# Patient Record
Sex: MALE | Race: BLACK OR AFRICAN AMERICAN | NOT HISPANIC OR LATINO | Employment: OTHER | URBAN - NONMETROPOLITAN AREA
[De-identification: names, ages, dates, MRNs, and addresses within clinical notes are randomized per-mention and may not be internally consistent; named-entity substitution may affect disease eponyms.]

---

## 2017-01-09 DIAGNOSIS — I65.23 BILATERAL CAROTID ARTERY STENOSIS: Primary | ICD-10-CM

## 2017-01-10 ENCOUNTER — OFFICE VISIT (OUTPATIENT)
Dept: CARDIAC SURGERY | Facility: CLINIC | Age: 82
End: 2017-01-10

## 2017-01-10 VITALS
WEIGHT: 157 LBS | HEIGHT: 66 IN | BODY MASS INDEX: 25.23 KG/M2 | TEMPERATURE: 98.3 F | HEART RATE: 89 BPM | OXYGEN SATURATION: 98 % | SYSTOLIC BLOOD PRESSURE: 180 MMHG | DIASTOLIC BLOOD PRESSURE: 81 MMHG

## 2017-01-10 DIAGNOSIS — I65.23 CAROTID STENOSIS, ASYMPTOMATIC, BILATERAL: Primary | ICD-10-CM

## 2017-01-10 PROCEDURE — 99212 OFFICE O/P EST SF 10 MIN: CPT | Performed by: THORACIC SURGERY (CARDIOTHORACIC VASCULAR SURGERY)

## 2017-01-10 RX ORDER — LISINOPRIL 20 MG/1
20 TABLET ORAL 2 TIMES DAILY
Qty: 60 TABLET | Refills: 3 | Status: SHIPPED | OUTPATIENT
Start: 2017-01-10 | End: 2017-05-09 | Stop reason: SDUPTHER

## 2017-01-10 NOTE — LETTER
January 15, 2017     Ada Cloud MD  [facility] [address] [place] [address]    Patient: General Keith Ruvalcaba Jr.   YOB: 1935   Date of Visit: 1/10/2017       Dear Dr. Ai MD:    General Ruvalcaba was in my office today. Below are the relevant portions of my assessment and plan of care.           If you have questions, please do not hesitate to call me. I look forward to following General along with you.         Sincerely,        Jack L. Hamman, MD        CC: No Recipients

## 2017-01-16 ENCOUNTER — OFFICE VISIT (OUTPATIENT)
Dept: CARDIOLOGY | Facility: CLINIC | Age: 82
End: 2017-01-16

## 2017-01-16 ENCOUNTER — TELEPHONE (OUTPATIENT)
Dept: CARDIOLOGY | Facility: CLINIC | Age: 82
End: 2017-01-16

## 2017-01-16 VITALS
HEART RATE: 84 BPM | WEIGHT: 159 LBS | BODY MASS INDEX: 25.55 KG/M2 | SYSTOLIC BLOOD PRESSURE: 170 MMHG | DIASTOLIC BLOOD PRESSURE: 86 MMHG | HEIGHT: 66 IN

## 2017-01-16 DIAGNOSIS — I50.32 CHRONIC DIASTOLIC CONGESTIVE HEART FAILURE (HCC): Chronic | ICD-10-CM

## 2017-01-16 DIAGNOSIS — I25.10 CORONARY ARTERY DISEASE INVOLVING NATIVE CORONARY ARTERY OF NATIVE HEART WITHOUT ANGINA PECTORIS: Primary | ICD-10-CM

## 2017-01-16 DIAGNOSIS — N18.2 CHRONIC KIDNEY DISEASE, STAGE 2 (MILD): Chronic | ICD-10-CM

## 2017-01-16 DIAGNOSIS — E11.9 TYPE 2 DIABETES MELLITUS WITHOUT COMPLICATION, WITHOUT LONG-TERM CURRENT USE OF INSULIN (HCC): ICD-10-CM

## 2017-01-16 PROCEDURE — 99214 OFFICE O/P EST MOD 30 MIN: CPT | Performed by: INTERNAL MEDICINE

## 2017-01-16 RX ORDER — SPIRONOLACTONE AND HYDROCHLOROTHIAZIDE 25; 25 MG/1; MG/1
0.5 TABLET ORAL DAILY
Qty: 30 TABLET | Refills: 12 | Status: SHIPPED | OUTPATIENT
Start: 2017-01-16 | End: 2017-01-28 | Stop reason: HOSPADM

## 2017-01-16 NOTE — MR AVS SNAPSHOT
General Parker Jordon    1/16/2017 10:15 AM   Office Visit    Dept Phone:  [phone]   Encounter #:  [id number]    Provider:  Kj Green MD   Department:  [facility] CARDIOLOGY                Your Full Care Plan              Today's Medication Changes          These changes are accurate as of: 1/16/17 11:12 AM.  If you have any questions, ask your nurse or doctor.               New Medication(s)Ordered:     spironolactone-hydrochlorothiazide 25-25 MG tablet   Commonly known as:  ALDACTAZIDE   Take 0.5 tablets by mouth Daily.   Started by:  Kj Green MD         Stop taking medication(s)listed here:     hydrALAZINE 25 MG tablet   Commonly known as:  APRESOLINE   Stopped by:  Kj Green MD           traMADol 50 MG tablet   Commonly known as:  ULTRAM   Stopped by:  Kj Green MD                Where to Get Your Medications      These medications were sent to [place] [address] [address] - [phone] [facility] [phone]   [address] [address]     Phone:  [phone]     spironolactone-hydrochlorothiazide 25-25 MG tablet                  Your Updated Medication List          This list is accurate as of: 1/16/17 11:12 AM.  Always use your most recent med list.                amLODIPine 10 MG tablet   Commonly known as:  NORVASC       aspirin 81 MG EC tablet       ferrous sulfate 325 (65 FE) MG tablet   Take 1 tablet by mouth Daily With Breakfast.       gabapentin 300 MG capsule   Commonly known as:  NEURONTIN   Take 1 capsule by mouth Every Night. at bedtime       glucose blood test strip   100 each by Other route Daily. TEST BLOOD SUGAR ONCE DAILY (LOT [id number] EXP 5/22/18); Truetest Test Strips       lisinopril 20 MG tablet   Commonly known as:  PRINIVIL,ZESTRIL   Take 1 tablet by mouth 2 (Two) Times a Day.       pravastatin 40 MG tablet   Commonly known as:  PRAVACHOL   Take  1 tablet by mouth daily.       SITagliptin 50 MG tablet   Commonly known as:  JANUVIA   Take 1 tablet by mouth Daily.       spironolactone-hydrochlorothiazide 25-25 MG tablet   Commonly known as:  ALDACTAZIDE   Take 0.5 tablets by mouth Daily.       TRUEPLUS LANCETS 30G misc   1 Device Daily. TEST               You Were Diagnosed With        Codes Comments    Coronary artery disease involving native coronary artery of native heart without angina pectoris    -  Primary ICD-10-CM: I25.10  ICD-9-CM: 414.01     Chronic diastolic congestive heart failure     ICD-10-CM: I50.32  ICD-9-CM: 428.32, 428.0     Chronic kidney disease, stage 2 (mild)     ICD-10-CM: N18.2  ICD-9-CM: 585.2     Type 2 diabetes mellitus without complication, without long-term current use of insulin     ICD-10-CM: E11.9  ICD-9-CM: 250.00       Instructions     None    Patient Instructions History      Upcoming Appointments     Visit Type Date Time Department    OFFICE VISIT 2017 10:15 AM [facility] HEART CARE [facility]AD    FOLLOW UP 2017  9:30 AM [facility] ORTHOPEDICS MAD    OFFICE VISIT 2017  8:30 AM [facility] INTERNAL MED MAD    OFFICE VISIT 2017  2:00 PM [facility] HEART CARE [facility]    CHF F/U 2017  9:00 AM [facility] CARDIOLOGY MAD    LAB 2017  9:00 AM [facility] OP INFUSION    FOLLOW UP - ONCOLOGY 2017  9:30 AM [facility] RAD ONC MAD    OFFICE VISIT 10/30/2017  8:00 AM [facility]OLOGY [facility] Sign[facility]     [facility] [employer] allows you to send messages to your doctor, view your test results, renew your prescriptions, schedule appointments, and more. To sign up, go to [employer] and click on the Sign Up Now link in the New User? box. Enter your [employer] Activation Code exactly as it appears below along with the last four digits of your Social Security Number and your Date of Birth () to complete the sign-up process. If you do not sign up before the expiration date, you must request a new code.    [employer] Activation Code:  [email or web address]  Expires: 1/23/2017  4:34 AM    If you have questions, you can email [email or web address] or call [phone] to talk to our MyChart staff. Remember, MyChart is NOT to be used for urgent needs. For medical emergencies, dial 911.               Other Info from Your Visit           Your Appointments     Jan 17, 2017  9:30 AM CST   Follow Up with Lonny Lorenzo MD   [facility] ORTHOPEDICS (--)    [address]  [address] [facility] [address] [phone]           Arrive 15 minutes prior to appointment.            Jan 23, 2017  8:30 AM CST   Office Visit with Ada Cloud MD   [facility] INTERNAL MEDICINE (--)    [address]  [address] [place] [address] [phone]           Arrive 15 minutes prior to appointment.            Feb 28, 2017  2:00 PM CST   Office Visit with Kj Green MD   [facility] CARDIOLOGY (--)    [address] [place] [address] [address] [place] [address] [phone]           Arrive 15 minutes prior to appointment.            Apr 11, 2017  9:00 AM CDT   CHF F/U with REGINA Carlos   [facility] CARDIOLOGY (--)    [address] [place] [address] [address]  [address] [place] [address] [phone]            Jul 25, 2017  9:00 AM CDT   LAB with NURSE JOSSE MARX   [facility] OP INFUSION ([facility]    [address] [address] [phone]              Allergies     No Known Allergies      Vital Signs     Blood Pressure Pulse Height Weight Body Mass Index Smoking Status    170/86 84 66\" (167.6 cm) 159 lb (72.1 kg) 25.66 kg/m2 Former Smoker      Problems and Diagnoses Noted     Heart failure    Chronic kidney disease    Coronary heart disease    Type 2 diabetes mellitus without complication, without long-term current use of insulin        "

## 2017-01-16 NOTE — PROGRESS NOTES
[facility] Keith Jordon .  1935            81 y.o.      1/10/2017    Chief Complaint   Patient presents with   • Carotid Artery Disease     2 year follow up     In August 2, 2005 this patient developed a DVT and pulmonary embolus despite adequate anticoagulation and thus underwent implantation of a vena caval filter which has remained stable and intact since that time.  He has long since been off anticoagulation.  [place] 10 when seen he was thought to have a significant carotid stenosis bilaterally been under surveillance since that time.  CT scan of his abdomen 5/14/14 demonstrated calcification in his abdominal aorta but NO ABDOMINAL AORTIC ANEURYSM      HPI        ROS       Current Outpatient Prescriptions:   •  amLODIPine (NORVASC) 10 MG tablet, Take 10 mg by mouth daily., Disp: , Rfl:   •  aspirin 81 MG EC tablet, Take 81 mg by mouth daily., Disp: , Rfl:   •  ferrous sulfate 325 (65 FE) MG tablet, Take 1 tablet by mouth Daily With Breakfast., Disp: 60 tablet, Rfl: 2  •  gabapentin (NEURONTIN) 300 MG capsule, Take 1 capsule by mouth Every Night. at bedtime, Disp: 30 capsule, Rfl: 4  •  glucose blood test strip, 100 each by Other route Daily. TEST BLOOD SUGAR ONCE DAILY (LOT [id number] EXP 5/22/18); Truetest Test Strips, Disp: 100 each, Rfl: 1  •  hydrALAZINE (APRESOLINE) 25 MG tablet, Take 1 tablet by mouth 2 (two) times a day., Disp: 60 tablet, Rfl: 2  •  pravastatin (PRAVACHOL) 40 MG tablet, Take 1 tablet by mouth daily., Disp: 30 tablet, Rfl: 5  •  traMADol (ULTRAM) 50 MG tablet, Take 1 tablet by mouth every 8 (eight) hours as needed for moderate pain (4-6)., Disp: 1 tablet, Rfl: 0  •  TRUEPLUS LANCETS 30G misc, 1 Device Daily. TEST, Disp: 100 each, Rfl: 3  •  lisinopril (PRINIVIL,ZESTRIL) 20 MG tablet, Take 1 tablet by mouth 2 (Two) Times a Day., Disp: 60 tablet, Rfl: 3  •  SITagliptin (JANUVIA) 50 MG tablet, Take 1 tablet by mouth Daily., Disp: 30 tablet, Rfl: 3    The following portions of the patient's  "history were reviewed and updated as appropriate: allergies, current medications, past family history, past medical history, past social history, past surgical history and problem list.    Physical Exam  Visit Vitals   • /81 (BP Location: Right arm)   • Pulse 89   • Temp 98.3 °F (36.8 °C) (Oral)   • Ht 66\" (167.6 cm)   • Wt 157 lb (71.2 kg)   • SpO2 98%   • BMI 25.34 kg/m2       HEENT:    CHEST:    HEART:    ABDOMEN:    EXTREMITIES:Pulses easily palpable both ankles DP PT arteries bilaterally    VASCULAR LAB STUDIES:CAROTID DUPLEX SCAN (1-10-17                                                                     RIGHT   0-15%              LEFT  0-15%          VERTEBRAL FLOW    Antegrade                     Antegrade          RADIOLOGY:      Carotid stenosis, asymptomatic, bilateral [I65.23]    IMPRESSION:  1. Minimal carotid stenosis asymptomatic                Assessment/Plan  General was seen today for carotid artery disease.    Diagnoses and all orders for this visit:    Carotid stenosis, asymptomatic, bilateral                Plan   1. Return 2 years carotid duplex scan        "

## 2017-01-16 NOTE — TELEPHONE ENCOUNTER
----- Message from Madelin Meza sent at 1/16/2017  1:25 PM CST -----  Contact: [phone]  Patient called to let you know that his blood pressure has been running high, he said the top number has been higher than normal.  He would like for you to call him at [phone].    Telephone contact: Mr Ruvalcaba desired to update me on medication changes made by Dr Green earlier today during office visit. Medication changes were made today due to uncontrolled hypertension.

## 2017-01-16 NOTE — PROGRESS NOTES
General Keith Ruvalcaba Jr.  81 y.o. male    01/16/2017  1. Coronary artery disease involving native coronary artery of native heart without angina pectoris    2. Chronic diastolic congestive heart failure    3. Chronic kidney disease, stage 2 (mild)    4. Type 2 diabetes mellitus without complication, without long-term current use of insulin        Chief complaint - follow-up diastolic heart failure      History of present Illness- 81-year-old gentleman with history of diastolic heart failure, systolic hypertension, history of atrial flutter and nonobstructive coronary disease has been doing fairly well but is systolic blood pressure staying around 170-190.  And going to add Aldactazide half a tablet in the morning along with his lisinopril and amlodipine.  We'll check a Chem-7 and a BNP in 1 week.  And he will follow with me in 6-8 weeks to see how he does.  He does not have any chest pain or shortness of breath no weight gain or weight loss, no TIA symptoms.        No Known Allergies      Past Medical History   Diagnosis Date   • Acquired hallux rigidus    • Arthropathy of lumbar facet joint    • Carpal tunnel syndrome    • CHF (congestive heart failure)    • Chronic diastolic congestive heart failure    • Chronic kidney disease    • Chronic kidney disease, stage II (mild)      Chronic kidney disease stage 2   • Chronic obstructive lung disease    • Chronic renal failure    • Coronary artery disease    • Cortical senile cataract    • Diabetes mellitus    • Diabetes mellitus type 2, noninsulin dependent      diabetes mellitus type 2, non-insulin treated, Medication treatment plan: oral diabetic medication   • Diabetes mellitus without complication    • Diastolic dysfunction    • Diastolic heart failure    • Essential hypertension      difficult to control      • Hip pain    • History of echocardiogram 08/19/2015     Echocardiogram W/ color flow 26746 (1) - Mild to moderate LVH with mild left atrial enlargement and  normal aortic root.CLVH,preserved LV systolic function with Ef of 55%.Diastolic dysfunction.MV intact.AV thickened.Aortic sclerosis.   • Influenza vaccine administered 10/02/2015     INFLUENZA IMMUN ADMIN OR PREV RECV'D  (1) - Ordered By: DIONNE MANDUJANO ([facility])    • Insomnia    • Insomnia      Other insomnia   • Joint pain    • Low back pain    • Muscle strain    • Nuclear cataract    • Pneumococcal vaccination given 07/22/2016     PNEUMOC VAC/ADMIN/RCVD 4040F (3) - Ordered By: DIONNE MANDUJANO ([facility])   • Pure hypercholesterolemia    • Sedentary lifestyle          Past Surgical History   Procedure Laterality Date   • Injection of medication  09/06/2012     Albuterol (2u) (1) - Ordered By: LIDYA STEINER ([facility])    • Injection of medication  09/06/2012     Atrovent (1) - Ordered By: LIDYA STEINER ([facility])    • Cardiac ablation     • Cardiac catheterization  02/04/1986     Cardiac cath 13892 (1) - normal left heart catherization,non cardiac chest pain   • Carpal tunnel release  10/19/2015     Carpal tunnel surgery (1) - Release of left carpal tunnel   • Endoscopy  08/05/2013     Colon endoscopy 86544 (2) - Hemorrhoids found.   • Injection of medication  02/10/2012     Depo Medrol (Methylprednisone) (3) - Ordered By: HEATHER PAK ([facility])    • Other surgical history  09/24/2011     Drain/Inject Major Joint 20610 (1) - Ordered By: KARLOS HERNANDEZ ([facility])    • Inguinal hernia repair Right 06/07/1968     Inguinal hernia, repair (2)   • Other surgical history       Insert IVC filter 59771 (1)   • Injection of medication  02/09/2014     Kenalog (4) - Ordered By: DIMAS ANDRADE ([facility])    • Lumbar laminectomy  2007     Lumbar laminectomy (1)   • Other surgical history  10/11/2011     SPIROMETRY 69631 (1) - Ordered By: HEATHER PAK ([facility])   • Mandible fracture surgery  10/11/1981     Treat nose/jaw fracture (1) - bilateral open reduction of fracture of mandible         Family History   Problem  Relation Age of Onset   • Diabetes Other    • Coronary artery disease Neg Hx          Social History     Social History   • Marital status:      Spouse name: N/A   • Number of children: N/A   • Years of education: N/A     Occupational History   • Not on file.     Social History Main Topics   • Smoking status: Former Smoker     Years: 25.00   • Smokeless tobacco: Never Used      Comment: Patient quit smoking years ago, smoked less than a pack a day for 25 years   • Alcohol use No   • Drug use: No   • Sexual activity: Defer      Comment: Marital status:      Other Topics Concern   • Not on file     Social History Narrative         Current Outpatient Prescriptions   Medication Sig Dispense Refill   • amLODIPine (NORVASC) 10 MG tablet Take 10 mg by mouth daily.     • aspirin 81 MG EC tablet Take 81 mg by mouth daily.     • ferrous sulfate 325 (65 FE) MG tablet Take 1 tablet by mouth Daily With Breakfast. 60 tablet 2   • gabapentin (NEURONTIN) 300 MG capsule Take 1 capsule by mouth Every Night. at bedtime 30 capsule 4   • glucose blood test strip 100 each by Other route Daily. TEST BLOOD SUGAR ONCE DAILY (LOT [id number] EXP 5/22/18); Truetest Test Strips 100 each 1   • lisinopril (PRINIVIL,ZESTRIL) 20 MG tablet Take 1 tablet by mouth 2 (Two) Times a Day. 60 tablet 3   • pravastatin (PRAVACHOL) 40 MG tablet Take 1 tablet by mouth daily. 30 tablet 5   • SITagliptin (JANUVIA) 50 MG tablet Take 1 tablet by mouth Daily. 30 tablet 3   • TRUEPLUS LANCETS 30G misc 1 Device Daily. TEST 100 each 3   • spironolactone-hydrochlorothiazide (ALDACTAZIDE) 25-25 MG tablet Take 0.5 tablets by mouth Daily. 30 tablet 12     No current facility-administered medications for this visit.          Review of Systems     Constitution: Denies any fatigue, fever or chills    HENT: Denies any headache, hearing impairment, nasal discharge or sore throat    Eyes: Denies any blurring of vision, or photophobia     Cardivascular - As per history  "of present illness     Respiratory system-denies any COPD, shortness of breath, sleep apnea.     Endocrine:   history of hyperlipidemia, diabetes       Musculoskeletal:   history of arthritis, musculoskeletal problems    Gastrointestinal: No nausea, vomiting, or melena    Genitourinary: No dysuria or hematuria    Neurological:   No history of seizure disorder, stroke, memory problems    Psychiatric/Behavioral:        No history of depression, bipolar disorder or schizophrenia     Hematological- no history of easy bruising or any bleeding diathesis            OBJECTIVE    Visit Vitals   • /86   • Pulse 84   • Ht 66\" (167.6 cm)   • Wt 159 lb (72.1 kg)   • BMI 25.66 kg/m2         Physical Exam     Constitutional: is oriented to person, place, and time.     Skin-warm and dry    Well developed and nourished in no acute distress      Head: Normocephalic and atraumatic.     Eyes: Pupils are equal, round, and reactive to light.     Neck: Neck supple. No bruit in the carotids, no elevation of JVD    Cardiovascular: [place] in the fifth intercostal space, regular rate, and  Rhythm,  S1 greater than S2, no S3 or S4, no gallop       Pulmonary/Chest:   Air  Entry is equal on both sides  No wheezing or crackles,      Abdominal: Soft.  No hepatosplenomegaly, bowel sounds are present    Musculoskeletal: No kyphoscoliosis, no significant thickening of the joints    Neurological: is alert and oriented to person, place, and time.    cranial nerve are intact .   No motor or sensory deficit    Extremities-no edema,  pulses are felt equally on both sides, no radial femoral delay      Psychiatric: He has a normal mood and affect.                  His behavior is normal.           Procedures      Lab Results   Component Value Date    WBC 5.7 10/10/2016    HGB 14.4 10/10/2016    HCT 42.3 10/10/2016    MCV 83.3 10/10/2016     10/10/2016     Lab Results   Component Value Date    GLUCOSE 101 (H) 08/25/2016    BUN 12 08/25/2016    " CREATININE 1.1 08/25/2016    CO2 28 08/25/2016    CALCIUM 9.2 08/25/2016    PROTENTOTREF 6.4 05/14/2015    ALBUMIN 4.2 09/21/2016    LABIL2 1.7 05/14/2015    AST 30 09/21/2016    ALT 19 (L) 09/21/2016     No results found for: CHOL  Lab Results   Component Value Date    TRIG 67 08/26/2016    TRIG 157 08/27/2015    TRIG 67 06/11/2015     Lab Results   Component Value Date    HDL 49 (L) 08/26/2016     Lab Results   Component Value Date    LDLCALC 79 08/26/2016    LDLCALC 96 08/27/2015    LDLCALC 100 06/11/2015     No results found for: LDL  No results found for: HDLLDLRATIO  No components found for: CHOLHDL  Lab Results   Component Value Date    HGBA1C 5.9 (H) 09/21/2016     Lab Results   Component Value Date    TSH 1.70 09/21/2016                  A/P    Diastolic heart failure with uncontrolled systolic hypertension added Aldactazide half a tablet daily along with lisinopril and amlodipine.  We'll check a BNP and a BMP in 1 week.  follow with me in 6 weeks.    Systolic hypertension not very well controlled added Aldactazide.    Diabetes is on Januvia and does Accu-Cheks.    Hyperlipidemia on pravastatin doing okay.      Kj Green MD  1/16/2017  11:11 AM

## 2017-01-24 ENCOUNTER — RESULTS ENCOUNTER (OUTPATIENT)
Dept: CARDIOLOGY | Facility: CLINIC | Age: 82
End: 2017-01-24

## 2017-01-24 DIAGNOSIS — N18.2 CHRONIC KIDNEY DISEASE, STAGE 2 (MILD): Chronic | ICD-10-CM

## 2017-01-24 DIAGNOSIS — I25.10 CORONARY ARTERY DISEASE INVOLVING NATIVE CORONARY ARTERY OF NATIVE HEART WITHOUT ANGINA PECTORIS: ICD-10-CM

## 2017-01-26 ENCOUNTER — OFFICE VISIT (OUTPATIENT)
Dept: INTERNAL MEDICINE | Facility: CLINIC | Age: 82
End: 2017-01-26

## 2017-01-26 VITALS
SYSTOLIC BLOOD PRESSURE: 140 MMHG | HEIGHT: 66 IN | DIASTOLIC BLOOD PRESSURE: 80 MMHG | BODY MASS INDEX: 25.78 KG/M2 | WEIGHT: 160.4 LBS

## 2017-01-26 DIAGNOSIS — E78.2 MIXED HYPERLIPIDEMIA: ICD-10-CM

## 2017-01-26 DIAGNOSIS — Z23 IMMUNIZATION DUE: ICD-10-CM

## 2017-01-26 DIAGNOSIS — I10 ESSENTIAL HYPERTENSION: ICD-10-CM

## 2017-01-26 DIAGNOSIS — E11.9 TYPE 2 DIABETES MELLITUS WITHOUT COMPLICATION, WITHOUT LONG-TERM CURRENT USE OF INSULIN (HCC): Primary | ICD-10-CM

## 2017-01-26 PROCEDURE — 90686 IIV4 VACC NO PRSV 0.5 ML IM: CPT | Performed by: INTERNAL MEDICINE

## 2017-01-26 PROCEDURE — 99213 OFFICE O/P EST LOW 20 MIN: CPT | Performed by: INTERNAL MEDICINE

## 2017-01-26 PROCEDURE — G0008 ADMIN INFLUENZA VIRUS VAC: HCPCS | Performed by: INTERNAL MEDICINE

## 2017-01-26 RX ORDER — AMLODIPINE BESYLATE 10 MG/1
10 TABLET ORAL DAILY
Qty: 30 TABLET | Refills: 11 | Status: SHIPPED | OUTPATIENT
Start: 2017-01-26 | End: 2017-11-08

## 2017-01-26 RX ORDER — GUANFACINE 1 MG/1
1 TABLET ORAL NIGHTLY
Qty: 30 TABLET | Refills: 5 | Status: SHIPPED | OUTPATIENT
Start: 2017-01-26 | End: 2017-02-24

## 2017-01-26 NOTE — PROGRESS NOTES
Subjective   General Keith Ruvalcaba Jr. is a 81 y.o. male     Patient presents for recheck on HTN, chronic renal failure, DM and hyperlipidemia.       DM, type II.  Not always compliant with ADA diet.  Glucose is running below 150.  HgbA1c 6.1 /08/26/16/.  On ACE inhibitor for HTN.  On statin for hyperlipidemia.  Lipids., TG 67, HDL 49, LDL 79. /08/26/2016/.  He tolerates Pravastatin well, and denies any side effects to the medication. No myalgia or myositis. No history of LFTs elevation.    Patient's BP was elevated, and he was started on Alactazide by .  Follow up labwork showed hyponatremia with Na 128 and hyperkalemia with Potassium 5.2.  Renal function is stable with creatinine between 1.1 and 1.2.  Patient currently denies any chest pain, dyspnea or edema in lower extremities. No TIA symptoms.   ECHO. 08/2016.  CLINICAL IMPRESSION:  1. Mild concentric left ventricular hypertrophy with early diastolic  dysfunction.  2. Estimated ejection fraction greater than 60%.  3. Mildly sclerotic aortic valve with mild mitral annular  calcification without any hemodynamic significant stenotic process.  4. On color flow Doppler, there is trace mitral and tricuspid  regurgitation without any hemodynamic significance.  5. No intracardiac mass, pericardial effusion, or cardiac thrombus  seen.          Past Medical History   Diagnosis Date   • Acquired hallux rigidus    • Arthropathy of lumbar facet joint    • Carpal tunnel syndrome    • CHF (congestive heart failure)    • Chronic diastolic congestive heart failure    • Chronic kidney disease    • Chronic kidney disease, stage II (mild)      Chronic kidney disease stage 2   • Chronic obstructive lung disease    • Chronic renal failure    • Coronary artery disease    • Cortical senile cataract    • Diabetes mellitus    • Diabetes mellitus type 2, noninsulin dependent      diabetes mellitus type 2, non-insulin treated, Medication treatment plan: oral diabetic medication    • Diabetes mellitus without complication    • Diastolic dysfunction    • Diastolic heart failure    • Essential hypertension      difficult to control      • Hip pain    • History of echocardiogram 08/19/2015     Echocardiogram W/ color flow 20751 (1) - Mild to moderate LVH with mild left atrial enlargement and normal aortic root.CLVH,preserved LV systolic function with Ef of 55%.Diastolic dysfunction.MV intact.AV thickened.Aortic sclerosis.   • Influenza vaccine administered 10/02/2015     INFLUENZA IMMUN ADMIN OR PREV RECV'D  (1) - Ordered By: DIONNE MANDUJANO ([facility])    • Insomnia    • Insomnia      Other insomnia   • Joint pain    • Low back pain    • Muscle strain    • Nuclear cataract    • Pneumococcal vaccination given 07/22/2016     PNEUMOC VAC/ADMIN/RCVD 4040F (3) - Ordered By: DIONNE MANDUJANO ([facility])   • Pure hypercholesterolemia    • Sedentary lifestyle      Past Surgical History   Procedure Laterality Date   • Injection of medication  09/06/2012     Albuterol (2u) (1) - Ordered By: LIDYA STEINER ([facility])    • Injection of medication  09/06/2012     Atrovent (1) - Ordered By: LIDYA STEINER ([facility])    • Cardiac ablation     • Cardiac catheterization  02/04/1986     Cardiac cath 80697 (1) - normal left heart catherization,non cardiac chest pain   • Carpal tunnel release  10/19/2015     Carpal tunnel surgery (1) - Release of left carpal tunnel   • Endoscopy  08/05/2013     Colon endoscopy 46326 (2) - Hemorrhoids found.   • Injection of medication  02/10/2012     Depo Medrol (Methylprednisone) (3) - Ordered By: HEATHER PAK ([facility])    • Other surgical history  09/24/2011     Drain/Inject Major Joint 20610 (1) - Ordered By: KARLOS HERNANDEZ ([facility])    • Inguinal hernia repair Right 06/07/1968     Inguinal hernia, repair (2)   • Other surgical history       Insert IVC filter 34770 (1)   • Injection of medication  02/09/2014     Kenalog (4) - Ordered By: DIMAS ANDRADE ([facility])    •  Lumbar laminectomy  2007     Lumbar laminectomy (1)   • Other surgical history  10/11/2011     SPIROMETRY 42698 (1) - Ordered By: HEATHER PAK ([facility])   • Mandible fracture surgery  10/11/1981     Treat nose/jaw fracture (1) - bilateral open reduction of fracture of mandible       Social History   Substance Use Topics   • Smoking status: Former Smoker     Years: 25.00   • Smokeless tobacco: Never Used      Comment: Patient quit smoking years ago, smoked less than a pack a day for 25 years   • Alcohol use No     Family History   Problem Relation Age of Onset   • Diabetes Other    • Coronary artery disease Neg Hx      No Known Allergies  Current Outpatient Prescriptions on File Prior to Visit   Medication Sig Dispense Refill   • aspirin 81 MG EC tablet Take 81 mg by mouth daily.     • ferrous sulfate 325 (65 FE) MG tablet Take 1 tablet by mouth Daily With Breakfast. 60 tablet 2   • gabapentin (NEURONTIN) 300 MG capsule Take 1 capsule by mouth Every Night. at bedtime 30 capsule 4   • glucose blood test strip 100 each by Other route Daily. TEST BLOOD SUGAR ONCE DAILY (LOT [id number] EXP 5/22/18); Truetest Test Strips 100 each 1   • lisinopril (PRINIVIL,ZESTRIL) 20 MG tablet Take 1 tablet by mouth 2 (Two) Times a Day. 60 tablet 3   • pravastatin (PRAVACHOL) 40 MG tablet Take 1 tablet by mouth daily. 30 tablet 5   • SITagliptin (JANUVIA) 50 MG tablet Take 1 tablet by mouth Daily. 30 tablet 3   • spironolactone-hydrochlorothiazide (ALDACTAZIDE) 25-25 MG tablet Take 0.5 tablets by mouth Daily. 30 tablet 12   • TRUEPLUS LANCETS 30G misc 1 Device Daily. TEST 100 each 3   • [DISCONTINUED] amLODIPine (NORVASC) 10 MG tablet Take 10 mg by mouth daily.       No current facility-administered medications on file prior to visit.      Review of Systems   Constitutional: Negative for fatigue.   HENT: Negative for congestion, mouth sores and nosebleeds.    Eyes: Negative for visual disturbance.   Respiratory: Negative for chest  tightness and shortness of breath.    Cardiovascular: Negative for chest pain, palpitations and leg swelling.   Gastrointestinal: Negative for abdominal pain, constipation and diarrhea.   Endocrine: Negative for cold intolerance, heat intolerance, polyphagia and polyuria.   Musculoskeletal: Negative for gait problem.   Neurological: Negative for dizziness, weakness and light-headedness.   Psychiatric/Behavioral: Negative for sleep disturbance. The patient is not nervous/anxious.        Objective   Physical Exam   Constitutional: He is oriented to person, place, and time. He appears well-developed and well-nourished.   HENT:   Head: Normocephalic and atraumatic.   Eyes: EOM are normal.   Neck: No JVD present. No thyromegaly present.   Cardiovascular: Normal rate and regular rhythm.    No murmur heard.  Pulmonary/Chest: Effort normal and breath sounds normal.   Abdominal: Soft. He exhibits no mass.       Neurological Sensory Findings -  Unaltered sharp/dull right ankle/foot discrimination and unaltered sharp/dull left ankle/foot discrimination.    Vascular Status -  His exam exhibits right foot vasculature normal. His exam exhibits left foot vasculature normal.   Skin Integrity  -  His right foot skin is intact.   General's left foot skin is intact. .  Neurological: He is alert and oriented to person, place, and time. No cranial nerve deficit.   Skin: Skin is warm and dry.           Patient Active Problem List   Diagnosis   • Chronic diastolic congestive heart failure   • Hypertension   • Coronary artery disease   • Chronic kidney disease   • H/O total hip arthroplasty   • Lumbar disc disease   • Osteoarthritis of multiple joints   • Iron deficiency   • Nuclear cataract   • Cortical age-related cataract   • Diabetes mellitus without complication   • Trochanteric bursitis, left hip   • Carotid stenosis, asymptomatic   • Type 2 diabetes mellitus without complication, without long-term current use of insulin                Assessment    Diagnosis Plan   1. Type 2 diabetes mellitus without complication, without long-term current use of insulin  Basic Metabolic Panel    Hemoglobin A1c    Microalbumin / Creatinine Urine Ratio   2. Essential hypertension     3. Mixed hyperlipidemia           Plan      1. Patient will continue Januvia.      ADA diet discussed with the patient.      Counseled on physical activity.  2. Will discontinue Aldactazide.      Add Tenex 1 mg ghs.       Continue Norvasc 10 mg daily and Lisinopril 20 mg bid.       Will recheck electrolytes off Altactazide.  3. Patient will continue Pravastatin for hyperlipidemia and history of CAD.      Counseled on physical activity.      He will return in 1 month for BP check.

## 2017-01-26 NOTE — MR AVS SNAPSHOT
General Parker Jordon HerndonHerman   1/26/2017 1:00 PM   Office Visit    Dept Phone:  [phone]   Encounter #:  [id number]    Provider:  Ada Cloud MD   Department:  [facility] INTERNAL MEDICINE                Your Full Care Plan              Today's Medication Changes          These changes are accurate as of: 1/26/17  1:50 PM.  If you have any questions, ask your nurse or doctor.               New Medication(s)Ordered:     guanFACINE 1 MG tablet   Commonly known as:  TENEX   Take 1 tablet by mouth Every Night.   Started by:  Ada Cloud MD            Where to Get Your Medications      These medications were sent to [facility] - [place], [place] - [address] - [phone]  - [phone]   [address], [facility] [address]     Phone:  [phone]     amLODIPine 10 MG tablet    guanFACINE 1 MG tablet                  Your Updated Medication List          This list is accurate as of: 1/26/17  1:50 PM.  Always use your most recent med list.                amLODIPine 10 MG tablet   Commonly known as:  NORVASC   Take 1 tablet by mouth Daily.       aspirin 81 MG EC tablet       ferrous sulfate 325 (65 FE) MG tablet   Take 1 tablet by mouth Daily With Breakfast.       gabapentin 300 MG capsule   Commonly known as:  NEURONTIN   Take 1 capsule by mouth Every Night. at bedtime       glucose blood test strip   100 each by Other route Daily. TEST BLOOD SUGAR ONCE DAILY (LOT [id number] EXP 5/22/18); Truetest Test Strips       guanFACINE 1 MG tablet   Commonly known as:  TENEX   Take 1 tablet by mouth Every Night.       lisinopril 20 MG tablet   Commonly known as:  PRINIVIL,ZESTRIL   Take 1 tablet by mouth 2 (Two) Times a Day.       pravastatin 40 MG tablet   Commonly known as:  PRAVACHOL   Take 1 tablet by mouth daily.       SITagliptin 50 MG tablet   Commonly known as:  JANUVIA   Take 1 tablet by mouth Daily.       spironolactone-hydrochlorothiazide  25-25 MG tablet   Commonly known as:  ALDACTAZIDE   Take 0.5 tablets by mouth Daily.       TRUEPLUS LANCETS 30G misc   1 Device Daily. TEST               We Performed the Following     Basic Metabolic Panel     Flu Vaccine Quad (FLUZONE)     Hemoglobin A1c     Microalbumin / Creatinine Urine Ratio       You Were Diagnosed With        Codes Comments    Type 2 diabetes mellitus without complication, without long-term current use of insulin    -  Primary ICD-10-CM: E11.9  ICD-9-CM: 250.00     Essential hypertension     ICD-10-CM: I10  ICD-9-CM: 401.9     Mixed hyperlipidemia     ICD-10-CM: E78.2  ICD-9-CM: 272.2     Immunization due     ICD-10-CM: Z23  ICD-9-CM: V05.9       Instructions     None    Patient Instructions History      Upcoming Appointments     Visit Type Date Time Department    OFFICE VISIT 1/26/2017  1:00 PM [facility] INTERNAL MED [facility]    FOLLOW UP 1/31/2017  9:15 AM [facility] ORTHOPEDICS MAD    NURSE/MA VISIT 2/24/2017  8:00 AM [facility] INTERNAL MED [facility]    OFFICE VISIT 2/28/2017  2:00 PM [facility] HEART CARE [facility]    CHF F/U 4/11/2017  9:00 AM [facility] CARDIOLOGY MAD    LAB 7/25/2017  9:00 AM [facility] OP INFUSION    FOLLOW UP - ONCOLOGY 7/25/2017  9:30 AM [facility] RAD ONC MAD    OFFICE VISIT 7/26/2017  8:00 AM [facility] INTERNAL MED [facility]    OFFICE VISIT 10/30/2017  8:00 AM [facility] OPHTHALMOLOGY [facility]      MyChart Signup     Our records indicate that you have declined [facility] sign[facility]. If you would like to sign up for [facility], please email [email or web address] or call [phone] to obtain an activation code.             Other Info from Your Visit           Your Appointments     Jan 31, 2017  9:15 AM CST   Follow Up with Lonny Lorenzo MD   [facility] ORTHOPEDICS (--)    [address] [place] [address] [address]  [address] [place] [address] [phone]           Arrive 15 minutes prior to appointment.            Feb 24, 2017  8:00 AM CST   Nurse Visit with MA INTERNAL MED [facility]  "INTERNAL MEDICINE (--)    [address]  [address] [facility] [address] [phone]            Feb 28, 2017  2:00 PM CST   Office Visit with Kj Green MD   [facility] CARDIOLOGY (--)    [address]  [facility] [address] [phone]           Arrive 15 minutes prior to appointment.            Apr 11, 2017  9:00 AM CDT   CHF F/U with REGINA Carlos   [facility] CARDIOLOGY (--)    [address] [place] [address] Dr  [address] [facility] [address] [phone]            Jul 25, 2017  9:00 AM CDT   LAB with JOSSE MARX OP INFU PROCEDURE CHAIR   [facility] OP INFUSION ([place])    [address]  [facility] [address] [phone]              Allergies     No Known Allergies      Reason for Visit     Blood Pressure Check           Vital Signs     Blood Pressure Height Weight Body Mass Index Smoking Status       140/80 66\" (167.6 cm) 160 lb 6.4 oz (72.8 kg) 25.89 kg/m2 Former Smoker       Problems and Diagnoses Noted     High blood pressure    Type 2 diabetes mellitus without complication, without long-term current use of insulin    Mixed hyperlipidemia        Immunization due          Immunizations Administered     Name Date    Flu Vaccine Split Quad         "

## 2017-01-27 ENCOUNTER — HOSPITAL ENCOUNTER (INPATIENT)
Facility: HOSPITAL | Age: 82
LOS: 1 days | Discharge: HOME | End: 2017-01-28
Attending: EMERGENCY MEDICINE | Admitting: FAMILY MEDICINE

## 2017-01-27 ENCOUNTER — APPOINTMENT (OUTPATIENT)
Dept: GENERAL RADIOLOGY | Facility: HOSPITAL | Age: 82
End: 2017-01-27

## 2017-01-27 DIAGNOSIS — R53.1 WEAKNESS: ICD-10-CM

## 2017-01-27 DIAGNOSIS — E87.1 HYPONATREMIA: Primary | ICD-10-CM

## 2017-01-27 LAB
ALBUMIN SERPL-MCNC: 3.9 G/DL (ref 3.4–4.8)
ALBUMIN/GLOB SERPL: 1.4 G/DL (ref 1.1–1.8)
ALP SERPL-CCNC: 66 U/L (ref 38–126)
ALT SERPL W P-5'-P-CCNC: 26 U/L (ref 21–72)
ANION GAP SERPL CALCULATED.3IONS-SCNC: 6 MMOL/L (ref 5–15)
ANION GAP SERPL CALCULATED.3IONS-SCNC: 7 MMOL/L (ref 5–15)
AST SERPL-CCNC: 38 U/L (ref 17–59)
BASOPHILS # BLD AUTO: 0.06 10*3/MM3 (ref 0–0.2)
BASOPHILS # BLD MANUAL: 0.04 10*3/MM3 (ref 0–0.2)
BASOPHILS NFR BLD AUTO: 1 % (ref 0–2)
BASOPHILS NFR BLD AUTO: 1.5 % (ref 0–2)
BILIRUB SERPL-MCNC: 0.6 MG/DL (ref 0.2–1.3)
BILIRUB UR QL STRIP: NEGATIVE
BUN BLD-MCNC: 17 MG/DL (ref 7–21)
BUN BLD-MCNC: 17 MG/DL (ref 7–21)
BUN/CREAT SERPL: 15.2 (ref 7–25)
BUN/CREAT SERPL: 15.3 (ref 7–25)
CALCIUM SPEC-SCNC: 8.9 MG/DL (ref 8.4–10.2)
CALCIUM SPEC-SCNC: 9 MG/DL (ref 8.4–10.2)
CHLORIDE SERPL-SCNC: 89 MMOL/L (ref 95–110)
CHLORIDE SERPL-SCNC: 94 MMOL/L (ref 95–110)
CK MB SERPL-CCNC: 3.71 NG/ML (ref 0–5)
CK SERPL-CCNC: 332 U/L (ref 55–170)
CLARITY UR: CLEAR
CO2 SERPL-SCNC: 29 MMOL/L (ref 22–31)
CO2 SERPL-SCNC: 29 MMOL/L (ref 22–31)
COLOR UR: YELLOW
CREAT BLD-MCNC: 1.11 MG/DL (ref 0.7–1.3)
CREAT BLD-MCNC: 1.12 MG/DL (ref 0.7–1.3)
DEPRECATED RDW RBC AUTO: 44.3 FL (ref 35.1–43.9)
DEPRECATED RDW RBC AUTO: 44.9 FL (ref 35.1–43.9)
EOSINOPHIL # BLD AUTO: 0.11 10*3/MM3 (ref 0–0.7)
EOSINOPHIL # BLD MANUAL: 0.21 10*3/MM3 (ref 0–0.7)
EOSINOPHIL NFR BLD AUTO: 2.7 % (ref 0–7)
EOSINOPHIL NFR BLD MANUAL: 5 % (ref 0–7)
ERYTHROCYTE [DISTWIDTH] IN BLOOD BY AUTOMATED COUNT: 14.3 % (ref 11.5–14.5)
ERYTHROCYTE [DISTWIDTH] IN BLOOD BY AUTOMATED COUNT: 14.4 % (ref 11.5–14.5)
GFR SERPL CREATININE-BSD FRML MDRD: 76 ML/MIN/1.73 (ref 42–98)
GFR SERPL CREATININE-BSD FRML MDRD: 77 ML/MIN/1.73 (ref 42–98)
GLOBULIN UR ELPH-MCNC: 2.7 GM/DL (ref 2.3–3.5)
GLUCOSE BLD-MCNC: 107 MG/DL (ref 60–100)
GLUCOSE BLD-MCNC: 95 MG/DL (ref 60–100)
GLUCOSE BLDC GLUCOMTR-MCNC: 104 MG/DL (ref 70–130)
GLUCOSE BLDC GLUCOMTR-MCNC: 122 MG/DL (ref 70–130)
GLUCOSE BLDC GLUCOMTR-MCNC: 91 MG/DL (ref 70–130)
GLUCOSE UR STRIP-MCNC: NEGATIVE MG/DL
HCT VFR BLD AUTO: 40.7 % (ref 39–49)
HCT VFR BLD AUTO: 41.7 % (ref 39–49)
HGB BLD-MCNC: 14.3 G/DL (ref 13.7–17.3)
HGB BLD-MCNC: 14.9 G/DL (ref 13.7–17.3)
HGB UR QL STRIP.AUTO: NEGATIVE
HOLD SPECIMEN: NORMAL
HOLD SPECIMEN: NORMAL
IMM GRANULOCYTES # BLD: 0.03 10*3/MM3 (ref 0–0.02)
IMM GRANULOCYTES NFR BLD: 0.7 % (ref 0–0.5)
INR PPP: 1 (ref 0.8–1.2)
KETONES UR QL STRIP: NEGATIVE
LEUKOCYTE ESTERASE UR QL STRIP.AUTO: NEGATIVE
LYMPHOCYTES # BLD AUTO: 1 10*3/MM3 (ref 0.6–4.2)
LYMPHOCYTES # BLD MANUAL: 1.05 10*3/MM3 (ref 0.6–4.2)
LYMPHOCYTES NFR BLD AUTO: 24.4 % (ref 10–50)
LYMPHOCYTES NFR BLD MANUAL: 21 % (ref 0–12)
LYMPHOCYTES NFR BLD MANUAL: 25 % (ref 10–50)
MAGNESIUM SERPL-MCNC: 2 MG/DL (ref 1.6–2.3)
MCH RBC QN AUTO: 29.9 PG (ref 26.5–34)
MCH RBC QN AUTO: 29.9 PG (ref 26.5–34)
MCHC RBC AUTO-ENTMCNC: 35.1 G/DL (ref 31.5–36.3)
MCHC RBC AUTO-ENTMCNC: 35.7 G/DL (ref 31.5–36.3)
MCV RBC AUTO: 83.7 FL (ref 80–98)
MCV RBC AUTO: 85 FL (ref 80–98)
MONOCYTES # BLD AUTO: 0.81 10*3/MM3 (ref 0–0.9)
MONOCYTES # BLD AUTO: 0.88 10*3/MM3 (ref 0–0.9)
MONOCYTES NFR BLD AUTO: 19.8 % (ref 0–12)
NEUTROPHILS # BLD AUTO: 1.98 10*3/MM3 (ref 2–8.6)
NEUTROPHILS # BLD AUTO: 2.08 10*3/MM3 (ref 2–8.6)
NEUTROPHILS NFR BLD AUTO: 50.9 % (ref 37–80)
NEUTROPHILS NFR BLD MANUAL: 47 % (ref 37–80)
NITRITE UR QL STRIP: NEGATIVE
NT-PROBNP SERPL-MCNC: 40.8 PG/ML (ref 0–1800)
OSMOLALITY UR: 363 MOSM/KG
PH UR STRIP.AUTO: 5.5 [PH] (ref 5–9)
PLAT MORPH BLD: NORMAL
PLATELET # BLD AUTO: 312 10*3/MM3 (ref 150–450)
PLATELET # BLD AUTO: 326 10*3/MM3 (ref 150–450)
PMV BLD AUTO: 7.9 FL (ref 8–12)
PMV BLD AUTO: 8 FL (ref 8–12)
POTASSIUM BLD-SCNC: 5.6 MMOL/L (ref 3.5–5.1)
POTASSIUM BLD-SCNC: 5.7 MMOL/L (ref 3.5–5.1)
PROT SERPL-MCNC: 6.6 G/DL (ref 6.3–8.6)
PROT UR QL STRIP: NEGATIVE
PROTHROMBIN TIME: 13.2 SECONDS (ref 11.1–15.3)
RBC # BLD AUTO: 4.79 10*6/MM3 (ref 4.37–5.74)
RBC # BLD AUTO: 4.98 10*6/MM3 (ref 4.37–5.74)
RBC MORPH BLD: NORMAL
SODIUM BLD-SCNC: 125 MMOL/L (ref 137–145)
SODIUM BLD-SCNC: 129 MMOL/L (ref 137–145)
SP GR UR STRIP: 1.01 (ref 1–1.03)
TROPONIN I SERPL-MCNC: <0.012 NG/ML
TROPONIN I SERPL-MCNC: <0.012 NG/ML
UROBILINOGEN UR QL STRIP: NORMAL
VARIANT LYMPHS NFR BLD MANUAL: 1 % (ref 0–5)
WBC MORPH BLD: NORMAL
WBC NRBC COR # BLD: 4.09 10*3/MM3 (ref 3.2–9.8)
WBC NRBC COR # BLD: 4.21 10*3/MM3 (ref 3.2–9.8)
WHOLE BLOOD HOLD SPECIMEN: NORMAL
WHOLE BLOOD HOLD SPECIMEN: NORMAL

## 2017-01-27 PROCEDURE — 85025 COMPLETE CBC W/AUTO DIFF WBC: CPT | Performed by: EMERGENCY MEDICINE

## 2017-01-27 PROCEDURE — 80053 COMPREHEN METABOLIC PANEL: CPT | Performed by: EMERGENCY MEDICINE

## 2017-01-27 PROCEDURE — 81003 URINALYSIS AUTO W/O SCOPE: CPT | Performed by: EMERGENCY MEDICINE

## 2017-01-27 PROCEDURE — 71020 HC CHEST PA AND LATERAL: CPT

## 2017-01-27 PROCEDURE — 99284 EMERGENCY DEPT VISIT MOD MDM: CPT

## 2017-01-27 PROCEDURE — 83735 ASSAY OF MAGNESIUM: CPT | Performed by: NURSE PRACTITIONER

## 2017-01-27 PROCEDURE — 83935 ASSAY OF URINE OSMOLALITY: CPT | Performed by: NURSE PRACTITIONER

## 2017-01-27 PROCEDURE — 85007 BL SMEAR W/DIFF WBC COUNT: CPT | Performed by: EMERGENCY MEDICINE

## 2017-01-27 PROCEDURE — 83880 ASSAY OF NATRIURETIC PEPTIDE: CPT | Performed by: EMERGENCY MEDICINE

## 2017-01-27 PROCEDURE — 84484 ASSAY OF TROPONIN QUANT: CPT | Performed by: EMERGENCY MEDICINE

## 2017-01-27 PROCEDURE — 93010 ELECTROCARDIOGRAM REPORT: CPT | Performed by: INTERNAL MEDICINE

## 2017-01-27 PROCEDURE — 82962 GLUCOSE BLOOD TEST: CPT

## 2017-01-27 PROCEDURE — 85025 COMPLETE CBC W/AUTO DIFF WBC: CPT | Performed by: NURSE PRACTITIONER

## 2017-01-27 PROCEDURE — 85610 PROTHROMBIN TIME: CPT | Performed by: EMERGENCY MEDICINE

## 2017-01-27 PROCEDURE — 93005 ELECTROCARDIOGRAM TRACING: CPT | Performed by: EMERGENCY MEDICINE

## 2017-01-27 PROCEDURE — 82553 CREATINE MB FRACTION: CPT | Performed by: EMERGENCY MEDICINE

## 2017-01-27 PROCEDURE — 82550 ASSAY OF CK (CPK): CPT | Performed by: EMERGENCY MEDICINE

## 2017-01-27 RX ORDER — ONDANSETRON 4 MG/1
4 TABLET, FILM COATED ORAL EVERY 6 HOURS PRN
Status: DISCONTINUED | OUTPATIENT
Start: 2017-01-27 | End: 2017-01-28 | Stop reason: HOSPADM

## 2017-01-27 RX ORDER — GABAPENTIN 300 MG/1
300 CAPSULE ORAL NIGHTLY
Status: DISCONTINUED | OUTPATIENT
Start: 2017-01-27 | End: 2017-01-28 | Stop reason: HOSPADM

## 2017-01-27 RX ORDER — SODIUM CHLORIDE 0.9 % (FLUSH) 0.9 %
1-10 SYRINGE (ML) INJECTION AS NEEDED
Status: DISCONTINUED | OUTPATIENT
Start: 2017-01-27 | End: 2017-01-28 | Stop reason: HOSPADM

## 2017-01-27 RX ORDER — SODIUM CHLORIDE 0.9 % (FLUSH) 0.9 %
10 SYRINGE (ML) INJECTION AS NEEDED
Status: DISCONTINUED | OUTPATIENT
Start: 2017-01-27 | End: 2017-01-28 | Stop reason: HOSPADM

## 2017-01-27 RX ORDER — ONDANSETRON 2 MG/ML
4 INJECTION INTRAMUSCULAR; INTRAVENOUS EVERY 6 HOURS PRN
Status: DISCONTINUED | OUTPATIENT
Start: 2017-01-27 | End: 2017-01-28 | Stop reason: HOSPADM

## 2017-01-27 RX ORDER — DEXTROSE MONOHYDRATE 25 G/50ML
25 INJECTION, SOLUTION INTRAVENOUS
Status: DISCONTINUED | OUTPATIENT
Start: 2017-01-27 | End: 2017-01-28 | Stop reason: HOSPADM

## 2017-01-27 RX ORDER — SODIUM CHLORIDE 9 MG/ML
100 INJECTION, SOLUTION INTRAVENOUS CONTINUOUS
Status: DISCONTINUED | OUTPATIENT
Start: 2017-01-27 | End: 2017-01-28 | Stop reason: HOSPADM

## 2017-01-27 RX ORDER — GUANFACINE 1 MG/1
1 TABLET ORAL NIGHTLY
Status: DISCONTINUED | OUTPATIENT
Start: 2017-01-27 | End: 2017-01-28 | Stop reason: HOSPADM

## 2017-01-27 RX ORDER — ASPIRIN 81 MG/1
81 TABLET ORAL DAILY
Status: DISCONTINUED | OUTPATIENT
Start: 2017-01-27 | End: 2017-01-28 | Stop reason: HOSPADM

## 2017-01-27 RX ORDER — ASPIRIN 325 MG
325 TABLET ORAL ONCE
Status: COMPLETED | OUTPATIENT
Start: 2017-01-27 | End: 2017-01-27

## 2017-01-27 RX ORDER — PRAVASTATIN SODIUM 40 MG
40 TABLET ORAL NIGHTLY
Status: DISCONTINUED | OUTPATIENT
Start: 2017-01-27 | End: 2017-01-28 | Stop reason: HOSPADM

## 2017-01-27 RX ORDER — ONDANSETRON 4 MG/1
4 TABLET, ORALLY DISINTEGRATING ORAL EVERY 6 HOURS PRN
Status: DISCONTINUED | OUTPATIENT
Start: 2017-01-27 | End: 2017-01-28 | Stop reason: HOSPADM

## 2017-01-27 RX ORDER — ACETAMINOPHEN 325 MG/1
650 TABLET ORAL EVERY 4 HOURS PRN
Status: DISCONTINUED | OUTPATIENT
Start: 2017-01-27 | End: 2017-01-28 | Stop reason: HOSPADM

## 2017-01-27 RX ORDER — LISINOPRIL 20 MG/1
20 TABLET ORAL 2 TIMES DAILY
Status: DISCONTINUED | OUTPATIENT
Start: 2017-01-27 | End: 2017-01-28 | Stop reason: HOSPADM

## 2017-01-27 RX ORDER — AMLODIPINE BESYLATE 10 MG/1
10 TABLET ORAL DAILY
Status: DISCONTINUED | OUTPATIENT
Start: 2017-01-27 | End: 2017-01-28 | Stop reason: HOSPADM

## 2017-01-27 RX ORDER — NICOTINE POLACRILEX 4 MG
15 LOZENGE BUCCAL
Status: DISCONTINUED | OUTPATIENT
Start: 2017-01-27 | End: 2017-01-28 | Stop reason: HOSPADM

## 2017-01-27 RX ADMIN — SODIUM CHLORIDE 100 ML/HR: 900 INJECTION, SOLUTION INTRAVENOUS at 18:02

## 2017-01-27 RX ADMIN — GABAPENTIN 300 MG: 300 CAPSULE ORAL at 20:46

## 2017-01-27 RX ADMIN — LISINOPRIL 20 MG: 20 TABLET ORAL at 18:29

## 2017-01-27 RX ADMIN — ASPIRIN 81 MG: 81 TABLET, COATED ORAL at 18:29

## 2017-01-27 RX ADMIN — PRAVASTATIN SODIUM 40 MG: 40 TABLET ORAL at 20:46

## 2017-01-27 RX ADMIN — ASPIRIN 325 MG: 325 TABLET, COATED ORAL at 08:50

## 2017-01-27 RX ADMIN — Medication 10 ML: at 08:50

## 2017-01-27 RX ADMIN — GUANFACINE HYDROCHLORIDE 1 MG: 1 TABLET ORAL at 20:46

## 2017-01-27 RX ADMIN — SODIUM CHLORIDE 1000 ML: 9 INJECTION, SOLUTION INTRAVENOUS at 09:43

## 2017-01-27 RX ADMIN — AMLODIPINE BESYLATE 10 MG: 10 TABLET ORAL at 18:29

## 2017-01-28 VITALS
TEMPERATURE: 97.5 F | DIASTOLIC BLOOD PRESSURE: 68 MMHG | SYSTOLIC BLOOD PRESSURE: 119 MMHG | WEIGHT: 155 LBS | BODY MASS INDEX: 24.33 KG/M2 | HEART RATE: 87 BPM | RESPIRATION RATE: 18 BRPM | HEIGHT: 67 IN | OXYGEN SATURATION: 96 %

## 2017-01-28 LAB
ANION GAP SERPL CALCULATED.3IONS-SCNC: 7 MMOL/L (ref 5–15)
BASOPHILS # BLD AUTO: 0.06 10*3/MM3 (ref 0–0.2)
BASOPHILS NFR BLD AUTO: 1.8 % (ref 0–2)
BUN BLD-MCNC: 17 MG/DL (ref 7–21)
BUN/CREAT SERPL: 14.9 (ref 7–25)
CALCIUM SPEC-SCNC: 8.4 MG/DL (ref 8.4–10.2)
CHLORIDE SERPL-SCNC: 99 MMOL/L (ref 95–110)
CO2 SERPL-SCNC: 25 MMOL/L (ref 22–31)
CREAT BLD-MCNC: 1.14 MG/DL (ref 0.7–1.3)
DEPRECATED RDW RBC AUTO: 44.5 FL (ref 35.1–43.9)
EOSINOPHIL # BLD AUTO: 0.09 10*3/MM3 (ref 0–0.7)
EOSINOPHIL NFR BLD AUTO: 2.7 % (ref 0–7)
ERYTHROCYTE [DISTWIDTH] IN BLOOD BY AUTOMATED COUNT: 14.2 % (ref 11.5–14.5)
GFR SERPL CREATININE-BSD FRML MDRD: 75 ML/MIN/1.73 (ref 42–98)
GLUCOSE BLD-MCNC: 99 MG/DL (ref 60–100)
GLUCOSE BLDC GLUCOMTR-MCNC: 88 MG/DL (ref 70–130)
GLUCOSE BLDC GLUCOMTR-MCNC: 95 MG/DL (ref 70–130)
HCT VFR BLD AUTO: 37 % (ref 39–49)
HGB BLD-MCNC: 12.9 G/DL (ref 13.7–17.3)
IMM GRANULOCYTES # BLD: 0.02 10*3/MM3 (ref 0–0.02)
IMM GRANULOCYTES NFR BLD: 0.6 % (ref 0–0.5)
LYMPHOCYTES # BLD AUTO: 0.98 10*3/MM3 (ref 0.6–4.2)
LYMPHOCYTES NFR BLD AUTO: 29.3 % (ref 10–50)
MAGNESIUM SERPL-MCNC: 2 MG/DL (ref 1.6–2.3)
MCH RBC QN AUTO: 29.7 PG (ref 26.5–34)
MCHC RBC AUTO-ENTMCNC: 34.9 G/DL (ref 31.5–36.3)
MCV RBC AUTO: 85.3 FL (ref 80–98)
MONOCYTES # BLD AUTO: 0.73 10*3/MM3 (ref 0–0.9)
MONOCYTES NFR BLD AUTO: 21.8 % (ref 0–12)
NEUTROPHILS # BLD AUTO: 1.47 10*3/MM3 (ref 2–8.6)
NEUTROPHILS NFR BLD AUTO: 43.8 % (ref 37–80)
PLATELET # BLD AUTO: 296 10*3/MM3 (ref 150–450)
PMV BLD AUTO: 8.4 FL (ref 8–12)
POTASSIUM BLD-SCNC: 4.6 MMOL/L (ref 3.5–5.1)
RBC # BLD AUTO: 4.34 10*6/MM3 (ref 4.37–5.74)
RBC MORPH BLD: NORMAL
SMALL PLATELETS BLD QL SMEAR: ADEQUATE
SODIUM BLD-SCNC: 131 MMOL/L (ref 137–145)
WBC MORPH BLD: NORMAL
WBC NRBC COR # BLD: 3.35 10*3/MM3 (ref 3.2–9.8)

## 2017-01-28 PROCEDURE — 80048 BASIC METABOLIC PNL TOTAL CA: CPT | Performed by: NURSE PRACTITIONER

## 2017-01-28 PROCEDURE — 82962 GLUCOSE BLOOD TEST: CPT

## 2017-01-28 PROCEDURE — 85007 BL SMEAR W/DIFF WBC COUNT: CPT | Performed by: NURSE PRACTITIONER

## 2017-01-28 PROCEDURE — 85025 COMPLETE CBC W/AUTO DIFF WBC: CPT | Performed by: NURSE PRACTITIONER

## 2017-01-28 PROCEDURE — 83735 ASSAY OF MAGNESIUM: CPT | Performed by: NURSE PRACTITIONER

## 2017-01-28 RX ADMIN — AMLODIPINE BESYLATE 10 MG: 10 TABLET ORAL at 08:32

## 2017-01-28 RX ADMIN — ASPIRIN 81 MG: 81 TABLET, COATED ORAL at 08:33

## 2017-01-28 RX ADMIN — LISINOPRIL 20 MG: 20 TABLET ORAL at 08:32

## 2017-01-28 RX ADMIN — SODIUM CHLORIDE 100 ML/HR: 900 INJECTION, SOLUTION INTRAVENOUS at 04:01

## 2017-01-31 ENCOUNTER — OFFICE VISIT (OUTPATIENT)
Dept: ORTHOPEDIC SURGERY | Facility: CLINIC | Age: 82
End: 2017-01-31

## 2017-01-31 VITALS — HEIGHT: 67 IN | BODY MASS INDEX: 25.11 KG/M2 | WEIGHT: 160 LBS

## 2017-01-31 DIAGNOSIS — M70.62 TROCHANTERIC BURSITIS, LEFT HIP: Primary | ICD-10-CM

## 2017-01-31 DIAGNOSIS — Z96.641 H/O TOTAL HIP ARTHROPLASTY, RIGHT: ICD-10-CM

## 2017-01-31 PROCEDURE — 99212 OFFICE O/P EST SF 10 MIN: CPT | Performed by: ORTHOPAEDIC SURGERY

## 2017-01-31 NOTE — MR AVS SNAPSHOT
General CAROL Ruvalcaba   1/31/2017 9:15 AM   Office Visit    Dept Phone:  [phone]   Encounter #:  [id number]    Provider:  Lonny Lorenzo MD   Department:  [facility] ORTHOPEDICS                Your Full Care Plan              Your Updated Medication List          This list is accurate as of: 1/31/17  9:49 AM.  Always use your most recent med list.                amLODIPine 10 MG tablet   Commonly known as:  NORVASC   Take 1 tablet by mouth Daily.       aspirin 81 MG EC tablet       ferrous sulfate 325 (65 FE) MG tablet   Take 1 tablet by mouth Daily With Breakfast.       gabapentin 300 MG capsule   Commonly known as:  NEURONTIN   Take 1 capsule by mouth Every Night. at bedtime       glucose blood test strip   100 each by Other route Daily. TEST BLOOD SUGAR ONCE DAILY (LOT [id number] EXP 5/22/18); Truetest Test Strips       guanFACINE 1 MG tablet   Commonly known as:  TENEX   Take 1 tablet by mouth Every Night.       lisinopril 20 MG tablet   Commonly known as:  PRINIVIL,ZESTRIL   Take 1 tablet by mouth 2 (Two) Times a Day.       pravastatin 40 MG tablet   Commonly known as:  PRAVACHOL   Take 1 tablet by mouth daily.       SITagliptin 50 MG tablet   Commonly known as:  JANUVIA   Take 1 tablet by mouth Daily.       TRUEPLUS LANCETS 30G misc   1 Device Daily. TEST               You Were Diagnosed With        Codes Comments    Trochanteric bursitis, left hip    -  Primary ICD-10-CM: M70.62  ICD-9-CM: 726.5     H/O total hip arthroplasty, right     ICD-10-CM: Z96.641  ICD-9-CM: V43.64       Instructions     None    Patient Instructions History      Upcoming Appointments     Visit Type Date Time Department    FOLLOW UP 1/31/2017  9:15 AM [facility] ORTHOPEDICS [facility]AD    NURSE/MA VISIT 2/24/2017  8:00 AM [facility] INTERNAL MED MAD    OFFICE VISIT 2/28/2017  2:00 PM [facility] HEART CARE MAD    CHF F/U 4/11/2017  9:00 AM [facility] CARDIOLOGY MAD    FOLLOW UP 5/2/2017  9:15 AM [facility] ORTHOPEDICS MAD    LAB 7/25/2017   "9:00 AM [facility] OP INFUSION    FOLLOW UP - ONCOLOGY 7/25/2017  9:30 AM [facility] RAD ONC MAD    OFFICE VISIT 7/26/2017  8:00 AM [facility] INTERNAL MED MAD    OFFICE VISIT 10/30/2017  8:00 AM [facility] OPHTHALMOLOGY [facility]      MyChart Signup     Our records indicate that you have declined [facility] MyChart signup. If you would like to sign up for MyChart, please email [email or web address] or call [phone] to obtain an activation code.             Other Info from Your Visit           Your Appointments     Feb 24, 2017  8:00 AM CST   Nurse Visit with MA INTERNAL MED [facility] INTERNAL MEDICINE (--)    [address]  [address] [place] [address] [place] [address] [phone]            Feb 28, 2017  2:00 PM CST   Office Visit with Kj Green MD   [facility] CARDIOLOGY (--)    [address]  [facility] [address] [phone]           Arrive 15 minutes prior to appointment.            Apr 11, 2017  9:00 AM CDT   CHF F/U with REGINA Carlos   [facility] CARDIOLOGY (--)    [address]  [address] [place] [address] [place] [address] [phone]            May 02, 2017  9:15 AM CDT   Follow Up with Lonny Lorenzo MD   [facility] ORTHOPEDICS (--)    [address]  [address] [place] [address] [facility] [address] [phone]           Arrive 15 minutes prior to appointment.            Jul 25, 2017  9:00 AM CDT   LAB with [facility] OP INFU PROCEDURE CHAIR   [facility] OP INFUSION ([facility]    [address]  [facility] [address] [phone]              Allergies     No Known Allergies      Reason for Visit     Left Hip - Follow-up, Pain     Right Hip - Follow-up, Pain           Vital Signs     Height Weight Body Mass Index Smoking Status          67\" (170.2 cm) 160 lb (72.6 kg) 25.06 kg/m2 Former Smoker        Problems and Diagnoses Noted     H/O total " hip arthroplasty    Bursitis of hip

## 2017-01-31 NOTE — PROGRESS NOTES
Subjective   General CAROL Ruvalcaba is a 81 y.o. male. 1935    Patient is here for a recheck of Left hip trochanteric bursitis.    History of Present Illness Patient presents complaining of pain in the trochanteric area of the left hip. He states the pain is aggravated by prolonged walking. He is also complaining of slight pain in his right hip area. Mr. Ruvalcaba ambulates with the help of a walking cane. He received a kenalog injecting in his left hip during his last visit that he feels has greatly helped. At this time  He is doing well and  Doing  All activities of   living daily      The following portions of the patient's history were reviewed and updated as appropriate:   He  has a past medical history of Acquired hallux rigidus; Anemia; Arthropathy of lumbar facet joint; Carpal tunnel syndrome; CHF (congestive heart failure); Chronic diastolic congestive heart failure; Chronic kidney disease; Chronic kidney disease, stage II (mild); Chronic obstructive lung disease; Chronic renal failure; Coronary artery disease; Cortical senile cataract; Diabetes mellitus; Diabetes mellitus type 2, noninsulin dependent; Diabetes mellitus without complication; Diastolic dysfunction; Diastolic heart failure; Essential hypertension; Hip pain; History of echocardiogram (08/19/2015); Hyperlipidemia; Influenza vaccine administered (10/02/2015); Insomnia; Insomnia; Joint pain; Low back pain; Muscle strain; Nuclear cataract; Pneumococcal vaccination given (07/22/2016); Pure hypercholesterolemia; and Sedentary lifestyle.  He  does not have any pertinent problems on file.  He  has a past surgical history that includes Injection of Medication (09/06/2012); Injection of Medication (09/06/2012); Cardiac Ablation; Cardiac catheterization (02/04/1986); Carpal tunnel release (10/19/2015); Esophagogastroduodenoscopy (08/05/2013); Injection of Medication (02/10/2012); Other surgical history (09/24/2011); Inguinal Hernia Repair (Right, 06/07/1968);  Other surgical history; Injection of Medication (02/09/2014); Lumbar laminectomy (2007); Other surgical history (10/11/2011); and Mandible fracture surgery (10/11/1981).  His family history includes Diabetes in his other. There is no history of Coronary artery disease.  He  reports that he has quit smoking. He quit after 25.00 years of use. He has never used smokeless tobacco. He reports that he does not drink alcohol or use illicit drugs.  Current Outpatient Prescriptions   Medication Sig Dispense Refill   • amLODIPine (NORVASC) 10 MG tablet Take 1 tablet by mouth Daily. 30 tablet 11   • aspirin 81 MG EC tablet Take 81 mg by mouth daily.     • ferrous sulfate 325 (65 FE) MG tablet Take 1 tablet by mouth Daily With Breakfast. 60 tablet 2   • gabapentin (NEURONTIN) 300 MG capsule Take 1 capsule by mouth Every Night. at bedtime 30 capsule 4   • glucose blood test strip 100 each by Other route Daily. TEST BLOOD SUGAR ONCE DAILY (LOT [id number] EXP 5/22/18); Truetest Test Strips 100 each 1   • guanFACINE (TENEX) 1 MG tablet Take 1 tablet by mouth Every Night. 30 tablet 5   • lisinopril (PRINIVIL,ZESTRIL) 20 MG tablet Take 1 tablet by mouth 2 (Two) Times a Day. 60 tablet 3   • pravastatin (PRAVACHOL) 40 MG tablet Take 1 tablet by mouth daily. 30 tablet 5   • SITagliptin (JANUVIA) 50 MG tablet Take 1 tablet by mouth Daily. 30 tablet 3   • TRUEPLUS LANCETS 30G misc 1 Device Daily. TEST 100 each 3     No current facility-administered medications for this visit.      Current Outpatient Prescriptions on File Prior to Visit   Medication Sig   • amLODIPine (NORVASC) 10 MG tablet Take 1 tablet by mouth Daily.   • aspirin 81 MG EC tablet Take 81 mg by mouth daily.   • ferrous sulfate 325 (65 FE) MG tablet Take 1 tablet by mouth Daily With Breakfast.   • gabapentin (NEURONTIN) 300 MG capsule Take 1 capsule by mouth Every Night. at bedtime   • glucose blood test strip 100 each by Other route Daily. TEST BLOOD SUGAR ONCE DAILY (LOT  "[id number] EXP 5/22/18); Truetest Test Strips   • guanFACINE (TENEX) 1 MG tablet Take 1 tablet by mouth Every Night.   • lisinopril (PRINIVIL,ZESTRIL) 20 MG tablet Take 1 tablet by mouth 2 (Two) Times a Day.   • pravastatin (PRAVACHOL) 40 MG tablet Take 1 tablet by mouth daily.   • SITagliptin (JANUVIA) 50 MG tablet Take 1 tablet by mouth Daily.   • TRUEPLUS LANCETS 30G misc 1 Device Daily. TEST     No current facility-administered medications on file prior to visit.      He has No Known Allergies..    Review of Systems  Visit Vitals   • Ht 67\" (170.2 cm)   • Wt 160 lb (72.6 kg)   • BMI 25.06 kg/m2       Social History     Social History   • Marital status:      Spouse name: N/A   • Number of children: N/A   • Years of education: N/A     Occupational History   • Not on file.     Social History Main Topics   • Smoking status: Former Smoker     Years: 25.00   • Smokeless tobacco: Never Used      Comment: Patient quit smoking years ago, smoked less than a pack a day for 25 years   • Alcohol use No   • Drug use: No   • Sexual activity: Defer      Comment: Marital status:      Other Topics Concern   • Not on file     Social History Narrative       HEENT: Normocephalic.  PERRLA.  TM's clear bilaterally.  Oropharynx: Clear.  Neck: Supple, with no adenopathy.  Chest: Equal bilateral expansion.  Clear to auscultation and percussion.  Heart: Regular sinus rhythm, S1 and S2 normal.  No murmurs or extra heart sounds heard.  Abdomen: Soft, nontender, and no organomegaly.  Neurological: cranial nerves II-XII normal Vascular: pulses are present  Dermatological: no rashes  or blemishes, or any abnormality of the skin.      REVIEW OF SYSTEMS:  Negative, other than presenting complaint.  HEENT: No headaches, diplopia, blurred vision, tinnitus, vertigo, epistaxis, hoarseness or sore throat.  Pulmonary: No cough, sputum, hemoptysis, dyspnea, wheezing, or chest pain.  Cardiac: No chest pain, palpitations, orthopnea, paroxysmal " nocturnal dyspnea, shortness of breath, or pedal edema.  Gastrointestinal: No diarrhea, melena, or constipation.  Genitourinary: No dysuria, hematuria, nocturia, frequency, bladder or bowel incontinence.  Hematology: No history of any anemia, fatigue, fever, or chills or night sweats.  Dermatology: No rashes, pruritus, or increased pigmentation changes of the skin.   Objective   Physical Exam full range of motion of  both hips no pain.neuro intact.   Assessment/Plan improved  Trochanteric bursitis  Left hip  See back in 3 months.    General was seen today for follow-up, pain, follow-up and pain.    Diagnoses and all orders for this visit:    Trochanteric bursitis, left hip    H/O total hip arthroplasty, right

## 2017-02-02 ENCOUNTER — APPOINTMENT (OUTPATIENT)
Dept: LAB | Facility: HOSPITAL | Age: 82
End: 2017-02-02

## 2017-02-02 LAB
ALBUMIN UR-MCNC: <0.6 MG/L
ANION GAP SERPL CALCULATED.3IONS-SCNC: 11 MMOL/L (ref 5–15)
BUN BLD-MCNC: 13 MG/DL (ref 7–21)
BUN/CREAT SERPL: 11.8 (ref 7–25)
CALCIUM SPEC-SCNC: 9.3 MG/DL (ref 8.4–10.2)
CHLORIDE SERPL-SCNC: 95 MMOL/L (ref 95–110)
CO2 SERPL-SCNC: 27 MMOL/L (ref 22–31)
CREAT BLD-MCNC: 1.1 MG/DL (ref 0.7–1.3)
CREAT UR-MCNC: 85.3 MG/DL
GFR SERPL CREATININE-BSD FRML MDRD: 78 ML/MIN/1.73 (ref 42–98)
GLUCOSE BLD-MCNC: 91 MG/DL (ref 60–100)
HBA1C MFR BLD: 6.56 % (ref 4–5.6)
MICROALBUMIN/CREAT UR: NORMAL MG/G (ref 0–30)
POTASSIUM BLD-SCNC: 4.2 MMOL/L (ref 3.5–5.1)
SODIUM BLD-SCNC: 133 MMOL/L (ref 137–145)

## 2017-02-02 PROCEDURE — 82043 UR ALBUMIN QUANTITATIVE: CPT | Performed by: INTERNAL MEDICINE

## 2017-02-02 PROCEDURE — 82570 ASSAY OF URINE CREATININE: CPT | Performed by: INTERNAL MEDICINE

## 2017-02-02 PROCEDURE — 83036 HEMOGLOBIN GLYCOSYLATED A1C: CPT | Performed by: INTERNAL MEDICINE

## 2017-02-02 PROCEDURE — 36415 COLL VENOUS BLD VENIPUNCTURE: CPT | Performed by: INTERNAL MEDICINE

## 2017-02-02 PROCEDURE — 80048 BASIC METABOLIC PNL TOTAL CA: CPT | Performed by: INTERNAL MEDICINE

## 2017-02-24 ENCOUNTER — OFFICE VISIT (OUTPATIENT)
Dept: INTERNAL MEDICINE | Facility: CLINIC | Age: 82
End: 2017-02-24

## 2017-02-24 VITALS
WEIGHT: 160 LBS | HEIGHT: 67 IN | DIASTOLIC BLOOD PRESSURE: 60 MMHG | SYSTOLIC BLOOD PRESSURE: 180 MMHG | BODY MASS INDEX: 25.11 KG/M2

## 2017-02-24 DIAGNOSIS — I50.32 CHRONIC DIASTOLIC CONGESTIVE HEART FAILURE (HCC): Chronic | ICD-10-CM

## 2017-02-24 DIAGNOSIS — I10 ESSENTIAL HYPERTENSION: Primary | ICD-10-CM

## 2017-02-24 DIAGNOSIS — E11.9 DIABETES MELLITUS WITHOUT COMPLICATION (HCC): ICD-10-CM

## 2017-02-24 PROCEDURE — 99212 OFFICE O/P EST SF 10 MIN: CPT | Performed by: INTERNAL MEDICINE

## 2017-02-24 RX ORDER — GUANFACINE 2 MG/1
2 TABLET ORAL NIGHTLY
Qty: 30 TABLET | Refills: 5 | Status: SHIPPED | OUTPATIENT
Start: 2017-02-24 | End: 2017-08-24 | Stop reason: SDUPTHER

## 2017-02-24 NOTE — PROGRESS NOTES
Subjective   General Keith Ruvalcaba Jr. is a 81 y.o. male       Patient present for follow up after recent hospitalization for weakness and hyponatremia. He was on Altactazide prescribed by . Medication was discontinued, and patient was given hydration with Normal saline. His Sodium improved to 131. Follow up SMA7 done on 02/02/2017 showed sodium of 133.    Patient BP remains elevated with systolic 170 to 180. Patient denies any cardiovascular complaints.  On Norvasc 10 mg daily, Lisinopril 20 mg bid and Tenex 1 mg daily.  He is compliant with his medications and takes them as prescribed.  Tolerates medications well without any side effects.  His renal function is stable.    Glucose has been well controlled on Januvia.   He is compliant with ADA diet.  Past Medical History   Diagnosis Date   • Acquired hallux rigidus    • Anemia    • Arthropathy of lumbar facet joint    • Carpal tunnel syndrome    • CHF (congestive heart failure)    • Chronic diastolic congestive heart failure    • Chronic kidney disease    • Chronic kidney disease, stage II (mild)      Chronic kidney disease stage 2   • Chronic obstructive lung disease    • Chronic renal failure    • Coronary artery disease    • Cortical senile cataract    • Diabetes mellitus    • Diabetes mellitus type 2, noninsulin dependent      diabetes mellitus type 2, non-insulin treated, Medication treatment plan: oral diabetic medication   • Diabetes mellitus without complication    • Diastolic dysfunction    • Diastolic heart failure    • Essential hypertension      difficult to control      • Hip pain    • History of echocardiogram 08/19/2015     Echocardiogram W/ color flow 68563 (1) - Mild to moderate LVH with mild left atrial enlargement and normal aortic root.CLVH,preserved LV systolic function with Ef of 55%.Diastolic dysfunction.MV intact.AV thickened.Aortic sclerosis.   • Hyperlipidemia    • Influenza vaccine administered 10/02/2015     INFLUENZA IMMUN ADMIN OR  PREV RECV'D  (1) - Ordered By: DIONNE MANDUJANO ([facility])    • Insomnia    • Insomnia      Other insomnia   • Joint pain    • Low back pain    • Muscle strain    • Nuclear cataract    • Pneumococcal vaccination given 07/22/2016     PNEUMOC VAC/ADMIN/RCVD 4040F (3) - Ordered By: DIONNE MANDUJANO ([facility])   • Pure hypercholesterolemia    • Sedentary lifestyle      Past Surgical History   Procedure Laterality Date   • Injection of medication  09/06/2012     Albuterol (2u) (1) - Ordered By: LIDYA STEINER ([facility])    • Injection of medication  09/06/2012     Atrovent (1) - Ordered By: LIDYA STEINER ([facility])    • Cardiac ablation     • Cardiac catheterization  02/04/1986     Cardiac cath 85344 (1) - normal left heart catherization,non cardiac chest pain   • Carpal tunnel release  10/19/2015     Carpal tunnel surgery (1) - Release of left carpal tunnel   • Endoscopy  08/05/2013     Colon endoscopy 49434 (2) - Hemorrhoids found.   • Injection of medication  02/10/2012     Depo Medrol (Methylprednisone) (3) - Ordered By: HEATHER PAK ([facility])    • Other surgical history  09/24/2011     Drain/Inject Major Joint 91911 (1) - Ordered By: KARLOS HERNANDEZ ([facility])    • Inguinal hernia repair Right 06/07/1968     Inguinal hernia, repair (2)   • Other surgical history       Insert IVC filter 59367 (1)   • Injection of medication  02/09/2014     Kenalog (4) - Ordered By: DIMAS ANDRADE ([facility])    • Lumbar laminectomy  2007     Lumbar laminectomy (1)   • Other surgical history  10/11/2011     SPIROMETRY 91227 (1) - Ordered By: HEATHER PAK ([facility])   • Mandible fracture surgery  10/11/1981     Treat nose/jaw fracture (1) - bilateral open reduction of fracture of mandible       Social History   Substance Use Topics   • Smoking status: Former Smoker     Years: 25.00   • Smokeless tobacco: Never Used      Comment: Patient quit smoking years ago, smoked less than a pack a day for 25 years   • Alcohol use No      Family History   Problem Relation Age of Onset   • Diabetes Other    • Coronary artery disease Neg Hx      No Known Allergies  Current Outpatient Prescriptions on File Prior to Visit   Medication Sig Dispense Refill   • amLODIPine (NORVASC) 10 MG tablet Take 1 tablet by mouth Daily. 30 tablet 11   • aspirin 81 MG EC tablet Take 81 mg by mouth daily.     • ferrous sulfate 325 (65 FE) MG tablet Take 1 tablet by mouth Daily With Breakfast. 60 tablet 2   • gabapentin (NEURONTIN) 300 MG capsule Take 1 capsule by mouth Every Night. at bedtime 30 capsule 4   • glucose blood test strip 100 each by Other route Daily. TEST BLOOD SUGAR ONCE DAILY (LOT [id number] EXP 5/22/18); Truetest Test Strips 100 each 1   • lisinopril (PRINIVIL,ZESTRIL) 20 MG tablet Take 1 tablet by mouth 2 (Two) Times a Day. 60 tablet 3   • pravastatin (PRAVACHOL) 40 MG tablet Take 1 tablet by mouth daily. 30 tablet 5   • SITagliptin (JANUVIA) 50 MG tablet Take 1 tablet by mouth Daily. 30 tablet 3   • TRUEPLUS LANCETS 30G misc 1 Device Daily. TEST 100 each 3   • [DISCONTINUED] guanFACINE (TENEX) 1 MG tablet Take 1 tablet by mouth Every Night. 30 tablet 5     No current facility-administered medications on file prior to visit.      Review of Systems   Constitutional: Negative for fatigue.   Eyes: Negative for photophobia and visual disturbance.   Respiratory: Negative for chest tightness and shortness of breath.    Cardiovascular: Negative for chest pain, palpitations and leg swelling.   Endocrine: Negative for polydipsia and polyuria.   Musculoskeletal: Negative for back pain and neck pain.   Neurological: Positive for numbness. Negative for dizziness, light-headedness and headaches.   Psychiatric/Behavioral: Negative for sleep disturbance. The patient is not nervous/anxious.        Objective   Physical Exam   Constitutional: He appears well-developed and well-nourished.   Eyes: EOM are normal. Pupils are equal, round, and reactive to light.   Neck: No  JVD present. No thyromegaly present.   Cardiovascular: Normal rate and regular rhythm.  Exam reveals no gallop.    No murmur heard.  Pulmonary/Chest: Effort normal and breath sounds normal.   Abdominal: Soft. Bowel sounds are normal.   Neurological: He is alert. He has normal reflexes.   Psychiatric: He has a normal mood and affect. His behavior is normal.   Nursing note and vitals reviewed.          Patient Active Problem List   Diagnosis   • Chronic diastolic congestive heart failure   • Hypertension   • Coronary artery disease   • Chronic kidney disease   • H/O total hip arthroplasty   • Lumbar disc disease   • Osteoarthritis of multiple joints   • Iron deficiency   • Nuclear cataract   • Cortical age-related cataract   • Diabetes mellitus without complication   • Trochanteric bursitis, left hip   • Carotid stenosis, asymptomatic   • Type 2 diabetes mellitus without complication, without long-term current use of insulin   • Hyponatremia   • Weakness         Admission on 01/27/2017, Discharged on 01/28/2017   Component Date Value Ref Range Status   • Troponin I 01/27/2017 <0.012  <=0.034 ng/mL Final   • Troponin I 01/27/2017 <0.012  <=0.034 ng/mL Final   • Glucose 01/27/2017 107* 60 - 100 mg/dL Final   • BUN 01/27/2017 17  7 - 21 mg/dL Final   • Creatinine 01/27/2017 1.11  0.70 - 1.30 mg/dL Final   • Sodium 01/27/2017 125* 137 - 145 mmol/L Final   • Potassium 01/27/2017 5.6* 3.5 - 5.1 mmol/L Final   • Chloride 01/27/2017 89* 95 - 110 mmol/L Final   • CO2 01/27/2017 29.0  22.0 - 31.0 mmol/L Final   • Calcium 01/27/2017 9.0  8.4 - 10.2 mg/dL Final   • Total Protein 01/27/2017 6.6  6.3 - 8.6 g/dL Final   • Albumin 01/27/2017 3.90  3.40 - 4.80 g/dL Final   • ALT (SGPT) 01/27/2017 26  21 - 72 U/L Final   • AST (SGOT) 01/27/2017 38  17 - 59 U/L Final   • Alkaline Phosphatase 01/27/2017 66  38 - 126 U/L Final   • Total Bilirubin 01/27/2017 0.6  0.2 - 1.3 mg/dL Final   • eGFR   Amer 01/27/2017 00  42 - 74  mL/min/1.73 Final   • Globulin 01/27/2017 2.7  2.3 - 3.5 gm/dL Final   • A/G Ratio 01/27/2017 1.4  1.1 - 1.8 g/dL Final   • BUN/Creatinine Ratio 01/27/2017 15.3  7.0 - 25.0 Final   • Anion Gap 01/27/2017 7.0  5.0 - 15.0 mmol/L Final   • proBNP 01/27/2017 40.8  0.0 - 1800.0 pg/mL Final   • Extra Tube 01/27/2017 hold for add-on   Final   • Extra Tube 01/27/2017 Hold for add-ons.   Final   • Extra Tube 01/27/2017 hold for add-on   Final   • Extra Tube 01/27/2017 Hold for add-ons.   Final   • WBC 01/27/2017 4.21  3.20 - 9.80 10*3/mm3 Final   • RBC 01/27/2017 4.98  4.37 - 5.74 10*6/mm3 Final   • Hemoglobin 01/27/2017 14.9  13.7 - 17.3 g/dL Final   • Hematocrit 01/27/2017 41.7  39.0 - 49.0 % Final   • MCV 01/27/2017 83.7  80.0 - 98.0 fL Final   • MCH 01/27/2017 29.9  26.5 - 34.0 pg Final   • MCHC 01/27/2017 35.7  31.5 - 36.3 g/dL Final   • RDW 01/27/2017 14.4  11.5 - 14.5 % Final   • RDW-SD 01/27/2017 44.3* 35.1 - 43.9 fl Final   • MPV 01/27/2017 8.0  8.0 - 12.0 fL Final   • Platelets 01/27/2017 326  150 - 450 10*3/mm3 Final   • Glucose 01/27/2017 122  70 - 130 mg/dL Final   • Protime 01/27/2017 13.2  11.1 - 15.3 Seconds Final   • INR 01/27/2017 1.00  0.80 - 1.20 Final   • Creatine Kinase 01/27/2017 332* 55 - 170 U/L Final   • CKMB 01/27/2017 3.71  0.00 - 5.00 ng/mL Final   • Neutrophil % 01/27/2017 47.0  37.0 - 80.0 % Final   • Lymphocyte % 01/27/2017 25.0  10.0 - 50.0 % Final   • Monocyte % 01/27/2017 21.0* 0.0 - 12.0 % Final   • Eosinophil % 01/27/2017 5.0  0.0 - 7.0 % Final   • Basophil % 01/27/2017 1.0  0.0 - 2.0 % Final   • Atypical Lymphocyte % 01/27/2017 1.0  0.0 - 5.0 % Final   • Neutrophils Absolute 01/27/2017 1.98* 2.00 - 8.60 10*3/mm3 Final   • Lymphocytes Absolute 01/27/2017 1.05  0.60 - 4.20 10*3/mm3 Final   • Monocytes Absolute 01/27/2017 0.88  0.00 - 0.90 10*3/mm3 Final   • Eosinophils Absolute 01/27/2017 0.21  0.00 - 0.70 10*3/mm3 Final   • Basophils Absolute 01/27/2017 0.04  0.00 - 0.20 10*3/mm3 Final    • RBC Morphology 01/27/2017 Normal  Normal Final   • WBC Morphology 01/27/2017 Normal  Normal Final   • Platelet Morphology 01/27/2017 Normal  Normal Final   • Color, UA 01/27/2017 Yellow  Yellow, Straw, Dark Yellow, Sachi Final   • Appearance, UA 01/27/2017 Clear  Clear Final   • pH, UA 01/27/2017 5.5  5.0 - 9.0 Final   • Specific Gravity, UA 01/27/2017 1.011  1.003 - 1.030 Final   • Glucose, UA 01/27/2017 Negative  Negative Final   • Ketones, UA 01/27/2017 Negative  Negative Final   • Bilirubin, UA 01/27/2017 Negative  Negative Final   • Blood, UA 01/27/2017 Negative  Negative Final   • Protein, UA 01/27/2017 Negative  Negative Final   • Leuk Esterase, UA 01/27/2017 Negative  Negative Final   • Nitrite, UA 01/27/2017 Negative  Negative Final   • Urobilinogen, UA 01/27/2017 0.2 E.U./dL  0.2 - 1.0 E.U./dL Final   • Glucose 01/27/2017 95  60 - 100 mg/dL Final   • BUN 01/27/2017 17  7 - 21 mg/dL Final   • Creatinine 01/27/2017 1.12  0.70 - 1.30 mg/dL Final   • Sodium 01/27/2017 129* 137 - 145 mmol/L Final   • Potassium 01/27/2017 5.7* 3.5 - 5.1 mmol/L Final   • Chloride 01/27/2017 94* 95 - 110 mmol/L Final   • CO2 01/27/2017 29.0  22.0 - 31.0 mmol/L Final   • Calcium 01/27/2017 8.9  8.4 - 10.2 mg/dL Final   • eGFR   Amer 01/27/2017 76  42 - 98 mL/min/1.73 Final   • BUN/Creatinine Ratio 01/27/2017 15.2  7.0 - 25.0 Final   • Anion Gap 01/27/2017 6.0  5.0 - 15.0 mmol/L Final   • Magnesium 01/27/2017 2.0  1.6 - 2.3 mg/dL Final   • Osmolality, Urine 01/27/2017 363  mOsm/kg Final   • WBC 01/27/2017 4.09  3.20 - 9.80 10*3/mm3 Final   • RBC 01/27/2017 4.79  4.37 - 5.74 10*6/mm3 Final   • Hemoglobin 01/27/2017 14.3  13.7 - 17.3 g/dL Final   • Hematocrit 01/27/2017 40.7  39.0 - 49.0 % Final   • MCV 01/27/2017 85.0  80.0 - 98.0 fL Final   • MCH 01/27/2017 29.9  26.5 - 34.0 pg Final   • MCHC 01/27/2017 35.1  31.5 - 36.3 g/dL Final   • RDW 01/27/2017 14.3  11.5 - 14.5 % Final   • RDW-SD 01/27/2017 44.9* 35.1 - 43.9 fl  Final   • MPV 01/27/2017 7.9* 8.0 - 12.0 fL Final   • Platelets 01/27/2017 312  150 - 450 10*3/mm3 Final   • Neutrophil % 01/27/2017 50.9  37.0 - 80.0 % Final   • Lymphocyte % 01/27/2017 24.4  10.0 - 50.0 % Final   • Monocyte % 01/27/2017 19.8* 0.0 - 12.0 % Final   • Eosinophil % 01/27/2017 2.7  0.0 - 7.0 % Final   • Basophil % 01/27/2017 1.5  0.0 - 2.0 % Final   • Immature Grans % 01/27/2017 0.7* 0.0 - 0.5 % Final   • Neutrophils, Absolute 01/27/2017 2.08  2.00 - 8.60 10*3/mm3 Final   • Lymphocytes, Absolute 01/27/2017 1.00  0.60 - 4.20 10*3/mm3 Final   • Monocytes, Absolute 01/27/2017 0.81  0.00 - 0.90 10*3/mm3 Final   • Eosinophils, Absolute 01/27/2017 0.11  0.00 - 0.70 10*3/mm3 Final   • Basophils, Absolute 01/27/2017 0.06  0.00 - 0.20 10*3/mm3 Final   • Immature Grans, Absolute 01/27/2017 0.03* 0.00 - 0.02 10*3/mm3 Final   • Glucose 01/27/2017 91  70 - 130 mg/dL Final   • Glucose 01/27/2017 104  70 - 130 mg/dL Final   • Glucose 01/28/2017 99  60 - 100 mg/dL Final   • BUN 01/28/2017 17  7 - 21 mg/dL Final   • Creatinine 01/28/2017 1.14  0.70 - 1.30 mg/dL Final   • Sodium 01/28/2017 131* 137 - 145 mmol/L Final   • Potassium 01/28/2017 4.6  3.5 - 5.1 mmol/L Final   • Chloride 01/28/2017 99  95 - 110 mmol/L Final   • CO2 01/28/2017 25.0  22.0 - 31.0 mmol/L Final   • Calcium 01/28/2017 8.4  8.4 - 10.2 mg/dL Final   • eGFR   Amer 01/28/2017 75  42 - 98 mL/min/1.73 Final   • BUN/Creatinine Ratio 01/28/2017 14.9  7.0 - 25.0 Final   • Anion Gap 01/28/2017 7.0  5.0 - 15.0 mmol/L Final   • Magnesium 01/28/2017 2.0  1.6 - 2.3 mg/dL Final   • WBC 01/28/2017 3.35  3.20 - 9.80 10*3/mm3 Final   • RBC 01/28/2017 4.34* 4.37 - 5.74 10*6/mm3 Final   • Hemoglobin 01/28/2017 12.9* 13.7 - 17.3 g/dL Final   • Hematocrit 01/28/2017 37.0* 39.0 - 49.0 % Final   • MCV 01/28/2017 85.3  80.0 - 98.0 fL Final   • MCH 01/28/2017 29.7  26.5 - 34.0 pg Final   • MCHC 01/28/2017 34.9  31.5 - 36.3 g/dL Final   • RDW 01/28/2017 14.2  11.5 -  14.5 % Final   • RDW-SD 01/28/2017 44.5* 35.1 - 43.9 fl Final   • MPV 01/28/2017 8.4  8.0 - 12.0 fL Final   • Platelets 01/28/2017 296  150 - 450 10*3/mm3 Final   • Neutrophil % 01/28/2017 43.8  37.0 - 80.0 % Final   • Lymphocyte % 01/28/2017 29.3  10.0 - 50.0 % Final   • Monocyte % 01/28/2017 21.8* 0.0 - 12.0 % Final   • Eosinophil % 01/28/2017 2.7  0.0 - 7.0 % Final   • Basophil % 01/28/2017 1.8  0.0 - 2.0 % Final   • Immature Grans % 01/28/2017 0.6* 0.0 - 0.5 % Final   • Neutrophils, Absolute 01/28/2017 1.47* 2.00 - 8.60 10*3/mm3 Final   • Lymphocytes, Absolute 01/28/2017 0.98  0.60 - 4.20 10*3/mm3 Final   • Monocytes, Absolute 01/28/2017 0.73  0.00 - 0.90 10*3/mm3 Final   • Eosinophils, Absolute 01/28/2017 0.09  0.00 - 0.70 10*3/mm3 Final   • Basophils, Absolute 01/28/2017 0.06  0.00 - 0.20 10*3/mm3 Final   • Immature Grans, Absolute 01/28/2017 0.02  0.00 - 0.02 10*3/mm3 Final   • Glucose 01/28/2017 95  70 - 130 mg/dL Final   • RBC Morphology 01/28/2017 Normal  Normal Final   • WBC Morphology 01/28/2017 Normal  Normal Final   • Platelet Estimate 01/28/2017 Adequate  Normal Final   • Glucose 01/28/2017 88  70 - 130 mg/dL Final   Office Visit on 01/26/2017   Component Date Value Ref Range Status   • Glucose 02/02/2017 91  60 - 100 mg/dL Final   • BUN 02/02/2017 13  7 - 21 mg/dL Final   • Creatinine 02/02/2017 1.10  0.70 - 1.30 mg/dL Final   • Sodium 02/02/2017 133* 137 - 145 mmol/L Final   • Potassium 02/02/2017 4.2  3.5 - 5.1 mmol/L Final   • Chloride 02/02/2017 95  95 - 110 mmol/L Final   • CO2 02/02/2017 27.0  22.0 - 31.0 mmol/L Final   • Calcium 02/02/2017 9.3  8.4 - 10.2 mg/dL Final   • eGFR  African Amer 02/02/2017 78  42 - 98 mL/min/1.73 Final   • BUN/Creatinine Ratio 02/02/2017 11.8  7.0 - 25.0 Final   • Anion Gap 02/02/2017 11.0  5.0 - 15.0 mmol/L Final   • Hemoglobin A1C 02/02/2017 6.56* 4 - 5.6 % Final   • Microalbumin/Creatinine Ratio 02/02/2017   0.0 - 30.0 mg/g Final   • Creatinine, Urine 02/02/2017  85.3  mg/dL Final   • Microalbumin, Urine 02/02/2017 <0.6  mg/L Final           Assessment    Diagnosis Plan   1. Essential hypertension     2. Chronic diastolic congestive heart failure     3. Diabetes mellitus without complication         Plan     Patient will continue Norvasc 10 mg daily and Lisinopril 20 mg bid.  Will increase Tenex to 2 mg daily.  DASH diet.  1.5 gr Sodium restriction.        Follow up in 1 months.

## 2017-02-28 ENCOUNTER — OFFICE VISIT (OUTPATIENT)
Dept: CARDIOLOGY | Facility: CLINIC | Age: 82
End: 2017-02-28

## 2017-02-28 VITALS
WEIGHT: 158 LBS | HEART RATE: 84 BPM | DIASTOLIC BLOOD PRESSURE: 70 MMHG | HEIGHT: 67 IN | SYSTOLIC BLOOD PRESSURE: 150 MMHG | BODY MASS INDEX: 24.8 KG/M2

## 2017-02-28 DIAGNOSIS — I10 ESSENTIAL HYPERTENSION: Primary | ICD-10-CM

## 2017-02-28 DIAGNOSIS — E11.9 TYPE 2 DIABETES MELLITUS WITHOUT COMPLICATION, WITHOUT LONG-TERM CURRENT USE OF INSULIN (HCC): ICD-10-CM

## 2017-02-28 DIAGNOSIS — N18.2 CHRONIC KIDNEY DISEASE, STAGE 2 (MILD): Chronic | ICD-10-CM

## 2017-02-28 PROBLEM — E87.1 HYPONATREMIA: Status: RESOLVED | Noted: 2017-01-27 | Resolved: 2017-02-28

## 2017-02-28 PROBLEM — R53.1 WEAKNESS: Status: RESOLVED | Noted: 2017-01-27 | Resolved: 2017-02-28

## 2017-02-28 PROCEDURE — 99213 OFFICE O/P EST LOW 20 MIN: CPT | Performed by: INTERNAL MEDICINE

## 2017-02-28 NOTE — PROGRESS NOTES
General Keith Ruvalcaba Jr.  81 y.o. male    01/16/2017  1. Essential hypertension    2. Type 2 diabetes mellitus without complication, without long-term current use of insulin    3. Chronic kidney disease, stage 2 (mild)        Chief complaint - follow-up from hospital after he developed hyponatremia and hyperkalemia with Aldactone      History of present Illness- 81-year-old gentleman with history of diastolic heart failure, systolic hypertension, history of atrial flutter and nonobstructive coronary disease has been doing fairly well but is systolic blood pressure staying around 170-190.  Was added with Aldactazide and he developed hyponatremia and hyperkalemia and did well with the stopping the medicine and giving him normal saline.  He is was started on tenex 2 mg at nighttime and his blood pressure is okay.  He thinks his blood pressure comes up when he comes to the office usually at home his blood pressure running around 120 to 140s systolic.  He denies any TIA symptoms or headache or weakness since his Aldactone was stopped.    Allergies   Allergen Reactions   • Aldactone [Spironolactone] Other (See Comments)     Hyponatremia and hyperkalemia         Past Medical History   Diagnosis Date   • Acquired hallux rigidus    • Anemia    • Arthropathy of lumbar facet joint    • Carpal tunnel syndrome    • CHF (congestive heart failure)    • Chronic diastolic congestive heart failure    • Chronic kidney disease    • Chronic kidney disease, stage II (mild)      Chronic kidney disease stage 2   • Chronic obstructive lung disease    • Chronic renal failure    • Coronary artery disease    • Cortical senile cataract    • Diabetes mellitus    • Diabetes mellitus type 2, noninsulin dependent      diabetes mellitus type 2, non-insulin treated, Medication treatment plan: oral diabetic medication   • Diabetes mellitus without complication    • Diastolic dysfunction    • Diastolic heart failure    • Essential hypertension       difficult to control      • Hip pain    • History of echocardiogram 08/19/2015     Echocardiogram W/ color flow 94718 (1) - Mild to moderate LVH with mild left atrial enlargement and normal aortic root.CLVH,preserved LV systolic function with Ef of 55%.Diastolic dysfunction.MV intact.AV thickened.Aortic sclerosis.   • Hyperlipidemia    • Influenza vaccine administered 10/02/2015     INFLUENZA IMMUN ADMIN OR PREV RECV'D  (1) - Ordered By: DIONNE MANDUJANO ([facility])    • Insomnia    • Insomnia      Other insomnia   • Joint pain    • Low back pain    • Muscle strain    • Nuclear cataract    • Pneumococcal vaccination given 07/22/2016     PNEUMOC VAC/ADMIN/RCVD 4040F (3) - Ordered By: DIONNE AMNDUJANO ([facility])   • Pure hypercholesterolemia    • Sedentary lifestyle          Past Surgical History   Procedure Laterality Date   • Injection of medication  09/06/2012     Albuterol (2u) (1) - Ordered By: LIDYA STEINER ([facility])    • Injection of medication  09/06/2012     Atrovent (1) - Ordered By: LIDYA STEINER ([facility])    • Cardiac ablation     • Cardiac catheterization  02/04/1986     Cardiac cath 11583 (1) - normal left heart catherization,non cardiac chest pain   • Carpal tunnel release  10/19/2015     Carpal tunnel surgery (1) - Release of left carpal tunnel   • Endoscopy  08/05/2013     Colon endoscopy 03606 (2) - Hemorrhoids found.   • Injection of medication  02/10/2012     Depo Medrol (Methylprednisone) (3) - Ordered By: HEATHER PAK ([facility])    • Other surgical history  09/24/2011     Drain/Inject Major Joint 20610 (1) - Ordered By: KARLOS HERNANDEZ ([facility])    • Inguinal hernia repair Right 06/07/1968     Inguinal hernia, repair (2)   • Other surgical history       Insert IVC filter 11769 (1)   • Injection of medication  02/09/2014     Kenalog (4) - Ordered By: DIMAS ANDRADE ([facility])    • Lumbar laminectomy  2007     Lumbar laminectomy (1)   • Other surgical history  10/11/2011     SPIROMETRY 97614  (1) - Ordered By: HEATHER PAK ([facility])   • Mandible fracture surgery  10/11/1981     Treat nose/jaw fracture (1) - bilateral open reduction of fracture of mandible         Family History   Problem Relation Age of Onset   • Diabetes Other    • Coronary artery disease Neg Hx          Social History     Social History   • Marital status:      Spouse name: N/A   • Number of children: N/A   • Years of education: N/A     Occupational History   • Not on file.     Social History Main Topics   • Smoking status: Former Smoker     Years: 25.00   • Smokeless tobacco: Never Used      Comment: Patient quit smoking years ago, smoked less than a pack a day for 25 years   • Alcohol use No   • Drug use: No   • Sexual activity: Defer      Comment: Marital status:      Other Topics Concern   • Not on file     Social History Narrative         Current Outpatient Prescriptions   Medication Sig Dispense Refill   • amLODIPine (NORVASC) 10 MG tablet Take 1 tablet by mouth Daily. 30 tablet 11   • aspirin 81 MG EC tablet Take 81 mg by mouth daily.     • ferrous sulfate 325 (65 FE) MG tablet Take 1 tablet by mouth Daily With Breakfast. 60 tablet 2   • gabapentin (NEURONTIN) 300 MG capsule Take 1 capsule by mouth Every Night. at bedtime 30 capsule 4   • glucose blood test strip 100 each by Other route Daily. TEST BLOOD SUGAR ONCE DAILY (LOT [id number] EXP 5/22/18); Truetest Test Strips 100 each 1   • guanFACINE (TENEX) 2 MG tablet Take 1 tablet by mouth Every Night. 30 tablet 5   • lisinopril (PRINIVIL,ZESTRIL) 20 MG tablet Take 1 tablet by mouth 2 (Two) Times a Day. 60 tablet 3   • pravastatin (PRAVACHOL) 40 MG tablet Take 1 tablet by mouth daily. 30 tablet 5   • SITagliptin (JANUVIA) 50 MG tablet Take 1 tablet by mouth Daily. 30 tablet 3   • TRUEPLUS LANCETS 30G misc 1 Device Daily. TEST 100 each 3     No current facility-administered medications for this visit.          Review of Systems     Constitution: Denies any fatigue,  "fever or chills    HENT: Denies any headache, hearing impairment, nasal discharge or sore throat    Eyes: Denies any blurring of vision, or photophobia     Cardivascular - As per history of present illness     Respiratory system-denies any COPD, shortness of breath, sleep apnea.     Endocrine:   history of hyperlipidemia, diabetes       Musculoskeletal:   history of arthritis, musculoskeletal problems    Gastrointestinal: No nausea, vomiting, or melena    Genitourinary: No dysuria or hematuria    Neurological:   No history of seizure disorder, stroke, memory problems    Psychiatric/Behavioral:        No history of depression, bipolar disorder or schizophrenia     Hematological- no history of easy bruising or any bleeding diathesis            OBJECTIVE    Visit Vitals   • /70   • Pulse 84   • Ht 67\" (170.2 cm)   • Wt 158 lb (71.7 kg)   • BMI 24.75 kg/m2         Physical Exam     Constitutional: is oriented to person, place, and time.     Skin-warm and dry    Well developed and nourished in no acute distress      Head: Normocephalic and atraumatic.     Eyes: Pupils are equal, round, and reactive to light.     Neck: Neck supple. No bruit in the carotids, no elevation of JVD    Cardiovascular: [place] in the fifth intercostal space, regular rate, and  Rhythm,  S1 greater than S2, no S3 or S4, no gallop       Pulmonary/Chest:   Air  Entry is equal on both sides  No wheezing or crackles,      Abdominal: Soft.  No hepatosplenomegaly, bowel sounds are present    Musculoskeletal: No kyphoscoliosis, no significant thickening of the joints    Neurological: is alert and oriented to person, place, and time.    cranial nerve are intact .   No motor or sensory deficit    Extremities-no edema,  pulses are felt equally on both sides, no radial femoral delay      Psychiatric: He has a normal mood and affect.                  His behavior is normal.           Procedures      Lab Results   Component Value Date    WBC 3.35 01/28/2017 "    HGB 12.9 (L) 01/28/2017    HCT 37.0 (L) 01/28/2017    MCV 85.3 01/28/2017     01/28/2017     Lab Results   Component Value Date    GLUCOSE 91 02/02/2017    BUN 13 02/02/2017    CREATININE 1.10 02/02/2017    EGFRIFAFRI 78 02/02/2017    BCR 11.8 02/02/2017    CO2 27.0 02/02/2017    CALCIUM 9.3 02/02/2017    PROTENTOTREF 6.4 05/14/2015    ALBUMIN 3.90 01/27/2017    LABIL2 1.4 01/27/2017    AST 38 01/27/2017    ALT 26 01/27/2017     No results found for: CHOL  Lab Results   Component Value Date    TRIG 67 08/26/2016    TRIG 157 08/27/2015    TRIG 67 06/11/2015     Lab Results   Component Value Date    HDL 49 (L) 08/26/2016     Lab Results   Component Value Date    LDLCALC 79 08/26/2016    LDLCALC 96 08/27/2015    LDLCALC 100 06/11/2015     No results found for: LDL  No results found for: HDLLDLRATIO  No components found for: CHOLHDL  Lab Results   Component Value Date    HGBA1C 6.56 (H) 02/02/2017     Lab Results   Component Value Date    TSH 1.70 09/21/2016                  A/P    Diastolic heart failure with systolic hypertension developed problems with Aldactazide, was stopped and started on Tenex 2 mg every afternoon and is doing well.  We'll continue lisinopril and amlodipine.    Diabetes is on Januvia and does Accu-Cheks.    Hyperlipidemia on pravastatin doing okay.  So we'll continue his current medicines as her blood pressure is running around 150/70 and is tolerating it okay.  Follow-up in 6 months      Kj Green MD  2/28/2017  2:20 PM

## 2017-03-24 ENCOUNTER — CLINICAL SUPPORT (OUTPATIENT)
Dept: INTERNAL MEDICINE | Facility: CLINIC | Age: 82
End: 2017-03-24

## 2017-03-24 PROCEDURE — 99211 OFF/OP EST MAY X REQ PHY/QHP: CPT | Performed by: INTERNAL MEDICINE

## 2017-03-24 NOTE — PROGRESS NOTES
PT CAME IN TODAY FOR BP CHECK UPON ARRIVAL PT BP NOTED AT   160/90  PT WAS LEFT TO REST AND DO A RECHECK ON BP UPON RECHECK OF PT BP IT  /80 I LET HIM KNOW TO CONTINUE THE SAME MEDS AND KEEP HIS APPT IN July

## 2017-03-30 ENCOUNTER — ANESTHESIA EVENT (OUTPATIENT)
Dept: PERIOP | Facility: HOSPITAL | Age: 82
End: 2017-03-30

## 2017-03-31 ENCOUNTER — ANESTHESIA (OUTPATIENT)
Dept: PERIOP | Facility: HOSPITAL | Age: 82
End: 2017-03-31

## 2017-03-31 ENCOUNTER — HOSPITAL ENCOUNTER (OUTPATIENT)
Facility: HOSPITAL | Age: 82
Setting detail: HOSPITAL OUTPATIENT SURGERY
Discharge: HOME | End: 2017-03-31
Attending: OPHTHALMOLOGY | Admitting: OPHTHALMOLOGY

## 2017-03-31 VITALS
TEMPERATURE: 97.3 F | RESPIRATION RATE: 20 BRPM | BODY MASS INDEX: 25.71 KG/M2 | DIASTOLIC BLOOD PRESSURE: 67 MMHG | SYSTOLIC BLOOD PRESSURE: 122 MMHG | HEART RATE: 60 BPM | OXYGEN SATURATION: 100 % | WEIGHT: 163.8 LBS | HEIGHT: 67 IN

## 2017-03-31 LAB — GLUCOSE BLDC GLUCOMTR-MCNC: 112 MG/DL (ref 70–130)

## 2017-03-31 PROCEDURE — 82962 GLUCOSE BLOOD TEST: CPT

## 2017-03-31 PROCEDURE — 25010000002 FENTANYL CITRATE (PF) 100 MCG/2ML SOLUTION: Performed by: NURSE ANESTHETIST, CERTIFIED REGISTERED

## 2017-03-31 PROCEDURE — 25010000002 VANCOMYCIN PER 500 MG: Performed by: OPHTHALMOLOGY

## 2017-03-31 PROCEDURE — 25010000002 MIDAZOLAM PER 1 MG: Performed by: NURSE ANESTHETIST, CERTIFIED REGISTERED

## 2017-03-31 PROCEDURE — C1780 LENS, INTRAOCULAR (NEW TECH): HCPCS | Performed by: OPHTHALMOLOGY

## 2017-03-31 DEVICE — LENS ACRYSOF IQ 6X13MM SN60WF 16.5: Type: IMPLANTABLE DEVICE | Site: EYE | Status: FUNCTIONAL

## 2017-03-31 RX ORDER — TETRACAINE HYDROCHLORIDE 5 MG/ML
SOLUTION OPHTHALMIC AS NEEDED
Status: DISCONTINUED | OUTPATIENT
Start: 2017-03-31 | End: 2017-03-31 | Stop reason: HOSPADM

## 2017-03-31 RX ORDER — SODIUM CHLORIDE 9 MG/ML
1000 INJECTION, SOLUTION INTRAVENOUS CONTINUOUS PRN
Status: DISCONTINUED | OUTPATIENT
Start: 2017-03-31 | End: 2017-03-31 | Stop reason: HOSPADM

## 2017-03-31 RX ORDER — BALANCED SALT SOLUTION ENRICHED WITH BICARBONATE, DEXTROSE, AND GLUTATHIONE
KIT INTRAOCULAR AS NEEDED
Status: DISCONTINUED | OUTPATIENT
Start: 2017-03-31 | End: 2017-03-31 | Stop reason: HOSPADM

## 2017-03-31 RX ORDER — BRIMONIDINE TARTRATE 0.15 %
DROPS OPHTHALMIC (EYE) AS NEEDED
Status: DISCONTINUED | OUTPATIENT
Start: 2017-03-31 | End: 2017-03-31 | Stop reason: HOSPADM

## 2017-03-31 RX ORDER — PREDNISOLONE ACETATE 10 MG/ML
SUSPENSION/ DROPS OPHTHALMIC AS NEEDED
Status: DISCONTINUED | OUTPATIENT
Start: 2017-03-31 | End: 2017-03-31 | Stop reason: HOSPADM

## 2017-03-31 RX ORDER — FENTANYL CITRATE 50 UG/ML
INJECTION, SOLUTION INTRAMUSCULAR; INTRAVENOUS AS NEEDED
Status: DISCONTINUED | OUTPATIENT
Start: 2017-03-31 | End: 2017-03-31 | Stop reason: SURG

## 2017-03-31 RX ORDER — MIDAZOLAM HYDROCHLORIDE 1 MG/ML
INJECTION INTRAMUSCULAR; INTRAVENOUS AS NEEDED
Status: DISCONTINUED | OUTPATIENT
Start: 2017-03-31 | End: 2017-03-31 | Stop reason: SURG

## 2017-03-31 RX ORDER — CYCLOPENTOLATE HYDROCHLORIDE 10 MG/ML
1 SOLUTION/ DROPS OPHTHALMIC
Status: COMPLETED | OUTPATIENT
Start: 2017-03-31 | End: 2017-03-31

## 2017-03-31 RX ORDER — TETRACAINE HYDROCHLORIDE 5 MG/ML
1 SOLUTION OPHTHALMIC
Status: COMPLETED | OUTPATIENT
Start: 2017-03-31 | End: 2017-03-31

## 2017-03-31 RX ORDER — PHENYLEPHRINE HCL 2.5 %
1 DROPS OPHTHALMIC (EYE)
Status: COMPLETED | OUTPATIENT
Start: 2017-03-31 | End: 2017-03-31

## 2017-03-31 RX ORDER — FLURBIPROFEN SODIUM 0.3 MG/ML
1 SOLUTION/ DROPS OPHTHALMIC
Status: COMPLETED | OUTPATIENT
Start: 2017-03-31 | End: 2017-03-31

## 2017-03-31 RX ADMIN — PHENYLEPHRINE HYDROCHLORIDE 1 DROP: 25 SOLUTION/ DROPS OPHTHALMIC at 10:27

## 2017-03-31 RX ADMIN — MIDAZOLAM 1 MG: 1 INJECTION INTRAMUSCULAR; INTRAVENOUS at 11:05

## 2017-03-31 RX ADMIN — SODIUM CHLORIDE 1000 ML: 9 INJECTION, SOLUTION INTRAVENOUS at 09:54

## 2017-03-31 RX ADMIN — FENTANYL CITRATE 25 MCG: 50 INJECTION, SOLUTION INTRAMUSCULAR; INTRAVENOUS at 11:08

## 2017-03-31 RX ADMIN — PHENYLEPHRINE HYDROCHLORIDE 1 DROP: 25 SOLUTION/ DROPS OPHTHALMIC at 09:59

## 2017-03-31 RX ADMIN — CYCLOPENTOLATE HYDROCHLORIDE 1 DROP: 10 SOLUTION/ DROPS OPHTHALMIC at 10:27

## 2017-03-31 RX ADMIN — CYCLOPENTOLATE HYDROCHLORIDE 1 DROP: 10 SOLUTION/ DROPS OPHTHALMIC at 09:58

## 2017-03-31 RX ADMIN — FLURBIPROFEN SODIUM 1 DROP: 0.3 SOLUTION/ DROPS OPHTHALMIC at 09:57

## 2017-03-31 RX ADMIN — FLURBIPROFEN SODIUM 1 DROP: 0.3 SOLUTION/ DROPS OPHTHALMIC at 10:11

## 2017-03-31 RX ADMIN — CYCLOPENTOLATE HYDROCHLORIDE 1 DROP: 10 SOLUTION/ DROPS OPHTHALMIC at 10:11

## 2017-03-31 RX ADMIN — FLURBIPROFEN SODIUM 1 DROP: 0.3 SOLUTION/ DROPS OPHTHALMIC at 10:27

## 2017-03-31 RX ADMIN — TETRACAINE HYDROCHLORIDE 1 DROP: 5 SOLUTION OPHTHALMIC at 10:27

## 2017-03-31 RX ADMIN — TETRACAINE HYDROCHLORIDE 1 DROP: 5 SOLUTION OPHTHALMIC at 09:57

## 2017-03-31 RX ADMIN — PHENYLEPHRINE HYDROCHLORIDE 1 DROP: 25 SOLUTION/ DROPS OPHTHALMIC at 10:11

## 2017-03-31 NOTE — ANESTHESIA POSTPROCEDURE EVALUATION
Patient: General Keith Ruvalcaba Jr.    Procedure Summary     Date Anesthesia Start Anesthesia Stop Room / Location    03/31/17 1105 1119 [facility] 06 / [facility]       Procedure Diagnosis Surgeon Provider    REMOVE CATARACT AND IMPLANT INRAOCULAR LENS LEFT EYE (Left Eye) Age-related nuclear cataract of eye, left  (CATARACT LEFT EYE) MD Leydi Jay CRNA          Anesthesia Type: MAC  Last vitals  BP      Temp      Pulse     Resp      SpO2        Post Anesthesia Care and Evaluation    Patient location during evaluation: bedside  Patient participation: complete - patient participated  Level of consciousness: awake and alert  Pain score: 0  Pain management: adequate  Airway patency: patent  Anesthetic complications: No anesthetic complications  PONV Status: none  Cardiovascular status: acceptable and stable  Respiratory status: acceptable, face mask and spontaneous ventilation  Hydration status: acceptable

## 2017-03-31 NOTE — ANESTHESIA PREPROCEDURE EVALUATION
Anesthesia Evaluation     NPO Status: > 8 hours   Airway   Mallampati: II  TM distance: >3 FB  Neck ROM: full  possible difficult intubation  Dental    (+) edentulous    Pulmonary     breath sounds clear to auscultation  (+) COPD moderate, decreased breath sounds,   Cardiovascular - normal exam    ECG reviewed  Rhythm: regular  Rate: normal    (+) hypertension well controlled, CAD, CHF, PVD (Carotid artery stenosis),     ROS comment: EKG:Sinus bradycardia with 1st degree AV block.    Neuro/Psych  (+) numbness (Carpal tunnel),    GI/Hepatic/Renal/Endo    (+)  chronic renal disease (Creatinine 1.2) CRI, diabetes mellitus type 2 well controlled,     Musculoskeletal     Abdominal    Substance History      OB/GYN          Other   (+) arthritis                                 Anesthesia Plan    ASA 3     MAC     intravenous induction   Anesthetic plan and risks discussed with patient.

## 2017-03-31 NOTE — ADDENDUM NOTE
Addendum  created 03/31/17 1130 by Leydi Bean CRNA    Anesthesia Attestations filed, Anesthesia Staff edited

## 2017-04-11 ENCOUNTER — APPOINTMENT (OUTPATIENT)
Dept: LAB | Facility: HOSPITAL | Age: 82
End: 2017-04-11

## 2017-04-11 ENCOUNTER — OFFICE VISIT (OUTPATIENT)
Dept: CARDIOLOGY | Facility: CLINIC | Age: 82
End: 2017-04-11

## 2017-04-11 VITALS
HEART RATE: 86 BPM | WEIGHT: 163.38 LBS | HEIGHT: 67 IN | OXYGEN SATURATION: 99 % | BODY MASS INDEX: 25.64 KG/M2 | SYSTOLIC BLOOD PRESSURE: 140 MMHG | DIASTOLIC BLOOD PRESSURE: 72 MMHG

## 2017-04-11 DIAGNOSIS — I10 ESSENTIAL HYPERTENSION: ICD-10-CM

## 2017-04-11 DIAGNOSIS — I50.32 CHRONIC DIASTOLIC CONGESTIVE HEART FAILURE (HCC): Primary | Chronic | ICD-10-CM

## 2017-04-11 DIAGNOSIS — I25.10 CORONARY ARTERY DISEASE INVOLVING NATIVE CORONARY ARTERY OF NATIVE HEART WITHOUT ANGINA PECTORIS: ICD-10-CM

## 2017-04-11 LAB
ANION GAP SERPL CALCULATED.3IONS-SCNC: 11 MMOL/L (ref 5–15)
BUN BLD-MCNC: 12 MG/DL (ref 7–21)
BUN/CREAT SERPL: 11 (ref 7–25)
CALCIUM SPEC-SCNC: 9.3 MG/DL (ref 8.4–10.2)
CHLORIDE SERPL-SCNC: 102 MMOL/L (ref 95–110)
CO2 SERPL-SCNC: 26 MMOL/L (ref 22–31)
CREAT BLD-MCNC: 1.09 MG/DL (ref 0.7–1.3)
GFR SERPL CREATININE-BSD FRML MDRD: 79 ML/MIN/1.73 (ref 42–98)
GLUCOSE BLD-MCNC: 105 MG/DL (ref 60–100)
POTASSIUM BLD-SCNC: 4.3 MMOL/L (ref 3.5–5.1)
SODIUM BLD-SCNC: 139 MMOL/L (ref 137–145)

## 2017-04-11 PROCEDURE — 99214 OFFICE O/P EST MOD 30 MIN: CPT | Performed by: NURSE PRACTITIONER

## 2017-04-11 PROCEDURE — 80048 BASIC METABOLIC PNL TOTAL CA: CPT | Performed by: NURSE PRACTITIONER

## 2017-04-11 PROCEDURE — 94620 PR PULMONARY STRESS TESTING,SIMPLE: CPT | Performed by: NURSE PRACTITIONER

## 2017-04-11 RX ORDER — GABAPENTIN 300 MG/1
CAPSULE ORAL
Qty: 30 CAPSULE | Refills: 0 | Status: SHIPPED | OUTPATIENT
Start: 2017-04-11 | End: 2017-05-08 | Stop reason: SDUPTHER

## 2017-04-11 RX ORDER — BROMFENAC SODIUM 0.7 MG/ML
1 SOLUTION/ DROPS OPHTHALMIC 4 TIMES DAILY
Refills: 3 | COMMUNITY
Start: 2017-04-05 | End: 2017-08-07

## 2017-04-11 RX ORDER — LOTEPREDNOL ETABONATE 5 MG/G
1 GEL OPHTHALMIC 4 TIMES DAILY
Refills: 3 | COMMUNITY
Start: 2017-04-05 | End: 2017-08-07

## 2017-04-11 NOTE — PROGRESS NOTES
Subjective:     Congestive Heart Failure (chief complaint)    PCP: Dr Lawson  Nephrology: Dr Reza    [facility]:  1. CAD, non-obstructive 2005  2. HFpEF - diastolic dysfunction  3. Hypertension  4. CKD, Stage 2 with H/O KIRK 08/2015  5. H/O Atrial Flutter S/P ablation per Dr Graves 2006  6. Prostate Cancer [place] Score 6/Stage 2 S/P RT followed per Dr Gonzalez  7. DM, Type II  8. Anterior right total hip arthroplasty 04/25/2016    Congestive Heart Failure   Presents for follow-up visit. Pertinent negatives include no abdominal pain, chest pain, chest pressure, claudication, edema, orthopnea, palpitations or unexpected weight change. The symptoms have been stable. Compliance with total regimen is 51-75%. Compliance problems include adherence to exercise.  Compliance with diet is %. Compliance with exercise is 0-25%. Compliance with medications is %.     Review of Systems   Constitution: Negative for chills, fever and unexpected weight change.   Cardiovascular: Negative for chest pain, claudication and palpitations.   Hematologic/Lymphatic: Does not bruise/bleed easily.   Gastrointestinal: Negative for abdominal pain.   Neurological: Negative for dizziness and light-headedness.     Past Medical History:   Diagnosis Date   • Acquired hallux rigidus    • Anemia    • Arthropathy of lumbar facet joint    • Carpal tunnel syndrome    • CHF (congestive heart failure)    • Chronic diastolic congestive heart failure    • Chronic kidney disease    • Chronic kidney disease, stage II (mild)     Chronic kidney disease stage 2   • Chronic obstructive lung disease    • Chronic renal failure    • Coronary artery disease    • Cortical senile cataract    • Diabetes mellitus    • Diabetes mellitus type 2, noninsulin dependent     diabetes mellitus type 2, non-insulin treated, Medication treatment plan: oral diabetic medication   • Diabetes mellitus without complication    • Diastolic dysfunction    • Diastolic heart failure    •  Essential hypertension     difficult to control      • Hip pain    • History of echocardiogram 08/19/2015    Echocardiogram W/ color flow 40371 (1) - Mild to moderate LVH with mild left atrial enlargement and normal aortic root.CLVH,preserved LV systolic function with Ef of 55%.Diastolic dysfunction.MV intact.AV thickened.Aortic sclerosis.   • Hyperlipidemia    • Influenza vaccine administered 10/02/2015    INFLUENZA IMMUN ADMIN OR PREV RECV'D  (1) - Ordered By: DIONNE MANDUJANO ([facility])    • Insomnia    • Insomnia     Other insomnia   • Joint pain    • Low back pain    • Muscle strain    • Nuclear cataract    • Pneumococcal vaccination given 07/22/2016    PNEUMOC VAC/ADMIN/RCVD 4040F (3) - Ordered By: DIONNE MANDUJANO ([facility])   • Pure hypercholesterolemia    • Sedentary lifestyle      Past Surgical History:   Procedure Laterality Date   • CARDIAC ABLATION     • CARDIAC CATHETERIZATION  02/04/1986    Cardiac cath 18567 (1) - normal left heart catherization,non cardiac chest pain   • CARPAL TUNNEL RELEASE  10/19/2015    Carpal tunnel surgery (1) - Release of left carpal tunnel   • CATARACT EXTRACTION W/ INTRAOCULAR LENS IMPLANT Left 3/31/2017    Procedure: REMOVE CATARACT AND IMPLANT INRAOCULAR LENS LEFT EYE;  Surgeon: Hector Navarrete MD;  Location: [facility];  Service:    • ENDOSCOPY  08/05/2013    Colon endoscopy 89129 (2) - Hemorrhoids found.   • INGUINAL HERNIA REPAIR Right 06/07/1968    Inguinal hernia, repair (2)   • INJECTION OF MEDICATION  09/06/2012    Albuterol (2u) (1) - Ordered By: LIDYA STEINER ([facility])    • INJECTION OF MEDICATION  09/06/2012    Atrovent (1) - Ordered By: ILDYA STEINER ([facility])    • INJECTION OF MEDICATION  02/10/2012    Depo Medrol (Methylprednisone) (3) - Ordered By: HEATHER PAK ([facility])    • INJECTION OF MEDICATION  02/09/2014    Kenalog (4) - Ordered By: DIMAS ANDRADE ([facility])    • LUMBAR LAMINECTOMY  2007    Lumbar laminectomy (1)   • MANDIBLE FRACTURE  SURGERY  10/11/1981    Treat nose/jaw fracture (1) - bilateral open reduction of fracture of mandible   • OTHER SURGICAL HISTORY  09/24/2011    Drain/Inject Major Joint 20610 (1) - Ordered By: KARLOS HERNANDEZ ([facility])    • OTHER SURGICAL HISTORY      Insert IVC filter 54748 (1)   • OTHER SURGICAL HISTORY  10/11/2011    SPIROMETRY 01027 (1) - Ordered By: HEATHER PAK ([facility])   • TOTAL HIP ARTHROPLASTY Right      Social History     Social History   • Marital status:      Spouse name: N/A   • Number of children: N/A   • Years of education: N/A     Social History Main Topics   • Smoking status: Former Smoker     Quit date: 1999   • Smokeless tobacco: Never Used   • Alcohol use No   • Drug use: No   • Sexual activity: Defer      Comment: Marital status:      Other Topics Concern   • None     Social History Narrative     Current Outpatient Prescriptions   Medication Sig Dispense Refill   • amLODIPine (NORVASC) 10 MG tablet Take 1 tablet by mouth Daily. 30 tablet 11   • aspirin 81 MG EC tablet Take 81 mg by mouth daily.     • ferrous sulfate 325 (65 FE) MG tablet Take 1 tablet by mouth Daily With Breakfast. 60 tablet 2   • glucose blood test strip 100 each by Other route Daily. TEST BLOOD SUGAR ONCE DAILY (LOT [id number] EXP 5/22/18); Truetest Test Strips 100 each 1   • guanFACINE (TENEX) 2 MG tablet Take 1 tablet by mouth Every Night. 30 tablet 5   • lisinopril (PRINIVIL,ZESTRIL) 20 MG tablet Take 1 tablet by mouth 2 (Two) Times a Day. 60 tablet 3   • LOTEMAX 0.5 % gel ophthalmic gel   3   • pravastatin (PRAVACHOL) 40 MG tablet Take 1 tablet by mouth daily. 30 tablet 5   • PROLENSA 0.07 % solution   3   • SITagliptin (JANUVIA) 50 MG tablet Take 1 tablet by mouth Daily. 30 tablet 3   • TRUEPLUS LANCETS 30G misc 1 Device Daily. TEST 100 each 3   • gabapentin (NEURONTIN) 300 MG capsule TAKE ONE CAPSULE BY MOUTH EVERY NIGHT AT BEDTIME 30 capsule 0     No current facility-administered medications for this visit.   "    Objective:     Vitals:    04/11/17 0903 04/11/17 0907   BP: 162/80 140/72   BP Location: Left arm Right arm   Patient Position: Sitting Sitting   Cuff Size: Adult Adult   Pulse: 86    SpO2: 99%    Weight: 163 lb 6 oz (74.1 kg)    Height: 67\" (170.2 cm)       Physical Exam   Constitutional: He is oriented to person, place, and time. He appears well-nourished. No distress.   Neck: No JVD present.   Cardiovascular: Normal rate, normal heart sounds and intact distal pulses.    No murmur heard.  Pulmonary/Chest: Effort normal and breath sounds normal. No respiratory distress.   Abdominal: He exhibits no distension.   Musculoskeletal: He exhibits no edema.   Neurological: He is alert and oriented to person, place, and time.   Skin: Skin is warm and dry. He is not diaphoretic.   Psychiatric: He has a normal mood and affect. His behavior is normal. Judgment and thought content normal.   Vitals reviewed.    Cardiographics  Echocardiogram: 08/26/2016  FINDINGS:  1. Both atria are normal in size.  2. The aortic valve is mildly sclerotic without any hemodynamically  significant aortic stenosis.  3. Mild concentric left ventricular hypertrophy with early diastolic  dysfunction. No evidence of any regional wall motion abnormality.  The estimated ejection fraction greater than 60%.  4. There is mild mitral annular calcification.  5. Normal right ventricular size and function.  6. On color flow Doppler, there is trace mitral and tricuspid  regurgitation without any hemodynamic significance.  7. No intracardiac mass, pericardial effusion, or cardiac thrombus  seen.  CLINICAL IMPRESSION:  1. Mild concentric left ventricular hypertrophy with early diastolic  dysfunction.  2. Estimated ejection fraction greater than 60%.  3. Mildly sclerotic aortic valve with mild mitral annular  calcification without any hemodynamic significant stenotic process.  4. On color flow Doppler, there is trace mitral and tricuspid  regurgitation without " any hemodynamic significance.  5. No intracardiac mass, pericardial effusion, or cardiac thrombus  seen.    Labs:   Lab Results   Component Value Date    GLUCOSE 105 (H) 04/11/2017    CALCIUM 9.3 04/11/2017     04/11/2017    K 4.3 04/11/2017    CO2 26.0 04/11/2017     04/11/2017    BUN 12 04/11/2017    CREATININE 1.09 04/11/2017    EGFRIFAFRI 79 04/11/2017    BCR 11.0 04/11/2017    ANIONGAP 11.0 04/11/2017     Lab Results   Component Value Date    WBC 3.35 01/28/2017    HGB 12.9 (L) 01/28/2017    HCT 37.0 (L) 01/28/2017    MCV 85.3 01/28/2017     01/28/2017     No results found for: CHOL  Lab Results   Component Value Date    TRIG 67 08/26/2016    TRIG 157 08/27/2015    TRIG 67 06/11/2015     Lab Results   Component Value Date    HDL 49 (L) 08/26/2016     Lab Results   Component Value Date    LDLCALC 79 08/26/2016    LDLCALC 96 08/27/2015    LDLCALC 100 06/11/2015     Lab Results   Component Value Date    TSH 1.70 09/21/2016     The following portions of the patient's history were reviewed and updated as appropriate: allergies, current medications, past family history, past medical history, past social history, past surgical history and problem list.     Assessment:      Diagnosis Plan   1. Chronic diastolic congestive heart failure  Basic Metabolic Panel   2. Essential hypertension  Basic Metabolic Panel   3. Coronary artery disease involving native coronary artery of native heart without angina pectoris       Mr Ruvalcaba presents with history of HFpEF for which there is no clinical evidence of hypervolemia. He is hemodynamically stable.      Plan:   1.   Recommended daily weight monitoring.  Recommended restricted dietary sodium intake.  Discussed patient action plan for heart failure.  Recommended avoiding NSAIDs use.  Discussed warning signs requiring additional medical attention for heart failure.  2. Call results of BMP.

## 2017-04-11 NOTE — PROGRESS NOTES
General Keith Ruvalcaba Jr.  Procedure: 6 Minute Walk Test   04/11/2017  Indication:HFpEF    Pretest: BP:162/80               HR:86               Sa02:99               Dyspnea:0               Fatigue:0    Post Test: BP:180/80               HR:98               Sa02:96               Dyspnea:3               Fatigue:3    First 6MWT:no    Supplemental oxygen during test:no    Stopped before 6 minutes:yes, 3:12    Pauses:no    Results in distance walked:207.4 meters    Did individual experience any pain or discomfort:shortness of breath    I was present for the above test and agree with the data.     NYHA Functional Class NYHA Class III

## 2017-04-13 ENCOUNTER — ANESTHESIA EVENT (OUTPATIENT)
Dept: PERIOP | Facility: HOSPITAL | Age: 82
End: 2017-04-13

## 2017-04-14 ENCOUNTER — HOSPITAL ENCOUNTER (OUTPATIENT)
Facility: HOSPITAL | Age: 82
Setting detail: HOSPITAL OUTPATIENT SURGERY
Discharge: HOME | End: 2017-04-14
Attending: OPHTHALMOLOGY | Admitting: OPHTHALMOLOGY

## 2017-04-14 ENCOUNTER — ANESTHESIA (OUTPATIENT)
Dept: PERIOP | Facility: HOSPITAL | Age: 82
End: 2017-04-14

## 2017-04-14 VITALS
OXYGEN SATURATION: 97 % | WEIGHT: 160.94 LBS | BODY MASS INDEX: 25.26 KG/M2 | SYSTOLIC BLOOD PRESSURE: 114 MMHG | RESPIRATION RATE: 18 BRPM | DIASTOLIC BLOOD PRESSURE: 67 MMHG | TEMPERATURE: 97.1 F | HEIGHT: 67 IN | HEART RATE: 70 BPM

## 2017-04-14 LAB — GLUCOSE BLDC GLUCOMTR-MCNC: 100 MG/DL (ref 70–130)

## 2017-04-14 PROCEDURE — 25010000002 MIDAZOLAM PER 1 MG: Performed by: NURSE ANESTHETIST, CERTIFIED REGISTERED

## 2017-04-14 PROCEDURE — 82962 GLUCOSE BLOOD TEST: CPT

## 2017-04-14 PROCEDURE — 25010000002 VANCOMYCIN PER 500 MG: Performed by: OPHTHALMOLOGY

## 2017-04-14 PROCEDURE — C1780 LENS, INTRAOCULAR (NEW TECH): HCPCS | Performed by: OPHTHALMOLOGY

## 2017-04-14 PROCEDURE — 25010000002 FENTANYL CITRATE (PF) 100 MCG/2ML SOLUTION: Performed by: NURSE ANESTHETIST, CERTIFIED REGISTERED

## 2017-04-14 DEVICE — LENS ACRYSOF IQ 6X13MM SN60WF 16.5: Type: IMPLANTABLE DEVICE | Site: EYE | Status: FUNCTIONAL

## 2017-04-14 RX ORDER — PHENYLEPHRINE HYDROCHLORIDE 100 MG/ML
1 SOLUTION/ DROPS OPHTHALMIC AS NEEDED
Status: DISCONTINUED | OUTPATIENT
Start: 2017-04-14 | End: 2017-04-14 | Stop reason: HOSPADM

## 2017-04-14 RX ORDER — FENTANYL CITRATE 50 UG/ML
INJECTION, SOLUTION INTRAMUSCULAR; INTRAVENOUS AS NEEDED
Status: DISCONTINUED | OUTPATIENT
Start: 2017-04-14 | End: 2017-04-14 | Stop reason: SURG

## 2017-04-14 RX ORDER — SODIUM CHLORIDE, POTASSIUM CHLORIDE, CALCIUM CHLORIDE, MAGNESIUM CHLORIDE, SODIUM ACETATE, AND SODIUM CITRATE 6.4; .75; .48; .3; 3.9; 1.7 MG/ML; MG/ML; MG/ML; MG/ML; MG/ML; MG/ML
SOLUTION IRRIGATION AS NEEDED
Status: DISCONTINUED | OUTPATIENT
Start: 2017-04-14 | End: 2017-04-14 | Stop reason: HOSPADM

## 2017-04-14 RX ORDER — TETRACAINE HYDROCHLORIDE 5 MG/ML
SOLUTION OPHTHALMIC AS NEEDED
Status: DISCONTINUED | OUTPATIENT
Start: 2017-04-14 | End: 2017-04-14 | Stop reason: HOSPADM

## 2017-04-14 RX ORDER — CYCLOPENTOLATE HYDROCHLORIDE 10 MG/ML
1 SOLUTION/ DROPS OPHTHALMIC
Status: COMPLETED | OUTPATIENT
Start: 2017-04-14 | End: 2017-04-14

## 2017-04-14 RX ORDER — FLURBIPROFEN SODIUM 0.3 MG/ML
1 SOLUTION/ DROPS OPHTHALMIC
Status: COMPLETED | OUTPATIENT
Start: 2017-04-14 | End: 2017-04-14

## 2017-04-14 RX ORDER — ONDANSETRON 2 MG/ML
4 INJECTION INTRAMUSCULAR; INTRAVENOUS ONCE AS NEEDED
Status: DISCONTINUED | OUTPATIENT
Start: 2017-04-14 | End: 2017-04-14 | Stop reason: HOSPADM

## 2017-04-14 RX ORDER — MOXIFLOXACIN 5 MG/ML
SOLUTION/ DROPS OPHTHALMIC AS NEEDED
Status: DISCONTINUED | OUTPATIENT
Start: 2017-04-14 | End: 2017-04-14 | Stop reason: HOSPADM

## 2017-04-14 RX ORDER — MIDAZOLAM HYDROCHLORIDE 1 MG/ML
INJECTION INTRAMUSCULAR; INTRAVENOUS AS NEEDED
Status: DISCONTINUED | OUTPATIENT
Start: 2017-04-14 | End: 2017-04-14 | Stop reason: SURG

## 2017-04-14 RX ORDER — TETRACAINE HYDROCHLORIDE 5 MG/ML
1 SOLUTION OPHTHALMIC AS NEEDED
Status: COMPLETED | OUTPATIENT
Start: 2017-04-14 | End: 2017-04-14

## 2017-04-14 RX ORDER — PREDNISOLONE ACETATE 10 MG/ML
SUSPENSION/ DROPS OPHTHALMIC AS NEEDED
Status: DISCONTINUED | OUTPATIENT
Start: 2017-04-14 | End: 2017-04-14 | Stop reason: HOSPADM

## 2017-04-14 RX ORDER — DEXAMETHASONE SODIUM PHOSPHATE 4 MG/ML
8 INJECTION, SOLUTION INTRA-ARTICULAR; INTRALESIONAL; INTRAMUSCULAR; INTRAVENOUS; SOFT TISSUE ONCE AS NEEDED
Status: DISCONTINUED | OUTPATIENT
Start: 2017-04-14 | End: 2017-04-14 | Stop reason: HOSPADM

## 2017-04-14 RX ORDER — SODIUM CHLORIDE 9 MG/ML
1000 INJECTION, SOLUTION INTRAVENOUS CONTINUOUS PRN
Status: DISCONTINUED | OUTPATIENT
Start: 2017-04-14 | End: 2017-04-14 | Stop reason: HOSPADM

## 2017-04-14 RX ORDER — PHENYLEPHRINE HCL 2.5 %
1 DROPS OPHTHALMIC (EYE)
Status: COMPLETED | OUTPATIENT
Start: 2017-04-14 | End: 2017-04-14

## 2017-04-14 RX ORDER — BRIMONIDINE TARTRATE 0.15 %
DROPS OPHTHALMIC (EYE) AS NEEDED
Status: DISCONTINUED | OUTPATIENT
Start: 2017-04-14 | End: 2017-04-14 | Stop reason: HOSPADM

## 2017-04-14 RX ADMIN — PHENYLEPHRINE HYDROCHLORIDE 1 DROP: 25 SOLUTION/ DROPS OPHTHALMIC at 09:33

## 2017-04-14 RX ADMIN — SODIUM CHLORIDE 1000 ML: 9 INJECTION, SOLUTION INTRAVENOUS at 09:10

## 2017-04-14 RX ADMIN — MIDAZOLAM 0.5 MG: 1 INJECTION INTRAMUSCULAR; INTRAVENOUS at 10:53

## 2017-04-14 RX ADMIN — CYCLOPENTOLATE HYDROCHLORIDE 1 DROP: 10 SOLUTION/ DROPS OPHTHALMIC at 09:23

## 2017-04-14 RX ADMIN — FLURBIPROFEN SODIUM 1 DROP: 0.3 SOLUTION/ DROPS OPHTHALMIC at 09:33

## 2017-04-14 RX ADMIN — PHENYLEPHRINE HYDROCHLORIDE 1 DROP: 25 SOLUTION/ DROPS OPHTHALMIC at 09:13

## 2017-04-14 RX ADMIN — FLURBIPROFEN SODIUM 1 DROP: 0.3 SOLUTION/ DROPS OPHTHALMIC at 09:13

## 2017-04-14 RX ADMIN — MIDAZOLAM 1 MG: 1 INJECTION INTRAMUSCULAR; INTRAVENOUS at 10:46

## 2017-04-14 RX ADMIN — FENTANYL CITRATE 25 MCG: 50 INJECTION, SOLUTION INTRAMUSCULAR; INTRAVENOUS at 10:51

## 2017-04-14 RX ADMIN — TETRACAINE HYDROCHLORIDE 1 DROP: 5 SOLUTION OPHTHALMIC at 09:33

## 2017-04-14 RX ADMIN — CYCLOPENTOLATE HYDROCHLORIDE 1 DROP: 10 SOLUTION/ DROPS OPHTHALMIC at 09:33

## 2017-04-14 RX ADMIN — PHENYLEPHRINE HYDROCHLORIDE 1 DROP: 25 SOLUTION/ DROPS OPHTHALMIC at 09:24

## 2017-04-14 RX ADMIN — TETRACAINE HYDROCHLORIDE 1 DROP: 5 SOLUTION OPHTHALMIC at 09:12

## 2017-04-14 RX ADMIN — CYCLOPENTOLATE HYDROCHLORIDE 1 DROP: 10 SOLUTION/ DROPS OPHTHALMIC at 09:12

## 2017-04-14 RX ADMIN — FLURBIPROFEN SODIUM 1 DROP: 0.3 SOLUTION/ DROPS OPHTHALMIC at 09:23

## 2017-04-14 NOTE — ANESTHESIA POSTPROCEDURE EVALUATION
Patient: General Keith Ruvalcaba Jr.    Procedure Summary     Date Anesthesia Start Anesthesia Stop Room / Location    04/14/17 1048  [facility] / [facility]       Procedure Diagnosis Surgeon Provider    REMOVE CATARACT AND IMPLANT INTRAOCULAR LENS RIGHT EYE (Right Eye) Age-related nuclear cataract of eye, right  (CATARACT RIGHT EYE) MD Cholo Jay MD          Anesthesia Type: MAC  Last vitals  BP      Temp      Pulse     Resp      SpO2        Post Anesthesia Care and Evaluation    Patient location during evaluation: bedside  Patient participation: complete - patient participated  Level of consciousness: responsive to verbal stimuli  Pain management: adequate  Airway patency: patent  Anesthetic complications: No anesthetic complications    Cardiovascular status: acceptable  Respiratory status: acceptable  Hydration status: acceptable

## 2017-04-14 NOTE — ANESTHESIA PREPROCEDURE EVALUATION
Anesthesia Evaluation     Patient summary reviewed and Nursing notes reviewed   NPO Status: > 8 hours   Airway   Mallampati: II  TM distance: >3 FB  Neck ROM: full  possible difficult intubation  Dental    (+) edentulous    Pulmonary    (+) COPD moderate, decreased breath sounds,   Cardiovascular - normal exam    ECG reviewed  Rhythm: regular  Rate: normal    (+) hypertension poorly controlled, CAD, CHF, PVD (Carotid artery stenosis),     ROS comment: EKG:Sinus cali with 1st degree AV block.    Neuro/Psych  (+) numbness,    GI/Hepatic/Renal/Endo    (+)  chronic renal disease CRI, diabetes mellitus type 2 well controlled,     Musculoskeletal     Abdominal    Substance History - negative use     OB/GYN negative ob/gyn ROS         Other   (+) arthritis                                 Anesthesia Plan    ASA 3     MAC     intravenous induction   Anesthetic plan and risks discussed with patient.

## 2017-04-21 RX ORDER — FERROUS SULFATE 325(65) MG
TABLET ORAL
Qty: 60 TABLET | Refills: 0 | Status: SHIPPED | OUTPATIENT
Start: 2017-04-21 | End: 2017-06-24 | Stop reason: SDUPTHER

## 2017-05-02 ENCOUNTER — OFFICE VISIT (OUTPATIENT)
Dept: ORTHOPEDIC SURGERY | Facility: CLINIC | Age: 82
End: 2017-05-02

## 2017-05-02 VITALS — HEIGHT: 67 IN | WEIGHT: 160 LBS | BODY MASS INDEX: 25.11 KG/M2

## 2017-05-02 DIAGNOSIS — M70.62 TROCHANTERIC BURSITIS, LEFT HIP: ICD-10-CM

## 2017-05-02 DIAGNOSIS — Z96.641 H/O TOTAL HIP ARTHROPLASTY, RIGHT: Primary | ICD-10-CM

## 2017-05-02 PROCEDURE — 99213 OFFICE O/P EST LOW 20 MIN: CPT | Performed by: ORTHOPAEDIC SURGERY

## 2017-05-02 RX ORDER — BESIFLOXACIN 6 MG/ML
SUSPENSION OPHTHALMIC
Refills: 3 | COMMUNITY
Start: 2017-04-28 | End: 2017-08-07

## 2017-05-08 RX ORDER — GABAPENTIN 300 MG/1
CAPSULE ORAL
Qty: 30 CAPSULE | Refills: 0 | Status: SHIPPED | OUTPATIENT
Start: 2017-05-08 | End: 2017-07-19 | Stop reason: DRUGHIGH

## 2017-05-09 ENCOUNTER — OFFICE VISIT (OUTPATIENT)
Dept: INTERNAL MEDICINE | Facility: CLINIC | Age: 82
End: 2017-05-09

## 2017-05-09 ENCOUNTER — APPOINTMENT (OUTPATIENT)
Dept: LAB | Facility: HOSPITAL | Age: 82
End: 2017-05-09

## 2017-05-09 VITALS
BODY MASS INDEX: 25.58 KG/M2 | DIASTOLIC BLOOD PRESSURE: 84 MMHG | SYSTOLIC BLOOD PRESSURE: 152 MMHG | HEIGHT: 67 IN | WEIGHT: 163 LBS

## 2017-05-09 DIAGNOSIS — E11.69 TYPE 2 DIABETES MELLITUS WITH OTHER SPECIFIED COMPLICATION (HCC): Primary | ICD-10-CM

## 2017-05-09 DIAGNOSIS — I10 ESSENTIAL HYPERTENSION: ICD-10-CM

## 2017-05-09 DIAGNOSIS — E78.2 MIXED HYPERLIPIDEMIA: ICD-10-CM

## 2017-05-09 LAB
GLUCOSE BLDC GLUCOMTR-MCNC: 111 MG/DL (ref 70–130)
HBA1C MFR BLD: 6.51 % (ref 4–5.6)

## 2017-05-09 PROCEDURE — 83036 HEMOGLOBIN GLYCOSYLATED A1C: CPT | Performed by: INTERNAL MEDICINE

## 2017-05-09 PROCEDURE — 99213 OFFICE O/P EST LOW 20 MIN: CPT | Performed by: INTERNAL MEDICINE

## 2017-05-09 PROCEDURE — 82962 GLUCOSE BLOOD TEST: CPT | Performed by: INTERNAL MEDICINE

## 2017-05-09 PROCEDURE — 36415 COLL VENOUS BLD VENIPUNCTURE: CPT | Performed by: INTERNAL MEDICINE

## 2017-05-09 RX ORDER — LISINOPRIL 20 MG/1
20 TABLET ORAL 2 TIMES DAILY
Qty: 60 TABLET | Refills: 5 | Status: SHIPPED | OUTPATIENT
Start: 2017-05-09 | End: 2017-11-08

## 2017-05-10 ENCOUNTER — TELEPHONE (OUTPATIENT)
Dept: INTERNAL MEDICINE | Facility: CLINIC | Age: 82
End: 2017-05-10

## 2017-05-10 RX ORDER — CALCIUM CITRATE/VITAMIN D3 200MG-6.25
TABLET ORAL
Qty: 100 EACH | Refills: 1 | Status: SHIPPED | OUTPATIENT
Start: 2017-05-10 | End: 2018-01-04 | Stop reason: SDUPTHER

## 2017-05-26 ENCOUNTER — HOSPITAL ENCOUNTER (EMERGENCY)
Facility: HOSPITAL | Age: 82
Discharge: HOME | End: 2017-05-26
Attending: FAMILY MEDICINE | Admitting: FAMILY MEDICINE

## 2017-05-26 ENCOUNTER — APPOINTMENT (OUTPATIENT)
Dept: GENERAL RADIOLOGY | Facility: HOSPITAL | Age: 82
End: 2017-05-26

## 2017-05-26 VITALS
WEIGHT: 162 LBS | DIASTOLIC BLOOD PRESSURE: 62 MMHG | HEART RATE: 68 BPM | HEIGHT: 67 IN | BODY MASS INDEX: 25.43 KG/M2 | RESPIRATION RATE: 14 BRPM | OXYGEN SATURATION: 98 % | TEMPERATURE: 97.7 F | SYSTOLIC BLOOD PRESSURE: 131 MMHG

## 2017-05-26 DIAGNOSIS — J40 BRONCHITIS: Primary | ICD-10-CM

## 2017-05-26 LAB
ALBUMIN SERPL-MCNC: 3.8 G/DL (ref 3.4–4.8)
ALBUMIN/GLOB SERPL: 1.4 G/DL (ref 1.1–1.8)
ALP SERPL-CCNC: 64 U/L (ref 38–126)
ALT SERPL W P-5'-P-CCNC: 31 U/L (ref 21–72)
ANION GAP SERPL CALCULATED.3IONS-SCNC: 11 MMOL/L (ref 5–15)
AST SERPL-CCNC: 25 U/L (ref 17–59)
BASOPHILS # BLD AUTO: 0.04 10*3/MM3 (ref 0–0.2)
BASOPHILS NFR BLD AUTO: 1.2 % (ref 0–2)
BILIRUB SERPL-MCNC: 0.3 MG/DL (ref 0.2–1.3)
BUN BLD-MCNC: 12 MG/DL (ref 7–21)
BUN/CREAT SERPL: 11 (ref 7–25)
CALCIUM SPEC-SCNC: 8.9 MG/DL (ref 8.4–10.2)
CHLORIDE SERPL-SCNC: 103 MMOL/L (ref 95–110)
CO2 SERPL-SCNC: 25 MMOL/L (ref 22–31)
CREAT BLD-MCNC: 1.09 MG/DL (ref 0.7–1.3)
DEPRECATED RDW RBC AUTO: 46.5 FL (ref 35.1–43.9)
EOSINOPHIL # BLD AUTO: 0.17 10*3/MM3 (ref 0–0.7)
EOSINOPHIL NFR BLD AUTO: 5.2 % (ref 0–7)
ERYTHROCYTE [DISTWIDTH] IN BLOOD BY AUTOMATED COUNT: 14.3 % (ref 11.5–14.5)
GFR SERPL CREATININE-BSD FRML MDRD: 79 ML/MIN/1.73 (ref 42–98)
GLOBULIN UR ELPH-MCNC: 2.8 GM/DL (ref 2.3–3.5)
GLUCOSE BLD-MCNC: 104 MG/DL (ref 60–100)
HCT VFR BLD AUTO: 39.3 % (ref 39–49)
HGB BLD-MCNC: 13.5 G/DL (ref 13.7–17.3)
HOLD SPECIMEN: NORMAL
HOLD SPECIMEN: NORMAL
IMM GRANULOCYTES # BLD: 0.02 10*3/MM3 (ref 0–0.02)
IMM GRANULOCYTES NFR BLD: 0.6 % (ref 0–0.5)
LYMPHOCYTES # BLD AUTO: 0.9 10*3/MM3 (ref 0.6–4.2)
LYMPHOCYTES NFR BLD AUTO: 27.4 % (ref 10–50)
MCH RBC QN AUTO: 30.2 PG (ref 26.5–34)
MCHC RBC AUTO-ENTMCNC: 34.4 G/DL (ref 31.5–36.3)
MCV RBC AUTO: 87.9 FL (ref 80–98)
MONOCYTES # BLD AUTO: 0.78 10*3/MM3 (ref 0–0.9)
MONOCYTES NFR BLD AUTO: 23.7 % (ref 0–12)
NEUTROPHILS # BLD AUTO: 1.38 10*3/MM3 (ref 2–8.6)
NEUTROPHILS NFR BLD AUTO: 41.9 % (ref 37–80)
NRBC BLD MANUAL-RTO: 0 /100 WBC (ref 0–0)
NT-PROBNP SERPL-MCNC: 88.8 PG/ML (ref 0–1800)
PLATELET # BLD AUTO: 297 10*3/MM3 (ref 150–450)
PMV BLD AUTO: 8.3 FL (ref 8–12)
POTASSIUM BLD-SCNC: 4.1 MMOL/L (ref 3.5–5.1)
PROT SERPL-MCNC: 6.6 G/DL (ref 6.3–8.6)
RBC # BLD AUTO: 4.47 10*6/MM3 (ref 4.37–5.74)
SODIUM BLD-SCNC: 139 MMOL/L (ref 137–145)
TROPONIN I SERPL-MCNC: <0.012 NG/ML
WBC NRBC COR # BLD: 3.29 10*3/MM3 (ref 3.2–9.8)
WHOLE BLOOD HOLD SPECIMEN: NORMAL
WHOLE BLOOD HOLD SPECIMEN: NORMAL

## 2017-05-26 PROCEDURE — 25010000002 CEFTRIAXONE: Performed by: FAMILY MEDICINE

## 2017-05-26 PROCEDURE — 84484 ASSAY OF TROPONIN QUANT: CPT | Performed by: FAMILY MEDICINE

## 2017-05-26 PROCEDURE — 93005 ELECTROCARDIOGRAM TRACING: CPT

## 2017-05-26 PROCEDURE — 83880 ASSAY OF NATRIURETIC PEPTIDE: CPT | Performed by: FAMILY MEDICINE

## 2017-05-26 PROCEDURE — 71020 HC CHEST PA AND LATERAL: CPT

## 2017-05-26 PROCEDURE — 85025 COMPLETE CBC W/AUTO DIFF WBC: CPT | Performed by: FAMILY MEDICINE

## 2017-05-26 PROCEDURE — 93010 ELECTROCARDIOGRAM REPORT: CPT | Performed by: INTERNAL MEDICINE

## 2017-05-26 PROCEDURE — 80053 COMPREHEN METABOLIC PANEL: CPT | Performed by: FAMILY MEDICINE

## 2017-05-26 PROCEDURE — 99283 EMERGENCY DEPT VISIT LOW MDM: CPT

## 2017-05-26 PROCEDURE — 96365 THER/PROPH/DIAG IV INF INIT: CPT

## 2017-05-26 PROCEDURE — 96375 TX/PRO/DX INJ NEW DRUG ADDON: CPT

## 2017-05-26 PROCEDURE — 25010000002 METHYLPREDNISOLONE PER 125 MG: Performed by: FAMILY MEDICINE

## 2017-05-26 RX ORDER — ALBUTEROL SULFATE 90 UG/1
2 AEROSOL, METERED RESPIRATORY (INHALATION) EVERY 4 HOURS PRN
Qty: 1 INHALER | Refills: 0 | Status: SHIPPED | OUTPATIENT
Start: 2017-05-26 | End: 2017-11-14 | Stop reason: SDUPTHER

## 2017-05-26 RX ORDER — AZITHROMYCIN 250 MG/1
250 TABLET, FILM COATED ORAL DAILY
Qty: 4 TABLET | Refills: 0 | Status: SHIPPED | OUTPATIENT
Start: 2017-05-26 | End: 2017-08-07

## 2017-05-26 RX ORDER — AZITHROMYCIN 250 MG/1
500 TABLET, FILM COATED ORAL ONCE
Status: COMPLETED | OUTPATIENT
Start: 2017-05-26 | End: 2017-05-26

## 2017-05-26 RX ORDER — IPRATROPIUM BROMIDE AND ALBUTEROL SULFATE 2.5; .5 MG/3ML; MG/3ML
3 SOLUTION RESPIRATORY (INHALATION) ONCE
Status: COMPLETED | OUTPATIENT
Start: 2017-05-26 | End: 2017-05-26

## 2017-05-26 RX ORDER — METHYLPREDNISOLONE SODIUM SUCCINATE 125 MG/2ML
125 INJECTION, POWDER, LYOPHILIZED, FOR SOLUTION INTRAMUSCULAR; INTRAVENOUS ONCE
Status: COMPLETED | OUTPATIENT
Start: 2017-05-26 | End: 2017-05-26

## 2017-05-26 RX ORDER — PREDNISONE 20 MG/1
20 TABLET ORAL 2 TIMES DAILY
Qty: 10 TABLET | Refills: 0 | Status: SHIPPED | OUTPATIENT
Start: 2017-05-26 | End: 2017-08-07

## 2017-05-26 RX ORDER — SODIUM CHLORIDE 0.9 % (FLUSH) 0.9 %
10 SYRINGE (ML) INJECTION AS NEEDED
Status: DISCONTINUED | OUTPATIENT
Start: 2017-05-26 | End: 2017-05-26 | Stop reason: HOSPADM

## 2017-05-26 RX ADMIN — METHYLPREDNISOLONE SODIUM SUCCINATE 125 MG: 125 INJECTION, POWDER, FOR SOLUTION INTRAMUSCULAR; INTRAVENOUS at 10:20

## 2017-05-26 RX ADMIN — CEFTRIAXONE 1 G: 1 INJECTION, POWDER, FOR SOLUTION INTRAMUSCULAR; INTRAVENOUS at 10:19

## 2017-05-26 RX ADMIN — IPRATROPIUM BROMIDE AND ALBUTEROL SULFATE 3 ML: 2.5; .5 SOLUTION RESPIRATORY (INHALATION) at 10:19

## 2017-05-26 RX ADMIN — AZITHROMYCIN 500 MG: 250 TABLET, FILM COATED ORAL at 10:19

## 2017-05-31 ENCOUNTER — OFFICE VISIT (OUTPATIENT)
Dept: INTERNAL MEDICINE | Facility: CLINIC | Age: 82
End: 2017-05-31

## 2017-05-31 VITALS
SYSTOLIC BLOOD PRESSURE: 130 MMHG | HEIGHT: 67 IN | DIASTOLIC BLOOD PRESSURE: 80 MMHG | WEIGHT: 160.8 LBS | BODY MASS INDEX: 25.24 KG/M2

## 2017-05-31 DIAGNOSIS — J20.9 ACUTE WHEEZY BRONCHITIS: Primary | ICD-10-CM

## 2017-05-31 PROCEDURE — 99213 OFFICE O/P EST LOW 20 MIN: CPT | Performed by: INTERNAL MEDICINE

## 2017-05-31 PROCEDURE — 96372 THER/PROPH/DIAG INJ SC/IM: CPT | Performed by: INTERNAL MEDICINE

## 2017-05-31 RX ORDER — TRIAMCINOLONE ACETONIDE 40 MG/ML
40 INJECTION, SUSPENSION INTRA-ARTICULAR; INTRAMUSCULAR ONCE
Status: COMPLETED | OUTPATIENT
Start: 2017-05-31 | End: 2017-05-31

## 2017-05-31 RX ORDER — DEXTROMETHORPHAN HYDROBROMIDE AND PROMETHAZINE HYDROCHLORIDE 15; 6.25 MG/5ML; MG/5ML
5 SYRUP ORAL 4 TIMES DAILY PRN
Qty: 240 ML | Refills: 0 | Status: SHIPPED | OUTPATIENT
Start: 2017-05-31 | End: 2017-08-07

## 2017-05-31 RX ADMIN — TRIAMCINOLONE ACETONIDE 40 MG: 40 INJECTION, SUSPENSION INTRA-ARTICULAR; INTRAMUSCULAR at 15:19

## 2017-06-01 ENCOUNTER — LAB (OUTPATIENT)
Dept: LAB | Facility: HOSPITAL | Age: 82
End: 2017-06-01

## 2017-06-01 ENCOUNTER — TRANSCRIBE ORDERS (OUTPATIENT)
Dept: LAB | Facility: HOSPITAL | Age: 82
End: 2017-06-01

## 2017-06-01 DIAGNOSIS — I10 ESSENTIAL HYPERTENSION, MALIGNANT: ICD-10-CM

## 2017-06-01 DIAGNOSIS — N18.2 CHRONIC KIDNEY DISEASE, STAGE II (MILD): ICD-10-CM

## 2017-06-01 DIAGNOSIS — E78.5 DYSLIPIDEMIA: ICD-10-CM

## 2017-06-01 DIAGNOSIS — N18.2 CHRONIC KIDNEY DISEASE, STAGE II (MILD): Primary | ICD-10-CM

## 2017-06-01 LAB
25(OH)D3 SERPL-MCNC: 22.8 NG/ML (ref 30–100)
ALBUMIN SERPL-MCNC: 3.9 G/DL (ref 3.4–4.8)
ALBUMIN/GLOB SERPL: 1.5 G/DL (ref 1.1–1.8)
ALP SERPL-CCNC: 62 U/L (ref 38–126)
ALT SERPL W P-5'-P-CCNC: 31 U/L (ref 21–72)
ANION GAP SERPL CALCULATED.3IONS-SCNC: 11 MMOL/L (ref 5–15)
AST SERPL-CCNC: 34 U/L (ref 17–59)
BILIRUB SERPL-MCNC: 0.5 MG/DL (ref 0.2–1.3)
BUN BLD-MCNC: 16 MG/DL (ref 7–21)
BUN/CREAT SERPL: 14.2 (ref 7–25)
CALCIUM SPEC-SCNC: 9 MG/DL (ref 8.4–10.2)
CHLORIDE SERPL-SCNC: 99 MMOL/L (ref 95–110)
CO2 SERPL-SCNC: 27 MMOL/L (ref 22–31)
CREAT BLD-MCNC: 1.13 MG/DL (ref 0.7–1.3)
GFR SERPL CREATININE-BSD FRML MDRD: 76 ML/MIN/1.73 (ref 42–98)
GLOBULIN UR ELPH-MCNC: 2.6 GM/DL (ref 2.3–3.5)
GLUCOSE BLD-MCNC: 78 MG/DL (ref 60–100)
POTASSIUM BLD-SCNC: 4 MMOL/L (ref 3.5–5.1)
PROT SERPL-MCNC: 6.5 G/DL (ref 6.3–8.6)
SODIUM BLD-SCNC: 137 MMOL/L (ref 137–145)

## 2017-06-01 PROCEDURE — 80053 COMPREHEN METABOLIC PANEL: CPT | Performed by: INTERNAL MEDICINE

## 2017-06-01 PROCEDURE — 82306 VITAMIN D 25 HYDROXY: CPT | Performed by: INTERNAL MEDICINE

## 2017-06-01 PROCEDURE — 36415 COLL VENOUS BLD VENIPUNCTURE: CPT

## 2017-06-19 ENCOUNTER — TRANSCRIBE ORDERS (OUTPATIENT)
Dept: LAB | Facility: HOSPITAL | Age: 82
End: 2017-06-19

## 2017-06-19 DIAGNOSIS — I10 ESSENTIAL HYPERTENSION, MALIGNANT: ICD-10-CM

## 2017-06-19 DIAGNOSIS — E78.5 HYPERLIPIDEMIA, UNSPECIFIED HYPERLIPIDEMIA TYPE: ICD-10-CM

## 2017-06-19 DIAGNOSIS — N18.2 CHRONIC KIDNEY DISEASE, STAGE II (MILD): Primary | ICD-10-CM

## 2017-06-19 DIAGNOSIS — E55.9 VITAMIN D DEFICIENCY: ICD-10-CM

## 2017-06-26 RX ORDER — FERROUS SULFATE 325(65) MG
TABLET ORAL
Qty: 60 TABLET | Refills: 3 | Status: SHIPPED | OUTPATIENT
Start: 2017-06-26 | End: 2018-04-02 | Stop reason: SDUPTHER

## 2017-07-11 RX ORDER — PRAVASTATIN SODIUM 40 MG
TABLET ORAL
Qty: 30 TABLET | Refills: 3 | Status: SHIPPED | OUTPATIENT
Start: 2017-07-11 | End: 2017-11-25 | Stop reason: SDUPTHER

## 2017-07-19 ENCOUNTER — OFFICE VISIT (OUTPATIENT)
Dept: CARDIOLOGY | Facility: CLINIC | Age: 82
End: 2017-07-19

## 2017-07-19 VITALS
BODY MASS INDEX: 25.11 KG/M2 | DIASTOLIC BLOOD PRESSURE: 74 MMHG | HEIGHT: 67 IN | HEART RATE: 78 BPM | OXYGEN SATURATION: 97 % | WEIGHT: 160 LBS | SYSTOLIC BLOOD PRESSURE: 158 MMHG

## 2017-07-19 DIAGNOSIS — I50.32 CHRONIC DIASTOLIC CONGESTIVE HEART FAILURE (HCC): Primary | Chronic | ICD-10-CM

## 2017-07-19 DIAGNOSIS — I25.10 CORONARY ARTERY DISEASE INVOLVING NATIVE CORONARY ARTERY OF NATIVE HEART WITHOUT ANGINA PECTORIS: ICD-10-CM

## 2017-07-19 DIAGNOSIS — Z85.46 PERSONAL HISTORY OF MALIGNANT NEOPLASM OF PROSTATE: Primary | ICD-10-CM

## 2017-07-19 DIAGNOSIS — I10 ESSENTIAL HYPERTENSION: ICD-10-CM

## 2017-07-19 DIAGNOSIS — N18.2 CHRONIC KIDNEY DISEASE, STAGE 2 (MILD): Chronic | ICD-10-CM

## 2017-07-19 PROCEDURE — 99214 OFFICE O/P EST MOD 30 MIN: CPT | Performed by: NURSE PRACTITIONER

## 2017-07-19 RX ORDER — GABAPENTIN 300 MG/1
300 CAPSULE ORAL 2 TIMES DAILY
COMMUNITY
End: 2017-11-08

## 2017-07-21 NOTE — PROGRESS NOTES
"Subjective:     Congestive Heart Failure (chief complaint )    Congestive Heart Failure   Presents for follow-up visit. Associated symptoms include shortness of breath. Pertinent negatives include no abdominal pain, chest pain, chest pressure, claudication, edema, near-syncope, orthopnea, palpitations, paroxysmal nocturnal dyspnea or unexpected weight change. The symptoms have been stable. Compliance with total regimen is 51-75%. Compliance problems include adherence to exercise.  Compliance with diet is %. Compliance with exercise is 0-25%. Compliance with medications is %.     1. CAD, non-obstructive 2005  2. HFpEF - diastolic dysfunction  3. Hypertension  4. CKD, Stage 2 with H/O KIRK 08/2015  5. H/O Atrial Flutter S/P ablation per Dr Graves 2006  6. Prostate Cancer Salvador Score 6/Stage 2 S/P RT followed per Dr Gonzalez  7. DM, Type II  8. Anterior right total hip arthroplasty 04/25/2016    Mr. Ruvalcaba indicates that he has occasional \"off-balance\" feeling with position changes.  He believes this began following the adjustment of his gabapentin from evening dose to twice daily.  He reports this adjustment was made by Dr. Johnson, neurology.  The patient reorts that the reason for the adjustment was due to complaints of some numbness tingling in the lower extremities and with this adjustment dose symptoms have not improved or resolved.    Review of Systems   Constitution: Negative for chills, fever and unexpected weight change.   Cardiovascular: Negative for chest pain, claudication, near-syncope and palpitations.   Respiratory: Positive for shortness of breath.    Hematologic/Lymphatic: Does not bruise/bleed easily.   Gastrointestinal: Negative for abdominal pain.   Neurological: Negative for dizziness and light-headedness.     Past Medical History:   Diagnosis Date   • Acquired hallux rigidus    • Anemia    • Arthropathy of lumbar facet joint    • Carpal tunnel syndrome    • CHF (congestive heart failure)  "   • Chronic diastolic congestive heart failure    • Chronic kidney disease    • Chronic kidney disease, stage II (mild)     Chronic kidney disease stage 2   • Chronic obstructive lung disease    • Chronic renal failure    • Coronary artery disease    • Cortical senile cataract    • Diabetes mellitus    • Diabetes mellitus type 2, noninsulin dependent     diabetes mellitus type 2, non-insulin treated, Medication treatment plan: oral diabetic medication   • Diabetes mellitus without complication    • Diastolic dysfunction    • Diastolic heart failure    • Essential hypertension     difficult to control      • Hip pain    • History of echocardiogram 08/19/2015    Echocardiogram W/ color flow 64121 (1) - Mild to moderate LVH with mild left atrial enlargement and normal aortic root.CLVH,preserved LV systolic function with Ef of 55%.Diastolic dysfunction.MV intact.AV thickened.Aortic sclerosis.   • Hyperlipidemia    • Influenza vaccine administered 10/02/2015    INFLUENZA IMMUN ADMIN OR PREV RECV'D  (1) - Ordered By: DIONNE MANDUJANO ([facility])    • Insomnia    • Insomnia     Other insomnia   • Joint pain    • Low back pain    • Muscle strain    • Nuclear cataract    • Pneumococcal vaccination given 07/22/2016    PNEUMOC VAC/ADMIN/RCVD 4040F (3) - Ordered By: DIONNE MANDUJANO ([facility])   • Pure hypercholesterolemia    • Sedentary lifestyle      Past Surgical History:   Procedure Laterality Date   • CARDIAC ABLATION     • CARDIAC CATHETERIZATION  02/04/1986    Cardiac cath 02254 (1) - normal left heart catherization,non cardiac chest pain   • CARPAL TUNNEL RELEASE  10/19/2015    Carpal tunnel surgery (1) - Release of left carpal tunnel   • CATARACT EXTRACTION W/ INTRAOCULAR LENS IMPLANT Left 3/31/2017    Procedure: REMOVE CATARACT AND IMPLANT INRAOCULAR LENS LEFT EYE;  Surgeon: Hector Navarrete MD;  Location: [facility];  Service:    • CATARACT EXTRACTION W/ INTRAOCULAR LENS IMPLANT Right 4/14/2017    Procedure:  REMOVE CATARACT AND IMPLANT INTRAOCULAR LENS RIGHT EYE;  Surgeon: Hector Navarrete MD;  Location: [facility];  Service:    • ENDOSCOPY  08/05/2013    Colon endoscopy 93274 (2) - Hemorrhoids found.   • INGUINAL HERNIA REPAIR Right 06/07/1968    Inguinal hernia, repair (2)   • INJECTION OF MEDICATION  09/06/2012    Albuterol (2u) (1) - Ordered By: LIDYA STEINER ([facility])    • INJECTION OF MEDICATION  09/06/2012    Atrovent (1) - Ordered By: LIDYA STEINER ([facility])    • INJECTION OF MEDICATION  02/10/2012    Depo Medrol (Methylprednisone) (3) - Ordered By: HEATHER PAK ([facility])    • INJECTION OF MEDICATION  02/09/2014    Kenalog (4) - Ordered By: DIMAS ANDRADE ([facility])    • LUMBAR LAMINECTOMY  2007    Lumbar laminectomy (1)   • MANDIBLE FRACTURE SURGERY  10/11/1981    Treat nose/jaw fracture (1) - bilateral open reduction of fracture of mandible   • OTHER SURGICAL HISTORY  09/24/2011    Drain/Inject Major Joint 20610 (1) - Ordered By: KARLOS HERNANDEZ ([facility])    • OTHER SURGICAL HISTORY      Insert IVC filter 81124 (1)   • OTHER SURGICAL HISTORY  10/11/2011    SPIROMETRY 28590 (1) - Ordered By: HEATHER PAK ([facility])   • TOTAL HIP ARTHROPLASTY Right      Social History     Social History   • Marital status:      Spouse name: N/A   • Number of children: N/A   • Years of education: N/A     Social History Main Topics   • Smoking status: Former Smoker     Quit date: 1999   • Smokeless tobacco: Never Used   • Alcohol use No   • Drug use: No   • Sexual activity: Defer      Comment: Marital status:      Other Topics Concern   • None     Social History Narrative     Current Outpatient Prescriptions   Medication Sig Dispense Refill   • albuterol (PROVENTIL HFA;VENTOLIN HFA) 108 (90 BASE) MCG/ACT inhaler Inhale 2 puffs Every 4 (Four) Hours As Needed for Wheezing. 1 inhaler 0   • amLODIPine (NORVASC) 10 MG tablet Take 1 tablet by mouth Daily. 30 tablet 11   • aspirin 81 MG EC tablet Take 81 mg by  "mouth daily.     • azithromycin (ZITHROMAX) 250 MG tablet Take 1 tablet by mouth Daily. 4 tablet 0   • BESIVANCE 0.6 % suspension ophthalmic suspension   3   • ferrous sulfate 325 (65 FE) MG tablet TAKE ONE TABLET BY MOUTH DAILY WITH BREAKFAST 60 tablet 3   • gabapentin (NEURONTIN) 300 MG capsule Take 300 mg by mouth 2 (Two) Times a Day.     • guanFACINE (TENEX) 2 MG tablet Take 1 tablet by mouth Every Night. 30 tablet 5   • lisinopril (PRINIVIL,ZESTRIL) 20 MG tablet Take 1 tablet by mouth 2 (Two) Times a Day. 60 tablet 5   • LOTEMAX 0.5 % gel ophthalmic gel 1 drop 4 (Four) Times a Day. Rt eye  3   • pravastatin (PRAVACHOL) 40 MG tablet TAKE ONE TABLET BY MOUTH DAILY 30 tablet 3   • predniSONE (DELTASONE) 20 MG tablet Take 1 tablet by mouth 2 (Two) Times a Day. 10 tablet 0   • PROLENSA 0.07 % solution 1 drop 4 (Four) Times a Day. Rt eye  3   • promethazine-dextromethorphan (PROMETHAZINE-DM) 6.25-15 MG/5ML syrup Take 5 mL by mouth 4 (Four) Times a Day As Needed for Cough. 240 mL 0   • SITagliptin (JANUVIA) 50 MG tablet Take 1 tablet by mouth Daily. 30 tablet 5   • TRUE METRIX BLOOD GLUCOSE TEST test strip TEST BLOOD SUGAR ONCE DAILY (LOT [id number] EXP 03/31/2018) 100 each 1   • TRUEPLUS LANCETS 30G misc 1 Device Daily. TEST 100 each 3     No current facility-administered medications for this visit.      Objective:     Vitals:    07/19/17 1008 07/19/17 1052 07/19/17 1053 07/19/17 1054   BP: 140/78 150/80 180/96 158/74   BP Location: Left arm Right arm Right arm Right arm   Patient Position: Sitting Lying Standing Sitting   Cuff Size: Adult Adult Adult Adult   Pulse: 83 81 84 78   SpO2: 98% 99% 98% 97%   Weight: 160 lb (72.6 kg)      Height: 67\" (170.2 cm)           Physical Exam   Constitutional: He is oriented to person, place, and time. He appears well-nourished. No distress.   Neck: No JVD present.   Cardiovascular: Normal rate, normal heart sounds and intact distal pulses.    No murmur heard.  Pulmonary/Chest: Effort " normal and breath sounds normal. No respiratory distress.   Abdominal: He exhibits no distension.   Musculoskeletal: He exhibits no edema.   Neurological: He is alert and oriented to person, place, and time.   Skin: Skin is warm and dry. He is not diaphoretic.   Psychiatric: He has a normal mood and affect. His behavior is normal. Judgment and thought content normal.   Vitals reviewed.    Cardiographics  Echocardiogram: 08/26/2016  FINDINGS:  1. Both atria are normal in size.  2. The aortic valve is mildly sclerotic without any hemodynamically  significant aortic stenosis.  3. Mild concentric left ventricular hypertrophy with early diastolic  dysfunction. No evidence of any regional wall motion abnormality.  The estimated ejection fraction greater than 60%.  4. There is mild mitral annular calcification.  5. Normal right ventricular size and function.  6. On color flow Doppler, there is trace mitral and tricuspid  regurgitation without any hemodynamic significance.  7. No intracardiac mass, pericardial effusion, or cardiac thrombus  seen.  CLINICAL IMPRESSION:  1. Mild concentric left ventricular hypertrophy with early diastolic  dysfunction.  2. Estimated ejection fraction greater than 60%.  3. Mildly sclerotic aortic valve with mild mitral annular  calcification without any hemodynamic significant stenotic process.  4. On color flow Doppler, there is trace mitral and tricuspid  regurgitation without any hemodynamic significance.  5. No intracardiac mass, pericardial effusion, or cardiac thrombus  seen.     Lab Results   Component Value Date    GLUCOSE 78 06/01/2017    CALCIUM 9.0 06/01/2017     06/01/2017    K 4.0 06/01/2017    CO2 27.0 06/01/2017    CL 99 06/01/2017    BUN 16 06/01/2017    CREATININE 1.13 06/01/2017    EGFRIFAFRI 76 06/01/2017    BCR 14.2 06/01/2017    ANIONGAP 11.0 06/01/2017     Lab Results   Component Value Date    WBC 3.29 05/26/2017    HGB 13.5 (L) 05/26/2017    HCT 39.3 05/26/2017     MCV 87.9 05/26/2017     05/26/2017     No results found for: CHOL  Lab Results   Component Value Date    TRIG 67 08/26/2016    TRIG 157 08/27/2015    TRIG 67 06/11/2015     Lab Results   Component Value Date    HDL 49 (L) 08/26/2016     Lab Results   Component Value Date    LDLCALC 79 08/26/2016    LDLCALC 96 08/27/2015    LDLCALC 100 06/11/2015     Lab Results   Component Value Date    TSH 1.70 09/21/2016     The following portions of the patient's history were reviewed and updated as appropriate: allergies, current medications, past family history, past medical history, past social history, past surgical history and problem list.     Assessment:      Diagnosis Plan   1. Chronic diastolic congestive heart failure  Recommended daily weight monitoring.  Recommended restricted dietary sodium intake not to exceed 2000 mg daily; FR 2000 cc daily  Discussed patient action plan for heart failure.  Recommended avoiding NSAIDs use.  Discussed warning signs requiring additional medical attention for heart failure.    Recommended for slow position changes which have been demonstrated as well as use of cane at all times as mobile assistance    Recommended to discuss with primary care provider, Dr. Lawson regarding gabapentin dosage     2. Essential hypertension  As above    3. Chronic kidney disease, stage 2 (mild)  Dr Reza   4. Coronary artery disease involving native coronary artery of native heart without angina pectoris  Aspirin plus statin therapy   Dr. Kj Green:  attached cardiologist

## 2017-07-25 ENCOUNTER — LAB (OUTPATIENT)
Dept: ONCOLOGY | Facility: HOSPITAL | Age: 82
End: 2017-07-25

## 2017-07-25 DIAGNOSIS — Z85.46 PERSONAL HISTORY OF MALIGNANT NEOPLASM OF PROSTATE: ICD-10-CM

## 2017-07-25 LAB — PSA SERPL-MCNC: 0.44 NG/ML (ref 0–4)

## 2017-07-25 PROCEDURE — 84153 ASSAY OF PSA TOTAL: CPT

## 2017-07-25 PROCEDURE — 36415 COLL VENOUS BLD VENIPUNCTURE: CPT | Performed by: RADIOLOGY

## 2017-08-07 ENCOUNTER — OFFICE VISIT (OUTPATIENT)
Dept: INTERNAL MEDICINE | Facility: CLINIC | Age: 82
End: 2017-08-07

## 2017-08-07 VITALS
HEIGHT: 67 IN | SYSTOLIC BLOOD PRESSURE: 140 MMHG | DIASTOLIC BLOOD PRESSURE: 90 MMHG | BODY MASS INDEX: 25.08 KG/M2 | WEIGHT: 159.8 LBS

## 2017-08-07 DIAGNOSIS — I10 ESSENTIAL HYPERTENSION: ICD-10-CM

## 2017-08-07 DIAGNOSIS — E78.2 MIXED HYPERLIPIDEMIA: ICD-10-CM

## 2017-08-07 DIAGNOSIS — N18.2 CHRONIC KIDNEY DISEASE, STAGE 2 (MILD): Chronic | ICD-10-CM

## 2017-08-07 DIAGNOSIS — E11.40 TYPE 2 DIABETES MELLITUS WITH DIABETIC NEUROPATHY, WITHOUT LONG-TERM CURRENT USE OF INSULIN (HCC): Primary | ICD-10-CM

## 2017-08-07 PROCEDURE — 99213 OFFICE O/P EST LOW 20 MIN: CPT | Performed by: INTERNAL MEDICINE

## 2017-08-07 NOTE — PROGRESS NOTES
Subjective   General Keith Ruvalcaba Jr. is a 81 y.o. male         Patient is in for recheck on HTN, DM, chronic renal failure and hyperlipidemia.    BP is controlled when he checks it at home but usually elevated when he comes to the office.  Patient is on Lisinopril, Amlodipine and Tenex.  He denies any chest pain, dyspnea or edema in lower extremities.  Renal function is stable with creatinine between 1.1 and 1.3.      DM, type II.  ADA diet compliant.  Quality controlled.  FBS is running below 120.  HgbA1c 6.51 /05/09/2017/.  On ACE inhibitor for HTN.  On statin for hyperlipidemia.  Lipids. , TG 67, HDL 49, LDL 79.  Tolerates Pravachol well and denies myalgia, myositis or fatigue.  No history of elevated LFTs.  Microvascular complications of DM. No retinopathy. Peripheral neuropathy. Gabapentin is helping with pain in his legs.  Macrovascular complications. CAD. No PAD.    Past Medical History:   Diagnosis Date   • Acquired hallux rigidus    • Anemia    • Arthropathy of lumbar facet joint    • Carpal tunnel syndrome    • CHF (congestive heart failure)    • Chronic diastolic congestive heart failure    • Chronic kidney disease    • Chronic kidney disease, stage II (mild)     Chronic kidney disease stage 2   • Chronic obstructive lung disease    • Chronic renal failure    • Coronary artery disease    • Cortical senile cataract    • Diabetes mellitus    • Diabetes mellitus type 2, noninsulin dependent     diabetes mellitus type 2, non-insulin treated, Medication treatment plan: oral diabetic medication   • Diabetes mellitus without complication    • Diastolic dysfunction    • Diastolic heart failure    • Essential hypertension     difficult to control      • Hip pain    • History of echocardiogram 08/19/2015    Echocardiogram W/ color flow 84221 (1) - Mild to moderate LVH with mild left atrial enlargement and normal aortic root.CLVH,preserved LV systolic function with Ef of 55%.Diastolic dysfunction.MV intact.AV  thickened.Aortic sclerosis.   • Hyperlipidemia    • Influenza vaccine administered 10/02/2015    INFLUENZA IMMUN ADMIN OR PREV RECV'D  (1) - Ordered By: DIONNE MANDUJANO ([facility])    • Insomnia    • Insomnia     Other insomnia   • Joint pain    • Low back pain    • Muscle strain    • Nuclear cataract    • Pneumococcal vaccination given 07/22/2016    PNEUMOC VAC/ADMIN/RCVD 4040F (3) - Ordered By: DIONNE MANDUJANO ([facility])   • Pure hypercholesterolemia    • Sedentary lifestyle      Past Surgical History:   Procedure Laterality Date   • CARDIAC ABLATION     • CARDIAC CATHETERIZATION  02/04/1986    Cardiac cath 80907 (1) - normal left heart catherization,non cardiac chest pain   • CARPAL TUNNEL RELEASE  10/19/2015    Carpal tunnel surgery (1) - Release of left carpal tunnel   • CATARACT EXTRACTION W/ INTRAOCULAR LENS IMPLANT Left 3/31/2017    Procedure: REMOVE CATARACT AND IMPLANT INRAOCULAR LENS LEFT EYE;  Surgeon: Hector Navarrete MD;  Location: [facility] OR;  Service:    • CATARACT EXTRACTION W/ INTRAOCULAR LENS IMPLANT Right 4/14/2017    Procedure: REMOVE CATARACT AND IMPLANT INTRAOCULAR LENS RIGHT EYE;  Surgeon: Hector Navarrete MD;  Location: [facility] OR;  Service:    • ENDOSCOPY  08/05/2013    Colon endoscopy 41845 (2) - Hemorrhoids found.   • INGUINAL HERNIA REPAIR Right 06/07/1968    Inguinal hernia, repair (2)   • INJECTION OF MEDICATION  09/06/2012    Albuterol (2u) (1) - Ordered By: LIDYA STEINER ([facility])    • INJECTION OF MEDICATION  09/06/2012    Atrovent (1) - Ordered By: LIDYA STEINER ([facility])    • INJECTION OF MEDICATION  02/10/2012    Depo Medrol (Methylprednisone) (3) - Ordered By: HEATHER PAK ([facility])    • INJECTION OF MEDICATION  02/09/2014    Kenalog (4) - Ordered By: DIMAS ANDRADE ([facility])    • LUMBAR LAMINECTOMY  2007    Lumbar laminectomy (1)   • MANDIBLE FRACTURE SURGERY  10/11/1981    Treat nose/jaw fracture (1) - bilateral open reduction of fracture of mandible   •  OTHER SURGICAL HISTORY  09/24/2011    Drain/Inject Major Joint 56675 (1) - Ordered By: KARLOS HERNANDEZ ([facility])    • OTHER SURGICAL HISTORY      Insert IVC filter 30273 (1)   • OTHER SURGICAL HISTORY  10/11/2011    SPIROMETRY 38172 (1) - Ordered By: HEATHER PAK ([facility])   • TOTAL HIP ARTHROPLASTY Right        Social History   Substance Use Topics   • Smoking status: Former Smoker     Quit date: 1999   • Smokeless tobacco: Never Used   • Alcohol use No     Family History   Problem Relation Age of Onset   • Diabetes Other    • Coronary artery disease Neg Hx      Allergies   Allergen Reactions   • Aldactone [Spironolactone] Other (See Comments)     Hyponatremia and hyperkalemia     Current Outpatient Prescriptions on File Prior to Visit   Medication Sig Dispense Refill   • albuterol (PROVENTIL HFA;VENTOLIN HFA) 108 (90 BASE) MCG/ACT inhaler Inhale 2 puffs Every 4 (Four) Hours As Needed for Wheezing. 1 inhaler 0   • amLODIPine (NORVASC) 10 MG tablet Take 1 tablet by mouth Daily. 30 tablet 11   • aspirin 81 MG EC tablet Take 81 mg by mouth daily.     • ferrous sulfate 325 (65 FE) MG tablet TAKE ONE TABLET BY MOUTH DAILY WITH BREAKFAST 60 tablet 3   • gabapentin (NEURONTIN) 300 MG capsule Take 300 mg by mouth 2 (Two) Times a Day.     • guanFACINE (TENEX) 2 MG tablet Take 1 tablet by mouth Every Night. 30 tablet 5   • lisinopril (PRINIVIL,ZESTRIL) 20 MG tablet Take 1 tablet by mouth 2 (Two) Times a Day. 60 tablet 5   • pravastatin (PRAVACHOL) 40 MG tablet TAKE ONE TABLET BY MOUTH DAILY 30 tablet 3   • SITagliptin (JANUVIA) 50 MG tablet Take 1 tablet by mouth Daily. 30 tablet 5   • TRUE METRIX BLOOD GLUCOSE TEST test strip TEST BLOOD SUGAR ONCE DAILY (LOT [id number] EXP 03/31/2018) 100 each 1   • TRUEPLUS LANCETS 30G misc 1 Device Daily. TEST 100 each 3   • [DISCONTINUED] azithromycin (ZITHROMAX) 250 MG tablet Take 1 tablet by mouth Daily. 4 tablet 0   • [DISCONTINUED] BESIVANCE 0.6 % suspension ophthalmic suspension   3    • [DISCONTINUED] LOTEMAX 0.5 % gel ophthalmic gel 1 drop 4 (Four) Times a Day. Rt eye  3   • [DISCONTINUED] predniSONE (DELTASONE) 20 MG tablet Take 1 tablet by mouth 2 (Two) Times a Day. 10 tablet 0   • [DISCONTINUED] PROLENSA 0.07 % solution 1 drop 4 (Four) Times a Day. Rt eye  3   • [DISCONTINUED] promethazine-dextromethorphan (PROMETHAZINE-DM) 6.25-15 MG/5ML syrup Take 5 mL by mouth 4 (Four) Times a Day As Needed for Cough. 240 mL 0     No current facility-administered medications on file prior to visit.      Review of Systems   Constitutional: Negative for activity change and fatigue.   Eyes: Negative for photophobia and visual disturbance.   Respiratory: Negative for chest tightness and shortness of breath.    Cardiovascular: Negative for chest pain, palpitations and leg swelling.   Gastrointestinal: Negative for abdominal pain, constipation and diarrhea.   Endocrine: Negative for cold intolerance, polydipsia and polyuria.   Genitourinary: Negative for flank pain and frequency.   Musculoskeletal: Positive for back pain. Negative for joint swelling and myalgias.   Skin: Negative for wound.   Neurological: Positive for numbness. Negative for dizziness and syncope.   Psychiatric/Behavioral: Negative for sleep disturbance. The patient is not nervous/anxious.        Objective   Physical Exam   Constitutional: He is oriented to person, place, and time. He appears well-developed and well-nourished.   HENT:   Head: Normocephalic and atraumatic.   Eyes: Conjunctivae are normal. Pupils are equal, round, and reactive to light.   Neck: Neck supple. No JVD present. No thyromegaly present.   Cardiovascular: Normal rate and regular rhythm.    Pulmonary/Chest: Effort normal and breath sounds normal.   Abdominal: Soft. Bowel sounds are normal. He exhibits no mass.   Musculoskeletal: Normal range of motion.   Neurological: He is alert and oriented to person, place, and time. He has normal reflexes.   Skin: Skin is warm and  dry.   Psychiatric: He has a normal mood and affect. His behavior is normal.           Patient Active Problem List   Diagnosis   • Chronic diastolic congestive heart failure   • Hypertension   • Coronary artery disease   • Chronic kidney disease   • H/O total hip arthroplasty   • Lumbar disc disease   • Osteoarthritis of multiple joints   • Iron deficiency   • Nuclear cataract   • Cortical age-related cataract   • Diabetes mellitus without complication   • Carotid stenosis, asymptomatic   • Type 2 diabetes mellitus without complication, without long-term current use of insulin           .  Lab on 07/25/2017   Component Date Value Ref Range Status   • PSA 07/25/2017 0.437  0.000 - 4.000 ng/mL Final   Lab on 06/01/2017   Component Date Value Ref Range Status   • Glucose 06/01/2017 78  60 - 100 mg/dL Final   • BUN 06/01/2017 16  7 - 21 mg/dL Final   • Creatinine 06/01/2017 1.13  0.70 - 1.30 mg/dL Final   • Sodium 06/01/2017 137  137 - 145 mmol/L Final   • Potassium 06/01/2017 4.0  3.5 - 5.1 mmol/L Final   • Chloride 06/01/2017 99  95 - 110 mmol/L Final   • CO2 06/01/2017 27.0  22.0 - 31.0 mmol/L Final   • Calcium 06/01/2017 9.0  8.4 - 10.2 mg/dL Final   • Total Protein 06/01/2017 6.5  6.3 - 8.6 g/dL Final   • Albumin 06/01/2017 3.90  3.40 - 4.80 g/dL Final   • ALT (SGPT) 06/01/2017 31  21 - 72 U/L Final   • AST (SGOT) 06/01/2017 34  17 - 59 U/L Final   • Alkaline Phosphatase 06/01/2017 62  38 - 126 U/L Final   • Total Bilirubin 06/01/2017 0.5  0.2 - 1.3 mg/dL Final   • eGFR   Amer 06/01/2017 76  42 - 98 mL/min/1.73 Final   • Globulin 06/01/2017 2.6  2.3 - 3.5 gm/dL Final   • A/G Ratio 06/01/2017 1.5  1.1 - 1.8 g/dL Final   • BUN/Creatinine Ratio 06/01/2017 14.2  7.0 - 25.0 Final   • Anion Gap 06/01/2017 11.0  5.0 - 15.0 mmol/L Final   • 25 Hydroxy, Vitamin D 06/01/2017 22.8* 30.0 - 100.0 ng/ml Final   Admission on 05/26/2017, Discharged on 05/26/2017   Component Date Value Ref Range Status   • Glucose 05/26/2017  104* 60 - 100 mg/dL Final   • BUN 05/26/2017 12  7 - 21 mg/dL Final   • Creatinine 05/26/2017 1.09  0.70 - 1.30 mg/dL Final   • Sodium 05/26/2017 139  137 - 145 mmol/L Final   • Potassium 05/26/2017 4.1  3.5 - 5.1 mmol/L Final   • Chloride 05/26/2017 103  95 - 110 mmol/L Final   • CO2 05/26/2017 25.0  22.0 - 31.0 mmol/L Final   • Calcium 05/26/2017 8.9  8.4 - 10.2 mg/dL Final   • Total Protein 05/26/2017 6.6  6.3 - 8.6 g/dL Final   • Albumin 05/26/2017 3.80  3.40 - 4.80 g/dL Final   • ALT (SGPT) 05/26/2017 31  21 - 72 U/L Final   • AST (SGOT) 05/26/2017 25  17 - 59 U/L Final   • Alkaline Phosphatase 05/26/2017 64  38 - 126 U/L Final   • Total Bilirubin 05/26/2017 0.3  0.2 - 1.3 mg/dL Final   • eGFR   Amer 05/26/2017 79  42 - 98 mL/min/1.73 Final   • Globulin 05/26/2017 2.8  2.3 - 3.5 gm/dL Final   • A/G Ratio 05/26/2017 1.4  1.1 - 1.8 g/dL Final   • BUN/Creatinine Ratio 05/26/2017 11.0  7.0 - 25.0 Final   • Anion Gap 05/26/2017 11.0  5.0 - 15.0 mmol/L Final   • proBNP 05/26/2017 88.8  0.0 - 1800.0 pg/mL Final   • Troponin I 05/26/2017 <0.012  <=0.034 ng/mL Final   • Extra Tube 05/26/2017 hold for add-on   Final   • Extra Tube 05/26/2017 Hold for add-ons.   Final   • Extra Tube 05/26/2017 hold for add-on   Final   • Extra Tube 05/26/2017 Hold for add-ons.   Final   • WBC 05/26/2017 3.29  3.20 - 9.80 10*3/mm3 Final   • RBC 05/26/2017 4.47  4.37 - 5.74 10*6/mm3 Final   • Hemoglobin 05/26/2017 13.5* 13.7 - 17.3 g/dL Final   • Hematocrit 05/26/2017 39.3  39.0 - 49.0 % Final   • MCV 05/26/2017 87.9  80.0 - 98.0 fL Final   • MCH 05/26/2017 30.2  26.5 - 34.0 pg Final   • MCHC 05/26/2017 34.4  31.5 - 36.3 g/dL Final   • RDW 05/26/2017 14.3  11.5 - 14.5 % Final   • RDW-SD 05/26/2017 46.5* 35.1 - 43.9 fl Final   • MPV 05/26/2017 8.3  8.0 - 12.0 fL Final   • Platelets 05/26/2017 297  150 - 450 10*3/mm3 Final   • Neutrophil % 05/26/2017 41.9  37.0 - 80.0 % Final   • Lymphocyte % 05/26/2017 27.4  10.0 - 50.0 % Final   •  Monocyte % 05/26/2017 23.7* 0.0 - 12.0 % Final   • Eosinophil % 05/26/2017 5.2  0.0 - 7.0 % Final   • Basophil % 05/26/2017 1.2  0.0 - 2.0 % Final   • Immature Grans % 05/26/2017 0.6* 0.0 - 0.5 % Final   • Neutrophils, Absolute 05/26/2017 1.38* 2.00 - 8.60 10*3/mm3 Final   • Lymphocytes, Absolute 05/26/2017 0.90  0.60 - 4.20 10*3/mm3 Final   • Monocytes, Absolute 05/26/2017 0.78  0.00 - 0.90 10*3/mm3 Final   • Eosinophils, Absolute 05/26/2017 0.17  0.00 - 0.70 10*3/mm3 Final   • Basophils, Absolute 05/26/2017 0.04  0.00 - 0.20 10*3/mm3 Final   • Immature Grans, Absolute 05/26/2017 0.02  0.00 - 0.02 10*3/mm3 Final   • nRBC 05/26/2017 0.0  0.0 - 0.0 /100 WBC Final   Office Visit on 05/09/2017   Component Date Value Ref Range Status   • Glucose 05/09/2017 111  70 - 130 mg/dL Final   • Hemoglobin A1C 05/09/2017 6.51* 4 - 5.6 % Final   Admission on 04/14/2017, Discharged on 04/14/2017   Component Date Value Ref Range Status   • Glucose 04/14/2017 100  70 - 130 mg/dL Final   Office Visit on 04/11/2017   Component Date Value Ref Range Status   • Glucose 04/11/2017 105* 60 - 100 mg/dL Final   • BUN 04/11/2017 12  7 - 21 mg/dL Final   • Creatinine 04/11/2017 1.09  0.70 - 1.30 mg/dL Final   • Sodium 04/11/2017 139  137 - 145 mmol/L Final   • Potassium 04/11/2017 4.3  3.5 - 5.1 mmol/L Final   • Chloride 04/11/2017 102  95 - 110 mmol/L Final   • CO2 04/11/2017 26.0  22.0 - 31.0 mmol/L Final   • Calcium 04/11/2017 9.3  8.4 - 10.2 mg/dL Final   • eGFR   Amer 04/11/2017 79  42 - 98 mL/min/1.73 Final   • BUN/Creatinine Ratio 04/11/2017 11.0  7.0 - 25.0 Final   • Anion Gap 04/11/2017 11.0  5.0 - 15.0 mmol/L Final   Admission on 03/31/2017, Discharged on 03/31/2017   Component Date Value Ref Range Status   • Glucose 03/31/2017 112  70 - 130 mg/dL Final           Assessment    Diagnosis Plan   1. Type 2 diabetes mellitus with diabetic neuropathy, without long-term current use of insulin     2. Essential hypertension     3.  Chronic kidney disease, stage 2 (mild)     4. Mixed hyperlipidemia           Plan        1. Patient will continue Januvia.      ADA diet.      Goals of diabetic control reviewed with the patient.      Will recheck HgbA1c.  2. Patient will continue Lisinopril, Norvasc and Tenex.      DASH.      1.5 gr Sodium restriction.      No evidence of CHF on exam.  3. Patient is advised to avoid NSAIDs and other nephrotoxins.       Renal function is monitored periodically.  4. Pravastatin is continued.      Counseled on nutrition and physical activity.        Follow up in 6 months.

## 2017-08-08 ENCOUNTER — TELEPHONE (OUTPATIENT)
Dept: INTERNAL MEDICINE | Facility: CLINIC | Age: 82
End: 2017-08-08

## 2017-08-08 NOTE — TELEPHONE ENCOUNTER
Spoke with pt let him know DR BRAVO office was unable at this time to move appt up but if they get a cancelation they will call [facility] with a sooner appt .... Other wise he is to keep his appt in September

## 2017-08-14 ENCOUNTER — TELEPHONE (OUTPATIENT)
Dept: INTERNAL MEDICINE | Facility: CLINIC | Age: 82
End: 2017-08-14

## 2017-08-14 NOTE — TELEPHONE ENCOUNTER
Spoke with pt let him know I have tried other neurologist and their appt for new pt is out further than what  DR BRAVO   At this point I let him know just to keep that appt until we can try to get him in sooner

## 2017-08-23 ENCOUNTER — APPOINTMENT (OUTPATIENT)
Dept: LAB | Facility: HOSPITAL | Age: 82
End: 2017-08-23

## 2017-08-23 ENCOUNTER — TRANSCRIBE ORDERS (OUTPATIENT)
Dept: LAB | Facility: HOSPITAL | Age: 82
End: 2017-08-23

## 2017-08-23 DIAGNOSIS — G60.8 OTHER HEREDITARY AND IDIOPATHIC NEUROPATHIES: Primary | ICD-10-CM

## 2017-08-23 DIAGNOSIS — E55.9 VITAMIN D DEFICIENCY, UNSPECIFIED: ICD-10-CM

## 2017-08-23 DIAGNOSIS — D51.9 ANEMIA DUE TO VITAMIN B12 DEFICIENCY, UNSPECIFIED B12 DEFICIENCY TYPE: ICD-10-CM

## 2017-08-23 DIAGNOSIS — E03.9 UNSPECIFIED HYPOTHYROIDISM: ICD-10-CM

## 2017-08-23 DIAGNOSIS — E11.9 DIABETES MELLITUS WITH NO COMPLICATION (HCC): ICD-10-CM

## 2017-08-23 DIAGNOSIS — Z13.1 ENCOUNTER FOR SCREENING FOR DIABETES MELLITUS: ICD-10-CM

## 2017-08-23 DIAGNOSIS — E61.1 IRON DEFICIENCY: ICD-10-CM

## 2017-08-23 LAB
25(OH)D3 SERPL-MCNC: 40.4 NG/ML (ref 30–100)
ALBUMIN SERPL-MCNC: 4.3 G/DL (ref 3.4–4.8)
ALP SERPL-CCNC: 54 U/L (ref 38–126)
ALT SERPL W P-5'-P-CCNC: 27 U/L (ref 21–72)
AST SERPL-CCNC: 30 U/L (ref 17–59)
BILIRUB CONJ SERPL-MCNC: 0 MG/DL (ref 0–0.3)
BILIRUB INDIRECT SERPL-MCNC: 0.3 MG/DL (ref 0–1.1)
BILIRUB SERPL-MCNC: 0.7 MG/DL (ref 0.2–1.3)
FOLATE SERPL-MCNC: 9.78 NG/ML (ref 2.76–21)
HBA1C MFR BLD: 6.6 % (ref 4–5.6)
IRON 24H UR-MRATE: 37 MCG/DL (ref 49–181)
MAGNESIUM SERPL-MCNC: 2 MG/DL (ref 1.6–2.3)
PROT SERPL-MCNC: 6.9 G/DL (ref 6.3–8.6)
TSH SERPL DL<=0.05 MIU/L-ACNC: 0.99 MIU/ML (ref 0.46–4.68)
VIT B12 BLD-MCNC: 242 PG/ML (ref 239–931)

## 2017-08-23 PROCEDURE — 80076 HEPATIC FUNCTION PANEL: CPT | Performed by: PSYCHIATRY & NEUROLOGY

## 2017-08-23 PROCEDURE — 82746 ASSAY OF FOLIC ACID SERUM: CPT | Performed by: PSYCHIATRY & NEUROLOGY

## 2017-08-23 PROCEDURE — 82525 ASSAY OF COPPER: CPT | Performed by: PSYCHIATRY & NEUROLOGY

## 2017-08-23 PROCEDURE — 84425 ASSAY OF VITAMIN B-1: CPT | Performed by: PSYCHIATRY & NEUROLOGY

## 2017-08-23 PROCEDURE — 36415 COLL VENOUS BLD VENIPUNCTURE: CPT | Performed by: PSYCHIATRY & NEUROLOGY

## 2017-08-23 PROCEDURE — 83735 ASSAY OF MAGNESIUM: CPT | Performed by: PSYCHIATRY & NEUROLOGY

## 2017-08-23 PROCEDURE — 83540 ASSAY OF IRON: CPT | Performed by: PSYCHIATRY & NEUROLOGY

## 2017-08-23 PROCEDURE — 82607 VITAMIN B-12: CPT | Performed by: PSYCHIATRY & NEUROLOGY

## 2017-08-23 PROCEDURE — 84443 ASSAY THYROID STIM HORMONE: CPT | Performed by: PSYCHIATRY & NEUROLOGY

## 2017-08-23 PROCEDURE — 83036 HEMOGLOBIN GLYCOSYLATED A1C: CPT | Performed by: PSYCHIATRY & NEUROLOGY

## 2017-08-23 PROCEDURE — 82306 VITAMIN D 25 HYDROXY: CPT | Performed by: PSYCHIATRY & NEUROLOGY

## 2017-08-24 RX ORDER — GUANFACINE 2 MG/1
TABLET ORAL
Qty: 30 TABLET | Refills: 5 | Status: SHIPPED | OUTPATIENT
Start: 2017-08-24 | End: 2017-11-08

## 2017-08-25 LAB
COPPER SERPL-MCNC: 114 UG/DL (ref 72–166)
VIT B1 BLD-SCNC: 88.6 NMOL/L (ref 66.5–200)

## 2017-08-30 ENCOUNTER — TRANSCRIBE ORDERS (OUTPATIENT)
Dept: PHYSICAL THERAPY | Facility: HOSPITAL | Age: 82
End: 2017-08-30

## 2017-08-30 DIAGNOSIS — G60.8 HEREDITARY SENSORY NEUROPATHY: ICD-10-CM

## 2017-08-30 DIAGNOSIS — M54.5 LOW BACK PAIN, UNSPECIFIED BACK PAIN LATERALITY, UNSPECIFIED CHRONICITY, WITH SCIATICA PRESENCE UNSPECIFIED: Primary | ICD-10-CM

## 2017-09-07 ENCOUNTER — HOSPITAL ENCOUNTER (OUTPATIENT)
Dept: PHYSICAL THERAPY | Facility: HOSPITAL | Age: 82
Setting detail: THERAPIES SERIES
Discharge: HOME | End: 2017-09-07

## 2017-09-07 DIAGNOSIS — M54.5 LOW BACK PAIN, UNSPECIFIED BACK PAIN LATERALITY, UNSPECIFIED CHRONICITY, WITH SCIATICA PRESENCE UNSPECIFIED: Primary | ICD-10-CM

## 2017-09-07 PROCEDURE — G8979 MOBILITY GOAL STATUS: HCPCS | Performed by: PHYSICAL THERAPIST

## 2017-09-07 PROCEDURE — 97162 PT EVAL MOD COMPLEX 30 MIN: CPT | Performed by: PHYSICAL THERAPIST

## 2017-09-07 PROCEDURE — 97110 THERAPEUTIC EXERCISES: CPT | Performed by: PHYSICAL THERAPIST

## 2017-09-07 PROCEDURE — G8978 MOBILITY CURRENT STATUS: HCPCS | Performed by: PHYSICAL THERAPIST

## 2017-09-07 NOTE — THERAPY EVALUATION
Outpatient Physical Therapy Ortho Initial Evaluation  [facility] [place]     Patient Name: General Keith Ruvalcaba Jr.  : 1935  MRN: [id number]  Today's Date: 2017      Visit Date: 2017     Subjective Improvement: N/A      Attendance:   Approved: Medicare guidelines          MD follow up: MRI 17           date:  17         Patient Active Problem List   Diagnosis   • Chronic diastolic congestive heart failure   • Hypertension   • Coronary artery disease   • Chronic kidney disease   • H/O total hip arthroplasty   • Lumbar disc disease   • Osteoarthritis of multiple joints   • Iron deficiency   • Nuclear cataract   • Cortical age-related cataract   • Diabetes mellitus without complication   • Carotid stenosis, asymptomatic   • Type 2 diabetes mellitus without complication, without long-term current use of insulin        Past Medical History:   Diagnosis Date   • Acquired hallux rigidus    • Anemia    • Arthropathy of lumbar facet joint    • Carpal tunnel syndrome    • CHF (congestive heart failure)    • Chronic diastolic congestive heart failure    • Chronic kidney disease    • Chronic kidney disease, stage II (mild)     Chronic kidney disease stage 2   • Chronic obstructive lung disease    • Chronic renal failure    • Coronary artery disease    • Cortical senile cataract    • Diabetes mellitus    • Diabetes mellitus type 2, noninsulin dependent     diabetes mellitus type 2, non-insulin treated, Medication treatment plan: oral diabetic medication   • Diabetes mellitus without complication    • Diastolic dysfunction    • Diastolic heart failure    • Essential hypertension     difficult to control      • Hip pain    • History of echocardiogram 2015    Echocardiogram W/ color flow 70065 (1) - Mild to moderate LVH with mild left atrial enlargement and normal aortic root.CLVH,preserved LV systolic function with Ef of 55%.Diastolic dysfunction.MV intact.AV thickened.Aortic sclerosis.   •  Hyperlipidemia    • Influenza vaccine administered 10/02/2015    INFLUENZA IMMUN ADMIN OR PREV RECV'D  (1) - Ordered By: DIONNE MANDUJANO ([facility])    • Insomnia    • Insomnia     Other insomnia   • Joint pain    • Low back pain    • Muscle strain    • Nuclear cataract    • Pneumococcal vaccination given 07/22/2016    PNEUMOC VAC/ADMIN/RCVD 4040F (3) - Ordered By: DIONNE MANDUJANO ([facility])   • Pure hypercholesterolemia    • Sedentary lifestyle         Past Surgical History:   Procedure Laterality Date   • CARDIAC ABLATION     • CARDIAC CATHETERIZATION  02/04/1986    Cardiac cath 14903 (1) - normal left heart catherization,non cardiac chest pain   • CARPAL TUNNEL RELEASE  10/19/2015    Carpal tunnel surgery (1) - Release of left carpal tunnel   • CATARACT EXTRACTION W/ INTRAOCULAR LENS IMPLANT Left 3/31/2017    Procedure: REMOVE CATARACT AND IMPLANT INRAOCULAR LENS LEFT EYE;  Surgeon: Hector Navarrete MD;  Location: [facility];  Service:    • CATARACT EXTRACTION W/ INTRAOCULAR LENS IMPLANT Right 4/14/2017    Procedure: REMOVE CATARACT AND IMPLANT INTRAOCULAR LENS RIGHT EYE;  Surgeon: Hector Navarrete MD;  Location: [facility] OR;  Service:    • ENDOSCOPY  08/05/2013    Colon endoscopy 58103 (2) - Hemorrhoids found.   • INGUINAL HERNIA REPAIR Right 06/07/1968    Inguinal hernia, repair (2)   • INJECTION OF MEDICATION  09/06/2012    Albuterol (2u) (1) - Ordered By: LIDYA STEINER ([facility])    • INJECTION OF MEDICATION  09/06/2012    Atrovent (1) - Ordered By: LIDYA STEINER ([facility])    • INJECTION OF MEDICATION  02/10/2012    Depo Medrol (Methylprednisone) (3) - Ordered By: HEATHER PAK ([facility])    • INJECTION OF MEDICATION  02/09/2014    Kenalog (4) - Ordered By: DIMAS ANDRADE ([facility])    • LUMBAR LAMINECTOMY  2007    Lumbar laminectomy (1)   • MANDIBLE FRACTURE SURGERY  10/11/1981    Treat nose/jaw fracture (1) - bilateral open reduction of fracture of mandible   • OTHER SURGICAL HISTORY   09/24/2011    Drain/Inject Major Joint 20610 (1) - Ordered By: KARLOS HERNANDEZ ([facility])    • OTHER SURGICAL HISTORY      Insert IVC filter 27891 (1)   • OTHER SURGICAL HISTORY  10/11/2011    SPIROMETRY 38431 (1) - Ordered By: HEATHER PAK ([facility])   • TOTAL HIP ARTHROPLASTY Right      Allergies   Allergen Reactions   • Aldactone [Spironolactone] Other (See Comments)     Hyponatremia and hyperkalemia       Current Outpatient Prescriptions:   •  albuterol (PROVENTIL HFA;VENTOLIN HFA) 108 (90 BASE) MCG/ACT inhaler, Inhale 2 puffs Every 4 (Four) Hours As Needed for Wheezing., Disp: 1 inhaler, Rfl: 0  •  amLODIPine (NORVASC) 10 MG tablet, Take 1 tablet by mouth Daily., Disp: 30 tablet, Rfl: 11  •  aspirin 81 MG EC tablet, Take 81 mg by mouth daily., Disp: , Rfl:   •  ferrous sulfate 325 (65 FE) MG tablet, TAKE ONE TABLET BY MOUTH DAILY WITH BREAKFAST, Disp: 60 tablet, Rfl: 3  •  gabapentin (NEURONTIN) 300 MG capsule, Take 300 mg by mouth 2 (Two) Times a Day., Disp: , Rfl:   •  guanFACINE (TENEX) 2 MG tablet, TAKE ONE TABLET BY MOUTH EVERY NIGHT, Disp: 30 tablet, Rfl: 5  •  lisinopril (PRINIVIL,ZESTRIL) 20 MG tablet, Take 1 tablet by mouth 2 (Two) Times a Day., Disp: 60 tablet, Rfl: 5  •  pravastatin (PRAVACHOL) 40 MG tablet, TAKE ONE TABLET BY MOUTH DAILY, Disp: 30 tablet, Rfl: 3  •  SITagliptin (JANUVIA) 50 MG tablet, Take 1 tablet by mouth Daily., Disp: 30 tablet, Rfl: 5  •  TRUE METRIX BLOOD GLUCOSE TEST test strip, TEST BLOOD SUGAR ONCE DAILY (LOT [id number] EXP 03/31/2018), Disp: 100 each, Rfl: 1  •  TRUEPLUS LANCETS 30G misc, 1 Device Daily. TEST, Disp: 100 each, Rfl: 3      Visit Dx:     ICD-10-CM ICD-9-CM   1. Low back pain, unspecified back pain laterality, unspecified chronicity, with sciatica presence unspecified M54.5 724.2             Patient History       09/07/17 0800          History    Chief Complaint Pain;Numbness;Difficulty Walking  -KG      Type of Pain Back pain  -KG      Date Current Problem(s) Began  "--   Middle of July 2017  -KG      Brief Description of Current Complaint Pt presents with c/o chronic low back pain & difficulty walking. States he's having a lot of trouble with his balance. Pt states his feet are always numb & when he tries to walk or stand with his walker, his knees tend to give out. States he's already fallen x2 because of it. States he uses his RW all the time when he is walking. Reports he's getting an MRI on 9/11/17, as he reports the MD states his balance/gait issues are all originating from his back. Pt reports he has had 2 back surgeries, most recent one in 2007. Medical report shows that pt had a lumbar laminectomy in 2007.  -KG      Onset Date- PT 9/7/17  -KG      Patient/Caregiver Goals Improve mobility;Improve strength;Return to prior level of function  -KG      Occupation/sports/leisure activities Pt is retired. Pt enjoys walking but hasn't been able to since his issues   -KG      Pain     Pain Location Back  -KG      Pain at Present 4  -KG      Pain at Best 2  -KG      Pain at Worst 8  -KG      Pain Frequency Intermittent  -KG      Pain Description Aching;Sharp  -KG      What Performance Factors Make the Current Problem(s) WORSE? Pt states sitting doesn't aggrevate his back pain much but sometimes when he stands or walks it does.  -KG      What Performance Factors Make the Current Problem(s) BETTER? Resting  -KG      Pain Comments Pt states sometimes pain will shoot down both his legs causing them to \"jump up\"  -KG      Tolerance Time- Standing \"just a few seconds\"  -KG      Tolerance Time- Sitting Can sit longer than he can stand or walk  -KG      Tolerance Time- Walking Pt reports he can walk between rooms in his house but it doesn't take long for his back to start hurting or his legs to become weak  -KG      Tolerance Time- Lying No issues lying on back  -KG      Is your sleep disturbed? No  -KG        User Key  (r) = Recorded By, (t) = Taken By, (c) = Cosigned By    Initials " Name Provider Type    KG Maryana Braden, PT Physical Therapist                PT Ortho       09/07/17 0800    Precautions and Contraindications    Precautions/Limitations no known precautions/limitations  -KG    Subjective Pain    Able to rate subjective pain? yes  -KG    Pre-Treatment Pain Level 4  -KG    Posture/Observations    Posture/Observations Comments Pt shows no signs of acute distress. Presents to clinic in w/c. Decreased lumbar lordosis noted. Forward flexed at hips in standing position. Rounded shoulders posture. Ambulates with RW with CGA/min assist for safety. Sit<> stand transfers with RW & CGA/min assist. Verbal cues required for proper walker managment and hand placement.  -KG    Quarter Clearing    Quarter Clearing Lower Quarter Clearing  -KG    Neural Tension Signs- Lower Quarter Clearing    Slump Bilateral:;Negative  -KG    SLR Bilateral:;Negative   Hamstring tightness noted bilaterally ~40 deg  -KG    Sensory Screen for Light Touch- Lower Quarter Clearing    L1 (inguinal area) Bilateral:;Intact  -KG    L2 (anterior mid thigh) Bilateral:;Intact  -KG    L3 (distal anterior thigh) Bilateral:;Diminished  -KG    L4 (medial lower leg/foot) Bilateral:;Diminished  -KG    L5 (lateral lower leg/great toe) Bilateral:;Diminished  -KG    S1 (bottom of foot) Bilateral:;Diminished  -KG    SI/Hip Screen- Lower Quarter Clearing    ASIS compression Bilateral:;Negative  -KG    ASIS distraction Bilateral:;Negative  -KG    Special Tests/Palpation    Special Tests/Palpation Lumbar/SI  -KG    Lumbosacral Palpation    Piriformis Bilateral:;Tender  -KG    Erector Spinae (Paraspinals) Bilateral:;Tender  -KG    Lumbosacral Palpation? Yes  -KG    MMT (Manual Muscle Testing)    General MMT Assessment lower extremity strength deficits identified  -KG    Left Hip    Hip Flexion Gross Movement (4+/5) good plus  -KG    Hip ABduction Gross Movement (4/5) good  -KG    Right Hip    Hip Flexion Gross Movement (4/5) good  -KG     "Hip ABduction Gross Movement (4/5) good  -KG    Lower Extremity    Lower Ext Manual Muscle Testing left hip strength deficit;right hip strength deficit;left knee strength deficit;right knee strength deficit;left ankle strength deficit;right ankle strength deficit  -KG    Left Knee    Knee Extension Gross Movement (4+/5) good plus  -KG    Knee Flexion Gross Movement (4/5) good  -KG    Right Knee    Knee Extension Gross Movement (4+/5) good plus  -KG    Knee Flexion Gross Movement (4-/5) good minus  -KG    Left Ankle/Foot    Ankle PF Gross Movement (4/5) good  -KG    Ankle Dorsiflexion Gross Movement (3+/5) fair plus  -KG    Right Ankle/Foot    Ankle PF Gross Movement (4/5) good  -KG    Ankle Dorsiflexion Gross Movement (3+/5) fair plus  -KG    Flexibility    Flexibility Tested? Lower Extremity  -KG    Lower Extremity Flexibility    Hamstrings Bilateral:;Moderately limited  -KG    Hip Flexors Bilateral:;Mildly limited  -KG    Balance Skills Training    Training Strategies (Balance Skills) FA/EO 20\", FT/EO 15\", Semi-tandem R leading 4\", semi-tandem R leading 12\"   Very close supervision/CGA by PT  -KG    Gait Assessment/Treatment    Gait, [place] Level contact guard assist  -KG    Gait, Assistive Device rolling walker  -KG    Gait, Gait Pattern Analysis other (see comments)  -KG    Gait, Gait Deviations bilateral:;antalgic;thuy decreased;decreased heel strike;forward flexed posture;knee buckling;narrow base;step length decreased;other (see comments)   Poor sensation/proprioception in feet  -KG    Gait, Safety Issues balance decreased during turns;sequencing ability decreased;step length decreased  -KG    Gait, Impairments sensation decreased;strength decreased;impaired balance  -KG    Gait, Comment Pt very unsteady on his feet when ambulating throughout evaluation. Pt unable to clear toes/feet at times. Pt appears to be unaware of where his feet are placed, possibly due to diminished sensation. During intial " "contact, pt's feet would be over pronated & pt would strike on lat side of foot.  -KG      User Key  (r) = Recorded By, (t) = Taken By, (c) = Cosigned By    Initials Name Provider Type    RONNI Braden PT Physical Therapist                            Therapy Education       09/07/17 0800          Therapy Education    Education Details Walker management when sitting from standing; sit<>stand transfer education, POC education. HEP: LTR, seated CR/TR, seated marching, LAQ  -KG      Given HEP;Symptoms/condition management;Pain management;Posture/body mechanics;Mobility training;Fall prevention and home safety  -KG      Program New  -KG      How Provided Verbal;Demonstration;Written  -KG      Provided to Patient;Other (comment)   Daughter  -KG      Level of Understanding Teach back education performed;Verbalized;Demonstrated  -KG        User Key  (r) = Recorded By, (t) = Taken By, (c) = Cosigned By    Initials Name Provider Type    RONNI Braden PT Physical Therapist                PT OP Goals       09/07/17 0800       PT Short Term Goals    STG Date to Achieve 09/21/17  -KG     STG 1 Indep in HEP  -KG     STG 1 Progress New  -KG     STG 2 Complete TUG test in 60\" with RW  -KG     STG 2 Progress New  -KG     STG 3 Perform FA/EO static standing balance for 30\" or greater  -KG     STG 3 Progress New  -KG     STG 4 Perform sit<>stand transfer with RW safely, no verbal cues for proper hand placement/technique  -KG     STG 4 Progress New  -KG     Long Term Goals    LTG Date to Achieve 10/05/17  -KG     LTG 1 Subjectively report 60% improvement or greater  -KG     LTG 1 Progress New  -KG     LTG 2 Complete TUG test in 45\" with RW  -KG     LTG 2 Progress New  -KG     LTG 3 Demonstrate bilateral hip flex/abd MMT to 4+/5 or greater  -KG     LTG 3 Progress New  -KG     LTG 4 Demonstrate bilateral knee flex/ext MMT to 4+/5 or greater  -KG     LTG 4 Progress New  -KG     LTG 5 Perform 10 min of standing " "therex/activties during treatment session without increased pain  -KG     LTG 5 Progress New  -KG     LTG 6 Perform FT/EO static balance for 30\" or greater  -KG     LTG 6 Progress New  -KG     LTG 7 Ambulate 100 feet with RW safely, modified independent  -KG     LTG 7 Progress New  -KG     Time Calculation    PT Goal Re-Cert Due Date 09/28/17   Visit 1/1, MRI 9/11/17  -KG       User Key  (r) = Recorded By, (t) = Taken By, (c) = Cosigned By    Initials Name Provider Type    KG Maryana Braden, PT Physical Therapist                PT Assessment/Plan       09/07/17 0800       PT Assessment    Functional Limitations Decreased safety during functional activities;Impaired gait;Impaired locomotion;Limitation in home management;Limitations in community activities;Performance in leisure activities;Limitations in functional capacity and performance  -KG     Impairments Balance;Gait;Impaired flexibility;Muscle strength;Pain;Posture;Range of motion;Sensation  -KG     Assessment Comments Pt presents with chronic low back & recent balance deficits over the past 2 months. Neurotension signs negative at this time with decreased core/BLE flexibility noted. Diminished sensation noted more distal to the knee bilaterally vs proximal. Demonstrates decrease in overall BLE strength. Weakness/sensation issues possibly lumbar related. Required CGA/min assist this date for ambulation & sit<>stand transfers. Pt will benefit from skilled PT services to address these limitations for improvements in overall functional mobility & strength.  -KG     Rehab Potential Good  -KG     Patient/caregiver participated in establishment of treatment plan and goals Yes  -KG     Patient would benefit from skilled therapy intervention Yes  -KG     PT Plan    PT Frequency 2x/week  -KG     Predicted Duration of Therapy Intervention (days/wks) 4 weeks  -KG     Planned CPT's? PT EVAL MOD COMPLELITY: 48835;PT RE-EVAL: 76534;PT THER PROC EA 15 MIN: 54122;PT THER ACT " "EA 15 MIN: 89077;PT MANUAL THERAPY EA 15 MIN: 30465;PT NEUROMUSC RE-EDUCATION EA 15 MIN: 36441;PT GAIT TRAINING EA 15 MIN: 10436;PT SELF CARE/HOME MGMT/TRAIN EA 15: 55429;PT THER SUPP EA 15 MIN  -KG     Physical Therapy Interventions (Optional Details) balance training;gait training;home exercise program;lumbar stabilization;manual therapy techniques;modalities;neuromuscular re-education;patient/family education;ROM (Range of Motion);strengthening;stretching  -KG     PT Plan Comments HEP, LE stretching, core stability, BLE strengthening, gait training, balance activities, transfer education, MHP prn for pain. No electromodalities due to prior hx of CA.  -KG       User Key  (r) = Recorded By, (t) = Taken By, (c) = Cosigned By    Initials Name Provider Type    RONNI Braden, PT Physical Therapist                Modalities       09/07/17 0800          Subjective Pain    Post-Treatment Pain Level 4  -KG        User Key  (r) = Recorded By, (t) = Taken By, (c) = Cosigned By    Initials Name Provider Type    RONNI Braden, PT Physical Therapist              Exercises       09/07/17 0800          Subjective Comments    Subjective Comments Refer to therapy pt history for details.  -KG      Subjective Pain    Able to rate subjective pain? yes  -KG      Pre-Treatment Pain Level 4  -KG      Post-Treatment Pain Level 4  -KG      Aquatics    Aquatics performed? No  -KG      Exercise 1    Exercise Name 1 LTR  -KG      Sets 1 1  -KG      Reps 1 10  -KG      Time (Seconds) 1 5\" hold  -KG      Exercise 2    Exercise Name 2 Seated Marching  -KG      Sets 2 2  -KG      Reps 2 10  -KG      Exercise 3    Exercise Name 3 Seated CR/TR  -KG      Sets 3 2  -KG      Reps 3 10  -KG      Exercise 4    Exercise Name 4 LAQ Bilaterally  -KG      Sets 4 1  -KG      Reps 4 10  -KG        User Key  (r) = Recorded By, (t) = Taken By, (c) = Cosigned By    Initials Name Provider Type    RONNI Braden PT Physical Therapist         " "                     Outcome Measures       17 0900          Modified Oswestry    Modified Oswestry Score/Comments 56%  -KG      Timed Up and Go (TUG)    TUG Test 1 72 seconds  -KG      Timed Up and Go Comments Use of RW, CGA  -KG        User Key  (r) = Recorded By, (t) = Taken By, (c) = Cosigned By    Initials Name Provider Type    KG Maryana Braden, PT Physical Therapist            Time Calculation:   Start Time: 806  Stop Time: 900  Time Calculation (min): 54 min  Total Timed Code Minutes- PT: 16 minute(s)     Therapy Charges for Today     Code Description Service Date Service Provider Modifiers Qty    96435815183 HC PT MOBILITY CURRENT 2017 Maryana Braden, PT GP, CL 1    48590698213 HC PT MOBILITY PROJECTED 2017 Maryana Braden, PT GP, CJ 1    35131722041 HC PT THER PROC EA 15 MIN 2017 Maryana Braden, PT GP 1    05393826649 HC PT EVAL MOD COMPLEXITY 3 2017 Maryana Braden, PT GP 1          PT G-Codes  PT Professional Judgement Used?: Yes  Outcome Measure Options: Charissa Irving, Timed Up and Go (TUG)  Score: TU min 12\"  Functional Limitation: Mobility: Walking and moving around  Mobility: Walking and Moving Around Current Status (): At least 60 percent but less than 80 percent impaired, limited or restricted  Mobility: Walking and Moving Around Goal Status (): At least 20 percent but less than 40 percent impaired, limited or restricted         Maryana Braden, PT  2017        "

## 2017-09-11 ENCOUNTER — APPOINTMENT (OUTPATIENT)
Dept: LAB | Facility: HOSPITAL | Age: 82
End: 2017-09-11
Attending: INTERNAL MEDICINE

## 2017-09-11 ENCOUNTER — HOSPITAL ENCOUNTER (OUTPATIENT)
Dept: MRI IMAGING | Facility: HOSPITAL | Age: 82
Discharge: HOME | End: 2017-09-11
Admitting: PSYCHIATRY & NEUROLOGY

## 2017-09-11 ENCOUNTER — TELEPHONE (OUTPATIENT)
Dept: INTERNAL MEDICINE | Facility: CLINIC | Age: 82
End: 2017-09-11

## 2017-09-11 DIAGNOSIS — M54.50 LOW BACK PAIN: ICD-10-CM

## 2017-09-11 LAB
ALBUMIN SERPL-MCNC: 4.4 G/DL (ref 3.4–4.8)
ALBUMIN/GLOB SERPL: 1.6 G/DL (ref 1.1–1.8)
ALP SERPL-CCNC: 65 U/L (ref 38–126)
ALT SERPL W P-5'-P-CCNC: 29 U/L (ref 21–72)
ANION GAP SERPL CALCULATED.3IONS-SCNC: 11 MMOL/L (ref 5–15)
ARTICHOKE IGE QN: 82 MG/DL (ref 1–129)
AST SERPL-CCNC: 23 U/L (ref 17–59)
BILIRUB SERPL-MCNC: 0.6 MG/DL (ref 0.2–1.3)
BUN BLD-MCNC: 13 MG/DL (ref 7–21)
BUN/CREAT SERPL: 11.1 (ref 7–25)
CALCIUM SPEC-SCNC: 9.8 MG/DL (ref 8.4–10.2)
CHLORIDE SERPL-SCNC: 100 MMOL/L (ref 95–110)
CHOLEST SERPL-MCNC: 169 MG/DL (ref 0–199)
CO2 SERPL-SCNC: 28 MMOL/L (ref 22–31)
CREAT BLD-MCNC: 1.17 MG/DL (ref 0.7–1.3)
GFR SERPL CREATININE-BSD FRML MDRD: 73 ML/MIN/1.73 (ref 42–98)
GLOBULIN UR ELPH-MCNC: 2.7 GM/DL (ref 2.3–3.5)
GLUCOSE BLD-MCNC: 142 MG/DL (ref 60–100)
HBA1C MFR BLD: 6.6 % (ref 4–5.6)
HDLC SERPL-MCNC: 66 MG/DL (ref 60–200)
LDLC/HDLC SERPL: 1.38 {RATIO} (ref 0–3.55)
POTASSIUM BLD-SCNC: 3.9 MMOL/L (ref 3.5–5.1)
PROT SERPL-MCNC: 7.1 G/DL (ref 6.3–8.6)
SODIUM BLD-SCNC: 139 MMOL/L (ref 137–145)
TRIGL SERPL-MCNC: 61 MG/DL (ref 20–199)

## 2017-09-11 PROCEDURE — 72158 MRI LUMBAR SPINE W/O & W/DYE: CPT

## 2017-09-11 PROCEDURE — 80053 COMPREHEN METABOLIC PANEL: CPT | Performed by: INTERNAL MEDICINE

## 2017-09-11 PROCEDURE — 36415 COLL VENOUS BLD VENIPUNCTURE: CPT | Performed by: INTERNAL MEDICINE

## 2017-09-11 PROCEDURE — 25010000002 GADOTERIDOL PER 1 ML: Performed by: PSYCHIATRY & NEUROLOGY

## 2017-09-11 PROCEDURE — A9576 INJ PROHANCE MULTIPACK: HCPCS | Performed by: PSYCHIATRY & NEUROLOGY

## 2017-09-11 PROCEDURE — 83036 HEMOGLOBIN GLYCOSYLATED A1C: CPT | Performed by: INTERNAL MEDICINE

## 2017-09-11 PROCEDURE — 80061 LIPID PANEL: CPT | Performed by: INTERNAL MEDICINE

## 2017-09-11 RX ADMIN — GADOTERIDOL 17 ML: 279.3 INJECTION, SOLUTION INTRAVENOUS at 09:47

## 2017-09-12 ENCOUNTER — HOSPITAL ENCOUNTER (OUTPATIENT)
Dept: PHYSICAL THERAPY | Facility: HOSPITAL | Age: 82
Setting detail: THERAPIES SERIES
Discharge: HOME | End: 2017-09-12

## 2017-09-12 DIAGNOSIS — M54.5 LOW BACK PAIN, UNSPECIFIED BACK PAIN LATERALITY, UNSPECIFIED CHRONICITY, WITH SCIATICA PRESENCE UNSPECIFIED: Primary | ICD-10-CM

## 2017-09-12 PROCEDURE — 97110 THERAPEUTIC EXERCISES: CPT

## 2017-09-12 PROCEDURE — 97140 MANUAL THERAPY 1/> REGIONS: CPT

## 2017-09-12 NOTE — THERAPY TREATMENT NOTE
Outpatient Physical Therapy Ortho Treatment Note  [facility]     Patient Name: General Keith Ruvalcaba Jr.  : 1935  MRN: [id number]  Today's Date: 2017      Visit Date: 2017  Attendance:   Subjective improvement:   Recert: 17  MD Appointment: 17 ~8:00-Dr. Johnson    Visit Dx:    ICD-10-CM ICD-9-CM   1. Low back pain, unspecified back pain laterality, unspecified chronicity, with sciatica presence unspecified M54.5 724.2       Patient Active Problem List   Diagnosis   • Chronic diastolic congestive heart failure   • Hypertension   • Coronary artery disease   • Chronic kidney disease   • H/O total hip arthroplasty   • Lumbar disc disease   • Osteoarthritis of multiple joints   • Iron deficiency   • Nuclear cataract   • Cortical age-related cataract   • Diabetes mellitus without complication   • Carotid stenosis, asymptomatic   • Type 2 diabetes mellitus without complication, without long-term current use of insulin        Past Medical History:   Diagnosis Date   • Acquired hallux rigidus    • Anemia    • Arthropathy of lumbar facet joint    • Carpal tunnel syndrome    • CHF (congestive heart failure)    • Chronic diastolic congestive heart failure    • Chronic kidney disease    • Chronic kidney disease, stage II (mild)     Chronic kidney disease stage 2   • Chronic obstructive lung disease    • Chronic renal failure    • Coronary artery disease    • Cortical senile cataract    • Diabetes mellitus    • Diabetes mellitus type 2, noninsulin dependent     diabetes mellitus type 2, non-insulin treated, Medication treatment plan: oral diabetic medication   • Diabetes mellitus without complication    • Diastolic dysfunction    • Diastolic heart failure    • Essential hypertension     difficult to control      • Hip pain    • History of echocardiogram 2015    Echocardiogram W/ color flow 95084 (1) - Mild to moderate LVH with mild left atrial enlargement and normal aortic  root.CLVH,preserved LV systolic function with Ef of 55%.Diastolic dysfunction.MV intact.AV thickened.Aortic sclerosis.   • Hyperlipidemia    • Influenza vaccine administered 10/02/2015    INFLUENZA IMMUN ADMIN OR PREV RECV'D  (1) - Ordered By: DIONNE MANDUJANO ([facility])    • Insomnia    • Insomnia     Other insomnia   • Joint pain    • Low back pain    • Muscle strain    • Nuclear cataract    • Pneumococcal vaccination given 07/22/2016    PNEUMOC VAC/ADMIN/RCVD 4040F (3) - Ordered By: DIONNE MANDUJANO ([facility])   • Pure hypercholesterolemia    • Sedentary lifestyle         Past Surgical History:   Procedure Laterality Date   • CARDIAC ABLATION     • CARDIAC CATHETERIZATION  02/04/1986    Cardiac cath 48338 (1) - normal left heart catherization,non cardiac chest pain   • CARPAL TUNNEL RELEASE  10/19/2015    Carpal tunnel surgery (1) - Release of left carpal tunnel   • CATARACT EXTRACTION W/ INTRAOCULAR LENS IMPLANT Left 3/31/2017    Procedure: REMOVE CATARACT AND IMPLANT INRAOCULAR LENS LEFT EYE;  Surgeon: Hector Navarrete MD;  Location: [facility] OR;  Service:    • CATARACT EXTRACTION W/ INTRAOCULAR LENS IMPLANT Right 4/14/2017    Procedure: REMOVE CATARACT AND IMPLANT INTRAOCULAR LENS RIGHT EYE;  Surgeon: Hector Navarrete MD;  Location: [facility] OR;  Service:    • ENDOSCOPY  08/05/2013    Colon endoscopy 11390 (2) - Hemorrhoids found.   • INGUINAL HERNIA REPAIR Right 06/07/1968    Inguinal hernia, repair (2)   • INJECTION OF MEDICATION  09/06/2012    Albuterol (2u) (1) - Ordered By: LIDYA STEINER ([facility])    • INJECTION OF MEDICATION  09/06/2012    Atrovent (1) - Ordered By: LIDYA STEINER ([facility])    • INJECTION OF MEDICATION  02/10/2012    Depo Medrol (Methylprednisone) (3) - Ordered By: HEATHER PAK ([facility])    • INJECTION OF MEDICATION  02/09/2014    Kenalog (4) - Ordered By: DIMAS ANDRADE ([facility])    • LUMBAR LAMINECTOMY  2007    Lumbar laminectomy (1)   • MANDIBLE FRACTURE SURGERY   10/11/1981    Treat nose/jaw fracture (1) - bilateral open reduction of fracture of mandible   • OTHER SURGICAL HISTORY  09/24/2011    Drain/Inject Major Joint 20610 (1) - Ordered By: KARLOS HERNANDEZ ([facility])    • OTHER SURGICAL HISTORY      Insert IVC filter 75767 (1)   • OTHER SURGICAL HISTORY  10/11/2011    SPIROMETRY 29199 (1) - Ordered By: HEAHTER PAK ([facility])   • TOTAL HIP ARTHROPLASTY Right              PT Ortho       09/12/17 1000    Precautions and Contraindications    Precautions/Limitations fall precautions  -EM    Contraindications No ESTIM due to hx of cancer  -EM    Subjective Pain    Post-Treatment Pain Level 0  -EM    Posture/Observations    Posture/Observations Comments Presents in w/c, t/f via stand pivot with min Ax1 .  -EM    Transfers    Transfers, Bed-Chair [place] minimum assist (75% patient effort)  -EM    Transfers, Chair-Bed [place] minimum assist (75% patient effort)  -EM    Transfers, Bed-Chair-Bed, Assist Device --   HHA  -EM    Transfers, Sit-Stand [place] minimum assist (75% patient effort)  -EM    Transfers, Stand-Sit [place] minimum assist (75% patient effort)  -EM    Transfer, Comment sup-sit-sup min Ax1; rolling minAx1 with poor control of B LE.   -EM      User Key  (r) = Recorded By, (t) = Taken By, (c) = Cosigned By    Initials Name Provider Type    EM Jaron Driscoll PTA Physical Therapy Assistant                            PT Assessment/Plan       09/12/17 1000       PT Assessment    Functional Limitations Decreased safety during functional activities;Impaired gait;Impaired locomotion;Limitation in home management;Limitations in community activities;Performance in leisure activities;Limitations in functional capacity and performance  -EM     Impairments Balance;Gait;Impaired flexibility;Muscle strength;Pain;Posture;Range of motion;Sensation  -EM     Assessment Comments Pt samy tx well, responed well with PT with no increase in pain. Pt demo poor balance  "and t/f ability due to poor B LE control at this time  -EM     Rehab Potential Good  -EM     Patient/caregiver participated in establishment of treatment plan and goals Yes  -EM     Patient would benefit from skilled therapy intervention Yes  -EM     PT Plan    PT Frequency 2x/week  -EM     Predicted Duration of Therapy Intervention (days/wks) 4 weeks  -EM     PT Plan Comments Cont gentle therex with core strengthening as samy/indicated.  Add PPT next tx.   -EM       User Key  (r) = Recorded By, (t) = Taken By, (c) = Cosigned By    Initials Name Provider Type    ITALIA Driscoll PTA Physical Therapy Assistant                Modalities       09/12/17 1000          Moist Heat    MH Applied Yes  -EM      Location L spine  -EM      Rx Minutes 10 mins  -EM      MH S/P Rx Yes  -EM        User Key  (r) = Recorded By, (t) = Taken By, (c) = Cosigned By    Initials Name Provider Type    ITALIA Driscoll PTA Physical Therapy Assistant                Exercises       09/12/17 1000          Subjective Comments    Subjective Comments Pt states he feel pretty good today. States he had only fallen 1 time. But states he catches his foot alot on floor. Pt reports numbness in B thighs and B feet.  -EM      Subjective Pain    Able to rate subjective pain? yes  -EM      Pre-Treatment Pain Level 0  -EM      Post-Treatment Pain Level 0  -EM      Exercise 1    Additional Comments Unable to perform PRO II due to poor control of B LE.   -EM      Exercise 2    Exercise Name 2 Seated Marching  -EM      Sets 2 2  -EM      Reps 2 10  -EM      Exercise 3    Exercise Name 3 HLTR  -EM      Sets 3 1  -EM      Reps 3 20  -EM      Exercise 4    Exercise Name 4 SL Clamshells  -EM      Sets 4 1  -EM      Reps 4 20  -EM      Exercise 5    Exercise Name 5 Add Bolster squeeze  -EM      Sets 5 1  -EM      Reps 5 20  -EM      Time (Seconds) 5 5\" Hold  -EM      Exercise 6    Exercise Name 6 Man Lumbar Gapping B sides  -EM      Time (Minutes) 6 10'  -EM      " "Exercise 7    Exercise Name 7 MHP LSpine  -EM      Time (Minutes) 7 10'  -EM        User Key  (r) = Recorded By, (t) = Taken By, (c) = Cosigned By    Initials Name Provider Type    ITALIA Driscoll PTA Physical Therapy Assistant                               PT OP Goals       09/12/17 1000       PT Short Term Goals    STG Date to Achieve 09/21/17  -EM     STG 1 Indep in HEP  -EM     STG 1 Progress Not Met  -EM     STG 2 Complete TUG test in 60\" with RW  -EM     STG 2 Progress Not Met  -EM     STG 3 Perform FA/EO static standing balance for 30\" or greater  -EM     STG 3 Progress Not Met  -EM     STG 4 Perform sit<>stand transfer with RW safely, no verbal cues for proper hand placement/technique  -EM     STG 4 Progress Not Met  -EM     Long Term Goals    LTG Date to Achieve 10/05/17  -EM     LTG 1 Subjectively report 60% improvement or greater  -EM     LTG 1 Progress Not Met  -EM     LTG 2 Complete TUG test in 45\" with RW  -EM     LTG 2 Progress Not Met  -EM     LTG 3 Demonstrate bilateral hip flex/abd MMT to 4+/5 or greater  -EM     LTG 3 Progress Not Met  -EM     LTG 4 Demonstrate bilateral knee flex/ext MMT to 4+/5 or greater  -EM     LTG 4 Progress Not Met  -EM     LTG 5 Perform 10 min of standing therex/activties during treatment session without increased pain  -EM     LTG 5 Progress Not Met  -EM     LTG 6 Perform FT/EO static balance for 30\" or greater  -EM     LTG 6 Progress Not Met  -EM     LTG 7 Ambulate 100 feet with RW safely, modified independent  -EM     LTG 7 Progress Not Met  -EM     Time Calculation    PT Goal Re-Cert Due Date 09/28/17  -EM       User Key  (r) = Recorded By, (t) = Taken By, (c) = Cosigned By    Initials Name Provider Type    ITALIA Driscoll PTA Physical Therapy Assistant                    Time Calculation:   Start Time: 1004  Stop Time: 1100  Time Calculation (min): 56 min  Total Timed Code Minutes- PT: 46 minute(s)    Therapy Charges for Today     Code Description Service Date " Service Provider Modifiers Qty    67315079181  PT THER PROC EA 15 MIN 9/12/2017 Jaron Driscoll PTA GP 3    90090411597 HC PT THER SUPP EA 15 MIN 9/12/2017 Jaron Driscoll PTA GP 1                    Jaron Driscoll PTA  9/12/2017

## 2017-09-12 NOTE — THERAPY TREATMENT NOTE
Outpatient Physical Therapy Ortho Treatment Note  [facility]     Patient Name: General Keith Ruvalcaba Jr.  : 1935  MRN: [id number]  Today's Date: 2017      Visit Date: 2017   Attendance:   Subjective improvement:  Recert:17  MD Appointment: 17 ~8:00-Dr. Johnson      Visit Dx:    ICD-10-CM ICD-9-CM   1. Low back pain, unspecified back pain laterality, unspecified chronicity, with sciatica presence unspecified M54.5 724.2       Patient Active Problem List   Diagnosis   • Chronic diastolic congestive heart failure   • Hypertension   • Coronary artery disease   • Chronic kidney disease   • H/O total hip arthroplasty   • Lumbar disc disease   • Osteoarthritis of multiple joints   • Iron deficiency   • Nuclear cataract   • Cortical age-related cataract   • Diabetes mellitus without complication   • Carotid stenosis, asymptomatic   • Type 2 diabetes mellitus without complication, without long-term current use of insulin        Past Medical History:   Diagnosis Date   • Acquired hallux rigidus    • Anemia    • Arthropathy of lumbar facet joint    • Carpal tunnel syndrome    • CHF (congestive heart failure)    • Chronic diastolic congestive heart failure    • Chronic kidney disease    • Chronic kidney disease, stage II (mild)     Chronic kidney disease stage 2   • Chronic obstructive lung disease    • Chronic renal failure    • Coronary artery disease    • Cortical senile cataract    • Diabetes mellitus    • Diabetes mellitus type 2, noninsulin dependent     diabetes mellitus type 2, non-insulin treated, Medication treatment plan: oral diabetic medication   • Diabetes mellitus without complication    • Diastolic dysfunction    • Diastolic heart failure    • Essential hypertension     difficult to control      • Hip pain    • History of echocardiogram 2015    Echocardiogram W/ color flow 33408 (1) - Mild to moderate LVH with mild left atrial enlargement and normal aortic  root.CLVH,preserved LV systolic function with Ef of 55%.Diastolic dysfunction.MV intact.AV thickened.Aortic sclerosis.   • Hyperlipidemia    • Influenza vaccine administered 10/02/2015    INFLUENZA IMMUN ADMIN OR PREV RECV'D  (1) - Ordered By: DIONNE MANDUJANO ([facility])    • Insomnia    • Insomnia     Other insomnia   • Joint pain    • Low back pain    • Muscle strain    • Nuclear cataract    • Pneumococcal vaccination given 07/22/2016    PNEUMOC VAC/ADMIN/RCVD 4040F (3) - Ordered By: DIONNE MANDUJANO ([facility])   • Pure hypercholesterolemia    • Sedentary lifestyle         Past Surgical History:   Procedure Laterality Date   • CARDIAC ABLATION     • CARDIAC CATHETERIZATION  02/04/1986    Cardiac cath 60046 (1) - normal left heart catherization,non cardiac chest pain   • CARPAL TUNNEL RELEASE  10/19/2015    Carpal tunnel surgery (1) - Release of left carpal tunnel   • CATARACT EXTRACTION W/ INTRAOCULAR LENS IMPLANT Left 3/31/2017    Procedure: REMOVE CATARACT AND IMPLANT INRAOCULAR LENS LEFT EYE;  Surgeon: Hector Navarrete MD;  Location: [facility] OR;  Service:    • CATARACT EXTRACTION W/ INTRAOCULAR LENS IMPLANT Right 4/14/2017    Procedure: REMOVE CATARACT AND IMPLANT INTRAOCULAR LENS RIGHT EYE;  Surgeon: Hector Navarrete MD;  Location: [facility] OR;  Service:    • ENDOSCOPY  08/05/2013    Colon endoscopy 11267 (2) - Hemorrhoids found.   • INGUINAL HERNIA REPAIR Right 06/07/1968    Inguinal hernia, repair (2)   • INJECTION OF MEDICATION  09/06/2012    Albuterol (2u) (1) - Ordered By: LIDYA STEINER ([facility])    • INJECTION OF MEDICATION  09/06/2012    Atrovent (1) - Ordered By: LIDYA STEINER ([facility])    • INJECTION OF MEDICATION  02/10/2012    Depo Medrol (Methylprednisone) (3) - Ordered By: HEATHER PAK ([facility])    • INJECTION OF MEDICATION  02/09/2014    Kenalog (4) - Ordered By: DIMAS ANDRADE ([facility])    • LUMBAR LAMINECTOMY  2007    Lumbar laminectomy (1)   • MANDIBLE FRACTURE SURGERY   10/11/1981    Treat nose/jaw fracture (1) - bilateral open reduction of fracture of mandible   • OTHER SURGICAL HISTORY  09/24/2011    Drain/Inject Major Joint 20610 (1) - Ordered By: KARLOS HERNANDEZ ([facility])    • OTHER SURGICAL HISTORY      Insert IVC filter 59945 (1)   • OTHER SURGICAL HISTORY  10/11/2011    SPIROMETRY 62994 (1) - Ordered By: HEATHER PAK ([facility])   • TOTAL HIP ARTHROPLASTY Right              PT Ortho       09/12/17 1000    Precautions and Contraindications    Precautions/Limitations fall precautions  -EM    Contraindications No ESTIM due to hx of cancer  -EM    Subjective Pain    Post-Treatment Pain Level 0  -EM    Posture/Observations    Posture/Observations Comments Presents in w/c, t/f via stand pivot with min Ax1 .  -EM    Transfers    Transfers, Bed-Chair [place] minimum assist (75% patient effort)  -EM    Transfers, Chair-Bed [place] minimum assist (75% patient effort)  -EM    Transfers, Bed-Chair-Bed, Assist Device --   HHA  -EM    Transfers, Sit-Stand [place] minimum assist (75% patient effort)  -EM    Transfers, Stand-Sit [place] minimum assist (75% patient effort)  -EM    Transfer, Comment sup-sit-sup min Ax1; rolling minAx1 with poor control of B LE.   -EM      User Key  (r) = Recorded By, (t) = Taken By, (c) = Cosigned By    Initials Name Provider Type    EM Jaron Driscoll PTA Physical Therapy Assistant                            PT Assessment/Plan       09/12/17 1000       PT Assessment    Functional Limitations Decreased safety during functional activities;Impaired gait;Impaired locomotion;Limitation in home management;Limitations in community activities;Performance in leisure activities;Limitations in functional capacity and performance  -EM     Impairments Balance;Gait;Impaired flexibility;Muscle strength;Pain;Posture;Range of motion;Sensation  -EM     Assessment Comments Pt samy tx well, responed well with PT with no increase in pain. Pt demo poor balance  "and t/f ability due to poor B LE control at this time  -EM     Rehab Potential Good  -EM     Patient/caregiver participated in establishment of treatment plan and goals Yes  -EM     Patient would benefit from skilled therapy intervention Yes  -EM     PT Plan    PT Frequency 2x/week  -EM     Predicted Duration of Therapy Intervention (days/wks) 4 weeks  -EM     PT Plan Comments Cont gentle therex with core strengthening as samy/indicated.  Add PPT next tx.   -EM       User Key  (r) = Recorded By, (t) = Taken By, (c) = Cosigned By    Initials Name Provider Type    ITALIA Driscoll PTA Physical Therapy Assistant                Modalities       09/12/17 1000          Moist Heat    MH Applied Yes  -EM      Location L spine  -EM      Rx Minutes 10 mins  -EM      MH S/P Rx Yes  -EM        User Key  (r) = Recorded By, (t) = Taken By, (c) = Cosigned By    Initials Name Provider Type    ITALIA Driscoll PTA Physical Therapy Assistant                Exercises       09/12/17 1000          Subjective Comments    Subjective Comments Pt states he feel pretty good today. States he had only fallen 1 time. But states he catches his foot alot on floor. Pt reports numbness in B thighs and B feet.  -EM      Subjective Pain    Able to rate subjective pain? yes  -EM      Pre-Treatment Pain Level 0  -EM      Post-Treatment Pain Level 0  -EM      Exercise 1    Additional Comments Unable to perform PRO II due to poor control of B LE.   -EM      Exercise 2    Exercise Name 2 Seated Marching  -EM      Sets 2 2  -EM      Reps 2 10  -EM      Exercise 3    Exercise Name 3 HLTR  -EM      Sets 3 1  -EM      Reps 3 20  -EM      Exercise 4    Exercise Name 4 SL Clamshells  -EM      Sets 4 1  -EM      Reps 4 20  -EM      Exercise 5    Exercise Name 5 Add Bolster squeeze  -EM      Sets 5 1  -EM      Reps 5 20  -EM      Time (Seconds) 5 5\" Hold  -EM      Exercise 6    Exercise Name 6 Man Lumbar Gapping B sides  -EM      Time (Minutes) 6 10'  -EM      " "Exercise 7    Exercise Name 7 MHP LSpine  -EM      Time (Minutes) 7 10'  -EM        User Key  (r) = Recorded By, (t) = Taken By, (c) = Cosigned By    Initials Name Provider Type    ITALIA Driscoll PTA Physical Therapy Assistant                               PT OP Goals       09/12/17 1000       PT Short Term Goals    STG Date to Achieve 09/21/17  -EM     STG 1 Indep in HEP  -EM     STG 1 Progress Not Met  -EM     STG 2 Complete TUG test in 60\" with RW  -EM     STG 2 Progress Not Met  -EM     STG 3 Perform FA/EO static standing balance for 30\" or greater  -EM     STG 3 Progress Not Met  -EM     STG 4 Perform sit<>stand transfer with RW safely, no verbal cues for proper hand placement/technique  -EM     STG 4 Progress Not Met  -EM     Long Term Goals    LTG Date to Achieve 10/05/17  -EM     LTG 1 Subjectively report 60% improvement or greater  -EM     LTG 1 Progress Not Met  -EM     LTG 2 Complete TUG test in 45\" with RW  -EM     LTG 2 Progress Not Met  -EM     LTG 3 Demonstrate bilateral hip flex/abd MMT to 4+/5 or greater  -EM     LTG 3 Progress Not Met  -EM     LTG 4 Demonstrate bilateral knee flex/ext MMT to 4+/5 or greater  -EM     LTG 4 Progress Not Met  -EM     LTG 5 Perform 10 min of standing therex/activties during treatment session without increased pain  -EM     LTG 5 Progress Not Met  -EM     LTG 6 Perform FT/EO static balance for 30\" or greater  -EM     LTG 6 Progress Not Met  -EM     LTG 7 Ambulate 100 feet with RW safely, modified independent  -EM     LTG 7 Progress Not Met  -EM     Time Calculation    PT Goal Re-Cert Due Date 09/28/17  -EM       User Key  (r) = Recorded By, (t) = Taken By, (c) = Cosigned By    Initials Name Provider Type    ITALIA Driscoll PTA Physical Therapy Assistant                    Time Calculation:   Start Time: 1004  Stop Time: 1100  Time Calculation (min): 56 min  Total Timed Code Minutes- PT: 46 minute(s)    Therapy Charges for Today     Code Description Service Date " Service Provider Modifiers Qty    35651389280 HC PT THER SUPP EA 15 MIN 9/12/2017 Jaron Driscoll, PTA GP 1    81556375146 HC PT THER PROC EA 15 MIN 9/12/2017 Jaron Driscoll PTA GP 2    77792656929 HC PT MANUAL THERAPY EA 15 MIN 9/12/2017 Jaron Driscoll PTA GP 1                    Jaron Driscoll, ENE  9/12/2017

## 2017-09-15 ENCOUNTER — HOSPITAL ENCOUNTER (OUTPATIENT)
Dept: PHYSICAL THERAPY | Facility: HOSPITAL | Age: 82
Setting detail: THERAPIES SERIES
Discharge: HOME | End: 2017-09-15

## 2017-09-15 DIAGNOSIS — M54.5 LOW BACK PAIN, UNSPECIFIED BACK PAIN LATERALITY, UNSPECIFIED CHRONICITY, WITH SCIATICA PRESENCE UNSPECIFIED: Primary | ICD-10-CM

## 2017-09-15 PROCEDURE — 97110 THERAPEUTIC EXERCISES: CPT

## 2017-09-15 NOTE — THERAPY TREATMENT NOTE
Outpatient Physical Therapy Ortho Treatment Note  [facility]     Patient Name: General Keith Ruvalcaba Jr.  : 1935  MRN: [id number]  Today's Date: 9/15/2017      Visit Date: 09/15/2017   Attendance: 3/3  Subjective improvement: 10-20%  Recert: 17  MD Appointment: 17 at 9: 45      Visit Dx:    ICD-10-CM ICD-9-CM   1. Low back pain, unspecified back pain laterality, unspecified chronicity, with sciatica presence unspecified M54.5 724.2       Patient Active Problem List   Diagnosis   • Chronic diastolic congestive heart failure   • Hypertension   • Coronary artery disease   • Chronic kidney disease   • H/O total hip arthroplasty   • Lumbar disc disease   • Osteoarthritis of multiple joints   • Iron deficiency   • Nuclear cataract   • Cortical age-related cataract   • Diabetes mellitus without complication   • Carotid stenosis, asymptomatic   • Type 2 diabetes mellitus without complication, without long-term current use of insulin        Past Medical History:   Diagnosis Date   • Acquired hallux rigidus    • Anemia    • Arthropathy of lumbar facet joint    • Carpal tunnel syndrome    • CHF (congestive heart failure)    • Chronic diastolic congestive heart failure    • Chronic kidney disease    • Chronic kidney disease, stage II (mild)     Chronic kidney disease stage 2   • Chronic obstructive lung disease    • Chronic renal failure    • Coronary artery disease    • Cortical senile cataract    • Diabetes mellitus    • Diabetes mellitus type 2, noninsulin dependent     diabetes mellitus type 2, non-insulin treated, Medication treatment plan: oral diabetic medication   • Diabetes mellitus without complication    • Diastolic dysfunction    • Diastolic heart failure    • Essential hypertension     difficult to control      • Hip pain    • History of echocardiogram 2015    Echocardiogram W/ color flow 74164 (1) - Mild to moderate LVH with mild left atrial enlargement and normal aortic  root.CLVH,preserved LV systolic function with Ef of 55%.Diastolic dysfunction.MV intact.AV thickened.Aortic sclerosis.   • Hyperlipidemia    • Influenza vaccine administered 10/02/2015    INFLUENZA IMMUN ADMIN OR PREV RECV'D  (1) - Ordered By: DIONNE MANDUJANO ([facility])    • Insomnia    • Insomnia     Other insomnia   • Joint pain    • Low back pain    • Muscle strain    • Nuclear cataract    • Pneumococcal vaccination given 07/22/2016    PNEUMOC VAC/ADMIN/RCVD 4040F (3) - Ordered By: DIONNE MANDUJANO ([facility])   • Pure hypercholesterolemia    • Sedentary lifestyle         Past Surgical History:   Procedure Laterality Date   • CARDIAC ABLATION     • CARDIAC CATHETERIZATION  02/04/1986    Cardiac cath 52235 (1) - normal left heart catherization,non cardiac chest pain   • CARPAL TUNNEL RELEASE  10/19/2015    Carpal tunnel surgery (1) - Release of left carpal tunnel   • CATARACT EXTRACTION W/ INTRAOCULAR LENS IMPLANT Left 3/31/2017    Procedure: REMOVE CATARACT AND IMPLANT INRAOCULAR LENS LEFT EYE;  Surgeon: Hector Navarrete MD;  Location: [facility] OR;  Service:    • CATARACT EXTRACTION W/ INTRAOCULAR LENS IMPLANT Right 4/14/2017    Procedure: REMOVE CATARACT AND IMPLANT INTRAOCULAR LENS RIGHT EYE;  Surgeon: Hector Navarrete MD;  Location: [facility] OR;  Service:    • ENDOSCOPY  08/05/2013    Colon endoscopy 21953 (2) - Hemorrhoids found.   • INGUINAL HERNIA REPAIR Right 06/07/1968    Inguinal hernia, repair (2)   • INJECTION OF MEDICATION  09/06/2012    Albuterol (2u) (1) - Ordered By: LIDYA STEINER ([facility])    • INJECTION OF MEDICATION  09/06/2012    Atrovent (1) - Ordered By: LIDYA STEINER ([facility])    • INJECTION OF MEDICATION  02/10/2012    Depo Medrol (Methylprednisone) (3) - Ordered By: HEATHER PAK ([facility])    • INJECTION OF MEDICATION  02/09/2014    Kenalog (4) - Ordered By: DIMAS ANDRADE ([facility])    • LUMBAR LAMINECTOMY  2007    Lumbar laminectomy (1)   • MANDIBLE FRACTURE SURGERY   10/11/1981    Treat nose/jaw fracture (1) - bilateral open reduction of fracture of mandible   • OTHER SURGICAL HISTORY  09/24/2011    Drain/Inject Major Joint 20610 (1) - Ordered By: KARLOS HERNANDEZ ([facility])    • OTHER SURGICAL HISTORY      Insert IVC filter 61307 (1)   • OTHER SURGICAL HISTORY  10/11/2011    SPIROMETRY 17600 (1) - Ordered By: HEATHER PAK ([facility])   • TOTAL HIP ARTHROPLASTY Right              PT Ortho       09/15/17 1300    Precautions and Contraindications    Precautions/Limitations fall precautions  -EM    Contraindications No ESTIM due to hx of cancer  -EM    Subjective Pain    Post-Treatment Pain Level 0  -EM    Posture/Observations    Posture/Observations Comments Presents in W/C , t/f via stand pivot. Numbness noted with B feet and mid thighs.  -EM    Left Hip    Hip Flexion Gross Movement (3+/5) fair plus  -EM    Hip ABduction Gross Movement (2+/5) poor plus  -EM    Right Hip    Hip Flexion Gross Movement (3+/5) fair plus  -EM    Hip ABduction Gross Movement (2+/5) poor plus  -EM    Left Knee    Knee Extension Gross Movement (5/5) normal  -EM    Knee Flexion Gross Movement (5/5) normal  -EM    Right Knee    Knee Extension Gross Movement (4+/5) good plus  -EM    Knee Flexion Gross Movement (4+/5) good plus  -EM    Left Ankle/Foot    Ankle Dorsiflexion Gross Movement (4+/5) good plus  -EM    Right Ankle/Foot    Ankle Dorsiflexion Gross Movement (4/5) good  -EM      User Key  (r) = Recorded By, (t) = Taken By, (c) = Cosigned By    Initials Name Provider Type    EM Jaron Driscoll PTA Physical Therapy Assistant                            PT Assessment/Plan       09/15/17 1400       PT Assessment    Functional Limitations Decreased safety during functional activities;Impaired gait;Impaired locomotion;Limitation in home management;Limitations in community activities;Performance in leisure activities;Limitations in functional capacity and performance  -EM     Impairments Balance;Gait;Impaired  "flexibility;Muscle strength;Pain;Posture;Range of motion;Sensation  -EM     Assessment Comments Pt samy tx well and cont to demonstrate poor B LE control and poor balance with t/fs. Pt is high fall risk due to poor control of B LE causing safety concerns. Slight subjective improvements noted with numbness. Slightly improved bed and t/f mobility noted.  -EM     Rehab Potential Fair  -EM     Patient/caregiver participated in establishment of treatment plan and goals Yes  -EM     Patient would benefit from skilled therapy intervention Yes  -EM     PT Plan    PT Frequency 2x/week  -EM     Predicted Duration of Therapy Intervention (days/wks) 4 weeks  -EM     PT Plan Comments Cont per MD recommendations. Per pt- surgery is possible.  -EM       User Key  (r) = Recorded By, (t) = Taken By, (c) = Cosigned By    Initials Name Provider Type    EM Jaron Driscoll PTA Physical Therapy Assistant                    Exercises       09/15/17 1300          Subjective Comments    Subjective Comments Pt states he has to have surgery, no scheduled date yet. Pt states he sees Dr. Johnson Monday. Pt later reports \"I think the numbness is a little better in my feet today\"  -EM      Subjective Pain    Able to rate subjective pain? yes  -EM      Pre-Treatment Pain Level 0  -EM      Post-Treatment Pain Level 0  -EM      Exercise 1    Exercise Name 1 Seated Marches  -EM      Sets 1 1  -EM      Reps 1 30  -EM      Exercise 2    Exercise Name 2 HLTR  -EM      Sets 2 1  -EM      Reps 2 30  -EM      Exercise 3    Exercise Name 3 SL Clamshells  -EM      Sets 3 1  -EM      Reps 3 20  -EM      Additional Comments AAROM  -EM      Exercise 4    Exercise Name 4 SL Hip Abd  -EM      Sets 4 1  -EM      Reps 4 20  -EM      Additional Comments AAROM  -EM      Exercise 5    Exercise Name 5 Add Bolster squeeze  -EM      Sets 5 1  -EM      Reps 5 30  -EM      Time (Seconds) 5 5\" Hold  -EM      Exercise 6    Exercise Name 6 SKTC  -EM      Sets 6 3  -EM      Time " "(Seconds) 6 30\"  -EM      Exercise 7    Additional Comments [facility]  -EM        User Key  (r) = Recorded By, (t) = Taken By, (c) = Cosigned By    Initials Name Provider Type    EM Jaron Driscoll PTA Physical Therapy Assistant                               PT OP Goals       09/15/17 1300       PT Short Term Goals    STG Date to Achieve 09/21/17  -EM     STG 1 Indep in HEP  -EM     STG 1 Progress Met  -EM     STG 2 Complete TUG test in 60\" with RW  -EM     STG 2 Progress Not Met  -EM     STG 3 Perform FA/EO static standing balance for 30\" or greater  -EM     STG 3 Progress Not Met  -EM     STG 4 Perform sit<>stand transfer with RW safely, no verbal cues for proper hand placement/technique  -EM     STG 4 Progress Not Met  -EM     Long Term Goals    LTG Date to Achieve 10/05/17  -EM     LTG 1 Subjectively report 60% improvement or greater  -EM     LTG 1 Progress Not Met  -EM     LTG 2 Complete TUG test in 45\" with RW  -EM     LTG 2 Progress Not Met  -EM     LTG 3 Demonstrate bilateral hip flex/abd MMT to 4+/5 or greater  -EM     LTG 3 Progress Not Met  -EM     LTG 4 Demonstrate bilateral knee flex/ext MMT to 4+/5 or greater  -EM     LTG 4 Progress Not Met  -EM     LTG 5 Perform 10 min of standing therex/activties during treatment session without increased pain  -EM     LTG 5 Progress Not Met  -EM     LTG 6 Perform FT/EO static balance for 30\" or greater  -EM     LTG 6 Progress Not Met  -EM     LTG 7 Ambulate 100 feet with RW safely, modified independent  -EM     LTG 7 Progress Not Met  -EM     Time Calculation    PT Goal Re-Cert Due Date 09/28/17  -EM       User Key  (r) = Recorded By, (t) = Taken By, (c) = Cosigned By    Initials Name Provider Type    ITALIA Driscoll PTA Physical Therapy Assistant                    Time Calculation:   Start Time: 1302  Stop Time: 1400  Time Calculation (min): 58 min  Total Timed Code Minutes- PT: 58 minute(s)    Therapy Charges for Today     Code Description Service Date Service " Provider Modifiers Qty    [id number]  PT THER PROC EA 15 MIN 9/15/2017 Jaron Driscoll, PTA GP 4                    Jaron Driscoll PTA  9/15/2017

## 2017-09-19 ENCOUNTER — APPOINTMENT (OUTPATIENT)
Dept: PHYSICAL THERAPY | Facility: HOSPITAL | Age: 82
End: 2017-09-19

## 2017-09-20 ENCOUNTER — TELEPHONE (OUTPATIENT)
Dept: INTERNAL MEDICINE | Facility: CLINIC | Age: 82
End: 2017-09-20

## 2017-09-21 ENCOUNTER — APPOINTMENT (OUTPATIENT)
Dept: PHYSICAL THERAPY | Facility: HOSPITAL | Age: 82
End: 2017-09-21

## 2017-09-21 ENCOUNTER — DOCUMENTATION (OUTPATIENT)
Dept: PHYSICAL THERAPY | Facility: HOSPITAL | Age: 82
End: 2017-09-21

## 2017-09-21 DIAGNOSIS — M54.5 LOW BACK PAIN, UNSPECIFIED BACK PAIN LATERALITY, UNSPECIFIED CHRONICITY, WITH SCIATICA PRESENCE UNSPECIFIED: Primary | ICD-10-CM

## 2017-09-21 NOTE — THERAPY DISCHARGE NOTE
"     Outpatient Physical Therapy Discharge Summary         Patient Name: General Keith Ruvalcaba Jr.  : 1935  MRN: [id number]    Today's Date: 2017    Visit Dx:    ICD-10-CM ICD-9-CM   1. Low back pain, unspecified back pain laterality, unspecified chronicity, with sciatica presence unspecified M54.5 724.2             PT OP Goals       17 1500       PT Short Term Goals    STG Date to Achieve 17  -KG     STG 1 Indep in HEP  -KG     STG 1 Progress Met  -KG     STG 2 Complete TUG test in 60\" with RW  -KG     STG 2 Progress Not Met  -KG     STG 3 Perform FA/EO static standing balance for 30\" or greater  -KG     STG 3 Progress Not Met  -KG     STG 4 Perform sit<>stand transfer with RW safely, no verbal cues for proper hand placement/technique  -KG     STG 4 Progress Not Met  -KG     Long Term Goals    LTG Date to Achieve 10/05/17  -KG     LTG 1 Subjectively report 60% improvement or greater  -KG     LTG 1 Progress Not Met  -KG     LTG 2 Complete TUG test in 45\" with RW  -KG     LTG 2 Progress Not Met  -KG     LTG 3 Demonstrate bilateral hip flex/abd MMT to 4+/5 or greater  -KG     LTG 3 Progress Not Met  -KG     LTG 4 Demonstrate bilateral knee flex/ext MMT to 4+/5 or greater  -KG     LTG 4 Progress Not Met  -KG     LTG 5 Perform 10 min of standing therex/activties during treatment session without increased pain  -KG     LTG 5 Progress Not Met  -KG     LTG 6 Perform FT/EO static balance for 30\" or greater  -KG     LTG 6 Progress Not Met  -KG     LTG 7 Ambulate 100 feet with RW safely, modified independent  -KG     LTG 7 Progress Not Met  -KG       User Key  (r) = Recorded By, (t) = Taken By, (c) = Cosigned By    Initials Name Provider Type    KG Maryana Braden, PT Physical Therapist          OP PT Discharge Summary  Date of Discharge: 17  Reason for Discharge: Per MD order, other (comment) (Per MD, pt will be having back surgery soon but has not been scheduled at this time.)  Outcomes Achieved: " Unable to make functional progress toward goals at this time, Refer to plan of care for updates on goals achieved  Discharge Destination: Home with home program      Time Calculation:                    Maryana Braden, PT  9/21/2017

## 2017-09-29 ENCOUNTER — TELEPHONE (OUTPATIENT)
Dept: INTERNAL MEDICINE | Facility: CLINIC | Age: 82
End: 2017-09-29

## 2017-09-29 RX ORDER — FUROSEMIDE 20 MG/1
TABLET ORAL
Qty: 30 TABLET | Refills: 0 | Status: SHIPPED | OUTPATIENT
Start: 2017-09-29 | End: 2017-11-08

## 2017-09-29 NOTE — TELEPHONE ENCOUNTER
PATIENT WANTED TO KNOW IF HE CAN GET SOMETHING CALLED INTO [facility] FOR SWELLING IN HIS FEET

## 2017-11-08 ENCOUNTER — APPOINTMENT (OUTPATIENT)
Dept: CT IMAGING | Facility: HOSPITAL | Age: 82
End: 2017-11-08

## 2017-11-08 ENCOUNTER — APPOINTMENT (OUTPATIENT)
Dept: CARDIOLOGY | Facility: HOSPITAL | Age: 82
End: 2017-11-08
Attending: FAMILY MEDICINE

## 2017-11-08 ENCOUNTER — HOSPITAL ENCOUNTER (OUTPATIENT)
Facility: HOSPITAL | Age: 82
Setting detail: OBSERVATION
Discharge: HOME | End: 2017-11-10
Attending: EMERGENCY MEDICINE | Admitting: FAMILY MEDICINE

## 2017-11-08 ENCOUNTER — APPOINTMENT (OUTPATIENT)
Dept: GENERAL RADIOLOGY | Facility: HOSPITAL | Age: 82
End: 2017-11-08

## 2017-11-08 DIAGNOSIS — N39.0 ACUTE URINARY TRACT INFECTION: ICD-10-CM

## 2017-11-08 DIAGNOSIS — I50.9 CONGESTIVE HEART FAILURE, UNSPECIFIED CONGESTIVE HEART FAILURE CHRONICITY, UNSPECIFIED CONGESTIVE HEART FAILURE TYPE: ICD-10-CM

## 2017-11-08 DIAGNOSIS — I50.32 CHRONIC DIASTOLIC CONGESTIVE HEART FAILURE (HCC): Chronic | ICD-10-CM

## 2017-11-08 DIAGNOSIS — I25.10 CORONARY ARTERY DISEASE INVOLVING NATIVE CORONARY ARTERY OF NATIVE HEART WITHOUT ANGINA PECTORIS: ICD-10-CM

## 2017-11-08 DIAGNOSIS — R60.0 BILATERAL LOWER EXTREMITY EDEMA: ICD-10-CM

## 2017-11-08 DIAGNOSIS — N18.2 STAGE 2 CHRONIC KIDNEY DISEASE: Chronic | ICD-10-CM

## 2017-11-08 DIAGNOSIS — E11.9 TYPE 2 DIABETES MELLITUS WITHOUT COMPLICATION, WITHOUT LONG-TERM CURRENT USE OF INSULIN (HCC): ICD-10-CM

## 2017-11-08 DIAGNOSIS — I10 ESSENTIAL HYPERTENSION: ICD-10-CM

## 2017-11-08 DIAGNOSIS — R06.00 DYSPNEA, UNSPECIFIED TYPE: ICD-10-CM

## 2017-11-08 DIAGNOSIS — R77.8 ELEVATED TROPONIN: Primary | ICD-10-CM

## 2017-11-08 LAB
ALBUMIN SERPL-MCNC: 4 G/DL (ref 3.4–4.8)
ALBUMIN/GLOB SERPL: 1.4 G/DL (ref 1.1–1.8)
ALP SERPL-CCNC: 101 U/L (ref 38–126)
ALT SERPL W P-5'-P-CCNC: 46 U/L (ref 21–72)
ANION GAP SERPL CALCULATED.3IONS-SCNC: 15 MMOL/L (ref 5–15)
AST SERPL-CCNC: 39 U/L (ref 17–59)
BACTERIA UR QL AUTO: ABNORMAL /HPF
BASOPHILS # BLD AUTO: 0.03 10*3/MM3 (ref 0–0.2)
BASOPHILS NFR BLD AUTO: 0.2 % (ref 0–2)
BH CV ECHO MEAS - EF(MOD-SP4): 60 %
BILIRUB SERPL-MCNC: 1.2 MG/DL (ref 0.2–1.3)
BILIRUB UR QL STRIP: ABNORMAL
BUN BLD-MCNC: 24 MG/DL (ref 7–21)
BUN/CREAT SERPL: 21.2 (ref 7–25)
CALCIUM SPEC-SCNC: 9.6 MG/DL (ref 8.4–10.2)
CHLORIDE SERPL-SCNC: 104 MMOL/L (ref 95–110)
CK MB SERPL-CCNC: 1.47 NG/ML (ref 0–5)
CK MB SERPL-CCNC: 1.78 NG/ML (ref 0–5)
CK SERPL-CCNC: 110 U/L (ref 55–170)
CK SERPL-CCNC: 97 U/L (ref 55–170)
CLARITY UR: ABNORMAL
CO2 SERPL-SCNC: 23 MMOL/L (ref 22–31)
COLOR UR: ABNORMAL
CREAT BLD-MCNC: 1.13 MG/DL (ref 0.7–1.3)
DEPRECATED RDW RBC AUTO: 43.9 FL (ref 35.1–43.9)
EOSINOPHIL # BLD AUTO: 0.03 10*3/MM3 (ref 0–0.7)
EOSINOPHIL NFR BLD AUTO: 0.2 % (ref 0–7)
ERYTHROCYTE [DISTWIDTH] IN BLOOD BY AUTOMATED COUNT: 13.8 % (ref 11.5–14.5)
GFR SERPL CREATININE-BSD FRML MDRD: 75 ML/MIN/1.73 (ref 42–98)
GLOBULIN UR ELPH-MCNC: 2.9 GM/DL (ref 2.3–3.5)
GLUCOSE BLD-MCNC: 169 MG/DL (ref 60–100)
GLUCOSE BLDC GLUCOMTR-MCNC: 120 MG/DL (ref 70–130)
GLUCOSE BLDC GLUCOMTR-MCNC: 144 MG/DL (ref 70–130)
GLUCOSE UR STRIP-MCNC: NEGATIVE MG/DL
HCT VFR BLD AUTO: 33.9 % (ref 39–49)
HGB BLD-MCNC: 11.4 G/DL (ref 13.7–17.3)
HGB UR QL STRIP.AUTO: ABNORMAL
HOLD SPECIMEN: NORMAL
HOLD SPECIMEN: NORMAL
HYALINE CASTS UR QL AUTO: ABNORMAL /LPF
IMM GRANULOCYTES # BLD: 0.48 10*3/MM3 (ref 0–0.02)
IMM GRANULOCYTES NFR BLD: 3.8 % (ref 0–0.5)
KETONES UR QL STRIP: ABNORMAL
LEUKOCYTE ESTERASE UR QL STRIP.AUTO: ABNORMAL
LV EF 2D ECHO EST: 55 %
LYMPHOCYTES # BLD AUTO: 0.91 10*3/MM3 (ref 0.6–4.2)
LYMPHOCYTES NFR BLD AUTO: 7.3 % (ref 10–50)
MAXIMAL PREDICTED HEART RATE: 139 BPM
MCH RBC QN AUTO: 29.5 PG (ref 26.5–34)
MCHC RBC AUTO-ENTMCNC: 33.6 G/DL (ref 31.5–36.3)
MCV RBC AUTO: 87.6 FL (ref 80–98)
MONOCYTES # BLD AUTO: 1.67 10*3/MM3 (ref 0–0.9)
MONOCYTES NFR BLD AUTO: 13.4 % (ref 0–12)
NEUTROPHILS # BLD AUTO: 9.35 10*3/MM3 (ref 2–8.6)
NEUTROPHILS NFR BLD AUTO: 75.1 % (ref 37–80)
NITRITE UR QL STRIP: POSITIVE
NT-PROBNP SERPL-MCNC: 363 PG/ML (ref 0–1800)
PH UR STRIP.AUTO: <=5 [PH] (ref 5–9)
PLATELET # BLD AUTO: 341 10*3/MM3 (ref 150–450)
PMV BLD AUTO: 9.1 FL (ref 8–12)
POTASSIUM BLD-SCNC: 3.8 MMOL/L (ref 3.5–5.1)
PROT SERPL-MCNC: 6.9 G/DL (ref 6.3–8.6)
PROT UR QL STRIP: ABNORMAL
RBC # BLD AUTO: 3.87 10*6/MM3 (ref 4.37–5.74)
RBC # UR: ABNORMAL /HPF
REF LAB TEST METHOD: ABNORMAL
SODIUM BLD-SCNC: 142 MMOL/L (ref 137–145)
SP GR UR STRIP: 1.03 (ref 1–1.03)
SQUAMOUS #/AREA URNS HPF: ABNORMAL /HPF
STRESS TARGET HR: 118 BPM
TROPONIN I SERPL-MCNC: 0.05 NG/ML
TROPONIN I SERPL-MCNC: 0.06 NG/ML
TROPONIN I SERPL-MCNC: 0.06 NG/ML
UROBILINOGEN UR QL STRIP: ABNORMAL
WBC NRBC COR # BLD: 12.47 10*3/MM3 (ref 3.2–9.8)
WBC UR QL AUTO: ABNORMAL /HPF
WHOLE BLOOD HOLD SPECIMEN: NORMAL
WHOLE BLOOD HOLD SPECIMEN: NORMAL

## 2017-11-08 PROCEDURE — G0378 HOSPITAL OBSERVATION PER HR: HCPCS

## 2017-11-08 PROCEDURE — 87040 BLOOD CULTURE FOR BACTERIA: CPT | Performed by: FAMILY MEDICINE

## 2017-11-08 PROCEDURE — 82553 CREATINE MB FRACTION: CPT | Performed by: FAMILY MEDICINE

## 2017-11-08 PROCEDURE — 25010000002 LEVOFLOXACIN PER 250 MG: Performed by: PHYSICIAN ASSISTANT

## 2017-11-08 PROCEDURE — 71275 CT ANGIOGRAPHY CHEST: CPT

## 2017-11-08 PROCEDURE — 25010000002 FUROSEMIDE PER 20 MG: Performed by: PHYSICIAN ASSISTANT

## 2017-11-08 PROCEDURE — 96365 THER/PROPH/DIAG IV INF INIT: CPT

## 2017-11-08 PROCEDURE — 99284 EMERGENCY DEPT VISIT MOD MDM: CPT

## 2017-11-08 PROCEDURE — 63710000001 DIPHENHYDRAMINE PER 50 MG: Performed by: INTERNAL MEDICINE

## 2017-11-08 PROCEDURE — 87077 CULTURE AEROBIC IDENTIFY: CPT | Performed by: EMERGENCY MEDICINE

## 2017-11-08 PROCEDURE — 87086 URINE CULTURE/COLONY COUNT: CPT | Performed by: EMERGENCY MEDICINE

## 2017-11-08 PROCEDURE — 25010000002 CEFTRIAXONE PER 250 MG: Performed by: FAMILY MEDICINE

## 2017-11-08 PROCEDURE — 87186 SC STD MICRODIL/AGAR DIL: CPT | Performed by: EMERGENCY MEDICINE

## 2017-11-08 PROCEDURE — 93005 ELECTROCARDIOGRAM TRACING: CPT | Performed by: EMERGENCY MEDICINE

## 2017-11-08 PROCEDURE — 71020 HC CHEST PA AND LATERAL: CPT

## 2017-11-08 PROCEDURE — 85025 COMPLETE CBC W/AUTO DIFF WBC: CPT | Performed by: EMERGENCY MEDICINE

## 2017-11-08 PROCEDURE — 84484 ASSAY OF TROPONIN QUANT: CPT | Performed by: PHYSICIAN ASSISTANT

## 2017-11-08 PROCEDURE — 93306 TTE W/DOPPLER COMPLETE: CPT

## 2017-11-08 PROCEDURE — 96376 TX/PRO/DX INJ SAME DRUG ADON: CPT

## 2017-11-08 PROCEDURE — 93306 TTE W/DOPPLER COMPLETE: CPT | Performed by: INTERNAL MEDICINE

## 2017-11-08 PROCEDURE — 96375 TX/PRO/DX INJ NEW DRUG ADDON: CPT

## 2017-11-08 PROCEDURE — 0 IOPAMIDOL PER 1 ML: Performed by: EMERGENCY MEDICINE

## 2017-11-08 PROCEDURE — 81001 URINALYSIS AUTO W/SCOPE: CPT | Performed by: EMERGENCY MEDICINE

## 2017-11-08 PROCEDURE — 83880 ASSAY OF NATRIURETIC PEPTIDE: CPT | Performed by: PHYSICIAN ASSISTANT

## 2017-11-08 PROCEDURE — 93010 ELECTROCARDIOGRAM REPORT: CPT | Performed by: INTERNAL MEDICINE

## 2017-11-08 PROCEDURE — 84484 ASSAY OF TROPONIN QUANT: CPT | Performed by: FAMILY MEDICINE

## 2017-11-08 PROCEDURE — 80053 COMPREHEN METABOLIC PANEL: CPT | Performed by: EMERGENCY MEDICINE

## 2017-11-08 PROCEDURE — 25010000002 AZITHROMYCIN: Performed by: FAMILY MEDICINE

## 2017-11-08 PROCEDURE — 82962 GLUCOSE BLOOD TEST: CPT

## 2017-11-08 PROCEDURE — 94799 UNLISTED PULMONARY SVC/PX: CPT

## 2017-11-08 PROCEDURE — 82550 ASSAY OF CK (CPK): CPT | Performed by: FAMILY MEDICINE

## 2017-11-08 PROCEDURE — 94640 AIRWAY INHALATION TREATMENT: CPT

## 2017-11-08 RX ORDER — FUROSEMIDE 10 MG/ML
40 INJECTION INTRAMUSCULAR; INTRAVENOUS ONCE
Status: COMPLETED | OUTPATIENT
Start: 2017-11-08 | End: 2017-11-08

## 2017-11-08 RX ORDER — DOCUSATE SODIUM 100 MG/1
100 CAPSULE, LIQUID FILLED ORAL
COMMUNITY
Start: 2017-11-07 | End: 2018-04-12

## 2017-11-08 RX ORDER — NICOTINE POLACRILEX 4 MG
15 LOZENGE BUCCAL
Status: DISCONTINUED | OUTPATIENT
Start: 2017-11-08 | End: 2017-11-10 | Stop reason: HOSPADM

## 2017-11-08 RX ORDER — DIPHENHYDRAMINE HCL 25 MG
25 CAPSULE ORAL ONCE
Status: COMPLETED | OUTPATIENT
Start: 2017-11-08 | End: 2017-11-08

## 2017-11-08 RX ORDER — ALBUTEROL SULFATE 2.5 MG/3ML
2.5 SOLUTION RESPIRATORY (INHALATION) EVERY 4 HOURS PRN
Status: DISCONTINUED | OUTPATIENT
Start: 2017-11-08 | End: 2017-11-10 | Stop reason: HOSPADM

## 2017-11-08 RX ORDER — LEVOFLOXACIN 5 MG/ML
750 INJECTION, SOLUTION INTRAVENOUS ONCE
Status: COMPLETED | OUTPATIENT
Start: 2017-11-08 | End: 2017-11-08

## 2017-11-08 RX ORDER — HYDROCODONE BITARTRATE AND ACETAMINOPHEN 5; 325 MG/1; MG/1
1 TABLET ORAL
COMMUNITY
Start: 2017-11-07 | End: 2018-04-10

## 2017-11-08 RX ORDER — HYDROCODONE BITARTRATE AND ACETAMINOPHEN 5; 325 MG/1; MG/1
1 TABLET ORAL EVERY 4 HOURS PRN
Status: DISCONTINUED | OUTPATIENT
Start: 2017-11-08 | End: 2017-11-10 | Stop reason: HOSPADM

## 2017-11-08 RX ORDER — DOCUSATE SODIUM 100 MG/1
100 CAPSULE, LIQUID FILLED ORAL 2 TIMES DAILY PRN
Status: DISCONTINUED | OUTPATIENT
Start: 2017-11-08 | End: 2017-11-10 | Stop reason: HOSPADM

## 2017-11-08 RX ORDER — SODIUM CHLORIDE 0.9 % (FLUSH) 0.9 %
1-10 SYRINGE (ML) INJECTION AS NEEDED
Status: DISCONTINUED | OUTPATIENT
Start: 2017-11-08 | End: 2017-11-10 | Stop reason: HOSPADM

## 2017-11-08 RX ORDER — FUROSEMIDE 10 MG/ML
40 INJECTION INTRAMUSCULAR; INTRAVENOUS EVERY 12 HOURS
Status: DISCONTINUED | OUTPATIENT
Start: 2017-11-08 | End: 2017-11-09

## 2017-11-08 RX ORDER — IPRATROPIUM BROMIDE AND ALBUTEROL SULFATE 2.5; .5 MG/3ML; MG/3ML
3 SOLUTION RESPIRATORY (INHALATION)
Status: DISCONTINUED | OUTPATIENT
Start: 2017-11-08 | End: 2017-11-10 | Stop reason: HOSPADM

## 2017-11-08 RX ORDER — SODIUM CHLORIDE 0.9 % (FLUSH) 0.9 %
10 SYRINGE (ML) INJECTION AS NEEDED
Status: DISCONTINUED | OUTPATIENT
Start: 2017-11-08 | End: 2017-11-10 | Stop reason: HOSPADM

## 2017-11-08 RX ORDER — DEXTROSE MONOHYDRATE 25 G/50ML
25 INJECTION, SOLUTION INTRAVENOUS
Status: DISCONTINUED | OUTPATIENT
Start: 2017-11-08 | End: 2017-11-10 | Stop reason: HOSPADM

## 2017-11-08 RX ORDER — GUANFACINE 1 MG/1
1 TABLET ORAL NIGHTLY
COMMUNITY
Start: 2017-11-07 | End: 2017-12-08

## 2017-11-08 RX ORDER — ONDANSETRON 2 MG/ML
4 INJECTION INTRAMUSCULAR; INTRAVENOUS EVERY 6 HOURS PRN
Status: DISCONTINUED | OUTPATIENT
Start: 2017-11-08 | End: 2017-11-10 | Stop reason: HOSPADM

## 2017-11-08 RX ORDER — ATORVASTATIN CALCIUM 10 MG/1
10 TABLET, FILM COATED ORAL DAILY
Status: DISCONTINUED | OUTPATIENT
Start: 2017-11-08 | End: 2017-11-10 | Stop reason: HOSPADM

## 2017-11-08 RX ORDER — ASPIRIN 81 MG/1
81 TABLET ORAL DAILY
Status: DISCONTINUED | OUTPATIENT
Start: 2017-11-08 | End: 2017-11-10 | Stop reason: HOSPADM

## 2017-11-08 RX ORDER — ACETAMINOPHEN 325 MG/1
650 TABLET ORAL EVERY 4 HOURS PRN
Status: DISCONTINUED | OUTPATIENT
Start: 2017-11-08 | End: 2017-11-10 | Stop reason: HOSPADM

## 2017-11-08 RX ADMIN — IOPAMIDOL 74 ML: 755 INJECTION, SOLUTION INTRAVENOUS at 12:50

## 2017-11-08 RX ADMIN — CEFTRIAXONE SODIUM 1 G: 1 INJECTION, POWDER, FOR SOLUTION INTRAMUSCULAR; INTRAVENOUS at 17:35

## 2017-11-08 RX ADMIN — DIPHENHYDRAMINE HYDROCHLORIDE 25 MG: 25 CAPSULE ORAL at 20:50

## 2017-11-08 RX ADMIN — ATORVASTATIN CALCIUM 10 MG: 10 TABLET, FILM COATED ORAL at 17:35

## 2017-11-08 RX ADMIN — FUROSEMIDE 40 MG: 10 INJECTION, SOLUTION INTRAMUSCULAR; INTRAVENOUS at 10:49

## 2017-11-08 RX ADMIN — ASPIRIN 81 MG: 81 TABLET, COATED ORAL at 17:35

## 2017-11-08 RX ADMIN — HYDROCODONE BITARTRATE AND ACETAMINOPHEN 1 TABLET: 5; 325 TABLET ORAL at 21:55

## 2017-11-08 RX ADMIN — LEVOFLOXACIN 750 MG: 5 INJECTION, SOLUTION INTRAVENOUS at 12:32

## 2017-11-08 RX ADMIN — AZITHROMYCIN 500 MG: 500 INJECTION, POWDER, LYOPHILIZED, FOR SOLUTION INTRAVENOUS at 18:55

## 2017-11-08 RX ADMIN — Medication 10 ML: at 09:33

## 2017-11-08 RX ADMIN — FUROSEMIDE 40 MG: 10 INJECTION, SOLUTION INTRAMUSCULAR; INTRAVENOUS at 17:35

## 2017-11-08 RX ADMIN — IPRATROPIUM BROMIDE AND ALBUTEROL SULFATE 3 ML: .5; 3 SOLUTION RESPIRATORY (INHALATION) at 20:09

## 2017-11-08 NOTE — H&P
[facility] Medicine Admission      Date of Admission: 11/8/2017      Primary Care Physician: Ada Cloud MD      Chief Complaint:  Dyspnea    HPI:  This 82-year-old -American male reported to the emergency department with complaints of shortness of air.  Patient was recently admitted at [facility] for spinal surgery.  Patient reports that he was admitted for proximally 10-12 days.  Since being in the hospital he feels like he is more short of air, endorses dyspnea upon exertion.  No chest pain, nausea, vomiting.  No dizziness or syncope.  He does have a new onset cough, feels generally weak.  Denies any focal weakness, slurring of speech, drooping of face.  Denies any numbness, tingling.  Denies any loss of bowel or bladder function.  Denies gross hematuria, dysuria, hematochezia, melena, hematemesis.  While he states that it has been a couple of days since his last bowel movement, he denies long-term constipation.  Patient reports that he has no restrictions status post surgical procedure.  He reports that he had a discectomy done, though it is possible that at some point the patient has had a laminectomy according to radiographical findings.  Patient states that [place] changed some of his meds, that he has perhaps not gotten some his blood pressure medications or some of his heart medications.  Patient states that he was on heavy fluid resuscitation while there and he feels he has become fluid overloaded.  Patient furthermore endorses that he did receive DVT prophylaxis, that he does recall getting shots in the abdomen.  CT a of the pulmonary arteries was performed in the emergency department, no pulmonary embolism.  Patient denies fever, chills.  Denies abdominal pain.  Endorses lower extremity edema. Denies lower extremity weakness.  Should also be noted that despite demonstration of urinary tract infection on labs and hematuria, patient  denies being catheterized.      Concurrent Medical History:  has a past medical history of Acquired hallux rigidus; Anemia; Arthropathy of lumbar facet joint; Carpal tunnel syndrome; CHF (congestive heart failure); Chronic diastolic congestive heart failure; Chronic kidney disease; Chronic kidney disease, stage II (mild); Chronic obstructive lung disease; Chronic renal failure; Coronary artery disease; Cortical senile cataract; Diabetes mellitus; Diabetes mellitus type 2, noninsulin dependent; Diabetes mellitus without complication; Diastolic dysfunction; Diastolic heart failure; Essential hypertension; Hip pain; History of echocardiogram (08/19/2015); Hyperlipidemia; Influenza vaccine administered (10/02/2015); Insomnia; Insomnia; Joint pain; Low back pain; Muscle strain; Nuclear cataract; Pneumococcal vaccination given (07/22/2016); Pure hypercholesterolemia; and Sedentary lifestyle.    Past Surgical History:  has a past surgical history that includes Injection of Medication (09/06/2012); Injection of Medication (09/06/2012); Cardiac Ablation; Cardiac catheterization (02/04/1986); Carpal tunnel release (10/19/2015); Esophagogastroduodenoscopy (08/05/2013); Injection of Medication (02/10/2012); Other surgical history (09/24/2011); Inguinal Hernia Repair (Right, 06/07/1968); Other surgical history; Injection of Medication (02/09/2014); Lumbar laminectomy (2007); Other surgical history (10/11/2011); Mandible fracture surgery (10/11/1981); Total hip arthroplasty (Right); Cataract extraction w/ intraocular lens implant (Left, 3/31/2017); and Cataract extraction w/ intraocular lens implant (Right, 4/14/2017).    Family History: family history includes Diabetes in his other. There is no history of Coronary artery disease.     Social History:  reports that he quit smoking about 18 years ago. He has never used smokeless tobacco. He reports that he does not drink alcohol or use illicit drugs.    Allergies:   Allergies    Allergen Reactions   • Aldactone [Spironolactone] Other (See Comments)     Hyponatremia and hyperkalemia       Medications:   Prescriptions Prior to Admission   Medication Sig Dispense Refill Last Dose   • docusate sodium (COLACE) 100 MG capsule Take 100 mg by mouth.      • guanFACINE (TENEX) 1 MG tablet Take 1 mg by mouth.      • HYDROcodone-acetaminophen (NORCO) 5-325 MG per tablet Take 1 tablet by mouth.      • albuterol (PROVENTIL HFA;VENTOLIN HFA) 108 (90 BASE) MCG/ACT inhaler Inhale 2 puffs Every 4 (Four) Hours As Needed for Wheezing. 1 inhaler 0 Taking   • aspirin 81 MG EC tablet Take 81 mg by mouth daily.   Taking   • ferrous sulfate 325 (65 FE) MG tablet TAKE ONE TABLET BY MOUTH DAILY WITH BREAKFAST 60 tablet 3 Taking   • pravastatin (PRAVACHOL) 40 MG tablet TAKE ONE TABLET BY MOUTH DAILY 30 tablet 3 Taking   • SITagliptin (JANUVIA) 50 MG tablet Take 1 tablet by mouth Daily. 30 tablet 5 Taking   • TRUE METRIX BLOOD GLUCOSE TEST test strip TEST BLOOD SUGAR ONCE DAILY (LOT [id number] EXP 03/31/2018) 100 each 1 Taking   • TRUEPLUS LANCETS 30G misc 1 Device Daily. TEST 100 each 3 Taking       Review of Systems:  Review of Systems   Constitutional: Negative for chills and fever.   Respiratory: Positive for cough and shortness of breath. Negative for apnea, choking, chest tightness, wheezing and stridor.    Cardiovascular: Positive for leg swelling. Negative for chest pain and palpitations.   Gastrointestinal: Negative for abdominal distention, abdominal pain, blood in stool, constipation, diarrhea, nausea and vomiting.   Genitourinary: Negative for decreased urine volume, difficulty urinating, dysuria, flank pain, frequency, hematuria and urgency.   Musculoskeletal: Negative for back pain, gait problem, neck pain and neck stiffness.   Skin: Negative for color change, pallor and rash.   Neurological: Positive for weakness. Negative for dizziness, tremors, seizures, syncope, facial asymmetry, speech difficulty,  light-headedness, numbness and headaches.        Generalized weakness, NO focal weakness   Psychiatric/Behavioral: Negative for behavioral problems, confusion and hallucinations. The patient is not nervous/anxious.      Otherwise complete ROS is negative except as mentioned above.    Physical Exam:   Temp:  [97.5 °F (36.4 °C)] 97.5 °F (36.4 °C)  Heart Rate:  [84-96] 96  Resp:  [17-28] 17  BP: (133-183)/(78-88) 133/80  Physical Exam   Constitutional: He is oriented to person, place, and time. He appears well-developed and well-nourished. No distress.   HENT:   Head: Normocephalic and atraumatic.   Eyes: Conjunctivae and EOM are normal. Pupils are equal, round, and reactive to light.   Cardiovascular: Normal rate and regular rhythm.    Pulmonary/Chest: Effort normal. No respiratory distress. He has no wheezes.   Abdominal: Soft. Bowel sounds are normal. He exhibits no distension. There is no tenderness.   Musculoskeletal: He exhibits edema.   Neurological: He is alert and oriented to person, place, and time. He has normal reflexes. No cranial nerve deficit.   Skin: Skin is warm and dry. He is not diaphoretic.   Psychiatric: He has a normal mood and affect. His behavior is normal.     Results Reviewed:  I have personally reviewed current lab, radiology, and data and agree with results.  Lab Results (last 24 hours)     Procedure Component Value Units Date/Time    CBC & Differential [[id number]] Collected:  11/08/17 0932    Specimen:  Blood Updated:  11/08/17 0943    Narrative:       The following orders were created for panel order CBC & Differential.  Procedure                               Abnormality         Status                     ---------                               -----------         ------                     CBC Auto Differential[[id number]]        Abnormal            Final result                 Please view results for these tests on the individual orders.    CBC Auto Differential [[id number]]  (Abnormal)  Collected:  11/08/17 0932    Specimen:  Blood Updated:  11/08/17 0943     WBC 12.47 (H) 10*3/mm3      RBC 3.87 (L) 10*6/mm3      Hemoglobin 11.4 (L) g/dL      Hematocrit 33.9 (L) %      MCV 87.6 fL      MCH 29.5 pg      MCHC 33.6 g/dL      RDW 13.8 %      RDW-SD 43.9 fl      MPV 9.1 fL      Platelets 341 10*3/mm3      Neutrophil % 75.1 %      Lymphocyte % 7.3 (L) %      Monocyte % 13.4 (H) %      Eosinophil % 0.2 %      Basophil % 0.2 %      Immature Grans % 3.8 (H) %      Neutrophils, Absolute 9.35 (H) 10*3/mm3      Lymphocytes, Absolute 0.91 10*3/mm3      Monocytes, Absolute 1.67 (H) 10*3/mm3      Eosinophils, Absolute 0.03 10*3/mm3      Basophils, Absolute 0.03 10*3/mm3      Immature Grans, Absolute 0.48 (H) 10*3/mm3     Comprehensive Metabolic Panel [[id number]]  (Abnormal) Collected:  11/08/17 0932    Specimen:  Blood Updated:  11/08/17 1001     Glucose 169 (H) mg/dL      BUN 24 (H) mg/dL      Creatinine 1.13 mg/dL      Sodium 142 mmol/L      Potassium 3.8 mmol/L      Chloride 104 mmol/L      CO2 23.0 mmol/L      Calcium 9.6 mg/dL      Total Protein 6.9 g/dL      Albumin 4.00 g/dL      ALT (SGPT) 46 U/L      AST (SGOT) 39 U/L      Alkaline Phosphatase 101 U/L      Total Bilirubin 1.2 mg/dL      eGFR  African Amer 75 mL/min/1.73      Globulin 2.9 gm/dL      A/G Ratio 1.4 g/dL      BUN/Creatinine Ratio 21.2     Anion Gap 15.0 mmol/L     Narrative:       The MDRD GFR formula is only valid for adults with stable renal function between ages 18 and 70.    [place] Draw [[id number]] Collected:  11/08/17 0932    Specimen:  Blood Updated:  11/08/17 1046    Narrative:       The following orders were created for panel order [place] Draw.  Procedure                               Abnormality         Status                     ---------                               -----------         ------                     Light Blue Top[[id number]]                                   Final result               Green Top (Gel)[[id number]]                                   Final result               Lavender Top[[id number]]                                     Final result               Gold Top - SST[[id number]]                                   Final result                 Please view results for these tests on the individual orders.    Light Blue Top [[id number]] Collected:  11/08/17 0932    Specimen:  Blood Updated:  11/08/17 1046     Extra Tube hold for add-on      Auto resulted       Green Top (Gel) [[id number]] Collected:  11/08/17 0932    Specimen:  Blood Updated:  11/08/17 1046     Extra Tube Hold for add-ons.      Auto resulted.       Lavender Top [[id number]] Collected:  11/08/17 0932    Specimen:  Blood Updated:  11/08/17 1046     Extra Tube hold for add-on      Auto resulted       Gold Top - SST [[id number]] Collected:  11/08/17 0932    Specimen:  Blood Updated:  11/08/17 1046     Extra Tube Hold for add-ons.      Auto resulted.       Troponin [[id number]]  (Abnormal) Collected:  11/08/17 0932    Specimen:  Blood Updated:  11/08/17 1049     Troponin I 0.050 (H) ng/mL     BNP [[id number]]  (Normal) Collected:  11/08/17 0932    Specimen:  Blood Updated:  11/08/17 1049     proBNP 363.0 pg/mL     Urine Culture - Urine, Urine, Clean Catch [[id number]] Collected:  11/08/17 1059    Specimen:  Urine from Urine, Clean Catch Updated:  11/08/17 1126    Urinalysis With / Culture If Indicated - Urine, Clean Catch [[id number]]  (Abnormal) Collected:  11/08/17 1059    Specimen:  Urine from Urine, Clean Catch Updated:  11/08/17 1127     Color, UA Dark Yellow     Appearance, UA Cloudy (A)     pH, UA <=5.0     Specific Gravity, UA 1.027     Glucose, UA Negative     Ketones, UA 15 mg/dL (1+) (A)     Bilirubin, UA Small (1+) (A)     Blood, UA Large (3+) (A)     Protein, UA 30 mg/dL (1+) (A)     Leuk Esterase, UA Moderate (2+) (A)     Nitrite, UA Positive (A)     Urobilinogen, UA 4.0 E.U./dL (A)    Urinalysis, Microscopic Only - Urine, Clean Catch [[id number]]  (Abnormal)  Collected:  11/08/17 1059    Specimen:  Urine from Urine, Clean Catch Updated:  11/08/17 1148     RBC, UA 3-5 (A) /HPF      WBC, UA Too Numerous to Count (A) /HPF      Bacteria, UA 4+ (A) /HPF      Squamous Epithelial Cells, UA None Seen /HPF      Hyaline Casts, UA 13-20 /LPF      Methodology Manual Light Microscopy        Imaging Results (last 24 hours)     Procedure Component Value Units Date/Time    XR Chest 2 View [[id number]] Collected:  11/08/17 1000     Updated:  11/08/17 1022    Narrative:           PROCEDURE: Chest PA and lateral    REASON FOR EXAM: shortness of breath    FINDINGS: Comparison study dated May 26, 2017. . Cardiac and  pulmonary vasculature are normal . Lungs are clear. Pleural  spaces are normal . No acute osseous abnormality.      Impression:       Negative chest    Electronically signed by:  Eladio Garces MD  11/8/2017 10:21 AM CST  Workstation: [id number]    CT Angiogram Chest With Contrast [[id number]] Collected:  11/08/17 1247     Updated:  11/08/17 1320    Narrative:         PROCEDURE: CT -CTA Thorax/PE with contrast     REASON FOR EXAM: chest pressure, dyspnea, elevated troponin,  R74.8 Abnormal levels of other serum enzymes R60.0 Localized  edema E11.9 Type 2 diabetes mellitus without complications I50.9  Heart failure, unspecified I10 Essential (primary) hypertension  N18.2 Chronic kidney disease, stage 2 (mild) I25.10  Atherosclerotic heart disease of native coronary artery without  angina pectoris R06.00 Dyspnea, unspecified N39.0     FINDINGS: No comparison. Axial computer tomography sequential  imaging was performed from the thoracic inlet through the  diaphragms after administration of IV contrast .3-D chest  sagittal and coronal reformates was performed. This exam was  performed according to our departmental dose optimization  program, which includes automated exposure control, adjustment of  the mA and/or KV according to patient size and/or use of  iterative reconstruction  technique.     The pulmonary arteries are normal. There is no filling defect  identified to suggest pulmonary embolus. Mediastinal structures  of the chest are normal. No lymphadenopathy or pericardial  effusion. Mild centrilobular as well as paraseptal emphysematous  changes. Lungs otherwise clear. Pleural spaces are normal.  Visualized upper abdominal structures reveals posterior segment  right lobe of liver peripheral 1.56 cm simple hepatic cyst.  Otherwise visualized upper abdominal structures are unremarkable.  No acute osseous abnormality. No acute osseous abnormality. No  acute osseous abnormality. T11, T12, and L1 bilateral  decompressive laminectomies.      Impression:       1. No evidence of pulmonary embolus..  2. Mild centrilobular and paraseptal emphysematous changes..  3. Posterior segment right lobe of liver peripheral 1.56 cm  simple hepatic cyst..  4.T11, T12, and L1 bilateral decompressive laminectomies..    Electronically signed by:  Eladio Garces MD  11/8/2017 1:18 PM CST  Workstation: [id number]            Assessment:    Hospital Problem List     Elevated troponin      Likely acute on chronic congestive heart failure   Likely congestive heart failure exacerbation   Urinary tract infection, acute cystitis with hematuria   Dyspnea   COPD         Plan:  At this time empiric anabiotic coverage in case the patient has early pneumonia from hospital setting, we'll also cover her urinary tract infection.  CTA of pulmonary arteries negative for pulmonary embolism.  Lasix for diuresis, echocardiogram.  At this time troponin is mildly elevated, but the patient denies chest pain.  If patient develops chest pain or if troponin continues to rise, will consult cardiology.  At this time troponin may be elevated secondary to either respiratory process or acute exacerbation of congestive heart failure.  Once echocardiogram has returned, we'll consider congestive heart failure now to get her consult.  Evaluate patient's  medications, records from [facility], continue treat as hospital course dictates.        This document has been electronically signed by JOSEPH Cueva on November 8, 2017 2:34 PM

## 2017-11-08 NOTE — ED PROVIDER NOTES
Subjective   HPI Comments: Mr Ruvalcaba presents to emergency department for shortness of breath, cough, chest pressure. Discharged from [place] yesterday for lumbar surgery for herniated disc.  States he was having shortness of breath a few days before that.  Denies fever, chills, nausea, vomiting.  States he used to use an inhaler.  States last time he saw his PCP, Dr Cloud was about 6mo ago.  Hx of of CHF, COPD, Type 2 Diabetes, CKD.  Also notes leg swelling.  Denies any pain.  He was taken of blood pressure medications and Lasix for surgical procedure.                         Patient is a 82 y.o. male presenting with shortness of breath.   History provided by:  Patient   used: No    Shortness of Breath   Severity:  Mild  Onset quality:  Gradual  Duration:  4 days  Timing:  Intermittent  Progression:  Unchanged  Chronicity:  Recurrent  Context: activity    Context: not animal exposure, not known allergens, not smoke exposure, not strong odors and not URI    Relieved by:  Nothing  Worsened by:  Nothing  Ineffective treatments:  None tried  Associated symptoms: no abdominal pain, no chest pain, no claudication, no cough, no diaphoresis, no ear pain, no fever, no headaches, no hemoptysis, no neck pain, no PND, no rash, no sore throat, no sputum production, no syncope, no swollen glands, no vomiting and no wheezing    Associated symptoms comment:  Leg edema  Risk factors: recent surgery    Risk factors: no recent alcohol use, no family hx of DVT, no hx of cancer, no hx of PE/DVT, no obesity, no prolonged immobilization and no tobacco use        Review of Systems   Constitutional: Negative for chills, diaphoresis and fever.   HENT: Negative for ear pain and sore throat.    Respiratory: Positive for shortness of breath. Negative for cough, hemoptysis, sputum production and wheezing.    Cardiovascular: Negative for chest pain, claudication, syncope and PND.   Gastrointestinal: Negative for abdominal  distention, abdominal pain, constipation, diarrhea, nausea and vomiting.   Genitourinary: Negative for flank pain.   Musculoskeletal: Negative for neck pain.   Skin: Negative for rash.   Allergic/Immunologic: Negative for immunocompromised state.   Neurological: Negative for dizziness, weakness and headaches.   Hematological: Does not bruise/bleed easily.   Psychiatric/Behavioral: Negative for self-injury and suicidal ideas.       Past Medical History:   Diagnosis Date   • Acquired hallux rigidus    • Anemia    • Arthropathy of lumbar facet joint    • Carpal tunnel syndrome    • CHF (congestive heart failure)    • Chronic diastolic congestive heart failure    • Chronic kidney disease    • Chronic kidney disease, stage II (mild)     Chronic kidney disease stage 2   • Chronic obstructive lung disease    • Chronic renal failure    • Coronary artery disease    • Cortical senile cataract    • Diabetes mellitus    • Diabetes mellitus type 2, noninsulin dependent     diabetes mellitus type 2, non-insulin treated, Medication treatment plan: oral diabetic medication   • Diabetes mellitus without complication    • Diastolic dysfunction    • Diastolic heart failure    • Essential hypertension     difficult to control      • Hip pain    • History of echocardiogram 08/19/2015    Echocardiogram W/ color flow 48758 (1) - Mild to moderate LVH with mild left atrial enlargement and normal aortic root.CLVH,preserved LV systolic function with Ef of 55%.Diastolic dysfunction.MV intact.AV thickened.Aortic sclerosis.   • Hyperlipidemia    • Influenza vaccine administered 10/02/2015    INFLUENZA IMMUN ADMIN OR PREV RECV'D  (1) - Ordered By: DIONNE MANDUJANO ([facility])    • Insomnia    • Insomnia     Other insomnia   • Joint pain    • Low back pain    • Muscle strain    • Nuclear cataract    • Pneumococcal vaccination given 07/22/2016    PNEUMOC VAC/ADMIN/RCVD 4040F (3) - Ordered By: DIONNE MANDUJANO ([facility])   • Pure  hypercholesterolemia    • Sedentary lifestyle        Allergies   Allergen Reactions   • Aldactone [Spironolactone] Other (See Comments)     Hyponatremia and hyperkalemia       Past Surgical History:   Procedure Laterality Date   • CARDIAC ABLATION     • CARDIAC CATHETERIZATION  02/04/1986    Cardiac cath 99487 (1) - normal left heart catherization,non cardiac chest pain   • CARPAL TUNNEL RELEASE  10/19/2015    Carpal tunnel surgery (1) - Release of left carpal tunnel   • CATARACT EXTRACTION W/ INTRAOCULAR LENS IMPLANT Left 3/31/2017    Procedure: REMOVE CATARACT AND IMPLANT INRAOCULAR LENS LEFT EYE;  Surgeon: Hector Navarrete MD;  Location: [facility] OR;  Service:    • CATARACT EXTRACTION W/ INTRAOCULAR LENS IMPLANT Right 4/14/2017    Procedure: REMOVE CATARACT AND IMPLANT INTRAOCULAR LENS RIGHT EYE;  Surgeon: Hector Navarrete MD;  Location: [facility] OR;  Service:    • ENDOSCOPY  08/05/2013    Colon endoscopy 90847 (2) - Hemorrhoids found.   • INGUINAL HERNIA REPAIR Right 06/07/1968    Inguinal hernia, repair (2)   • INJECTION OF MEDICATION  09/06/2012    Albuterol (2u) (1) - Ordered By: LIDYA STEINER ([facility])    • INJECTION OF MEDICATION  09/06/2012    Atrovent (1) - Ordered By: LIDYA STEINER ([facility])    • INJECTION OF MEDICATION  02/10/2012    Depo Medrol (Methylprednisone) (3) - Ordered By: HEATHER PAK ([facility])    • INJECTION OF MEDICATION  02/09/2014    Kenalog (4) - Ordered By: DIMAS ANDRADE ([facility])    • LUMBAR LAMINECTOMY  2007    Lumbar laminectomy (1)   • MANDIBLE FRACTURE SURGERY  10/11/1981    Treat nose/jaw fracture (1) - bilateral open reduction of fracture of mandible   • OTHER SURGICAL HISTORY  09/24/2011    Drain/Inject Major Joint 20610 (1) - Ordered By: KARLOS HERNANDEZ ([facility])    • OTHER SURGICAL HISTORY      Insert IVC filter 84887 (1)   • OTHER SURGICAL HISTORY  10/11/2011    SPIROMETRY 01986 (1) - Ordered By: HEATHER PAK ([facility])   • TOTAL HIP ARTHROPLASTY Right   "      Family History   Problem Relation Age of Onset   • Diabetes Other    • Coronary artery disease Neg Hx        Social History     Social History   • Marital status:      Spouse name: N/A   • Number of children: N/A   • Years of education: N/A     Social History Main Topics   • Smoking status: Former Smoker     Quit date: 1999   • Smokeless tobacco: Never Used   • Alcohol use No   • Drug use: No   • Sexual activity: Defer      Comment: Marital status:      Other Topics Concern   • None     Social History Narrative           Objective      /78  Pulse 92  Temp 97.5 °F (36.4 °C) (Oral)   Resp 19  Ht 67\" (170.2 cm)  Wt 160 lb (72.6 kg)  SpO2 98%  BMI 25.06 kg/m2    Physical Exam   Constitutional: He is oriented to person, place, and time. He appears well-developed and well-nourished. No distress.   Eyes: EOM are normal. Pupils are equal, round, and reactive to light.   Cardiovascular: Normal rate, regular rhythm and normal heart sounds.    Pulmonary/Chest: Effort normal and breath sounds normal. He has no wheezes.   Abdominal: Soft. Bowel sounds are normal. He exhibits no distension. There is no tenderness.   Musculoskeletal:        Right upper leg: He exhibits edema. He exhibits no tenderness and no deformity.        Left upper leg: He exhibits edema. He exhibits no tenderness and no deformity.        Right lower leg: He exhibits edema. He exhibits no tenderness and no deformity.        Left lower leg: He exhibits edema. He exhibits no tenderness and no deformity.        Legs:  2-3+ pitting edema from mid thigh to foot bilateral, intact distal pulses and sensation   Neurological: He is alert and oriented to person, place, and time.   Skin: Skin is warm and dry.   Psychiatric: He has a normal mood and affect. His behavior is normal. Thought content normal.   Nursing note and vitals reviewed.      Procedures         ED Course  ED Course   Value Comment By Time    HEART Score 5 points. Obed " LEONIE Carroll PA-C 11/08 1156   Leuk Esterase, UA: (!) Moderate (2+) (Reviewed) Obed Carroll PA-C 11/08 1200    Spoke to Dr Ferris who agreed to admit patient. Obed Carroll PA-C 11/08 1205                  MDM    Final diagnoses:   Elevated troponin   Bilateral lower extremity edema   Type 2 diabetes mellitus without complication, without long-term current use of insulin   Congestive heart failure, unspecified congestive heart failure chronicity, unspecified congestive heart failure type   Essential hypertension   Stage 2 chronic kidney disease   Coronary artery disease involving native coronary artery of native heart without angina pectoris   Dyspnea, unspecified type   Acute urinary tract infection            Obed Carroll PA-C  11/08/17 1216

## 2017-11-08 NOTE — ED NOTES
Assisted pt with urinal. Brought pt fresh coffee & a warm blanket per pt request     Josh Rincon RN  11/08/17 3119

## 2017-11-08 NOTE — PLAN OF CARE
Problem: Patient Care Overview (Adult)  Goal: Plan of Care Review  Outcome: Ongoing (interventions implemented as appropriate)    11/08/17 4957   Coping/Psychosocial Response Interventions   Plan Of Care Reviewed With patient;spouse   Patient Care Overview   Progress progress toward functional goals as expected   Outcome Evaluation   Outcome Summary/Follow up Plan new admission this shift.        Goal: Adult Individualization and Mutuality  Outcome: Ongoing (interventions implemented as appropriate)  Goal: Discharge Needs Assessment  Outcome: Ongoing (interventions implemented as appropriate)    Problem: Acute Coronary Syndrome (ACS) (Adult)  Goal: Signs and Symptoms of Listed Potential Problems Will be Absent or Manageable (Acute Coronary Syndrome)  Outcome: Ongoing (interventions implemented as appropriate)    Problem: Fall Risk (Adult)  Goal: Identify Related Risk Factors and Signs and Symptoms  Outcome: Outcome(s) achieved Date Met:  11/08/17  Goal: Absence of Falls  Outcome: Ongoing (interventions implemented as appropriate)

## 2017-11-09 ENCOUNTER — APPOINTMENT (OUTPATIENT)
Dept: GENERAL RADIOLOGY | Facility: HOSPITAL | Age: 82
End: 2017-11-09

## 2017-11-09 LAB
ANION GAP SERPL CALCULATED.3IONS-SCNC: 11 MMOL/L (ref 5–15)
BUN BLD-MCNC: 25 MG/DL (ref 7–21)
BUN/CREAT SERPL: 18.2 (ref 7–25)
CALCIUM SPEC-SCNC: 8.9 MG/DL (ref 8.4–10.2)
CHLORIDE SERPL-SCNC: 104 MMOL/L (ref 95–110)
CO2 SERPL-SCNC: 27 MMOL/L (ref 22–31)
CREAT BLD-MCNC: 1.37 MG/DL (ref 0.7–1.3)
DEPRECATED RDW RBC AUTO: 43.5 FL (ref 35.1–43.9)
ERYTHROCYTE [DISTWIDTH] IN BLOOD BY AUTOMATED COUNT: 13.6 % (ref 11.5–14.5)
GFR SERPL CREATININE-BSD FRML MDRD: 60 ML/MIN/1.73 (ref 42–98)
GIANT PLATELETS: ABNORMAL
GLUCOSE BLD-MCNC: 117 MG/DL (ref 60–100)
GLUCOSE BLDC GLUCOMTR-MCNC: 119 MG/DL (ref 70–130)
GLUCOSE BLDC GLUCOMTR-MCNC: 121 MG/DL (ref 70–130)
GLUCOSE BLDC GLUCOMTR-MCNC: 169 MG/DL (ref 70–130)
GLUCOSE BLDC GLUCOMTR-MCNC: 177 MG/DL (ref 70–130)
HCT VFR BLD AUTO: 30.2 % (ref 39–49)
HGB BLD-MCNC: 10.2 G/DL (ref 13.7–17.3)
HOLD SPECIMEN: NORMAL
LYMPHOCYTES # BLD MANUAL: 0.7 10*3/MM3 (ref 0.6–4.2)
LYMPHOCYTES NFR BLD MANUAL: 6 % (ref 10–50)
LYMPHOCYTES NFR BLD MANUAL: 9 % (ref 0–12)
MCH RBC QN AUTO: 29.3 PG (ref 26.5–34)
MCHC RBC AUTO-ENTMCNC: 33.8 G/DL (ref 31.5–36.3)
MCV RBC AUTO: 86.8 FL (ref 80–98)
METAMYELOCYTES NFR BLD MANUAL: 1 % (ref 0–0)
MONOCYTES # BLD AUTO: 1.05 10*3/MM3 (ref 0–0.9)
MYELOCYTES NFR BLD MANUAL: 1 % (ref 0–0)
NEUTROPHILS # BLD AUTO: 9.58 10*3/MM3 (ref 2–8.6)
NEUTROPHILS NFR BLD MANUAL: 82 % (ref 37–80)
NEUTS VAC BLD QL SMEAR: ABNORMAL
PLATELET # BLD AUTO: 303 10*3/MM3 (ref 150–450)
PMV BLD AUTO: 8.8 FL (ref 8–12)
POLYCHROMASIA BLD QL SMEAR: ABNORMAL
POTASSIUM BLD-SCNC: 3.7 MMOL/L (ref 3.5–5.1)
RBC # BLD AUTO: 3.48 10*6/MM3 (ref 4.37–5.74)
SODIUM BLD-SCNC: 142 MMOL/L (ref 137–145)
TOXIC GRANULATION: ABNORMAL
TROPONIN I SERPL-MCNC: 0.04 NG/ML
TROPONIN I SERPL-MCNC: 0.06 NG/ML
VARIANT LYMPHS NFR BLD MANUAL: 1 % (ref 0–5)
WBC NRBC COR # BLD: 11.68 10*3/MM3 (ref 3.2–9.8)

## 2017-11-09 PROCEDURE — 82962 GLUCOSE BLOOD TEST: CPT

## 2017-11-09 PROCEDURE — 84484 ASSAY OF TROPONIN QUANT: CPT | Performed by: FAMILY MEDICINE

## 2017-11-09 PROCEDURE — 85025 COMPLETE CBC W/AUTO DIFF WBC: CPT | Performed by: FAMILY MEDICINE

## 2017-11-09 PROCEDURE — 93005 ELECTROCARDIOGRAM TRACING: CPT | Performed by: FAMILY MEDICINE

## 2017-11-09 PROCEDURE — 94799 UNLISTED PULMONARY SVC/PX: CPT

## 2017-11-09 PROCEDURE — 63710000001 INSULIN ASPART PER 5 UNITS: Performed by: FAMILY MEDICINE

## 2017-11-09 PROCEDURE — G0378 HOSPITAL OBSERVATION PER HR: HCPCS

## 2017-11-09 PROCEDURE — 25010000002 CEFTRIAXONE PER 250 MG: Performed by: FAMILY MEDICINE

## 2017-11-09 PROCEDURE — 93010 ELECTROCARDIOGRAM REPORT: CPT | Performed by: INTERNAL MEDICINE

## 2017-11-09 PROCEDURE — 85007 BL SMEAR W/DIFF WBC COUNT: CPT | Performed by: FAMILY MEDICINE

## 2017-11-09 PROCEDURE — 80048 BASIC METABOLIC PNL TOTAL CA: CPT | Performed by: FAMILY MEDICINE

## 2017-11-09 PROCEDURE — 25010000002 FUROSEMIDE PER 20 MG: Performed by: FAMILY MEDICINE

## 2017-11-09 PROCEDURE — 94760 N-INVAS EAR/PLS OXIMETRY 1: CPT

## 2017-11-09 PROCEDURE — 25010000002 FUROSEMIDE PER 20 MG: Performed by: PHYSICIAN ASSISTANT

## 2017-11-09 PROCEDURE — 25010000002 AZITHROMYCIN PER 500 MG: Performed by: FAMILY MEDICINE

## 2017-11-09 PROCEDURE — 96376 TX/PRO/DX INJ SAME DRUG ADON: CPT

## 2017-11-09 PROCEDURE — 71020 HC CHEST PA AND LATERAL: CPT

## 2017-11-09 RX ORDER — FUROSEMIDE 10 MG/ML
20 INJECTION INTRAMUSCULAR; INTRAVENOUS EVERY 12 HOURS
Status: DISCONTINUED | OUTPATIENT
Start: 2017-11-09 | End: 2017-11-10 | Stop reason: HOSPADM

## 2017-11-09 RX ORDER — ZOLPIDEM TARTRATE 5 MG/1
5 TABLET ORAL NIGHTLY PRN
Status: DISCONTINUED | OUTPATIENT
Start: 2017-11-09 | End: 2017-11-10 | Stop reason: HOSPADM

## 2017-11-09 RX ADMIN — IPRATROPIUM BROMIDE AND ALBUTEROL SULFATE 3 ML: .5; 3 SOLUTION RESPIRATORY (INHALATION) at 20:49

## 2017-11-09 RX ADMIN — FUROSEMIDE 20 MG: 10 INJECTION, SOLUTION INTRAVENOUS at 17:49

## 2017-11-09 RX ADMIN — AZITHROMYCIN 500 MG: 500 INJECTION, POWDER, LYOPHILIZED, FOR SOLUTION INTRAVENOUS at 17:49

## 2017-11-09 RX ADMIN — FUROSEMIDE 40 MG: 10 INJECTION, SOLUTION INTRAMUSCULAR; INTRAVENOUS at 04:29

## 2017-11-09 RX ADMIN — IPRATROPIUM BROMIDE AND ALBUTEROL SULFATE 3 ML: .5; 3 SOLUTION RESPIRATORY (INHALATION) at 07:20

## 2017-11-09 RX ADMIN — IPRATROPIUM BROMIDE AND ALBUTEROL SULFATE 3 ML: .5; 3 SOLUTION RESPIRATORY (INHALATION) at 11:21

## 2017-11-09 RX ADMIN — ASPIRIN 81 MG: 81 TABLET, COATED ORAL at 09:02

## 2017-11-09 RX ADMIN — HYDROCORTISONE: 1 CREAM TOPICAL at 17:49

## 2017-11-09 RX ADMIN — ATORVASTATIN CALCIUM 10 MG: 10 TABLET, FILM COATED ORAL at 09:02

## 2017-11-09 RX ADMIN — INSULIN ASPART 3 UNITS: 100 INJECTION, SOLUTION INTRAVENOUS; SUBCUTANEOUS at 17:50

## 2017-11-09 RX ADMIN — HYDROCODONE BITARTRATE AND ACETAMINOPHEN 1 TABLET: 5; 325 TABLET ORAL at 12:40

## 2017-11-09 RX ADMIN — CEFTRIAXONE SODIUM 1 G: 1 INJECTION, POWDER, FOR SOLUTION INTRAMUSCULAR; INTRAVENOUS at 17:49

## 2017-11-09 RX ADMIN — INSULIN ASPART 3 UNITS: 100 INJECTION, SOLUTION INTRAVENOUS; SUBCUTANEOUS at 11:32

## 2017-11-09 RX ADMIN — IPRATROPIUM BROMIDE AND ALBUTEROL SULFATE 3 ML: .5; 3 SOLUTION RESPIRATORY (INHALATION) at 14:31

## 2017-11-09 RX ADMIN — ZOLPIDEM TARTRATE 5 MG: 5 TABLET ORAL at 19:53

## 2017-11-09 NOTE — PLAN OF CARE
Problem: Patient Care Overview (Adult)  Goal: Plan of Care Review  Outcome: Ongoing (interventions implemented as appropriate)    11/09/17 0413   Coping/Psychosocial Response Interventions   Plan Of Care Reviewed With patient   Patient Care Overview   Progress no change       Goal: Adult Individualization and Mutuality  Outcome: Ongoing (interventions implemented as appropriate)  Goal: Discharge Needs Assessment  Outcome: Ongoing (interventions implemented as appropriate)    Problem: Acute Coronary Syndrome (ACS) (Adult)  Goal: Signs and Symptoms of Listed Potential Problems Will be Absent or Manageable (Acute Coronary Syndrome)  Outcome: Ongoing (interventions implemented as appropriate)    Problem: Fall Risk (Adult)  Goal: Absence of Falls  Outcome: Ongoing (interventions implemented as appropriate)    Problem: Pressure Ulcer (Adult)  Goal: Signs and Symptoms of Listed Potential Problems Will be Absent or Manageable (Pressure Ulcer)  Outcome: Ongoing (interventions implemented as appropriate)

## 2017-11-09 NOTE — PLAN OF CARE
Problem: Patient Care Overview (Adult)  Goal: Plan of Care Review  Outcome: Ongoing (interventions implemented as appropriate)  Goal: Adult Individualization and Mutuality  Outcome: Ongoing (interventions implemented as appropriate)  Goal: Discharge Needs Assessment  Outcome: Ongoing (interventions implemented as appropriate)    Problem: Acute Coronary Syndrome (ACS) (Adult)  Goal: Signs and Symptoms of Listed Potential Problems Will be Absent or Manageable (Acute Coronary Syndrome)  Outcome: Ongoing (interventions implemented as appropriate)    Problem: Fall Risk (Adult)  Goal: Absence of Falls  Outcome: Ongoing (interventions implemented as appropriate)    Problem: Pressure Ulcer (Adult)  Goal: Signs and Symptoms of Listed Potential Problems Will be Absent or Manageable (Pressure Ulcer)  Outcome: Ongoing (interventions implemented as appropriate)

## 2017-11-09 NOTE — NURSING NOTE
Pateint has a stage 2 pressure ulcer healing on his superior left buttock. Measures 0.6 x 0.6 x 0  1 cm Wound bed 100% granulation. No drainage noted. Needs good skin care, off loading and protection. POA yes.

## 2017-11-09 NOTE — PROGRESS NOTES
[facility] Medicine Services  INPATIENT PROGRESS NOTE    Length of Stay: 0  Date of Admission: 11/8/2017  Primary Care Physician: Ada Cloud MD    Subjective     Chief Complaint   Patient presents with   • Shortness of Breath       HPI:  Patient was seen and examined this morning. Patient laying down in bed comfortably . Patient states that dyspnea improved and resolved . Denies chest pain .   Patient is tolerating diet , passing gas, Patient denies fever or chills, headache or dizziness, chest pain or palpitations, difficulty in breathing or cough, abdominal pain N/V/D , blood in the urine or blood in the stool , or any urinary symptoms , weakness or numbness or tingling in the extremities or visual changes.    Review of Systems   All pertinent negatives and positives are as above. All other systems have been reviewed and are negative unless otherwise stated.   Objective    Physical exam    Temp:  [97.9 °F (36.6 °C)-98.3 °F (36.8 °C)] 98.1 °F (36.7 °C)  Heart Rate:  [] 104  Resp:  [16-20] 18  BP: (119-152)/(68-84) 145/79    -Constitutional: Patient is AAO x 3. Patient appears well-developed and well-nourished.   -CVS: Normal rate, regular rhythm, normal heart sounds and intact distal pulses.  Exam reveals no gallop and no friction rub.  No murmur heard.  -Pulm: Effort normal and breath sounds normal. No respiratory distress. No wheezes, rales or ronchi.  -Abdominal: Soft. Bowel sounds are normal. No distension, no tenderness. There is no rebound and no guarding. No hernia.   -Musculoskeletal: No Cyanosis clubbing or edema.    Results Review:    Laboratory Data:     Results from last 7 days  Lab Units 11/09/17  0242 11/08/17  0932   SODIUM mmol/L 142 142   POTASSIUM mmol/L 3.7 3.8   CHLORIDE mmol/L 104 104   CO2 mmol/L 27.0 23.0   BUN mg/dL 25* 24*   CREATININE mg/dL 1.37* 1.13   GLUCOSE mg/dL 117* 169*   CALCIUM mg/dL 8.9 9.6   BILIRUBIN mg/dL  --  1.2   ALK PHOS  U/L  --  101   ALT (SGPT) U/L  --  46   AST (SGOT) U/L  --  39   ANION GAP mmol/L 11.0 15.0     Estimated Creatinine Clearance: 33.8 mL/min (by C-G formula based on Cr of 1.37).            Results from last 7 days  Lab Units 11/09/17  0242 11/08/17  0932   WBC 10*3/mm3 11.68* 12.47*   HEMOGLOBIN g/dL 10.2* 11.4*   HEMATOCRIT % 30.2* 33.9*   PLATELETS 10*3/mm3 303 341           Culture Data:   Blood Culture   Date Value Ref Range Status   11/08/2017 No growth at less than 24 hours  Preliminary   11/08/2017 No growth at less than 24 hours  Preliminary     Urine Culture   Date Value Ref Range Status   11/08/2017 >100,000 CFU/mL Gram Negative Bacilli (A)  Preliminary     No results found for: RESPCX  No results found for: WOUNDCX  No results found for: STOOLCX  No components found for: BODYFLD    Micobiology  Blood Culture   Date Value Ref Range Status   11/08/2017 No growth at less than 24 hours  Preliminary                    Urine Culture   Date Value Ref Range Status   11/08/2017 >100,000 CFU/mL Gram Negative Bacilli (A)  Preliminary          Radiology Data:   Imaging Results (all)     Procedure Component Value Units Date/Time    XR Chest 2 View [[id number]] Collected:  11/08/17 1000     Updated:  11/08/17 1022    Narrative:           PROCEDURE: Chest PA and lateral    REASON FOR EXAM: shortness of breath    FINDINGS: Comparison study dated May 26, 2017. . Cardiac and  pulmonary vasculature are normal . Lungs are clear. Pleural  spaces are normal . No acute osseous abnormality.      Impression:       Negative chest    Electronically signed by:  Eladio Garces MD  11/8/2017 10:21 AM [facility]  Workstation: [id number]    CT Angiogram Chest With Contrast [[id number]] Collected:  11/08/17 1247     Updated:  11/08/17 1320    Narrative:         PROCEDURE: CT -CTA Thorax/PE with contrast     REASON FOR EXAM: chest pressure, dyspnea, elevated troponin,  R74.8 Abnormal levels of other serum enzymes R60.0 Localized  edema E11.9 Type 2  diabetes mellitus without complications I50.9  Heart failure, unspecified I10 Essential (primary) hypertension  N18.2 Chronic kidney disease, stage 2 (mild) I25.10  Atherosclerotic heart disease of native coronary artery without  angina pectoris R06.00 Dyspnea, unspecified N39.0     FINDINGS: No comparison. Axial computer tomography sequential  imaging was performed from the thoracic inlet through the  diaphragms after administration of IV contrast .3-D chest  sagittal and coronal reformates was performed. This exam was  performed according to our departmental dose optimization  program, which includes automated exposure control, adjustment of  the mA and/or KV according to patient size and/or use of  iterative reconstruction technique.     The pulmonary arteries are normal. There is no filling defect  identified to suggest pulmonary embolus. Mediastinal structures  of the chest are normal. No lymphadenopathy or pericardial  effusion. Mild centrilobular as well as paraseptal emphysematous  changes. Lungs otherwise clear. Pleural spaces are normal.  Visualized upper abdominal structures reveals posterior segment  right lobe of liver peripheral 1.56 cm simple hepatic cyst.  Otherwise visualized upper abdominal structures are unremarkable.  No acute osseous abnormality. No acute osseous abnormality. No  acute osseous abnormality. T11, T12, and L1 bilateral  decompressive laminectomies.      Impression:       1. No evidence of pulmonary embolus..  2. Mild centrilobular and paraseptal emphysematous changes..  3. Posterior segment right lobe of liver peripheral 1.56 cm  simple hepatic cyst..  4.T11, T12, and L1 bilateral decompressive laminectomies..    Electronically signed by:  Eladio Garces MD  11/8/2017 1:18 PM CST  Workstation: [id number]          I have reviewed the patient current medications.   Assessment/Plan     Hospital Problem List     Elevated troponin          Assessment / Plan    --dyspnea  Improved and  resolving  CTA negative for PE  Will repeat CXR   possibly COPD exacerbation /early CAP    --UTI  continue IVAB    --mildly elevated trop  ECHO EF 55%  Chest pain free       This document has been electronically signed by Jeferson Limon MD on November 9, 2017 10:03 AM

## 2017-11-10 VITALS
HEIGHT: 67 IN | BODY MASS INDEX: 18.74 KG/M2 | WEIGHT: 119.4 LBS | SYSTOLIC BLOOD PRESSURE: 138 MMHG | HEART RATE: 73 BPM | TEMPERATURE: 98.7 F | RESPIRATION RATE: 18 BRPM | DIASTOLIC BLOOD PRESSURE: 92 MMHG | OXYGEN SATURATION: 96 %

## 2017-11-10 LAB
BACTERIA SPEC AEROBE CULT: ABNORMAL
BACTERIA SPEC AEROBE CULT: ABNORMAL
GLUCOSE BLDC GLUCOMTR-MCNC: 128 MG/DL (ref 70–130)
GLUCOSE BLDC GLUCOMTR-MCNC: 134 MG/DL (ref 70–130)
TROPONIN I SERPL-MCNC: 0.05 NG/ML

## 2017-11-10 PROCEDURE — 25010000002 PNEUMOCOCCAL VAC POLYVALENT PER 0.5 ML: Performed by: FAMILY MEDICINE

## 2017-11-10 PROCEDURE — G0378 HOSPITAL OBSERVATION PER HR: HCPCS

## 2017-11-10 PROCEDURE — 96376 TX/PRO/DX INJ SAME DRUG ADON: CPT

## 2017-11-10 PROCEDURE — 84484 ASSAY OF TROPONIN QUANT: CPT | Performed by: FAMILY MEDICINE

## 2017-11-10 PROCEDURE — 94799 UNLISTED PULMONARY SVC/PX: CPT

## 2017-11-10 PROCEDURE — 90732 PPSV23 VACC 2 YRS+ SUBQ/IM: CPT | Performed by: FAMILY MEDICINE

## 2017-11-10 PROCEDURE — 25010000002 INFLUENZA VAC SPLIT QUAD SUSPENSION: Performed by: FAMILY MEDICINE

## 2017-11-10 PROCEDURE — 82962 GLUCOSE BLOOD TEST: CPT

## 2017-11-10 PROCEDURE — 25010000002 FUROSEMIDE PER 20 MG: Performed by: FAMILY MEDICINE

## 2017-11-10 PROCEDURE — G0008 ADMIN INFLUENZA VIRUS VAC: HCPCS | Performed by: FAMILY MEDICINE

## 2017-11-10 PROCEDURE — G0009 ADMIN PNEUMOCOCCAL VACCINE: HCPCS | Performed by: FAMILY MEDICINE

## 2017-11-10 PROCEDURE — 90682 RIV4 VACC RECOMBINANT DNA IM: CPT | Performed by: FAMILY MEDICINE

## 2017-11-10 PROCEDURE — 94760 N-INVAS EAR/PLS OXIMETRY 1: CPT

## 2017-11-10 RX ORDER — LEVOFLOXACIN 500 MG/1
500 TABLET, FILM COATED ORAL DAILY
Qty: 7 TABLET | Refills: 0 | Status: SHIPPED | OUTPATIENT
Start: 2017-11-10 | End: 2017-11-10

## 2017-11-10 RX ORDER — LEVOFLOXACIN 500 MG/1
500 TABLET, FILM COATED ORAL DAILY
Qty: 10 TABLET | Refills: 0 | Status: SHIPPED | OUTPATIENT
Start: 2017-11-10 | End: 2017-11-20

## 2017-11-10 RX ADMIN — IPRATROPIUM BROMIDE AND ALBUTEROL SULFATE 3 ML: .5; 3 SOLUTION RESPIRATORY (INHALATION) at 07:22

## 2017-11-10 RX ADMIN — ASPIRIN 81 MG: 81 TABLET, COATED ORAL at 09:27

## 2017-11-10 RX ADMIN — HYDROCORTISONE: 1 CREAM TOPICAL at 09:28

## 2017-11-10 RX ADMIN — IPRATROPIUM BROMIDE AND ALBUTEROL SULFATE 3 ML: .5; 3 SOLUTION RESPIRATORY (INHALATION) at 10:43

## 2017-11-10 RX ADMIN — INFLUENZA A VIRUS A/MICHIGAN/45/2015 X-275 (H1N1) ANTIGEN (FORMALDEHYDE INACTIVATED), INFLUENZA A VIRUS A/HONG KONG/4801/2014 X-263B (H3N2) ANTIGEN (FORMALDEHYDE INACTIVATED), INFLUENZA B VIRUS B/PHUKET/3073/2013 ANTIGEN (FORMALDEHYDE INACTIVATED), AND INFLUENZA B VIRUS B/BRISBANE/60/2008 ANTIGEN (FORMALDEHYDE INACTIVATED) 0.5 ML: 15; 15; 15; 15 INJECTION, SUSPENSION INTRAMUSCULAR at 11:26

## 2017-11-10 RX ADMIN — PNEUMOCOCCAL VACCINE POLYVALENT 0.5 ML
25; 25; 25; 25; 25; 25; 25; 25; 25; 25; 25; 25; 25; 25; 25; 25; 25; 25; 25; 25; 25; 25; 25 INJECTION, SOLUTION INTRAMUSCULAR; SUBCUTANEOUS at 11:24

## 2017-11-10 RX ADMIN — ATORVASTATIN CALCIUM 10 MG: 10 TABLET, FILM COATED ORAL at 09:27

## 2017-11-10 RX ADMIN — FUROSEMIDE 20 MG: 10 INJECTION, SOLUTION INTRAVENOUS at 05:21

## 2017-11-10 NOTE — PLAN OF CARE
Problem: Patient Care Overview (Adult)  Goal: Adult Individualization and Mutuality  Outcome: Outcome(s) achieved Date Met:  11/10/17

## 2017-11-10 NOTE — DISCHARGE SUMMARY
[facility]  DISCHARGE SUMMARY       Date of Admission: 11/8/2017  Date of Discharge:  11/10/2017  Primary Care Physician: Ada Clodu MD    Presenting Problem/History of Present Illness:  Essential hypertension [I10]  Elevated troponin [R74.8]  Acute urinary tract infection [N39.0]  Bilateral lower extremity edema [R60.0]  Coronary artery disease involving native coronary artery of native heart without angina pectoris [I25.10]  Congestive heart failure, unspecified congestive heart failure chronicity, unspecified congestive heart failure type [I50.9]  Type 2 diabetes mellitus without complication, without long-term current use of insulin [E11.9]  Dyspnea, unspecified type [R06.00]  Stage 2 chronic kidney disease [N18.2]     Final Discharge Diagnoses:  UTI  Hospital Problem List     Elevated troponin          Consults:   Consults     No orders found from 10/10/2017 to 11/9/2017.          Procedures Performed:                 Pertinent Test Results:    Laboratory Data:     Results from last 7 days  Lab Units 11/09/17  0242 11/08/17  0932   SODIUM mmol/L 142 142   POTASSIUM mmol/L 3.7 3.8   CHLORIDE mmol/L 104 104   CO2 mmol/L 27.0 23.0   BUN mg/dL 25* 24*   CREATININE mg/dL 1.37* 1.13   GLUCOSE mg/dL 117* 169*   CALCIUM mg/dL 8.9 9.6   BILIRUBIN mg/dL  --  1.2   ALK PHOS U/L  --  101   ALT (SGPT) U/L  --  46   AST (SGOT) U/L  --  39   ANION GAP mmol/L 11.0 15.0     Estimated Creatinine Clearance: 32.4 mL/min (by C-G formula based on Cr of 1.37).            Results from last 7 days  Lab Units 11/09/17  0242 11/08/17  0932   WBC 10*3/mm3 11.68* 12.47*   HEMOGLOBIN g/dL 10.2* 11.4*   HEMATOCRIT % 30.2* 33.9*   PLATELETS 10*3/mm3 303 341           Culture Data:   Blood Culture   Date Value Ref [facility] Status   11/08/2017 No growth at 24 hours  Preliminary   11/08/2017 No growth at 24 hours  Preliminary     Urine Culture   Date Value Ref Range Status    11/08/2017 >100,000 CFU/mL Escherichia coli (A)  Final     No results found for: RESPCX  No results found for: WOUNDCX  No results found for: STOOLCX  No components found for: BODYFLD    Micobiology  Blood Culture   Date Value Ref Range Status   11/08/2017 No growth at 24 hours  Preliminary                    Urine Culture   Date Value Ref Range Status   11/08/2017 >100,000 CFU/mL Escherichia coli (A)  Final          Radiology Data:   Imaging Results (all)     Procedure Component Value Units Date/Time    XR Chest 2 View [[id number]] Collected:  11/08/17 1000     Updated:  11/08/17 1022    Narrative:           PROCEDURE: Chest PA and lateral    REASON FOR EXAM: shortness of breath    FINDINGS: Comparison study dated May 26, 2017. . Cardiac and  pulmonary vasculature are normal . Lungs are clear. Pleural  spaces are normal . No acute osseous abnormality.      Impression:       Negative chest    Electronically signed by:  Eladio Garces MD  11/8/2017 10:21 AM CST  Workstation: [id number]    CT Angiogram Chest With Contrast [[id number]] Collected:  11/08/17 1247     Updated:  11/08/17 1320    Narrative:         PROCEDURE: CT -CTA Thorax/PE with contrast     REASON FOR EXAM: chest pressure, dyspnea, elevated troponin,  R74.8 Abnormal levels of other serum enzymes R60.0 Localized  edema E11.9 Type 2 diabetes mellitus without complications I50.9  Heart failure, unspecified I10 Essential (primary) hypertension  N18.2 Chronic kidney disease, stage 2 (mild) I25.10  Atherosclerotic heart disease of native coronary artery without  angina pectoris R06.00 Dyspnea, unspecified N39.0     FINDINGS: No comparison. Axial computer tomography sequential  imaging was performed from the thoracic inlet through the  diaphragms after administration of IV contrast .3-D chest  sagittal and coronal reformates was performed. This exam was  performed according to our departmental dose optimization  program, which includes automated exposure control,  adjustment of  the mA and/or KV according to patient size and/or use of  iterative reconstruction technique.     The pulmonary arteries are normal. There is no filling defect  identified to suggest pulmonary embolus. Mediastinal structures  of the chest are normal. No lymphadenopathy or pericardial  effusion. Mild centrilobular as well as paraseptal emphysematous  changes. Lungs otherwise clear. Pleural spaces are normal.  Visualized upper abdominal structures reveals posterior segment  right lobe of liver peripheral 1.56 cm simple hepatic cyst.  Otherwise visualized upper abdominal structures are unremarkable.  No acute osseous abnormality. No acute osseous abnormality. No  acute osseous abnormality. T11, T12, and L1 bilateral  decompressive laminectomies.      Impression:       1. No evidence of pulmonary embolus..  2. Mild centrilobular and paraseptal emphysematous changes..  3. Posterior segment right lobe of liver peripheral 1.56 cm  simple hepatic cyst..  4.T11, T12, and L1 bilateral decompressive laminectomies..    Electronically signed by:  Eladio Garces MD  11/8/2017 1:18 PM CST  Workstation: [id number]    XR Chest PA & Lateral [[id number]] Collected:  11/09/17 1052     Updated:  11/09/17 1746    Narrative:           PROCEDURE: Chest PA and lateral    REASON FOR EXAM: dyspnea, R74.8 Abnormal levels of other serum  enzymes R60.0 Localized edema E11.9 Type 2 diabetes mellitus  without complications I50.9 Heart failure, unspecified I10  Essential (primary) hypertension N18.2 Chronic kidney disease,  stage 2 (mild) I25.10 Atherosclerotic heart disease of native  coronary artery without angina pectoris R06.00 Dyspnea,  unspecified N39.0 Urinary tract infection, site not s    FINDINGS: Comparison study dated November 8, 2017. . Cardiac and  pulmonary vasculature are normal . Stable hyperinflated  hyperlucent lungs suggest underlying emphysematous changes. Lungs  otherwise clear. Pleural spaces are normal . No acute  osseous  abnormality.      Impression:       1.  Stable hyperinflated hyperlucent lungs suggesting underlying  emphysema.  2.  Otherwise unremarkable chest.    Electronically signed by:  Eladio Garces MD  11/9/2017 5:45 PM CST  Workstation: [id number]          Chief Complaint on Day of Discharge: none    HPI:This 82-year-old -American male reported to the emergency department with complaints of shortness of air.  Patient was recently admitted at [facility] for spinal surgery.  Patient reports that he was admitted for proximally 10-12 days.  Since being in the hospital he feels like he is more short of air, endorses dyspnea upon exertion.  No chest pain, nausea, vomiting.  No dizziness or syncope.  He does have a new onset cough, feels generally weak.  Denies any focal weakness, slurring of speech, drooping of face.  Denies any numbness, tingling.  Denies any loss of bowel or bladder function.  Denies gross hematuria, dysuria, hematochezia, melena, hematemesis.  While he states that it has been a couple of days since his last bowel movement, he denies long-term constipation.  Patient reports that he has no restrictions status post surgical procedure.  He reports that he had a discectomy done, though it is possible that at some point the patient has had a laminectomy according to radiographical findings.  Patient states that [facility] changed some of his meds, that he has perhaps not gotten some his blood pressure medications or some of his heart medications.  Patient states that he was on heavy fluid resuscitation while there and he feels he has become fluid overloaded.  Patient furthermore endorses that he did receive DVT prophylaxis, that he does recall getting shots in the abdomen.  CT a of the pulmonary arteries was performed in the emergency department, no pulmonary embolism.  Patient denies fever, chills.  Denies abdominal pain.  Endorses lower extremity edema. Denies lower extremity weakness.   "Should also be noted that despite demonstration of urinary tract infection on labs and hematuria, patient denies being catheterized.    Hospital Course: patient had a UTI , slightly elevated WBC , responded well to IVAB, slightly elevated  trops trended down ( possibly trop leak). EKG with no ST/T wave changes, unremarkable ECHO  And unremarkable CTA .patient denied any chest pain through out his hospital stay and denied any dyspnea upon further question and stated that the reason he came to the hospital  is because he felt slightly\" tired\" . Patient was asymptotic and physical exam unremarkable at time of discharge ,patient wanted to go home and didn't want any further cardiac work up at this point . Patient discharged on levaquin for 10 days . Patient will follow up with PCP in 2-3 days.    Condition on Discharge:  Improved and Stable    Physical Exam on Discharge:  Temp:  [96.6 °F (35.9 °C)-97.9 °F (36.6 °C)] 97.9 °F (36.6 °C)  Heart Rate:  [] 86  Resp:  [18-20] 18  BP: (119-152)/(65-80) 129/76    Constitutional: Patient is AAO x 3. Patient appears well-developed and well-nourished.   Cardiovascular: Normal rate, regular rhythm, normal heart sounds and intact distal pulses.  Exam reveals no gallop and no friction rub.  No murmur heard.  Pulmonary/Chest: Effort normal and breath sounds normal. No respiratory distress. No wheezes, rales or ronchi.  Abdominal: Soft. Bowel sounds are normal. No distension, no tenderness. There is no rebound and no guarding. No hernia.   Musculoskeletal: No Cyanosis clubbing or edema.    Discharge Disposition:  Home or Self Care    Discharge Medications:   General Keith Ruvalcaba Jr.   Home Medication Instructions MARY:[id number]    Printed on:11/10/17 1016   Medication Information                      albuterol (PROVENTIL HFA;VENTOLIN HFA) 108 (90 BASE) MCG/ACT inhaler  Inhale 2 puffs Every 4 (Four) Hours As Needed for Wheezing.             aspirin 81 MG EC tablet  Take 81 mg by " mouth daily.             docusate sodium (COLACE) 100 MG capsule  Take 100 mg by mouth.             ferrous sulfate 325 (65 FE) MG tablet  TAKE ONE TABLET BY MOUTH DAILY WITH BREAKFAST             guanFACINE (TENEX) 1 MG tablet  Take 1 mg by mouth.             HYDROcodone-acetaminophen (NORCO) 5-325 MG per tablet  Take 1 tablet by mouth.             levoFLOXacin (LEVAQUIN) 500 MG tablet  Take 1 tablet by mouth Daily for 10 days.             pravastatin (PRAVACHOL) 40 MG tablet  TAKE ONE TABLET BY MOUTH DAILY             SITagliptin (JANUVIA) 50 MG tablet  Take 1 tablet by mouth Daily.             TRUE METRIX BLOOD GLUCOSE TEST test strip  TEST BLOOD SUGAR ONCE DAILY (LOT [id number] EXP 03/31/2018)             TRUEPLUS LANCETS 30G misc  1 Device Daily. TEST                 Discharge Diet:   Diet Instructions     Diet: Consistent Carbohydrate, Cardiac       Discharge Diet:   Consistent Carbohydrate  Cardiac                    Activity at Discharge:   Activity Instructions     Activity as Tolerated                     All the abnormal findings were discussed with the patient in details and the patient verbalized understanding of needing to follow up with PCP and and other specialities of  outpatient follow up for further evaluation and management.    Discharge Care Plan/Instructions: follow up with PCP in 2-3 days    Follow-up Appointments:   Future Appointments  Date Time Provider Department Center   11/14/2017 8:45 AM Ada Cloud MD [facility] None   11/21/2017 8:30 AM Ada Cloud MD [facility] None   1/14/2019 9:40 AM Jack L Hamman, MD [facility] None       Test Results Pending at Discharge:  Order Current Status    Blood Culture - Blood, Preliminary result    Blood Culture - Blood, Preliminary result           This document has been electronically signed by Jeferson Limon MD on November 10, 2017 10:16 AM

## 2017-11-10 NOTE — DISCHARGE PLACEMENT REQUEST
"General Keith Ruvalcaba Jr. (82 y.o. Male)     Date of Birth Social Security Number Address Home Phone MRN    [id number]  [address] [facility] [address] [phone] [id number]    Orthodoxy Marital Status          [facility]        Admission Date Admission Type Admitting Provider Attending Provider Department, Room/Bed    11/8/17 Emergency Parish Ferris MD Bleibel, Fadi, MD [address], 328/1    Discharge Date Discharge Disposition Discharge Destination         Home or Self Care             Attending Provider: Jeferson Limon MD     Allergies:  Aldactone [Spironolactone]    Isolation:  None   Infection:  None   Code Status:  FULL    Ht:  67\" (170.2 cm)   Wt:  119 lb 6.4 oz (54.2 kg)    Admission Cmt:  None   Principal Problem:  None                Active Insurance as of 11/8/2017     Primary Coverage     Payor Plan Insurance Group Employer/Plan Group    MEDICARE MEDICARE A & B      Payor Plan Address Payor Plan Phone Number Effective From Effective To    [address] [phone] 10/1/1977     [place] [address]       Subscriber Name Subscriber Birth Date Member ID       GENERAL KEITH RUVALCABA JR. 1935 [id number]           Secondary Coverage     Payor Plan Insurance Group Employer/Plan Group    [facility] [id number]     Payor Plan Address Payor Plan Phone Number Effective From Effective To    [address] [address] [phone] 11/1/2015     [place] [address]       Subscriber Name Subscriber Birth Date Member ID       GENERAL KEITH RUVALCABA JR. 1935 [id number]                 Emergency Contacts      (Rel.) Home Phone Work Phone Mobile Phone    Jerri Ruvalcaba (Daughter) [phone] -- --    MenjivarKiah abbott (Daughter) -- -- [phone]            Emergency Contact Information     Name Relation Home Work Mobile    Jerri Ruvalcaba Daughter [phone]      TiaraKristyKiah Daughter   [phone]          Insurance Information                MEDICARE/MEDICARE A & B Phone: [phone]    " Subscriber: General Keith Ruvalcaba Jr. Subscriber#: [id number]    Group#:  Precert#:         UMR/UMR Phone: [phone]    Subscriber: General Kieth Ruvalcaba Jr. Subscriber#: [id number]    Group#: [id number] Precert#:              History & Physical      JOSEPH Cueva at 11/8/2017  2:23 PM     Attestation signed by Parish Ferris MD at 11/8/2017  5:06 PM        I personally evaluated and discussed the patient in conjunction with Adria Staton PA-C and agree with the assessment, treatment plan, and disposition of the patient as recorded.          This document has been electronically signed by Parish Ferris MD on November 8, 2017 5:06 PM                                         [facility] Medicine Admission      Date of Admission: 11/8/2017      Primary Care Physician: Ada Cloud MD      Chief Complaint:  Dyspnea    HPI:  This 82-year-old -American male reported to the emergency department with complaints of shortness of air.  Patient was recently admitted at [facility] for spinal surgery.  Patient reports that he was admitted for proximally 10-12 days.  Since being in the hospital he feels like he is more short of air, endorses dyspnea upon exertion.  No chest pain, nausea, vomiting.  No dizziness or syncope.  He does have a new onset cough, feels generally weak.  Denies any focal weakness, slurring of speech, drooping of face.  Denies any numbness, tingling.  Denies any loss of bowel or bladder function.  Denies gross hematuria, dysuria, hematochezia, melena, hematemesis.  While he states that it has been a couple of days since his last bowel movement, he denies long-term constipation.  Patient reports that he has no restrictions status post surgical procedure.  He reports that he had a discectomy done, though it is possible that at some point the patient has had a laminectomy according to radiographical findings.  Patient states that [facility]  some of his meds, that he has perhaps not gotten some his blood pressure medications or some of his heart medications.  Patient states that he was on heavy fluid resuscitation while there and he feels he has become fluid overloaded.  Patient furthermore endorses that he did receive DVT prophylaxis, that he does recall getting shots in the abdomen.  CT a of the pulmonary arteries was performed in the emergency department, no pulmonary embolism.  Patient denies fever, chills.  Denies abdominal pain.  Endorses lower extremity edema. Denies lower extremity weakness.  Should also be noted that despite demonstration of urinary tract infection on labs and hematuria, patient denies being catheterized.      Concurrent Medical History:  has a past medical history of Acquired hallux rigidus; Anemia; Arthropathy of lumbar facet joint; Carpal tunnel syndrome; CHF (congestive heart failure); Chronic diastolic congestive heart failure; Chronic kidney disease; Chronic kidney disease, stage II (mild); Chronic obstructive lung disease; Chronic renal failure; Coronary artery disease; Cortical senile cataract; Diabetes mellitus; Diabetes mellitus type 2, noninsulin dependent; Diabetes mellitus without complication; Diastolic dysfunction; Diastolic heart failure; Essential hypertension; Hip pain; History of echocardiogram (08/19/2015); Hyperlipidemia; Influenza vaccine administered (10/02/2015); Insomnia; Insomnia; Joint pain; Low back pain; Muscle strain; Nuclear cataract; Pneumococcal vaccination given (07/22/2016); Pure hypercholesterolemia; and Sedentary lifestyle.    Past Surgical History:  has a past surgical history that includes Injection of Medication (09/06/2012); Injection of Medication (09/06/2012); Cardiac Ablation; Cardiac catheterization (02/04/1986); Carpal tunnel release (10/19/2015); Esophagogastroduodenoscopy (08/05/2013); Injection of Medication (02/10/2012); Other surgical history (09/24/2011); Inguinal Hernia  Repair (Right, 06/07/1968); Other surgical history; Injection of Medication (02/09/2014); Lumbar laminectomy (2007); Other surgical history (10/11/2011); Mandible fracture surgery (10/11/1981); Total hip arthroplasty (Right); Cataract extraction w/ intraocular lens implant (Left, 3/31/2017); and Cataract extraction w/ intraocular lens implant (Right, 4/14/2017).    Family History: family history includes Diabetes in his other. There is no history of Coronary artery disease.     Social History:  reports that he quit smoking about 18 years ago. He has never used smokeless tobacco. He reports that he does not drink alcohol or use illicit drugs.    Allergies:   Allergies   Allergen Reactions   • Aldactone [Spironolactone] Other (See Comments)     Hyponatremia and hyperkalemia       Medications:   Prescriptions Prior to Admission   Medication Sig Dispense Refill Last Dose   • docusate sodium (COLACE) 100 MG capsule Take 100 mg by mouth.      • guanFACINE (TENEX) 1 MG tablet Take 1 mg by mouth.      • HYDROcodone-acetaminophen (NORCO) 5-325 MG per tablet Take 1 tablet by mouth.      • albuterol (PROVENTIL HFA;VENTOLIN HFA) 108 (90 BASE) MCG/ACT inhaler Inhale 2 puffs Every 4 (Four) Hours As Needed for Wheezing. 1 inhaler 0 Taking   • aspirin 81 MG EC tablet Take 81 mg by mouth daily.   Taking   • ferrous sulfate 325 (65 FE) MG tablet TAKE ONE TABLET BY MOUTH DAILY WITH BREAKFAST 60 tablet 3 Taking   • pravastatin (PRAVACHOL) 40 MG tablet TAKE ONE TABLET BY MOUTH DAILY 30 tablet 3 Taking   • SITagliptin (JANUVIA) 50 MG tablet Take 1 tablet by mouth Daily. 30 tablet 5 Taking   • TRUE METRIX BLOOD GLUCOSE TEST test strip TEST BLOOD SUGAR ONCE DAILY (LOT [id number] EXP 03/31/2018) 100 each 1 Taking   • TRUEPLUS LANCETS 30G misc 1 Device Daily. TEST 100 each 3 Taking       Review of Systems:  Review of Systems   Constitutional: Negative for chills and fever.   Respiratory: Positive for cough and shortness of breath. Negative for  apnea, choking, chest tightness, wheezing and stridor.    Cardiovascular: Positive for leg swelling. Negative for chest pain and palpitations.   Gastrointestinal: Negative for abdominal distention, abdominal pain, blood in stool, constipation, diarrhea, nausea and vomiting.   Genitourinary: Negative for decreased urine volume, difficulty urinating, dysuria, flank pain, frequency, hematuria and urgency.   Musculoskeletal: Negative for back pain, gait problem, neck pain and neck stiffness.   Skin: Negative for color change, pallor and rash.   Neurological: Positive for weakness. Negative for dizziness, tremors, seizures, syncope, facial asymmetry, speech difficulty, light-headedness, numbness and headaches.        Generalized weakness, NO focal weakness   Psychiatric/Behavioral: Negative for behavioral problems, confusion and hallucinations. The patient is not nervous/anxious.      Otherwise complete ROS is negative except as mentioned above.    Physical Exam:   Temp:  [97.5 °F (36.4 °C)] 97.5 °F (36.4 °C)  Heart Rate:  [84-96] 96  Resp:  [17-28] 17  BP: (133-183)/(78-88) 133/80  Physical Exam   Constitutional: He is oriented to person, place, and time. He appears well-developed and well-nourished. No distress.   HENT:   Head: Normocephalic and atraumatic.   Eyes: Conjunctivae and EOM are normal. Pupils are equal, round, and reactive to light.   Cardiovascular: Normal rate and regular rhythm.    Pulmonary/Chest: Effort normal. No respiratory distress. He has no wheezes.   Abdominal: Soft. Bowel sounds are normal. He exhibits no distension. There is no tenderness.   Musculoskeletal: He exhibits edema.   Neurological: He is alert and oriented to person, place, and time. He has normal reflexes. No cranial nerve deficit.   Skin: Skin is warm and dry. He is not diaphoretic.   Psychiatric: He has a normal mood and affect. His behavior is normal.     Results Reviewed:  I have personally reviewed current lab, radiology, and  data and agree with results.  Lab Results (last 24 hours)     Procedure Component Value Units Date/Time    CBC & Differential [[id number]] Collected:  11/08/17 0932    Specimen:  Blood Updated:  11/08/17 0943    Narrative:       The following orders were created for panel order CBC & Differential.  Procedure                               Abnormality         Status                     ---------                               -----------         ------                     CBC Auto Differential[[id number]]        Abnormal            Final result                 Please view results for these tests on the individual orders.    CBC Auto Differential [[id number]]  (Abnormal) Collected:  11/08/17 0932    Specimen:  Blood Updated:  11/08/17 0943     WBC 12.47 (H) 10*3/mm3      RBC 3.87 (L) 10*6/mm3      Hemoglobin 11.4 (L) g/dL      Hematocrit 33.9 (L) %      MCV 87.6 fL      MCH 29.5 pg      MCHC 33.6 g/dL      RDW 13.8 %      RDW-SD 43.9 fl      MPV 9.1 fL      Platelets 341 10*3/mm3      Neutrophil % 75.1 %      Lymphocyte % 7.3 (L) %      Monocyte % 13.4 (H) %      Eosinophil % 0.2 %      Basophil % 0.2 %      Immature Grans % 3.8 (H) %      Neutrophils, Absolute 9.35 (H) 10*3/mm3      Lymphocytes, Absolute 0.91 10*3/mm3      Monocytes, Absolute 1.67 (H) 10*3/mm3      Eosinophils, Absolute 0.03 10*3/mm3      Basophils, Absolute 0.03 10*3/mm3      Immature Grans, Absolute 0.48 (H) 10*3/mm3     Comprehensive Metabolic Panel [[id number]]  (Abnormal) Collected:  11/08/17 0932    Specimen:  Blood Updated:  11/08/17 1001     Glucose 169 (H) mg/dL      BUN 24 (H) mg/dL      Creatinine 1.13 mg/dL      Sodium 142 mmol/L      Potassium 3.8 mmol/L      Chloride 104 mmol/L      CO2 23.0 mmol/L      Calcium 9.6 mg/dL      Total Protein 6.9 g/dL      Albumin 4.00 g/dL      ALT (SGPT) 46 U/L      AST (SGOT) 39 U/L      Alkaline Phosphatase 101 U/L      Total Bilirubin 1.2 mg/dL      eGFR  African Amer 75 mL/min/1.73      Globulin 2.9 gm/dL       A/G Ratio 1.4 g/dL      BUN/Creatinine Ratio 21.2     Anion Gap 15.0 mmol/L     Narrative:       The MDRD GFR formula is only valid for adults with stable renal function between ages 18 and 70.    [facility] Draw [[id number]] Collected:  11/08/17 0932    Specimen:  Blood Updated:  11/08/17 1046    Narrative:       The following orders were created for panel order [facility] Draw.  Procedure                               Abnormality         Status                     ---------                               -----------         ------                     Light Blue Top[[id number]]                                   Final result               Green Top (Gel)[[id number]]                                  Final result               Lavender Top[[id number]]                                     Final result               Gold Top - SST[[id number]]                                   Final result                 Please view results for these tests on the individual orders.    Light Blue Top [[id number]] Collected:  11/08/17 0932    Specimen:  Blood Updated:  11/08/17 1046     Extra Tube hold for add-on      Auto resulted       Green Top (Gel) [[id number]] Collected:  11/08/17 0932    Specimen:  Blood Updated:  11/08/17 1046     Extra Tube Hold for add-ons.      Auto resulted.       Lavender Top [[id number]] Collected:  11/08/17 0932    Specimen:  Blood Updated:  11/08/17 1046     Extra Tube hold for add-on      Auto resulted       Gold Top - SST [[id number]] Collected:  11/08/17 0932    Specimen:  Blood Updated:  11/08/17 1046     Extra Tube Hold for add-ons.      Auto resulted.       Troponin [[id number]]  (Abnormal) Collected:  11/08/17 0932    Specimen:  Blood Updated:  11/08/17 1049     Troponin I 0.050 (H) ng/mL     BNP [[id number]]  (Normal) Collected:  11/08/17 0932    Specimen:  Blood Updated:  11/08/17 1049     proBNP 363.0 pg/mL     Urine Culture - Urine, Urine, Clean Catch [[id number]] Collected:  11/08/17 1059    Specimen:  Urine  from Urine, Clean Catch Updated:  11/08/17 1126    Urinalysis With / Culture If Indicated - Urine, Clean Catch [[id number]]  (Abnormal) Collected:  11/08/17 1059    Specimen:  Urine from Urine, Clean Catch Updated:  11/08/17 1127     Color, UA Dark Yellow     Appearance, UA Cloudy (A)     pH, UA <=5.0     Specific Gravity, UA 1.027     Glucose, UA Negative     Ketones, UA 15 mg/dL (1+) (A)     Bilirubin, UA Small (1+) (A)     Blood, UA Large (3+) (A)     Protein, UA 30 mg/dL (1+) (A)     Leuk Esterase, UA Moderate (2+) (A)     Nitrite, UA Positive (A)     Urobilinogen, UA 4.0 E.U./dL (A)    Urinalysis, Microscopic Only - Urine, Clean Catch [[id number]]  (Abnormal) Collected:  11/08/17 1059    Specimen:  Urine from Urine, Clean Catch Updated:  11/08/17 1148     RBC, UA 3-5 (A) /HPF      WBC, UA Too Numerous to Count (A) /HPF      Bacteria, UA 4+ (A) /HPF      Squamous Epithelial Cells, UA None Seen /HPF      Hyaline Casts, UA 13-20 /LPF      Methodology Manual Light Microscopy        Imaging Results (last 24 hours)     Procedure Component Value Units Date/Time    XR Chest 2 View [[id number]] Collected:  11/08/17 1000     Updated:  11/08/17 1022    Narrative:           PROCEDURE: Chest PA and lateral    REASON FOR EXAM: shortness of breath    FINDINGS: Comparison study dated May 26, 2017. . Cardiac and  pulmonary vasculature are normal . Lungs are clear. Pleural  spaces are normal . No acute osseous abnormality.      Impression:       Negative chest    Electronically signed by:  Eladio Garces MD  11/8/2017 10:21 AM CST  Workstation: [id number]    CT Angiogram Chest With Contrast [[id number]] Collected:  11/08/17 1247     Updated:  11/08/17 1320    Narrative:         PROCEDURE: CT -CTA Thorax/PE with contrast     REASON FOR EXAM: chest pressure, dyspnea, elevated troponin,  R74.8 Abnormal levels of other serum enzymes R60.0 Localized  edema E11.9 Type 2 diabetes mellitus without complications I50.9  Heart failure, unspecified  I10 Essential (primary) hypertension  N18.2 Chronic kidney disease, stage 2 (mild) I25.10  Atherosclerotic heart disease of native coronary artery without  angina pectoris R06.00 Dyspnea, unspecified N39.0     FINDINGS: No comparison. Axial computer tomography sequential  imaging was performed from the thoracic inlet through the  diaphragms after administration of IV contrast .3-D chest  sagittal and coronal reformates was performed. This exam was  performed according to our departmental dose optimization  program, which includes automated exposure control, adjustment of  the mA and/or KV according to patient size and/or use of  iterative reconstruction technique.     The pulmonary arteries are normal. There is no filling defect  identified to suggest pulmonary embolus. Mediastinal structures  of the chest are normal. No lymphadenopathy or pericardial  effusion. Mild centrilobular as well as paraseptal emphysematous  changes. Lungs otherwise clear. Pleural spaces are normal.  Visualized upper abdominal structures reveals posterior segment  right lobe of liver peripheral 1.56 cm simple hepatic cyst.  Otherwise visualized upper abdominal structures are unremarkable.  No acute osseous abnormality. No acute osseous abnormality. No  acute osseous abnormality. T11, T12, and L1 bilateral  decompressive laminectomies.      Impression:       1. No evidence of pulmonary embolus..  2. Mild centrilobular and paraseptal emphysematous changes..  3. Posterior segment right lobe of liver peripheral 1.56 cm  simple hepatic cyst..  4.T11, T12, and L1 bilateral decompressive laminectomies..    Electronically signed by:  Eladio Garces MD  11/8/2017 1:18 PM CST  Workstation: [id number]            Assessment:    Hospital Problem List     Elevated troponin      Likely acute on chronic congestive heart failure   Likely congestive heart failure exacerbation   Urinary tract infection, acute cystitis with hematuria   Dyspnea   COPD         Plan:   At this time empiric anabiotic coverage in case the patient has early pneumonia from hospital setting, we'll also cover her urinary tract infection.  CTA of pulmonary arteries negative for pulmonary embolism.  Lasix for diuresis, echocardiogram.  At this time troponin is mildly elevated, but the patient denies chest pain.  If patient develops chest pain or if troponin continues to rise, will consult cardiology.  At this time troponin may be elevated secondary to either respiratory process or acute exacerbation of congestive heart failure.  Once echocardiogram has returned, we'll consider congestive heart failure now to get her consult.  Evaluate patient's medications, records from [facility], continue treat as hospital course dictates.        This document has been electronically signed by JOSEPH Cueva on 2017 2:34 PM                 Electronically signed by Parish Ferris MD at 2017  5:06 PM        Discharge Summary     No notes of this type exist for this encounter.          [address] [address]  Phone:  [phone]  Fax:   Date: Nov 10, 2017      Ambulatory Referral to Home Health     Patient:  General Keith Ruvalcaba Jr. MRN:  [id number]   [address] [address] :  1935  SSN:    Phone: [phone] Sex:  M      INSURANCE PAYOR PLAN GROUP # SUBSCRIBER ID   Primary:  Secondary: MEDICARE  UMR [id number]  [id number]    [id number]  [id number]      Referring Provider Information:  MARIUM ABREU Phone: [phone] Fax:       Referral Information:   # Visits:  1 Referral Type: Home Health [42]   Urgency:  Routine Referral Reason: Specialty Services Required   Start Date: Nov 10, 2017 End Date:  To be determined by Insurer   Diagnosis: Essential hypertension (I10 [ICD-10-CM] 401.9 [ICD-9-CM])  Acute urinary tract infection (N39.0 [ICD-10-CM] 599.0 [ICD-9-CM])  Chronic diastolic congestive heart failure  (I50.32 [ICD-10-CM] 428.32,428.0 [ICD-9-CM])      Refer to Dept: [facility] HOME CARE  Refer to Provider:   Refer to Facility:       Face to Face Visit Date: 11/10/2017  Follow-up Provider for Plan of Care? I treated the patient in an acute care facility and will not continue treatment after discharge.  Follow-up Provider: CHANG MANDUJANO [[id number]]  Reason/Clinical Findings: weakness  Describe mobility limitations that make leaving home difficult: weakness  Nursing/Therapeutic Services Requested: Skilled Nursing  Skilled nursing orders: Cardiopulmonary assessments  Skilled nursing orders: Medication education  Frequency: 1 Week 1     This document serves as a request of services and does not constitute Insurance authorization or approval of services.  To determine eligibility, please contact the members Insurance carrier to verify and review coverage.     If you have medical questions regarding this request for services. Please contact [address] at [phone] between the hours of 8:00am - 5:00pm (Mon-Fri).             Authorizing Provider:Jeferson Limon MD  Authorizing Provider's NPI: [id number]  Order Entered By: Jeferson Limon MD 11/10/2017  9:56 AM     Electronically signed by: Jeferson Limon MD 11/10/2017  9:56 AM         Radha Bojorquez RN [facility]  [phone]  Fax [phone]

## 2017-11-10 NOTE — PLAN OF CARE
Problem: Pressure Ulcer (Adult)  Goal: Signs and Symptoms of Listed Potential Problems Will be Absent or Manageable (Pressure Ulcer)  Outcome: Outcome(s) achieved Date Met:  11/10/17

## 2017-11-10 NOTE — PLAN OF CARE
Problem: Patient Care Overview (Adult)  Goal: Plan of Care Review  Outcome: Ongoing (interventions implemented as appropriate)    11/10/17 0334   Coping/Psychosocial Response Interventions   Plan Of Care Reviewed With patient   Patient Care Overview   Progress no change   Outcome Evaluation   Outcome Summary/Follow up Plan No C/O pain/discomfort. Educated on need to change positions frequently to prevent further breakdown. Will continue to monitor.          Problem: Acute Coronary Syndrome (ACS) (Adult)  Goal: Signs and Symptoms of Listed Potential Problems Will be Absent or Manageable (Acute Coronary Syndrome)  Outcome: Ongoing (interventions implemented as appropriate)    Problem: Fall Risk (Adult)  Goal: Absence of Falls  Outcome: Ongoing (interventions implemented as appropriate)    Problem: Pressure Ulcer (Adult)  Goal: Signs and Symptoms of Listed Potential Problems Will be Absent or Manageable (Pressure Ulcer)  Outcome: Ongoing (interventions implemented as appropriate)

## 2017-11-10 NOTE — PLAN OF CARE
Problem: Patient Care Overview (Adult)  Goal: Discharge Needs Assessment  Outcome: Outcome(s) achieved Date Met:  11/10/17

## 2017-11-10 NOTE — PLAN OF CARE
Problem: Acute Coronary Syndrome (ACS) (Adult)  Goal: Signs and Symptoms of Listed Potential Problems Will be Absent or Manageable (Acute Coronary Syndrome)  Outcome: Outcome(s) achieved Date Met:  11/10/17

## 2017-11-10 NOTE — PLAN OF CARE
Problem: Patient Care Overview (Adult)  Goal: Plan of Care Review  Outcome: Outcome(s) achieved Date Met:  11/10/17

## 2017-11-10 NOTE — PLAN OF CARE
Problem: Fall Risk (Adult)  Goal: Absence of Falls  Outcome: Outcome(s) achieved Date Met:  11/10/17

## 2017-11-13 LAB
BACTERIA SPEC AEROBE CULT: NORMAL
BACTERIA SPEC AEROBE CULT: NORMAL

## 2017-11-14 ENCOUNTER — OFFICE VISIT (OUTPATIENT)
Dept: INTERNAL MEDICINE | Facility: CLINIC | Age: 82
End: 2017-11-14

## 2017-11-14 VITALS
DIASTOLIC BLOOD PRESSURE: 80 MMHG | BODY MASS INDEX: 25.54 KG/M2 | HEIGHT: 67 IN | SYSTOLIC BLOOD PRESSURE: 150 MMHG | WEIGHT: 162.7 LBS

## 2017-11-14 DIAGNOSIS — I50.33 ACUTE ON CHRONIC DIASTOLIC HEART FAILURE (HCC): Primary | ICD-10-CM

## 2017-11-14 DIAGNOSIS — I10 ESSENTIAL HYPERTENSION: ICD-10-CM

## 2017-11-14 DIAGNOSIS — N18.30 STAGE 3 CHRONIC KIDNEY DISEASE (HCC): Chronic | ICD-10-CM

## 2017-11-14 DIAGNOSIS — E11.9 TYPE 2 DIABETES MELLITUS WITHOUT COMPLICATION, WITHOUT LONG-TERM CURRENT USE OF INSULIN (HCC): ICD-10-CM

## 2017-11-14 PROCEDURE — 99213 OFFICE O/P EST LOW 20 MIN: CPT | Performed by: INTERNAL MEDICINE

## 2017-11-14 RX ORDER — FUROSEMIDE 20 MG/1
20 TABLET ORAL 2 TIMES DAILY
Qty: 60 TABLET | Refills: 5 | Status: SHIPPED | OUTPATIENT
Start: 2017-11-14 | End: 2018-05-11 | Stop reason: SDUPTHER

## 2017-11-14 RX ORDER — ALBUTEROL SULFATE 90 UG/1
2 AEROSOL, METERED RESPIRATORY (INHALATION) EVERY 4 HOURS PRN
Qty: 1 INHALER | Refills: 0 | Status: SHIPPED | OUTPATIENT
Start: 2017-11-14 | End: 2018-08-14

## 2017-11-14 NOTE — PROGRESS NOTES
Subjective   General Keith Ruvalcaba Jr. is a 82 y.o. male       Patient is in for recheck after recent hospitalization for dyspnea, weakness and UTI.  He was treated with IV Antibiotics for UTI.Urine culture grew E.coli sensitive to multiple antibiotics. Blood cultures are negative.    Patient states that he is still having difficulty breathing and also complains of edema in lower extremities.  He denies any cough or sputum production. Deneis any fever or chills.  CTA chest done during hospitalization did not show any evidence of PE.  ECHO was performed and showed preserved EF and evidence of diastolic dysfunction.  Patient states that several of his medications where discontinued during hospitalization in [place] after laminectomy. He is off Lisinopril and off Lasix.    His glucose has been fairly well controlled on Januvia with readings between 100 and 150.    Past Medical History:   Diagnosis Date   • Acquired hallux rigidus    • Anemia    • Arthropathy of lumbar facet joint    • Carpal tunnel syndrome    • CHF (congestive heart failure)    • Chronic diastolic congestive heart failure    • Chronic kidney disease    • Chronic kidney disease, stage II (mild)     Chronic kidney disease stage 2   • Chronic obstructive lung disease    • Chronic renal failure    • Coronary artery disease    • Cortical senile cataract    • Diabetes mellitus    • Diabetes mellitus type 2, noninsulin dependent     diabetes mellitus type 2, non-insulin treated, Medication treatment plan: oral diabetic medication   • Diabetes mellitus without complication    • Diastolic dysfunction    • Diastolic heart failure    • Essential hypertension     difficult to control      • Hip pain    • History of echocardiogram 08/19/2015    Echocardiogram W/ color flow 59327 (1) - Mild to moderate LVH with mild left atrial enlargement and normal aortic root.CLVH,preserved LV systolic function with Ef of 55%.Diastolic dysfunction.MV intact.AV thickened.Aortic  sclerosis.   • Hyperlipidemia    • Influenza vaccine administered 10/02/2015    INFLUENZA IMMUN ADMIN OR PREV RECV'D  (1) - Ordered By: DIONNE MANDUJANO ([facility])    • Insomnia    • Insomnia     Other insomnia   • Joint pain    • Low back pain    • Muscle strain    • Nuclear cataract    • Pneumococcal vaccination given 07/22/2016    PNEUMOC VAC/ADMIN/RCVD 4040F (3) - Ordered By: DIONNE MANDUJANO ([facility])   • Pure hypercholesterolemia    • Sedentary lifestyle      Past Surgical History:   Procedure Laterality Date   • CARDIAC ABLATION     • CARDIAC CATHETERIZATION  02/04/1986    Cardiac cath 23214 (1) - normal left heart catherization,non cardiac chest pain   • CARPAL TUNNEL RELEASE  10/19/2015    Carpal tunnel surgery (1) - Release of left carpal tunnel   • CATARACT EXTRACTION W/ INTRAOCULAR LENS IMPLANT Left 3/31/2017    Procedure: REMOVE CATARACT AND IMPLANT INRAOCULAR LENS LEFT EYE;  Surgeon: Hector Navarrete MD;  Location: [facility] OR;  Service:    • CATARACT EXTRACTION W/ INTRAOCULAR LENS IMPLANT Right 4/14/2017    Procedure: REMOVE CATARACT AND IMPLANT INTRAOCULAR LENS RIGHT EYE;  Surgeon: Hector Navarrete MD;  Location: [facility] OR;  Service:    • ENDOSCOPY  08/05/2013    Colon endoscopy 05224 (2) - Hemorrhoids found.   • INGUINAL HERNIA REPAIR Right 06/07/1968    Inguinal hernia, repair (2)   • INJECTION OF MEDICATION  09/06/2012    Albuterol (2u) (1) - Ordered By: LIDYA STEINER ([facility])    • INJECTION OF MEDICATION  09/06/2012    Atrovent (1) - Ordered By: LIDYA STEINER ([facility])    • INJECTION OF MEDICATION  02/10/2012    Depo Medrol (Methylprednisone) (3) - Ordered By: HEATHER PAK ([facility])    • INJECTION OF MEDICATION  02/09/2014    Kenalog (4) - Ordered By: DIMAS ANDRADE ([facility])    • LUMBAR LAMINECTOMY  2007    Lumbar laminectomy (1)   • MANDIBLE FRACTURE SURGERY  10/11/1981    Treat nose/jaw fracture (1) - bilateral open reduction of fracture of mandible   • OTHER SURGICAL  HISTORY  09/24/2011    Drain/Inject Major Joint 20610 (1) - Ordered By: KARLOS HERNANDEZ ([facility])    • OTHER SURGICAL HISTORY      Insert IVC filter 14464 (1)   • OTHER SURGICAL HISTORY  10/11/2011    SPIROMETRY 80504 (1) - Ordered By: HEATHER PAK ([facility])   • TOTAL HIP ARTHROPLASTY Right        Social History   Substance Use Topics   • Smoking status: Former Smoker     Quit date: 1999   • Smokeless tobacco: Never Used   • Alcohol use No     Family History   Problem Relation Age of Onset   • Diabetes Other    • Coronary artery disease Neg Hx      Allergies   Allergen Reactions   • Aldactone [Spironolactone] Other (See Comments)     Hyponatremia and hyperkalemia     Current Outpatient Prescriptions on File Prior to Visit   Medication Sig Dispense Refill   • albuterol (PROVENTIL HFA;VENTOLIN HFA) 108 (90 BASE) MCG/ACT inhaler Inhale 2 puffs Every 4 (Four) Hours As Needed for Wheezing. 1 inhaler 0   • aspirin 81 MG EC tablet Take 81 mg by mouth daily.     • docusate sodium (COLACE) 100 MG capsule Take 100 mg by mouth.     • ferrous sulfate 325 (65 FE) MG tablet TAKE ONE TABLET BY MOUTH DAILY WITH BREAKFAST 60 tablet 3   • guanFACINE (TENEX) 1 MG tablet Take 1 mg by mouth.     • HYDROcodone-acetaminophen (NORCO) 5-325 MG per tablet Take 1 tablet by mouth.     • levoFLOXacin (LEVAQUIN) 500 MG tablet Take 1 tablet by mouth Daily for 10 days. 10 tablet 0   • pravastatin (PRAVACHOL) 40 MG tablet TAKE ONE TABLET BY MOUTH DAILY 30 tablet 3   • SITagliptin (JANUVIA) 50 MG tablet Take 1 tablet by mouth Daily. 30 tablet 5   • TRUE METRIX BLOOD GLUCOSE TEST test strip TEST BLOOD SUGAR ONCE DAILY (LOT [id number] EXP 03/31/2018) 100 each 1   • TRUEPLUS LANCETS 30G misc 1 Device Daily. TEST 100 each 3     No current facility-administered medications on file prior to visit.      Current outpatient and discharge medications have been reconciled for the patient.  Ada Cloud MD    Review of Systems   Constitutional: Negative for  chills and fever.   Respiratory: Positive for shortness of breath. Negative for cough and chest tightness.    Cardiovascular: Positive for leg swelling. Negative for chest pain and palpitations.   Gastrointestinal: Negative for abdominal pain, constipation and diarrhea.   Endocrine: Negative for cold intolerance, heat intolerance, polydipsia and polyuria.   Musculoskeletal: Positive for gait problem.   Neurological: Negative for dizziness and light-headedness.       Objective   Physical Exam   Constitutional: He appears well-developed and well-nourished.   HENT:   Head: Normocephalic and atraumatic.   Nose: Nose normal.   Mouth/Throat: Oropharynx is clear and moist.   Eyes: Conjunctivae are normal.   Neck: Neck supple. No JVD present.   Cardiovascular: Normal rate and regular rhythm.    Bilateral lower extremities edema   Pulmonary/Chest: Effort normal and breath sounds normal.   Abdominal: Soft. Bowel sounds are normal. He exhibits no mass. There is no tenderness.   Skin: Skin is warm and dry. No rash noted.   Psychiatric: He has a normal mood and affect. His behavior is normal. Judgment and thought content normal.   Nursing note and vitals reviewed.          Patient Active Problem List   Diagnosis   • Chronic diastolic congestive heart failure   • Hypertension   • Coronary artery disease   • Chronic kidney disease   • H/O total hip arthroplasty   • Lumbar disc disease   • Osteoarthritis of multiple joints   • Iron deficiency   • Nuclear cataract   • Cortical age-related cataract   • Diabetes mellitus without complication   • Carotid stenosis, asymptomatic   • Type 2 diabetes mellitus without complication, without long-term current use of insulin   • Elevated troponin         Admission on 11/08/2017, Discharged on 11/10/2017   Component Date Value Ref Range Status   • Glucose 11/08/2017 169* 60 - 100 mg/dL Final   • BUN 11/08/2017 24* 7 - 21 mg/dL Final   • Creatinine 11/08/2017 1.13  0.70 - 1.30 mg/dL Final   •  Sodium 11/08/2017 142  137 - 145 mmol/L Final   • Potassium 11/08/2017 3.8  3.5 - 5.1 mmol/L Final   • Chloride 11/08/2017 104  95 - 110 mmol/L Final   • CO2 11/08/2017 23.0  22.0 - 31.0 mmol/L Final   • Calcium 11/08/2017 9.6  8.4 - 10.2 mg/dL Final   • Total Protein 11/08/2017 6.9  6.3 - 8.6 g/dL Final   • Albumin 11/08/2017 4.00  3.40 - 4.80 g/dL Final   • ALT (SGPT) 11/08/2017 46  21 - 72 U/L Final   • AST (SGOT) 11/08/2017 39  17 - 59 U/L Final   • Alkaline Phosphatase 11/08/2017 101  38 - 126 U/L Final   • Total Bilirubin 11/08/2017 1.2  0.2 - 1.3 mg/dL Final   • eGFR   Amer 11/08/2017 75  42 - 98 mL/min/1.73 Final   • Globulin 11/08/2017 2.9  2.3 - 3.5 gm/dL Final   • A/G Ratio 11/08/2017 1.4  1.1 - 1.8 g/dL Final   • BUN/Creatinine Ratio 11/08/2017 21.2  7.0 - 25.0 Final   • Anion Gap 11/08/2017 15.0  5.0 - 15.0 mmol/L Final   • WBC 11/08/2017 12.47* 3.20 - 9.80 10*3/mm3 Final   • RBC 11/08/2017 3.87* 4.37 - 5.74 10*6/mm3 Final   • Hemoglobin 11/08/2017 11.4* 13.7 - 17.3 g/dL Final   • Hematocrit 11/08/2017 33.9* 39.0 - 49.0 % Final   • MCV 11/08/2017 87.6  80.0 - 98.0 fL Final   • MCH 11/08/2017 29.5  26.5 - 34.0 pg Final   • MCHC 11/08/2017 33.6  31.5 - 36.3 g/dL Final   • RDW 11/08/2017 13.8  11.5 - 14.5 % Final   • RDW-SD 11/08/2017 43.9  35.1 - 43.9 fl Final   • MPV 11/08/2017 9.1  8.0 - 12.0 fL Final   • Platelets 11/08/2017 341  150 - 450 10*3/mm3 Final   • Neutrophil % 11/08/2017 75.1  37.0 - 80.0 % Final   • Lymphocyte % 11/08/2017 7.3* 10.0 - 50.0 % Final   • Monocyte % 11/08/2017 13.4* 0.0 - 12.0 % Final   • Eosinophil % 11/08/2017 0.2  0.0 - 7.0 % Final   • Basophil % 11/08/2017 0.2  0.0 - 2.0 % Final   • Immature Grans % 11/08/2017 3.8* 0.0 - 0.5 % Final   • Neutrophils, Absolute 11/08/2017 9.35* 2.00 - 8.60 10*3/mm3 Final   • Lymphocytes, Absolute 11/08/2017 0.91  0.60 - 4.20 10*3/mm3 Final   • Monocytes, Absolute 11/08/2017 1.67* 0.00 - 0.90 10*3/mm3 Final   • Eosinophils, Absolute  11/08/2017 0.03  0.00 - 0.70 10*3/mm3 Final   • Basophils, Absolute 11/08/2017 0.03  0.00 - 0.20 10*3/mm3 Final   • Immature Grans, Absolute 11/08/2017 0.48* 0.00 - 0.02 10*3/mm3 Final   • Extra Tube 11/08/2017 hold for add-on   Final   • Extra Tube 11/08/2017 Hold for add-ons.   Final   • Extra Tube 11/08/2017 hold for add-on   Final   • Extra Tube 11/08/2017 Hold for add-ons.   Final   • Color, UA 11/08/2017 Dark Yellow  Yellow, Straw, Dark Yellow, Sachi Final   • Appearance, UA 11/08/2017 Cloudy* Clear Final   • pH, UA 11/08/2017 <=5.0  5.0 - 9.0 Final   • Specific Gravity, UA 11/08/2017 1.027  1.003 - 1.030 Final   • Glucose, UA 11/08/2017 Negative  Negative Final   • Ketones, UA 11/08/2017 15 mg/dL (1+)* Negative Final   • Bilirubin, UA 11/08/2017 Small (1+)* Negative Final   • Blood, UA 11/08/2017 Large (3+)* Negative Final   • Protein, UA 11/08/2017 30 mg/dL (1+)* Negative Final   • Leuk Esterase, UA 11/08/2017 Moderate (2+)* Negative Final   • Nitrite, UA 11/08/2017 Positive* Negative Final   • Urobilinogen, UA 11/08/2017 4.0 E.U./dL* 0.2 - 1.0 E.U./dL Final   • Troponin I 11/08/2017 0.050* <=0.034 ng/mL Final   • proBNP 11/08/2017 363.0  0.0 - 1800.0 pg/mL Final   • RBC, UA 11/08/2017 3-5* None Seen /HPF Final   • WBC, UA 11/08/2017 Too Numerous to Count* None Seen, 0-2, 3-5 /HPF Final   • Bacteria, UA 11/08/2017 4+* None Seen /HPF Final   • Squamous Epithelial Cells, UA 11/08/2017 None Seen  None Seen, 0-2 /HPF Final   • Hyaline Casts, UA 11/08/2017 13-20  None Seen /LPF Final   • Methodology 11/08/2017 Manual Light Microscopy   Final   • Urine Culture 11/08/2017 >100,000 CFU/mL Escherichia coli*  Final   • Troponin I 11/08/2017 0.056* <=0.034 ng/mL Final   • Troponin I 11/08/2017 0.056* <=0.034 ng/mL Final   • Creatine Kinase 11/08/2017 110  55 - 170 U/L Final   • CKMB 11/08/2017 1.78  0.00 - 5.00 ng/mL Final   • Creatine Kinase 11/08/2017 97  55 - 170 U/L Final   • CKMB 11/08/2017 1.47  0.00 - 5.00 ng/mL  Final   • Target HR (85%) 11/08/2017 118  bpm Final   • Max. Pred. HR (100%) 11/08/2017 139  bpm Final   • Echo EF Estimated 11/08/2017 55  % Final   • EF(MOD-sp4) 11/08/2017 60  % Final   • Blood Culture 11/08/2017 No growth at 5 days   Final   • Blood Culture 11/08/2017 No growth at 5 days   Final   • Glucose 11/08/2017 120  70 - 130 mg/dL Final   • Troponin I 11/09/2017 0.057* <=0.034 ng/mL Final   • Glucose 11/08/2017 144* 70 - 130 mg/dL Final   • Glucose 11/09/2017 117* 60 - 100 mg/dL Final   • BUN 11/09/2017 25* 7 - 21 mg/dL Final   • Creatinine 11/09/2017 1.37* 0.70 - 1.30 mg/dL Final   • Sodium 11/09/2017 142  137 - 145 mmol/L Final   • Potassium 11/09/2017 3.7  3.5 - 5.1 mmol/L Final   • Chloride 11/09/2017 104  95 - 110 mmol/L Final   • CO2 11/09/2017 27.0  22.0 - 31.0 mmol/L Final   • Calcium 11/09/2017 8.9  8.4 - 10.2 mg/dL Final   • eGFR   Amer 11/09/2017 60  42 - 98 mL/min/1.73 Final   • BUN/Creatinine Ratio 11/09/2017 18.2  7.0 - 25.0 Final   • Anion Gap 11/09/2017 11.0  5.0 - 15.0 mmol/L Final   • WBC 11/09/2017 11.68* 3.20 - 9.80 10*3/mm3 Final   • RBC 11/09/2017 3.48* 4.37 - 5.74 10*6/mm3 Final   • Hemoglobin 11/09/2017 10.2* 13.7 - 17.3 g/dL Final   • Hematocrit 11/09/2017 30.2* 39.0 - 49.0 % Final   • MCV 11/09/2017 86.8  80.0 - 98.0 fL Final   • MCH 11/09/2017 29.3  26.5 - 34.0 pg Final   • MCHC 11/09/2017 33.8  31.5 - 36.3 g/dL Final   • RDW 11/09/2017 13.6  11.5 - 14.5 % Final   • RDW-SD 11/09/2017 43.5  35.1 - 43.9 fl Final   • MPV 11/09/2017 8.8  8.0 - 12.0 fL Final   • Platelets 11/09/2017 303  150 - 450 10*3/mm3 Final   • Extra Tube 11/09/2017 Hold for add-ons.   Final   • Neutrophil % 11/09/2017 82.0* 37.0 - 80.0 % Final   • Lymphocyte % 11/09/2017 6.0* 10.0 - 50.0 % Final   • Monocyte % 11/09/2017 9.0  0.0 - 12.0 % Final   • Metamyelocyte % 11/09/2017 1.0* 0.0 - 0.0 % Final   • Myelocyte % 11/09/2017 1.0* 0.0 - 0.0 % Final   • Atypical Lymphocyte % 11/09/2017 1.0  0.0 - 5.0 %  Final   • Neutrophils Absolute 11/09/2017 9.58* 2.00 - 8.60 10*3/mm3 Final   • Lymphocytes Absolute 11/09/2017 0.70  0.60 - 4.20 10*3/mm3 Final   • Monocytes Absolute 11/09/2017 1.05* 0.00 - 0.90 10*3/mm3 Final   • Polychromasia 11/09/2017 Slight/1+  None Seen Final   • Toxic Granulation 11/09/2017 Slight/1+  None Seen Final   • Vacuolated Neutrophils 11/09/2017 Slight/1+  None Seen Final   • Giant Platelets 11/09/2017 Slight/1+  None Seen Final   • Glucose 11/09/2017 119  70 - 130 mg/dL Final   • Troponin I 11/09/2017 0.043* <=0.034 ng/mL Final   • Glucose 11/09/2017 169* 70 - 130 mg/dL Final   • Glucose 11/09/2017 177* 70 - 130 mg/dL Final   • Glucose 11/09/2017 121  70 - 130 mg/dL Final   • Troponin I 11/10/2017 0.047* <=0.034 ng/mL Final   • Glucose 11/10/2017 128  70 - 130 mg/dL Final   • Glucose 11/10/2017 134* 70 - 130 mg/dL Final   Transcribe Orders on 08/23/2017   Component Date Value Ref Range Status   • Total Protein 08/23/2017 6.9  6.3 - 8.6 g/dL Final   • Albumin 08/23/2017 4.30  3.40 - 4.80 g/dL Final   • ALT (SGPT) 08/23/2017 27  21 - 72 U/L Final   • AST (SGOT) 08/23/2017 30  17 - 59 U/L Final   • Alkaline Phosphatase 08/23/2017 54  38 - 126 U/L Final   • Total Bilirubin 08/23/2017 0.7  0.2 - 1.3 mg/dL Final   • Bilirubin, Direct 08/23/2017 0.0  0.0 - 0.3 mg/dL Final   • Bilirubin, Indirect 08/23/2017 0.3  0.0 - 1.1 mg/dL Final   • Magnesium 08/23/2017 2.0  1.6 - 2.3 mg/dL Final   • Copper 08/23/2017 114  72 - 166 ug/dL Final   • Vitamin B1, Whole Blood 08/23/2017 88.6  66.5 - 200.0 nmol/L Final   • Vitamin B-12 08/23/2017 242  239 - 931 pg/mL Final   • 25 Hydroxy, Vitamin D 08/23/2017 40.4  30.0 - 100.0 ng/ml Final   • Folate 08/23/2017 9.78  2.76 - 21.00 ng/mL Final   • Hemoglobin A1C 08/23/2017 6.6* 4 - 5.6 % Final   • TSH 08/23/2017 0.990  0.460 - 4.680 mIU/mL Final   • Iron 08/23/2017 37* 49 - 181 mcg/dL Final   Office Visit on 08/07/2017   Component Date Value Ref Range Status   • Total  Cholesterol 09/11/2017 169  0 - 199 mg/dL Final   • Triglycerides 09/11/2017 61  20 - 199 mg/dL Final   • HDL Cholesterol 09/11/2017 66  60 - 200 mg/dL Final   • LDL Cholesterol  09/11/2017 82  1 - 129 mg/dL Final   • LDL/HDL Ratio 09/11/2017 1.38  0.00 - 3.55 Final   • Glucose 09/11/2017 142* 60 - 100 mg/dL Final   • BUN 09/11/2017 13  7 - 21 mg/dL Final   • Creatinine 09/11/2017 1.17  0.70 - 1.30 mg/dL Final   • Sodium 09/11/2017 139  137 - 145 mmol/L Final   • Potassium 09/11/2017 3.9  3.5 - 5.1 mmol/L Final   • Chloride 09/11/2017 100  95 - 110 mmol/L Final   • CO2 09/11/2017 28.0  22.0 - 31.0 mmol/L Final   • Calcium 09/11/2017 9.8  8.4 - 10.2 mg/dL Final   • Total Protein 09/11/2017 7.1  6.3 - 8.6 g/dL Final   • Albumin 09/11/2017 4.40  3.40 - 4.80 g/dL Final   • ALT (SGPT) 09/11/2017 29  21 - 72 U/L Final   • AST (SGOT) 09/11/2017 23  17 - 59 U/L Final   • Alkaline Phosphatase 09/11/2017 65  38 - 126 U/L Final   • Total Bilirubin 09/11/2017 0.6  0.2 - 1.3 mg/dL Final   • eGFR   Amer 09/11/2017 73  42 - 98 mL/min/1.73 Final   • Globulin 09/11/2017 2.7  2.3 - 3.5 gm/dL Final   • A/G Ratio 09/11/2017 1.6  1.1 - 1.8 g/dL Final   • BUN/Creatinine Ratio 09/11/2017 11.1  7.0 - 25.0 Final   • Anion Gap 09/11/2017 11.0  5.0 - 15.0 mmol/L Final   • Hemoglobin A1C 09/11/2017 6.6* 4 - 5.6 % Final   Lab on 07/25/2017   Component Date Value Ref Range Status   • PSA 07/25/2017 0.437  0.000 - 4.000 ng/mL Final   Lab on 06/01/2017   Component Date Value Ref Range Status   • Glucose 06/01/2017 78  60 - 100 mg/dL Final   • BUN 06/01/2017 16  7 - 21 mg/dL Final   • Creatinine 06/01/2017 1.13  0.70 - 1.30 mg/dL Final   • Sodium 06/01/2017 137  137 - 145 mmol/L Final   • Potassium 06/01/2017 4.0  3.5 - 5.1 mmol/L Final   • Chloride 06/01/2017 99  95 - 110 mmol/L Final   • CO2 06/01/2017 27.0  22.0 - 31.0 mmol/L Final   • Calcium 06/01/2017 9.0  8.4 - 10.2 mg/dL Final   • Total Protein 06/01/2017 6.5  6.3 - 8.6 g/dL Final    • Albumin 06/01/2017 3.90  3.40 - 4.80 g/dL Final   • ALT (SGPT) 06/01/2017 31  21 - 72 U/L Final   • AST (SGOT) 06/01/2017 34  17 - 59 U/L Final   • Alkaline Phosphatase 06/01/2017 62  38 - 126 U/L Final   • Total Bilirubin 06/01/2017 0.5  0.2 - 1.3 mg/dL Final   • eGFR   Amer 06/01/2017 76  42 - 98 mL/min/1.73 Final   • Globulin 06/01/2017 2.6  2.3 - 3.5 gm/dL Final   • A/G Ratio 06/01/2017 1.5  1.1 - 1.8 g/dL Final   • BUN/Creatinine Ratio 06/01/2017 14.2  7.0 - 25.0 Final   • Anion Gap 06/01/2017 11.0  5.0 - 15.0 mmol/L Final   • 25 Hydroxy, Vitamin D 06/01/2017 22.8* 30.0 - 100.0 ng/ml Final   Admission on 05/26/2017, Discharged on 05/26/2017   Component Date Value Ref Range Status   • Glucose 05/26/2017 104* 60 - 100 mg/dL Final   • BUN 05/26/2017 12  7 - 21 mg/dL Final   • Creatinine 05/26/2017 1.09  0.70 - 1.30 mg/dL Final   • Sodium 05/26/2017 139  137 - 145 mmol/L Final   • Potassium 05/26/2017 4.1  3.5 - 5.1 mmol/L Final   • Chloride 05/26/2017 103  95 - 110 mmol/L Final   • CO2 05/26/2017 25.0  22.0 - 31.0 mmol/L Final   • Calcium 05/26/2017 8.9  8.4 - 10.2 mg/dL Final   • Total Protein 05/26/2017 6.6  6.3 - 8.6 g/dL Final   • Albumin 05/26/2017 3.80  3.40 - 4.80 g/dL Final   • ALT (SGPT) 05/26/2017 31  21 - 72 U/L Final   • AST (SGOT) 05/26/2017 25  17 - 59 U/L Final   • Alkaline Phosphatase 05/26/2017 64  38 - 126 U/L Final   • Total Bilirubin 05/26/2017 0.3  0.2 - 1.3 mg/dL Final   • eGFR   Amer 05/26/2017 79  42 - 98 mL/min/1.73 Final   • Globulin 05/26/2017 2.8  2.3 - 3.5 gm/dL Final   • A/G Ratio 05/26/2017 1.4  1.1 - 1.8 g/dL Final   • BUN/Creatinine Ratio 05/26/2017 11.0  7.0 - 25.0 Final   • Anion Gap 05/26/2017 11.0  5.0 - 15.0 mmol/L Final   • proBNP 05/26/2017 88.8  0.0 - 1800.0 pg/mL Final   • Troponin I 05/26/2017 <0.012  <=0.034 ng/mL Final   • Extra Tube 05/26/2017 hold for add-on   Final   • Extra Tube 05/26/2017 Hold for add-ons.   Final   • Extra Tube 05/26/2017 hold  for add-on   Final   • Extra Tube 05/26/2017 Hold for add-ons.   Final   • WBC 05/26/2017 3.29  3.20 - 9.80 10*3/mm3 Final   • RBC 05/26/2017 4.47  4.37 - 5.74 10*6/mm3 Final   • Hemoglobin 05/26/2017 13.5* 13.7 - 17.3 g/dL Final   • Hematocrit 05/26/2017 39.3  39.0 - 49.0 % Final   • MCV 05/26/2017 87.9  80.0 - 98.0 fL Final   • MCH 05/26/2017 30.2  26.5 - 34.0 pg Final   • MCHC 05/26/2017 34.4  31.5 - 36.3 g/dL Final   • RDW 05/26/2017 14.3  11.5 - 14.5 % Final   • RDW-SD 05/26/2017 46.5* 35.1 - 43.9 fl Final   • MPV 05/26/2017 8.3  8.0 - 12.0 fL Final   • Platelets 05/26/2017 297  150 - 450 10*3/mm3 Final   • Neutrophil % 05/26/2017 41.9  37.0 - 80.0 % Final   • Lymphocyte % 05/26/2017 27.4  10.0 - 50.0 % Final   • Monocyte % 05/26/2017 23.7* 0.0 - 12.0 % Final   • Eosinophil % 05/26/2017 5.2  0.0 - 7.0 % Final   • Basophil % 05/26/2017 1.2  0.0 - 2.0 % Final   • Immature Grans % 05/26/2017 0.6* 0.0 - 0.5 % Final   • Neutrophils, Absolute 05/26/2017 1.38* 2.00 - 8.60 10*3/mm3 Final   • Lymphocytes, Absolute 05/26/2017 0.90  0.60 - 4.20 10*3/mm3 Final   • Monocytes, Absolute 05/26/2017 0.78  0.00 - 0.90 10*3/mm3 Final   • Eosinophils, Absolute 05/26/2017 0.17  0.00 - 0.70 10*3/mm3 Final   • Basophils, Absolute 05/26/2017 0.04  0.00 - 0.20 10*3/mm3 Final   • Immature Grans, Absolute 05/26/2017 0.02  0.00 - 0.02 10*3/mm3 Final   • nRBC 05/26/2017 0.0  0.0 - 0.0 /100 WBC Final           Assessment   Diagnosis Plan   1. Acute on chronic diastolic heart failure  Ambulatory Referral to Cardiology   2. Essential hypertension     3. Stage 3 chronic kidney disease     4. Type 2 diabetes mellitus without complication, without long-term current use of insulin         Plan      Patient will be placed on Lasix 20 mg bid.  Fluid restriction.  Low sodium diet.  Referral to Heart failure Clinic.  He will continue current diabetic medications.      Follow up in 1 month.          This document has been electronically signed by  Ada Cloud MD on November 14, 2017 9:13 AM

## 2017-11-16 ENCOUNTER — OFFICE VISIT (OUTPATIENT)
Dept: CARDIOLOGY | Facility: CLINIC | Age: 82
End: 2017-11-16

## 2017-11-16 VITALS
HEIGHT: 67 IN | HEART RATE: 53 BPM | BODY MASS INDEX: 25.92 KG/M2 | SYSTOLIC BLOOD PRESSURE: 150 MMHG | DIASTOLIC BLOOD PRESSURE: 74 MMHG | WEIGHT: 165.13 LBS | OXYGEN SATURATION: 98 %

## 2017-11-16 DIAGNOSIS — R60.0 LOWER EXTREMITY EDEMA: ICD-10-CM

## 2017-11-16 DIAGNOSIS — I50.32 CHRONIC DIASTOLIC CONGESTIVE HEART FAILURE (HCC): Primary | Chronic | ICD-10-CM

## 2017-11-16 PROCEDURE — 96372 THER/PROPH/DIAG INJ SC/IM: CPT | Performed by: NURSE PRACTITIONER

## 2017-11-16 PROCEDURE — 99214 OFFICE O/P EST MOD 30 MIN: CPT | Performed by: NURSE PRACTITIONER

## 2017-11-16 RX ORDER — FUROSEMIDE 10 MG/ML
20 INJECTION INTRAMUSCULAR; INTRAVENOUS ONCE
Status: COMPLETED | OUTPATIENT
Start: 2017-11-16 | End: 2017-11-16

## 2017-11-16 RX ORDER — AMLODIPINE BESYLATE 10 MG/1
10 TABLET ORAL DAILY
Qty: 30 TABLET | Refills: 11 | Status: SHIPPED | OUTPATIENT
Start: 2017-11-16 | End: 2017-11-27

## 2017-11-16 RX ORDER — FUROSEMIDE 10 MG/ML
40 INJECTION INTRAMUSCULAR; INTRAVENOUS ONCE
Status: COMPLETED | OUTPATIENT
Start: 2017-11-16 | End: 2017-11-16

## 2017-11-16 RX ORDER — LISINOPRIL 20 MG/1
20 TABLET ORAL DAILY
Qty: 30 TABLET | Refills: 11 | Status: SHIPPED | OUTPATIENT
Start: 2017-11-16 | End: 2018-04-12 | Stop reason: DRUGHIGH

## 2017-11-16 RX ORDER — GABAPENTIN 300 MG/1
300 CAPSULE ORAL 2 TIMES DAILY
COMMUNITY
End: 2017-12-14 | Stop reason: SDUPTHER

## 2017-11-16 RX ADMIN — FUROSEMIDE 20 MG: 10 INJECTION INTRAMUSCULAR; INTRAVENOUS at 14:41

## 2017-11-16 RX ADMIN — FUROSEMIDE 40 MG: 10 INJECTION INTRAMUSCULAR; INTRAVENOUS at 14:41

## 2017-11-16 NOTE — PROGRESS NOTES
Subjective:     Leg Swelling (chief complaint )    Leg Swelling   Pertinent negatives include no abdominal pain, chest pain, chills or fever.   Congestive Heart Failure   Presents for follow-up visit. Associated symptoms include shortness of breath. Pertinent negatives include no abdominal pain, chest pain, chest pressure, claudication, edema, near-syncope, orthopnea, palpitations, paroxysmal nocturnal dyspnea or unexpected weight change. The symptoms have been stable. Compliance with total regimen is 51-75%. Compliance problems include adherence to exercise.  Compliance with diet is %. Compliance with exercise is 0-25%. Compliance with medications is %.     1. CAD, non-obstructive 2005  2. HFpEF - diastolic dysfunction  3. Hypertension  4. CKD, Stage 2 with H/O KIRK 08/2015  5. H/O Atrial Flutter S/P ablation per Dr Graves 2006  6. Prostate Cancer Salvador Score 6/Stage 2 S/P RT followed per Dr Gonzalez  7. DM, Type II  8. Anterior right total hip arthroplasty 04/25/2016    Mr. Ruvalcaba presents sooner than expected from primary care provider's office regarding leg swelling.  He recently underwent lumbar surgery 10/27/2017 in [place]. He was discharge 11/7/2017. Following discharge he had several medications that were recommended be stopped.  On reviewing discharge summary from [place], it was made note that his hospitalization was complicated by hypotension and acute kidney injury.  Her recieved IV fluids per nephrology consult and blood pressure medicine discontinued.   Blood pressure today is elevated higher than normal.  He does check his blood pressure at home & it has been elevated.  Surgeon in [place] also recommended discontinuing his gabapentin.    He is complaining of numbness and tingling in his lower extremities.  I recommended he resume his gabapentin, seeking out Dr. Ocasio for refills as needed.     Review of Systems   Constitution: Negative for chills, fever and unexpected weight change.    Cardiovascular: Negative for chest pain, claudication, near-syncope and palpitations.   Respiratory: Positive for shortness of breath.    Hematologic/Lymphatic: Does not bruise/bleed easily.   Gastrointestinal: Negative for abdominal pain.   Neurological: Negative for dizziness and light-headedness.     Past Medical History:   Diagnosis Date   • Acquired hallux rigidus    • Anemia    • Arthropathy of lumbar facet joint    • Carpal tunnel syndrome    • CHF (congestive heart failure)    • Chronic diastolic congestive heart failure    • Chronic kidney disease    • Chronic kidney disease, stage II (mild)     Chronic kidney disease stage 2   • Chronic obstructive lung disease    • Chronic renal failure    • Coronary artery disease    • Cortical senile cataract    • Diabetes mellitus    • Diabetes mellitus type 2, noninsulin dependent     diabetes mellitus type 2, non-insulin treated, Medication treatment plan: oral diabetic medication   • Diabetes mellitus without complication    • Diastolic dysfunction    • Diastolic heart failure    • Essential hypertension     difficult to control      • Hip pain    • History of echocardiogram 08/19/2015    Echocardiogram W/ color flow 12478 (1) - Mild to moderate LVH with mild left atrial enlargement and normal aortic root.CLVH,preserved LV systolic function with Ef of 55%.Diastolic dysfunction.MV intact.AV thickened.Aortic sclerosis.   • Hyperlipidemia    • Influenza vaccine administered 10/02/2015    INFLUENZA IMMUN ADMIN OR PREV RECV'D  (1) - Ordered By: DIONNE MANDUJANO ([facility])    • Insomnia    • Insomnia     Other insomnia   • Joint pain    • Low back pain    • Muscle strain    • Nuclear cataract    • Pneumococcal vaccination given 07/22/2016    PNEUMOC VAC/ADMIN/RCVD 4040F (3) - Ordered By: DIONNE MANDUJANO ([facility])   • Pure hypercholesterolemia    • Sedentary lifestyle      Past Surgical History:   Procedure Laterality Date   • CARDIAC ABLATION     • CARDIAC  CATHETERIZATION  02/04/1986    Cardiac cath 50924 (1) - normal left heart catherization,non cardiac chest pain   • CARPAL TUNNEL RELEASE  10/19/2015    Carpal tunnel surgery (1) - Release of left carpal tunnel   • CATARACT EXTRACTION W/ INTRAOCULAR LENS IMPLANT Left 3/31/2017    Procedure: REMOVE CATARACT AND IMPLANT INRAOCULAR LENS LEFT EYE;  Surgeon: Hector Navarrete MD;  Location: [facility] OR;  Service:    • CATARACT EXTRACTION W/ INTRAOCULAR LENS IMPLANT Right 4/14/2017    Procedure: REMOVE CATARACT AND IMPLANT INTRAOCULAR LENS RIGHT EYE;  Surgeon: Hector Navarrete MD;  Location: [facility] OR;  Service:    • ENDOSCOPY  08/05/2013    Colon endoscopy 20637 (2) - Hemorrhoids found.   • INGUINAL HERNIA REPAIR Right 06/07/1968    Inguinal hernia, repair (2)   • INJECTION OF MEDICATION  09/06/2012    Albuterol (2u) (1) - Ordered By: LIDYA STEINER ([facility])    • INJECTION OF MEDICATION  09/06/2012    Atrovent (1) - Ordered By: LIDYA STEINER ([facility])    • INJECTION OF MEDICATION  02/10/2012    Depo Medrol (Methylprednisone) (3) - Ordered By: HEATHER PAK ([facility])    • INJECTION OF MEDICATION  02/09/2014    Kenalog (4) - Ordered By: DIMAS ANDRADE ([facility])    • LUMBAR LAMINECTOMY  2007    Lumbar laminectomy (1)   • MANDIBLE FRACTURE SURGERY  10/11/1981    Treat nose/jaw fracture (1) - bilateral open reduction of fracture of mandible   • OTHER SURGICAL HISTORY  09/24/2011    Drain/Inject Major Joint 20610 (1) - Ordered By: KARLOS HERNANDEZ ([facility])    • OTHER SURGICAL HISTORY      Insert IVC filter 24090 (1)   • OTHER SURGICAL HISTORY  10/11/2011    SPIROMETRY 90259 (1) - Ordered By: HEATHER PAK ([facility])   • TOTAL HIP ARTHROPLASTY Right      Social History     Social History   • Marital status:      Spouse name: N/A   • Number of children: N/A   • Years of education: N/A     Social History Main Topics   • Smoking status: Former Smoker     Quit date: 1999   • Smokeless tobacco: Never Used   •  "Alcohol use No   • Drug use: No   • Sexual activity: Defer      Comment: Marital status:      Other Topics Concern   • None     Social History Narrative     Current Outpatient Prescriptions   Medication Sig Dispense Refill   • albuterol (PROVENTIL HFA;VENTOLIN HFA) 108 (90 Base) MCG/ACT inhaler Inhale 2 puffs Every 4 (Four) Hours As Needed for Wheezing. 1 inhaler 0   • aspirin 81 MG EC tablet Take 81 mg by mouth daily.     • docusate sodium (COLACE) 100 MG capsule Take 100 mg by mouth.     • ferrous sulfate 325 (65 FE) MG tablet TAKE ONE TABLET BY MOUTH DAILY WITH BREAKFAST 60 tablet 3   • furosemide (LASIX) 20 MG tablet Take 1 tablet by mouth 2 (Two) Times a Day. 60 tablet 5   • guanFACINE (TENEX) 1 MG tablet Take 1 mg by mouth.     • HYDROcodone-acetaminophen (NORCO) 5-325 MG per tablet Take 1 tablet by mouth.     • levoFLOXacin (LEVAQUIN) 500 MG tablet Take 1 tablet by mouth Daily for 10 days. 10 tablet 0   • pravastatin (PRAVACHOL) 40 MG tablet TAKE ONE TABLET BY MOUTH DAILY 30 tablet 3   • SITagliptin (JANUVIA) 50 MG tablet Take 1 tablet by mouth Daily. 30 tablet 5   • TRUE METRIX BLOOD GLUCOSE TEST test strip TEST BLOOD SUGAR ONCE DAILY (LOT [id number] EXP 03/31/2018) 100 each 1   • TRUEPLUS LANCETS 30G misc 1 Device Daily. TEST 100 each 3     No current facility-administered medications for this visit.      Objective:     Vitals:    11/16/17 1404   BP: 150/74   BP Location: Left arm   Patient Position: Sitting   Cuff Size: Adult   Pulse: 53   SpO2: 98%   Weight: 165 lb 2 oz (74.9 kg)   Height: 67\" (170.2 cm)        Physical Exam   Constitutional: He is oriented to person, place, and time. He appears well-nourished. No distress.   Neck: No JVD present.   Cardiovascular: Normal rate, normal heart sounds and intact distal pulses.    No murmur heard.  Pulmonary/Chest: Effort normal and breath sounds normal. No respiratory distress.   Abdominal: He exhibits no distension.   Musculoskeletal: He exhibits no " edema.   Neurological: He is alert and oriented to person, place, and time.   Skin: Skin is warm and dry. He is not diaphoretic.   Psychiatric: He has a normal mood and affect. His behavior is normal. Judgment and thought content normal.   Vitals reviewed.    Cardiographics  EKG Oct 30 2017 13:52:49  Vent. Rate : 079 BPM     Atrial Rate : 079 BPM     P-R Int : 192 ms          QRS Dur : 072 ms      QT Int : 336 ms       P-R-T Axes : 058 037 -37 degrees     QTc Int : 385 ms    Normal sinus rhythm  T wave abnormality, consider inferolateral ischemia  Abnormal ECG  When compared with ECG of 23-OCT-2017 14:18,  No significant change was found  Confirmed by Jorge Hudson MD ([id number]) on 10/31/2017 7:40:19 AM    Transthoracic Echocardiogram 10/31/3017 in [place]  Indication:  Hypotension  BP:           93/63    Findings       Left Ventricle:  The left ventricular chamber size is normal. Mild concentric left  ventricular hypertrophy is observed. The estimated ejection fraction is  65-70%. Abnormal left ventricular diastolic filling is observed,  consistent with impaired relaxation.      Left Atrium:  The left atrial chamber size is normal.     Right Ventricle:  The right ventricular cavity size is normal. The right ventricular  global systolic function is normal.     Right Atrium:  The right atrial cavity size is normal.     Aortic Valve:  Moderate aortic leaflet calcification is visualized. There is mild to  moderate aortic stenosis. The mean gradient of the aortic valve is 30  mmHg. The aortic valve area by velocity time interval is calculated at  1.84 cm2. There is no evidence of aortic regurgitation.     Mitral Valve:  The mitral valve leaflets appear normal. There is mild mitral  regurgitation.      Tricuspid Valve:  The tricuspid valve leaflets are normal.  There is mild tricuspid  regurgitation. The Right Ventricular Systolic Pressure is calculated at  35 mmHg.     Pulmonic Valve:  There is trace pulmonic  regurgitation present.     Pericardium:  There is no pericardial effusion.     Conclusions  1. Mild concentric left ventricular hypertrophy with thickened  papillary muscle is observed.   Hyperdynamic LV systolic function with an  estimated ejection fraction is 65-70%.  2. Abnormal left ventricular diastolic filling is observed, consistent  with impaired relaxation.   3. Aortic valve is trileaflet, moderately calcified with mild to  moderate aortic stenosis  4. Mild tricuspid regurgitation with RVSP 35 mm Hg    Echocardiogram: 08/26/2016  FINDINGS:  1. Both atria are normal in size.  2. The aortic valve is mildly sclerotic without any hemodynamically  significant aortic stenosis.  3. Mild concentric left ventricular hypertrophy with early diastolic  dysfunction. No evidence of any regional wall motion abnormality.  The estimated ejection fraction greater than 60%.  4. There is mild mitral annular calcification.  5. Normal right ventricular size and function.  6. On color flow Doppler, there is trace mitral and tricuspid  regurgitation without any hemodynamic significance.  7. No intracardiac mass, pericardial effusion, or cardiac thrombus  seen.  CLINICAL IMPRESSION:  1. Mild concentric left ventricular hypertrophy with early diastolic  dysfunction.  2. Estimated ejection fraction greater than 60%.  3. Mildly sclerotic aortic valve with mild mitral annular  calcification without any hemodynamic significant stenotic process.  4. On color flow Doppler, there is trace mitral and tricuspid  regurgitation without any hemodynamic significance.  5. No intracardiac mass, pericardial effusion, or cardiac thrombus  Seen.    Renal Artery U/S 11/1/2017  1. No color Doppler scan evidence of hemodynamically significant renal artery stenosis.  2. Elevated resistivity indices within the kidneys bilaterally which may be indicative of medical renal parenchymal disease.  3. Kidneys are slightly atrophic in size as detailed above.  4.  Urinary bladder poorly distended for assessment with suspected thickening the bladder wall which is nonspecific and may simply be accentuated by lack of distention or muscle hypertrophy..     Lab Results   Component Value Date    GLUCOSE 117 (H) 11/09/2017    CALCIUM 8.9 11/09/2017     11/09/2017    K 3.7 11/09/2017    CO2 27.0 11/09/2017     11/09/2017    BUN 25 (H) 11/09/2017    CREATININE 1.37 (H) (baseline 1.0) 11/09/2017    EGFRIFAFRI 60 11/09/2017    BCR 18.2 11/09/2017    ANIONGAP 11.0 11/09/2017     Lab Results   Component Value Date    WBC 11.68 (H) 11/09/2017    HGB 10.2 (L) 11/09/2017    HCT 30.2 (L) 11/09/2017    MCV 86.8 11/09/2017     11/09/2017     Lab Results   Component Value Date    CHOL 169 09/11/2017     Lab Results   Component Value Date    TRIG 61 09/11/2017    TRIG 67 08/26/2016    TRIG 157 08/27/2015     Lab Results   Component Value Date    HDL 66 09/11/2017    HDL 49 (L) 08/26/2016     Lab Results   Component Value Date    LDLCALC 79 08/26/2016    LDLCALC 96 08/27/2015    LDLCALC 100 06/11/2015     Lab Results   Component Value Date    TSH 0.990 08/23/2017   BNP        Ref Range & Units 11/08/2017     proBNP 0.0 - 1800.0 pg/mL 363.0             The following portions of the patient's history were reviewed and updated as appropriate: allergies, current medications, past family history, past medical history, past social history, past surgical history and problem list.     Assessment:   Apparently, some hypotension prior to hospital DC following back surgery prompted discontinuation of Lisinopril, Lasix, Norvasc, and gabapentin.   Mr. Ruvalcaba presented to PCP office yesterday for follow up. Contact was made with CHF clinic regarding leg edema.   He was seen today with 2-3+ non-pitting edema and mild SOA. Medications were resumed and lasix was injected SQ.      Diagnosis Plan   1. Chronic diastolic congestive heart failure  EF:55%. NYHA Class II, Stage C. Patient is currently  volume overloaded.  and in a well perfused physiologic state. Hemodynamics are acceptable  BETA-BLOCKER:  n/a           ACE/ARB: Lisinopril 20mg daily  ENTRESTO: no  DIURETIC: Lasix 20mg BID  ALDOSTERONT ANTAGONIST: Allergy  IMDUR/HYDRALAZINE: N/A  DIGOXIN: N/A  Fluid restriction: 1.5-1.8 L  Sodium restriction:2 grams    Recommended daily weight monitoring.  Recommended restricted dietary sodium intake not to exceed 2000 mg daily; FR 1.5-1.8 L daily  Discussed patient action plan for heart failure.  Recommended avoiding NSAIDs use.  Discussed warning signs requiring additional medical attention for heart failure.      DUE TO FINDINGS OF HYPERVOLEMIA IN THE SETTING OF HFpEF WE WILL PROCEED WITH LASIX 60 MG SUBCUTANEOUS INFUSION.   TIME: 1444  LASIX 60mg/6ML SUBCUTANEOUS right LOWER QUADRANT OF ABDOMEN INFUSED OVER 10 MINUTES FOLLOWED BY 0.9% NA+ CHLORIDE FLUSH OF 10ML INFUSION PER REGINA COULTER.       2. Essential hypertension  Lisinopril 20mg daily  Norvasc 10mg daily (hold for 2 days before beginning again)  Tenex 1mg nightly       3. Chronic kidney disease, stage 2 (mild)  Dr Libia AKINS tomorrow following Lasix injection        Follow up 10 days. TASHI tomorrow. Mr. Ruvalcaba will call this office to be seen sooner should leg edema not subside over the weekend with interventions.

## 2017-11-16 NOTE — PATIENT INSTRUCTIONS
Restart Lisinopril 20mg daily    Restart Lasix 20mg BID- tomorrow    Restart Norvasc on Saturday if BP over 120 systolic    Restart Gabapentin    Daily weights.     Elevate legs.     Increase activity

## 2017-11-17 ENCOUNTER — TELEPHONE (OUTPATIENT)
Dept: CARDIOLOGY | Facility: CLINIC | Age: 82
End: 2017-11-17

## 2017-11-17 ENCOUNTER — LAB (OUTPATIENT)
Dept: LAB | Facility: HOSPITAL | Age: 82
End: 2017-11-17

## 2017-11-17 DIAGNOSIS — R60.0 LOWER EXTREMITY EDEMA: ICD-10-CM

## 2017-11-17 DIAGNOSIS — I50.32 CHRONIC DIASTOLIC CONGESTIVE HEART FAILURE (HCC): Chronic | ICD-10-CM

## 2017-11-17 LAB
ANION GAP SERPL CALCULATED.3IONS-SCNC: 9 MMOL/L (ref 5–15)
BUN BLD-MCNC: 13 MG/DL (ref 7–21)
BUN/CREAT SERPL: 10.3 (ref 7–25)
CALCIUM SPEC-SCNC: 9.4 MG/DL (ref 8.4–10.2)
CHLORIDE SERPL-SCNC: 100 MMOL/L (ref 95–110)
CO2 SERPL-SCNC: 29 MMOL/L (ref 22–31)
CREAT BLD-MCNC: 1.26 MG/DL (ref 0.7–1.3)
GFR SERPL CREATININE-BSD FRML MDRD: 67 ML/MIN/1.73 (ref 42–98)
GLUCOSE BLD-MCNC: 114 MG/DL (ref 60–100)
POTASSIUM BLD-SCNC: 4.1 MMOL/L (ref 3.5–5.1)
SODIUM BLD-SCNC: 138 MMOL/L (ref 137–145)

## 2017-11-17 PROCEDURE — 80048 BASIC METABOLIC PNL TOTAL CA: CPT | Performed by: NURSE PRACTITIONER

## 2017-11-17 PROCEDURE — 36415 COLL VENOUS BLD VENIPUNCTURE: CPT

## 2017-11-17 NOTE — TELEPHONE ENCOUNTER
Called with BMP results. Weight is down. Swelling is better following lasix inj.    /80 without Norvasc. Continue to hold. Can DC at next visit if BP controlled.   Lisinopril 20mg , lasix 20mg BID

## 2017-11-27 ENCOUNTER — OFFICE VISIT (OUTPATIENT)
Dept: CARDIOLOGY | Facility: CLINIC | Age: 82
End: 2017-11-27

## 2017-11-27 VITALS
WEIGHT: 160.8 LBS | DIASTOLIC BLOOD PRESSURE: 90 MMHG | SYSTOLIC BLOOD PRESSURE: 155 MMHG | HEIGHT: 67 IN | OXYGEN SATURATION: 99 % | HEART RATE: 82 BPM | BODY MASS INDEX: 25.24 KG/M2

## 2017-11-27 DIAGNOSIS — R60.0 LOWER EXTREMITY EDEMA: ICD-10-CM

## 2017-11-27 DIAGNOSIS — I50.32 CHRONIC DIASTOLIC CONGESTIVE HEART FAILURE (HCC): Primary | Chronic | ICD-10-CM

## 2017-11-27 PROCEDURE — 99214 OFFICE O/P EST MOD 30 MIN: CPT | Performed by: NURSE PRACTITIONER

## 2017-11-27 RX ORDER — HYDRALAZINE HYDROCHLORIDE 25 MG/1
25 TABLET, FILM COATED ORAL 2 TIMES DAILY
Qty: 60 TABLET | Refills: 6 | Status: SHIPPED | OUTPATIENT
Start: 2017-11-27 | End: 2018-06-25 | Stop reason: SDUPTHER

## 2017-11-27 RX ORDER — PRAVASTATIN SODIUM 40 MG
TABLET ORAL
Qty: 30 TABLET | Refills: 3 | Status: SHIPPED | OUTPATIENT
Start: 2017-11-27 | End: 2018-04-12 | Stop reason: SDUPTHER

## 2017-11-27 NOTE — PATIENT INSTRUCTIONS
Lasix 2 tablets in morning for 4 days. Continue 1 tablet in afternoon. After 4 days, go back to regular dosing.    Add Hydralazine 25mg twice a day. Can stop Amlodipine 10mg.     Compression stockings- wear during the day. Off at night.

## 2017-11-27 NOTE — PROGRESS NOTES
Subjective:     Congestive Heart Failure    Congestive Heart Failure   Presents for follow-up visit. Associated symptoms include edema and shortness of breath. Pertinent negatives include no abdominal pain, chest pain, chest pressure, claudication, near-syncope, orthopnea, palpitations, paroxysmal nocturnal dyspnea or unexpected weight change. The symptoms have been worsening. Compliance with total regimen is 51-75%. Compliance problems include adherence to exercise.  Compliance with diet is %. Compliance with exercise is 0-25%. Compliance with medications is %.   Leg Swelling   This is a recurrent problem. The current episode started 1 to 4 weeks ago. The problem occurs constantly. The problem has been gradually worsening. Pertinent negatives include no abdominal pain, chest pain, chills or fever.     1. CAD, non-obstructive 2005  2. HFpEF - diastolic dysfunction  3. Hypertension  4. CKD, Stage 2 with H/O KIRK 08/2015  5. H/O Atrial Flutter S/P ablation per Dr Graves 2006  6. Prostate Cancer [place] Score 6/Stage 2 S/P RT followed per Dr Gonzalez  7. DM, Type II  8. Anterior right total hip arthroplasty 04/25/2016    Mr. Ruvalcaba presents sooner than expected from primary care provider's office regarding leg swelling.  He recently underwent lumbar surgery 10/27/2017 in [place]. He was discharge 11/7/2017. Following discharge he had several medications that were recommended be stopped.  On reviewing discharge summary from [place], it was made note that his hospitalization was complicated by hypotension and acute kidney injury.  Her recieved IV fluids per nephrology consult and blood pressure medicine discontinued.   Blood pressure today is elevated higher than normal.  He does check his blood pressure at home & it has been elevated.  Surgeon in [place] also recommended discontinuing his gabapentin.    He is complaining of numbness and tingling in his lower extremities.  I recommended he resume his  gabapentin, seeking out Dr. Ocasio for refills as needed.     Review of Systems   Constitution: Negative for chills, fever and unexpected weight change.   Cardiovascular: Negative for chest pain, claudication, near-syncope and palpitations.   Respiratory: Positive for shortness of breath.    Hematologic/Lymphatic: Does not bruise/bleed easily.   Gastrointestinal: Negative for abdominal pain.   Neurological: Negative for dizziness and light-headedness.     Past Medical History:   Diagnosis Date   • Acquired hallux rigidus    • Anemia    • Arthropathy of lumbar facet joint    • Carpal tunnel syndrome    • CHF (congestive heart failure)    • Chronic diastolic congestive heart failure    • Chronic kidney disease    • Chronic kidney disease, stage II (mild)     Chronic kidney disease stage 2   • Chronic obstructive lung disease    • Chronic renal failure    • Coronary artery disease    • Cortical senile cataract    • Diabetes mellitus    • Diabetes mellitus type 2, noninsulin dependent     diabetes mellitus type 2, non-insulin treated, Medication treatment plan: oral diabetic medication   • Diabetes mellitus without complication    • Diastolic dysfunction    • Diastolic heart failure    • Essential hypertension     difficult to control      • Hip pain    • History of echocardiogram 08/19/2015    Echocardiogram W/ color flow 52862 (1) - Mild to moderate LVH with mild left atrial enlargement and normal aortic root.CLVH,preserved LV systolic function with Ef of 55%.Diastolic dysfunction.MV intact.AV thickened.Aortic sclerosis.   • Hyperlipidemia    • Influenza vaccine administered 10/02/2015    INFLUENZA IMMUN ADMIN OR PREV RECV'D  (1) - Ordered By: DIONNE MANDUJANO ([facility])    • Insomnia    • Insomnia     Other insomnia   • Joint pain    • Low back pain    • Muscle strain    • Nuclear cataract    • Pneumococcal vaccination given 07/22/2016    PNEUMOC VAC/ADMIN/RCVD 4040F (3) - Ordered By: DIONNE MANDUJANO ([facility])   •  Pure hypercholesterolemia    • Sedentary lifestyle      Past Surgical History:   Procedure Laterality Date   • CARDIAC ABLATION     • CARDIAC CATHETERIZATION  02/04/1986    Cardiac cath 29520 (1) - normal left heart catherization,non cardiac chest pain   • CARPAL TUNNEL RELEASE  10/19/2015    Carpal tunnel surgery (1) - Release of left carpal tunnel   • CATARACT EXTRACTION W/ INTRAOCULAR LENS IMPLANT Left 3/31/2017    Procedure: REMOVE CATARACT AND IMPLANT INRAOCULAR LENS LEFT EYE;  Surgeon: Hector Navarrete MD;  Location: [facility];  Service:    • CATARACT EXTRACTION W/ INTRAOCULAR LENS IMPLANT Right 4/14/2017    Procedure: REMOVE CATARACT AND IMPLANT INTRAOCULAR LENS RIGHT EYE;  Surgeon: Hector Navarrete MD;  Location: [facility] OR;  Service:    • ENDOSCOPY  08/05/2013    Colon endoscopy 30364 (2) - Hemorrhoids found.   • INGUINAL HERNIA REPAIR Right 06/07/1968    Inguinal hernia, repair (2)   • INJECTION OF MEDICATION  09/06/2012    Albuterol (2u) (1) - Ordered By: LIDYA STEINER ([facility])    • INJECTION OF MEDICATION  09/06/2012    Atrovent (1) - Ordered By: LIDYA STEINER ([facility])    • INJECTION OF MEDICATION  02/10/2012    Depo Medrol (Methylprednisone) (3) - Ordered By: HEATHER PAK ([facility])    • INJECTION OF MEDICATION  02/09/2014    Kenalog (4) - Ordered By: DIMAS ANDRADE ([facility])    • LUMBAR LAMINECTOMY  2007    Lumbar laminectomy (1)   • MANDIBLE FRACTURE SURGERY  10/11/1981    Treat nose/jaw fracture (1) - bilateral open reduction of fracture of mandible   • OTHER SURGICAL HISTORY  09/24/2011    Drain/Inject Major Joint 20610 (1) - Ordered By: KARLOS HERNANDEZ ([facility])    • OTHER SURGICAL HISTORY      Insert IVC filter 94475 (1)   • OTHER SURGICAL HISTORY  10/11/2011    SPIROMETRY 62562 (1) - Ordered By: HEATHER PAK ([facility])   • TOTAL HIP ARTHROPLASTY Right      Social History     Social History   • Marital status:      Spouse name: N/A   • Number of children: N/A   • Years  "of education: N/A     Social History Main Topics   • Smoking status: Former Smoker     Quit date: 1999   • Smokeless tobacco: Never Used   • Alcohol use No   • Drug use: No   • Sexual activity: Defer      Comment: Marital status:      Other Topics Concern   • None     Social History Narrative     Current Outpatient Prescriptions   Medication Sig Dispense Refill   • albuterol (PROVENTIL HFA;VENTOLIN HFA) 108 (90 Base) MCG/ACT inhaler Inhale 2 puffs Every 4 (Four) Hours As Needed for Wheezing. 1 inhaler 0   • amLODIPine (NORVASC) 10 MG tablet Take 1 tablet by mouth Daily. 30 tablet 11   • aspirin 81 MG EC tablet Take 81 mg by mouth daily.     • docusate sodium (COLACE) 100 MG capsule Take 100 mg by mouth.     • ferrous sulfate 325 (65 FE) MG tablet TAKE ONE TABLET BY MOUTH DAILY WITH BREAKFAST 60 tablet 3   • furosemide (LASIX) 20 MG tablet Take 1 tablet by mouth 2 (Two) Times a Day. 60 tablet 5   • gabapentin (NEURONTIN) 300 MG capsule Take 300 mg by mouth 2 (Two) Times a Day.     • guanFACINE (TENEX) 1 MG tablet Take 1 mg by mouth Every Night.     • HYDROcodone-acetaminophen (NORCO) 5-325 MG per tablet Take 1 tablet by mouth.     • lisinopril (PRINIVIL,ZESTRIL) 20 MG tablet Take 1 tablet by mouth Daily. 30 tablet 11   • pravastatin (PRAVACHOL) 40 MG tablet TAKE ONE TABLET BY MOUTH DAILY 30 tablet 3   • SITagliptin (JANUVIA) 50 MG tablet Take 1 tablet by mouth Daily. 30 tablet 5   • TRUE METRIX BLOOD GLUCOSE TEST test strip TEST BLOOD SUGAR ONCE DAILY (LOT [id number] EXP 03/31/2018) 100 each 1   • TRUEPLUS LANCETS 30G misc 1 Device Daily. TEST 100 each 3     No current facility-administered medications for this visit.      Objective:     Vitals:    11/27/17 0950 11/27/17 1012   BP: (!) 190/88 155/90   BP Location: Left arm Right arm   Patient Position: Sitting    Cuff Size: Adult    Pulse: 82    SpO2: 99%    Weight: 160 lb 12.8 oz (72.9 kg)    Height: 67\" (170.2 cm)         Physical Exam   Constitutional: He is " oriented to person, place, and time. He appears well-nourished. No distress.   Neck: No JVD present.   Cardiovascular: Normal rate, normal heart sounds and intact distal pulses.    No murmur heard.  Pulmonary/Chest: Effort normal and breath sounds normal. No respiratory distress.   Abdominal: He exhibits no distension.   Musculoskeletal: He exhibits no edema.   Neurological: He is alert and oriented to person, place, and time.   Skin: Skin is warm and dry. He is not diaphoretic.   Psychiatric: He has a normal mood and affect. His behavior is normal. Judgment and thought content normal.   Vitals reviewed.    Cardiographics  EKG Oct 30 2017 13:52:49  Vent. Rate : 079 BPM     Atrial Rate : 079 BPM     P-R Int : 192 ms          QRS Dur : 072 ms      QT Int : 336 ms       P-R-T Axes : 058 037 -37 degrees     QTc Int : 385 ms    Normal sinus rhythm  T wave abnormality, consider inferolateral ischemia  Abnormal ECG  When compared with ECG of 23-OCT-2017 14:18,  No significant change was found  Confirmed by Jorge Hudson MD ([id number]) on 10/31/2017 7:40:19 AM    Transthoracic Echocardiogram 10/31/3017 in [place]  Indication:  Hypotension  BP:           93/63    Findings       Left Ventricle:  The left ventricular chamber size is normal. Mild concentric left  ventricular hypertrophy is observed. The estimated ejection fraction is  65-70%. Abnormal left ventricular diastolic filling is observed,  consistent with impaired relaxation.      Left Atrium:  The left atrial chamber size is normal.     Right Ventricle:  The right ventricular cavity size is normal. The right ventricular  global systolic function is normal.     Right Atrium:  The right atrial cavity size is normal.     Aortic Valve:  Moderate aortic leaflet calcification is visualized. There is mild to  moderate aortic stenosis. The mean gradient of the aortic valve is 30  mmHg. The aortic valve area by velocity time interval is calculated at  1.84 cm2. There is no  evidence of aortic regurgitation.     Mitral Valve:  The mitral valve leaflets appear normal. There is mild mitral  regurgitation.      Tricuspid Valve:  The tricuspid valve leaflets are normal.  There is mild tricuspid  regurgitation. The Right Ventricular Systolic Pressure is calculated at  35 mmHg.     Pulmonic Valve:  There is trace pulmonic regurgitation present.     Pericardium:  There is no pericardial effusion.     Conclusions  1. Mild concentric left ventricular hypertrophy with thickened  papillary muscle is observed.   Hyperdynamic LV systolic function with an  estimated ejection fraction is 65-70%.  2. Abnormal left ventricular diastolic filling is observed, consistent  with impaired relaxation.   3. Aortic valve is trileaflet, moderately calcified with mild to  moderate aortic stenosis  4. Mild tricuspid regurgitation with RVSP 35 mm Hg    Echocardiogram: 08/26/2016  FINDINGS:  1. Both atria are normal in size.  2. The aortic valve is mildly sclerotic without any hemodynamically  significant aortic stenosis.  3. Mild concentric left ventricular hypertrophy with early diastolic  dysfunction. No evidence of any regional wall motion abnormality.  The estimated ejection fraction greater than 60%.  4. There is mild mitral annular calcification.  5. Normal right ventricular size and function.  6. On color flow Doppler, there is trace mitral and tricuspid  regurgitation without any hemodynamic significance.  7. No intracardiac mass, pericardial effusion, or cardiac thrombus  seen.  CLINICAL IMPRESSION:  1. Mild concentric left ventricular hypertrophy with early diastolic  dysfunction.  2. Estimated ejection fraction greater than 60%.  3. Mildly sclerotic aortic valve with mild mitral annular  calcification without any hemodynamic significant stenotic process.  4. On color flow Doppler, there is trace mitral and tricuspid  regurgitation without any hemodynamic significance.  5. No intracardiac mass, pericardial  effusion, or cardiac thrombus  Seen.    Renal Artery U/S 11/1/2017  1. No color Doppler scan evidence of hemodynamically significant renal artery stenosis.  2. Elevated resistivity indices within the kidneys bilaterally which may be indicative of medical renal parenchymal disease.  3. Kidneys are slightly atrophic in size as detailed above.  4. Urinary bladder poorly distended for assessment with suspected thickening the bladder wall which is nonspecific and may simply be accentuated by lack of distention or muscle hypertrophy..     Lab Results   Component Value Date    GLUCOSE 117 (H) 11/09/2017    CALCIUM 8.9 11/09/2017     11/09/2017    K 3.7 11/09/2017    CO2 27.0 11/09/2017     11/09/2017    BUN 25 (H) 11/09/2017    CREATININE 1.37 (H) (baseline 1.0) 11/09/2017    EGFRIFAFRI 60 11/09/2017    BCR 18.2 11/09/2017    ANIONGAP 11.0 11/09/2017     Lab Results   Component Value Date    WBC 11.68 (H) 11/09/2017    HGB 10.2 (L) 11/09/2017    HCT 30.2 (L) 11/09/2017    MCV 86.8 11/09/2017     11/09/2017     Lab Results   Component Value Date    CHOL 169 09/11/2017     Lab Results   Component Value Date    TRIG 61 09/11/2017    TRIG 67 08/26/2016    TRIG 157 08/27/2015     Lab Results   Component Value Date    HDL 66 09/11/2017    HDL 49 (L) 08/26/2016     Lab Results   Component Value Date    LDLCALC 79 08/26/2016    LDLCALC 96 08/27/2015    LDLCALC 100 06/11/2015     Lab Results   Component Value Date    TSH 0.990 08/23/2017   BNP        Ref Range & Units 11/08/2017     proBNP 0.0 - 1800.0 pg/mL 363.0             The following portions of the patient's history were reviewed and updated as appropriate: allergies, current medications, past family history, past medical history, past social history, past surgical history and problem list.     Assessment:   Apparently, some hypotension prior to hospital DC following back surgery prompted discontinuation of Lisinopril, Lasix, Norvasc, and gabapentin.     Jordon presented to PCP office yesterday for follow up. Contact was made with CHF clinic regarding leg edema.   He was seen 11/16/ with 2-3+ non-pitting edema and mild SOA. Medications were resumed and lasix was injected SQ.     Today is a F/U for that visit. He still has edematous legs, but is without any SOA. He is using a walker now with good mobility.      Diagnosis Plan   1. Chronic diastolic congestive heart failure  EF:55%. NYHA Class II, Stage C. Patient is mildly volume overloaded and in a well perfused physiologic state. Hypertensive- 158/90 on recheck.   BETA-BLOCKER:  n/a           ACE/ARB: Lisinopril 20mg daily  ENTRESTO: no  DIURETIC: Lasix 20mg BID  ALDOSTERONT ANTAGONIST: Allergy  IMDUR/HYDRALAZINE: N/A  DIGOXIN: N/A  Fluid restriction: 1.5-1.8 L  Sodium restriction:2 grams    Recommended daily weight monitoring.  Recommended restricted dietary sodium intake not to exceed 2000 mg daily; FR 1.5-1.8 L daily  Discussed patient action plan for heart failure.  Recommended avoiding NSAIDs use.  Discussed warning signs requiring additional medical attention for heart failure.       2. Essential hypertension  Lisinopril 20mg daily  DC Norvasc. Add Hydralazine 25mg BID  Tenex 1mg nightly       3. Chronic kidney disease, stage 2 (mild)  Dr Reza       4. BLE edema Compression stockings placed today.   No open areas           Follow up next scheduled visit in 1 month with Maria.   Mr. Ruvalcaba will call this office to be seen sooner should leg edema not subside over the week with interventions.

## 2017-11-30 DIAGNOSIS — R31.29 HEMATURIA, MICROSCOPIC: Primary | ICD-10-CM

## 2017-11-30 RX ORDER — LEVOFLOXACIN 500 MG/1
500 TABLET, FILM COATED ORAL DAILY
Qty: 7 TABLET | Refills: 0 | Status: SHIPPED | OUTPATIENT
Start: 2017-11-30 | End: 2017-12-14

## 2017-12-04 RX ORDER — SITAGLIPTIN 50 MG/1
TABLET, FILM COATED ORAL
Qty: 30 TABLET | Refills: 5 | Status: SHIPPED | OUTPATIENT
Start: 2017-12-04 | End: 2018-07-02 | Stop reason: SDUPTHER

## 2017-12-14 ENCOUNTER — OFFICE VISIT (OUTPATIENT)
Dept: INTERNAL MEDICINE | Facility: CLINIC | Age: 82
End: 2017-12-14

## 2017-12-14 VITALS
BODY MASS INDEX: 23.89 KG/M2 | DIASTOLIC BLOOD PRESSURE: 60 MMHG | WEIGHT: 152.2 LBS | HEIGHT: 67 IN | SYSTOLIC BLOOD PRESSURE: 150 MMHG

## 2017-12-14 DIAGNOSIS — E11.42 DM TYPE 2 WITH DIABETIC PERIPHERAL NEUROPATHY (HCC): ICD-10-CM

## 2017-12-14 DIAGNOSIS — I10 ESSENTIAL HYPERTENSION: Primary | ICD-10-CM

## 2017-12-14 DIAGNOSIS — I50.32 CHRONIC DIASTOLIC CONGESTIVE HEART FAILURE (HCC): Chronic | ICD-10-CM

## 2017-12-14 PROCEDURE — 99213 OFFICE O/P EST LOW 20 MIN: CPT | Performed by: INTERNAL MEDICINE

## 2017-12-14 RX ORDER — GABAPENTIN 300 MG/1
300 CAPSULE ORAL 2 TIMES DAILY
Qty: 60 CAPSULE | Refills: 5 | Status: SHIPPED | OUTPATIENT
Start: 2017-12-14 | End: 2018-06-11 | Stop reason: SDUPTHER

## 2017-12-14 NOTE — PROGRESS NOTES
Subjective   General Keith Ruvalcaba Jr. is a 82 y.o. male       Patient is in for recheck on HTN, and CHF.  He is doing better. His breathing improved and he denies jaqueline chest pain or palpitations. He still has some swelling in his legs but has not been using support hose.  His weight  Is down 12lbs pounds.  His BP is still elevated at times. He is on Lisinopril and Hydralazine. Amlodipine was discontinued.  His renal function remains stable with creatinine between 1.2 and 1.3.    His glucose is well controlled on Januvia with FBS readings between 100 and 150.  He is trying to abide by ADA diet  HgbA1c 6.6 /09/11/2017/.  He is taking Pravachol for cholesterol and tolerates it well.   He has neuropathy and complains of numbness in his legs. Has been out of Gabapentin since surgery.    Past Medical History:   Diagnosis Date   • Acquired hallux rigidus    • Anemia    • Arthropathy of lumbar facet joint    • Carpal tunnel syndrome    • CHF (congestive heart failure)    • Chronic diastolic congestive heart failure    • Chronic kidney disease    • Chronic kidney disease, stage II (mild)     Chronic kidney disease stage 2   • Chronic obstructive lung disease    • Chronic renal failure    • Coronary artery disease    • Cortical senile cataract    • Diabetes mellitus    • Diabetes mellitus type 2, noninsulin dependent     diabetes mellitus type 2, non-insulin treated, Medication treatment plan: oral diabetic medication   • Diabetes mellitus without complication    • Diastolic dysfunction    • Diastolic heart failure    • Essential hypertension     difficult to control      • Hip pain    • History of echocardiogram 08/19/2015    Echocardiogram W/ color flow 76792 (1) - Mild to moderate LVH with mild left atrial enlargement and normal aortic root.CLVH,preserved LV systolic function with Ef of 55%.Diastolic dysfunction.MV intact.AV thickened.Aortic sclerosis.   • Hyperlipidemia    • Influenza vaccine administered 10/02/2015     INFLUENZA IMMUN ADMIN OR PREV RECV'D  (1) - Ordered By: DIONNE MANDUJANO ([facility])    • Insomnia    • Insomnia     Other insomnia   • Joint pain    • Low back pain    • Muscle strain    • Nuclear cataract    • Pneumococcal vaccination given 07/22/2016    PNEUMOC VAC/ADMIN/RCVD 4040F (3) - Ordered By: DIONNE MANDUJANO ([facility])   • Pure hypercholesterolemia    • Sedentary lifestyle      Past Surgical History:   Procedure Laterality Date   • CARDIAC ABLATION     • CARDIAC CATHETERIZATION  02/04/1986    Cardiac cath 87980 (1) - normal left heart catherization,non cardiac chest pain   • CARPAL TUNNEL RELEASE  10/19/2015    Carpal tunnel surgery (1) - Release of left carpal tunnel   • CATARACT EXTRACTION W/ INTRAOCULAR LENS IMPLANT Left 3/31/2017    Procedure: REMOVE CATARACT AND IMPLANT INRAOCULAR LENS LEFT EYE;  Surgeon: Hector Navarrete MD;  Location: [facility];  Service:    • CATARACT EXTRACTION W/ INTRAOCULAR LENS IMPLANT Right 4/14/2017    Procedure: REMOVE CATARACT AND IMPLANT INTRAOCULAR LENS RIGHT EYE;  Surgeon: Hector Navarrete MD;  Location: [facility] OR;  Service:    • ENDOSCOPY  08/05/2013    Colon endoscopy 19866 (2) - Hemorrhoids found.   • INGUINAL HERNIA REPAIR Right 06/07/1968    Inguinal hernia, repair (2)   • INJECTION OF MEDICATION  09/06/2012    Albuterol (2u) (1) - Ordered By: LIDYA STEINER ([facility])    • INJECTION OF MEDICATION  09/06/2012    Atrovent (1) - Ordered By: LIDYA STEINER ([facility])    • INJECTION OF MEDICATION  02/10/2012    Depo Medrol (Methylprednisone) (3) - Ordered By: HEATHER PAK ([facility])    • INJECTION OF MEDICATION  02/09/2014    Kenalog (4) - Ordered By: DIMAS ANDRADE ([facility])    • LUMBAR LAMINECTOMY  2007    Lumbar laminectomy (1)   • MANDIBLE FRACTURE SURGERY  10/11/1981    Treat nose/jaw fracture (1) - bilateral open reduction of fracture of mandible   • OTHER SURGICAL HISTORY  09/24/2011    Drain/Inject Major Joint 20610 (1) - Ordered By: KARLOS HERNANDEZ  ([facility])    • OTHER SURGICAL HISTORY      Insert IVC filter 44539 (1)   • OTHER SURGICAL HISTORY  10/11/2011    SPIROMETRY 75517 (1) - Ordered By: HEATHER PAK ([facility])   • TOTAL HIP ARTHROPLASTY Right        Social History   Substance Use Topics   • Smoking status: Former Smoker     Quit date: 1999   • Smokeless tobacco: Never Used   • Alcohol use No     Family History   Problem Relation Age of Onset   • Diabetes Other    • Coronary artery disease Neg Hx      Allergies   Allergen Reactions   • Aldactone [Spironolactone] Other (See Comments)     Hyponatremia and hyperkalemia     Current Outpatient Prescriptions on File Prior to Visit   Medication Sig Dispense Refill   • albuterol (PROVENTIL HFA;VENTOLIN HFA) 108 (90 Base) MCG/ACT inhaler Inhale 2 puffs Every 4 (Four) Hours As Needed for Wheezing. 1 inhaler 0   • aspirin 81 MG EC tablet Take 81 mg by mouth daily.     • docusate sodium (COLACE) 100 MG capsule Take 100 mg by mouth.     • ferrous sulfate 325 (65 FE) MG tablet TAKE ONE TABLET BY MOUTH DAILY WITH BREAKFAST 60 tablet 3   • furosemide (LASIX) 20 MG tablet Take 1 tablet by mouth 2 (Two) Times a Day. 60 tablet 5   • gabapentin (NEURONTIN) 300 MG capsule Take 300 mg by mouth 2 (Two) Times a Day.     • hydrALAZINE (APRESOLINE) 25 MG tablet Take 1 tablet by mouth 2 (Two) Times a Day. 60 tablet 6   • HYDROcodone-acetaminophen (NORCO) 5-325 MG per tablet Take 1 tablet by mouth.     • JANUVIA 50 MG tablet TAKE ONE TABLET BY MOUTH DAILY 30 tablet 5   • lisinopril (PRINIVIL,ZESTRIL) 20 MG tablet Take 1 tablet by mouth Daily. 30 tablet 11   • pravastatin (PRAVACHOL) 40 MG tablet TAKE ONE TABLET BY MOUTH DAILY 30 tablet 3   • TRUE METRIX BLOOD GLUCOSE TEST test strip TEST BLOOD SUGAR ONCE DAILY (LOT [id number] EXP 03/31/2018) 100 each 1   • TRUEPLUS LANCETS 30G misc 1 Device Daily. TEST 100 each 3   • [DISCONTINUED] levoFLOXacin (LEVAQUIN) 500 MG tablet Take 1 tablet by mouth Daily. 7 tablet 0     No current  facility-administered medications on file prior to visit.      Review of Systems   Constitutional: Negative for chills, fatigue and fever.   HENT: Negative for mouth sores and nosebleeds.    Eyes: Negative for photophobia and visual disturbance.   Respiratory: Negative for chest tightness and shortness of breath.    Cardiovascular: Positive for leg swelling. Negative for chest pain and palpitations.   Gastrointestinal: Negative for abdominal pain, constipation and diarrhea.   Genitourinary: Negative for difficulty urinating, flank pain and frequency.   Musculoskeletal: Negative for back pain and joint swelling.   Neurological: Positive for numbness. Negative for dizziness and light-headedness.   Psychiatric/Behavioral: Negative for sleep disturbance. The patient is not nervous/anxious.        Objective   Physical Exam   Constitutional: He is oriented to person, place, and time. He appears well-developed and well-nourished.   HENT:   Nose: Nose normal.   Mouth/Throat: Oropharynx is clear and moist.   Eyes: Conjunctivae and EOM are normal. Pupils are equal, round, and reactive to light.   Neck: Neck supple. No JVD present. No thyromegaly present.   Cardiovascular: Normal rate and regular rhythm.    No murmur heard.  Plus 1 edema in lower extremities   Pulmonary/Chest: Effort normal and breath sounds normal.   Abdominal: Soft. Bowel sounds are normal. He exhibits no mass.   Neurological: He is alert and oriented to person, place, and time. He has normal reflexes.   Skin: Skin is warm and dry.   Psychiatric: He has a normal mood and affect. His behavior is normal.   Nursing note and vitals reviewed.          Patient Active Problem List   Diagnosis   • Chronic diastolic congestive heart failure   • Hypertension   • Coronary artery disease   • Chronic kidney disease   • H/O total hip arthroplasty   • Lumbar disc disease   • Osteoarthritis of multiple joints   • Iron deficiency   • Nuclear cataract   • Cortical age-related  cataract   • Diabetes mellitus without complication   • Carotid stenosis, asymptomatic   • Type 2 diabetes mellitus without complication, without long-term current use of insulin   • Elevated troponin   • Lower extremity edema   • Hematuria, microscopic               Assessment   Diagnosis Plan   1. Essential hypertension     2. Chronic diastolic congestive heart failure     3. DM type 2 with diabetic peripheral neuropathy     4. Stage 3 chronic kidney disease         Plan      1. Patient will continue Lisinopril and Hydralazine.      Will restart Tenex 1 mg qhs.  2. Lasix is continued in current dosage.      Low sodium diet.       Fluid restriction.      Advised to start wearing support hose for swelling in lower extremities.  3. Januvia 50 mg daily.      ADA diet.     Gabapentin 300 mg bid for symptoms of neuropathy.     Medication side effects discussed with the patient.      Follow up in 3 months.          This document has been electronically signed by Ada Cloud MD on December 14, 2017 10:06 AM

## 2017-12-18 ENCOUNTER — EPISODE CHANGES (OUTPATIENT)
Dept: CASE MANAGEMENT | Facility: OTHER | Age: 82
End: 2017-12-18

## 2017-12-20 ENCOUNTER — TELEPHONE (OUTPATIENT)
Dept: CARDIOLOGY | Facility: CLINIC | Age: 82
End: 2017-12-20

## 2017-12-20 ENCOUNTER — APPOINTMENT (OUTPATIENT)
Dept: LAB | Facility: HOSPITAL | Age: 82
End: 2017-12-20

## 2017-12-20 ENCOUNTER — OFFICE VISIT (OUTPATIENT)
Dept: CARDIOLOGY | Facility: CLINIC | Age: 82
End: 2017-12-20

## 2017-12-20 VITALS
HEIGHT: 67 IN | DIASTOLIC BLOOD PRESSURE: 70 MMHG | WEIGHT: 152.44 LBS | OXYGEN SATURATION: 99 % | HEART RATE: 85 BPM | SYSTOLIC BLOOD PRESSURE: 148 MMHG | BODY MASS INDEX: 23.93 KG/M2

## 2017-12-20 DIAGNOSIS — I50.32 CHRONIC DIASTOLIC CONGESTIVE HEART FAILURE (HCC): Primary | Chronic | ICD-10-CM

## 2017-12-20 DIAGNOSIS — I10 ESSENTIAL HYPERTENSION: ICD-10-CM

## 2017-12-20 LAB
ANION GAP SERPL CALCULATED.3IONS-SCNC: 12 MMOL/L (ref 5–15)
BUN BLD-MCNC: 18 MG/DL (ref 7–21)
BUN/CREAT SERPL: 14.5 (ref 7–25)
CALCIUM SPEC-SCNC: 9.1 MG/DL (ref 8.4–10.2)
CHLORIDE SERPL-SCNC: 97 MMOL/L (ref 95–110)
CO2 SERPL-SCNC: 28 MMOL/L (ref 22–31)
CREAT BLD-MCNC: 1.24 MG/DL (ref 0.7–1.3)
GFR SERPL CREATININE-BSD FRML MDRD: 68 ML/MIN/1.73 (ref 42–98)
GLUCOSE BLD-MCNC: 96 MG/DL (ref 60–100)
POTASSIUM BLD-SCNC: 3.9 MMOL/L (ref 3.5–5.1)
SODIUM BLD-SCNC: 137 MMOL/L (ref 137–145)

## 2017-12-20 PROCEDURE — 99214 OFFICE O/P EST MOD 30 MIN: CPT | Performed by: NURSE PRACTITIONER

## 2017-12-20 PROCEDURE — 36415 COLL VENOUS BLD VENIPUNCTURE: CPT | Performed by: NURSE PRACTITIONER

## 2017-12-20 PROCEDURE — 80048 BASIC METABOLIC PNL TOTAL CA: CPT | Performed by: NURSE PRACTITIONER

## 2017-12-20 NOTE — PROGRESS NOTES
Subjective:     Congestive Heart Failure (chief complaint )    Congestive Heart Failure   Presents for follow-up visit. Associated symptoms include edema and shortness of breath. Pertinent negatives include no abdominal pain, chest pain, chest pressure, claudication, near-syncope, orthopnea, palpitations, paroxysmal nocturnal dyspnea or unexpected weight change. The symptoms have been improving. Compliance with total regimen is 51-75%. Compliance problems include adherence to exercise.  Compliance with diet is %. Compliance with exercise is 0-25%. Compliance with medications is %.   Leg Swelling   This is a recurrent problem. The current episode started 1 to 4 weeks ago. The problem occurs constantly. The problem has been gradually worsening. Pertinent negatives include no abdominal pain, chest pain, chills or fever.     1. CAD, non-obstructive 2005  2. HFpEF - diastolic dysfunction  3. Hypertension  4. CKD, Stage 2 with H/O KIRK 08/2015  5. H/O Atrial Flutter S/P ablation per Dr Graves 2006  6. Prostate Cancer Salvador Score 6/Stage 2 S/P RT followed per Dr Gonzalez  7. DM, Type II  8. Anterior right total hip arthroplasty 04/25/2016    Mr. Ruvalcaba presents sooner than expected from primary care provider's office regarding leg swelling.  He recently underwent lumbar surgery 10/27/2017 in [place]. He was discharge 11/7/2017. Following discharge he had several medications that were recommended be stopped.  On reviewing discharge summary from [place], it was made note that his hospitalization was complicated by hypotension and acute kidney injury.  Her recieved IV fluids per nephrology consult and blood pressure medicine discontinued.   Blood pressure today is elevated higher than normal.  He does check his blood pressure at home & it has been elevated.  Surgeon in [place] also recommended discontinuing his gabapentin.        The patient returns to the office today in follow-up as noted above.  The patient  reports that he received evaluation in nephrology services per Dr. Reza on December 4 at which time recommendation was increasing Lasix 20  mg to 2 tablets in the a.m. 1 tablet in the p.m. for a total of 3 weeks. In one more week he will return to Lasix 20 mg 1 AM 1 PM.    The patient reports continuation of mild improvement in overall condition.  He is utilizing a rolling walker today for mobility assist and is compliant with presence of bilateral compression stockings.    Review of Systems   Constitution: Negative for chills, fever and unexpected weight change.   Cardiovascular: Negative for chest pain, claudication, near-syncope and palpitations.   Respiratory: Positive for shortness of breath.    Hematologic/Lymphatic: Does not bruise/bleed easily.   Gastrointestinal: Negative for abdominal pain.   Neurological: Negative for dizziness and light-headedness.     Past Medical History:   Diagnosis Date   • Acquired hallux rigidus    • Anemia    • Arthropathy of lumbar facet joint    • Carpal tunnel syndrome    • CHF (congestive heart failure)    • Chronic diastolic congestive heart failure    • Chronic kidney disease    • Chronic kidney disease, stage II (mild)     Chronic kidney disease stage 2   • Chronic obstructive lung disease    • Chronic renal failure    • Coronary artery disease    • Cortical senile cataract    • Diabetes mellitus    • Diabetes mellitus type 2, noninsulin dependent     diabetes mellitus type 2, non-insulin treated, Medication treatment plan: oral diabetic medication   • Diabetes mellitus without complication    • Diastolic dysfunction    • Diastolic heart failure    • Essential hypertension     difficult to control      • Hip pain    • History of echocardiogram 08/19/2015    Echocardiogram W/ color flow 84859 (1) - Mild to moderate LVH with mild left atrial enlargement and normal aortic root.CLVH,preserved LV systolic function with Ef of 55%.Diastolic dysfunction.MV intact.AV  thickened.Aortic sclerosis.   • Hyperlipidemia    • Influenza vaccine administered 10/02/2015    INFLUENZA IMMUN ADMIN OR PREV RECV'D  (1) - Ordered By: DIONNE MANDUJANO ([facility])    • Insomnia    • Insomnia     Other insomnia   • Joint pain    • Low back pain    • Muscle strain    • Nuclear cataract    • Pneumococcal vaccination given 07/22/2016    PNEUMOC VAC/ADMIN/RCVD 4040F (3) - Ordered By: DIONNE MANDUJANO ([facility])   • Pure hypercholesterolemia    • Sedentary lifestyle      Past Surgical History:   Procedure Laterality Date   • CARDIAC ABLATION     • CARDIAC CATHETERIZATION  02/04/1986    Cardiac cath 59070 (1) - normal left heart catherization,non cardiac chest pain   • CARPAL TUNNEL RELEASE  10/19/2015    Carpal tunnel surgery (1) - Release of left carpal tunnel   • CATARACT EXTRACTION W/ INTRAOCULAR LENS IMPLANT Left 3/31/2017    Procedure: REMOVE CATARACT AND IMPLANT INRAOCULAR LENS LEFT EYE;  Surgeon: Hector Navarrete MD;  Location: [facility] OR;  Service:    • CATARACT EXTRACTION W/ INTRAOCULAR LENS IMPLANT Right 4/14/2017    Procedure: REMOVE CATARACT AND IMPLANT INTRAOCULAR LENS RIGHT EYE;  Surgeon: Hector Navarrete MD;  Location: [facility] OR;  Service:    • ENDOSCOPY  08/05/2013    Colon endoscopy 10077 (2) - Hemorrhoids found.   • INGUINAL HERNIA REPAIR Right 06/07/1968    Inguinal hernia, repair (2)   • INJECTION OF MEDICATION  09/06/2012    Albuterol (2u) (1) - Ordered By: LIDYA STEINER ([facility])    • INJECTION OF MEDICATION  09/06/2012    Atrovent (1) - Ordered By: LIDYA STEINER ([facility])    • INJECTION OF MEDICATION  02/10/2012    Depo Medrol (Methylprednisone) (3) - Ordered By: HEATHER PAK ([facility])    • INJECTION OF MEDICATION  02/09/2014    Kenalog (4) - Ordered By: DIMAS ANDRADE ([facility])    • LUMBAR LAMINECTOMY  2007    Lumbar laminectomy (1)   • MANDIBLE FRACTURE SURGERY  10/11/1981    Treat nose/jaw fracture (1) - bilateral open reduction of fracture of mandible   •  OTHER SURGICAL HISTORY  09/24/2011    Drain/Inject Major Joint 20610 (1) - Ordered By: KARLOS HERNANDEZ ([facility])    • OTHER SURGICAL HISTORY      Insert IVC filter 54949 (1)   • OTHER SURGICAL HISTORY  10/11/2011    SPIROMETRY 81650 (1) - Ordered By: HEATHER PAK ([facility])   • TOTAL HIP ARTHROPLASTY Right      Social History     Social History   • Marital status:      Spouse name: N/A   • Number of children: N/A   • Years of education: N/A     Social History Main Topics   • Smoking status: Former Smoker     Quit date: 1999   • Smokeless tobacco: Never Used   • Alcohol use No   • Drug use: No   • Sexual activity: Defer      Comment: Marital status:      Other Topics Concern   • None     Social History Narrative     Current Outpatient Prescriptions   Medication Sig Dispense Refill   • albuterol (PROVENTIL HFA;VENTOLIN HFA) 108 (90 Base) MCG/ACT inhaler Inhale 2 puffs Every 4 (Four) Hours As Needed for Wheezing. 1 inhaler 0   • aspirin 81 MG EC tablet Take 81 mg by mouth daily.     • docusate sodium (COLACE) 100 MG capsule Take 100 mg by mouth.     • ferrous sulfate 325 (65 FE) MG tablet TAKE ONE TABLET BY MOUTH DAILY WITH BREAKFAST 60 tablet 3   • furosemide (LASIX) 20 MG tablet Take 1 tablet by mouth 2 (Two) Times a Day. 60 tablet 5   • gabapentin (NEURONTIN) 300 MG capsule Take 1 capsule by mouth 2 (Two) Times a Day. 60 capsule 5   • hydrALAZINE (APRESOLINE) 25 MG tablet Take 1 tablet by mouth 2 (Two) Times a Day. 60 tablet 6   • HYDROcodone-acetaminophen (NORCO) 5-325 MG per tablet Take 1 tablet by mouth.     • JANUVIA 50 MG tablet TAKE ONE TABLET BY MOUTH DAILY 30 tablet 5   • lisinopril (PRINIVIL,ZESTRIL) 20 MG tablet Take 1 tablet by mouth Daily. 30 tablet 11   • pravastatin (PRAVACHOL) 40 MG tablet TAKE ONE TABLET BY MOUTH DAILY 30 tablet 3   • TRUE METRIX BLOOD GLUCOSE TEST test strip TEST BLOOD SUGAR ONCE DAILY (LOT [id number] EXP 03/31/2018) 100 each 1   • TRUEPLUS LANCETS 30G misc 1 Device Daily.  "TEST 100 each 3     No current facility-administered medications for this visit.      Objective:     Vitals:    12/20/17 1407   BP: 148/70   BP Location: Left arm   Patient Position: Sitting   Cuff Size: Adult   Pulse: 85   SpO2: 99%   Weight: 69.1 kg (152 lb 7 oz)   Height: 170.2 cm (67\")        Physical Exam   Constitutional: He is oriented to person, place, and time. He appears well-nourished. No distress.   Neck: No JVD present.   Cardiovascular: Normal rate, normal heart sounds and intact distal pulses.    No murmur heard.  Pulmonary/Chest: Effort normal and breath sounds normal. No respiratory distress.   Abdominal: He exhibits no distension.   Musculoskeletal: He exhibits no edema.   Neurological: He is alert and oriented to person, place, and time.   Skin: Skin is warm and dry. He is not diaphoretic.   Psychiatric: He has a normal mood and affect. His behavior is normal. Judgment and thought content normal.   Vitals reviewed.    Cardiographics  EKG Oct 30 2017 13:52:49  Vent. Rate : 079 BPM     Atrial Rate : 079 BPM     P-R Int : 192 ms          QRS Dur : 072 ms      QT Int : 336 ms       P-R-T Axes : 058 037 -37 degrees     QTc Int : 385 ms    Normal sinus rhythm  T wave abnormality, consider inferolateral ischemia  Abnormal ECG  When compared with ECG of 23-OCT-2017 14:18,  No significant change was found  Confirmed by Jorge Hudson MD ([id number]) on 10/31/2017 7:40:19 AM    Transthoracic Echocardiogram 10/31/3017 in [place]  Indication:  Hypotension  BP:           93/63    Findings       Left Ventricle:  The left ventricular chamber size is normal. Mild concentric left  ventricular hypertrophy is observed. The estimated ejection fraction is  65-70%. Abnormal left ventricular diastolic filling is observed,  consistent with impaired relaxation.      Left Atrium:  The left atrial chamber size is normal.     Right Ventricle:  The right ventricular cavity size is normal. The right ventricular  global systolic " function is normal.     Right Atrium:  The right atrial cavity size is normal.     Aortic Valve:  Moderate aortic leaflet calcification is visualized. There is mild to  moderate aortic stenosis. The mean gradient of the aortic valve is 30  mmHg. The aortic valve area by velocity time interval is calculated at  1.84 cm2. There is no evidence of aortic regurgitation.     Mitral Valve:  The mitral valve leaflets appear normal. There is mild mitral  regurgitation.      Tricuspid Valve:  The tricuspid valve leaflets are normal.  There is mild tricuspid  regurgitation. The Right Ventricular Systolic Pressure is calculated at  35 mmHg.     Pulmonic Valve:  There is trace pulmonic regurgitation present.     Pericardium:  There is no pericardial effusion.     Conclusions  1. Mild concentric left ventricular hypertrophy with thickened  papillary muscle is observed.   Hyperdynamic LV systolic function with an  estimated ejection fraction is 65-70%.  2. Abnormal left ventricular diastolic filling is observed, consistent  with impaired relaxation.   3. Aortic valve is trileaflet, moderately calcified with mild to  moderate aortic stenosis  4. Mild tricuspid regurgitation with RVSP 35 mm Hg    Echocardiogram: 08/26/2016  FINDINGS:  1. Both atria are normal in size.  2. The aortic valve is mildly sclerotic without any hemodynamically  significant aortic stenosis.  3. Mild concentric left ventricular hypertrophy with early diastolic  dysfunction. No evidence of any regional wall motion abnormality.  The estimated ejection fraction greater than 60%.  4. There is mild mitral annular calcification.  5. Normal right ventricular size and function.  6. On color flow Doppler, there is trace mitral and tricuspid  regurgitation without any hemodynamic significance.  7. No intracardiac mass, pericardial effusion, or cardiac thrombus  seen.  CLINICAL IMPRESSION:  1. Mild concentric left ventricular hypertrophy with early  diastolic  dysfunction.  2. Estimated ejection fraction greater than 60%.  3. Mildly sclerotic aortic valve with mild mitral annular  calcification without any hemodynamic significant stenotic process.  4. On color flow Doppler, there is trace mitral and tricuspid  regurgitation without any hemodynamic significance.  5. No intracardiac mass, pericardial effusion, or cardiac thrombus  Seen.    Renal Artery U/S 11/1/2017  1. No color Doppler scan evidence of hemodynamically significant renal artery stenosis.  2. Elevated resistivity indices within the kidneys bilaterally which may be indicative of medical renal parenchymal disease.  3. Kidneys are slightly atrophic in size as detailed above.  4. Urinary bladder poorly distended for assessment with suspected thickening the bladder wall which is nonspecific and may simply be accentuated by lack of distention or muscle hypertrophy..     Lab Results   Component Value Date    GLUCOSE 117 (H) 11/09/2017    CALCIUM 8.9 11/09/2017     11/09/2017    K 3.7 11/09/2017    CO2 27.0 11/09/2017     11/09/2017    BUN 25 (H) 11/09/2017    CREATININE 1.37 (H) (baseline 1.0) 11/09/2017    EGFRIFAFRI 60 11/09/2017    BCR 18.2 11/09/2017    ANIONGAP 11.0 11/09/2017     Lab Results   Component Value Date    WBC 11.68 (H) 11/09/2017    HGB 10.2 (L) 11/09/2017    HCT 30.2 (L) 11/09/2017    MCV 86.8 11/09/2017     11/09/2017     Lab Results   Component Value Date    CHOL 169 09/11/2017     Lab Results   Component Value Date    TRIG 61 09/11/2017    TRIG 67 08/26/2016    TRIG 157 08/27/2015     Lab Results   Component Value Date    HDL 66 09/11/2017    HDL 49 (L) 08/26/2016     Lab Results   Component Value Date    LDLCALC 79 08/26/2016    LDLCALC 96 08/27/2015    LDLCALC 100 06/11/2015     Lab Results   Component Value Date    TSH 0.990 08/23/2017   BNP        Ref Range & Units 11/08/2017     proBNP 0.0 - 1800.0 pg/mL 363.0             The following portions of the  patient's history were reviewed and updated as appropriate: allergies, current medications, past family history, past medical history, past social history, past surgical history and problem list.     Assessment:      Diagnosis Plan   1. Chronic diastolic congestive heart failure without clinical evidence of hypervolemia; he is well perfused Basic Metabolic Panel - will contact with results    AHA Stage C: ; NYHA Class II  BETA-BLOCKER: Not applicable  ACE/ARB: Lisinopril 20 mg daily  ENTRESTO: Not applicable  DIURETIC: To return to Lasix 20 mg twice daily in one additional week, as directed per Dr Reza [place] ANTAGONIST: Not applicable  IMDUR/HYDRALAZINE: Hydralazine 25 mg twice daily  DIGOXIN: Not applicable  Fluid restriction: 2000 cc daily  Sodium restriction: 2000 mg daily   6MWT: Up-to-date    Recommended daily weight monitoring.  Discussed patient action plan for heart failure.  Recommended avoiding NSAIDs use.  Discussed warning signs requiring additional medical attention for heart failure.       2. Essential hypertension      As per #1   3.  CKD Stage II As per Dr Reza

## 2017-12-20 NOTE — TELEPHONE ENCOUNTER
Telephone contact regarding the results of today's basic metabolic profile for which instructions are to continue to follow the directions of Dr. Reza regarding diuretic therapy.

## 2017-12-26 ENCOUNTER — EPISODE CHANGES (OUTPATIENT)
Dept: CASE MANAGEMENT | Facility: OTHER | Age: 82
End: 2017-12-26

## 2017-12-29 ENCOUNTER — PATIENT OUTREACH (OUTPATIENT)
Dept: CASE MANAGEMENT | Facility: OTHER | Age: 82
End: 2017-12-29

## 2017-12-29 NOTE — OUTREACH NOTE
Spoke with daughter at home number in chart. Stated he is doing well, home health physical therapy continues to see, stated nursing has completed visits. Reports he uses a walker for ambulation. Stated he has medications and is taking as prescribed. Discussed the possibility of daily weights, and to report a gain of 3 pounds or greater to PCP. No new questions or concerns voiced at time of call.

## 2018-01-17 ENCOUNTER — OFFICE VISIT (OUTPATIENT)
Dept: CARDIOLOGY | Facility: CLINIC | Age: 83
End: 2018-01-17

## 2018-01-17 VITALS
DIASTOLIC BLOOD PRESSURE: 82 MMHG | OXYGEN SATURATION: 100 % | BODY MASS INDEX: 24.67 KG/M2 | HEIGHT: 67 IN | HEART RATE: 99 BPM | SYSTOLIC BLOOD PRESSURE: 178 MMHG | WEIGHT: 157.19 LBS

## 2018-01-17 DIAGNOSIS — I50.32 CHRONIC DIASTOLIC CONGESTIVE HEART FAILURE (HCC): Primary | Chronic | ICD-10-CM

## 2018-01-17 DIAGNOSIS — I10 ESSENTIAL HYPERTENSION: ICD-10-CM

## 2018-01-17 PROCEDURE — 99213 OFFICE O/P EST LOW 20 MIN: CPT | Performed by: NURSE PRACTITIONER

## 2018-01-17 NOTE — PROGRESS NOTES
Subjective:     Congestive Heart Failure (chief complaint )    Congestive Heart Failure   Presents for follow-up visit. Associated symptoms include shortness of breath. Pertinent negatives include no abdominal pain, chest pain, chest pressure, claudication, edema, near-syncope, orthopnea, palpitations, paroxysmal nocturnal dyspnea or unexpected weight change. The symptoms have been improving. Compliance with total regimen is %. Compliance problems include adherence to exercise.  Compliance with diet is %. Compliance with exercise is 26-50%. Compliance with medications is %.   Leg Swelling   This is a recurrent problem. The current episode started 1 to 4 weeks ago. The problem occurs constantly. The problem has been gradually worsening. Pertinent negatives include no abdominal pain, chest pain, chills or fever.     1. CAD, non-obstructive 2005  2. HFpEF - diastolic dysfunction  3. Hypertension  4. CKD, Stage 2 with H/O KIRK 08/2015  5. H/O Atrial Flutter S/P ablation per Dr Graves 2006  6. Prostate Cancer North Apollo Score 6/Stage 2 S/P RT followed per Dr Gonazlez  7. DM, Type II  8. Anterior right total hip arthroplasty 04/25/2016    Mr. Ruvalcaba recently underwent lumbar surgery 10/27/2017 in [place]. He was discharge 11/7/2017. Following discharge he had several medications that were recommended be stopped.  On reviewing discharge summary from [place], it was made note that his hospitalization was complicated by hypotension and acute kidney injury.  He recieved IV fluids per nephrology consult and blood pressure medicine discontinued.       The patient returns to the office today in follow-up as noted above.  The patient reports that he received evaluation in nephrology services per Dr. Reza on December 4 at which time recommendation was increasing Lasix 20  mg to 2 tablets in the a.m. 1 tablet in the p.m. for a total of 3 weeks.  The patient is now taking Lasix 20 mg twice daily.    The patient reports  continuation of mild improvement in overall condition.  He is utilizing a rolling walker today for mobility assist and is compliant with presence of bilateral compression stockings.    The patient acknowledges a component of anxiety regarding today's office visit secondary to inclement weather with bad road conditions.  He reports taking his blood pressure in the home setting with typical findings systolic 130 to 140/70-80 diastolic.    Review of Systems   Constitution: Negative for chills, fever and unexpected weight change.   Cardiovascular: Negative for chest pain, claudication, near-syncope and palpitations.   Respiratory: Positive for shortness of breath.    Hematologic/Lymphatic: Does not bruise/bleed easily.   Gastrointestinal: Negative for abdominal pain.   Neurological: Negative for dizziness and light-headedness.     Past Medical History:   Diagnosis Date   • Acquired hallux rigidus    • Anemia    • Arthropathy of lumbar facet joint    • Carpal tunnel syndrome    • CHF (congestive heart failure)    • Chronic diastolic congestive heart failure    • Chronic kidney disease    • Chronic kidney disease, stage II (mild)     Chronic kidney disease stage 2   • Chronic obstructive lung disease    • Chronic renal failure    • Coronary artery disease    • Cortical senile cataract    • Diabetes mellitus    • Diabetes mellitus type 2, noninsulin dependent     diabetes mellitus type 2, non-insulin treated, Medication treatment plan: oral diabetic medication   • Diabetes mellitus without complication    • Diastolic dysfunction    • Diastolic heart failure    • Essential hypertension     difficult to control      • Hip pain    • History of echocardiogram 08/19/2015    Echocardiogram W/ color flow 86970 (1) - Mild to moderate LVH with mild left atrial enlargement and normal aortic root.CLVH,preserved LV systolic function with Ef of 55%.Diastolic dysfunction.MV intact.AV thickened.Aortic sclerosis.   • Hyperlipidemia    •  Influenza vaccine administered 10/02/2015    INFLUENZA IMMUN ADMIN OR PREV RECV'D  (1) - Ordered By: DIONNE MANDUJANO ([facility])    • Insomnia    • Insomnia     Other insomnia   • Joint pain    • Low back pain    • Muscle strain    • Nuclear cataract    • Pneumococcal vaccination given 07/22/2016    PNEUMOC VAC/ADMIN/RCVD 4040F (3) - Ordered By: DIONNE MANDUJANO ([facility])   • Pure hypercholesterolemia    • Sedentary lifestyle      Past Surgical History:   Procedure Laterality Date   • CARDIAC ABLATION     • CARDIAC CATHETERIZATION  02/04/1986    Cardiac cath 95479 (1) - normal left heart catherization,non cardiac chest pain   • CARPAL TUNNEL RELEASE  10/19/2015    Carpal tunnel surgery (1) - Release of left carpal tunnel   • CATARACT EXTRACTION W/ INTRAOCULAR LENS IMPLANT Left 3/31/2017    Procedure: REMOVE CATARACT AND IMPLANT INRAOCULAR LENS LEFT EYE;  Surgeon: Hector Navarrete MD;  Location: [facility];  Service:    • CATARACT EXTRACTION W/ INTRAOCULAR LENS IMPLANT Right 4/14/2017    Procedure: REMOVE CATARACT AND IMPLANT INTRAOCULAR LENS RIGHT EYE;  Surgeon: Hector Navarrete MD;  Location: [facility] OR;  Service:    • ENDOSCOPY  08/05/2013    Colon endoscopy 42096 (2) - Hemorrhoids found.   • INGUINAL HERNIA REPAIR Right 06/07/1968    Inguinal hernia, repair (2)   • INJECTION OF MEDICATION  09/06/2012    Albuterol (2u) (1) - Ordered By: LIDYA STEINER ([facility])    • INJECTION OF MEDICATION  09/06/2012    Atrovent (1) - Ordered By: LIDYA STEINER ([facility])    • INJECTION OF MEDICATION  02/10/2012    Depo Medrol (Methylprednisone) (3) - Ordered By: HEATHER PAK ([facility])    • INJECTION OF MEDICATION  02/09/2014    Kenalog (4) - Ordered By: DIMAS ANDRADE ([facility])    • LUMBAR LAMINECTOMY  2007    Lumbar laminectomy (1)   • MANDIBLE FRACTURE SURGERY  10/11/1981    Treat nose/jaw fracture (1) - bilateral open reduction of fracture of mandible   • OTHER SURGICAL HISTORY  09/24/2011    Drain/Inject  Major Joint 20610 (1) - Ordered By: KARLOS HERNANDEZ ([facility])    • OTHER SURGICAL HISTORY      Insert IVC filter 34665 (1)   • OTHER SURGICAL HISTORY  10/11/2011    SPIROMETRY 00280 (1) - Ordered By: HEATHER PAK ([facility])   • TOTAL HIP ARTHROPLASTY Right      Social History     Social History   • Marital status:      Spouse name: N/A   • Number of children: N/A   • Years of education: N/A     Social History Main Topics   • Smoking status: Former Smoker     Quit date: 1999   • Smokeless tobacco: Never Used   • Alcohol use No   • Drug use: No   • Sexual activity: Defer      Comment: Marital status:      Other Topics Concern   • None     Social History Narrative     Current Outpatient Prescriptions   Medication Sig Dispense Refill   • albuterol (PROVENTIL HFA;VENTOLIN HFA) 108 (90 Base) MCG/ACT inhaler Inhale 2 puffs Every 4 (Four) Hours As Needed for Wheezing. 1 inhaler 0   • aspirin 81 MG EC tablet Take 81 mg by mouth daily.     • docusate sodium (COLACE) 100 MG capsule Take 100 mg by mouth.     • ferrous sulfate 325 (65 FE) MG tablet TAKE ONE TABLET BY MOUTH DAILY WITH BREAKFAST 60 tablet 3   • furosemide (LASIX) 20 MG tablet Take 1 tablet by mouth 2 (Two) Times a Day. 60 tablet 5   • gabapentin (NEURONTIN) 300 MG capsule Take 1 capsule by mouth 2 (Two) Times a Day. 60 capsule 5   • glucose blood (TRUE METRIX BLOOD GLUCOSE TEST) test strip Check glucose daily 100 each 1   • hydrALAZINE (APRESOLINE) 25 MG tablet Take 1 tablet by mouth 2 (Two) Times a Day. 60 tablet 6   • HYDROcodone-acetaminophen (NORCO) 5-325 MG per tablet Take 1 tablet by mouth.     • JANUVIA 50 MG tablet TAKE ONE TABLET BY MOUTH DAILY 30 tablet 5   • lisinopril (PRINIVIL,ZESTRIL) 20 MG tablet Take 1 tablet by mouth Daily. 30 tablet 11   • pravastatin (PRAVACHOL) 40 MG tablet TAKE ONE TABLET BY MOUTH DAILY 30 tablet 3   • TRUEPLUS LANCETS 30G misc 1 Device Daily. TEST 100 each 3     No current facility-administered medications for  "this visit.      Objective:     Vitals:    01/17/18 0858 01/17/18 0900   BP: (!) 184/92 178/82   BP Location: Left arm Right arm   Patient Position: Sitting Sitting   Cuff Size: Adult Adult   Pulse: 99    SpO2: 100%    Weight: 71.3 kg (157 lb 3 oz)    Height: 170.2 cm (67\")       Physical Exam   Constitutional: He is oriented to person, place, and time. He appears well-nourished. No distress.   Neck: No JVD present.   Cardiovascular: Normal rate, normal heart sounds and intact distal pulses.    No murmur heard.  Pulmonary/Chest: Effort normal and breath sounds normal. No respiratory distress.   Abdominal: He exhibits no distension.   Musculoskeletal: He exhibits no edema.   Neurological: He is alert and oriented to person, place, and time.   Skin: Skin is warm and dry. He is not diaphoretic.   Psychiatric: He has a normal mood and affect. His behavior is normal. Judgment and thought content normal.   Vitals reviewed.    Cardiographics  EKG Oct 30 2017 13:52:49  Vent. Rate : 079 BPM     Atrial Rate : 079 BPM     P-R Int : 192 ms          QRS Dur : 072 ms      QT Int : 336 ms       P-R-T Axes : 058 037 -37 degrees     QTc Int : 385 ms    Normal sinus rhythm  T wave abnormality, consider inferolateral ischemia  Abnormal ECG  When compared with ECG of 23-OCT-2017 14:18,  No significant change was found  Confirmed by Jorge Hudson MD ([id number]) on 10/31/2017 7:40:19 AM    Transthoracic Echocardiogram 10/31/3017 in [place]  Indication:  Hypotension  BP:           93/63    Findings       Left Ventricle:  The left ventricular chamber size is normal. Mild concentric left  ventricular hypertrophy is observed. The estimated ejection fraction is  65-70%. Abnormal left ventricular diastolic filling is observed,  consistent with impaired relaxation.      Left Atrium:  The left atrial chamber size is normal.     Right Ventricle:  The right ventricular cavity size is normal. The right ventricular  global systolic function is normal. "     Right Atrium:  The right atrial cavity size is normal.     Aortic Valve:  Moderate aortic leaflet calcification is visualized. There is mild to  moderate aortic stenosis. The mean gradient of the aortic valve is 30  mmHg. The aortic valve area by velocity time interval is calculated at  1.84 cm2. There is no evidence of aortic regurgitation.     Mitral Valve:  The mitral valve leaflets appear normal. There is mild mitral  regurgitation.      Tricuspid Valve:  The tricuspid valve leaflets are normal.  There is mild tricuspid  regurgitation. The Right Ventricular Systolic Pressure is calculated at  35 mmHg.     Pulmonic Valve:  There is trace pulmonic regurgitation present.     Pericardium:  There is no pericardial effusion.     Conclusions  1. Mild concentric left ventricular hypertrophy with thickened  papillary muscle is observed.   Hyperdynamic LV systolic function with an  estimated ejection fraction is 65-70%.  2. Abnormal left ventricular diastolic filling is observed, consistent  with impaired relaxation.   3. Aortic valve is trileaflet, moderately calcified with mild to  moderate aortic stenosis  4. Mild tricuspid regurgitation with RVSP 35 mm Hg    Echocardiogram: 08/26/2016  FINDINGS:  1. Both atria are normal in size.  2. The aortic valve is mildly sclerotic without any hemodynamically  significant aortic stenosis.  3. Mild concentric left ventricular hypertrophy with early diastolic  dysfunction. No evidence of any regional wall motion abnormality.  The estimated ejection fraction greater than 60%.  4. There is mild mitral annular calcification.  5. Normal right ventricular size and function.  6. On color flow Doppler, there is trace mitral and tricuspid  regurgitation without any hemodynamic significance.  7. No intracardiac mass, pericardial effusion, or cardiac thrombus  seen.  CLINICAL IMPRESSION:  1. Mild concentric left ventricular hypertrophy with early diastolic  dysfunction.  2. Estimated  ejection fraction greater than 60%.  3. Mildly sclerotic aortic valve with mild mitral annular  calcification without any hemodynamic significant stenotic process.  4. On color flow Doppler, there is trace mitral and tricuspid  regurgitation without any hemodynamic significance.  5. No intracardiac mass, pericardial effusion, or cardiac thrombus  Seen.    Renal Artery U/S 11/1/2017  1. No color Doppler scan evidence of hemodynamically significant renal artery stenosis.  2. Elevated resistivity indices within the kidneys bilaterally which may be indicative of medical renal parenchymal disease.  3. Kidneys are slightly atrophic in size as detailed above.  4. Urinary bladder poorly distended for assessment with suspected thickening the bladder wall which is nonspecific and may simply be accentuated by lack of distention or muscle hypertrophy..     Lab Results   Component Value Date    GLUCOSE 117 (H) 11/09/2017    CALCIUM 8.9 11/09/2017     11/09/2017    K 3.7 11/09/2017    CO2 27.0 11/09/2017     11/09/2017    BUN 25 (H) 11/09/2017    CREATININE 1.37 (H) (baseline 1.0) 11/09/2017    EGFRIFAFRI 60 11/09/2017    BCR 18.2 11/09/2017    ANIONGAP 11.0 11/09/2017     Lab Results   Component Value Date    WBC 11.68 (H) 11/09/2017    HGB 10.2 (L) 11/09/2017    HCT 30.2 (L) 11/09/2017    MCV 86.8 11/09/2017     11/09/2017     Lab Results   Component Value Date    CHOL 169 09/11/2017     Lab Results   Component Value Date    TRIG 61 09/11/2017    TRIG 67 08/26/2016    TRIG 157 08/27/2015     Lab Results   Component Value Date    HDL 66 09/11/2017    HDL 49 (L) 08/26/2016     Lab Results   Component Value Date    LDLCALC 79 08/26/2016    LDLCALC 96 08/27/2015    LDLCALC 100 06/11/2015     Lab Results   Component Value Date    TSH 0.990 08/23/2017   BNP        Ref Range & Units 11/08/2017     proBNP 0.0 - 1800.0 pg/mL 363.0             The following portions of the patient's history were reviewed and updated as  appropriate: allergies, current medications, past family history, past medical history, past social history, past surgical history and problem list.     Assessment:      Diagnosis Plan   1. Chronic diastolic congestive heart failure without clinical evidence of hypervolemia; he is well perfused AHA Stage C: ; NYHA Class II  BETA-BLOCKER: Not applicable  ACE/ARB: Lisinopril 20 mg daily  ENTRESTO: Not applicable  DIURETIC: Lasix 20 mg twice daily  ALDOSTERONT ANTAGONIST: Not applicable  IMDUR/HYDRALAZINE: Hydralazine 25 mg twice daily  DIGOXIN: Not applicable  Fluid restriction: 2000 cc daily  Sodium restriction: 2000 mg daily   6MWT: Up-to-date    Recommended daily weight monitoring.  Discussed patient action plan for heart failure.  Recommended avoiding NSAIDs use.  Discussed warning signs requiring additional medical attention for heart failure.       2. Essential hypertension      As per #1    The patient feels very strongly that the blood pressure findings in the office today are atypical and he is requesting the ability to continue to monitor at home.    I have strongly encouraged Herman Ruvalcaba to contact me if blood pressure is found consistently greater then 150/90 in the home setting.        3.  CKD Stage II As per Dr Reza

## 2018-01-29 ENCOUNTER — OFFICE VISIT (OUTPATIENT)
Dept: RADIATION ONCOLOGY | Facility: HOSPITAL | Age: 83
End: 2018-01-29

## 2018-01-29 VITALS
HEART RATE: 87 BPM | SYSTOLIC BLOOD PRESSURE: 172 MMHG | RESPIRATION RATE: 18 BRPM | BODY MASS INDEX: 25.24 KG/M2 | WEIGHT: 161.16 LBS | TEMPERATURE: 97.7 F | DIASTOLIC BLOOD PRESSURE: 79 MMHG

## 2018-01-29 DIAGNOSIS — Z85.46 H/O PROSTATE CANCER: Primary | ICD-10-CM

## 2018-01-29 PROCEDURE — 99214 OFFICE O/P EST MOD 30 MIN: CPT | Performed by: NURSE PRACTITIONER

## 2018-01-29 NOTE — PROGRESS NOTES
DATE OF VISIT: 1/29/2018    REASON FOR VISIT:  History of prostate cancer    HISTORY OF PRESENT ILLNESS:      82 yr old male with history of prostate cancer, Stage II, diagnosed in 2010 and status post radiation therapy. He received 7500 cGy that completed in September 2010. He is being seen on yearly follow-up today. His last PSA was checked in July 2017 and was 0.4. He denies any new problems, feels well. Denies any urination problems.    PAST MEDICAL HISTORY:    Past Medical History:   Diagnosis Date   • Acquired hallux rigidus    • Anemia    • Arthropathy of lumbar facet joint    • Carpal tunnel syndrome    • CHF (congestive heart failure)    • Chronic diastolic congestive heart failure    • Chronic kidney disease    • Chronic kidney disease, stage II (mild)     Chronic kidney disease stage 2   • Chronic obstructive lung disease    • Chronic renal failure    • Coronary artery disease    • Cortical senile cataract    • Diabetes mellitus    • Diabetes mellitus type 2, noninsulin dependent     diabetes mellitus type 2, non-insulin treated, Medication treatment plan: oral diabetic medication   • Diabetes mellitus without complication    • Diastolic dysfunction    • Diastolic heart failure    • Essential hypertension     difficult to control      • Hip pain    • History of echocardiogram 08/19/2015    Echocardiogram W/ color flow 27896 (1) - Mild to moderate LVH with mild left atrial enlargement and normal aortic root.CLVH,preserved LV systolic function with Ef of 55%.Diastolic dysfunction.MV intact.AV thickened.Aortic sclerosis.   • Hyperlipidemia    • Influenza vaccine administered 10/02/2015    INFLUENZA IMMUN ADMIN OR PREV RECV'D  (1) - Ordered By: DIONNE MANDUJANO ([facility])    • Insomnia    • Insomnia     Other insomnia   • Joint pain    • Low back pain    • Muscle strain    • Nuclear cataract    • Pneumococcal vaccination given 07/22/2016    PNEUMOC VAC/ADMIN/RCVD 4040F (3) - Ordered By: DIONNE MANDUJANO ([facility])   • Prostate cancer    • Pure hypercholesterolemia    • Sedentary lifestyle        SOCIAL HISTORY:    Social History   Substance Use Topics   • Smoking status: Former Smoker     Quit date: 1999   • Smokeless tobacco: Never Used   • Alcohol use No       Surgical History :  Past Surgical History:   Procedure Laterality Date   • CARDIAC ABLATION     • CARDIAC CATHETERIZATION  02/04/1986    Cardiac cath 47117 (1) - normal left heart catherization,non cardiac chest pain   • CARPAL TUNNEL RELEASE  10/19/2015    Carpal tunnel surgery (1) - Release of left carpal tunnel   • CATARACT EXTRACTION W/ INTRAOCULAR LENS IMPLANT Left 3/31/2017    Procedure: REMOVE CATARACT AND IMPLANT INRAOCULAR LENS LEFT EYE;  Surgeon: Hector Navarrete MD;  Location: [facility] OR;  Service:    • CATARACT EXTRACTION W/ INTRAOCULAR LENS IMPLANT Right 4/14/2017    Procedure: REMOVE CATARACT AND IMPLANT INTRAOCULAR LENS RIGHT EYE;  Surgeon: Hector Navarrete MD;  Location: [facility] OR;  Service:    • ENDOSCOPY  08/05/2013    Colon endoscopy 74917 (2) - Hemorrhoids found.   • INGUINAL HERNIA REPAIR Right 06/07/1968    Inguinal hernia, repair (2)   • INJECTION OF MEDICATION  09/06/2012    Albuterol (2u) (1) - Ordered By: LIDYA STEINER ([facility])    • INJECTION OF MEDICATION  09/06/2012    Atrovent (1) - Ordered By: LIDYA STEINER ([facility])    • INJECTION OF MEDICATION  02/10/2012    Depo Medrol (Methylprednisone) (3) - Ordered By: HEATHER PAK ([facility])    • INJECTION OF MEDICATION  02/09/2014    Kenalog (4) - Ordered By: DIMAS ANDRADE ([facility])    • LUMBAR LAMINECTOMY  2007    Lumbar laminectomy (1)   • MANDIBLE FRACTURE SURGERY  10/11/1981    Treat nose/jaw fracture (1) - bilateral open reduction of fracture of mandible   • OTHER SURGICAL HISTORY  09/24/2011    Drain/Inject Major Joint 20610 (1) - Ordered By: KARLOS HERNANDEZ ([facility])    • OTHER SURGICAL HISTORY      Insert IVC filter 16505 (1)   • OTHER SURGICAL HISTORY   10/11/2011    SPIROMETRY 88591 (1) - Ordered By: HEATHER PAK ([facility])   • TOTAL HIP ARTHROPLASTY Right        ALLERGIES:    Allergies   Allergen Reactions   • Aldactone [Spironolactone] Other (See Comments)     Hyponatremia and hyperkalemia       REVIEW OF SYSTEMS:      CONSTITUTIONAL:  No fever, chills, or night sweats.     HEENT:  No epistaxis, mouth sores, or difficulty swallowing.    RESPIRATORY:  No new shortness of breath or cough at present.    CARDIOVASCULAR:  No chest pain or palpitations.    GASTROINTESTINAL:  No abdominal pain, nausea, vomiting, or blood in the stool.    GENITOURINARY:  No dysuria or hematuria.    MUSCULOSKELETAL:  No any new back pain or arthralgias.     NEUROLOGICAL:  No tingling or numbness. No new headache or dizziness.     LYMPHATICS:  Denies any abnormal swollen and anywhere in the body.    SKIN:  Denies any new skin rash.    PHYSICAL EXAMINATION:      VITAL SIGNS:  /79  Pulse 87  Temp 97.7 °F (36.5 °C)  Resp 18  Wt 73.1 kg (161 lb 2.5 oz)  BMI 25.24 kg/m2    GENERAL:  Not in any distress.    HEENT:  Normocephalic, Atraumatic.Mild Conjunctival pallor. No icterus. No Facial Asymmetry noted.    NECK:  No adenopathy. No JVD.    RESPIRATORY:  Fair air entry bilateral. No rhonchi or wheezing.    CARDIOVASCULAR:  S1, S2. Regular rate and rhythm. No murmur or gallop appreciated.    ABDOMEN:  Soft, obese, nontender. Bowel sounds present in all four quadrants.  No organomegaly appreciated.    EXTREMITIES:  No edema.No Calf Tenderness.    NEUROLOGIC:  Alert, awake and oriented ×3.      SKIN : No new skin lesion identified  DIAGNOSTIC DATA:    Glucose   Date Value Ref Range Status   12/20/2017 96 60 - 100 mg/dL Final     Sodium   Date Value Ref Range Status   12/20/2017 137 137 - 145 mmol/L Final     Potassium   Date Value Ref Range Status   12/20/2017 3.9 3.5 - 5.1 mmol/L Final     CO2   Date Value Ref Range Status   12/20/2017 28.0 22.0 - 31.0 mmol/L Final     Chloride   Date  Value Ref Range Status   12/20/2017 97 95 - 110 mmol/L Final     Anion Gap   Date Value Ref Range Status   12/20/2017 12.0 5.0 - 15.0 mmol/L Final     Creatinine   Date Value Ref Range Status   12/20/2017 1.24 0.70 - 1.30 mg/dL Final     BUN   Date Value Ref Range Status   12/20/2017 18 7 - 21 mg/dL Final     BUN/Creatinine Ratio   Date Value Ref Range Status   12/20/2017 14.5 7.0 - 25.0 Final     Calcium   Date Value Ref Range Status   12/20/2017 9.1 8.4 - 10.2 mg/dL Final     Alkaline Phosphatase   Date Value Ref Range Status   11/08/2017 101 38 - 126 U/L Final     Total Protein   Date Value Ref Range Status   11/08/2017 6.9 6.3 - 8.6 g/dL Final     ALT (SGPT)   Date Value Ref Range Status   11/08/2017 46 21 - 72 U/L Final     AST (SGOT)   Date Value Ref Range Status   11/08/2017 39 17 - 59 U/L Final     Total Bilirubin   Date Value Ref Range Status   11/08/2017 1.2 0.2 - 1.3 mg/dL Final     Albumin   Date Value Ref Range Status   11/08/2017 4.00 3.40 - 4.80 g/dL Final     Globulin   Date Value Ref Range Status   11/08/2017 2.9 2.3 - 3.5 gm/dL Final     A/G Ratio   Date Value Ref Range Status   11/08/2017 1.4 1.1 - 1.8 g/dL Final     Lab Results   Component Value Date    WBC 11.68 (H) 11/09/2017    HGB 10.2 (L) 11/09/2017    HCT 30.2 (L) 11/09/2017    MCV 86.8 11/09/2017     11/09/2017     Lab Results   Component Value Date    NEUTROABS 9.58 (H) 11/09/2017    IRON 37 (L) 08/23/2017    FERRITIN 166.0 10/10/2016    DIVRTTQX77 242 08/23/2017    FOLATE 9.78 08/23/2017     No results found for: , LABCA2, AFPTM, HCGQUANT, , CHROMGRNA, [id number], CEA, REFLABREPO]  Component PSA   Latest Ref Rng & Units 0.000 - 4.000 ng/mL   4/16/2014 0.67   4/15/2015 0.95   7/21/2015 0.77   7/21/2016 0.49   7/25/2017 0.437         ASSESSMENT AND PLAN:      1. History of prostate cancer, stage II diagnosed in 2010, status post radiation therapy, completed in 2010; at this time, pt is more than 7 yr out from radiation  therapy, his PSA has been stable and being monitored by primary care, we will discharge pt from our clinic at this time, he was very happy to hear this.    2. Health maintenance, he does not smoke; colonoscopy in 2016       This document has been signed by REGINA De Jesus on January 29, 2018 2:02 PM

## 2018-02-16 ENCOUNTER — TELEPHONE (OUTPATIENT)
Dept: CARDIOLOGY | Facility: CLINIC | Age: 83
End: 2018-02-16

## 2018-02-16 NOTE — TELEPHONE ENCOUNTER
Contacted patient to remind him to bring his bp cuff that he uses at home to his appointment on Monday he expressed his understanding and also confirmed that he would be here on Monday as well

## 2018-02-19 ENCOUNTER — OFFICE VISIT (OUTPATIENT)
Dept: CARDIOLOGY | Facility: CLINIC | Age: 83
End: 2018-02-19

## 2018-02-19 VITALS
OXYGEN SATURATION: 99 % | WEIGHT: 158.25 LBS | DIASTOLIC BLOOD PRESSURE: 72 MMHG | HEART RATE: 92 BPM | BODY MASS INDEX: 25.43 KG/M2 | SYSTOLIC BLOOD PRESSURE: 144 MMHG | HEIGHT: 66 IN

## 2018-02-19 DIAGNOSIS — I65.23 ASYMPTOMATIC BILATERAL CAROTID ARTERY STENOSIS: ICD-10-CM

## 2018-02-19 DIAGNOSIS — R60.0 LEG EDEMA: ICD-10-CM

## 2018-02-19 DIAGNOSIS — I50.32 CHRONIC DIASTOLIC CONGESTIVE HEART FAILURE (HCC): Primary | Chronic | ICD-10-CM

## 2018-02-19 DIAGNOSIS — I25.10 CORONARY ARTERY DISEASE INVOLVING NATIVE CORONARY ARTERY OF NATIVE HEART WITHOUT ANGINA PECTORIS: ICD-10-CM

## 2018-02-19 DIAGNOSIS — E61.1 IRON DEFICIENCY: ICD-10-CM

## 2018-02-19 DIAGNOSIS — N18.2 CHRONIC KIDNEY DISEASE, STAGE II (MILD): ICD-10-CM

## 2018-02-19 DIAGNOSIS — I10 ESSENTIAL HYPERTENSION: ICD-10-CM

## 2018-02-19 DIAGNOSIS — E11.9 DIABETES MELLITUS WITHOUT COMPLICATION (HCC): ICD-10-CM

## 2018-02-19 PROCEDURE — 99214 OFFICE O/P EST MOD 30 MIN: CPT | Performed by: NURSE PRACTITIONER

## 2018-02-19 NOTE — PROGRESS NOTES
Subjective:     Congestive Heart Failure (chief complaint )    Congestive Heart Failure   Presents for follow-up visit. Pertinent negatives include no abdominal pain, chest pain, chest pressure, claudication, edema (bilateral ankle at the end of the day; compliant with compression stockings), fatigue, near-syncope, orthopnea, palpitations, paroxysmal nocturnal dyspnea, shortness of breath or unexpected weight change. The symptoms have been improving. Compliance with total regimen is %. Compliance problems include adherence to exercise.  Compliance with diet is %. Compliance with exercise is 26-50%. Compliance with medications is %.   Leg Swelling   This is a recurrent problem. The current episode started more than 1 month ago. The problem occurs intermittently. The problem has been gradually improving. Pertinent negatives include no abdominal pain, anorexia, chest pain, chills, congestion, coughing, diaphoresis, fatigue, fever, joint swelling, myalgias, nausea, numbness, rash, vertigo, vomiting or weakness. Nothing aggravates the symptoms. The treatment provided mild relief.     1. CAD, non-obstructive 2005  2. HFpEF - diastolic dysfunction  3. Hypertension  4. CKD, Stage 2 with H/O KIRK  5. H/O Atrial Flutter S/P ablation per Dr Graves 2006  6. Prostate Cancer Salvador Score 6/Stage 2 S/P RT   7. DM, Type II  8. Anterior right total hip arthroplasty 04/25/2016    Mr. Ruvalcaba recently underwent lumbar surgery 10/27/2017 in [place]. He was discharge 11/7/2017. Following discharge he had several medications that were recommended be stopped.  On reviewing discharge summary from [place], it was made note that his hospitalization was complicated by hypotension and acute kidney injury.  He recieved IV fluids per nephrology consult and blood pressure medicine discontinued.       The patient returns to the office today in follow-up as noted above.  The patient is now taking Lasix 20 mg twice daily.    The  patient reports continuation of mild improvement in overall condition.  He is utilizing a rolling walker today for mobility assist and is compliant with presence of bilateral compression stockings.    The patient reports that he continues to receive home health care with [facility].  In the home setting, the patient reports blood pressure on average 1:30 to 140/74-82 mmHg.    The patient reports that when he is sitting at home he does keep his legs and an elevated position.    He has follow-up with Dr. Reza scheduled for 04/04/2018 with labs one week prior.    Review of Systems   Constitution: Negative for chills, diaphoresis, fatigue, fever, weakness and unexpected weight change.   HENT: Negative for congestion and nosebleeds.    Cardiovascular: Positive for dyspnea on exertion. Negative for chest pain, claudication, cyanosis, irregular heartbeat, near-syncope, orthopnea, palpitations, paroxysmal nocturnal dyspnea and syncope.   Respiratory: Negative for cough, shortness of breath, sleep disturbances due to breathing and snoring.    Endocrine: Negative for polydipsia, polyphagia and polyuria.   Hematologic/Lymphatic: Negative for bleeding problem. Does not bruise/bleed easily.   Skin: Negative for poor wound healing and rash.   Musculoskeletal: Negative for joint swelling and myalgias.   Gastrointestinal: Negative for bloating, abdominal pain, anorexia, nausea and vomiting.   Neurological: Negative for dizziness, light-headedness, numbness and vertigo.   Psychiatric/Behavioral: Negative for altered mental status. The patient is not nervous/anxious.      Past Medical History:   Diagnosis Date   • Acquired hallux rigidus    • Anemia    • Arthropathy of lumbar facet joint    • Carpal tunnel syndrome    • CHF (congestive heart failure)    • Chronic diastolic congestive heart failure    • Chronic kidney disease    • Chronic kidney disease, stage II (mild)     Chronic kidney disease stage 2   • Chronic obstructive  lung disease    • Chronic renal failure    • Coronary artery disease    • Cortical senile cataract    • Diabetes mellitus    • Diabetes mellitus type 2, noninsulin dependent     diabetes mellitus type 2, non-insulin treated, Medication treatment plan: oral diabetic medication   • Diabetes mellitus without complication    • Diastolic dysfunction    • Diastolic heart failure    • Essential hypertension     difficult to control      • Hip pain    • History of echocardiogram 08/19/2015    Echocardiogram W/ color flow 33680 (1) - Mild to moderate LVH with mild left atrial enlargement and normal aortic root.CLVH,preserved LV systolic function with Ef of 55%.Diastolic dysfunction.MV intact.AV thickened.Aortic sclerosis.   • Hyperlipidemia    • Influenza vaccine administered 10/02/2015    INFLUENZA IMMUN ADMIN OR PREV RECV'D  (1) - Ordered By: DIONNE MANDUJANO ([facility])    • Insomnia    • Insomnia     Other insomnia   • Joint pain    • Low back pain    • Muscle strain    • Nuclear cataract    • Pneumococcal vaccination given 07/22/2016    PNEUMOC VAC/ADMIN/RCVD 4040F (3) - Ordered By: DIONNE MANDUJANO ([facility])   • Prostate cancer    • Pure hypercholesterolemia    • Sedentary lifestyle      Past Surgical History:   Procedure Laterality Date   • CARDIAC ABLATION     • CARDIAC CATHETERIZATION  02/04/1986    Cardiac cath 83486 (1) - normal left heart catherization,non cardiac chest pain   • CARPAL TUNNEL RELEASE  10/19/2015    Carpal tunnel surgery (1) - Release of left carpal tunnel   • CATARACT EXTRACTION W/ INTRAOCULAR LENS IMPLANT Left 3/31/2017    Procedure: REMOVE CATARACT AND IMPLANT INRAOCULAR LENS LEFT EYE;  Surgeon: Hector Navarrete MD;  Location: [facility] OR;  Service:    • CATARACT EXTRACTION W/ INTRAOCULAR LENS IMPLANT Right 4/14/2017    Procedure: REMOVE CATARACT AND IMPLANT INTRAOCULAR LENS RIGHT EYE;  Surgeon: Hector Navarrete MD;  Location: [facility] OR;  Service:    • ENDOSCOPY  08/05/2013    Colon  endoscopy 73605 (2) - Hemorrhoids found.   • INGUINAL HERNIA REPAIR Right 06/07/1968    Inguinal hernia, repair (2)   • INJECTION OF MEDICATION  09/06/2012    Albuterol (2u) (1) - Ordered By: LIDYA STEINER ([facility])    • INJECTION OF MEDICATION  09/06/2012    Atrovent (1) - Ordered By: LIDYA STEINER ([facility])    • INJECTION OF MEDICATION  02/10/2012    Depo Medrol (Methylprednisone) (3) - Ordered By: HEATHER PAK ([facility])    • INJECTION OF MEDICATION  02/09/2014    Kenalog (4) - Ordered By: DIMAS ANDRADE ([facility])    • LUMBAR LAMINECTOMY  2007    Lumbar laminectomy (1)   • MANDIBLE FRACTURE SURGERY  10/11/1981    Treat nose/jaw fracture (1) - bilateral open reduction of fracture of mandible   • OTHER SURGICAL HISTORY  09/24/2011    Drain/Inject Major Joint 20610 (1) - Ordered By: KARLOS HERNANDEZ ([facility])    • OTHER SURGICAL HISTORY      Insert IVC filter 28549 (1)   • OTHER SURGICAL HISTORY  10/11/2011    SPIROMETRY 28383 (1) - Ordered By: HEATHER PAK ([facility])   • TOTAL HIP ARTHROPLASTY Right      Social History     Social History   • Marital status:      Spouse name: N/A   • Number of children: N/A   • Years of education: N/A     Social History Main Topics   • Smoking status: Former Smoker     Quit date: 1999   • Smokeless tobacco: Never Used   • Alcohol use No   • Drug use: No   • Sexual activity: Defer      Comment: Marital status:      Other Topics Concern   • None     Social History Narrative     Current Outpatient Prescriptions   Medication Sig Dispense Refill   • albuterol (PROVENTIL HFA;VENTOLIN HFA) 108 (90 Base) MCG/ACT inhaler Inhale 2 puffs Every 4 (Four) Hours As Needed for Wheezing. 1 inhaler 0   • aspirin 81 MG EC tablet Take 81 mg by mouth daily.     • docusate sodium (COLACE) 100 MG capsule Take 100 mg by mouth.     • ferrous sulfate 325 (65 FE) MG tablet TAKE ONE TABLET BY MOUTH DAILY WITH BREAKFAST 60 tablet 3   • furosemide (LASIX) 20 MG tablet Take 1 tablet by mouth  "2 (Two) Times a Day. 60 tablet 5   • gabapentin (NEURONTIN) 300 MG capsule Take 1 capsule by mouth 2 (Two) Times a Day. 60 capsule 5   • glucose blood (TRUE METRIX BLOOD GLUCOSE TEST) test strip Check glucose daily 100 each 1   • hydrALAZINE (APRESOLINE) 25 MG tablet Take 1 tablet by mouth 2 (Two) Times a Day. 60 tablet 6   • HYDROcodone-acetaminophen (NORCO) 5-325 MG per tablet Take 1 tablet by mouth.     • JANUVIA 50 MG tablet TAKE ONE TABLET BY MOUTH DAILY 30 tablet 5   • lisinopril (PRINIVIL,ZESTRIL) 20 MG tablet Take 1 tablet by mouth Daily. 30 tablet 11   • pravastatin (PRAVACHOL) 40 MG tablet TAKE ONE TABLET BY MOUTH DAILY 30 tablet 3   • TRUEPLUS LANCETS 30G misc 1 Device Daily. TEST 100 each 3     No current facility-administered medications for this visit.      Objective:     Vitals:    02/19/18 1041 02/19/18 1044 02/19/18 1104   BP: 160/98 158/86 144/72   BP Location: Left arm Right arm Left arm   Patient Position: Sitting Sitting Sitting   Cuff Size: Adult Adult Adult   Pulse: 92     SpO2: 99%     Weight: 71.8 kg (158 lb 4 oz)     Height: 167.6 cm (66\")        Physical Exam   Constitutional: He is oriented to person, place, and time. He appears well-nourished. No distress.   HENT:   Head: Normocephalic and atraumatic.   Eyes: Conjunctivae are normal. Right eye exhibits no discharge. Left eye exhibits no discharge.   Neck: No JVD ( 6 cm at 45°) present. No tracheal deviation present.   Cardiovascular: Normal rate, regular rhythm, S1 normal, S2 normal, normal heart sounds and intact distal pulses.  Exam reveals no S3 and no S4.    No murmur heard.  Pulses:       Radial pulses are 2+ on the right side, and 2+ on the left side.   Pulmonary/Chest: Effort normal and breath sounds normal. No respiratory distress. He has no wheezes. He has no rales.   Abdominal: He exhibits no distension.   Musculoskeletal: He exhibits no edema or tenderness.   Neurological: He is alert and oriented to person, place, and time. "   Skin: Skin is warm and dry. He is not diaphoretic.   Psychiatric: He has a normal mood and affect. His behavior is normal. Judgment and thought content normal.   Vitals reviewed.    Data Reviewed:    Electrocardiogram: 11/09/2017    LHC: 03/12/2005 - Dr Kj Green  Aortic pressure is 133/77.   Left ventricular pressure is 133.   Left ventricular end diastolic pressure is 13.   The left main is a very short caliber vessel, almost like a group home ostium.   The LAD is a large caliber vessel, distally has diffuse 20-30 percent narrowing, gives rise to a large diagonal 1.   The circumflex is a large caliber vessel, which gives rise to an OM1, which had diffuse 20-30 percent disease and also a posterior descending artery.   The RCA is a medium to large caliber vessel, which gave rise to a posterior descending artery and posterolateral, distal to the bifurcation has about 60-70 percent narrowing.   Left ventricular angiogram showed normal wall motion with ejection fraction of 65-75 percent.     Transthoracic echocardiogram: 11/08/2017  · Left ventricular wall thickness is consistent with mild concentric hypertrophy.  · Left ventricular systolic function is normal. Estimated EF = 55%.  · Left ventricular diastolic dysfunction (grade I) consistent with impaired relaxation.    Transthoracic Echocardiogram 10/31/3017 in [place]  Indication:  Hypotension  BP:           93/63  Findings       Left Ventricle:  The left ventricular chamber size is normal. Mild concentric left  ventricular hypertrophy is observed. The estimated ejection fraction is  65-70%. Abnormal left ventricular diastolic filling is observed,  consistent with impaired relaxation.    Left Atrium:  The left atrial chamber size is normal.   Right Ventricle:  The right ventricular cavity size is normal. The right ventricular  global systolic function is normal.   Right Atrium:  The right atrial cavity size is normal.   Aortic Valve:  Moderate aortic leaflet  calcification is visualized. There is mild to  moderate aortic stenosis. The mean gradient of the aortic valve is 30  mmHg. The aortic valve area by velocity time interval is calculated at  1.84 cm2. There is no evidence of aortic regurgitation.   Mitral Valve:  The mitral valve leaflets appear normal. There is mild mitral  regurgitation.    Tricuspid Valve:  The tricuspid valve leaflets are normal.  There is mild tricuspid  regurgitation. The Right Ventricular Systolic Pressure is calculated at  35 mmHg.   Pulmonic Valve:  There is trace pulmonic regurgitation present.   Pericardium:  There is no pericardial effusion.   Conclusions  1. Mild concentric left ventricular hypertrophy with thickened  papillary muscle is observed.   Hyperdynamic LV systolic function with an  estimated ejection fraction is 65-70%.  2. Abnormal left ventricular diastolic filling is observed, consistent  with impaired relaxation.   3. Aortic valve is trileaflet, moderately calcified with mild to  moderate aortic stenosis  4. Mild tricuspid regurgitation with RVSP 35 mm Hg    Echocardiogram: 08/26/2016  FINDINGS:  1. Both atria are normal in size.  2. The aortic valve is mildly sclerotic without any hemodynamically  significant aortic stenosis.  3. Mild concentric left ventricular hypertrophy with early diastolic  dysfunction. No evidence of any regional wall motion abnormality.  The estimated ejection fraction greater than 60%.  4. There is mild mitral annular calcification.  5. Normal right ventricular size and function.  6. On color flow Doppler, there is trace mitral and tricuspid  regurgitation without any hemodynamic significance.  7. No intracardiac mass, pericardial effusion, or cardiac thrombus  seen.  CLINICAL IMPRESSION:  1. Mild concentric left ventricular hypertrophy with early diastolic  dysfunction.  2. Estimated ejection fraction greater than 60%.  3. Mildly sclerotic aortic valve with mild mitral annular  calcification  without any hemodynamic significant stenotic process.  4. On color flow Doppler, there is trace mitral and tricuspid  regurgitation without any hemodynamic significance.  5. No intracardiac mass, pericardial effusion, or cardiac thrombus  Seen.    Carotid Ulsd: 01/09/2017  Unable to retrieve results  Renal Artery U/S 11/1/2017  1. No color Doppler scan evidence of hemodynamically significant renal artery stenosis.  2. Elevated resistivity indices within the kidneys bilaterally which may be indicative of medical renal parenchymal disease.  3. Kidneys are slightly atrophic in size as detailed above.  4. Urinary bladder poorly distended for assessment with suspected thickening the bladder wall which is nonspecific and may simply be accentuated by lack of distention or muscle hypertrophy.  CTA Chest: 11/08/2017  IMPRESSION:  1. No evidence of pulmonary embolus..  2. Mild centrilobular and paraseptal emphysematous changes..  3. Posterior segment right lobe of liver peripheral 1.56 cm  simple hepatic cyst..  4.T11, T12, and L1 bilateral decompressive laminectomies.    Office Visit on 12/20/2017   Component Date Value Ref Range Status   • Glucose 12/20/2017 96  60 - 100 mg/dL Final   • BUN 12/20/2017 18  7 - 21 mg/dL Final   • Creatinine 12/20/2017 1.24  0.70 - 1.30 mg/dL Final   • Sodium 12/20/2017 137  137 - 145 mmol/L Final   • Potassium 12/20/2017 3.9  3.5 - 5.1 mmol/L Final   • Chloride 12/20/2017 97  95 - 110 mmol/L Final   • CO2 12/20/2017 28.0  22.0 - 31.0 mmol/L Final   • Calcium 12/20/2017 9.1  8.4 - 10.2 mg/dL Final   • eGFR   Amer 12/20/2017 68  42 - 98 mL/min/1.73 Final   • BUN/Creatinine Ratio 12/20/2017 14.5  7.0 - 25.0 Final   • Anion Gap 12/20/2017 12.0  5.0 - 15.0 mmol/L Final   Lab on 11/17/2017   Component Date Value Ref Range Status   • Glucose 11/17/2017 114* 60 - 100 mg/dL Final   • BUN 11/17/2017 13  7 - 21 mg/dL Final   • Creatinine 11/17/2017 1.26  0.70 - 1.30 mg/dL Final   • Sodium  11/17/2017 138  137 - 145 mmol/L Final   • Potassium 11/17/2017 4.1  3.5 - 5.1 mmol/L Final   • Chloride 11/17/2017 100  95 - 110 mmol/L Final   • CO2 11/17/2017 29.0  22.0 - 31.0 mmol/L Final   • Calcium 11/17/2017 9.4  8.4 - 10.2 mg/dL Final   • eGFR   Amer 11/17/2017 67  42 - 98 mL/min/1.73 Final   • BUN/Creatinine Ratio 11/17/2017 10.3  7.0 - 25.0 Final   • Anion Gap 11/17/2017 9.0  5.0 - 15.0 mmol/L Final   Admission on 11/08/2017, Discharged on 11/10/2017   Component Date Value Ref Range Status   • Glucose 11/08/2017 169* 60 - 100 mg/dL Final   • BUN 11/08/2017 24* 7 - 21 mg/dL Final   • Creatinine 11/08/2017 1.13  0.70 - 1.30 mg/dL Final   • Sodium 11/08/2017 142  137 - 145 mmol/L Final   • Potassium 11/08/2017 3.8  3.5 - 5.1 mmol/L Final   • Chloride 11/08/2017 104  95 - 110 mmol/L Final   • CO2 11/08/2017 23.0  22.0 - 31.0 mmol/L Final   • Calcium 11/08/2017 9.6  8.4 - 10.2 mg/dL Final   • Total Protein 11/08/2017 6.9  6.3 - 8.6 g/dL Final   • Albumin 11/08/2017 4.00  3.40 - 4.80 g/dL Final   • ALT (SGPT) 11/08/2017 46  21 - 72 U/L Final   • AST (SGOT) 11/08/2017 39  17 - 59 U/L Final   • Alkaline Phosphatase 11/08/2017 101  38 - 126 U/L Final   • Total Bilirubin 11/08/2017 1.2  0.2 - 1.3 mg/dL Final   • eGFR   Amer 11/08/2017 75  42 - 98 mL/min/1.73 Final   • Globulin 11/08/2017 2.9  2.3 - 3.5 gm/dL Final   • A/G Ratio 11/08/2017 1.4  1.1 - 1.8 g/dL Final   • BUN/Creatinine Ratio 11/08/2017 21.2  7.0 - 25.0 Final   • Anion Gap 11/08/2017 15.0  5.0 - 15.0 mmol/L Final   • WBC 11/08/2017 12.47* 3.20 - 9.80 10*3/mm3 Final   • RBC 11/08/2017 3.87* 4.37 - 5.74 10*6/mm3 Final   • Hemoglobin 11/08/2017 11.4* 13.7 - 17.3 g/dL Final   • Hematocrit 11/08/2017 33.9* 39.0 - 49.0 % Final   • MCV 11/08/2017 87.6  80.0 - 98.0 fL Final   • MCH 11/08/2017 29.5  26.5 - 34.0 pg Final   • MCHC 11/08/2017 33.6  31.5 - 36.3 g/dL Final   • RDW 11/08/2017 13.8  11.5 - 14.5 % Final   • RDW-SD 11/08/2017 43.9  35.1 -  43.9 fl Final   • MPV 11/08/2017 9.1  8.0 - 12.0 fL Final   • Platelets 11/08/2017 341  150 - 450 10*3/mm3 Final   • Neutrophil % 11/08/2017 75.1  37.0 - 80.0 % Final   • Lymphocyte % 11/08/2017 7.3* 10.0 - 50.0 % Final   • Monocyte % 11/08/2017 13.4* 0.0 - 12.0 % Final   • Eosinophil % 11/08/2017 0.2  0.0 - 7.0 % Final   • Basophil % 11/08/2017 0.2  0.0 - 2.0 % Final   • Immature Grans % 11/08/2017 3.8* 0.0 - 0.5 % Final   • Neutrophils, Absolute 11/08/2017 9.35* 2.00 - 8.60 10*3/mm3 Final   • Lymphocytes, Absolute 11/08/2017 0.91  0.60 - 4.20 10*3/mm3 Final   • Monocytes, Absolute 11/08/2017 1.67* 0.00 - 0.90 10*3/mm3 Final   • Eosinophils, Absolute 11/08/2017 0.03  0.00 - 0.70 10*3/mm3 Final   • Basophils, Absolute 11/08/2017 0.03  0.00 - 0.20 10*3/mm3 Final   • Immature Grans, Absolute 11/08/2017 0.48* 0.00 - 0.02 10*3/mm3 Final   • Extra Tube 11/08/2017 hold for add-on   Final    Auto resulted   • Extra Tube 11/08/2017 Hold for add-ons.   Final    Auto resulted.   • Extra Tube 11/08/2017 hold for add-on   Final    Auto resulted   • Extra Tube 11/08/2017 Hold for add-ons.   Final    Auto resulted.   • Color, UA 11/08/2017 Dark Yellow  Yellow, Straw, Dark Yellow, Sachi Final   • Appearance, UA 11/08/2017 Cloudy* Clear Final   • pH, UA 11/08/2017 <=5.0  5.0 - 9.0 Final   • Specific Gravity, UA 11/08/2017 1.027  1.003 - 1.030 Final   • Glucose, UA 11/08/2017 Negative  Negative Final   • Ketones, UA 11/08/2017 15 mg/dL (1+)* Negative Final   • Bilirubin, UA 11/08/2017 Small (1+)* Negative Final   • Blood, UA 11/08/2017 Large (3+)* Negative Final   • Protein, UA 11/08/2017 30 mg/dL (1+)* Negative Final   • Leuk Esterase, UA 11/08/2017 Moderate (2+)* Negative Final   • Nitrite, UA 11/08/2017 Positive* Negative Final   • Urobilinogen, UA 11/08/2017 4.0 E.U./dL* 0.2 - 1.0 E.U./dL Final   • Troponin I 11/08/2017 0.050* <=0.034 ng/mL Final   • proBNP 11/08/2017 363.0  0.0 - 1800.0 pg/mL Final   • RBC, UA 11/08/2017 3-5*  None Seen /HPF Final   • WBC, UA 11/08/2017 Too Numerous to Count* None Seen, 0-2, 3-5 /HPF Final   • Bacteria, UA 11/08/2017 4+* None Seen /HPF Final   • Squamous Epithelial Cells, UA 11/08/2017 None Seen  None Seen, 0-2 /HPF Final   • Hyaline Casts, UA 11/08/2017 13-20  None Seen /LPF Final   • Methodology 11/08/2017 Manual Light Microscopy   Final   • Urine Culture 11/08/2017 >100,000 CFU/mL Escherichia coli*  Final   • Troponin I 11/08/2017 0.056* <=0.034 ng/mL Final   • Troponin I 11/08/2017 0.056* <=0.034 ng/mL Final   • Creatine Kinase 11/08/2017 110  55 - 170 U/L Final   • CKMB 11/08/2017 1.78  0.00 - 5.00 ng/mL Final   • Creatine Kinase 11/08/2017 97  55 - 170 U/L Final   • CKMB 11/08/2017 1.47  0.00 - 5.00 ng/mL Final   • Target HR (85%) 11/08/2017 118  bpm Final   • Max. Pred. HR (100%) 11/08/2017 139  bpm Final   • Echo EF Estimated 11/08/2017 55  % Final   • EF(MOD-sp4) 11/08/2017 60  % Final   • Blood Culture 11/08/2017 No growth at 5 days   Final   • Blood Culture 11/08/2017 No growth at 5 days   Final   • Glucose 11/08/2017 120  70 - 130 mg/dL Final    RN NotifiedMeter: [id number]: [id number] FRANKI MCKINLEY   • Troponin I 11/09/2017 0.057* <=0.034 ng/mL Final   • Glucose 11/08/2017 144* 70 - 130 mg/dL Final    RN NotifiedMeter: [id number]: [id number] DANIELLENIGEL ASHLI   • Glucose 11/09/2017 117* 60 - 100 mg/dL Final   • BUN 11/09/2017 25* 7 - 21 mg/dL Final   • Creatinine 11/09/2017 1.37* 0.70 - 1.30 mg/dL Final   • Sodium 11/09/2017 142  137 - 145 mmol/L Final   • Potassium 11/09/2017 3.7  3.5 - 5.1 mmol/L Final   • Chloride 11/09/2017 104  95 - 110 mmol/L Final   • CO2 11/09/2017 27.0  22.0 - 31.0 mmol/L Final   • Calcium 11/09/2017 8.9  8.4 - 10.2 mg/dL Final   • eGFR   Amer 11/09/2017 60  42 - 98 mL/min/1.73 Final   • BUN/Creatinine Ratio 11/09/2017 18.2  7.0 - 25.0 Final   • Anion Gap 11/09/2017 11.0  5.0 - 15.0 mmol/L Final   • WBC 11/09/2017 11.68* 3.20 - 9.80 10*3/mm3  Final   • RBC 11/09/2017 3.48* 4.37 - 5.74 10*6/mm3 Final   • Hemoglobin 11/09/2017 10.2* 13.7 - 17.3 g/dL Final   • Hematocrit 11/09/2017 30.2* 39.0 - 49.0 % Final   • MCV 11/09/2017 86.8  80.0 - 98.0 fL Final   • MCH 11/09/2017 29.3  26.5 - 34.0 pg Final   • MCHC 11/09/2017 33.8  31.5 - 36.3 g/dL Final   • RDW 11/09/2017 13.6  11.5 - 14.5 % Final   • RDW-SD 11/09/2017 43.5  35.1 - 43.9 fl Final   • MPV 11/09/2017 8.8  8.0 - 12.0 fL Final   • Platelets 11/09/2017 303  150 - 450 10*3/mm3 Final   • Extra Tube 11/09/2017 Hold for add-ons.   Final    Auto resulted.   • Neutrophil % 11/09/2017 82.0* 37.0 - 80.0 % Final   • Lymphocyte % 11/09/2017 6.0* 10.0 - 50.0 % Final   • Monocyte % 11/09/2017 9.0  0.0 - 12.0 % Final   • Metamyelocyte % 11/09/2017 1.0* 0.0 - 0.0 % Final   • Myelocyte % 11/09/2017 1.0* 0.0 - 0.0 % Final   • Atypical Lymphocyte % 11/09/2017 1.0  0.0 - 5.0 % Final   • Neutrophils Absolute 11/09/2017 9.58* 2.00 - 8.60 10*3/mm3 Final   • Lymphocytes Absolute 11/09/2017 0.70  0.60 - 4.20 10*3/mm3 Final   • Monocytes Absolute 11/09/2017 1.05* 0.00 - 0.90 10*3/mm3 Final   • Polychromasia 11/09/2017 Slight/1+  None Seen Final   • Toxic Granulation 11/09/2017 Slight/1+  None Seen Final   • Vacuolated Neutrophils 11/09/2017 Slight/1+  None Seen Final   • Giant Platelets 11/09/2017 Slight/1+  None Seen Final   • Glucose 11/09/2017 119  70 - 130 mg/dL Final    RN NotifiedMeter: [id number]: [id number] MU CORNELIUS   • Troponin I 11/09/2017 0.043* <=0.034 ng/mL Final   • Glucose 11/09/2017 169* 70 - 130 mg/dL Final    RN NotifiedMeter: [id number]: [id number] FRANKI MCKINLEY   • Glucose 11/09/2017 177* 70 - 130 mg/dL Final    RN NotifiedMeter: [id number]: [id number] FRANKI MCKINLEY   • Glucose 11/09/2017 121  70 - 130 mg/dL Final    RN NotifiedMeter: [id number]: [id number] MU CORNELIUS   • Troponin I 11/10/2017 0.047* <=0.034 ng/mL Final   • Glucose 11/10/2017 128  70 - 130  mg/dL Final    RN NotifiedMeter: [id number]: [id number] MU CORNELIUS   • Glucose 11/10/2017 134* 70 - 130 mg/dL Final    RN NotifiedMeter: [id number]: [id number] NICK RAMÍREZ   Transcribe Orders on 08/23/2017   Component Date Value Ref Range Status   • Total Protein 08/23/2017 6.9  6.3 - 8.6 g/dL Final   • Albumin 08/23/2017 4.30  3.40 - 4.80 g/dL Final   • ALT (SGPT) 08/23/2017 27  21 - 72 U/L Final   • AST (SGOT) 08/23/2017 30  17 - 59 U/L Final   • Alkaline Phosphatase 08/23/2017 54  38 - 126 U/L Final   • Total Bilirubin 08/23/2017 0.7  0.2 - 1.3 mg/dL Final   • Bilirubin, Direct 08/23/2017 0.0  0.0 - 0.3 mg/dL Final   • Bilirubin, Indirect 08/23/2017 0.3  0.0 - 1.1 mg/dL Final   • Magnesium 08/23/2017 2.0  1.6 - 2.3 mg/dL Final   • Copper 08/23/2017 114  72 - 166 ug/dL Final                                    Detection Limit = 5   • Vitamin B1, Whole Blood 08/23/2017 88.6  66.5 - 200.0 nmol/L Final   • Vitamin B-12 08/23/2017 242  239 - 931 pg/mL Final   • 25 Hydroxy, Vitamin D 08/23/2017 40.4  30.0 - 100.0 ng/ml Final   • Folate 08/23/2017 9.78  2.76 - 21.00 ng/mL Final   • Hemoglobin A1C 08/23/2017 6.6* 4 - 5.6 % Final   • TSH 08/23/2017 0.990  0.460 - 4.680 mIU/mL Final   • Iron 08/23/2017 37* 49 - 181 mcg/dL Final       The following portions of the patient's history were reviewed and updated as appropriate: allergies, current medications, past family history, past medical history, past social history, past surgical history and problem list.     Assessment:      Diagnosis Plan   1. Chronic diastolic congestive heart failure without clinical evidence of hypervolemia;  He is well perfused. AHA Stage C: ; NYHA Class III  BETA-BLOCKER: Not applicable from HFpEF, however could be considered from CAD standpoint  ACE/ARB: Lisinopril 20 mg daily  ENTRESTO: Not applicable  DIURETIC: Lasix 20 mg twice daily  ALDOSTERONT ANTAGONIST: History of KIRK/hyperkalemia  IMDUR/HYDRALAZINE: Hydralazine 25  mg twice daily  DIGOXIN: Not applicable  Fluid restriction: 2000 cc daily  Sodium restriction: 2000 mg daily     Recommended daily weight monitoring.  Discussed patient action plan for heart failure.  Recommended avoiding NSAIDs use.  Discussed warning signs requiring additional medical attention for heart failure.       2. Leg edema (bilateral ankle) consider venous stasis as contributing      Duplex Venous Lower Extremity - Bilateral CAR   3. Coronary artery disease involving native coronary artery of native heart without angina pectoris, stable      ASA plus statin therapy   4. Essential hypertension, stable      As per #1; consideration for white coat syndrome   The patient presented with his outpatient blood pressure monitor which appears in correlation with manual findings        5. Diabetes mellitus without complication, controlled    As per PCP        6. Chronic kidney disease, stage II (mild), stable  As per Dr Reza       7. Iron deficiency, stable  As per PCP and nephrology       8. Asymptomatic bilateral carotid artery stenosis, stable in January 2017  (0-15% bilateral ICA)      Dr Hamman in January 2019         Patient's BMI is within normal parameters. No follow-up required; non smoker

## 2018-03-08 ENCOUNTER — OFFICE VISIT (OUTPATIENT)
Dept: CARDIOLOGY | Facility: CLINIC | Age: 83
End: 2018-03-08

## 2018-03-08 VITALS
DIASTOLIC BLOOD PRESSURE: 78 MMHG | HEART RATE: 83 BPM | BODY MASS INDEX: 25.39 KG/M2 | OXYGEN SATURATION: 99 % | WEIGHT: 158 LBS | HEIGHT: 66 IN | SYSTOLIC BLOOD PRESSURE: 142 MMHG

## 2018-03-08 DIAGNOSIS — I82.403 LEG DVT (DEEP VENOUS THROMBOEMBOLISM), ACUTE, BILATERAL (HCC): Primary | ICD-10-CM

## 2018-03-08 DIAGNOSIS — I50.32 CHRONIC DIASTOLIC CONGESTIVE HEART FAILURE (HCC): Chronic | ICD-10-CM

## 2018-03-08 PROCEDURE — 99214 OFFICE O/P EST MOD 30 MIN: CPT | Performed by: NURSE PRACTITIONER

## 2018-03-08 NOTE — PROGRESS NOTES
Subjective:     CC: DVT    Congestive Heart Failure   Presents for follow-up visit. Pertinent negatives include no abdominal pain, chest pain, chest pressure, claudication, edema (bilateral ankle at the end of the day; compliant with compression stockings), fatigue, near-syncope, orthopnea, palpitations, paroxysmal nocturnal dyspnea, shortness of breath or unexpected weight change. The symptoms have been improving. Compliance with total regimen is %. Compliance problems include adherence to exercise.  Compliance with diet is %. Compliance with exercise is 26-50%. Compliance with medications is %.   Leg Swelling   This is a chronic problem. The current episode started more than 1 month ago. The problem occurs intermittently. The problem has been resolved. Pertinent negatives include no abdominal pain, anorexia, chest pain, chills, congestion, coughing, diaphoresis, fatigue, fever, joint swelling, myalgias, nausea, numbness, rash, vertigo, vomiting or weakness. Nothing aggravates the symptoms. He has tried rest and position changes for the symptoms. The treatment provided mild relief.     1. CAD, non-obstructive 2005  2. HFpEF - diastolic dysfunction  3. Hypertension  4. CKD, Stage 2 with H/O KIRK  5. H/O Atrial Flutter S/P ablation per Dr Graves 2006  6. Prostate Cancer [place] Score 6/Stage 2 S/P RT   7. DM, Type II  8. Anterior right total hip arthroplasty 04/25/2016    Mr. Ruvalcaba recently underwent lumbar surgery 10/27/2017 in [place]. He was discharge 11/7/2017. Following discharge he had several medications that were recommended be stopped.  On reviewing discharge summary from [place], it was made note that his hospitalization was complicated by hypotension and acute kidney injury.  He recieved IV fluids per nephrology consult and blood pressure medicine discontinued.       The patient is now taking Lasix 20 mg twice daily.    The patient reports continuation of mild improvement in overall  condition.  He is utilizing a rolling walker today for mobility assist and is compliant with presence of bilateral compression stockings.    The patient reports that when he is sitting at home he does keep his legs and an elevated position.    He has follow-up with Dr. Reza scheduled for 04/04/2018 with labs one week prior.    3/8/18:  Mr. Ruvalcaba was seen today sooner than expected due to acute findings of DVT bilaterally during today's ultrasound. His edema is improved greatly, absent today. He is taking 20mg Lasix BID. He does complain of increased urination at night. His DVT's are compressible with adequate flow. He was given a Xarelto DVT pack and treated acutely with 15mg BID x 21 days followed by 20mg daily.     Review of Systems   Constitution: Negative for chills, diaphoresis, fatigue, fever, weakness and unexpected weight change.   HENT: Negative for congestion and nosebleeds.    Cardiovascular: Positive for dyspnea on exertion. Negative for chest pain, claudication, cyanosis, irregular heartbeat, near-syncope, orthopnea, palpitations, paroxysmal nocturnal dyspnea and syncope.   Respiratory: Negative for cough, shortness of breath, sleep disturbances due to breathing and snoring.    Endocrine: Negative for polydipsia, polyphagia and polyuria.   Hematologic/Lymphatic: Negative for bleeding problem. Does not bruise/bleed easily.   Skin: Negative for poor wound healing and rash.   Musculoskeletal: Negative for joint swelling and myalgias.   Gastrointestinal: Negative for bloating, abdominal pain, anorexia, nausea and vomiting.   Neurological: Negative for dizziness, light-headedness, numbness and vertigo.   Psychiatric/Behavioral: Negative for altered mental status. The patient is not nervous/anxious.      Past Medical History:   Diagnosis Date   • Acquired hallux rigidus    • Anemia    • Arthropathy of lumbar facet joint    • Carpal tunnel syndrome    • CHF (congestive heart failure)    • Chronic diastolic  congestive heart failure    • Chronic kidney disease    • Chronic kidney disease, stage II (mild)     Chronic kidney disease stage 2   • Chronic obstructive lung disease    • Chronic renal failure    • Coronary artery disease    • Cortical senile cataract    • Diabetes mellitus    • Diabetes mellitus type 2, noninsulin dependent     diabetes mellitus type 2, non-insulin treated, Medication treatment plan: oral diabetic medication   • Diabetes mellitus without complication    • Diastolic dysfunction    • Diastolic heart failure    • Essential hypertension     difficult to control      • Hip pain    • History of echocardiogram 08/19/2015    Echocardiogram W/ color flow 65410 (1) - Mild to moderate LVH with mild left atrial enlargement and normal aortic root.CLVH,preserved LV systolic function with Ef of 55%.Diastolic dysfunction.MV intact.AV thickened.Aortic sclerosis.   • Hyperlipidemia    • Influenza vaccine administered 10/02/2015    INFLUENZA IMMUN ADMIN OR PREV RECV'D  (1) - Ordered By: DIONNE MANDUJANO ([facility])    • Insomnia    • Insomnia     Other insomnia   • Joint pain    • Low back pain    • Muscle strain    • Nuclear cataract    • Pneumococcal vaccination given 07/22/2016    PNEUMOC VAC/ADMIN/RCVD 4040F (3) - Ordered By: DIONNE MANDUJANO ([facility])   • Prostate cancer    • Pure hypercholesterolemia    • Sedentary lifestyle      Past Surgical History:   Procedure Laterality Date   • CARDIAC ABLATION     • CARDIAC CATHETERIZATION  02/04/1986    Cardiac cath 32007 (1) - normal left heart catherization,non cardiac chest pain   • CARPAL TUNNEL RELEASE  10/19/2015    Carpal tunnel surgery (1) - Release of left carpal tunnel   • CATARACT EXTRACTION W/ INTRAOCULAR LENS IMPLANT Left 3/31/2017    Procedure: REMOVE CATARACT AND IMPLANT INRAOCULAR LENS LEFT EYE;  Surgeon: Hector Navarrete MD;  Location: [facility];  Service:    • CATARACT EXTRACTION W/ INTRAOCULAR LENS IMPLANT Right 4/14/2017    Procedure:  REMOVE CATARACT AND IMPLANT INTRAOCULAR LENS RIGHT EYE;  Surgeon: Hector Navarrete MD;  Location: [facility];  Service:    • ENDOSCOPY  08/05/2013    Colon endoscopy 82138 (2) - Hemorrhoids found.   • INGUINAL HERNIA REPAIR Right 06/07/1968    Inguinal hernia, repair (2)   • INJECTION OF MEDICATION  09/06/2012    Albuterol (2u) (1) - Ordered By: LIDYA STEINER ([facility])    • INJECTION OF MEDICATION  09/06/2012    Atrovent (1) - Ordered By: LIDYA STEINER ([facility])    • INJECTION OF MEDICATION  02/10/2012    Depo Medrol (Methylprednisone) (3) - Ordered By: HEATHER PAK ([facility])    • INJECTION OF MEDICATION  02/09/2014    Kenalog (4) - Ordered By: DIMAS ANDRADE ([facility])    • LUMBAR LAMINECTOMY  2007    Lumbar laminectomy (1)   • MANDIBLE FRACTURE SURGERY  10/11/1981    Treat nose/jaw fracture (1) - bilateral open reduction of fracture of mandible   • OTHER SURGICAL HISTORY  09/24/2011    Drain/Inject Major Joint 20610 (1) - Ordered By: KARLOS HERNANDEZ ([facility])    • OTHER SURGICAL HISTORY      Insert IVC filter 78402 (1)   • OTHER SURGICAL HISTORY  10/11/2011    SPIROMETRY 45942 (1) - Ordered By: HEATHER PAK ([facility])   • TOTAL HIP ARTHROPLASTY Right      Social History     Social History   • Marital status:      Social History Main Topics   • Smoking status: Former Smoker     Quit date: 1999   • Smokeless tobacco: Never Used   • Alcohol use No   • Drug use: No   • Sexual activity: Defer      Comment: Marital status:      Current Outpatient Prescriptions   Medication Sig Dispense Refill   • albuterol (PROVENTIL HFA;VENTOLIN HFA) 108 (90 Base) MCG/ACT inhaler Inhale 2 puffs Every 4 (Four) Hours As Needed for Wheezing. 1 inhaler 0   • aspirin 81 MG EC tablet Take 81 mg by mouth daily.     • docusate sodium (COLACE) 100 MG capsule Take 100 mg by mouth.     • ferrous sulfate 325 (65 FE) MG tablet TAKE ONE TABLET BY MOUTH DAILY WITH BREAKFAST 60 tablet 3   • furosemide (LASIX) 20 MG tablet  "Take 1 tablet by mouth 2 (Two) Times a Day. 60 tablet 5   • gabapentin (NEURONTIN) 300 MG capsule Take 1 capsule by mouth 2 (Two) Times a Day. 60 capsule 5   • glucose blood (TRUE METRIX BLOOD GLUCOSE TEST) test strip Check glucose daily 100 each 1   • hydrALAZINE (APRESOLINE) 25 MG tablet Take 1 tablet by mouth 2 (Two) Times a Day. 60 tablet 6   • HYDROcodone-acetaminophen (NORCO) 5-325 MG per tablet Take 1 tablet by mouth.     • JANUVIA 50 MG tablet TAKE ONE TABLET BY MOUTH DAILY 30 tablet 5   • lisinopril (PRINIVIL,ZESTRIL) 20 MG tablet Take 1 tablet by mouth Daily. 30 tablet 11   • pravastatin (PRAVACHOL) 40 MG tablet TAKE ONE TABLET BY MOUTH DAILY 30 tablet 3   • TRUEPLUS LANCETS 30G misc 1 Device Daily. TEST 100 each 3   • Rivaroxaban 15 & 20 MG tablet therapy pack Take as directed 1 each 0     No current facility-administered medications for this visit.      Objective:     Vitals:    03/08/18 1116   BP: 142/78   Pulse: 83   SpO2: 99%   Weight: 71.7 kg (158 lb)   Height: 167.6 cm (66\")      Physical Exam   Constitutional: He is oriented to person, place, and time. He appears well-nourished. No distress.   HENT:   Head: Normocephalic and atraumatic.   Eyes: Conjunctivae are normal. Right eye exhibits no discharge. Left eye exhibits no discharge.   Neck: No JVD ( 6 cm at 45°) present. No tracheal deviation present.   Cardiovascular: Normal rate, regular rhythm, S1 normal, S2 normal, normal heart sounds and intact distal pulses.  Exam reveals no S3 and no S4.    No murmur heard.  Pulses:       Radial pulses are 2+ on the right side, and 2+ on the left side.   Pulmonary/Chest: Effort normal and breath sounds normal. No respiratory distress. He has no wheezes. He has no rales.   Abdominal: He exhibits no distension.   Musculoskeletal: He exhibits no edema or tenderness.   Neurological: He is alert and oriented to person, place, and time.   Skin: Skin is warm and dry. He is not diaphoretic.   Psychiatric: He has a " normal mood and affect. His behavior is normal. Judgment and thought content normal.   Vitals reviewed.    Data Reviewed:    Electrocardiogram: 11/09/2017    LHC: 03/12/2005 - Dr Kj Green  Aortic pressure is 133/77.   Left ventricular pressure is 133.   Left ventricular end diastolic pressure is 13.   The left main is a very short caliber vessel, almost like a senior living ostium.   The LAD is a large caliber vessel, distally has diffuse 20-30 percent narrowing, gives rise to a large diagonal 1.   The circumflex is a large caliber vessel, which gives rise to an OM1, which had diffuse 20-30 percent disease and also a posterior descending artery.   The RCA is a medium to large caliber vessel, which gave rise to a posterior descending artery and posterolateral, distal to the bifurcation has about 60-70 percent narrowing.   Left ventricular angiogram showed normal wall motion with ejection fraction of 65-75 percent.     Transthoracic echocardiogram: 11/08/2017  · Left ventricular wall thickness is consistent with mild concentric hypertrophy.  · Left ventricular systolic function is normal. Estimated EF = 55%.  · Left ventricular diastolic dysfunction (grade I) consistent with impaired relaxation.    Transthoracic Echocardiogram 10/31/3017 in [place]  Indication:  Hypotension  BP:           93/63  Findings       Left Ventricle:  The left ventricular chamber size is normal. Mild concentric left  ventricular hypertrophy is observed. The estimated ejection fraction is  65-70%. Abnormal left ventricular diastolic filling is observed,  consistent with impaired relaxation.    Left Atrium:  The left atrial chamber size is normal.   Right Ventricle:  The right ventricular cavity size is normal. The right ventricular  global systolic function is normal.   Right Atrium:  The right atrial cavity size is normal.   Aortic Valve:  Moderate aortic leaflet calcification is visualized. There is mild to  moderate aortic stenosis. The  mean gradient of the aortic valve is 30  mmHg. The aortic valve area by velocity time interval is calculated at  1.84 cm2. There is no evidence of aortic regurgitation.   Mitral Valve:  The mitral valve leaflets appear normal. There is mild mitral  regurgitation.    Tricuspid Valve:  The tricuspid valve leaflets are normal.  There is mild tricuspid  regurgitation. The Right Ventricular Systolic Pressure is calculated at  35 mmHg.   Pulmonic Valve:  There is trace pulmonic regurgitation present.   Pericardium:  There is no pericardial effusion.   Conclusions  1. Mild concentric left ventricular hypertrophy with thickened  papillary muscle is observed.   Hyperdynamic LV systolic function with an  estimated ejection fraction is 65-70%.  2. Abnormal left ventricular diastolic filling is observed, consistent  with impaired relaxation.   3. Aortic valve is trileaflet, moderately calcified with mild to  moderate aortic stenosis  4. Mild tricuspid regurgitation with RVSP 35 mm Hg    Echocardiogram: 08/26/2016  FINDINGS:  1. Both atria are normal in size.  2. The aortic valve is mildly sclerotic without any hemodynamically  significant aortic stenosis.  3. Mild concentric left ventricular hypertrophy with early diastolic  dysfunction. No evidence of any regional wall motion abnormality.  The estimated ejection fraction greater than 60%.  4. There is mild mitral annular calcification.  5. Normal right ventricular size and function.  6. On color flow Doppler, there is trace mitral and tricuspid  regurgitation without any hemodynamic significance.  7. No intracardiac mass, pericardial effusion, or cardiac thrombus  seen.  CLINICAL IMPRESSION:  1. Mild concentric left ventricular hypertrophy with early diastolic  dysfunction.  2. Estimated ejection fraction greater than 60%.  3. Mildly sclerotic aortic valve with mild mitral annular  calcification without any hemodynamic significant stenotic process.  4. On color flow Doppler,  there is trace mitral and tricuspid  regurgitation without any hemodynamic significance.  5. No intracardiac mass, pericardial effusion, or cardiac thrombus  Seen.    Carotid Ulsd: 01/09/2017  Unable to retrieve results  Renal Artery U/S 11/1/2017  1. No color Doppler scan evidence of hemodynamically significant renal artery stenosis.  2. Elevated resistivity indices within the kidneys bilaterally which may be indicative of medical renal parenchymal disease.  3. Kidneys are slightly atrophic in size as detailed above.  4. Urinary bladder poorly distended for assessment with suspected thickening the bladder wall which is nonspecific and may simply be accentuated by lack of distention or muscle hypertrophy.  CTA Chest: 11/08/2017  IMPRESSION:  1. No evidence of pulmonary embolus..  2. Mild centrilobular and paraseptal emphysematous changes..  3. Posterior segment right lobe of liver peripheral 1.56 cm  simple hepatic cyst..  4.T11, T12, and L1 bilateral decompressive laminectomies.    VENOUS DUPLEX 3/8/2018  Interpretation Summary   · Acute right lower extremity deep vein thrombosis noted in the common femoral, profunda femoral, proximal femoral, mid femoral, distal femoral, popliteal and peroneal.  · Acute right lower extremity superficial thrombophlebitis noted in the greater saphenous (above knee).  · Acute left lower extremity deep vein thrombosis noted in the common femoral, profunda femoral, proximal femoral, mid femoral, distal femoral, popliteal and peroneal.  · Partial flow is noted above mentioned veins.           LABS  Lab Results   Component Value Date    WBC 11.68 (H) 11/09/2017    HGB 10.2 (L) 11/09/2017    HCT 30.2 (L) 11/09/2017    MCV 86.8 11/09/2017     11/09/2017     Lab Results   Component Value Date    GLUCOSE 96 12/20/2017    BUN 18 12/20/2017    CREATININE 1.24 12/20/2017    EGFRIFAFRI 68 12/20/2017    BCR 14.5 12/20/2017    K 3.9 12/20/2017    CO2 28.0 12/20/2017    CALCIUM 9.1  12/20/2017    PROTENTOTREF 6.4 05/14/2015    ALBUMIN 4.00 11/08/2017    LABIL2 1.4 11/08/2017    AST 39 11/08/2017    ALT 46 11/08/2017         The following portions of the patient's history were reviewed and updated as appropriate: allergies, current medications, past family history, past medical history, past social history, past surgical history and problem list.     Assessment:      Diagnosis Plan   1. Leg DVT (deep venous thromboembolism), acute, bilateral  I feel the DVT is provoked secondary to recent surgery, prior CA history, and decreased mobility following said surgery. He also has history of leg edema and some venous pooling is possible.     Xarelto starter pack.  15mg BID x 21 days followed by 20mg daily    He was given the risks and benefits of beginning a NOAC.    Anticoagulation teaching/discussion for 30 minutes of direct face-face interaction with the patient during this encounter and over half of that time was spent on counseling and coordination of care.  We discussed in depth the importance of medication adherence, signs/symptoms of bleeding, and management of anticoagulation for procedures. I also educated the patient about assistance programs available for cost reduction of the medication prescribed.     He was given the starter pack and 20mg samples to follow. He wishes to return on April 20th as prior scheduled versus 3 weeks.    Venous duplex will be repeated in 3-6 months to determine need for NOAC to continue.      2. Chronic diastolic congestive heart failure  Stable currently on medications       CALL for any questions or to be seen sooner.

## 2018-03-08 NOTE — PATIENT INSTRUCTIONS
Blood clots in both legs from femoral vein to calf.     Xarelto is a blood thinner. Go to the ER if you fall or hit head.       Will restudy your legs in 6 months to see if gone.       Elevat you legs daily. Wear your stockings.     Get up and move often.

## 2018-03-27 ENCOUNTER — TRANSCRIBE ORDERS (OUTPATIENT)
Dept: LAB | Facility: HOSPITAL | Age: 83
End: 2018-03-27

## 2018-03-27 ENCOUNTER — APPOINTMENT (OUTPATIENT)
Dept: LAB | Facility: HOSPITAL | Age: 83
End: 2018-03-27

## 2018-03-27 DIAGNOSIS — E78.5 HYPERLIPIDEMIA, UNSPECIFIED HYPERLIPIDEMIA TYPE: ICD-10-CM

## 2018-03-27 DIAGNOSIS — N18.2 CHRONIC KIDNEY DISEASE, STAGE II (MILD): Primary | ICD-10-CM

## 2018-03-27 DIAGNOSIS — I10 ESSENTIAL (PRIMARY) HYPERTENSION: ICD-10-CM

## 2018-03-27 DIAGNOSIS — E55.9 VITAMIN D DEFICIENCY: ICD-10-CM

## 2018-03-27 LAB
25(OH)D3 SERPL-MCNC: 56.2 NG/ML (ref 30–100)
ALBUMIN SERPL-MCNC: 4.4 G/DL (ref 3.4–4.8)
ALBUMIN/GLOB SERPL: 1.8 G/DL (ref 1.1–1.8)
ALP SERPL-CCNC: 67 U/L (ref 38–126)
ALT SERPL W P-5'-P-CCNC: 22 U/L (ref 21–72)
ANION GAP SERPL CALCULATED.3IONS-SCNC: 16 MMOL/L (ref 5–15)
AST SERPL-CCNC: 27 U/L (ref 17–59)
BILIRUB SERPL-MCNC: 0.3 MG/DL (ref 0.2–1.3)
BUN BLD-MCNC: 23 MG/DL (ref 7–21)
BUN/CREAT SERPL: 16.7 (ref 7–25)
CALCIUM SPEC-SCNC: 9.3 MG/DL (ref 8.4–10.2)
CHLORIDE SERPL-SCNC: 99 MMOL/L (ref 95–110)
CO2 SERPL-SCNC: 25 MMOL/L (ref 22–31)
CREAT BLD-MCNC: 1.38 MG/DL (ref 0.7–1.3)
GFR SERPL CREATININE-BSD FRML MDRD: 60 ML/MIN/1.73 (ref 42–98)
GLOBULIN UR ELPH-MCNC: 2.5 GM/DL (ref 2.3–3.5)
GLUCOSE BLD-MCNC: 104 MG/DL (ref 60–100)
HCT VFR BLD AUTO: 40.1 % (ref 39–49)
HGB BLD-MCNC: 13.5 G/DL (ref 13.7–17.3)
PHOSPHATE SERPL-MCNC: 4 MG/DL (ref 2.4–4.4)
POTASSIUM BLD-SCNC: 4.3 MMOL/L (ref 3.5–5.1)
PROT SERPL-MCNC: 6.9 G/DL (ref 6.3–8.6)
SODIUM BLD-SCNC: 140 MMOL/L (ref 137–145)

## 2018-03-27 PROCEDURE — 84100 ASSAY OF PHOSPHORUS: CPT | Performed by: INTERNAL MEDICINE

## 2018-03-27 PROCEDURE — 85014 HEMATOCRIT: CPT | Performed by: INTERNAL MEDICINE

## 2018-03-27 PROCEDURE — 82306 VITAMIN D 25 HYDROXY: CPT | Performed by: INTERNAL MEDICINE

## 2018-03-27 PROCEDURE — 36415 COLL VENOUS BLD VENIPUNCTURE: CPT | Performed by: INTERNAL MEDICINE

## 2018-03-27 PROCEDURE — 85018 HEMOGLOBIN: CPT | Performed by: INTERNAL MEDICINE

## 2018-03-27 PROCEDURE — 80053 COMPREHEN METABOLIC PANEL: CPT | Performed by: INTERNAL MEDICINE

## 2018-04-04 RX ORDER — FERROUS SULFATE 325(65) MG
TABLET ORAL
Qty: 60 TABLET | Refills: 0 | Status: SHIPPED | OUTPATIENT
Start: 2018-04-04 | End: 2018-04-12 | Stop reason: SDUPTHER

## 2018-04-10 ENCOUNTER — PATIENT OUTREACH (OUTPATIENT)
Dept: CASE MANAGEMENT | Facility: OTHER | Age: 83
End: 2018-04-10

## 2018-04-10 ENCOUNTER — OFFICE VISIT (OUTPATIENT)
Dept: ORTHOPEDIC SURGERY | Facility: CLINIC | Age: 83
End: 2018-04-10

## 2018-04-10 ENCOUNTER — TELEPHONE (OUTPATIENT)
Dept: CARDIOLOGY | Facility: CLINIC | Age: 83
End: 2018-04-10

## 2018-04-10 VITALS — HEIGHT: 66 IN | WEIGHT: 162 LBS | BODY MASS INDEX: 26.03 KG/M2

## 2018-04-10 DIAGNOSIS — M79.671 RIGHT FOOT PAIN: Primary | ICD-10-CM

## 2018-04-10 PROCEDURE — 99214 OFFICE O/P EST MOD 30 MIN: CPT | Performed by: NURSE PRACTITIONER

## 2018-04-10 PROCEDURE — 96372 THER/PROPH/DIAG INJ SC/IM: CPT | Performed by: NURSE PRACTITIONER

## 2018-04-10 RX ORDER — HYDROCODONE BITARTRATE AND ACETAMINOPHEN 5; 325 MG/1; MG/1
1 TABLET ORAL EVERY 8 HOURS PRN
Qty: 30 TABLET | Refills: 0 | Status: SHIPPED | OUTPATIENT
Start: 2018-04-10 | End: 2018-06-15 | Stop reason: SDUPTHER

## 2018-04-10 RX ORDER — TRIAMCINOLONE ACETONIDE 40 MG/ML
60 INJECTION, SUSPENSION INTRA-ARTICULAR; INTRAMUSCULAR ONCE
Status: COMPLETED | OUTPATIENT
Start: 2018-04-10 | End: 2018-04-10

## 2018-04-10 RX ADMIN — TRIAMCINOLONE ACETONIDE 60 MG: 40 INJECTION, SUSPENSION INTRA-ARTICULAR; INTRAMUSCULAR at 09:07

## 2018-04-10 NOTE — PROGRESS NOTES
General Keith Ruvalcaba Jr. is a 82 y.o. male   Primary provider:  Ada Cloud MD       Chief Complaint   Patient presents with   • Right Foot - Pain       HISTORY OF PRESENT ILLNESS:    82-year-old male patient presents to office for evaluation of right foot pain.  Onset of pain about 4 weeks ago.  Denies any known injury.  Denies pain with weightbearing.  Patient complains primarily of pain at rest and when he is lying in bed.  Pain is also increased mildly with range of motion of the foot and palpation. Pain is described as intermittent and severe.  Pain is described as aching in nature.  No associated swelling or erythema.  Nothing improves the pain.       Pain   This is a new problem. The current episode started 1 to 4 weeks ago. The problem occurs intermittently. The problem has been gradually worsening. Associated symptoms include numbness (Left foot). Associated symptoms comments: aching. Exacerbated by: laying in bed, movement of the right foot/ankle, palpation. He has tried nothing for the symptoms.        CONCURRENT MEDICAL HISTORY:    Past Medical History:   Diagnosis Date   • Acquired hallux rigidus    • Anemia    • Arthropathy of lumbar facet joint    • Carpal tunnel syndrome    • CHF (congestive heart failure)    • Chronic diastolic congestive heart failure    • Chronic kidney disease    • Chronic kidney disease, stage II (mild)     Chronic kidney disease stage 2   • Chronic obstructive lung disease    • Chronic renal failure    • Coronary artery disease    • Cortical senile cataract    • Diabetes mellitus    • Diabetes mellitus type 2, noninsulin dependent     diabetes mellitus type 2, non-insulin treated, Medication treatment plan: oral diabetic medication   • Diabetes mellitus without complication    • Diastolic dysfunction    • Diastolic heart failure    • Essential hypertension     difficult to control      • Hip pain    • History of echocardiogram 08/19/2015    Echocardiogram W/ color flow 83409  (1) - Mild to moderate LVH with mild left atrial enlargement and normal aortic root.CLVH,preserved LV systolic function with Ef of 55%.Diastolic dysfunction.MV intact.AV thickened.Aortic sclerosis.   • Hyperlipidemia    • Influenza vaccine administered 10/02/2015    INFLUENZA IMMUN ADMIN OR PREV RECV'D  (1) - Ordered By: DIONNE MANDUJANO ([facility])    • Insomnia    • Insomnia     Other insomnia   • Joint pain    • Low back pain    • Muscle strain    • Nuclear cataract    • Pneumococcal vaccination given 07/22/2016    PNEUMOC VAC/ADMIN/RCVD 4040F (3) - Ordered By: DIONNE MANDUJANO ([facility])   • Prostate cancer    • Pure hypercholesterolemia    • Sedentary lifestyle        Allergies   Allergen Reactions   • Aldactone [Spironolactone] Other (See Comments)     Hyponatremia and hyperkalemia         Current Outpatient Prescriptions:   •  albuterol (PROVENTIL HFA;VENTOLIN HFA) 108 (90 Base) MCG/ACT inhaler, Inhale 2 puffs Every 4 (Four) Hours As Needed for Wheezing., Disp: 1 inhaler, Rfl: 0  •  aspirin 81 MG EC tablet, Take 81 mg by mouth daily., Disp: , Rfl:   •  docusate sodium (COLACE) 100 MG capsule, Take 100 mg by mouth., Disp: , Rfl:   •  ferrous sulfate 325 (65 FE) MG tablet, TAKE ONE TABLET BY MOUTH DAILY WITH BREAKFAST, Disp: 60 tablet, Rfl: 0  •  furosemide (LASIX) 20 MG tablet, Take 1 tablet by mouth 2 (Two) Times a Day., Disp: 60 tablet, Rfl: 5  •  gabapentin (NEURONTIN) 300 MG capsule, Take 1 capsule by mouth 2 (Two) Times a Day., Disp: 60 capsule, Rfl: 5  •  glucose blood (TRUE METRIX BLOOD GLUCOSE TEST) test strip, Check glucose daily, Disp: 100 each, Rfl: 1  •  hydrALAZINE (APRESOLINE) 25 MG tablet, Take 1 tablet by mouth 2 (Two) Times a Day., Disp: 60 tablet, Rfl: 6  •  JANUVIA 50 MG tablet, TAKE ONE TABLET BY MOUTH DAILY, Disp: 30 tablet, Rfl: 5  •  lisinopril (PRINIVIL,ZESTRIL) 20 MG tablet, Take 1 tablet by mouth Daily., Disp: 30 tablet, Rfl: 11  •  pravastatin (PRAVACHOL) 40 MG tablet, TAKE ONE  TABLET BY MOUTH DAILY, Disp: 30 tablet, Rfl: 3  •  Rivaroxaban 15 & 20 MG tablet therapy pack, Take as directed, Disp: 1 each, Rfl: 0  •  TRUEPLUS LANCETS 30G misc, 1 Device Daily. TEST, Disp: 100 each, Rfl: 3  •  HYDROcodone-acetaminophen (NORCO) 5-325 MG per tablet, Take 1 tablet by mouth Every 8 (Eight) Hours As Needed for Moderate Pain  or Severe Pain ., Disp: 30 tablet, Rfl: 0    Past Surgical History:   Procedure Laterality Date   • CARDIAC ABLATION     • CARDIAC CATHETERIZATION  02/04/1986    Cardiac cath 77338 (1) - normal left heart catherization,non cardiac chest pain   • CARPAL TUNNEL RELEASE  10/19/2015    Carpal tunnel surgery (1) - Release of left carpal tunnel   • CATARACT EXTRACTION W/ INTRAOCULAR LENS IMPLANT Left 3/31/2017    Procedure: REMOVE CATARACT AND IMPLANT INRAOCULAR LENS LEFT EYE;  Surgeon: Hector Navarrete MD;  Location: [facility];  Service:    • CATARACT EXTRACTION W/ INTRAOCULAR LENS IMPLANT Right 4/14/2017    Procedure: REMOVE CATARACT AND IMPLANT INTRAOCULAR LENS RIGHT EYE;  Surgeon: Hector Navarrete MD;  Location: [facility] OR;  Service:    • ENDOSCOPY  08/05/2013    Colon endoscopy 22729 (2) - Hemorrhoids found.   • INGUINAL HERNIA REPAIR Right 06/07/1968    Inguinal hernia, repair (2)   • INJECTION OF MEDICATION  09/06/2012    Albuterol (2u) (1) - Ordered By: LIDYA STEINER ([facility])    • INJECTION OF MEDICATION  09/06/2012    Atrovent (1) - Ordered By: LIDYA STEINER ([facility])    • INJECTION OF MEDICATION  02/10/2012    Depo Medrol (Methylprednisone) (3) - Ordered By: HEATHER PAK ([facility])    • INJECTION OF MEDICATION  02/09/2014    Kenalog (4) - Ordered By: DIMAS ANDRADE ([facility])    • LUMBAR LAMINECTOMY  2007    Lumbar laminectomy (1)   • MANDIBLE FRACTURE SURGERY  10/11/1981    Treat nose/jaw fracture (1) - bilateral open reduction of fracture of mandible   • OTHER SURGICAL HISTORY  09/24/2011    Drain/Inject Major Joint 20610 (1) - Ordered By: KARLOS HERNANDEZ  "([facility])    • OTHER SURGICAL HISTORY      Insert IVC filter [id number] (1)   • OTHER SURGICAL HISTORY  10/11/2011    SPIROMETRY 05475 (1) - Ordered By: HEATHER PAK ([facility])   • TOTAL HIP ARTHROPLASTY Right        Family History   Problem Relation Age of Onset   • Diabetes Other    • Cancer Sister    • Coronary artery disease Neg Hx        Social History     Social History   • Marital status:      Spouse name: N/A   • Number of children: N/A   • Years of education: N/A     Occupational History   • Not on file.     Social History Main Topics   • Smoking status: Former Smoker     Quit date: 1999   • Smokeless tobacco: Never Used   • Alcohol use No   • Drug use: No   • Sexual activity: Defer      Comment: Marital status:      Other Topics Concern   • Not on file     Social History Narrative   • No narrative on file        Review of Systems   Musculoskeletal:        Right foot pain.    Neurological: Positive for numbness (Left foot).        Tingling.    All other systems reviewed and are negative.      PHYSICAL EXAMINATION:       Ht 167.6 cm (66\")   Wt 73.5 kg (162 lb)   BMI 26.15 kg/m²     Physical Exam   Constitutional: He is oriented to person, place, and time. Vital signs are normal. He appears well-developed and well-nourished. He is active and cooperative. He does not appear ill. No distress.   HENT:   Head: Normocephalic.   Pulmonary/Chest: Effort normal. No respiratory distress.   Abdominal: Soft. He exhibits no distension.   Musculoskeletal: He exhibits edema (Mild, bilateral lower legs (generalized)) and tenderness (Mild, dorsal right foot). He exhibits no deformity.   Neurological: He is alert and oriented to person, place, and time. GCS eye subscore is 4. GCS verbal subscore is 5. GCS motor subscore is 6.   Skin: Skin is warm, dry and intact. Capillary refill takes less than 2 seconds.   Psychiatric: He has a normal mood and affect. His speech is normal and behavior is normal. Judgment " and thought content normal. Cognition and memory are normal.   Vitals reviewed.      GAIT:     []  Normal  [x]  Antalgic    Assistive device: []  None  [x]  Walker     []  Crutches  []  Cane     []  Wheelchair  []  Stretcher    Right Ankle Exam   Swelling: mild    Tenderness   Right ankle tenderness location: Mild, dorsal aspect of foot.        Range of Motion   The patient has normal right ankle ROM.    Muscle Strength   Dorsiflexion:  4/5  Plantar flexion:  4/5  Other   Erythema: absent  Sensation: normal  Pulse: present     Comments:  Mild pain with range of motion.       Left Ankle Exam   Swelling: mild    Tenderness   The patient is experiencing no tenderness.     Range of Motion   The patient has normal left ankle ROM.     Muscle Strength   The patient has normal left ankle strength.    Other   Erythema: absent  Sensation: normal  Pulse: present            Xr Foot 3+ View Right    Result Date: 4/10/2018  Narrative: 3 views of right foot reveal no evidence of fracture.  There are degenerative changes present at the metatarsophalangeal joint of the first toe.  There is mild hallux valgus deformity noted of the first toe.  No acute radiologic abnormalities are noted at this time.  No comparison images are available for review.04/10/18 at 4:02 PM by REGINA Muñoz         ASSESSMENT:    Diagnoses and all orders for this visit:    Right foot pain  -     triamcinolone acetonide (KENALOG-40) injection 60 mg; Inject 1.5 mL into the shoulder, thigh, or buttocks 1 (One) Time.  -     MRI Foot Right Without Contrast; Future    Other orders  -     HYDROcodone-acetaminophen (NORCO) 5-325 MG per tablet; Take 1 tablet by mouth Every 8 (Eight) Hours As Needed for Moderate Pain  or Severe Pain .    PLAN    X-rays of right foot reviewed. Patient complains of pain in his right foot that is intermittent but severe in nature. Denies any pain with weight-bearing. Patient complains of a severe, aching pain primarily that occurs  "at rest and when lying in the bed. The right foot pain is diffuse and mildly increases with range of motion of the foot and ankle. Recommend MRI of right foot to evaluate for stress fracture, bone bruising, ligament and/or tendon injury. Discussed with patient that the pain may be radicular in nature. This patient has a history of 3 back surgeries and a right hip replacement. He denies any back pain or right leg pain other than \"drawing\" of the right thigh muscle at times. Recommend IM injection of Kenalog today for pain. Patient is a well-controlled type 2 diabetic. His blood sugar this AM was 101. Discussed with patient that the steroid may increase his blood sugar readings temporarily. Patient is instructed to monitor his blood sugar closely and follow a diabetic diet. Norco is prescribed for pain as needed for moderate to severe pain. Recommend Tylenol as needed for milder pain. Patient is unable to take NSAIDs due to chronic kidney disease. Follow up after MRI.     This patient has tried and failed a course of multiple treatment modalities which include but are not limited to the use of acetaminophen, rest, ice and heat therapy and continues to have pain that is described as severe. Therefore, I will recommend a short course of narcotic pain medication for this patient until their pain has been sufficiently reduced to a level that I deem acceptable to be treated with alternative treatment options.  [facility] reviewed # [id number].     Return for after MRI.        This document has been electronically signed by REGINA Muñoz on April 11, 2018 12:11 PM      REGINA Muñoz    "

## 2018-04-10 NOTE — TELEPHONE ENCOUNTER
Telephone contact at which time the patient informs me that Dr Reza has RX Lisinopril 20/12.5 mg along with continuation of Lasix 20 mg twice daily with plans for labs in four weeks.  The patient is instructed to stop the Lisinopril 20 mg taken prior and stay with the plan as outlined per Dr Reza.           This document has been electronically signed by REGINA Carlos on April 10, 2018 10:20 AM

## 2018-04-12 ENCOUNTER — OFFICE VISIT (OUTPATIENT)
Dept: INTERNAL MEDICINE | Facility: CLINIC | Age: 83
End: 2018-04-12

## 2018-04-12 ENCOUNTER — APPOINTMENT (OUTPATIENT)
Dept: LAB | Facility: HOSPITAL | Age: 83
End: 2018-04-12

## 2018-04-12 ENCOUNTER — TELEPHONE (OUTPATIENT)
Dept: INTERNAL MEDICINE | Facility: CLINIC | Age: 83
End: 2018-04-12

## 2018-04-12 VITALS
SYSTOLIC BLOOD PRESSURE: 140 MMHG | BODY MASS INDEX: 26.05 KG/M2 | WEIGHT: 162.1 LBS | HEIGHT: 66 IN | DIASTOLIC BLOOD PRESSURE: 70 MMHG

## 2018-04-12 DIAGNOSIS — I10 ESSENTIAL HYPERTENSION: ICD-10-CM

## 2018-04-12 DIAGNOSIS — Z00.00 MEDICARE ANNUAL WELLNESS VISIT, SUBSEQUENT: Primary | ICD-10-CM

## 2018-04-12 DIAGNOSIS — E11.8 TYPE 2 DIABETES MELLITUS WITH COMPLICATION, WITHOUT LONG-TERM CURRENT USE OF INSULIN (HCC): ICD-10-CM

## 2018-04-12 LAB — HBA1C MFR BLD: 6.4 % (ref 4–5.6)

## 2018-04-12 PROCEDURE — 83036 HEMOGLOBIN GLYCOSYLATED A1C: CPT | Performed by: INTERNAL MEDICINE

## 2018-04-12 PROCEDURE — G0439 PPPS, SUBSEQ VISIT: HCPCS | Performed by: INTERNAL MEDICINE

## 2018-04-12 PROCEDURE — 99213 OFFICE O/P EST LOW 20 MIN: CPT | Performed by: INTERNAL MEDICINE

## 2018-04-12 PROCEDURE — 36415 COLL VENOUS BLD VENIPUNCTURE: CPT | Performed by: INTERNAL MEDICINE

## 2018-04-12 RX ORDER — LISINOPRIL AND HYDROCHLOROTHIAZIDE 20; 12.5 MG/1; MG/1
1 TABLET ORAL DAILY
COMMUNITY
End: 2018-08-14 | Stop reason: SDUPTHER

## 2018-04-12 RX ORDER — FERROUS SULFATE 325(65) MG
1 TABLET ORAL
Qty: 30 TABLET | Refills: 2 | Status: SHIPPED | OUTPATIENT
Start: 2018-04-12 | End: 2018-08-14 | Stop reason: SDUPTHER

## 2018-04-12 RX ORDER — PRAVASTATIN SODIUM 40 MG
40 TABLET ORAL DAILY
Qty: 30 TABLET | Refills: 11 | Status: SHIPPED | OUTPATIENT
Start: 2018-04-12 | End: 2019-04-24 | Stop reason: SDUPTHER

## 2018-04-18 ENCOUNTER — HOSPITAL ENCOUNTER (OUTPATIENT)
Dept: MRI IMAGING | Facility: HOSPITAL | Age: 83
Discharge: HOME | End: 2018-04-18
Admitting: NURSE PRACTITIONER

## 2018-04-18 DIAGNOSIS — M79.671 RIGHT FOOT PAIN: ICD-10-CM

## 2018-04-18 PROCEDURE — 73718 MRI LOWER EXTREMITY W/O DYE: CPT

## 2018-04-20 ENCOUNTER — OFFICE VISIT (OUTPATIENT)
Dept: ORTHOPEDIC SURGERY | Facility: CLINIC | Age: 83
End: 2018-04-20

## 2018-04-20 ENCOUNTER — OFFICE VISIT (OUTPATIENT)
Dept: CARDIOLOGY | Facility: CLINIC | Age: 83
End: 2018-04-20

## 2018-04-20 VITALS
BODY MASS INDEX: 25.71 KG/M2 | WEIGHT: 163.8 LBS | SYSTOLIC BLOOD PRESSURE: 134 MMHG | HEART RATE: 97 BPM | HEIGHT: 67 IN | DIASTOLIC BLOOD PRESSURE: 60 MMHG | OXYGEN SATURATION: 98 %

## 2018-04-20 VITALS — BODY MASS INDEX: 25.58 KG/M2 | WEIGHT: 163 LBS | HEIGHT: 67 IN

## 2018-04-20 DIAGNOSIS — M79.671 RIGHT FOOT PAIN: Primary | ICD-10-CM

## 2018-04-20 DIAGNOSIS — I10 ESSENTIAL HYPERTENSION: ICD-10-CM

## 2018-04-20 DIAGNOSIS — I82.403 LEG DVT (DEEP VENOUS THROMBOEMBOLISM), ACUTE, BILATERAL (HCC): ICD-10-CM

## 2018-04-20 DIAGNOSIS — M77.50 TENDONITIS OF FOOT: ICD-10-CM

## 2018-04-20 DIAGNOSIS — I25.10 CORONARY ARTERY DISEASE INVOLVING NATIVE CORONARY ARTERY OF NATIVE HEART WITHOUT ANGINA PECTORIS: ICD-10-CM

## 2018-04-20 DIAGNOSIS — I50.32 CHRONIC DIASTOLIC CONGESTIVE HEART FAILURE (HCC): Primary | Chronic | ICD-10-CM

## 2018-04-20 PROCEDURE — 99213 OFFICE O/P EST LOW 20 MIN: CPT | Performed by: NURSE PRACTITIONER

## 2018-04-20 PROCEDURE — 99215 OFFICE O/P EST HI 40 MIN: CPT | Performed by: NURSE PRACTITIONER

## 2018-04-20 NOTE — PROGRESS NOTES
General Keith Ruvalcaba Jr. is a 82 y.o. male returns for     Chief Complaint   Patient presents with   • Right Foot - Follow-up, Pain   • Results     mri right foot.        HISTORY OF PRESENT ILLNESS: Patient presents to office for follow-up of right foot pain and MRI results of right foot.  Onset of pain about 6 weeks ago.  Denies any known injury.  Pain is primarily present at rest and movement of right foot.  Denies any pain with weightbearing.  Patient was given IM injection of Kenalog at last office visit on 4/10/2018.  Patient reports that the pain is significantly improved.     CONCURRENT MEDICAL HISTORY:    Past Medical History:   Diagnosis Date   • Acquired hallux rigidus    • Anemia    • Arthropathy of lumbar facet joint    • Carpal tunnel syndrome    • CHF (congestive heart failure)    • Chronic diastolic congestive heart failure    • Chronic kidney disease    • Chronic kidney disease, stage II (mild)     Chronic kidney disease stage 2   • Chronic obstructive lung disease    • Chronic renal failure    • Coronary artery disease    • Cortical senile cataract    • Diabetes mellitus    • Diabetes mellitus type 2, noninsulin dependent     diabetes mellitus type 2, non-insulin treated, Medication treatment plan: oral diabetic medication   • Diabetes mellitus without complication    • Diastolic dysfunction    • Diastolic heart failure    • Essential hypertension     difficult to control      • Hip pain    • History of echocardiogram 08/19/2015    Echocardiogram W/ color flow 69914 (1) - Mild to moderate LVH with mild left atrial enlargement and normal aortic root.CLVH,preserved LV systolic function with Ef of 55%.Diastolic dysfunction.MV intact.AV thickened.Aortic sclerosis.   • Hyperlipidemia    • Influenza vaccine administered 10/02/2015    INFLUENZA IMMUN ADMIN OR PREV RECV'D  (1) - Ordered By: DIONNE MANDUJANO ([facility])    • Insomnia    • Insomnia     Other insomnia   • Joint pain    • Low back pain    •  Muscle strain    • Nuclear cataract    • Pneumococcal vaccination given 07/22/2016    PNEUMOC VAC/ADMIN/RCVD 4040F (3) - Ordered By: DIONNE MANDUJANO ([facility])   • Prostate cancer    • Pure hypercholesterolemia    • Sedentary lifestyle        Allergies   Allergen Reactions   • Aldactone [Spironolactone] Other (See Comments)     Hyponatremia and hyperkalemia         Current Outpatient Prescriptions:   •  albuterol (PROVENTIL HFA;VENTOLIN HFA) 108 (90 Base) MCG/ACT inhaler, Inhale 2 puffs Every 4 (Four) Hours As Needed for Wheezing., Disp: 1 inhaler, Rfl: 0  •  aspirin 81 MG EC tablet, Take 81 mg by mouth daily., Disp: , Rfl:   •  ferrous sulfate 325 (65 FE) MG tablet, Take 1 tablet by mouth Daily With Breakfast., Disp: 30 tablet, Rfl: 2  •  furosemide (LASIX) 20 MG tablet, Take 1 tablet by mouth 2 (Two) Times a Day., Disp: 60 tablet, Rfl: 5  •  gabapentin (NEURONTIN) 300 MG capsule, Take 1 capsule by mouth 2 (Two) Times a Day., Disp: 60 capsule, Rfl: 5  •  glucose blood (TRUE METRIX BLOOD GLUCOSE TEST) test strip, Check glucose daily, Disp: 100 each, Rfl: 1  •  hydrALAZINE (APRESOLINE) 25 MG tablet, Take 1 tablet by mouth 2 (Two) Times a Day., Disp: 60 tablet, Rfl: 6  •  HYDROcodone-acetaminophen (NORCO) 5-325 MG per tablet, Take 1 tablet by mouth Every 8 (Eight) Hours As Needed for Moderate Pain  or Severe Pain ., Disp: 30 tablet, Rfl: 0  •  JANUVIA 50 MG tablet, TAKE ONE TABLET BY MOUTH DAILY, Disp: 30 tablet, Rfl: 5  •  lisinopril-hydrochlorothiazide (PRINZIDE,ZESTORETIC) 20-12.5 MG per tablet, Take 1 tablet by mouth Daily., Disp: , Rfl:   •  pravastatin (PRAVACHOL) 40 MG tablet, Take 1 tablet by mouth Daily., Disp: 30 tablet, Rfl: 11  •  rivaroxaban (XARELTO) 20 MG tablet, Take 1 tablet by mouth Daily., Disp: 30 tablet, Rfl: 5  •  TRUEPLUS LANCETS 30G misc, 1 Device Daily. TEST, Disp: 100 each, Rfl: 3    Past Surgical History:   Procedure Laterality Date   • CARDIAC ABLATION     • CARDIAC CATHETERIZATION   "02/04/1986    Cardiac cath 44854 (1) - normal left heart catherization,non cardiac chest pain   • CARPAL TUNNEL RELEASE  10/19/2015    Carpal tunnel surgery (1) - Release of left carpal tunnel   • CATARACT EXTRACTION W/ INTRAOCULAR LENS IMPLANT Left 3/31/2017    Procedure: REMOVE CATARACT AND IMPLANT INRAOCULAR LENS LEFT EYE;  Surgeon: Hector Navarrete MD;  Location: [facility] OR;  Service:    • CATARACT EXTRACTION W/ INTRAOCULAR LENS IMPLANT Right 4/14/2017    Procedure: REMOVE CATARACT AND IMPLANT INTRAOCULAR LENS RIGHT EYE;  Surgeon: Hector Navarrete MD;  Location: [facility] OR;  Service:    • ENDOSCOPY  08/05/2013    Colon endoscopy 22345 (2) - Hemorrhoids found.   • INGUINAL HERNIA REPAIR Right 06/07/1968    Inguinal hernia, repair (2)   • INJECTION OF MEDICATION  09/06/2012    Albuterol (2u) (1) - Ordered By: LIDYA STEINER ([facility])    • INJECTION OF MEDICATION  09/06/2012    Atrovent (1) - Ordered By: LIDYA STEINER ([facility])    • INJECTION OF MEDICATION  02/10/2012    Depo Medrol (Methylprednisone) (3) - Ordered By: HEATHER PAK ([facility])    • INJECTION OF MEDICATION  02/09/2014    Kenalog (4) - Ordered By: DIMAS ANDRADE ([facility])    • LUMBAR LAMINECTOMY  2007    Lumbar laminectomy (1)   • MANDIBLE FRACTURE SURGERY  10/11/1981    Treat nose/jaw fracture (1) - bilateral open reduction of fracture of mandible   • OTHER SURGICAL HISTORY  09/24/2011    Drain/Inject Major Joint 20610 (1) - Ordered By: KARLOS HERNANDEZ ([facility])    • OTHER SURGICAL HISTORY      Insert IVC filter [id number] (1)   • OTHER SURGICAL HISTORY  10/11/2011    SPIROMETRY 26500 (1) - Ordered By: HEATHER PAK ([facility])   • TOTAL HIP ARTHROPLASTY Right        ROS  No fevers or chills.  No chest pain or shortness of air.  No GI or  disturbances.    PHYSICAL EXAMINATION:       Ht 170.2 cm (67\")   Wt 73.9 kg (163 lb)   BMI 25.53 kg/m²     Physical Exam   Constitutional: He is oriented to person, place, and time. Vital signs are " normal. He appears well-developed and well-nourished. He is active and cooperative. He does not appear ill. No distress.   HENT:   Head: Normocephalic.   Pulmonary/Chest: Effort normal. No respiratory distress.   Abdominal: Soft. He exhibits no distension.   Musculoskeletal: He exhibits edema (Mild, bilateral lower extremities, chronic) and tenderness (Mild, dorsal right foot). He exhibits no deformity.   Neurological: He is alert and oriented to person, place, and time. GCS eye subscore is 4. GCS verbal subscore is 5. GCS motor subscore is 6.   Skin: Skin is warm, dry and intact. Capillary refill takes less than 2 seconds.   Psychiatric: He has a normal mood and affect. His speech is normal and behavior is normal. Judgment and thought content normal. Cognition and memory are normal.   Vitals reviewed.      GAIT:     []  Normal  [x]  Antalgic    Assistive device: []  None  [x]  Walker     []  Crutches  []  Cane     []  Wheelchair  []  Stretcher    Right Ankle Exam   Swelling: mild (generalized, lower leg/ankle)    Tenderness   Right ankle tenderness location: Mild, dorsal foot.        Range of Motion   The patient has normal right ankle ROM.    Muscle Strength   Dorsiflexion:  4/5  Plantar flexion:  4/5  Other   Erythema: absent  Sensation: normal  Pulse: present     Comments:  No pain with range of motion.       Left Ankle Exam   Swelling: mild (generalized, lower leg/ankle)    Tenderness   The patient is experiencing no tenderness.     Range of Motion   The patient has normal left ankle ROM.     Muscle Strength   The patient has normal left ankle strength.    Other   Erythema: absent  Sensation: normal  Pulse: present            Xr Foot 3+ View Right    Result Date: 4/10/2018  Narrative: 3 views of right foot reveal no evidence of fracture.  There are degenerative changes present at the metatarsophalangeal joint of the first toe.  There is mild hallux valgus deformity noted of the first toe.  No acute radiologic  abnormalities are noted at this time.  No comparison images are available for review.04/10/18 at 4:02 PM by REGINA Muñoz     Mri Foot Right Without Contrast    Result Date: 4/19/2018  Narrative: MRI right foot. HISTORY: Severe right foot pain. Prior exam: Right foot April 10, 2018. TECHNIQUE: Multiplanar multisequence noncontrast images right foot. FINDINGS: There is no bone edema or areas of bony destruction. There are degenerative changes first metatarsophalangeal joint. There is extensive tenosynovitis, fluid surrounding one of the flexor tendons in the plantar aspect of the foot series 7 image 18. More superiorly there is tenosynovitis of the flexor hallucis tendons and significant edema of the muscle or tendinous junction at the level of the posterior tibia. MRI right foot is otherwise unremarkable.     Impression: CONCLUSION: Tenosynovitis in one of the flexor tendons plantar aspect of the foot. Tenosynovitis flexor hallucis tendon more superiorly as well as edema of the muscle tendinous junction of the flexor hallucis at level of the posterior tibia. Otherwise unremarkable right foot. Electronically signed by:  Bruce Zepeda MD  4/19/2018 8:12 AM CDT Workstation: [id number]        ASSESSMENT:    Diagnoses and all orders for this visit:    Right foot pain    Tendonitis of foot    PLAN    MRI of right foot reviewed and results discussed with patient.  Patient reports his pain is significantly improved since receiving IM injection of Kenalog at last office visit 4/10/2018.  Discussed recommendation for stretching exercises and range of motion of his right foot.  Recommend elevation of the lower extremities intermittently throughout the day to minimize swelling.  Recommend ice therapy to the right foot intermittently as needed for pain/swelling. Recommend Tylenol as needed for pain.  Patient also has Norco as needed for moderate to severe pain.  Patient is unable to take NSAIDs due to chronic kidney disease.   Follow-up as needed for any new, worsening or persistent symptoms.  Consider referral to podiatry if pain/symptoms worsen again.     Return in about 6 weeks (around 6/1/2018), or if symptoms worsen or fail to improve.      This document has been electronically signed by REGINA Muñoz on April 24, 2018 11:13 AM      REGINA Muñoz

## 2018-04-20 NOTE — PROGRESS NOTES
Subjective:     Congestive Heart Failure (2 month f/u)    Congestive Heart Failure   Presents for follow-up visit. Pertinent negatives include no abdominal pain, chest pain, chest pressure, claudication, edema (bilateral ankle at the end of the day; compliant with compression stockings), fatigue, near-syncope, orthopnea, palpitations, paroxysmal nocturnal dyspnea, shortness of breath or unexpected weight change. The symptoms have been improving. Compliance with total regimen is %. Compliance problems include adherence to exercise.  Compliance with diet is %. Compliance with exercise is 26-50%. Compliance with medications is %.   Leg Swelling   The problem has been resolved. Pertinent negatives include no abdominal pain, anorexia, chest pain, chills, congestion, coughing, diaphoresis, fatigue, fever, joint swelling, myalgias, nausea, numbness, rash, vertigo, vomiting or weakness. Nothing aggravates the symptoms. The treatment provided mild relief.     1. CAD, non-obstructive 2005  2. HFpEF - diastolic dysfunction  3. Hypertension  4. CKD, Stage 2 with H/O KIRK  5. H/O Atrial Flutter S/P ablation per Dr Graves 2006  6. Prostate Cancer Dennis Score 6/Stage 2 S/P RT   7. DM, Type II  8. Anterior right total hip arthroplasty 04/25/2016    Mr. Ruvalcaba recently underwent lumbar surgery 10/27/2017 in [place]. He was discharge 11/7/2017. Following discharge he had several medications that were recommended be stopped.  On reviewing discharge summary from [place], it was made note that his hospitalization was complicated by hypotension and acute kidney injury.  He recieved IV fluids per nephrology consult and blood pressure medicine discontinued.       The patient received evaluation with REGINA Vidal on 03/08/2018 in follow-up regarding leg edema for which lower extremity duplex have been ordered and was found with positive findings regarding DVT.   The patient was initiated on DOAC, Xarelto on that  date.     Today the patient reports resolution of leg edema.  He received evaluation with Dr. Reza on 04/04/2018 for which there was initiation of lisinopril 20/12.5 mg daily.  He is scheduled back for laboratory diagnostics per Dr. Reza on May 4 with reported plans to possibly discontinue Lasix.  In addition guanfacine 2 mg has been discontinued per Dr Reza.    He has follow-up with Dr. Reza scheduled for 04/04/2018 with labs one week prior.    Review of Systems   Constitution: Negative for chills, diaphoresis, fatigue, fever, weakness and unexpected weight change.   HENT: Negative for congestion and nosebleeds.    Cardiovascular: Positive for dyspnea on exertion. Negative for chest pain, claudication, cyanosis, irregular heartbeat, near-syncope, orthopnea, palpitations, paroxysmal nocturnal dyspnea and syncope.   Respiratory: Negative for cough, shortness of breath, sleep disturbances due to breathing and snoring.    Endocrine: Negative for polydipsia, polyphagia and polyuria.   Hematologic/Lymphatic: Negative for bleeding problem. Does not bruise/bleed easily.   Skin: Negative for poor wound healing and rash.   Musculoskeletal: Negative for falls, joint swelling and myalgias.   Gastrointestinal: Negative for bloating, abdominal pain, anorexia, nausea and vomiting.   Neurological: Negative for dizziness, light-headedness, numbness and vertigo.   Psychiatric/Behavioral: Negative for altered mental status. The patient is not nervous/anxious.      Past Medical History:   Diagnosis Date   • Acquired hallux rigidus    • Anemia    • Arthropathy of lumbar facet joint    • Carpal tunnel syndrome    • CHF (congestive heart failure)    • Chronic diastolic congestive heart failure    • Chronic kidney disease    • Chronic kidney disease, stage II (mild)     Chronic kidney disease stage 2   • Chronic obstructive lung disease    • Chronic renal failure    • Coronary artery disease    • Cortical senile cataract    •  Diabetes mellitus    • Diabetes mellitus type 2, noninsulin dependent     diabetes mellitus type 2, non-insulin treated, Medication treatment plan: oral diabetic medication   • Diabetes mellitus without complication    • Diastolic dysfunction    • Diastolic heart failure    • Essential hypertension     difficult to control      • Hip pain    • History of echocardiogram 08/19/2015    Echocardiogram W/ color flow 38041 (1) - Mild to moderate LVH with mild left atrial enlargement and normal aortic root.CLVH,preserved LV systolic function with Ef of 55%.Diastolic dysfunction.MV intact.AV thickened.Aortic sclerosis.   • Hyperlipidemia    • Influenza vaccine administered 10/02/2015    INFLUENZA IMMUN ADMIN OR PREV RECV'D  (1) - Ordered By: DIONNE MANDUJANO ([facility])    • Insomnia    • Insomnia     Other insomnia   • Joint pain    • Low back pain    • Muscle strain    • Nuclear cataract    • Pneumococcal vaccination given 07/22/2016    PNEUMOC VAC/ADMIN/RCVD 4040F (3) - Ordered By: DIONNE MANDUJANO ([facility])   • Prostate cancer    • Pure hypercholesterolemia    • Sedentary lifestyle      Past Surgical History:   Procedure Laterality Date   • CARDIAC ABLATION     • CARDIAC CATHETERIZATION  02/04/1986    Cardiac cath 26431 (1) - normal left heart catherization,non cardiac chest pain   • CARPAL TUNNEL RELEASE  10/19/2015    Carpal tunnel surgery (1) - Release of left carpal tunnel   • CATARACT EXTRACTION W/ INTRAOCULAR LENS IMPLANT Left 3/31/2017    Procedure: REMOVE CATARACT AND IMPLANT INRAOCULAR LENS LEFT EYE;  Surgeon: Hector Navarrete MD;  Location: [facility] OR;  Service:    • CATARACT EXTRACTION W/ INTRAOCULAR LENS IMPLANT Right 4/14/2017    Procedure: REMOVE CATARACT AND IMPLANT INTRAOCULAR LENS RIGHT EYE;  Surgeon: Hector Navarrete MD;  Location: [facility] OR;  Service:    • ENDOSCOPY  08/05/2013    Colon endoscopy 93662 (2) - Hemorrhoids found.   • INGUINAL HERNIA REPAIR Right 06/07/1968    Inguinal hernia,  repair (2)   • INJECTION OF MEDICATION  09/06/2012    Albuterol (2u) (1) - Ordered By: LIDYA STEINER ([facility])    • INJECTION OF MEDICATION  09/06/2012    Atrovent (1) - Ordered By: LIDYA STEINER ([facility])    • INJECTION OF MEDICATION  02/10/2012    Depo Medrol (Methylprednisone) (3) - Ordered By: HEATHER PAK ([facility])    • INJECTION OF MEDICATION  02/09/2014    Kenalog (4) - Ordered By: DIMAS ANDRADE ([facility])    • LUMBAR LAMINECTOMY  2007    Lumbar laminectomy (1)   • MANDIBLE FRACTURE SURGERY  10/11/1981    Treat nose/jaw fracture (1) - bilateral open reduction of fracture of mandible   • OTHER SURGICAL HISTORY  09/24/2011    Drain/Inject Major Joint 20610 (1) - Ordered By: KARLOS HERNANDEZ ([facility])    • OTHER SURGICAL HISTORY      Insert IVC filter 56249 (1)   • OTHER SURGICAL HISTORY  10/11/2011    SPIROMETRY 62965 (1) - Ordered By: HEATHER PAK ([facility])   • TOTAL HIP ARTHROPLASTY Right      Social History     Social History   • Marital status:      Social History Main Topics   • Smoking status: Former Smoker     Quit date: 1999   • Smokeless tobacco: Never Used   • Alcohol use No   • Drug use: No   • Sexual activity: Defer      Comment: Marital status:      Other Topics Concern   • Not on file     Current Outpatient Prescriptions   Medication Sig Dispense Refill   • albuterol (PROVENTIL HFA;VENTOLIN HFA) 108 (90 Base) MCG/ACT inhaler Inhale 2 puffs Every 4 (Four) Hours As Needed for Wheezing. 1 inhaler 0   • aspirin 81 MG EC tablet Take 81 mg by mouth daily.     • ferrous sulfate 325 (65 FE) MG tablet Take 1 tablet by mouth Daily With Breakfast. 30 tablet 2   • furosemide (LASIX) 20 MG tablet Take 1 tablet by mouth 2 (Two) Times a Day. 60 tablet 5   • gabapentin (NEURONTIN) 300 MG capsule Take 1 capsule by mouth 2 (Two) Times a Day. 60 capsule 5   • glucose blood (TRUE METRIX BLOOD GLUCOSE TEST) test strip Check glucose daily 100 each 1   • hydrALAZINE (APRESOLINE) 25 MG tablet Take  "1 tablet by mouth 2 (Two) Times a Day. 60 tablet 6   • HYDROcodone-acetaminophen (NORCO) 5-325 MG per tablet Take 1 tablet by mouth Every 8 (Eight) Hours As Needed for Moderate Pain  or Severe Pain . 30 tablet 0   • JANUVIA 50 MG tablet TAKE ONE TABLET BY MOUTH DAILY 30 tablet 5   • lisinopril-hydrochlorothiazide (PRINZIDE,ZESTORETIC) 20-12.5 MG per tablet Take 1 tablet by mouth Daily.     • pravastatin (PRAVACHOL) 40 MG tablet Take 1 tablet by mouth Daily. 30 tablet 11   • TRUEPLUS LANCETS 30G misc 1 Device Daily. TEST 100 each 3   • rivaroxaban (XARELTO) 20 MG tablet Take 1 tablet by mouth Daily. 30 tablet 5     No current facility-administered medications for this visit.      Objective:     Vitals:    04/20/18 1002 04/20/18 1021   BP: 168/78 134/60   BP Location: Left arm Left arm   Patient Position: Sitting Sitting   Cuff Size:  Adult   Pulse: 97    SpO2: 98%    Weight: 74.3 kg (163 lb 12.8 oz)    Height: 170.2 cm (67\")       Physical Exam   Constitutional: He is oriented to person, place, and time. He appears well-nourished. No distress.   HENT:   Head: Normocephalic and atraumatic.   Eyes: Conjunctivae are normal. Right eye exhibits no discharge. Left eye exhibits no discharge.   Neck: No JVD ( 6 cm at 45°) present. No tracheal deviation present.   Cardiovascular: Normal rate, regular rhythm, S1 normal, S2 normal, normal heart sounds and intact distal pulses.  Exam reveals no S3 and no S4.    No murmur heard.  Pulses:       Radial pulses are 2+ on the right side, and 2+ on the left side.   Pulmonary/Chest: Effort normal and breath sounds normal. No respiratory distress. He has no wheezes. He has no rales.   Abdominal: He exhibits no distension.   Musculoskeletal: He exhibits no edema or tenderness.   Neurological: He is alert and oriented to person, place, and time.   Skin: Skin is warm and dry. He is not diaphoretic.   Psychiatric: He has a normal mood and affect. His behavior is normal. Judgment and thought " content normal.   Vitals reviewed.    Data Reviewed:    Electrocardiogram: 11/09/2017    LHC: 03/12/2005 - Dr Kj Green  Aortic pressure is 133/77.   Left ventricular pressure is 133.   Left ventricular end diastolic pressure is 13.   The left main is a very short caliber vessel, almost like a long term ostium.   The LAD is a large caliber vessel, distally has diffuse 20-30 percent narrowing, gives rise to a large diagonal 1.   The circumflex is a large caliber vessel, which gives rise to an OM1, which had diffuse 20-30 percent disease and also a posterior descending artery.   The RCA is a medium to large caliber vessel, which gave rise to a posterior descending artery and posterolateral, distal to the bifurcation has about 60-70 percent narrowing.   Left ventricular angiogram showed normal wall motion with ejection fraction of 65-75 percent.     Transthoracic echocardiogram: 11/08/2017  · Left ventricular wall thickness is consistent with mild concentric hypertrophy.  · Left ventricular systolic function is normal. Estimated EF = 55%.  · Left ventricular diastolic dysfunction (grade I) consistent with impaired relaxation.    Transthoracic Echocardiogram 10/31/3017 in [place]  Indication:  Hypotension  BP:           93/63  Findings       Left Ventricle:  The left ventricular chamber size is normal. Mild concentric left  ventricular hypertrophy is observed. The estimated ejection fraction is  65-70%. Abnormal left ventricular diastolic filling is observed,  consistent with impaired relaxation.    Left Atrium:  The left atrial chamber size is normal.   Right Ventricle:  The right ventricular cavity size is normal. The right ventricular  global systolic function is normal.   Right Atrium:  The right atrial cavity size is normal.   Aortic Valve:  Moderate aortic leaflet calcification is visualized. There is mild to  moderate aortic stenosis. The mean gradient of the aortic valve is 30  mmHg. The aortic valve area  by velocity time interval is calculated at  1.84 cm2. There is no evidence of aortic regurgitation.   Mitral Valve:  The mitral valve leaflets appear normal. There is mild mitral  regurgitation.    Tricuspid Valve:  The tricuspid valve leaflets are normal.  There is mild tricuspid  regurgitation. The Right Ventricular Systolic Pressure is calculated at  35 mmHg.   Pulmonic Valve:  There is trace pulmonic regurgitation present.   Pericardium:  There is no pericardial effusion.   Conclusions  1. Mild concentric left ventricular hypertrophy with thickened  papillary muscle is observed.   Hyperdynamic LV systolic function with an  estimated ejection fraction is 65-70%.  2. Abnormal left ventricular diastolic filling is observed, consistent  with impaired relaxation.   3. Aortic valve is trileaflet, moderately calcified with mild to  moderate aortic stenosis  4. Mild tricuspid regurgitation with RVSP 35 mm Hg    Echocardiogram: 08/26/2016  FINDINGS:  1. Both atria are normal in size.  2. The aortic valve is mildly sclerotic without any hemodynamically  significant aortic stenosis.  3. Mild concentric left ventricular hypertrophy with early diastolic  dysfunction. No evidence of any regional wall motion abnormality.  The estimated ejection fraction greater than 60%.  4. There is mild mitral annular calcification.  5. Normal right ventricular size and function.  6. On color flow Doppler, there is trace mitral and tricuspid  regurgitation without any hemodynamic significance.  7. No intracardiac mass, pericardial effusion, or cardiac thrombus  seen.  CLINICAL IMPRESSION:  1. Mild concentric left ventricular hypertrophy with early diastolic  dysfunction.  2. Estimated ejection fraction greater than 60%.  3. Mildly sclerotic aortic valve with mild mitral annular  calcification without any hemodynamic significant stenotic process.  4. On color flow Doppler, there is trace mitral and tricuspid  regurgitation without any  hemodynamic significance.  5. No intracardiac mass, pericardial effusion, or cardiac thrombus  Seen.    Carotid Ulsd: 01/09/2017  Unable to retrieve results  Renal Artery U/S 11/1/2017  1. No color Doppler scan evidence of hemodynamically significant renal artery stenosis.  2. Elevated resistivity indices within the kidneys bilaterally which may be indicative of medical renal parenchymal disease.  3. Kidneys are slightly atrophic in size as detailed above.  4. Urinary bladder poorly distended for assessment with suspected thickening the bladder wall which is nonspecific and may simply be accentuated by lack of distention or muscle hypertrophy.  CTA Chest: 11/08/2017  IMPRESSION:  1. No evidence of pulmonary embolus..  2. Mild centrilobular and paraseptal emphysematous changes..  3. Posterior segment right lobe of liver peripheral 1.56 cm  simple hepatic cyst..  4.T11, T12, and L1 bilateral decompressive laminectomies.    Venous duplex lower extremity: 03/08/2018  · Acute right lower extremity deep vein thrombosis noted in the common femoral, profunda femoral, proximal femoral, mid femoral, distal femoral, popliteal and peroneal.  · Acute right lower extremity superficial thrombophlebitis noted in the greater saphenous (above knee).  · Acute left lower extremity deep vein thrombosis noted in the common femoral, profunda femoral, proximal femoral, mid femoral, distal femoral, popliteal and peroneal.  · Partial flow is noted above mentioned veins.    Office Visit on 04/12/2018   Component Date Value Ref Range Status   • Hemoglobin A1C 04/12/2018 6.4* 4 - 5.6 % Final   Transcribe Orders on 03/27/2018   Component Date Value Ref Range Status   • Hemoglobin 03/27/2018 13.5* 13.7 - 17.3 g/dL Final   • Hematocrit 03/27/2018 40.1  39.0 - 49.0 % Final   • Phosphorus 03/27/2018 4.0  2.4 - 4.4 mg/dL Final   Office Visit on 12/20/2017   Component Date Value Ref Range Status   • Glucose 12/20/2017 96  60 - 100 mg/dL Final   • BUN  12/20/2017 18  7 - 21 mg/dL Final   • Creatinine 12/20/2017 1.24  0.70 - 1.30 mg/dL Final   • Sodium 12/20/2017 137  137 - 145 mmol/L Final   • Potassium 12/20/2017 3.9  3.5 - 5.1 mmol/L Final   • Chloride 12/20/2017 97  95 - 110 mmol/L Final   • CO2 12/20/2017 28.0  22.0 - 31.0 mmol/L Final   • Calcium 12/20/2017 9.1  8.4 - 10.2 mg/dL Final   • eGFR   Amer 12/20/2017 68  42 - 98 mL/min/1.73 Final   • BUN/Creatinine Ratio 12/20/2017 14.5  7.0 - 25.0 Final   • Anion Gap 12/20/2017 12.0  5.0 - 15.0 mmol/L Final   Lab on 11/17/2017   Component Date Value Ref Range Status   • Glucose 11/17/2017 114* 60 - 100 mg/dL Final   • BUN 11/17/2017 13  7 - 21 mg/dL Final   • Creatinine 11/17/2017 1.26  0.70 - 1.30 mg/dL Final   • Sodium 11/17/2017 138  137 - 145 mmol/L Final   • Potassium 11/17/2017 4.1  3.5 - 5.1 mmol/L Final   • Chloride 11/17/2017 100  95 - 110 mmol/L Final   • CO2 11/17/2017 29.0  22.0 - 31.0 mmol/L Final   • Calcium 11/17/2017 9.4  8.4 - 10.2 mg/dL Final   • eGFR   Amer 11/17/2017 67  42 - 98 mL/min/1.73 Final   • BUN/Creatinine Ratio 11/17/2017 10.3  7.0 - 25.0 Final   • Anion Gap 11/17/2017 9.0  5.0 - 15.0 mmol/L Final   Admission on 11/08/2017, Discharged on 11/10/2017   Component Date Value Ref Range Status   • Glucose 11/08/2017 169* 60 - 100 mg/dL Final   • BUN 11/08/2017 24* 7 - 21 mg/dL Final   • Creatinine 11/08/2017 1.13  0.70 - 1.30 mg/dL Final   • Sodium 11/08/2017 142  137 - 145 mmol/L Final   • Potassium 11/08/2017 3.8  3.5 - 5.1 mmol/L Final   • Chloride 11/08/2017 104  95 - 110 mmol/L Final   • CO2 11/08/2017 23.0  22.0 - 31.0 mmol/L Final   • Calcium 11/08/2017 9.6  8.4 - 10.2 mg/dL Final   • Total Protein 11/08/2017 6.9  6.3 - 8.6 g/dL Final   • Albumin 11/08/2017 4.00  3.40 - 4.80 g/dL Final   • ALT (SGPT) 11/08/2017 46  21 - 72 U/L Final   • AST (SGOT) 11/08/2017 39  17 - 59 U/L Final   • Alkaline Phosphatase 11/08/2017 101  38 - 126 U/L Final   • Total Bilirubin 11/08/2017  1.2  0.2 - 1.3 mg/dL Final   • eGFR   Amer 11/08/2017 75  42 - 98 mL/min/1.73 Final   • Globulin 11/08/2017 2.9  2.3 - 3.5 gm/dL Final   • A/G Ratio 11/08/2017 1.4  1.1 - 1.8 g/dL Final   • BUN/Creatinine Ratio 11/08/2017 21.2  7.0 - 25.0 Final   • Anion Gap 11/08/2017 15.0  5.0 - 15.0 mmol/L Final   • WBC 11/08/2017 12.47* 3.20 - 9.80 10*3/mm3 Final   • RBC 11/08/2017 3.87* 4.37 - 5.74 10*6/mm3 Final   • Hemoglobin 11/08/2017 11.4* 13.7 - 17.3 g/dL Final   • Hematocrit 11/08/2017 33.9* 39.0 - 49.0 % Final   • MCV 11/08/2017 87.6  80.0 - 98.0 fL Final   • MCH 11/08/2017 29.5  26.5 - 34.0 pg Final   • MCHC 11/08/2017 33.6  31.5 - 36.3 g/dL Final   • RDW 11/08/2017 13.8  11.5 - 14.5 % Final   • RDW-SD 11/08/2017 43.9  35.1 - 43.9 fl Final   • MPV 11/08/2017 9.1  8.0 - 12.0 fL Final   • Platelets 11/08/2017 341  150 - 450 10*3/mm3 Final   • Neutrophil % 11/08/2017 75.1  37.0 - 80.0 % Final   • Lymphocyte % 11/08/2017 7.3* 10.0 - 50.0 % Final   • Monocyte % 11/08/2017 13.4* 0.0 - 12.0 % Final   • Eosinophil % 11/08/2017 0.2  0.0 - 7.0 % Final   • Basophil % 11/08/2017 0.2  0.0 - 2.0 % Final   • Immature Grans % 11/08/2017 3.8* 0.0 - 0.5 % Final   • Neutrophils, Absolute 11/08/2017 9.35* 2.00 - 8.60 10*3/mm3 Final   • Lymphocytes, Absolute 11/08/2017 0.91  0.60 - 4.20 10*3/mm3 Final   • Monocytes, Absolute 11/08/2017 1.67* 0.00 - 0.90 10*3/mm3 Final   • Eosinophils, Absolute 11/08/2017 0.03  0.00 - 0.70 10*3/mm3 Final   • Basophils, Absolute 11/08/2017 0.03  0.00 - 0.20 10*3/mm3 Final   • Immature Grans, Absolute 11/08/2017 0.48* 0.00 - 0.02 10*3/mm3 Final   • Extra Tube 11/08/2017 hold for add-on   Final   • Extra Tube 11/08/2017 Hold for add-ons.   Final   • Extra Tube 11/08/2017 hold for add-on   Final   • Extra Tube 11/08/2017 Hold for add-ons.   Final   • Color, UA 11/08/2017 Dark Yellow  Yellow, Straw, Dark Yellow, Sachi Final   • Appearance, UA 11/08/2017 Cloudy* Clear Final   • pH, UA 11/08/2017 <=5.0  5.0  - 9.0 Final   • Specific Gravity, UA 11/08/2017 1.027  1.003 - 1.030 Final   • Glucose, UA 11/08/2017 Negative  Negative Final   • Ketones, UA 11/08/2017 15 mg/dL (1+)* Negative Final   • Bilirubin, UA 11/08/2017 Small (1+)* Negative Final   • Blood, UA 11/08/2017 Large (3+)* Negative Final   • Protein, UA 11/08/2017 30 mg/dL (1+)* Negative Final   • Leuk Esterase, UA 11/08/2017 Moderate (2+)* Negative Final   • Nitrite, UA 11/08/2017 Positive* Negative Final   • Urobilinogen, UA 11/08/2017 4.0 E.U./dL* 0.2 - 1.0 E.U./dL Final   • Troponin I 11/08/2017 0.050* <=0.034 ng/mL Final   • proBNP 11/08/2017 363.0  0.0 - 1,800.0 pg/mL Final   • RBC, UA 11/08/2017 3-5* None Seen /HPF Final   • WBC, UA 11/08/2017 Too Numerous to Count* None Seen, 0-2, 3-5 /HPF Final   • Bacteria, UA 11/08/2017 4+* None Seen /HPF Final   • Squamous Epithelial Cells, UA 11/08/2017 None Seen  None Seen, 0-2 /HPF Final   • Hyaline Casts, UA 11/08/2017 13-20  None Seen /LPF Final   • Methodology 11/08/2017 Manual Light Microscopy   Final   • Urine Culture 11/10/2017 >100,000 CFU/mL Escherichia coli*  Final   • Troponin I 11/08/2017 0.056* <=0.034 ng/mL Final   • Troponin I 11/08/2017 0.056* <=0.034 ng/mL Final   • Creatine Kinase 11/08/2017 110  55 - 170 U/L Final   • CKMB 11/08/2017 1.78  0.00 - 5.00 ng/mL Final   • Creatine Kinase 11/08/2017 97  55 - 170 U/L Final   • CKMB 11/08/2017 1.47  0.00 - 5.00 ng/mL Final   • Target HR (85%) 11/08/2017 118  bpm Final   • Max. Pred. HR (100%) 11/08/2017 139  bpm Final   • Echo EF Estimated 11/08/2017 55  % Final   • EF(MOD-sp4) 11/08/2017 60  % Final   • Blood Culture 11/13/2017 No growth at 5 days   Final   • Blood Culture 11/13/2017 No growth at 5 days   Final   • Glucose 11/08/2017 120  70 - 130 mg/dL Final   • Troponin I 11/09/2017 0.057* <=0.034 ng/mL Final   • Glucose 11/08/2017 144* 70 - 130 mg/dL Final   • Glucose 11/09/2017 117* 60 - 100 mg/dL Final   • BUN 11/09/2017 25* 7 - 21 mg/dL Final   •  Creatinine 11/09/2017 1.37* 0.70 - 1.30 mg/dL Final   • Sodium 11/09/2017 142  137 - 145 mmol/L Final   • Potassium 11/09/2017 3.7  3.5 - 5.1 mmol/L Final   • Chloride 11/09/2017 104  95 - 110 mmol/L Final   • CO2 11/09/2017 27.0  22.0 - 31.0 mmol/L Final   • Calcium 11/09/2017 8.9  8.4 - 10.2 mg/dL Final   • eGFR   Amer 11/09/2017 60  42 - 98 mL/min/1.73 Final   • BUN/Creatinine Ratio 11/09/2017 18.2  7.0 - 25.0 Final   • Anion Gap 11/09/2017 11.0  5.0 - 15.0 mmol/L Final   • WBC 11/09/2017 11.68* 3.20 - 9.80 10*3/mm3 Final   • RBC 11/09/2017 3.48* 4.37 - 5.74 10*6/mm3 Final   • Hemoglobin 11/09/2017 10.2* 13.7 - 17.3 g/dL Final   • Hematocrit 11/09/2017 30.2* 39.0 - 49.0 % Final   • MCV 11/09/2017 86.8  80.0 - 98.0 fL Final   • MCH 11/09/2017 29.3  26.5 - 34.0 pg Final   • MCHC 11/09/2017 33.8  31.5 - 36.3 g/dL Final   • RDW 11/09/2017 13.6  11.5 - 14.5 % Final   • RDW-SD 11/09/2017 43.5  35.1 - 43.9 fl Final   • MPV 11/09/2017 8.8  8.0 - 12.0 fL Final   • Platelets 11/09/2017 303  150 - 450 10*3/mm3 Final   • Extra Tube 11/09/2017 Hold for add-ons.   Final   • Neutrophil % 11/09/2017 82.0* 37.0 - 80.0 % Final   • Lymphocyte % 11/09/2017 6.0* 10.0 - 50.0 % Final   • Monocyte % 11/09/2017 9.0  0.0 - 12.0 % Final   • Metamyelocyte % 11/09/2017 1.0* 0.0 - 0.0 % Final   • Myelocyte % 11/09/2017 1.0* 0.0 - 0.0 % Final   • Atypical Lymphocyte % 11/09/2017 1.0  0.0 - 5.0 % Final   • Neutrophils Absolute 11/09/2017 9.58* 2.00 - 8.60 10*3/mm3 Final   • Lymphocytes Absolute 11/09/2017 0.70  0.60 - 4.20 10*3/mm3 Final   • Monocytes Absolute 11/09/2017 1.05* 0.00 - 0.90 10*3/mm3 Final   • Polychromasia 11/09/2017 Slight/1+  None Seen Final   • Toxic Granulation 11/09/2017 Slight/1+  None Seen Final   • Vacuolated Neutrophils 11/09/2017 Slight/1+  None Seen Final   • Giant Platelets 11/09/2017 Slight/1+  None Seen Final   • Glucose 11/09/2017 119  70 - 130 mg/dL Final   • Troponin I 11/09/2017 0.043* <=0.034 ng/mL Final    • Glucose 11/09/2017 169* 70 - 130 mg/dL Final   • Glucose 11/09/2017 177* 70 - 130 mg/dL Final   • Glucose 11/09/2017 121  70 - 130 mg/dL Final   • Troponin I 11/10/2017 0.047* <=0.034 ng/mL Final   • Glucose 11/10/2017 128  70 - 130 mg/dL Final   • Glucose 11/10/2017 134* 70 - 130 mg/dL Final       The following portions of the patient's history were reviewed and updated as appropriate: allergies, current medications, past family history, past medical history, past social history, past surgical history and problem list.     Assessment:      Diagnosis Plan   1. Chronic diastolic congestive heart failure without clinical evidence of hypervolemia;  He is well perfused. AHA Stage C: ; NYHA Class III  BETA-BLOCKER: Not applicable from HFpEF  ACE/ARB: Lisinopril 20/12.5 mg mg daily  ENTRESTO: Not applicable  DIURETIC: Lasix 20 mg twice daily  ALDOSTERONT ANTAGONIST: History of KIRK/hyperkalemia  IMDUR/HYDRALAZINE: Hydralazine 25 mg twice daily  DIGOXIN: Not applicable  Fluid restriction: 2000 cc daily  Sodium restriction: 2000 mg daily     Recommended daily weight monitoring.  Discussed patient action plan for heart failure.  Recommended avoiding NSAIDs use.  Discussed warning signs requiring additional medical attention for heart failure.       2.  Leg DVT Xarelto;  Referral to vascular services re: referral for DVT and follow up       3. Coronary artery disease involving native coronary artery of native heart without angina pectoris, stable      ASA plus statin therapy   4. Essential hypertension, stable      As per #1; consideration for white coat syndrome        5. Diabetes mellitus without complication, controlled    As per PCP        6. Chronic kidney disease, stage II (mild), stable  As per Dr Reza       7. Iron deficiency, stable  As per PCP and nephrology       8. Asymptomatic bilateral carotid artery stenosis, stable in January 2017  (0-15% bilateral ICA)      Dr Hamman in January 2019         Patient's BMI  is within normal parameters. No follow-up required; non smoker

## 2018-04-24 PROBLEM — M77.50 TENDONITIS OF FOOT: Status: ACTIVE | Noted: 2018-04-24

## 2018-05-04 ENCOUNTER — TRANSCRIBE ORDERS (OUTPATIENT)
Dept: LAB | Facility: HOSPITAL | Age: 83
End: 2018-05-04

## 2018-05-04 ENCOUNTER — LAB (OUTPATIENT)
Dept: LAB | Facility: HOSPITAL | Age: 83
End: 2018-05-04

## 2018-05-04 DIAGNOSIS — E78.5 HYPERLIPIDEMIA, UNSPECIFIED HYPERLIPIDEMIA TYPE: ICD-10-CM

## 2018-05-04 DIAGNOSIS — E55.9 VITAMIN D DEFICIENCY: ICD-10-CM

## 2018-05-04 DIAGNOSIS — N18.2 CHRONIC RENAL DISEASE, STAGE II: Primary | ICD-10-CM

## 2018-05-04 DIAGNOSIS — I10 ESSENTIAL HYPERTENSION: ICD-10-CM

## 2018-05-04 DIAGNOSIS — N18.2 CHRONIC RENAL DISEASE, STAGE II: ICD-10-CM

## 2018-05-04 LAB
ANION GAP SERPL CALCULATED.3IONS-SCNC: 13 MMOL/L (ref 5–15)
BUN BLD-MCNC: 33 MG/DL (ref 7–21)
BUN/CREAT SERPL: 22.4 (ref 7–25)
CALCIUM SPEC-SCNC: 9.3 MG/DL (ref 8.4–10.2)
CHLORIDE SERPL-SCNC: 97 MMOL/L (ref 95–110)
CO2 SERPL-SCNC: 27 MMOL/L (ref 22–31)
CREAT BLD-MCNC: 1.47 MG/DL (ref 0.7–1.3)
GFR SERPL CREATININE-BSD FRML MDRD: 56 ML/MIN/1.73 (ref 42–98)
GLUCOSE BLD-MCNC: 109 MG/DL (ref 60–100)
MAGNESIUM SERPL-MCNC: 2.3 MG/DL (ref 1.6–2.3)
POTASSIUM BLD-SCNC: 4.6 MMOL/L (ref 3.5–5.1)
SODIUM BLD-SCNC: 137 MMOL/L (ref 137–145)

## 2018-05-04 PROCEDURE — 83735 ASSAY OF MAGNESIUM: CPT

## 2018-05-04 PROCEDURE — 36415 COLL VENOUS BLD VENIPUNCTURE: CPT

## 2018-05-04 PROCEDURE — 80048 BASIC METABOLIC PNL TOTAL CA: CPT

## 2018-05-11 RX ORDER — FUROSEMIDE 20 MG/1
TABLET ORAL
Qty: 60 TABLET | Refills: 5 | Status: SHIPPED | OUTPATIENT
Start: 2018-05-11 | End: 2018-07-12 | Stop reason: SDUPTHER

## 2018-06-11 DIAGNOSIS — I50.32 CHRONIC DIASTOLIC CONGESTIVE HEART FAILURE (HCC): Chronic | ICD-10-CM

## 2018-06-15 ENCOUNTER — OFFICE VISIT (OUTPATIENT)
Dept: ORTHOPEDIC SURGERY | Facility: CLINIC | Age: 83
End: 2018-06-15

## 2018-06-15 VITALS — WEIGHT: 167.7 LBS | HEIGHT: 67 IN | BODY MASS INDEX: 26.32 KG/M2

## 2018-06-15 DIAGNOSIS — M77.50 TENDONITIS OF FOOT: Primary | ICD-10-CM

## 2018-06-15 DIAGNOSIS — M79.671 RIGHT FOOT PAIN: ICD-10-CM

## 2018-06-15 PROCEDURE — 96372 THER/PROPH/DIAG INJ SC/IM: CPT | Performed by: NURSE PRACTITIONER

## 2018-06-15 PROCEDURE — 99214 OFFICE O/P EST MOD 30 MIN: CPT | Performed by: NURSE PRACTITIONER

## 2018-06-15 RX ORDER — TRIAMCINOLONE ACETONIDE 40 MG/ML
80 INJECTION, SUSPENSION INTRA-ARTICULAR; INTRAMUSCULAR ONCE
Status: COMPLETED | OUTPATIENT
Start: 2018-06-15 | End: 2018-06-15

## 2018-06-15 RX ORDER — HYDROCODONE BITARTRATE AND ACETAMINOPHEN 5; 325 MG/1; MG/1
1 TABLET ORAL EVERY 8 HOURS PRN
Qty: 30 TABLET | Refills: 0 | Status: SHIPPED | OUTPATIENT
Start: 2018-06-15 | End: 2018-07-16 | Stop reason: ALTCHOICE

## 2018-06-15 RX ORDER — TIZANIDINE 4 MG/1
4 TABLET ORAL NIGHTLY PRN
Qty: 30 TABLET | Refills: 1 | Status: SHIPPED | OUTPATIENT
Start: 2018-06-15 | End: 2018-07-16 | Stop reason: ALTCHOICE

## 2018-06-15 RX ADMIN — TRIAMCINOLONE ACETONIDE 80 MG: 40 INJECTION, SUSPENSION INTRA-ARTICULAR; INTRAMUSCULAR at 16:20

## 2018-06-15 NOTE — PROGRESS NOTES
General Keith Ruvalcaba Jr. is a 82 y.o. male returns for     Chief Complaint   Patient presents with   • Right Foot - Follow-up, Pain       HISTORY OF PRESENT ILLNESS: Patient presents to office for follow up of right foot pain. Onset about 3 to 4 months ago. No known injury. MRI done on 4/19/2018 was positive for tendonitis. Patient had improvement with IM injection of Kenalog for about 2 months. Pain has returned with swelling in the foot also. Patient states he is unable to sleep at night due to pain in his right foot. Patient also complains of leg cramps that cause him pain. Pain level is 7/10 today. Patient is ambulatory with his walker.      CONCURRENT MEDICAL HISTORY:    Past Medical History:   Diagnosis Date   • Acquired hallux rigidus    • Anemia    • Arthropathy of lumbar facet joint    • Carpal tunnel syndrome    • CHF (congestive heart failure)    • Chronic diastolic congestive heart failure    • Chronic kidney disease    • Chronic kidney disease, stage II (mild)     Chronic kidney disease stage 2   • Chronic obstructive lung disease    • Chronic renal failure    • Coronary artery disease    • Cortical senile cataract    • Diabetes mellitus    • Diabetes mellitus type 2, noninsulin dependent     diabetes mellitus type 2, non-insulin treated, Medication treatment plan: oral diabetic medication   • Diabetes mellitus without complication    • Diastolic dysfunction    • Diastolic heart failure    • Essential hypertension     difficult to control      • Hip pain    • History of echocardiogram 08/19/2015    Echocardiogram W/ color flow 84201 (1) - Mild to moderate LVH with mild left atrial enlargement and normal aortic root.CLVH,preserved LV systolic function with Ef of 55%.Diastolic dysfunction.MV intact.AV thickened.Aortic sclerosis.   • Hyperlipidemia    • Influenza vaccine administered 10/02/2015    INFLUENZA IMMUN ADMIN OR PREV RECV'D  (1) - Ordered By: DIONNE MANDUJANO ([facility])    • Insomnia    •  Insomnia     Other insomnia   • Joint pain    • Low back pain    • Muscle strain    • Nuclear cataract    • Pneumococcal vaccination given 07/22/2016    PNEUMOC VAC/ADMIN/RCVD 4040F (3) - Ordered By: DIONNE MANDUJANO ([facility])   • Prostate cancer    • Pure hypercholesterolemia    • Sedentary lifestyle        Allergies   Allergen Reactions   • Aldactone [Spironolactone] Other (See Comments)     Hyponatremia and hyperkalemia         Current Outpatient Prescriptions:   •  albuterol (PROVENTIL HFA;VENTOLIN HFA) 108 (90 Base) MCG/ACT inhaler, Inhale 2 puffs Every 4 (Four) Hours As Needed for Wheezing., Disp: 1 inhaler, Rfl: 0  •  aspirin 81 MG EC tablet, Take 81 mg by mouth daily., Disp: , Rfl:   •  ferrous sulfate 325 (65 FE) MG tablet, Take 1 tablet by mouth Daily With Breakfast., Disp: 30 tablet, Rfl: 2  •  furosemide (LASIX) 20 MG tablet, TAKE ONE TABLET BY MOUTH TWO TIMES A DAY, Disp: 60 tablet, Rfl: 5  •  gabapentin (NEURONTIN) 300 MG capsule, TAKE ONE CAPSULE BY MOUTH TWO TIMES A DAY, Disp: 60 capsule, Rfl: 5  •  glucose blood (TRUE METRIX BLOOD GLUCOSE TEST) test strip, Check glucose daily, Disp: 100 each, Rfl: 1  •  hydrALAZINE (APRESOLINE) 25 MG tablet, Take 1 tablet by mouth 2 (Two) Times a Day., Disp: 60 tablet, Rfl: 6  •  HYDROcodone-acetaminophen (NORCO) 5-325 MG per tablet, Take 1 tablet by mouth Every 8 (Eight) Hours As Needed for Moderate Pain  or Severe Pain ., Disp: 30 tablet, Rfl: 0  •  JANUVIA 50 MG tablet, TAKE ONE TABLET BY MOUTH DAILY, Disp: 30 tablet, Rfl: 5  •  lisinopril-hydrochlorothiazide (PRINZIDE,ZESTORETIC) 20-12.5 MG per tablet, Take 1 tablet by mouth Daily., Disp: , Rfl:   •  pravastatin (PRAVACHOL) 40 MG tablet, Take 1 tablet by mouth Daily., Disp: 30 tablet, Rfl: 11  •  rivaroxaban (XARELTO) 20 MG tablet, Take 1 tablet by mouth Daily., Disp: 30 tablet, Rfl: 5  •  tiZANidine (ZANAFLEX) 4 MG tablet, Take 1 tablet by mouth At Night As Needed for Muscle Spasms., Disp: 30 tablet, Rfl: 1  •   TRUEPLUS LANCETS 30G misc, 1 Device Daily. TEST, Disp: 100 each, Rfl: 3    Past Surgical History:   Procedure Laterality Date   • CARDIAC ABLATION     • CARDIAC CATHETERIZATION  02/04/1986    Cardiac cath 28253 (1) - normal left heart catherization,non cardiac chest pain   • CARPAL TUNNEL RELEASE  10/19/2015    Carpal tunnel surgery (1) - Release of left carpal tunnel   • CATARACT EXTRACTION W/ INTRAOCULAR LENS IMPLANT Left 3/31/2017    Procedure: REMOVE CATARACT AND IMPLANT INRAOCULAR LENS LEFT EYE;  Surgeon: Hector Navarrete MD;  Location: [facility] OR;  Service:    • CATARACT EXTRACTION W/ INTRAOCULAR LENS IMPLANT Right 4/14/2017    Procedure: REMOVE CATARACT AND IMPLANT INTRAOCULAR LENS RIGHT EYE;  Surgeon: Hector Navarrete MD;  Location: [facility] OR;  Service:    • ENDOSCOPY  08/05/2013    Colon endoscopy 66756 (2) - Hemorrhoids found.   • INGUINAL HERNIA REPAIR Right 06/07/1968    Inguinal hernia, repair (2)   • INJECTION OF MEDICATION  09/06/2012    Albuterol (2u) (1) - Ordered By: LIDYA STEINER ([facility])    • INJECTION OF MEDICATION  09/06/2012    Atrovent (1) - Ordered By: LIDYA STEINER ([facility])    • INJECTION OF MEDICATION  02/10/2012    Depo Medrol (Methylprednisone) (3) - Ordered By: HEATHER PAK ([facility])    • INJECTION OF MEDICATION  02/09/2014    Kenalog (4) - Ordered By: DIMAS ANDRADE ([facility])    • LUMBAR LAMINECTOMY  2007    Lumbar laminectomy (1)   • MANDIBLE FRACTURE SURGERY  10/11/1981    Treat nose/jaw fracture (1) - bilateral open reduction of fracture of mandible   • OTHER SURGICAL HISTORY  09/24/2011    Drain/Inject Major Joint 20610 (1) - Ordered By: KARLOS HERNANDEZ ([facility])    • OTHER SURGICAL HISTORY      Insert IVC filter 49619 (1)   • OTHER SURGICAL HISTORY  10/11/2011    SPIROMETRY 89390 (1) - Ordered By: HEATHER PAK ([facility])   • TOTAL HIP ARTHROPLASTY Right        ROS  No fevers or chills.  No chest pain or shortness of air.  No GI or  disturbances. Right foot  "pain.     PHYSICAL EXAMINATION:       Ht 170.2 cm (67\")   Wt 76.1 kg (167 lb 11.2 oz)   BMI 26.27 kg/m²     Physical Exam   Constitutional: He is oriented to person, place, and time. Vital signs are normal. He appears well-developed and well-nourished. He is active and cooperative. He does not appear ill. No distress.   HENT:   Head: Normocephalic.   Pulmonary/Chest: Effort normal. No respiratory distress.   Abdominal: Soft. He exhibits no distension.   Musculoskeletal: He exhibits edema (Mild, lower legs, ankles, feet bilaterally) and tenderness (Right foot). He exhibits no deformity.   Neurological: He is alert and oriented to person, place, and time. GCS eye subscore is 4. GCS verbal subscore is 5. GCS motor subscore is 6.   Skin: Skin is warm, dry and intact. Capillary refill takes less than 2 seconds. No erythema.   Psychiatric: He has a normal mood and affect. His speech is normal and behavior is normal. Judgment and thought content normal. Cognition and memory are normal.   Vitals reviewed.      GAIT:     []  Normal  [x]  Antalgic    Assistive device: []  None  [x]  Walker     []  Crutches  []  Cane     []  Wheelchair  []  Stretcher    Right Ankle Exam   Swelling: mild    Tenderness   Right ankle tenderness location: dorsal foot.        Range of Motion   The patient has normal right ankle ROM.    Muscle Strength   Dorsiflexion:  4/5  Plantar flexion:  4/5  Other   Erythema: absent  Sensation: normal  Pulse: present     Comments:  Pain with range of motion of the foot.       Left Ankle Exam   Swelling: mild    Tenderness   The patient is experiencing no tenderness.     Range of Motion   The patient has normal left ankle ROM.     Muscle Strength   The patient has normal left ankle strength.    Other   Erythema: absent  Sensation: normal  Pulse: present              Xr Foot 3+ View Right     Result Date: 4/10/2018  Narrative: 3 views of right foot reveal no evidence of fracture.  There are degenerative changes " present at the metatarsophalangeal joint of the first toe.  There is mild hallux valgus deformity noted of the first toe.  No acute radiologic abnormalities are noted at this time.  No comparison images are available for review.04/10/18 at 4:02 PM by REGINA Muñoz      Mri Foot Right Without Contrast     Result Date: 4/19/2018  Narrative: MRI right foot. HISTORY: Severe right foot pain. Prior exam: Right foot April 10, 2018. TECHNIQUE: Multiplanar multisequence noncontrast images right foot. FINDINGS: There is no bone edema or areas of bony destruction. There are degenerative changes first metatarsophalangeal joint. There is extensive tenosynovitis, fluid surrounding one of the flexor tendons in the plantar aspect of the foot series 7 image 18. More superiorly there is tenosynovitis of the flexor hallucis tendons and significant edema of the muscle or tendinous junction at the level of the posterior tibia. MRI right foot is otherwise unremarkable.      Impression: CONCLUSION: Tenosynovitis in one of the flexor tendons plantar aspect of the foot. Tenosynovitis flexor hallucis tendon more superiorly as well as edema of the muscle tendinous junction of the flexor hallucis at level of the posterior tibia. Otherwise unremarkable right foot. Electronically signed by:  Bruce Zepeda MD  4/19/2018 8:12 AM CDT Workstation: [id number]      ASSESSMENT:    Diagnoses and all orders for this visit:    Tendonitis of foot  -     triamcinolone acetonide (KENALOG-40) injection 80 mg; Inject 2 mL into the shoulder, thigh, or buttocks 1 (One) Time.    Right foot pain    Other orders  -     HYDROcodone-acetaminophen (NORCO) 5-325 MG per tablet; Take 1 tablet by mouth Every 8 (Eight) Hours As Needed for Moderate Pain  or Severe Pain .  -     tiZANidine (ZANAFLEX) 4 MG tablet; Take 1 tablet by mouth At Night As Needed for Muscle Spasms.    PLAN    Patient complains of recurrent pain in his right foot that is severe at times. Patient  states it keeps him up at night. The pain is present at rest and also with motion of the right foot. MRI done on 4/19/2018 was positive for tendonitis. His pain significantly improved previously with IM injection of Kenalog. Recommend IM injection of Kenalog today. Cautioned patient regarding risk for hyperglycemia with steroid injection. He is a well-controlled type 2 diabetic. Patient reports his blood sugar was 97 this morning. Recommend rest and activity modification as tolerated and based on his pain. Recommend to continue modified weight-bearing with use of his walker. Recommend elevation and ice therapy to the right foot to minimize pain. Norco is prescribed for moderate to severe pain as needed. Patient took this previously and tolerated well. Patient is unable to take oral NSAIDs due to chronic kidney disease. Zanaflex is also prescribed as needed for muscle spasms in his feet and legs. Follow up in 4 weeks for recheck. Plan for referral to podiatry if pain/symptoms persist.     This patient has tried and failed a course of multiple treatment modalities which include the use of acetaminophen, rest, ice and heat therapy and continues to have pain that is described as severe. Therefore, I will recommend a short course of narcotic pain medication for this patient until their pain has been sufficiently reduced to a level that I deem acceptable to be treated with alternative treatment options.  [facility] reviewed # [id number].      Return in about 4 weeks (around 7/13/2018) for Recheck.        This document has been electronically signed by REGINA Muñoz on June 18, 2018 8:47 PM    REGINA Muñoz

## 2018-06-18 ENCOUNTER — TELEPHONE (OUTPATIENT)
Dept: INTERNAL MEDICINE | Facility: CLINIC | Age: 83
End: 2018-06-18

## 2018-06-18 RX ORDER — GABAPENTIN 300 MG/1
CAPSULE ORAL
Qty: 60 CAPSULE | Refills: 5 | Status: SHIPPED | OUTPATIENT
Start: 2018-06-18 | End: 2018-08-14 | Stop reason: SDUPTHER

## 2018-06-18 NOTE — TELEPHONE ENCOUNTER
Pt needs a refill on Gabapentin sent to [facility] and have them deliver it.

## 2018-06-25 RX ORDER — HYDRALAZINE HYDROCHLORIDE 25 MG/1
TABLET, FILM COATED ORAL
Qty: 60 TABLET | Refills: 6 | Status: SHIPPED | OUTPATIENT
Start: 2018-06-25 | End: 2018-08-14 | Stop reason: SDUPTHER

## 2018-07-02 RX ORDER — SITAGLIPTIN 50 MG/1
TABLET, FILM COATED ORAL
Qty: 30 TABLET | Refills: 6 | Status: SHIPPED | OUTPATIENT
Start: 2018-07-02 | End: 2019-03-07 | Stop reason: SDUPTHER

## 2018-07-12 ENCOUNTER — TELEPHONE (OUTPATIENT)
Dept: CARDIOLOGY | Facility: CLINIC | Age: 83
End: 2018-07-12

## 2018-07-12 RX ORDER — FUROSEMIDE 20 MG/1
20 TABLET ORAL 2 TIMES DAILY
Qty: 60 TABLET | Refills: 5 | Status: SHIPPED | OUTPATIENT
Start: 2018-07-12 | End: 2018-07-16 | Stop reason: SDUPTHER

## 2018-07-12 NOTE — TELEPHONE ENCOUNTER
Contacted pharmacy per Jaimie to see how often patient picks up his lasix they informed me that he hasnt picked any up since 5/11 and that he had one ready now I asked if it was the one that Jaimie had just ordered and she said yes I then called them back to verify if they deliver medicine to him and they said yes and also verified that it would be out to him today

## 2018-07-12 NOTE — TELEPHONE ENCOUNTER
"Called regarding swelling coming and going in legs. He has not been on Lasix for \"a long time\". He said Dr. Reza took him off of it. Dr. reza's last note 4/4/18 did not make mention of this. Telephone encounter where Mr. Rvualcaba called Maria and informed her of the plan also mention the continuation of Lasix 20mg BID.     Mr. Ruvalcaba was diagnosed with a DVT in both legs 3/8/18. His swelling improved after a few weeks of acute dosing of Xarelto.     Now, he says his swelling is ok in the AM, but returns in the evening. He is more active now, but dealing with foot pain and tendonitis.     Will send new RX for Lasix 20mg DAILY. He has follow up appointment Aug 2nd. Will bring him in sooner, possibly Monday with Maria.           This document has been electronically signed by REGINA Lopez on July 12, 2018 8:52 AM      "

## 2018-07-16 ENCOUNTER — OFFICE VISIT (OUTPATIENT)
Dept: CARDIOLOGY | Facility: CLINIC | Age: 83
End: 2018-07-16

## 2018-07-16 ENCOUNTER — APPOINTMENT (OUTPATIENT)
Dept: LAB | Facility: HOSPITAL | Age: 83
End: 2018-07-16

## 2018-07-16 VITALS
SYSTOLIC BLOOD PRESSURE: 142 MMHG | DIASTOLIC BLOOD PRESSURE: 64 MMHG | HEART RATE: 76 BPM | HEIGHT: 67 IN | OXYGEN SATURATION: 98 % | WEIGHT: 165.4 LBS | BODY MASS INDEX: 25.96 KG/M2

## 2018-07-16 DIAGNOSIS — N18.30 STAGE 3 CHRONIC KIDNEY DISEASE (HCC): Chronic | ICD-10-CM

## 2018-07-16 DIAGNOSIS — I50.32 CHRONIC DIASTOLIC CONGESTIVE HEART FAILURE (HCC): Primary | Chronic | ICD-10-CM

## 2018-07-16 DIAGNOSIS — I25.10 CORONARY ARTERY DISEASE INVOLVING NATIVE CORONARY ARTERY OF NATIVE HEART WITHOUT ANGINA PECTORIS: ICD-10-CM

## 2018-07-16 DIAGNOSIS — I10 ESSENTIAL HYPERTENSION: ICD-10-CM

## 2018-07-16 DIAGNOSIS — I82.403 LEG DVT (DEEP VENOUS THROMBOEMBOLISM), ACUTE, BILATERAL (HCC): ICD-10-CM

## 2018-07-16 LAB
ANION GAP SERPL CALCULATED.3IONS-SCNC: 12 MMOL/L (ref 5–15)
BUN BLD-MCNC: 29 MG/DL (ref 7–21)
BUN/CREAT SERPL: 21.6 (ref 7–25)
CALCIUM SPEC-SCNC: 8.9 MG/DL (ref 8.4–10.2)
CHLORIDE SERPL-SCNC: 95 MMOL/L (ref 95–110)
CO2 SERPL-SCNC: 24 MMOL/L (ref 22–31)
CREAT BLD-MCNC: 1.34 MG/DL (ref 0.7–1.3)
GFR SERPL CREATININE-BSD FRML MDRD: 62 ML/MIN/1.73 (ref 42–98)
GLUCOSE BLD-MCNC: 110 MG/DL (ref 60–100)
POTASSIUM BLD-SCNC: 4.6 MMOL/L (ref 3.5–5.1)
SODIUM BLD-SCNC: 131 MMOL/L (ref 137–145)

## 2018-07-16 PROCEDURE — 93005 ELECTROCARDIOGRAM TRACING: CPT | Performed by: NURSE PRACTITIONER

## 2018-07-16 PROCEDURE — 99215 OFFICE O/P EST HI 40 MIN: CPT | Performed by: NURSE PRACTITIONER

## 2018-07-16 PROCEDURE — 80048 BASIC METABOLIC PNL TOTAL CA: CPT | Performed by: NURSE PRACTITIONER

## 2018-07-16 PROCEDURE — 93010 ELECTROCARDIOGRAM REPORT: CPT | Performed by: INTERNAL MEDICINE

## 2018-07-16 PROCEDURE — 36415 COLL VENOUS BLD VENIPUNCTURE: CPT | Performed by: NURSE PRACTITIONER

## 2018-07-16 RX ORDER — FUROSEMIDE 20 MG/1
TABLET ORAL
Qty: 30 TABLET | Refills: 5 | Status: SHIPPED | OUTPATIENT
Start: 2018-07-16 | End: 2018-08-14

## 2018-07-16 NOTE — PROGRESS NOTES
Subjective:     Congestive Heart Failure (chief complaint) and Leg Swelling    Congestive Heart Failure   Presents for follow-up visit. Pertinent negatives include no abdominal pain, chest pain, chest pressure, claudication, edema (bilateral ankle at the end of the day; compliant with compression stockings), fatigue, near-syncope, orthopnea, palpitations, paroxysmal nocturnal dyspnea, shortness of breath or unexpected weight change. The symptoms have been improving. Compliance with total regimen is %. Compliance problems include adherence to exercise.  Compliance with diet is %. Compliance with exercise is 26-50%. Compliance with medications is %.   Leg Swelling   The problem has been resolved. Pertinent negatives include no abdominal pain, anorexia, chest pain, chills, congestion, coughing, diaphoresis, fatigue, fever, joint swelling, myalgias, nausea, numbness, rash, vertigo, vomiting or weakness. Nothing aggravates the symptoms. The treatment provided mild relief.     1. CAD, non-obstructive 2005  2. HFpEF - diastolic dysfunction  3. Hypertension  4. CKD, Stage 2 with H/O KIRK  5. H/O Atrial Flutter S/P ablation per Dr Graves 2006  6. Prostate Cancer [place] Score 6/Stage 2 S/P RT   7. DM, Type II  8. Anterior right total hip arthroplasty 04/25/2016    Mr. Ruvalcaba recently underwent lumbar surgery 10/27/2017 in [place]. He was discharge 11/7/2017. Following discharge he had several medications that were recommended be stopped.  On reviewing discharge summary from [place], it was made note that his hospitalization was complicated by hypotension and acute kidney injury.  He recieved IV fluids per nephrology consult and blood pressure medicine discontinued.       The patient received evaluation with REGINA Vidal on 03/08/2018 in follow-up regarding leg edema for which lower extremity duplex have been ordered and was found with positive findings regarding DVT.   The patient was initiated on  "DOAC, Xarelto on that date.     The patient contacted our office on Thursday, 07/12/2018 regarding leg swelling; the patient indicated that he had not been on Lasix since early May 2018 under the direction of Dr. Reza.  The patient also is reporting that he was given directions \"by a nurse\" to stop wearing compression stockings.  Lasix 20 mg twice daily was restarted on 07/12/2018.  Today, the patient is reporting resolution of the leg edema.  The patient reports compliance with dietary sodium restrictions.  Also, he reports monitoring of his blood pressure in the home setting with average systolic blood pressure 120-140.    Review of Systems   Constitution: Negative for chills, diaphoresis, fatigue, fever, weakness and unexpected weight change.   HENT: Negative for congestion and nosebleeds.    Cardiovascular: Negative for chest pain, claudication, cyanosis, dyspnea on exertion, irregular heartbeat, near-syncope, orthopnea, palpitations, paroxysmal nocturnal dyspnea and syncope.   Respiratory: Negative for cough, shortness of breath, sleep disturbances due to breathing and snoring.    Endocrine: Negative for polydipsia, polyphagia and polyuria.   Hematologic/Lymphatic: Negative for bleeding problem. Does not bruise/bleed easily.   Skin: Negative for poor wound healing and rash.   Musculoskeletal: Negative for falls, joint swelling and myalgias.   Gastrointestinal: Negative for bloating, abdominal pain, anorexia, nausea and vomiting.   Neurological: Negative for dizziness, light-headedness, numbness and vertigo.   Psychiatric/Behavioral: Negative for altered mental status. The patient is not nervous/anxious.      Past Medical History:   Diagnosis Date   • Acquired hallux rigidus    • Anemia    • Arthropathy of lumbar facet joint    • Carpal tunnel syndrome    • CHF (congestive heart failure) (CMS/HCC)    • Chronic diastolic congestive heart failure (CMS/HCC)    • Chronic kidney disease    • Chronic kidney disease, " stage II (mild)     Chronic kidney disease stage 2   • Chronic obstructive lung disease (CMS/HCC)    • Chronic renal failure    • Coronary artery disease    • Cortical senile cataract    • Diabetes mellitus (CMS/Formerly [facility])    • Diabetes mellitus type 2, noninsulin dependent (CMS/Formerly [facility])     diabetes mellitus type 2, non-insulin treated, Medication treatment plan: oral diabetic medication   • Diabetes mellitus without complication (CMS/Formerly [facility])    • Diastolic dysfunction    • Diastolic heart failure (CMS/HCC)    • Essential hypertension     difficult to control      • Hip pain    • History of echocardiogram 08/19/2015    Echocardiogram W/ color flow 95185 (1) - Mild to moderate LVH with mild left atrial enlargement and normal aortic root.CLVH,preserved LV systolic function with Ef of 55%.Diastolic dysfunction.MV intact.AV thickened.Aortic sclerosis.   • Hyperlipidemia    • Influenza vaccine administered 10/02/2015    INFLUENZA IMMUN ADMIN OR PREV RECV'D  (1) - Ordered By: DIONNE MANDUJANO ([facility])    • Insomnia    • Insomnia     Other insomnia   • Joint pain    • Low back pain    • Muscle strain    • Nuclear cataract    • Pneumococcal vaccination given 07/22/2016    PNEUMOC VAC/ADMIN/RCVD 4040F (3) - Ordered By: DIONNE MANDUJANO ([facility])   • Prostate cancer (CMS/Formerly [facility])    • Pure hypercholesterolemia    • Sedentary lifestyle      Past Surgical History:   Procedure Laterality Date   • CARDIAC ABLATION     • CARDIAC CATHETERIZATION  02/04/1986    Cardiac cath 41065 (1) - normal left heart catherization,non cardiac chest pain   • CARPAL TUNNEL RELEASE  10/19/2015    Carpal tunnel surgery (1) - Release of left carpal tunnel   • CATARACT EXTRACTION W/ INTRAOCULAR LENS IMPLANT Left 3/31/2017    Procedure: REMOVE CATARACT AND IMPLANT INRAOCULAR LENS LEFT EYE;  Surgeon: Hector Navarrete MD;  Location: [facility];  Service:    • CATARACT EXTRACTION W/ INTRAOCULAR LENS IMPLANT Right 4/14/2017    Procedure: REMOVE CATARACT AND  IMPLANT INTRAOCULAR LENS RIGHT EYE;  Surgeon: Hector Navarrete MD;  Location: [facility];  Service:    • ENDOSCOPY  08/05/2013    Colon endoscopy 12059 (2) - Hemorrhoids found.   • INGUINAL HERNIA REPAIR Right 06/07/1968    Inguinal hernia, repair (2)   • INJECTION OF MEDICATION  09/06/2012    Albuterol (2u) (1) - Ordered By: LIDYA STEINER ([facility])    • INJECTION OF MEDICATION  09/06/2012    Atrovent (1) - Ordered By: LIDYA STEINER ([facility])    • INJECTION OF MEDICATION  02/10/2012    Depo Medrol (Methylprednisone) (3) - Ordered By: HEATHER PAK ([facility])    • INJECTION OF MEDICATION  02/09/2014    Kenalog (4) - Ordered By: DIMAS ANDRADE ([facility])    • LUMBAR LAMINECTOMY  2007    Lumbar laminectomy (1)   • MANDIBLE FRACTURE SURGERY  10/11/1981    Treat nose/jaw fracture (1) - bilateral open reduction of fracture of mandible   • OTHER SURGICAL HISTORY  09/24/2011    Drain/Inject Major Joint 20610 (1) - Ordered By: KARLOS HERNANDEZ ([facility])    • OTHER SURGICAL HISTORY      Insert IVC filter 68331 (1)   • OTHER SURGICAL HISTORY  10/11/2011    SPIROMETRY 92942 (1) - Ordered By: HEATHER PAK ([facility])   • TOTAL HIP ARTHROPLASTY Right      Social History     Social History   • Marital status:      Social History Main Topics   • Smoking status: Former Smoker     Quit date: 1999   • Smokeless tobacco: Never Used   • Alcohol use No   • Drug use: No   • Sexual activity: Defer      Comment: Marital status:      Other Topics Concern   • Not on file     Current Outpatient Prescriptions   Medication Sig Dispense Refill   • albuterol (PROVENTIL HFA;VENTOLIN HFA) 108 (90 Base) MCG/ACT inhaler Inhale 2 puffs Every 4 (Four) Hours As Needed for Wheezing. 1 inhaler 0   • aspirin 81 MG EC tablet Take 81 mg by mouth daily.     • ferrous sulfate 325 (65 FE) MG tablet Take 1 tablet by mouth Daily With Breakfast. 30 tablet 2   • furosemide (LASIX) 20 MG tablet If weight gain of 2-3 pounds overnight or 5  "pounds in one week 30 tablet 5   • gabapentin (NEURONTIN) 300 MG capsule TAKE ONE CAPSULE BY MOUTH TWO TIMES A DAY 60 capsule 5   • glucose blood (TRUE METRIX BLOOD GLUCOSE TEST) test strip Check glucose daily 100 each 1   • hydrALAZINE (APRESOLINE) 25 MG tablet TAKE ONE TABLET BY MOUTH TWO TIMES A DAY 60 tablet 6   • JANUVIA 50 MG tablet TAKE ONE TABLET BY MOUTH DAILY 30 tablet 6   • lisinopril-hydrochlorothiazide (PRINZIDE,ZESTORETIC) 20-12.5 MG per tablet Take 1 tablet by mouth Daily.     • pravastatin (PRAVACHOL) 40 MG tablet Take 1 tablet by mouth Daily. 30 tablet 11   • rivaroxaban (XARELTO) 20 MG tablet Take 1 tablet by mouth Daily. 30 tablet 5   • TRUEPLUS LANCETS 30G misc 1 Device Daily. TEST 100 each 3     No current facility-administered medications for this visit.      Objective:     Vitals:    07/16/18 0855 07/16/18 0946   BP: 176/84 142/64   BP Location: Left arm Left arm   Patient Position: Sitting Sitting   Cuff Size: Adult Adult   Pulse: 76    SpO2: 98%    Weight: 75 kg (165 lb 6.4 oz)    Height: 170.2 cm (67\")       Physical Exam   Constitutional: He is oriented to person, place, and time. He appears well-nourished. No distress.   HENT:   Head: Normocephalic and atraumatic.   Eyes: Conjunctivae are normal. Right eye exhibits no discharge. Left eye exhibits no discharge.   Neck: No JVD ( 6 cm at 45°) present. No tracheal deviation present.   Cardiovascular: Normal rate, regular rhythm, S1 normal, S2 normal, normal heart sounds and intact distal pulses.  Exam reveals no S3 and no S4.    No murmur heard.  Pulses:       Radial pulses are 2+ on the right side, and 2+ on the left side.   Pulmonary/Chest: Effort normal and breath sounds normal. No respiratory distress. He has no wheezes. He has no rales.   Abdominal: He exhibits no distension.   Musculoskeletal: He exhibits no edema or tenderness.   Neurological: He is alert and oriented to person, place, and time.   Skin: Skin is warm and dry. He is not " diaphoretic.   Psychiatric: He has a normal mood and affect. His behavior is normal. Judgment and thought content normal.   Vitals reviewed.    Data Reviewed:    Electrocardiogram: 11/09/2017    LHC: 03/12/2005 - Dr Kj Green  Aortic pressure is 133/77.   Left ventricular pressure is 133.   Left ventricular end diastolic pressure is 13.   The left main is a very short caliber vessel, almost like a half-way ostium.   The LAD is a large caliber vessel, distally has diffuse 20-30 percent narrowing, gives rise to a large diagonal 1.   The circumflex is a large caliber vessel, which gives rise to an OM1, which had diffuse 20-30 percent disease and also a posterior descending artery.   The RCA is a medium to large caliber vessel, which gave rise to a posterior descending artery and posterolateral, distal to the bifurcation has about 60-70 percent narrowing.   Left ventricular angiogram showed normal wall motion with ejection fraction of 65-75 percent.     Transthoracic echocardiogram: 11/08/2017  · Left ventricular wall thickness is consistent with mild concentric hypertrophy.  · Left ventricular systolic function is normal. Estimated EF = 55%.  · Left ventricular diastolic dysfunction (grade I) consistent with impaired relaxation.    Transthoracic Echocardiogram 10/31/3017 in [place]  Indication:  Hypotension  BP:           93/63  Findings       Left Ventricle:  The left ventricular chamber size is normal. Mild concentric left  ventricular hypertrophy is observed. The estimated ejection fraction is  65-70%. Abnormal left ventricular diastolic filling is observed,  consistent with impaired relaxation.    Left Atrium:  The left atrial chamber size is normal.   Right Ventricle:  The right ventricular cavity size is normal. The right ventricular  global systolic function is normal.   Right Atrium:  The right atrial cavity size is normal.   Aortic Valve:  Moderate aortic leaflet calcification is visualized. There is  mild to  moderate aortic stenosis. The mean gradient of the aortic valve is 30  mmHg. The aortic valve area by velocity time interval is calculated at  1.84 cm2. There is no evidence of aortic regurgitation.   Mitral Valve:  The mitral valve leaflets appear normal. There is mild mitral  regurgitation.    Tricuspid Valve:  The tricuspid valve leaflets are normal.  There is mild tricuspid  regurgitation. The Right Ventricular Systolic Pressure is calculated at  35 mmHg.   Pulmonic Valve:  There is trace pulmonic regurgitation present.   Pericardium:  There is no pericardial effusion.   Conclusions  1. Mild concentric left ventricular hypertrophy with thickened  papillary muscle is observed.   Hyperdynamic LV systolic function with an  estimated ejection fraction is 65-70%.  2. Abnormal left ventricular diastolic filling is observed, consistent  with impaired relaxation.   3. Aortic valve is trileaflet, moderately calcified with mild to  moderate aortic stenosis  4. Mild tricuspid regurgitation with RVSP 35 mm Hg    Echocardiogram: 08/26/2016  FINDINGS:  1. Both atria are normal in size.  2. The aortic valve is mildly sclerotic without any hemodynamically  significant aortic stenosis.  3. Mild concentric left ventricular hypertrophy with early diastolic  dysfunction. No evidence of any regional wall motion abnormality.  The estimated ejection fraction greater than 60%.  4. There is mild mitral annular calcification.  5. Normal right ventricular size and function.  6. On color flow Doppler, there is trace mitral and tricuspid  regurgitation without any hemodynamic significance.  7. No intracardiac mass, pericardial effusion, or cardiac thrombus  seen.  CLINICAL IMPRESSION:  1. Mild concentric left ventricular hypertrophy with early diastolic  dysfunction.  2. Estimated ejection fraction greater than 60%.  3. Mildly sclerotic aortic valve with mild mitral annular  calcification without any hemodynamic significant  stenotic process.  4. On color flow Doppler, there is trace mitral and tricuspid  regurgitation without any hemodynamic significance.  5. No intracardiac mass, pericardial effusion, or cardiac thrombus  Seen.    Carotid Ulsd: 01/09/2017  Unable to retrieve results  Renal Artery U/S 11/1/2017  1. No color Doppler scan evidence of hemodynamically significant renal artery stenosis.  2. Elevated resistivity indices within the kidneys bilaterally which may be indicative of medical renal parenchymal disease.  3. Kidneys are slightly atrophic in size as detailed above.  4. Urinary bladder poorly distended for assessment with suspected thickening the bladder wall which is nonspecific and may simply be accentuated by lack of distention or muscle hypertrophy.  CTA Chest: 11/08/2017  IMPRESSION:  1. No evidence of pulmonary embolus..  2. Mild centrilobular and paraseptal emphysematous changes..  3. Posterior segment right lobe of liver peripheral 1.56 cm  simple hepatic cyst..  4.T11, T12, and L1 bilateral decompressive laminectomies.    Venous duplex lower extremity: 03/08/2018  · Acute right lower extremity deep vein thrombosis noted in the common femoral, profunda femoral, proximal femoral, mid femoral, distal femoral, popliteal and peroneal.  · Acute right lower extremity superficial thrombophlebitis noted in the greater saphenous (above knee).  · Acute left lower extremity deep vein thrombosis noted in the common femoral, profunda femoral, proximal femoral, mid femoral, distal femoral, popliteal and peroneal.  · Partial flow is noted above mentioned veins.    Office Visit on 07/16/2018   Component Date Value Ref Range Status   • Glucose 07/16/2018 110* 60 - 100 mg/dL Final   • BUN 07/16/2018 29* 7 - 21 mg/dL Final   • Creatinine 07/16/2018 1.34* 0.70 - 1.30 mg/dL Final   • Sodium 07/16/2018 131* 137 - 145 mmol/L Final   • Potassium 07/16/2018 4.6  3.5 - 5.1 mmol/L Final   • Chloride 07/16/2018 95  95 - 110 mmol/L Final    • CO2 07/16/2018 24.0  22.0 - 31.0 mmol/L Final   • Calcium 07/16/2018 8.9  8.4 - 10.2 mg/dL Final   • eGFR   Amer 07/16/2018 62  42 - 98 mL/min/1.73 Final   • BUN/Creatinine Ratio 07/16/2018 21.6  7.0 - 25.0 Final   • Anion Gap 07/16/2018 12.0  5.0 - 15.0 mmol/L Final   Lab on 05/04/2018   Component Date Value Ref Range Status   • Glucose 05/04/2018 109* 60 - 100 mg/dL Final   • BUN 05/04/2018 33* 7 - 21 mg/dL Final   • Creatinine 05/04/2018 1.47* 0.70 - 1.30 mg/dL Final   • Sodium 05/04/2018 137  137 - 145 mmol/L Final   • Potassium 05/04/2018 4.6  3.5 - 5.1 mmol/L Final   • Chloride 05/04/2018 97  95 - 110 mmol/L Final   • CO2 05/04/2018 27.0  22.0 - 31.0 mmol/L Final   • Calcium 05/04/2018 9.3  8.4 - 10.2 mg/dL Final   • eGFR   Amer 05/04/2018 56  42 - 98 mL/min/1.73 Final   • BUN/Creatinine Ratio 05/04/2018 22.4  7.0 - 25.0 Final   • Anion Gap 05/04/2018 13.0  5.0 - 15.0 mmol/L Final   • Magnesium 05/04/2018 2.3  1.6 - 2.3 mg/dL Final   Office Visit on 04/12/2018   Component Date Value Ref Range Status   • Hemoglobin A1C 04/12/2018 6.4* 4 - 5.6 % Final   Transcribe Orders on 03/27/2018   Component Date Value Ref Range Status   • Hemoglobin 03/27/2018 13.5* 13.7 - 17.3 g/dL Final   • Hematocrit 03/27/2018 40.1  39.0 - 49.0 % Final   • Phosphorus 03/27/2018 4.0  2.4 - 4.4 mg/dL Final       The following portions of the patient's history were reviewed and updated as appropriate: allergies, current medications, past family history, past medical history, past social history, past surgical history and problem list.     Assessment:      Diagnosis Plan   1. Chronic diastolic congestive heart failure without clinical evidence of hypervolemia;  He is well perfused. AHA Stage C: ; NYHA Class II  BETA-BLOCKER: Not applicable from HFpEF  ACE/ARB: Lisinopril 20/12.5 mg mg daily  ENTRESTO: Not applicable  DIURETIC: Lasix 20 mg daily as needed for weight gain of 2-3 pounds overnight or 5 pounds in one week; if  the patient should have any concerns regarding leg swelling or the manner in which to use his Lasix he is to contact the heart failure clinic.  ALDOSTERONT ANTAGONIST: History of KIRK/hyperkalemia  IMDUR/HYDRALAZINE: Hydralazine 25 mg twice daily  DIGOXIN: Not applicable  Fluid restriction: 2000 cc daily  Sodium restriction: 2000 mg daily     Recommended daily weight monitoring.  Discussed patient action plan for heart failure.  Recommended avoiding NSAIDs use.  Discussed warning signs requiring additional medical attention for heart failure.       2.  Leg DVT Xarelto;  Referral to vascular services re: referral for DVT and follow up  Heather Dexter 09/12/2018       3. Coronary artery disease involving native coronary artery of native heart without angina pectoris, stable      ASA plus statin therapy   4. Essential hypertension, stable      As per #1; consideration for white coat syndrome        5. Diabetes mellitus without complication, controlled    As per PCP        6. Chronic kidney disease, stage II (mild), stable  As per Dr Reza       7. Iron deficiency, stable  As per PCP and nephrology       8. Asymptomatic bilateral carotid artery stenosis, stable in January 2017  (0-15% bilateral ICA)      Dr Hamman in January 2019         Patient's BMI is within normal parameters. No follow-up required; non smoker.    The patient has an appointment with his primary care provider (Dr. Lawson) on 08/14/2018.          This document has been electronically signed by REGINA Carlos on July 16, 2018 11:16 AM

## 2018-07-17 ENCOUNTER — TELEPHONE (OUTPATIENT)
Dept: CARDIOLOGY | Facility: CLINIC | Age: 83
End: 2018-07-17

## 2018-07-17 NOTE — TELEPHONE ENCOUNTER
Contacted patient to let him know that I had a scale for him so that he can start weighing himself on a daily basis. Patient is coming to pick it up today

## 2018-07-27 DIAGNOSIS — M25.551 RIGHT HIP PAIN: Primary | ICD-10-CM

## 2018-07-30 ENCOUNTER — OFFICE VISIT (OUTPATIENT)
Dept: ORTHOPEDIC SURGERY | Facility: CLINIC | Age: 83
End: 2018-07-30

## 2018-07-30 VITALS — BODY MASS INDEX: 25.9 KG/M2 | HEIGHT: 67 IN | WEIGHT: 165 LBS

## 2018-07-30 DIAGNOSIS — M25.551 RIGHT HIP PAIN: Primary | ICD-10-CM

## 2018-07-30 DIAGNOSIS — Z96.641 STATUS POST TOTAL HIP REPLACEMENT, RIGHT: ICD-10-CM

## 2018-07-30 DIAGNOSIS — M70.61 TROCHANTERIC BURSITIS OF RIGHT HIP: ICD-10-CM

## 2018-07-30 DIAGNOSIS — M54.16 LUMBAR RADICULOPATHY, RIGHT: ICD-10-CM

## 2018-07-30 PROCEDURE — 20610 DRAIN/INJ JOINT/BURSA W/O US: CPT | Performed by: ORTHOPAEDIC SURGERY

## 2018-07-30 PROCEDURE — 99214 OFFICE O/P EST MOD 30 MIN: CPT | Performed by: ORTHOPAEDIC SURGERY

## 2018-07-30 RX ORDER — LIDOCAINE HYDROCHLORIDE 10 MG/ML
4 INJECTION, SOLUTION INFILTRATION; PERINEURAL
Status: COMPLETED | OUTPATIENT
Start: 2018-07-30 | End: 2018-07-30

## 2018-07-30 RX ORDER — TRIAMCINOLONE ACETONIDE 40 MG/ML
80 INJECTION, SUSPENSION INTRA-ARTICULAR; INTRAMUSCULAR
Status: COMPLETED | OUTPATIENT
Start: 2018-07-30 | End: 2018-07-30

## 2018-07-30 RX ADMIN — TRIAMCINOLONE ACETONIDE 80 MG: 40 INJECTION, SUSPENSION INTRA-ARTICULAR; INTRAMUSCULAR at 11:00

## 2018-07-30 RX ADMIN — LIDOCAINE HYDROCHLORIDE 4 ML: 10 INJECTION, SOLUTION INFILTRATION; PERINEURAL at 11:00

## 2018-07-30 NOTE — PROGRESS NOTES
General Keith Ruvalcaba Jr. is a 82 y.o. male returns for     Chief Complaint   Patient presents with   • Right Hip - Pain       HISTORY OF PRESENT ILLNESS: Patient is here today for right hip pain. Patient has seen Dr. Lonny Lorenzo in the past. He has a history of right total hip arthroplasty. He was sent to [facility] upon arrival. His hip did fairly well.  Since his hip his head he tells me 3 back operations.  For about the past week he says his had some pain shooting down the right hip on the outside associated with some electrical type sensation.  It does not go all the way down the leg.  He discussed down to the thigh the thigh somewhat worse than the back.  He has no groin pain has had no fever or chills.       CONCURRENT MEDICAL HISTORY:    Past Medical History:   Diagnosis Date   • Acquired hallux rigidus    • Anemia    • Arthropathy of lumbar facet joint    • Carpal tunnel syndrome    • CHF (congestive heart failure) (CMS/HCC)    • Chronic diastolic congestive heart failure (CMS/HCC)    • Chronic kidney disease    • Chronic kidney disease, stage II (mild)     Chronic kidney disease stage 2   • Chronic obstructive lung disease (CMS/HCC)    • Chronic renal failure    • Coronary artery disease    • Cortical senile cataract    • Diabetes mellitus (CMS/HCC)    • Diabetes mellitus type 2, noninsulin dependent (CMS/HCC)     diabetes mellitus type 2, non-insulin treated, Medication treatment plan: oral diabetic medication   • Diabetes mellitus without complication (CMS/HCC)    • Diastolic dysfunction    • Diastolic heart failure (CMS/HCC)    • Essential hypertension     difficult to control      • Hip pain    • History of echocardiogram 08/19/2015    Echocardiogram W/ color flow 86611 (1) - Mild to moderate LVH with mild left atrial enlargement and normal aortic root.CLVH,preserved LV systolic function with Ef of 55%.Diastolic dysfunction.MV intact.AV thickened.Aortic sclerosis.   • Hyperlipidemia    • Influenza vaccine  administered 10/02/2015    INFLUENZA IMMUN ADMIN OR PREV RECV'D  (1) - Ordered By: DIONNE MANDUJANO ([facility])    • Insomnia    • Insomnia     Other insomnia   • Joint pain    • Low back pain    • Muscle strain    • Nuclear cataract    • Pneumococcal vaccination given 07/22/2016    PNEUMOC VAC/ADMIN/RCVD 4040F (3) - Ordered By: DIONNE MANDUJANO ([facility])   • Prostate cancer (CMS/HCC)    • Pure hypercholesterolemia    • Sedentary lifestyle        Allergies   Allergen Reactions   • Aldactone [Spironolactone] Other (See Comments)     Hyponatremia and hyperkalemia         Current Outpatient Prescriptions:   •  albuterol (PROVENTIL HFA;VENTOLIN HFA) 108 (90 Base) MCG/ACT inhaler, Inhale 2 puffs Every 4 (Four) Hours As Needed for Wheezing., Disp: 1 inhaler, Rfl: 0  •  aspirin 81 MG EC tablet, Take 81 mg by mouth daily., Disp: , Rfl:   •  ferrous sulfate 325 (65 FE) MG tablet, Take 1 tablet by mouth Daily With Breakfast., Disp: 30 tablet, Rfl: 2  •  furosemide (LASIX) 20 MG tablet, If weight gain of 2-3 pounds overnight or 5 pounds in one week, Disp: 30 tablet, Rfl: 5  •  gabapentin (NEURONTIN) 300 MG capsule, TAKE ONE CAPSULE BY MOUTH TWO TIMES A DAY, Disp: 60 capsule, Rfl: 5  •  glucose blood (TRUE METRIX BLOOD GLUCOSE TEST) test strip, Check glucose daily, Disp: 100 each, Rfl: 1  •  hydrALAZINE (APRESOLINE) 25 MG tablet, TAKE ONE TABLET BY MOUTH TWO TIMES A DAY, Disp: 60 tablet, Rfl: 6  •  JANUVIA 50 MG tablet, TAKE ONE TABLET BY MOUTH DAILY, Disp: 30 tablet, Rfl: 6  •  lisinopril-hydrochlorothiazide (PRINZIDE,ZESTORETIC) 20-12.5 MG per tablet, Take 1 tablet by mouth Daily., Disp: , Rfl:   •  pravastatin (PRAVACHOL) 40 MG tablet, Take 1 tablet by mouth Daily., Disp: 30 tablet, Rfl: 11  •  rivaroxaban (XARELTO) 20 MG tablet, Take 1 tablet by mouth Daily., Disp: 30 tablet, Rfl: 5  •  TRUEPLUS LANCETS 30G misc, 1 Device Daily. TEST, Disp: 100 each, Rfl: 3    Past Surgical History:   Procedure Laterality Date   • CARDIAC  "ABLATION     • CARDIAC CATHETERIZATION  02/04/1986    Cardiac cath 77643 (1) - normal left heart catherization,non cardiac chest pain   • CARPAL TUNNEL RELEASE  10/19/2015    Carpal tunnel surgery (1) - Release of left carpal tunnel   • CATARACT EXTRACTION W/ INTRAOCULAR LENS IMPLANT Left 3/31/2017    Procedure: REMOVE CATARACT AND IMPLANT INRAOCULAR LENS LEFT EYE;  Surgeon: Hector Navarrete MD;  Location: [facility] OR;  Service:    • CATARACT EXTRACTION W/ INTRAOCULAR LENS IMPLANT Right 4/14/2017    Procedure: REMOVE CATARACT AND IMPLANT INTRAOCULAR LENS RIGHT EYE;  Surgeon: Hector Navarrete MD;  Location: [facility] OR;  Service:    • ENDOSCOPY  08/05/2013    Colon endoscopy 11062 (2) - Hemorrhoids found.   • INGUINAL HERNIA REPAIR Right 06/07/1968    Inguinal hernia, repair (2)   • INJECTION OF MEDICATION  09/06/2012    Albuterol (2u) (1) - Ordered By: LIDYA STEINER ([facility])    • INJECTION OF MEDICATION  09/06/2012    Atrovent (1) - Ordered By: LIDYA STEINER ([facility])    • INJECTION OF MEDICATION  02/10/2012    Depo Medrol (Methylprednisone) (3) - Ordered By: HEATHER PAK ([facility])    • INJECTION OF MEDICATION  02/09/2014    Kenalog (4) - Ordered By: DIMAS ANDRADE ([facility])    • LUMBAR LAMINECTOMY  2007    Lumbar laminectomy (1)   • MANDIBLE FRACTURE SURGERY  10/11/1981    Treat nose/jaw fracture (1) - bilateral open reduction of fracture of mandible   • OTHER SURGICAL HISTORY  09/24/2011    Drain/Inject Major Joint 20610 (1) - Ordered By: KARLOS HERNANDEZ ([facility])    • OTHER SURGICAL HISTORY      Insert IVC filter 32509 (1)   • OTHER SURGICAL HISTORY  10/11/2011    SPIROMETRY 52416 (1) - Ordered By: HEATHER PAK ([facility])   • TOTAL HIP ARTHROPLASTY Right        ROS  No fevers or chills.  No chest pain or shortness of air.  No GI or  disturbances.    PHYSICAL EXAMINATION:       Ht 170.2 cm (67\")   Wt 74.8 kg (165 lb)   BMI 25.84 kg/m²     Physical Exam   Constitutional: He is oriented to " person, place, and time. He appears well-developed and well-nourished.   HENT:   Head: Normocephalic and atraumatic.   Eyes: Pupils are equal, round, and reactive to light. EOM are normal.   Neck: Neck supple. No tracheal deviation present.   Pulmonary/Chest: Effort normal.   Musculoskeletal: He exhibits tenderness. He exhibits no edema or deformity.   Neurological: He is alert and oriented to person, place, and time.   Skin: Skin is warm and dry. No erythema.   Psychiatric: He has a normal mood and affect. Thought content normal.   Vitals reviewed.      GAIT:     []  Normal  [x]  Antalgic    Assistive device: []  None  [x]  Walker     []  Crutches  []  Cane     []  Wheelchair  []  Stretcher    Ortho Exam   Tender over the sciatic notch on the right side.  Hip range of motion is full to internal and external rotation.  Straight raising is negative.  He is tender over the trochanteric flare on the right side.  Strength is grossly intact distally.    No results found.    Repeat x-rays of the hip including pelvis shows a well-seated no sign of complicating features.    Large Joint Arthrocentesis  Date/Time: 7/30/2018 11:00 AM  Consent given by: patient  Timeout: Immediately prior to procedure a time out was called to verify the correct patient, procedure, equipment, support staff and site/side marked as required   Supporting Documentation  Indications: pain   Procedure Details  Location: hip - R greater trochanteric bursa  Needle size: 22 G  Medications administered: 4 mL lidocaine 1 %; 80 mg triamcinolone acetonide 40 MG/ML  Patient tolerance: patient tolerated the procedure well with no immediate complications          ASSESSMENT:    Diagnoses and all orders for this visit:    Right hip pain    Status post total hip replacement, right    Trochanteric bursitis of right hip    Lumbar radiculopathy, right          PLAN he appears neurologically intact.  This is only a short-term nature.  He is most tender over the  trochanteric flare in the back.  I think his hip is okay.  I think this is most likely related to his back.  I'm going to inject the trochanteric flare today.  Follow-up in 3 weeks    No Follow-up on file.    Fernando Chacon MD

## 2018-08-14 ENCOUNTER — OFFICE VISIT (OUTPATIENT)
Dept: INTERNAL MEDICINE | Facility: CLINIC | Age: 83
End: 2018-08-14

## 2018-08-14 VITALS
BODY MASS INDEX: 26.02 KG/M2 | HEIGHT: 67 IN | WEIGHT: 165.8 LBS | SYSTOLIC BLOOD PRESSURE: 160 MMHG | DIASTOLIC BLOOD PRESSURE: 80 MMHG

## 2018-08-14 DIAGNOSIS — E11.40 TYPE 2 DIABETES MELLITUS WITH DIABETIC NEUROPATHY, WITHOUT LONG-TERM CURRENT USE OF INSULIN (HCC): Primary | ICD-10-CM

## 2018-08-14 DIAGNOSIS — I10 ESSENTIAL HYPERTENSION: ICD-10-CM

## 2018-08-14 DIAGNOSIS — I82.403 LEG DVT (DEEP VENOUS THROMBOEMBOLISM), ACUTE, BILATERAL (HCC): ICD-10-CM

## 2018-08-14 DIAGNOSIS — I50.32 CHRONIC DIASTOLIC CONGESTIVE HEART FAILURE (HCC): Chronic | ICD-10-CM

## 2018-08-14 DIAGNOSIS — D64.9 ANEMIA, UNSPECIFIED TYPE: ICD-10-CM

## 2018-08-14 DIAGNOSIS — N18.30 STAGE 3 CHRONIC KIDNEY DISEASE (HCC): Chronic | ICD-10-CM

## 2018-08-14 LAB — GLUCOSE BLDC GLUCOMTR-MCNC: 100 MG/DL (ref 70–130)

## 2018-08-14 PROCEDURE — 99214 OFFICE O/P EST MOD 30 MIN: CPT | Performed by: INTERNAL MEDICINE

## 2018-08-14 PROCEDURE — 82962 GLUCOSE BLOOD TEST: CPT | Performed by: INTERNAL MEDICINE

## 2018-08-14 RX ORDER — HYDRALAZINE HYDROCHLORIDE 25 MG/1
25 TABLET, FILM COATED ORAL 3 TIMES DAILY
Qty: 90 TABLET | Refills: 6 | Status: SHIPPED | OUTPATIENT
Start: 2018-08-14 | End: 2019-04-24 | Stop reason: SDUPTHER

## 2018-08-14 RX ORDER — FERROUS SULFATE 325(65) MG
1 TABLET ORAL
Qty: 30 TABLET | Refills: 5 | Status: SHIPPED | OUTPATIENT
Start: 2018-08-14 | End: 2019-03-07 | Stop reason: SDUPTHER

## 2018-08-14 RX ORDER — GABAPENTIN 300 MG/1
300 CAPSULE ORAL 2 TIMES DAILY
Qty: 60 CAPSULE | Refills: 5 | Status: SHIPPED | OUTPATIENT
Start: 2018-08-14 | End: 2019-02-14 | Stop reason: SDUPTHER

## 2018-08-14 RX ORDER — GLUCOSAM/CHON-MSM1/C/MANG/BOSW 500-416.6
1 TABLET ORAL DAILY
Qty: 100 EACH | Refills: 3 | Status: SHIPPED | OUTPATIENT
Start: 2018-08-14 | End: 2019-04-24 | Stop reason: SDUPTHER

## 2018-08-14 RX ORDER — FUROSEMIDE 20 MG/1
20 TABLET ORAL DAILY PRN
COMMUNITY
End: 2018-12-10 | Stop reason: SDUPTHER

## 2018-08-14 RX ORDER — LISINOPRIL AND HYDROCHLOROTHIAZIDE 20; 12.5 MG/1; MG/1
1 TABLET ORAL DAILY
Qty: 30 TABLET | Refills: 5 | Status: SHIPPED | OUTPATIENT
Start: 2018-08-14 | End: 2018-12-04 | Stop reason: HOSPADM

## 2018-08-14 NOTE — PROGRESS NOTES
Subjective   General Keith Ruvalcaba Jr. is a 82 y.o. male       Patient is in for recheck on HTN, DM, CHF and neuropathy.    He has no new complaints. Has been on Xarelto since March 2018 when he was diagnosed with bilateral DVTs.  Swelling in both legs improved tremendously.    His BP is still labile, and has been for years.  He is on multiple medications which are adjusted periodically.  Denies chest pain, chest pressure or difficulty breathing.    Glucose is regulated with FBS below 120. He is trying to watch carbs in his diet.  Denies polydipsia or polyuria.    Takes Gabapentin for neuropathy, and it helps his pain.  Tolerates medication well without any side effects.Denies dizziness or lightheadedness.      Past Medical History:   Diagnosis Date   • Acquired hallux rigidus    • Anemia    • Arthropathy of lumbar facet joint    • Carpal tunnel syndrome    • CHF (congestive heart failure) (CMS/HCC)    • Chronic diastolic congestive heart failure (CMS/HCC)    • Chronic kidney disease    • Chronic kidney disease, stage II (mild)     Chronic kidney disease stage 2   • Chronic obstructive lung disease (CMS/HCC)    • Chronic renal failure    • Coronary artery disease    • Cortical senile cataract    • Diabetes mellitus (CMS/HCC)    • Diabetes mellitus type 2, noninsulin dependent (CMS/HCC)     diabetes mellitus type 2, non-insulin treated, Medication treatment plan: oral diabetic medication   • Diabetes mellitus without complication (CMS/HCC)    • Diastolic dysfunction    • Diastolic heart failure (CMS/HCC)    • Essential hypertension     difficult to control      • Hip pain    • History of echocardiogram 08/19/2015    Echocardiogram W/ color flow 84796 (1) - Mild to moderate LVH with mild left atrial enlargement and normal aortic root.CLVH,preserved LV systolic function with Ef of 55%.Diastolic dysfunction.MV intact.AV thickened.Aortic sclerosis.   • Hyperlipidemia    • Influenza vaccine administered 10/02/2015     INFLUENZA IMMUN ADMIN OR PREV RECV'D  (1) - Ordered By: DIONNE MANDUJANO ([facility])    • Insomnia    • Insomnia     Other insomnia   • Joint pain    • Low back pain    • Muscle strain    • Nuclear cataract    • Pneumococcal vaccination given 07/22/2016    PNEUMOC VAC/ADMIN/RCVD 4040F (3) - Ordered By: DIONNE MANDUJANO ([facility])   • Prostate cancer (CMS/HCC)    • Pure hypercholesterolemia    • Sedentary lifestyle      Past Surgical History:   Procedure Laterality Date   • CARDIAC ABLATION     • CARDIAC CATHETERIZATION  02/04/1986    Cardiac cath 32274 (1) - normal left heart catherization,non cardiac chest pain   • CARPAL TUNNEL RELEASE  10/19/2015    Carpal tunnel surgery (1) - Release of left carpal tunnel   • CATARACT EXTRACTION W/ INTRAOCULAR LENS IMPLANT Left 3/31/2017    Procedure: REMOVE CATARACT AND IMPLANT INRAOCULAR LENS LEFT EYE;  Surgeon: Hector Navarrete MD;  Location: [facility];  Service:    • CATARACT EXTRACTION W/ INTRAOCULAR LENS IMPLANT Right 4/14/2017    Procedure: REMOVE CATARACT AND IMPLANT INTRAOCULAR LENS RIGHT EYE;  Surgeon: Hector Navarrete MD;  Location: [facility] OR;  Service:    • ENDOSCOPY  08/05/2013    Colon endoscopy 98058 (2) - Hemorrhoids found.   • INGUINAL HERNIA REPAIR Right 06/07/1968    Inguinal hernia, repair (2)   • INJECTION OF MEDICATION  09/06/2012    Albuterol (2u) (1) - Ordered By: LIDYA STEINER ([facility])    • INJECTION OF MEDICATION  09/06/2012    Atrovent (1) - Ordered By: LIDYA STEINER ([facility])    • INJECTION OF MEDICATION  02/10/2012    Depo Medrol (Methylprednisone) (3) - Ordered By: HEATHER PAK ([facility])    • INJECTION OF MEDICATION  02/09/2014    Kenalog (4) - Ordered By: DIMAS ANDRADE ([facility])    • LUMBAR LAMINECTOMY  2007    Lumbar laminectomy (1)   • MANDIBLE FRACTURE SURGERY  10/11/1981    Treat nose/jaw fracture (1) - bilateral open reduction of fracture of mandible   • OTHER SURGICAL HISTORY  09/24/2011    Drain/Inject Major Joint  86979 (1) - Ordered By: KARLOS HERNANDEZ ([facility])    • OTHER SURGICAL HISTORY      Insert IVC filter 74210 (1)   • OTHER SURGICAL HISTORY  10/11/2011    SPIROMETRY 47986 (1) - Ordered By: HEATHER PAK ([facility])   • TOTAL HIP ARTHROPLASTY Right        Social History   Substance Use Topics   • Smoking status: Former Smoker     Quit date: 1999   • Smokeless tobacco: Never Used   • Alcohol use No     Family History   Problem Relation Age of Onset   • Diabetes Other    • Cancer Sister    • Coronary artery disease Neg Hx      Allergies   Allergen Reactions   • Aldactone [Spironolactone] Other (See Comments)     Hyponatremia and hyperkalemia     Current Outpatient Prescriptions on File Prior to Visit   Medication Sig Dispense Refill   • aspirin 81 MG EC tablet Take 81 mg by mouth daily.     • ferrous sulfate 325 (65 FE) MG tablet Take 1 tablet by mouth Daily With Breakfast. 30 tablet 2   • glucose blood (TRUE METRIX BLOOD GLUCOSE TEST) test strip Check glucose daily 100 each 1   • JANUVIA 50 MG tablet TAKE ONE TABLET BY MOUTH DAILY 30 tablet 6   • pravastatin (PRAVACHOL) 40 MG tablet Take 1 tablet by mouth Daily. 30 tablet 11   • rivaroxaban (XARELTO) 20 MG tablet Take 1 tablet by mouth Daily. 30 tablet 5   • [DISCONTINUED] albuterol (PROVENTIL HFA;VENTOLIN HFA) 108 (90 Base) MCG/ACT inhaler Inhale 2 puffs Every 4 (Four) Hours As Needed for Wheezing. 1 inhaler 0   • [DISCONTINUED] gabapentin (NEURONTIN) 300 MG capsule TAKE ONE CAPSULE BY MOUTH TWO TIMES A DAY 60 capsule 5   • [DISCONTINUED] hydrALAZINE (APRESOLINE) 25 MG tablet TAKE ONE TABLET BY MOUTH TWO TIMES A DAY 60 tablet 6   • [DISCONTINUED] lisinopril-hydrochlorothiazide (PRINZIDE,ZESTORETIC) 20-12.5 MG per tablet Take 1 tablet by mouth Daily.     • [DISCONTINUED] TRUEPLUS LANCETS 30G misc 1 Device Daily. TEST 100 each 3   • [DISCONTINUED] furosemide (LASIX) 20 MG tablet If weight gain of 2-3 pounds overnight or 5 pounds in one week 30 tablet 5     No current  facility-administered medications on file prior to visit.      Review of Systems   Constitutional: Negative for activity change and fatigue.   HENT: Negative.    Eyes: Negative.    Respiratory: Negative for chest tightness and shortness of breath.    Cardiovascular: Negative for chest pain, palpitations and leg swelling.   Gastrointestinal: Negative for abdominal pain, constipation and diarrhea.   Endocrine: Negative for cold intolerance and heat intolerance.   Genitourinary: Negative for flank pain and frequency.   Musculoskeletal: Positive for gait problem. Negative for arthralgias.   Skin: Negative for color change and rash.   Neurological: Positive for numbness. Negative for dizziness and light-headedness.   Hematological: Negative for adenopathy. Does not bruise/bleed easily.       Objective   Physical Exam   Constitutional: He is oriented to person, place, and time. He appears well-developed and well-nourished.   HENT:   Head: Normocephalic and atraumatic.   Nose: Nose normal.   Mouth/Throat: Oropharynx is clear and moist.   Eyes: Pupils are equal, round, and reactive to light. Conjunctivae are normal.   Neck: Normal range of motion. Neck supple. No JVD present.   Cardiovascular: Normal rate and regular rhythm.    Pulmonary/Chest: Effort normal and breath sounds normal.   Abdominal: Soft. Bowel sounds are normal. He exhibits no mass.   Musculoskeletal: He exhibits no deformity.   Patient is using walker for ambulation   Neurological: He is alert and oriented to person, place, and time.   Skin: Skin is warm and dry. No rash noted.   Psychiatric: He has a normal mood and affect. His behavior is normal.           Patient Active Problem List   Diagnosis   • Chronic diastolic congestive heart failure (CMS/HCC)   • Hypertension   • Coronary artery disease   • Chronic kidney disease   • Status post total hip replacement, right   • Lumbar disc disease   • Osteoarthritis of multiple joints   • Iron deficiency   •  Nuclear cataract   • Cortical age-related cataract   • Diabetes mellitus without complication (CMS/HCC)   • Carotid stenosis, asymptomatic   • Type 2 diabetes mellitus without complication, without long-term current use of insulin (CMS/HCC)   • Elevated troponin   • Lower extremity edema   • Hematuria, microscopic   • H/O prostate cancer   • Chronic kidney disease, stage II (mild)   • Leg DVT (deep venous thromboembolism), acute, bilateral (CMS/HCC)   • Right foot pain   • Tendonitis of foot   • Right hip pain   • Lumbar radiculopathy, right   • Trochanteric bursitis of right hip         Office Visit on 08/14/2018   Component Date Value Ref Range Status   • Glucose 08/14/2018 100  70 - 130 mg/dL Final   Office Visit on 07/16/2018   Component Date Value Ref Range Status   • Glucose 07/16/2018 110* 60 - 100 mg/dL Final   • BUN 07/16/2018 29* 7 - 21 mg/dL Final   • Creatinine 07/16/2018 1.34* 0.70 - 1.30 mg/dL Final   • Sodium 07/16/2018 131* 137 - 145 mmol/L Final   • Potassium 07/16/2018 4.6  3.5 - 5.1 mmol/L Final   • Chloride 07/16/2018 95  95 - 110 mmol/L Final   • CO2 07/16/2018 24.0  22.0 - 31.0 mmol/L Final   • Calcium 07/16/2018 8.9  8.4 - 10.2 mg/dL Final   • eGFR   Amer 07/16/2018 62  42 - 98 mL/min/1.73 Final   • BUN/Creatinine Ratio 07/16/2018 21.6  7.0 - 25.0 Final   • Anion Gap 07/16/2018 12.0  5.0 - 15.0 mmol/L Final   Lab on 05/04/2018   Component Date Value Ref Range Status   • Glucose 05/04/2018 109* 60 - 100 mg/dL Final   • BUN 05/04/2018 33* 7 - 21 mg/dL Final   • Creatinine 05/04/2018 1.47* 0.70 - 1.30 mg/dL Final   • Sodium 05/04/2018 137  137 - 145 mmol/L Final   • Potassium 05/04/2018 4.6  3.5 - 5.1 mmol/L Final   • Chloride 05/04/2018 97  95 - 110 mmol/L Final   • CO2 05/04/2018 27.0  22.0 - 31.0 mmol/L Final   • Calcium 05/04/2018 9.3  8.4 - 10.2 mg/dL Final   • eGFR   Amer 05/04/2018 56  42 - 98 mL/min/1.73 Final   • BUN/Creatinine Ratio 05/04/2018 22.4  7.0 - 25.0 Final   •  Anion Gap 2018 13.0  5.0 - 15.0 mmol/L Final   • Magnesium 2018 2.3  1.6 - 2.3 mg/dL Final   Office Visit on 2018   Component Date Value Ref Range Status   • Hemoglobin A1C 2018 6.4* 4 - 5.6 % Final   Transcribe Orders on 2018   Component Date Value Ref Range Status   • Hemoglobin 2018 13.5* 13.7 - 17.3 g/dL Final   • Hematocrit 2018 40.1  39.0 - 49.0 % Final   • Phosphorus 2018 4.0  2.4 - 4.4 mg/dL Final       Lab Results   Component Value Date    CHOL 169 2017     Lab Results   Component Value Date    TRIG 61 2017    TRIG 67 2016    TRIG 157 2015     Lab Results   Component Value Date    HDL 66 2017    HDL 49 (L) 2016     No components found for: LDLDIRECTC  Lab Results   Component Value Date    LDL 82 2017    LDL 79 2016    LDL 96 2015     Lab Results   Component Value Date    LDLHDL 1.38 2017    LDLHDL 3.00 2015    LDLHDL 1.52 2015     Lab Results   Component Value Date    HGBA1C 6.4 (H) 2018    HGBA1C 6.6 (H) 2017    HGBA1C 6.6 (H) 2017     Lab Results   Component Value Date    BUN 29 (H) 2018    BUN 33 (H) 2018    BUN 23 (H) 2018     Lab Results   Component Value Date    CREATININE 1.34 (H) 2018    CREATININE 1.47 (H) 2018    CREATININE 1.38 (H) 2018     [facility]  [address] [place] [address] [address] [place] [address] [phone]             General Keith Ruvalcaba Jr.   Duplex scan of extremity veins including responses to compression and other maneuvers; bilateral   Order# [id number]   Reading physician: Syed Lemus MD Ordering physician: Maria Porter APRN Study date: 3/8/18   Patient Information     Patient Name   Keith Ruvalcaba Jr. MRN  [id number] Sex  Male  (Age)  1935 (82 y.o.)   Clinical Indication     leg edema   Dx: Chronic diastolic congestive heart failure (CMS/HCC) [I50.32  (ICD-10-CM)]; Essential hypertension [I10 (ICD-10-CM)]; Leg edema [R60.0 (ICD-10-CM)]   Interpretation Summary     · Acute right lower extremity deep vein thrombosis noted in the common femoral, profunda femoral, proximal femoral, mid femoral, distal femoral, popliteal and peroneal.  · Acute right lower extremity superficial thrombophlebitis noted in the greater saphenous (above knee).  · Acute left lower extremity deep vein thrombosis noted in the common femoral, profunda femoral, proximal femoral, mid femoral, distal femoral, popliteal and peroneal.  · Partial flow is noted above mentioned veins     [address] [address] [phone]              Keith Jordon Mendoza   Echocardiogram   Order# [id number]   Reading physician: Kimani Graves MD Ordering physician: Parish Ferris MD Study date: 17   Patient Information     Patient Name  General Keith Ruvalcaba Jr. MRN  [id number] Sex  Male  (Age)  1935 (82 y.o.)   Sedation Narrator Report     Sedation Narrator Report   Interpretation Summary     · Left ventricular wall thickness is consistent with mild concentric hypertrophy.  · Left ventricular systolic function is normal. Estimated EF = 55%.  · Left ventricular diastolic dysfunction (grade I) consistent with impaired relaxation.          Assessment/Plan   General was seen today for med refill.    Diagnoses and all orders for this visit:    Type 2 diabetes mellitus with diabetic neuropathy, without long-term current use of insulin (CMS/HCC)  -     TRUEPLUS LANCETS 30G misc; 1 Device Daily. TEST  -     Hemoglobin A1c; Future  -     Lipid Panel  -     POCT Glucose    Essential hypertension    Stage 3 chronic kidney disease    Chronic diastolic congestive heart failure (CMS/HCC)  -     gabapentin (NEURONTIN) 300 MG capsule; Take 1 capsule by mouth 2 (Two) Times a Day.    Leg DVT (deep venous thromboembolism), acute, bilateral (CMS/HCC)  -     CBC &  Differential; Future    Anemia, unspecified type    BMI 25.0-25.9,adult    Other orders  -     lisinopril-hydrochlorothiazide (PRINZIDE,ZESTORETIC) 20-12.5 MG per tablet; Take 1 tablet by mouth Daily.  -     hydrALAZINE (APRESOLINE) 25 MG tablet; Take 1 tablet by mouth 3 (Three) Times a Day.  -     ferrous sulfate 325 (65 FE) MG tablet; Take 1 tablet by mouth Daily With Breakfast.           Plan        1. Januvia.  Medication benefits and side effects discussed in detail.  HgbA1c is OK.  ADA diet.  Gabapentin for neuropathy. Patient tolerates medication well without any side effects.  The patient has read and signed the [facility] Controlled Substance Contract.  I will continue to see patient for regular follow up appointments.  They are well controlled on their medication.  PAULA is updated every 3 months. The patient is aware of the potential for addiction and dependence.  2.Increase Hydralazine to tid.  Lisinopril.   Lasix.  DASH.  3. Will monitor renal function.  Advised to avoid NSAIDs, PPIs and other nephrotoxins.  He is also under the care of .  4.Patient is clinically stable without any evidence of fluid overload.  Records from [facility] reviewed.  5.Xarelto. ?6 months of anticoagulation for unprovoked DVT.  Medication benefits and side effects discussed in detail.  He denies any bruising or bleeding on the medication.  CBC.  6. Ferrous sulfate 325 mg daily.  Will monitor CBC.  7.Activity as tolerated.  Diet: Heart healthy foods - more fresh fruits and vegetables and whole grains; less red meat; more fish and poultry that is baked or grilled - not fried; less salt not to exceed 2000 mg daily - less processed food; No trans or saturated fat  Diabetes management  Blood pressure management  Weight management: Patient's Body mass index is 25.96 kg/m². BMI is above normal parameters. Recommendations include: educational material, exercise counseling and nutrition  counseling.                    Follow up in 3 months.            This document has been electronically signed by Ada Cloud MD on August 14, 2018 12:33 PM

## 2018-09-12 ENCOUNTER — OFFICE VISIT (OUTPATIENT)
Dept: CARDIAC SURGERY | Facility: CLINIC | Age: 83
End: 2018-09-12

## 2018-09-12 VITALS
WEIGHT: 172.8 LBS | OXYGEN SATURATION: 98 % | DIASTOLIC BLOOD PRESSURE: 72 MMHG | SYSTOLIC BLOOD PRESSURE: 148 MMHG | HEART RATE: 92 BPM | BODY MASS INDEX: 27.06 KG/M2 | TEMPERATURE: 98.3 F

## 2018-09-12 DIAGNOSIS — I82.513 CHRONIC DEEP VEIN THROMBOSIS (DVT) OF FEMORAL VEIN OF BOTH LOWER EXTREMITIES (HCC): Primary | ICD-10-CM

## 2018-09-12 DIAGNOSIS — Z79.01 LONG TERM CURRENT USE OF ANTICOAGULANT: ICD-10-CM

## 2018-09-12 PROCEDURE — 99214 OFFICE O/P EST MOD 30 MIN: CPT | Performed by: NURSE PRACTITIONER

## 2018-09-17 ENCOUNTER — OFFICE VISIT (OUTPATIENT)
Dept: GASTROENTEROLOGY | Facility: CLINIC | Age: 83
End: 2018-09-17

## 2018-09-17 VITALS — WEIGHT: 167 LBS | HEIGHT: 67 IN | BODY MASS INDEX: 26.21 KG/M2

## 2018-09-17 DIAGNOSIS — Z80.0 FAMILY HISTORY OF COLON CANCER: ICD-10-CM

## 2018-09-17 DIAGNOSIS — Z12.11 ENCOUNTER FOR SCREENING FOR MALIGNANT NEOPLASM OF COLON: Primary | ICD-10-CM

## 2018-09-17 PROBLEM — Z79.01 LONG TERM CURRENT USE OF ANTICOAGULANT: Status: ACTIVE | Noted: 2018-09-17

## 2018-09-17 PROBLEM — I82.403 LEG DVT (DEEP VENOUS THROMBOEMBOLISM), ACUTE, BILATERAL: Status: RESOLVED | Noted: 2018-03-08 | Resolved: 2018-09-17

## 2018-09-17 PROBLEM — I82.513 CHRONIC DEEP VEIN THROMBOSIS (DVT) OF FEMORAL VEIN OF BOTH LOWER EXTREMITIES (HCC): Status: ACTIVE | Noted: 2018-09-17

## 2018-09-17 PROCEDURE — 99212 OFFICE O/P EST SF 10 MIN: CPT | Performed by: PHYSICIAN ASSISTANT

## 2018-09-17 RX ORDER — DEXTROSE AND SODIUM CHLORIDE 5; .45 G/100ML; G/100ML
30 INJECTION, SOLUTION INTRAVENOUS CONTINUOUS PRN
Status: CANCELLED | OUTPATIENT
Start: 2018-10-31

## 2018-09-17 NOTE — PROGRESS NOTES
Subjective   Patient ID: General Keith Ruvalcaba Jr. is a 83 y.o. male is here today for follow-up.    Chief Complaint:    Chief Complaint   Patient presents with   • Deep Vein Thrombosis     recheck       The following portions of the patient's history were reviewed and updated as appropriate: allergies, current medications, past family history, past medical history, past social history, past surgical history and problem list.  Recent images independently reviewed.  Available laboratory values reviewed.    PCP:  Ada Cloud MD  Cardiology:  [facility] ([facility])  Nephrology:     83 y.o. male with HTN(stable, increased risk stroke, rupture), Hyperlipidemia(stable, increased risk cardiovascular events), Diabetes Mellitus(stable, increased risk cardiovascular events), COPD(stable, increased risk pulmonary complications), Chronic Kidney Disease(stable, increased risk renal failure) and Atrial fibrillation(stable, increased risk stroke)  Lumbar surgery (10/2017) Diagnosis Bilateral DVT (3/2018) after complaints of LE swelling. Placed on Xarelto. Tolerating well. No adverse bleeding events.  former smoker.  Presents for Vascular evaluation.  No TIA stroke amaurosis.  No MI claudication. No other associated signs, symptoms or modifying factors.    3/2017: LE Venous Duplex: Acute right lower extremity deep vein thrombosis noted in the common femoral, profunda femoral, proximal femoral, mid femoral, distal femoral, popliteal and peroneal.  Acute right lower extremity superficial thrombophlebitis noted in the greater saphenous (above knee).  Acute left lower extremity deep vein thrombosis noted in the common femoral, profunda femoral, proximal femoral, mid femoral, distal femoral, popliteal and peroneal.     Partial flow is noted above mentioned veins     11/2017: CTA Chest: Negative PE  9/2018: LE Venous Duplex: Chronic right lower extremity deep vein thrombosis noted in the common femoral, profunda femoral,  proximal femoral, mid femoral, distal femoral, popliteal, posterior tibial and peroneal.  Chronic right lower extremity superficial thrombophlebitis noted in the saphenofemoral junction and greater saphenous (above knee).  Chronic left lower extremity deep vein thrombosis noted in the common femoral, profunda femoral, proximal femoral, mid femoral, distal femoral, popliteal, posterial tibial and peroneal.           Past Medical History:   Diagnosis Date   • Acquired hallux rigidus    • Anemia    • Arthropathy of lumbar facet joint    • Carpal tunnel syndrome    • CHF (congestive heart failure) (CMS/[facility])    • Chronic diastolic congestive heart failure (CMS/[facility])    • Chronic kidney disease    • Chronic kidney disease, stage II (mild)     Chronic kidney disease stage 2   • Chronic obstructive lung disease (CMS/[facility])    • Chronic renal failure    • Coronary artery disease    • Cortical senile cataract    • Diabetes mellitus (CMS/[facility])    • Diabetes mellitus type 2, noninsulin dependent (CMS/[facility])     diabetes mellitus type 2, non-insulin treated, Medication treatment plan: oral diabetic medication   • Diabetes mellitus without complication (CMS/[facility])    • Diastolic dysfunction    • Diastolic heart failure (CMS/[facility])    • Essential hypertension     difficult to control      • Hip pain    • History of echocardiogram 08/19/2015    Echocardiogram W/ color flow 02058 (1) - Mild to moderate LVH with mild left atrial enlargement and normal aortic root.CLVH,preserved LV systolic function with Ef of 55%.Diastolic dysfunction.MV intact.AV thickened.Aortic sclerosis.   • Hyperlipidemia    • Influenza vaccine administered 10/02/2015    INFLUENZA IMMUN ADMIN OR PREV RECV'D  (1) - Ordered By: DIONNE MANDUJANO ([facility])    • Insomnia    • Insomnia     Other insomnia   • Joint pain    • Low back pain    • Muscle strain    • Nuclear cataract    • Pneumococcal vaccination given 07/22/2016    PNEUMOC VAC/ADMIN/RCVD 4040F (3) - Ordered By:  DIONNE MANDUJANO ([facility])   • Prostate cancer (CMS/HCC)    • Pure hypercholesterolemia    • Sedentary lifestyle      Past Surgical History:   Procedure Laterality Date   • CARDIAC ABLATION     • CARDIAC CATHETERIZATION  02/04/1986    Cardiac cath 05970 (1) - normal left heart catherization,non cardiac chest pain   • CARPAL TUNNEL RELEASE  10/19/2015    Carpal tunnel surgery (1) - Release of left carpal tunnel   • CATARACT EXTRACTION W/ INTRAOCULAR LENS IMPLANT Left 3/31/2017    Procedure: REMOVE CATARACT AND IMPLANT INRAOCULAR LENS LEFT EYE;  Surgeon: Hector Navarrete MD;  Location: [facility] OR;  Service:    • CATARACT EXTRACTION W/ INTRAOCULAR LENS IMPLANT Right 4/14/2017    Procedure: REMOVE CATARACT AND IMPLANT INTRAOCULAR LENS RIGHT EYE;  Surgeon: Hector Navarrete MD;  Location: [facility] OR;  Service:    • ENDOSCOPY  08/05/2013    Colon endoscopy 97592 (2) - Hemorrhoids found.   • INGUINAL HERNIA REPAIR Right 06/07/1968    Inguinal hernia, repair (2)   • INJECTION OF MEDICATION  09/06/2012    Albuterol (2u) (1) - Ordered By: LIDYA STEINER ([facility])    • INJECTION OF MEDICATION  09/06/2012    Atrovent (1) - Ordered By: LIDYA STEINER ([facility])    • INJECTION OF MEDICATION  02/10/2012    Depo Medrol (Methylprednisone) (3) - Ordered By: HEATHER PAK ([facility])    • INJECTION OF MEDICATION  02/09/2014    Kenalog (4) - Ordered By: DIMAS ANDRADE ([facility])    • LUMBAR LAMINECTOMY  2007    Lumbar laminectomy (1)   • MANDIBLE FRACTURE SURGERY  10/11/1981    Treat nose/jaw fracture (1) - bilateral open reduction of fracture of mandible   • OTHER SURGICAL HISTORY  09/24/2011    Drain/Inject Major Joint 20610 (1) - Ordered By: KARLOS HERNANDEZ ([facility])    • OTHER SURGICAL HISTORY      Insert IVC filter 16533 (1)   • OTHER SURGICAL HISTORY  10/11/2011    SPIROMETRY 72779 (1) - Ordered By: HEATHER PAK ([facility])   • TOTAL HIP ARTHROPLASTY Right        ALLERGIES:   Aldactone  [spironolactone]    MEDICATIONS:      Current Outpatient Prescriptions:   •  aspirin 81 MG EC tablet, Take 81 mg by mouth daily., Disp: , Rfl:   •  ferrous sulfate 325 (65 FE) MG tablet, Take 1 tablet by mouth Daily With Breakfast., Disp: 30 tablet, Rfl: 5  •  furosemide (LASIX) 20 MG tablet, Take 20 mg by mouth 2 (Two) Times a Day. If weight gain 2 t o 3 pounds overnight or 5 pounds in 1 week, Disp: , Rfl:   •  gabapentin (NEURONTIN) 300 MG capsule, Take 1 capsule by mouth 2 (Two) Times a Day., Disp: 60 capsule, Rfl: 5  •  glucose blood (TRUE METRIX BLOOD GLUCOSE TEST) test strip, Check glucose daily, Disp: 100 each, Rfl: 1  •  hydrALAZINE (APRESOLINE) 25 MG tablet, Take 1 tablet by mouth 3 (Three) Times a Day., Disp: 90 tablet, Rfl: 6  •  JANUVIA 50 MG tablet, TAKE ONE TABLET BY MOUTH DAILY, Disp: 30 tablet, Rfl: 6  •  lisinopril-hydrochlorothiazide (PRINZIDE,ZESTORETIC) 20-12.5 MG per tablet, Take 1 tablet by mouth Daily., Disp: 30 tablet, Rfl: 5  •  pravastatin (PRAVACHOL) 40 MG tablet, Take 1 tablet by mouth Daily., Disp: 30 tablet, Rfl: 11  •  TRUEPLUS LANCETS 30G misc, 1 Device Daily. TEST, Disp: 100 each, Rfl: 3  •  rivaroxaban (XARELTO) 10 MG tablet, Take 1 tablet by mouth Daily., Disp: 30 tablet, Rfl: 11    Review of Systems   Constitution: Negative for weakness.   HENT: Positive for hearing loss. Negative for nosebleeds.    Eyes: Negative for visual disturbance.   Cardiovascular: Negative for claudication, cyanosis and leg swelling.   Respiratory: Negative for hemoptysis and shortness of breath.    Hematologic/Lymphatic: Negative for bleeding problem. Does not bruise/bleed easily.   Skin: Negative for color change and nail changes.   Musculoskeletal: Positive for arthritis, back pain and joint pain. Negative for muscle weakness.   Gastrointestinal: Negative for dysphagia, hematemesis and melena.   Genitourinary: Negative for hematuria.   Neurological: Negative for dizziness, focal weakness,  light-headedness, loss of balance, numbness and paresthesias.   Psychiatric/Behavioral: Negative for altered mental status.   All other systems reviewed and are negative.       Objective       Vitals:    09/12/18 1125   BP: 148/72   Pulse: 92   Temp: 98.3 °F (36.8 °C)   SpO2: 98%      Body mass index is 27.06 kg/m².  Physical Exam   Constitutional: He is oriented to person, place, and time. He appears well-developed.   HENT:   Head: Normocephalic.   Eyes: EOM are normal.   Neck: Neck supple. Carotid bruit is not present.   Cardiovascular: Normal rate.    Pulses:       Dorsalis pedis pulses are 2+ on the right side, and 2+ on the left side.        Posterior tibial pulses are 2+ on the right side, and 2+ on the left side.   Pulmonary/Chest: Effort normal and breath sounds normal. No respiratory distress.   Abdominal: Soft. He exhibits no distension.   Musculoskeletal:   Walker   Neurological: He is alert and oriented to person, place, and time. No cranial nerve deficit.   Skin: Skin is warm and dry. Capillary refill takes less than 2 seconds.   Psychiatric: He has a normal mood and affect. Thought content normal.   Vitals reviewed.          Assessment/Plan   Independent Review of Radiographic Studies:    Detailed discussion regarding risks, benefits, and treatment plan. Images independently reviewed. Patient understands, agrees, and wishes to proceed with plan.     1. Long term current use of anticoagulant  Completed 6 mos of therapeutic treatment  Decrease Xarelto 10mg daily-preventative dosing  Rx sent  May stop for any procedures without bridging  Anticoagulation teaching/discussion for 30 minutes of direct face-face interaction with the patient during this encounter and over half of that time was spent on counseling and coordination of care.  We discussed in depth the importance of medication adherence, signs/symptoms of bleeding, and management of anticoagulation for procedures. I also educated the patient about  assistance programs available for cost reduction of the medication prescribed.      2. Chronic deep vein thrombosis (DVT) of femoral vein of both lower extremities (CMS/[facility])  Compression stockings for occasional swelling  If you should experience any additional leg pain with swelling or sudden onset of shortness of breath notify heart and vascular center immediately for evaluation.              This document has been electronically signed by REGINA Ortiz on September 17, 2018 12:58 PM

## 2018-09-17 NOTE — PROGRESS NOTES
Chief Complaint   Patient presents with   • Colonoscopy consultation       ENDO PROCEDURE ORDERED: COLON +FH    Subjective    General Keith Ruvalcaba Jr. is a 83 y.o. male. he is being seen for consultation today at the request of Dr. Cloud.    History of Present Illness    This 83-year-old male was sent for evaluation for colonoscopy. He got a recall letter. Last saw Dr. Alonso on 07/31/2013 with family history of colon cancer. He had a colonoscopy on 08/05/2013 that showed hemorrhoids, was recommended to have a repeat at 5 years. He currently denies abdominal pain, heartburn, nausea, vomiting or dysphagia. Bowel movements are regular without blood or mucus. Weight is down 24 pounds since he last saw Dr. Alonso. He is on iron as well as Xarelto. He had a duplex showing acute DVT of the lower extremities on 03/08/2018. He has recently seen Maria Porter. He last saw Dr. Cloud for followup on 08/14/2018.     The patient currently denies tobacco, alcohol or illicit substance use. He has a personal history of back surgery, DVT, prostate cancer, heart stents, diabetes and arthritis. Family history of heart disease, unknown cancer, hypertension.    ASSESSMENT/PLAN: The patient is due for screening colonoscopy for family history of colon cancer, he is also  at increased risk. I did briefly message Maria Porter through [employer] regarding the patient's anticoagulants and his recent DVTs. He is 6 months post and does not require bridging. He will stop the typical time prior to endoscopy and will resume once he has finished the procedure. Will pursue further pending clinical course and the results of the above.    Thank you very much, Dr. Cloud, for involving us in the care of your patient.       The following portions of the patient's history were reviewed and updated as appropriate:   Past Medical History:   Diagnosis Date   • Acquired hallux rigidus    • Anemia    • Arthropathy of lumbar facet joint     • Carpal tunnel syndrome    • CHF (congestive heart failure) (CMS/[facility])    • Chronic diastolic congestive heart failure (CMS/[facility])    • Chronic kidney disease    • Chronic kidney disease, stage II (mild)     Chronic kidney disease stage 2   • Chronic obstructive lung disease (CMS/[facility])    • Chronic renal failure    • Coronary artery disease    • Cortical senile cataract    • Diabetes mellitus (CMS/[facility])    • Diabetes mellitus type 2, noninsulin dependent (CMS/[facility])     diabetes mellitus type 2, non-insulin treated, Medication treatment plan: oral diabetic medication   • Diabetes mellitus without complication (CMS/[facility])    • Diastolic dysfunction    • Diastolic heart failure (CMS/[facility])    • Essential hypertension     difficult to control      • Hip pain    • History of echocardiogram 08/19/2015    Echocardiogram W/ color flow 59345 (1) - Mild to moderate LVH with mild left atrial enlargement and normal aortic root.CLVH,preserved LV systolic function with Ef of 55%.Diastolic dysfunction.MV intact.AV thickened.Aortic sclerosis.   • Hyperlipidemia    • Influenza vaccine administered 10/02/2015    INFLUENZA IMMUN ADMIN OR PREV RECV'D  (1) - Ordered By: DIONNE MANDUJANO ([facility])    • Insomnia    • Insomnia     Other insomnia   • Joint pain    • Low back pain    • Muscle strain    • Nuclear cataract    • Pneumococcal vaccination given 07/22/2016    PNEUMOC VAC/ADMIN/RCVD 4040F (3) - Ordered By: DIONNE MANDUJANO ([facility])   • Prostate cancer (CMS/[facility])    • Pure hypercholesterolemia    • Sedentary lifestyle      Past Surgical History:   Procedure Laterality Date   • CARDIAC ABLATION     • CARDIAC CATHETERIZATION  02/04/1986    Cardiac cath 06777 (1) - normal left heart catherization,non cardiac chest pain   • CARPAL TUNNEL RELEASE  10/19/2015    Carpal tunnel surgery (1) - Release of left carpal tunnel   • CATARACT EXTRACTION W/ INTRAOCULAR LENS IMPLANT Left 3/31/2017    Procedure: REMOVE CATARACT AND IMPLANT INRAOCULAR LENS  LEFT EYE;  Surgeon: Hector Navarrete MD;  Location: [facility];  Service:    • CATARACT EXTRACTION W/ INTRAOCULAR LENS IMPLANT Right 4/14/2017    Procedure: REMOVE CATARACT AND IMPLANT INTRAOCULAR LENS RIGHT EYE;  Surgeon: Hector Navarrete MD;  Location: [facility];  Service:    • ENDOSCOPY  08/05/2013    Colon endoscopy 89313 (2) - Hemorrhoids found.   • INGUINAL HERNIA REPAIR Right 06/07/1968    Inguinal hernia, repair (2)   • INJECTION OF MEDICATION  09/06/2012    Albuterol (2u) (1) - Ordered By: LIDYA STEINER ([facility])    • INJECTION OF MEDICATION  09/06/2012    Atrovent (1) - Ordered By: LIDYA STEINER ([facility])    • INJECTION OF MEDICATION  02/10/2012    Depo Medrol (Methylprednisone) (3) - Ordered By: HEATHER PAK ([facility])    • INJECTION OF MEDICATION  02/09/2014    Kenalog (4) - Ordered By: DIMAS ANDRADE ([facility])    • LUMBAR LAMINECTOMY  2007    Lumbar laminectomy (1)   • MANDIBLE FRACTURE SURGERY  10/11/1981    Treat nose/jaw fracture (1) - bilateral open reduction of fracture of mandible   • OTHER SURGICAL HISTORY  09/24/2011    Drain/Inject Major Joint 00819 (1) - Ordered By: KARLOS HERNANDEZ ([facility])    • OTHER SURGICAL HISTORY      Insert IVC filter 16288 (1)   • OTHER SURGICAL HISTORY  10/11/2011    SPIROMETRY 32192 (1) - Ordered By: HEATHER PAK ([facility])   • TOTAL HIP ARTHROPLASTY Right      Family History   Problem Relation Age of Onset   • Diabetes Other    • Cancer Sister    • Heart disease Mother    • Hypertension Mother    • Heart disease Father    • Hypertension Father    • Coronary artery disease Neg Hx        Allergies   Allergen Reactions   • Aldactone [Spironolactone] Other (See Comments)     Hyponatremia and hyperkalemia     Social History     Social History   • Marital status:      Social History Main Topics   • Smoking status: Former Smoker     Quit date: 1999   • Smokeless tobacco: Never Used   • Alcohol use No   • Drug use: No   • Sexual activity: Defer     "  Comment: Marital status:      Other Topics Concern   • Not on file       Current Outpatient Prescriptions:   •  aspirin 81 MG EC tablet, Take 81 mg by mouth daily., Disp: , Rfl:   •  ferrous sulfate 325 (65 FE) MG tablet, Take 1 tablet by mouth Daily With Breakfast., Disp: 30 tablet, Rfl: 5  •  furosemide (LASIX) 20 MG tablet, Take 20 mg by mouth 2 (Two) Times a Day. If weight gain 2 t o 3 pounds overnight or 5 pounds in 1 week, Disp: , Rfl:   •  gabapentin (NEURONTIN) 300 MG capsule, Take 1 capsule by mouth 2 (Two) Times a Day., Disp: 60 capsule, Rfl: 5  •  glucose blood (TRUE METRIX BLOOD GLUCOSE TEST) test strip, Check glucose daily, Disp: 100 each, Rfl: 1  •  hydrALAZINE (APRESOLINE) 25 MG tablet, Take 1 tablet by mouth 3 (Three) Times a Day., Disp: 90 tablet, Rfl: 6  •  JANUVIA 50 MG tablet, TAKE ONE TABLET BY MOUTH DAILY, Disp: 30 tablet, Rfl: 6  •  lisinopril-hydrochlorothiazide (PRINZIDE,ZESTORETIC) 20-12.5 MG per tablet, Take 1 tablet by mouth Daily., Disp: 30 tablet, Rfl: 5  •  pravastatin (PRAVACHOL) 40 MG tablet, Take 1 tablet by mouth Daily., Disp: 30 tablet, Rfl: 11  •  rivaroxaban (XARELTO) 10 MG tablet, Take 1 tablet by mouth Daily., Disp: 30 tablet, Rfl: 11  •  TRUEPLUS LANCETS 30G misc, 1 Device Daily. TEST, Disp: 100 each, Rfl: 3  •  sodium-potassium-magnesium sulfates (SUPREP BOWEL PREP KIT) 17.5-3.13-1.6 GM/180ML solution oral solution, As directed per instruction sheet for colonoscopy, Disp: 1 bottle, Rfl: 0  Review of Systems  Review of Systems   Constitutional: Negative for unexpected weight change.   HENT: Negative for trouble swallowing.    Gastrointestinal: Negative for abdominal distention, abdominal pain, anal bleeding, blood in stool, constipation, diarrhea, nausea, rectal pain and vomiting.   All other systems reviewed and are negative.         Objective    Ht 170.2 cm (67\")   Wt 75.8 kg (167 lb)   BMI 26.16 kg/m²   Physical Exam   Constitutional: He is oriented to person, " place, and time. He appears well-developed and well-nourished. No distress.   AA   HENT:   Head: Normocephalic and atraumatic.   Eyes: Pupils are equal, round, and reactive to light. EOM are normal.   Neck: Normal range of motion.   Cardiovascular: Normal rate, regular rhythm and normal heart sounds.    Pulmonary/Chest: Effort normal and breath sounds normal.   Abdominal: Soft. Bowel sounds are normal. He exhibits no shifting dullness, no distension, no abdominal bruit, no ascites and no mass. There is no hepatosplenomegaly. There is no tenderness. There is no rigidity, no rebound, no guarding and no CVA tenderness. No hernia. Hernia confirmed negative in the ventral area.   Musculoskeletal: Normal range of motion.   Neurological: He is alert and oriented to person, place, and time.   Skin: Skin is warm and dry.   Psychiatric: He has a normal mood and affect. His behavior is normal. Judgment and thought content normal.   Nursing note and vitals reviewed.    Assessment/Plan      1. Encounter for screening for malignant neoplasm of colon    2. Family history of colon cancer    .   General was seen today for colonoscopy consultation.    Diagnoses and all orders for this visit:    Encounter for screening for malignant neoplasm of colon  -     Case Request; Standing  -     dextrose 5 % and sodium chloride 0.45 % infusion; Infuse 30 mL/hr into a venous catheter Continuous As Needed (Start Prior to Procedure).  -     Case Request    Family history of colon cancer  -     Case Request; Standing  -     dextrose 5 % and sodium chloride 0.45 % infusion; Infuse 30 mL/hr into a venous catheter Continuous As Needed (Start Prior to Procedure).  -     Case Request    Other orders  -     Follow Anesthesia Guidelines / Standing Orders; Future  -     Obtain Informed Consent; Standing  -     POC Glucose Once; Standing  -     sodium-potassium-magnesium sulfates (SUPREP BOWEL PREP KIT) 17.5-3.13-1.6 GM/180ML solution oral solution; As  directed per instruction sheet for colonoscopy        Orders placed during this encounter include:  Orders Placed This Encounter   Procedures   • Follow Anesthesia Guidelines / Standing Orders     Standing Status:   Future       Medications prescribed:  New Medications Ordered This Visit   Medications   • sodium-potassium-magnesium sulfates (SUPREP BOWEL PREP KIT) 17.5-3.13-1.6 GM/180ML solution oral solution     Sig: As directed per instruction sheet for colonoscopy     Dispense:  1 bottle     Refill:  0     Patient would like that medication delivered.       Requested Prescriptions     Signed Prescriptions Disp Refills   • sodium-potassium-magnesium sulfates (SUPREP BOWEL PREP KIT) 17.5-3.13-1.6 GM/180ML solution oral solution 1 bottle 0     Sig: As directed per instruction sheet for colonoscopy       Review and/or summary of lab tests, radiology, procedures, medications. Review and summary of old records and obtaining of history. The risks and benefits of my recommendations, as well as other treatment options were discussed with the patient today. Questions were answered.    Follow-up: Return if symptoms worsen or fail to improve.     COLONOSCOPY (N/A)      This document has been electronically signed by Lazaro Healy PA-C on September 18, 2018 8:18 PM      Results for orders placed or performed during the hospital encounter of 09/12/18   Duplex Venous Lower Extremity - Bilateral CAR   Result Value Ref Range    Right Common Femoral Spont 1     Right Saphenofemoral Junction Spont 1     Right Profunda Femoral Spont 1     Right Proximal Femoral Spont 1     Right Mid Femoral Spont 1     Right Distal Femoral Spont 1     Right Popliteal Spont 1     Right Posterior Tibial Vessel 1     Right Peroneal Vessel 1     Right Greater Saph AK Vessel 1     Left Common Femoral Spont 1     Left Profunda Femoral Spont 1     Left Proximal Femoral Spont 1     Left Mid Femoral Spont 1     Left Distal Femoral Spont 1     Left  Popliteal Spont 1     Left Posterior Tibial Vessel 1     Left Peroneal Vessel 1     Right Common Femoral Spont Y     Right Common Femoral Phasic Y     Right Common Femoral Augment Y     Right Common Femoral Compress P     Right Common Femoral Thrombus C     Right Saphenofemoral Junction Spont Y     Right Saphenofemoral Junction Phasic Y     Right Saphenofemoral Junction Augment Y     Right Saphenofemoral Junction Compress P     Right Saphenofemoral Junction Thrombus C     Right Profunda Femoral Spont Y     Right Profunda Femoral Phasic Y     Right Profunda Femoral Augment Y     Right Profunda Femoral Compress P     Right Profunda Femoral Thrombus C     Right Proximal Femoral Spont Y     Right Proximal Femoral Augment Y     Right Proximal Femoral Compress P     Right Proximal Femoral Thrombus C     Right Mid Femoral Spont Y     Right Mid Femoral Phasic Y     Right Mid Femoral Augment Y     Right Mid Femoral Compress P     Right Mid Femoral Thrombus C     Right Distal Femoral Spont Y     Right Distal Femoral Phasic Y     Right Distal Femoral Compress P     Right Distal Femoral Thrombus C     Right Popliteal Spont Y     Right Popliteal Phasic Y     Right Popliteal Augment Y     Right Popliteal Compress P     Right Popliteal Thrombus C     Right Posterior Tibial Augment Y     Right Posterior Tibial Compress P     Right Posterior Tibial Thrombus C     Right Peroneal Augment Y     Right Peroneal Compress P     Right Peroneal Thrombus C     Right Greater Saph AK Spont Y     Right Greater Saph AK Phasic Y     Right Greater Saph AK Augment Y     Right Greater Saph AK Compress P     Right Greater Saph AK Thrombus C     Right Greater Saph BK Augment Y     Right Greater Saph BK Compress C     Right Lesser Saph Compress C     Left Common Femoral Spont Y     Left Common Femoral Phasic Y     Left Common Femoral Augment Y     Left Common Femoral Compress P     Left Common Femoral Thrombus C     Left Saphenofemoral Junction Spont Y      Left Saphenofemoral Junction Phasic Y     Left Saphenofemoral Junction Augment Y     Left Saphenofemoral Junction Competent Y     Left Saphenofemoral Junction Compress C     Left Profunda Femoral Spont Y     Left Profunda Femoral Phasic Y     Left Profunda Femoral Augment Y     Left Profunda Femoral Compress P     Left Profunda Femoral Thrombus C     Left Proximal Femoral Spont Y     Left Proximal Femoral Phasic Y     Left Proximal Femoral Augment Y     Left Proximal Femoral Compress P     Left Proximal Femoral Thrombus C     Left Mid Femoral Spont Y     Left Mid Femoral Phasic Y     Left Mid Femoral Augment Y     Left Mid Femoral Compress P     Left Mid Femoral Thrombus C     Left Distal Femoral Spont N     Left Distal Femoral Phasic N     Left Distal Femoral Augment N     Left Distal Femoral Compress N     Left Distal Femoral Thrombus C     Left Popliteal Spont Y     Left Popliteal Phasic Y     Left Popliteal Augment Y     Left Popliteal Compress P     Left Popliteal Thrombus C     Left Posterior Tibial Augment Y     Left Posterior Tibial Compress P     Left Posterior Tibial Thrombus C     Left Peroneal Augment Y     Left Peroneal Compress P     Left Peroneal Thrombus C     Left Greater Saph AK Augment Y     Left Greater Saph AK Compress C     Left Lesser Saph Compress C     Right Proximal Femoral Phasic Y     Right Distal Femoral Augment Y      *Note: Due to a large number of results and/or encounters for the requested time period, some results have not been displayed. A complete set of results can be found in Results Review.       Some portions of this note have been dictated using voice recognition software and may contain errors and/or omissions.

## 2018-09-18 ENCOUNTER — TELEPHONE (OUTPATIENT)
Dept: GASTROENTEROLOGY | Facility: CLINIC | Age: 83
End: 2018-09-18

## 2018-09-18 RX ORDER — SODIUM, POTASSIUM,MAG SULFATES 17.5-3.13G
SOLUTION, RECONSTITUTED, ORAL ORAL
Qty: 1 BOTTLE | Refills: 0 | Status: SHIPPED | OUTPATIENT
Start: 2018-09-18 | End: 2018-11-06

## 2018-09-18 NOTE — TELEPHONE ENCOUNTER
----- Message from Lazaro Healy PA-C sent at 9/17/2018  5:31 PM CDT -----      ----- Message -----  From: Maria Porter APRN  Sent: 9/17/2018   1:30 PM  To: Lazaro Healy PA-C    He would not require bridging, hold Xarelto for 2 days prior and restart according to findings/procedures... Let me know if additional info necessary.. You are welcomeMaria    ----- Message -----  From: Lazaro Healy PA-C  Sent: 9/17/2018  11:36 AM  To: REGINA Carlos    Scheduling patient for colonoscopy. He is 6months post DVT. Does he need to be bridged or can he hold xarelto for the procedure? Appreciate your help. Lazaro

## 2018-09-18 NOTE — TELEPHONE ENCOUNTER
Patient has been contacted and made aware to hold his Xarelto 2 days prior to his endoscopy procedure. Patient voiced understanding.

## 2018-09-20 ENCOUNTER — TELEPHONE (OUTPATIENT)
Dept: INTERNAL MEDICINE | Facility: CLINIC | Age: 83
End: 2018-09-20

## 2018-09-20 NOTE — TELEPHONE ENCOUNTER
Pt needs a prescription on a cane he left his at [facility] and when he called about it they said it wasn't there.  So, he wants a RX for a cane sent to [facility].

## 2018-10-02 ENCOUNTER — TRANSCRIBE ORDERS (OUTPATIENT)
Dept: LAB | Facility: HOSPITAL | Age: 83
End: 2018-10-02

## 2018-10-02 ENCOUNTER — LAB (OUTPATIENT)
Dept: LAB | Facility: HOSPITAL | Age: 83
End: 2018-10-02

## 2018-10-02 DIAGNOSIS — E55.9 VITAMIN D DEFICIENCY: ICD-10-CM

## 2018-10-02 DIAGNOSIS — I10 ESSENTIAL (PRIMARY) HYPERTENSION: ICD-10-CM

## 2018-10-02 DIAGNOSIS — N18.2 CHRONIC RENAL DISEASE, STAGE II: ICD-10-CM

## 2018-10-02 DIAGNOSIS — E78.5 DYSLIPIDEMIA: ICD-10-CM

## 2018-10-02 DIAGNOSIS — N18.2 CHRONIC RENAL DISEASE, STAGE II: Primary | ICD-10-CM

## 2018-10-02 LAB
25(OH)D3 SERPL-MCNC: 58 NG/ML (ref 30–100)
ALBUMIN SERPL-MCNC: 4.1 G/DL (ref 3.4–4.8)
ANION GAP SERPL CALCULATED.3IONS-SCNC: 14 MMOL/L (ref 5–15)
BUN BLD-MCNC: 20 MG/DL (ref 7–21)
BUN/CREAT SERPL: 14.7 (ref 7–25)
CALCIUM SPEC-SCNC: 9.1 MG/DL (ref 8.4–10.2)
CHLORIDE SERPL-SCNC: 93 MMOL/L (ref 95–110)
CO2 SERPL-SCNC: 26 MMOL/L (ref 22–31)
CREAT BLD-MCNC: 1.36 MG/DL (ref 0.7–1.3)
GFR SERPL CREATININE-BSD FRML MDRD: 61 ML/MIN/1.73 (ref 42–98)
GLUCOSE BLD-MCNC: 113 MG/DL (ref 60–100)
PHOSPHATE SERPL-MCNC: 2.9 MG/DL (ref 2.4–4.4)
POTASSIUM BLD-SCNC: 4.4 MMOL/L (ref 3.5–5.1)
SODIUM BLD-SCNC: 133 MMOL/L (ref 137–145)

## 2018-10-02 PROCEDURE — 82306 VITAMIN D 25 HYDROXY: CPT

## 2018-10-02 PROCEDURE — 80069 RENAL FUNCTION PANEL: CPT

## 2018-10-02 PROCEDURE — 36415 COLL VENOUS BLD VENIPUNCTURE: CPT

## 2018-10-14 ENCOUNTER — HOSPITAL ENCOUNTER (EMERGENCY)
Facility: HOSPITAL | Age: 83
Discharge: HOME | End: 2018-10-14
Attending: EMERGENCY MEDICINE | Admitting: EMERGENCY MEDICINE

## 2018-10-14 VITALS
DIASTOLIC BLOOD PRESSURE: 67 MMHG | WEIGHT: 164 LBS | HEART RATE: 72 BPM | BODY MASS INDEX: 25.74 KG/M2 | TEMPERATURE: 97.7 F | RESPIRATION RATE: 16 BRPM | OXYGEN SATURATION: 99 % | SYSTOLIC BLOOD PRESSURE: 153 MMHG | HEIGHT: 67 IN

## 2018-10-14 DIAGNOSIS — M25.551 PAIN OF RIGHT HIP JOINT: Primary | ICD-10-CM

## 2018-10-14 PROCEDURE — 99283 EMERGENCY DEPT VISIT LOW MDM: CPT

## 2018-10-14 RX ORDER — HYDROCODONE BITARTRATE AND ACETAMINOPHEN 5; 325 MG/1; MG/1
1 TABLET ORAL ONCE
Status: COMPLETED | OUTPATIENT
Start: 2018-10-14 | End: 2018-10-14

## 2018-10-14 RX ORDER — HYDROCODONE BITARTRATE AND ACETAMINOPHEN 5; 325 MG/1; MG/1
1 TABLET ORAL EVERY 6 HOURS PRN
Qty: 15 TABLET | Refills: 0 | Status: SHIPPED | OUTPATIENT
Start: 2018-10-14 | End: 2018-11-06 | Stop reason: ALTCHOICE

## 2018-10-14 RX ADMIN — HYDROCODONE BITARTRATE AND ACETAMINOPHEN 1 TABLET: 5; 325 TABLET ORAL at 08:02

## 2018-10-14 NOTE — ED PROVIDER NOTES
Subjective   Patient presents this morning with a right-sided hip pain.  Patient's had a hip replaced with Dr. Gayle the past.  Patient states that he has had soreness in the leg over this weekend.  Patient was seen at outside clinic and given a presumed steroid shot.  Patient states that it didn't do anything for him.  Patient was x-rayed that time with no acute pathology noted in the hip.  Patient is continue with the soreness is seeking pain control.  Patient denies fevers chills.  Patient has been ambulatory.            Review of Systems   Constitutional: Negative.  Negative for appetite change, chills and fever.   HENT: Negative.  Negative for congestion.    Eyes: Negative.  Negative for photophobia and visual disturbance.   Respiratory: Negative.  Negative for cough, chest tightness and shortness of breath.    Cardiovascular: Negative.  Negative for chest pain and palpitations.   Gastrointestinal: Negative.  Negative for abdominal pain, constipation, diarrhea, nausea and vomiting.   Endocrine: Negative.    Genitourinary: Negative.  Negative for decreased urine volume, dysuria, flank pain and hematuria.   Musculoskeletal: Positive for arthralgias. Negative for back pain, myalgias, neck pain and neck stiffness.   Skin: Negative.  Negative for pallor.   Neurological: Negative.  Negative for dizziness, syncope, weakness, light-headedness, numbness and headaches.   Psychiatric/Behavioral: Negative.  Negative for confusion and suicidal ideas. The patient is not nervous/anxious.    All other systems reviewed and are negative.      Past Medical History:   Diagnosis Date   • Acquired hallux rigidus    • Anemia    • Arthropathy of lumbar facet joint    • Carpal tunnel syndrome    • CHF (congestive heart failure) (CMS/HCC)    • Chronic diastolic congestive heart failure (CMS/HCC)    • Chronic kidney disease    • Chronic kidney disease, stage II (mild)     Chronic kidney disease stage 2   • Chronic obstructive lung  disease (CMS/[facility])    • Chronic renal failure    • Coronary artery disease    • Cortical senile cataract    • Diabetes mellitus (CMS/[facility])    • Diabetes mellitus type 2, noninsulin dependent (CMS/[facility])     diabetes mellitus type 2, non-insulin treated, Medication treatment plan: oral diabetic medication   • Diabetes mellitus without complication (CMS/[facility])    • Diastolic dysfunction    • Diastolic heart failure (CMS/[facility])    • Essential hypertension     difficult to control      • Hip pain    • History of echocardiogram 08/19/2015    Echocardiogram W/ color flow 65909 (1) - Mild to moderate LVH with mild left atrial enlargement and normal aortic root.CLVH,preserved LV systolic function with Ef of 55%.Diastolic dysfunction.MV intact.AV thickened.Aortic sclerosis.   • Hyperlipidemia    • Influenza vaccine administered 10/02/2015    INFLUENZA IMMUN ADMIN OR PREV RECV'D  (1) - Ordered By: DIONNE MANDUJANO ([facility])    • Insomnia    • Insomnia     Other insomnia   • Joint pain    • Low back pain    • Muscle strain    • Nuclear cataract    • Pneumococcal vaccination given 07/22/2016    PNEUMOC VAC/ADMIN/RCVD 4040F (3) - Ordered By: DIONNE MANDUJANO ([facility])   • Prostate cancer (CMS/[facility])    • Pure hypercholesterolemia    • Sedentary lifestyle        Allergies   Allergen Reactions   • Aldactone [Spironolactone] Other (See Comments)     Hyponatremia and hyperkalemia       Past Surgical History:   Procedure Laterality Date   • CARDIAC ABLATION     • CARDIAC CATHETERIZATION  02/04/1986    Cardiac cath 38592 (1) - normal left heart catherization,non cardiac chest pain   • CARPAL TUNNEL RELEASE  10/19/2015    Carpal tunnel surgery (1) - Release of left carpal tunnel   • CATARACT EXTRACTION W/ INTRAOCULAR LENS IMPLANT Left 3/31/2017    Procedure: REMOVE CATARACT AND IMPLANT INRAOCULAR LENS LEFT EYE;  Surgeon: Hector Navarrete MD;  Location: [facility];  Service:    • CATARACT EXTRACTION W/ INTRAOCULAR LENS IMPLANT Right  4/14/2017    Procedure: REMOVE CATARACT AND IMPLANT INTRAOCULAR LENS RIGHT EYE;  Surgeon: Hector Navarrete MD;  Location: [facility];  Service:    • ENDOSCOPY  08/05/2013    Colon endoscopy 06036 (2) - Hemorrhoids found.   • INGUINAL HERNIA REPAIR Right 06/07/1968    Inguinal hernia, repair (2)   • INJECTION OF MEDICATION  09/06/2012    Albuterol (2u) (1) - Ordered By: LIDYA STEINER ([facility])    • INJECTION OF MEDICATION  09/06/2012    Atrovent (1) - Ordered By: LIDYA STEINER ([facility])    • INJECTION OF MEDICATION  02/10/2012    Depo Medrol (Methylprednisone) (3) - Ordered By: HEAHTER PAK ([facility])    • INJECTION OF MEDICATION  02/09/2014    Kenalog (4) - Ordered By: DIMAS ANDRADE ([facility])    • LUMBAR LAMINECTOMY  2007    Lumbar laminectomy (1)   • MANDIBLE FRACTURE SURGERY  10/11/1981    Treat nose/jaw fracture (1) - bilateral open reduction of fracture of mandible   • OTHER SURGICAL HISTORY  09/24/2011    Drain/Inject Major Joint 20610 (1) - Ordered By: KARLOS HERNANDEZ ([facility])    • OTHER SURGICAL HISTORY      Insert IVC filter 93303 (1)   • OTHER SURGICAL HISTORY  10/11/2011    SPIROMETRY 40907 (1) - Ordered By: HEATHER PAK ([facility])   • TOTAL HIP ARTHROPLASTY Right        Family History   Problem Relation Age of Onset   • Diabetes Other    • Cancer Sister    • Heart disease Mother    • Hypertension Mother    • Heart disease Father    • Hypertension Father    • Coronary artery disease Neg Hx        Social History     Social History   • Marital status:      Social History Main Topics   • Smoking status: Former Smoker     Quit date: 1999   • Smokeless tobacco: Never Used   • Alcohol use No   • Drug use: No   • Sexual activity: Defer      Comment: Marital status:      Other Topics Concern   • Not on file           Objective   Physical Exam   Constitutional: He is oriented to person, place, and time. He appears well-developed and well-nourished. No distress.   HENT:   Head:  Normocephalic and atraumatic.   Eyes: Conjunctivae and EOM are normal.   Neck: Normal range of motion. Neck supple. No JVD present.   Cardiovascular: Normal rate, regular rhythm, normal heart sounds and intact distal pulses.  Exam reveals no gallop and no friction rub.    No murmur heard.  Pulmonary/Chest: Effort normal. No respiratory distress. He has no wheezes. He has no rales. He exhibits no tenderness.   Abdominal: Soft. Bowel sounds are normal. He exhibits no distension and no mass. There is no tenderness. There is no rebound and no guarding.   Musculoskeletal: Normal range of motion.   Range of motion of the hip passively.  Some pain actively.  There is no apparent subluxation 2+ distal pulses.   Neurological: He is alert and oriented to person, place, and time.   Skin: Skin is warm and dry.   Psychiatric: He has a normal mood and affect. His behavior is normal. Judgment and thought content normal.   Nursing note and vitals reviewed.      Procedures           ED Course      Short-term pain management with outpatient follow-up.  [facility]#[id number]            [facility]      Final diagnoses:   Pain of right hip joint            Beto Samson MD  10/14/18 0753       Beto Samson MD  10/14/18 0758

## 2018-10-15 ENCOUNTER — EPISODE CHANGES (OUTPATIENT)
Dept: SOCIAL WORK | Facility: HOSPITAL | Age: 83
End: 2018-10-15

## 2018-10-16 ENCOUNTER — OFFICE VISIT (OUTPATIENT)
Dept: ORTHOPEDIC SURGERY | Facility: CLINIC | Age: 83
End: 2018-10-16

## 2018-10-16 VITALS — HEIGHT: 67 IN

## 2018-10-16 DIAGNOSIS — M25.551 RIGHT HIP PAIN: Primary | ICD-10-CM

## 2018-10-16 DIAGNOSIS — M70.61 TROCHANTERIC BURSITIS OF RIGHT HIP: ICD-10-CM

## 2018-10-16 DIAGNOSIS — Z96.641 STATUS POST TOTAL HIP REPLACEMENT, RIGHT: ICD-10-CM

## 2018-10-16 PROCEDURE — 99214 OFFICE O/P EST MOD 30 MIN: CPT | Performed by: NURSE PRACTITIONER

## 2018-10-16 PROCEDURE — 20610 DRAIN/INJ JOINT/BURSA W/O US: CPT | Performed by: NURSE PRACTITIONER

## 2018-10-16 RX ADMIN — LIDOCAINE HYDROCHLORIDE 2 ML: 20 INJECTION, SOLUTION INFILTRATION; PERINEURAL at 09:20

## 2018-10-16 RX ADMIN — TRIAMCINOLONE ACETONIDE 80 MG: 40 INJECTION, SUSPENSION INTRA-ARTICULAR; INTRAMUSCULAR at 09:20

## 2018-10-16 NOTE — PROGRESS NOTES
" Keith Ruvalcaba Jr. is a 83 y.o. male returns for     Chief Complaint   Patient presents with   • Right Hip - Follow-up       HISTORY OF PRESENT ILLNESS: Patient presents to office for follow up of right hip pain. Patient was last seen in office per Dr. Cahcon and given a right hip bursa injection of steroid for trochanteric bursitis on 7/30/2018. Patient states his right hip pain has not improved and has recently worsened. He reports difficulty getting out of bed and ambulating due to his right hip pain. Patient was evaluated in the ED on 10/14/2018 and prescribed Norco for pain. Patient has had a previous right hip arthroplasty per Dr. Lorenzo several years ago with no complications.      CONCURRENT MEDICAL HISTORY:    The following portions of the patient's history were reviewed and updated as appropriate: allergies, current medications, past family history, past medical history, past social history, past surgical history and problem list.     ROS  No fevers or chills.  No chest pain or shortness of air.  No GI or  disturbances. Right hip pain.     PHYSICAL EXAMINATION:       Ht 170.2 cm (67\")     Physical Exam   Constitutional: He is oriented to person, place, and time. Vital signs are normal. He appears well-developed and well-nourished. He is active and cooperative. He does not appear ill. No distress.   HENT:   Head: Normocephalic.   Pulmonary/Chest: Effort normal. No respiratory distress.   Abdominal: Soft. He exhibits no distension.   Musculoskeletal: He exhibits tenderness (Right hip). He exhibits no edema or deformity.   Neurological: He is alert and oriented to person, place, and time. GCS eye subscore is 4. GCS verbal subscore is 5. GCS motor subscore is 6.   Skin: Skin is warm, dry and intact. Capillary refill takes less than 2 seconds. No erythema.   Psychiatric: He has a normal mood and affect. His speech is normal and behavior is normal. Judgment and thought content normal. Cognition and memory " are normal.   Vitals reviewed.      GAIT:     []  Normal  []  Antalgic    Assistive device: []  None  []  Walker     []  Crutches  []  Cane     [x]  Wheelchair  []  Stretcher    Right Hip Exam     Tenderness   The patient is experiencing tenderness in the greater trochanter and lateral.    Range of Motion   Flexion: 110   Internal Rotation: 20   External Rotation: 60   Abduction: 25   Adduction: 30     Muscle Strength   Abduction: 4/5   Flexion: 4/5     Other   Erythema: absent  Sensation: normal  Pulse: present    Comments:  Pain and tenderness over the greater trochanter and lateral hip. No erythema. No warmth. No signs of infection noted. Good motion of the hip. Mild pain elicited in the lateral hip with motion. Stable joint exam.       Left Hip Exam     Tenderness   The patient is experiencing no tenderness.         Range of Motion   Flexion: 120   Internal Rotation: 20   External Rotation: 60   Abduction: 30   Adduction: 30     Muscle Strength   Abduction: 4/5   Flexion: 4/5     Tests   ALEISHA: negative  Fadir:  Negative FADIR test    Other   Erythema: absent  Sensation: normal  Pulse: present            XR hip with or without pelvis 2-3 view right  2 views right hip with pelvis     Comparisons prior x-ray     Patient is status post total hip on the right.  There is minimal heterotopic ossification about the right hip.  There is no obvious change or loosening in the alignment of the hip.  Mild varus of the stem is noted.  This is not changed.     Impression: Status post total hip right without complicating features    Xr Hip With Or Without Pelvis 2 - 3 View Right    Result Date: 10/17/2018  Narrative: AP and lateral views of the right hip with an AP view of the pelvis reveal no evidence of acute fracture or dislocation.  There are postsurgical changes present to the right hip post hip arthroplasty.  Right prosthetic hip implant appears well aligned and well positioned with no evidence of hardware failure or  loosening noted.  There are degenerative changes noted to the left hip on the pelvic view, but overall joint spacing is maintained.  There are degenerative changes present to the right pelvis with osteophyte formations noted.  No significant changes are noted when compared with prior images from 7/30/2018.  No acute bony radiologic abnormalities are noted at this time. 10/17/18 at 2:46 PM by REGINA Muñoz         ASSESSMENT:    Diagnoses and all orders for this visit:    Right hip pain  -     XR Hip With or Without Pelvis 2 - 3 View Right; Future  -     Large Joint Arthrocentesis    Status post total hip replacement, right  -     XR Hip With or Without Pelvis 2 - 3 View Right; Future  -     Large Joint Arthrocentesis    Trochanteric bursitis of right hip  -     XR Hip With or Without Pelvis 2 - 3 View Right; Future  -     Large Joint Arthrocentesis      PLAN    Large Joint Arthrocentesis  Date/Time: 10/16/2018 9:20 AM  Consent given by: patient  Timeout: Immediately prior to procedure a time out was called to verify the correct patient, procedure, equipment, support staff and site/side marked as required   Supporting Documentation  Indications: pain   Procedure Details  Location: hip - R greater trochanteric bursa  Needle size: 22 G  Approach: lateral  Medications administered: 2 mL lidocaine 2%; 80 mg triamcinolone acetonide 40 MG/ML  Patient tolerance: patient tolerated the procedure well with no immediate complications      X-rays of right hip repeated today in office and reviewed with no acute findings noted. The right hip prosthesis is stable and unchanged. Subjective complaints and physical exam remain consistent with trochanteric bursitis. 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 However, he denies any back pain and his hip pain is not consistent with radicular/neuropathic pain.  Patient received a right hip bursa  injection in office on 7/30/2018, which he states did not help. However, the history is a little unclear today as to whether the injection did help temporarily and the pain has recently returned or if it did not offer any relief. The patient has not sought any care for his right hip pain again until recently. He is having quite a bit of pain and tenderness with difficulty walking. There are no signs of infection noted on exam of the hip. Recommend a repeat injection today of the right trochanteric hip bursa with steroid for management of pain/inflammation. Recommend ice therapy to the right hip intermittently 3 to 4 times daily for 20 minutes at a time to minimize pain/inflammation. Recommend modified weight-bearing with use of a walker. The patient has a walker at home and his family is present with him today to help him. Recommend rest and activity modification as tolerated with gradual progression of activity and weight-bearing as his pain improves. Patient is unable to take oral NSAIDS due to chronic kidney disease. He has Norco to take as needed for pain, recently prescribed by the ED physician. Follow up in 2 weeks for recheck.     Return in about 2 weeks (around 10/30/2018) for Recheck.        This document has been electronically signed by REGINA Muñoz on October 17, 2018 7:46 PM      REGINA Muñoz

## 2018-10-17 RX ORDER — TRIAMCINOLONE ACETONIDE 40 MG/ML
80 INJECTION, SUSPENSION INTRA-ARTICULAR; INTRAMUSCULAR
Status: COMPLETED | OUTPATIENT
Start: 2018-10-16 | End: 2018-10-16

## 2018-10-17 RX ORDER — LIDOCAINE HYDROCHLORIDE 20 MG/ML
2 INJECTION, SOLUTION INFILTRATION; PERINEURAL
Status: COMPLETED | OUTPATIENT
Start: 2018-10-16 | End: 2018-10-16

## 2018-10-23 ENCOUNTER — TELEPHONE (OUTPATIENT)
Dept: INTERNAL MEDICINE | Facility: CLINIC | Age: 83
End: 2018-10-23

## 2018-10-23 DIAGNOSIS — E11.9 DIABETES MELLITUS WITHOUT COMPLICATION (HCC): Primary | ICD-10-CM

## 2018-10-23 NOTE — TELEPHONE ENCOUNTER
Pt needs a refill on his test strips sent to [facility].  Bello told me to send it to you and that you would take care of it.  He is in a panic.

## 2018-10-23 NOTE — TELEPHONE ENCOUNTER
Requested Refills Sent    Appointment with Dr. Larkin in November, He will need to send additional refills when he sees him

## 2018-10-29 ENCOUNTER — TELEPHONE (OUTPATIENT)
Dept: GASTROENTEROLOGY | Facility: CLINIC | Age: 83
End: 2018-10-29

## 2018-10-29 NOTE — TELEPHONE ENCOUNTER
"10/29/2018, 0807 - Patient telephoned this staff member submitting voice message with inquiry regarding Colonoscopy scheduled for completion Wednesday, October 31, 218 at 9:30 a.m. and prescription medications to be taken/withheld.    10/29/2018, 0823 - Patient telephone call returned per this staff member [phone].  Zero answer with zero ability to submit voice message - \"memory is full\".  "

## 2018-10-31 ENCOUNTER — HOSPITAL ENCOUNTER (OUTPATIENT)
Facility: HOSPITAL | Age: 83
Setting detail: HOSPITAL OUTPATIENT SURGERY
Discharge: HOME | End: 2018-10-31
Attending: INTERNAL MEDICINE | Admitting: INTERNAL MEDICINE

## 2018-10-31 ENCOUNTER — ANESTHESIA (OUTPATIENT)
Dept: GASTROENTEROLOGY | Facility: HOSPITAL | Age: 83
End: 2018-10-31

## 2018-10-31 ENCOUNTER — ANESTHESIA EVENT (OUTPATIENT)
Dept: GASTROENTEROLOGY | Facility: HOSPITAL | Age: 83
End: 2018-10-31

## 2018-10-31 VITALS
SYSTOLIC BLOOD PRESSURE: 109 MMHG | HEART RATE: 83 BPM | HEIGHT: 67 IN | BODY MASS INDEX: 25.11 KG/M2 | WEIGHT: 160 LBS | DIASTOLIC BLOOD PRESSURE: 61 MMHG | RESPIRATION RATE: 18 BRPM | TEMPERATURE: 96.9 F | OXYGEN SATURATION: 98 %

## 2018-10-31 DIAGNOSIS — Z80.0 FAMILY HISTORY OF COLON CANCER: ICD-10-CM

## 2018-10-31 DIAGNOSIS — Z12.11 ENCOUNTER FOR SCREENING FOR MALIGNANT NEOPLASM OF COLON: ICD-10-CM

## 2018-10-31 PROCEDURE — 88305 TISSUE EXAM BY PATHOLOGIST: CPT | Performed by: INTERNAL MEDICINE

## 2018-10-31 PROCEDURE — 45380 COLONOSCOPY AND BIOPSY: CPT | Performed by: INTERNAL MEDICINE

## 2018-10-31 PROCEDURE — 25010000002 PROPOFOL 10 MG/ML EMULSION: Performed by: NURSE ANESTHETIST, CERTIFIED REGISTERED

## 2018-10-31 PROCEDURE — 88305 TISSUE EXAM BY PATHOLOGIST: CPT | Performed by: PATHOLOGY

## 2018-10-31 RX ORDER — PROMETHAZINE HYDROCHLORIDE 25 MG/1
25 SUPPOSITORY RECTAL ONCE AS NEEDED
Status: DISCONTINUED | OUTPATIENT
Start: 2018-10-31 | End: 2018-10-31 | Stop reason: HOSPADM

## 2018-10-31 RX ORDER — LIDOCAINE HYDROCHLORIDE 10 MG/ML
INJECTION, SOLUTION INFILTRATION; PERINEURAL AS NEEDED
Status: DISCONTINUED | OUTPATIENT
Start: 2018-10-31 | End: 2018-10-31 | Stop reason: SURG

## 2018-10-31 RX ORDER — PROMETHAZINE HYDROCHLORIDE 25 MG/ML
12.5 INJECTION, SOLUTION INTRAMUSCULAR; INTRAVENOUS ONCE AS NEEDED
Status: DISCONTINUED | OUTPATIENT
Start: 2018-10-31 | End: 2018-10-31 | Stop reason: HOSPADM

## 2018-10-31 RX ORDER — ONDANSETRON 2 MG/ML
4 INJECTION INTRAMUSCULAR; INTRAVENOUS ONCE AS NEEDED
Status: DISCONTINUED | OUTPATIENT
Start: 2018-10-31 | End: 2018-10-31 | Stop reason: HOSPADM

## 2018-10-31 RX ORDER — DEXAMETHASONE SODIUM PHOSPHATE 4 MG/ML
8 INJECTION, SOLUTION INTRA-ARTICULAR; INTRALESIONAL; INTRAMUSCULAR; INTRAVENOUS; SOFT TISSUE ONCE AS NEEDED
Status: DISCONTINUED | OUTPATIENT
Start: 2018-10-31 | End: 2018-10-31 | Stop reason: HOSPADM

## 2018-10-31 RX ORDER — DEXTROSE AND SODIUM CHLORIDE 5; .45 G/100ML; G/100ML
30 INJECTION, SOLUTION INTRAVENOUS CONTINUOUS PRN
Status: DISCONTINUED | OUTPATIENT
Start: 2018-10-31 | End: 2018-10-31 | Stop reason: HOSPADM

## 2018-10-31 RX ORDER — PROMETHAZINE HYDROCHLORIDE 25 MG/1
25 TABLET ORAL ONCE AS NEEDED
Status: DISCONTINUED | OUTPATIENT
Start: 2018-10-31 | End: 2018-10-31 | Stop reason: HOSPADM

## 2018-10-31 RX ORDER — PROPOFOL 10 MG/ML
VIAL (ML) INTRAVENOUS AS NEEDED
Status: DISCONTINUED | OUTPATIENT
Start: 2018-10-31 | End: 2018-10-31 | Stop reason: SURG

## 2018-10-31 RX ADMIN — DEXTROSE AND SODIUM CHLORIDE: 5; 450 INJECTION, SOLUTION INTRAVENOUS at 10:30

## 2018-10-31 RX ADMIN — PROPOFOL 20 MG: 10 INJECTION, EMULSION INTRAVENOUS at 11:08

## 2018-10-31 RX ADMIN — PROPOFOL 80 MG: 10 INJECTION, EMULSION INTRAVENOUS at 11:03

## 2018-10-31 RX ADMIN — PROPOFOL 20 MG: 10 INJECTION, EMULSION INTRAVENOUS at 11:05

## 2018-10-31 RX ADMIN — PROPOFOL 10 MG: 10 INJECTION, EMULSION INTRAVENOUS at 11:16

## 2018-10-31 RX ADMIN — PROPOFOL 20 MG: 10 INJECTION, EMULSION INTRAVENOUS at 11:11

## 2018-10-31 RX ADMIN — LIDOCAINE HYDROCHLORIDE 50 MG: 10 INJECTION, SOLUTION INFILTRATION; PERINEURAL at 11:03

## 2018-10-31 RX ADMIN — PROPOFOL 20 MG: 10 INJECTION, EMULSION INTRAVENOUS at 11:14

## 2018-10-31 NOTE — ANESTHESIA POSTPROCEDURE EVALUATION
Patient: General Keith Ruvalcaba Jr.    Procedure Summary     Date:  10/31/18 Room / Location:  [facility] ENDOSCOPY 1 / [facility] ENDOSCOPY    Anesthesia Start:  1059 Anesthesia Stop:  1119    Procedure:  COLONOSCOPY (N/A ) Diagnosis:       Family history of colon cancer      Encounter for screening for malignant neoplasm of colon      (Family history of colon cancer [Z80.0])      (Encounter for screening for malignant neoplasm of colon [Z12.11])    Surgeon:  Herbie Christine MD Provider:  Neva Elias CRNA    Anesthesia Type:  MAC ASA Status:  3          Anesthesia Type: MAC  Last vitals  BP   159/72 (10/31/18 0959)   Temp   98.1 °F (36.7 °C) (10/31/18 0959)   Pulse   95 (10/31/18 0959)   Resp   16 (10/31/18 0959)     SpO2   100 % (10/31/18 0959)     Post Anesthesia Care and Evaluation    Patient location during evaluation: bedside  Patient participation: waiting for patient participation  Level of consciousness: responsive to verbal stimuli  Pain management: adequate  Airway patency: patent  Anesthetic complications: No anesthetic complications    Cardiovascular status: acceptable  Respiratory status: acceptable  Hydration status: acceptable

## 2018-10-31 NOTE — ANESTHESIA PREPROCEDURE EVALUATION
Anesthesia Evaluation     NPO Solid Status: > 8 hours  NPO Liquid Status: > 2 hours           Airway   Mallampati: II  TM distance: >3 FB  Neck ROM: limited  No difficulty expected  Dental    (+) edentulous    Pulmonary - normal exam   Cardiovascular - normal exam        Neuro/Psych  GI/Hepatic/Renal/Endo      Musculoskeletal     Abdominal  - normal exam   Substance History      OB/GYN          Other                      Anesthesia Plan    ASA 3     MAC     intravenous induction   Anesthetic plan, all risks, benefits, and alternatives have been provided, discussed and informed consent has been obtained with: patient and child.

## 2018-11-01 LAB
LAB AP CASE REPORT: NORMAL
PATH REPORT.FINAL DX SPEC: NORMAL
PATH REPORT.GROSS SPEC: NORMAL

## 2018-11-06 ENCOUNTER — OFFICE VISIT (OUTPATIENT)
Dept: CARDIOLOGY | Facility: CLINIC | Age: 83
End: 2018-11-06

## 2018-11-06 VITALS
BODY MASS INDEX: 26.36 KG/M2 | WEIGHT: 164 LBS | DIASTOLIC BLOOD PRESSURE: 72 MMHG | OXYGEN SATURATION: 97 % | HEART RATE: 92 BPM | HEIGHT: 66 IN | SYSTOLIC BLOOD PRESSURE: 142 MMHG

## 2018-11-06 DIAGNOSIS — I25.10 CORONARY ARTERY DISEASE INVOLVING NATIVE CORONARY ARTERY OF NATIVE HEART WITHOUT ANGINA PECTORIS: ICD-10-CM

## 2018-11-06 DIAGNOSIS — I50.32 CHRONIC DIASTOLIC CONGESTIVE HEART FAILURE (HCC): Primary | Chronic | ICD-10-CM

## 2018-11-06 DIAGNOSIS — I10 ESSENTIAL HYPERTENSION: ICD-10-CM

## 2018-11-06 DIAGNOSIS — N18.2 CHRONIC KIDNEY DISEASE, STAGE II (MILD): ICD-10-CM

## 2018-11-06 PROCEDURE — 99214 OFFICE O/P EST MOD 30 MIN: CPT | Performed by: NURSE PRACTITIONER

## 2018-11-06 NOTE — PROGRESS NOTES
Subjective:     Congestive Heart Failure (chief complaint)    Congestive Heart Failure   Presents for follow-up visit. Pertinent negatives include no chest pressure, claudication, edema (bilateral ankle at the end of the day; compliant with compression stockings), near-syncope, orthopnea, palpitations, paroxysmal nocturnal dyspnea, shortness of breath or unexpected weight change. The symptoms have been improving. Compliance with total regimen is %. Compliance problems include adherence to exercise.  Compliance with diet is %. Compliance with exercise is 26-50%. Compliance with medications is %.     1. CAD, non-obstructive 2005  2. HFpEF - diastolic dysfunction  3. Hypertension  4. CKD, Stage 2 with H/O KIRK  5. H/O Atrial Flutter S/P ablation per Dr Graves 2006  6. Prostate Cancer Sidney Score 6/Stage 2 S/P RT   7. DM, Type II  8. Anterior right total hip arthroplasty 04/25/2016  9. Chronic DVT of bilateral femoral vein: on preventative dosing - Xarelto  10. Carotid disease followed annually by Dr Hamman    Mr. Ruvalcaba recently underwent lumbar surgery 10/27/2017 in [place]. He was discharge 11/7/2017. Following discharge he had several medications that were recommended be stopped.  On reviewing discharge summary from [place], it was made note that his hospitalization was complicated by hypotension and acute kidney injury.  He recieved IV fluids per nephrology consult and blood pressure medicine discontinued.       The patient received evaluation with REGINA Vidal on 03/08/2018 in follow-up regarding leg edema for which lower extremity duplex have been ordered and was found with positive findings regarding DVT.   The patient was initiated on DOAC, Xarelto on that date.     Today, the patient is reporting resolution of the leg edema.  The patient reports compliance with dietary sodium restrictions.  Also, he reports monitoring of his blood pressure in the home setting with average systolic  blood pressure 120-140.    Review of Systems   Constitution: Negative for unexpected weight change.   HENT: Negative for nosebleeds.    Cardiovascular: Negative for claudication, cyanosis, dyspnea on exertion, irregular heartbeat, near-syncope, orthopnea, palpitations, paroxysmal nocturnal dyspnea and syncope.   Respiratory: Negative for shortness of breath, sleep disturbances due to breathing and snoring.    Endocrine: Negative for polydipsia, polyphagia and polyuria.   Hematologic/Lymphatic: Negative for bleeding problem. Does not bruise/bleed easily.   Skin: Negative for poor wound healing.   Musculoskeletal: Negative for falls.   Gastrointestinal: Negative for bloating.   Neurological: Negative for dizziness and light-headedness.   Psychiatric/Behavioral: Negative for altered mental status. The patient is not nervous/anxious.      Past Medical History:   Diagnosis Date   • Acquired hallux rigidus    • Anemia    • Arthropathy of lumbar facet joint    • Carpal tunnel syndrome    • CHF (congestive heart failure) (CMS/HCC)    • Chronic diastolic congestive heart failure (CMS/HCC)    • Chronic kidney disease    • Chronic kidney disease, stage II (mild)     Chronic kidney disease stage 2   • Chronic obstructive lung disease (CMS/HCC)    • Chronic renal failure    • Coronary artery disease    • Cortical senile cataract    • Diabetes mellitus (CMS/HCC)    • Diabetes mellitus type 2, noninsulin dependent (CMS/HCC)     diabetes mellitus type 2, non-insulin treated, Medication treatment plan: oral diabetic medication   • Diabetes mellitus without complication (CMS/HCC)    • Diastolic dysfunction    • Diastolic heart failure (CMS/HCC)    • Essential hypertension     difficult to control      • Hip pain    • History of echocardiogram 08/19/2015    Echocardiogram W/ color flow 87521 (1) - Mild to moderate LVH with mild left atrial enlargement and normal aortic root.CLVH,preserved LV systolic function with Ef of 55%.Diastolic  dysfunction.MV intact.AV thickened.Aortic sclerosis.   • Hyperlipidemia    • Influenza vaccine administered 10/02/2015    INFLUENZA IMMUN ADMIN OR PREV RECV'D  (1) - Ordered By: DIONNE MANDUJANO ([facility])    • Insomnia    • Insomnia     Other insomnia   • Joint pain    • Low back pain    • Muscle strain    • Nuclear cataract    • Pneumococcal vaccination given 07/22/2016    PNEUMOC VAC/ADMIN/RCVD 4040F (3) - Ordered By: DIONNE MANDUJANO ([facility])   • Prostate cancer (CMS/[facility])    • Pure hypercholesterolemia    • Sedentary lifestyle      Past Surgical History:   Procedure Laterality Date   • CARDIAC ABLATION     • CARDIAC CATHETERIZATION  02/04/1986    Cardiac cath 25823 (1) - normal left heart catherization,non cardiac chest pain   • CARPAL TUNNEL RELEASE  10/19/2015    Carpal tunnel surgery (1) - Release of left carpal tunnel   • CATARACT EXTRACTION W/ INTRAOCULAR LENS IMPLANT Left 3/31/2017    Procedure: REMOVE CATARACT AND IMPLANT INRAOCULAR LENS LEFT EYE;  Surgeon: Hector Navarrete MD;  Location: [facility] OR;  Service:    • CATARACT EXTRACTION W/ INTRAOCULAR LENS IMPLANT Right 4/14/2017    Procedure: REMOVE CATARACT AND IMPLANT INTRAOCULAR LENS RIGHT EYE;  Surgeon: Hector Navarrete MD;  Location: [facility] OR;  Service:    • COLONOSCOPY N/A 10/31/2018    Procedure: COLONOSCOPY;  Surgeon: Herbie Christine MD;  Location: [facility] ENDOSCOPY;  Service: Gastroenterology   • ENDOSCOPY  08/05/2013    Colon endoscopy 14950 (2) - Hemorrhoids found.   • INGUINAL HERNIA REPAIR Right 06/07/1968    Inguinal hernia, repair (2)   • INJECTION OF MEDICATION  09/06/2012    Albuterol (2u) (1) - Ordered By: LIDYA STEINER ([facility])    • INJECTION OF MEDICATION  09/06/2012    Atrovent (1) - Ordered By: LIDYA STEINER ([facility])    • INJECTION OF MEDICATION  02/10/2012    Depo Medrol (Methylprednisone) (3) - Ordered By: HEATHER PAK ([facility])    • INJECTION OF MEDICATION  02/09/2014    Kenalog (4) - Ordered By: DIMAS  LUPE ([facility])    • JOINT REPLACEMENT     • LUMBAR LAMINECTOMY  2007    Lumbar laminectomy (1)   • MANDIBLE FRACTURE SURGERY  10/11/1981    Treat nose/jaw fracture (1) - bilateral open reduction of fracture of mandible   • OTHER SURGICAL HISTORY  09/24/2011    Drain/Inject Major Joint 20610 (1) - Ordered By: KARLOS HERNANDEZ ([facility])    • OTHER SURGICAL HISTORY      Insert IVC filter 09762 (1)   • OTHER SURGICAL HISTORY  10/11/2011    SPIROMETRY 90129 (1) - Ordered By: HEATHER PAK ([facility])   • TOTAL HIP ARTHROPLASTY Right      Social History     Social History   • Marital status:      Social History Main Topics   • Smoking status: Former Smoker     Quit date: 1999   • Smokeless tobacco: Never Used   • Alcohol use No   • Drug use: No   • Sexual activity: Defer      Comment: Marital status:      Other Topics Concern   • Not on file     Current Outpatient Prescriptions   Medication Sig Dispense Refill   • aspirin 81 MG EC tablet Take 81 mg by mouth daily.     • ferrous sulfate 325 (65 FE) MG tablet Take 1 tablet by mouth Daily With Breakfast. 30 tablet 5   • furosemide (LASIX) 20 MG tablet Take 20 mg by mouth 2 (Two) Times a Day. If weight gain 2 t o 3 pounds overnight or 5 pounds in 1 week     • gabapentin (NEURONTIN) 300 MG capsule Take 1 capsule by mouth 2 (Two) Times a Day. 60 capsule 5   • glucose blood (TRUE METRIX BLOOD GLUCOSE TEST) test strip Check glucose daily 100 each 0   • hydrALAZINE (APRESOLINE) 25 MG tablet Take 1 tablet by mouth 3 (Three) Times a Day. 90 tablet 6   • JANUVIA 50 MG tablet TAKE ONE TABLET BY MOUTH DAILY 30 tablet 6   • lisinopril-hydrochlorothiazide (PRINZIDE,ZESTORETIC) 20-12.5 MG per tablet Take 1 tablet by mouth Daily. 30 tablet 5   • pravastatin (PRAVACHOL) 40 MG tablet Take 1 tablet by mouth Daily. 30 tablet 11   • rivaroxaban (XARELTO) 10 MG tablet Take 1 tablet by mouth Daily. 30 tablet 11   • sodium-potassium-magnesium sulfates (SUPREP BOWEL PREP KIT)  "17.5-3.13-1.6 GM/180ML solution oral solution As directed per instruction sheet for colonoscopy 1 bottle 0   • TRUEPLUS LANCETS 30G misc 1 Device Daily. TEST 100 each 3     No current facility-administered medications for this visit.      Objective:     Vitals:    11/06/18 0901   BP: 162/80   BP Location: Left arm   Patient Position: Sitting   Cuff Size: Adult   Pulse: 92   SpO2: 97%   Weight: 74.4 kg (164 lb)   Height: 167.6 cm (66\")      Physical Exam   Constitutional: He is oriented to person, place, and time. He appears well-nourished. No distress.   HENT:   Head: Normocephalic and atraumatic.   Eyes: Conjunctivae are normal. Right eye exhibits no discharge. Left eye exhibits no discharge.   Neck: No JVD ( 6 cm at 45°) present. No tracheal deviation present.   Cardiovascular: Normal rate, regular rhythm, S1 normal, S2 normal, normal heart sounds and intact distal pulses.  Exam reveals no S3 and no S4.    No murmur heard.  Pulses:       Radial pulses are 2+ on the right side, and 2+ on the left side.   Pulmonary/Chest: Effort normal and breath sounds normal. No respiratory distress. He has no wheezes. He has no rales.   Abdominal: He exhibits no distension.   Musculoskeletal: He exhibits no edema or tenderness.   Neurological: He is alert and oriented to person, place, and time.   Skin: Skin is warm and dry. He is not diaphoretic.   Psychiatric: He has a normal mood and affect. His behavior is normal. Judgment and thought content normal.   Vitals reviewed.    Data Reviewed:    [facility]: 03/12/2005 - Dr Kj Green  Aortic pressure is 133/77.   Left ventricular pressure is 133.   Left ventricular end diastolic pressure is 13.   The left main is a very short caliber vessel, almost like a [facility] ostium.   The LAD is a large caliber vessel, distally has diffuse 20-30 percent narrowing, gives rise to a large diagonal 1.   The circumflex is a large caliber vessel, which gives rise to an OM1, which had diffuse 20-30 " percent disease and also a posterior descending artery.   The RCA is a medium to large caliber vessel, which gave rise to a posterior descending artery and posterolateral, distal to the bifurcation has about 60-70 percent narrowing.   Left ventricular angiogram showed normal wall motion with ejection fraction of 65-75 percent.     Transthoracic echocardiogram: 11/08/2017  · Left ventricular wall thickness is consistent with mild concentric hypertrophy.  · Left ventricular systolic function is normal. Estimated EF = 55%.  · Left ventricular diastolic dysfunction (grade I) consistent with impaired relaxation.    Transthoracic Echocardiogram 10/31/3017 in [place]  Indication:  Hypotension  BP:           93/63  Findings       Left Ventricle:  The left ventricular chamber size is normal. Mild concentric left  ventricular hypertrophy is observed. The estimated ejection fraction is  65-70%. Abnormal left ventricular diastolic filling is observed,  consistent with impaired relaxation.    Left Atrium:  The left atrial chamber size is normal.   Right Ventricle:  The right ventricular cavity size is normal. The right ventricular  global systolic function is normal.   Right Atrium:  The right atrial cavity size is normal.   Aortic Valve:  Moderate aortic leaflet calcification is visualized. There is mild to  moderate aortic stenosis. The mean gradient of the aortic valve is 30  mmHg. The aortic valve area by velocity time interval is calculated at  1.84 cm2. There is no evidence of aortic regurgitation.   Mitral Valve:  The mitral valve leaflets appear normal. There is mild mitral  regurgitation.    Tricuspid Valve:  The tricuspid valve leaflets are normal.  There is mild tricuspid  regurgitation. The Right Ventricular Systolic Pressure is calculated at  35 mmHg.   Pulmonic Valve:  There is trace pulmonic regurgitation present.   Pericardium:  There is no pericardial effusion.   Conclusions  1. Mild concentric left  ventricular hypertrophy with thickened  papillary muscle is observed.   Hyperdynamic LV systolic function with an  estimated ejection fraction is 65-70%.  2. Abnormal left ventricular diastolic filling is observed, consistent  with impaired relaxation.   3. Aortic valve is trileaflet, moderately calcified with mild to  moderate aortic stenosis  4. Mild tricuspid regurgitation with RVSP 35 mm Hg    Echocardiogram: 08/26/2016  FINDINGS:  1. Both atria are normal in size.  2. The aortic valve is mildly sclerotic without any hemodynamically  significant aortic stenosis.  3. Mild concentric left ventricular hypertrophy with early diastolic  dysfunction. No evidence of any regional wall motion abnormality.  The estimated ejection fraction greater than 60%.  4. There is mild mitral annular calcification.  5. Normal right ventricular size and function.  6. On color flow Doppler, there is trace mitral and tricuspid  regurgitation without any hemodynamic significance.  7. No intracardiac mass, pericardial effusion, or cardiac thrombus  seen.  CLINICAL IMPRESSION:  1. Mild concentric left ventricular hypertrophy with early diastolic  dysfunction.  2. Estimated ejection fraction greater than 60%.  3. Mildly sclerotic aortic valve with mild mitral annular  calcification without any hemodynamic significant stenotic process.  4. On color flow Doppler, there is trace mitral and tricuspid  regurgitation without any hemodynamic significance.  5. No intracardiac mass, pericardial effusion, or cardiac thrombus  Seen.    Carotid Ulsd: 01/09/2017  Unable to retrieve results  Renal Artery U/S 11/1/2017  1. No color Doppler scan evidence of hemodynamically significant renal artery stenosis.  2. Elevated resistivity indices within the kidneys bilaterally which may be indicative of medical renal parenchymal disease.  3. Kidneys are slightly atrophic in size as detailed above.  4. Urinary bladder poorly distended for assessment with suspected  thickening the bladder wall which is nonspecific and may simply be accentuated by lack of distention or muscle hypertrophy.  CTA Chest: 11/08/2017  IMPRESSION:  1. No evidence of pulmonary embolus..  2. Mild centrilobular and paraseptal emphysematous changes..  3. Posterior segment right lobe of liver peripheral 1.56 cm  simple hepatic cyst..  4.T11, T12, and L1 bilateral decompressive laminectomies.    Venous duplex lower extremity: 03/08/2018  · Acute right lower extremity deep vein thrombosis noted in the common femoral, profunda femoral, proximal femoral, mid femoral, distal femoral, popliteal and peroneal.  · Acute right lower extremity superficial thrombophlebitis noted in the greater saphenous (above knee).  · Acute left lower extremity deep vein thrombosis noted in the common femoral, profunda femoral, proximal femoral, mid femoral, distal femoral, popliteal and peroneal.  · Partial flow is noted above mentioned veins.    Admission on 10/31/2018, Discharged on 10/31/2018   Component Date Value Ref Range Status   • Case Report 10/31/2018    Final                    Value:Surgical Pathology Report                         Case: [id number]                                  Authorizing Provider:  Herbie Christine MD        Collected:           10/31/2018 11:17 AM          Ordering Location:     [facility]             Received:            10/31/2018 12:06 PM                                 [facility]                                                     Pathologist:           Jero Crabtree MD                                                           Specimen:    Large Intestine, Left / Descending Colon, polyp                                           • Final Diagnosis 10/31/2018    Final                    Value:This result contains rich text formatting which cannot be displayed here.   • Gross Description 10/31/2018    Final                    Value:This result contains rich text formatting which  cannot be displayed here.   Lab on 10/02/2018   Component Date Value Ref Range Status   • Glucose 10/02/2018 113* 60 - 100 mg/dL Final   • BUN 10/02/2018 20  7 - 21 mg/dL Final   • Creatinine 10/02/2018 1.36* 0.70 - 1.30 mg/dL Final   • Sodium 10/02/2018 133* 137 - 145 mmol/L Final   • Potassium 10/02/2018 4.4  3.5 - 5.1 mmol/L Final   • Chloride 10/02/2018 93* 95 - 110 mmol/L Final   • CO2 10/02/2018 26.0  22.0 - 31.0 mmol/L Final   • Calcium 10/02/2018 9.1  8.4 - 10.2 mg/dL Final   • Albumin 10/02/2018 4.10  3.40 - 4.80 g/dL Final   • Phosphorus 10/02/2018 2.9  2.4 - 4.4 mg/dL Final   • Anion Gap 10/02/2018 14.0  5.0 - 15.0 mmol/L Final   • BUN/Creatinine Ratio 10/02/2018 14.7  7.0 - 25.0 Final   • eGFR   Amer 10/02/2018 61  42 - 98 mL/min/1.73 Final   • 25 Hydroxy, Vitamin D 10/02/2018 58.0  30.0 - 100.0 ng/ml Final   Hospital Outpatient Visit on 09/12/2018   Component Date Value Ref Range Status   • Right Common Femoral Spont 09/12/2018 1   Final   • Right Saphenofemoral Junction Spont 09/12/2018 1   Final   • Right Profunda Femoral Spont 09/12/2018 1   Final   • Right Proximal Femoral Spont 09/12/2018 1   Final   • Right Mid Femoral Spont 09/12/2018 1   Final   • Right Distal Femoral Spont 09/12/2018 1   Final   • Right Popliteal Spont 09/12/2018 1   Final   • Right Posterior Tibial Vessel 09/12/2018 1   Final   • Right Peroneal Vessel 09/12/2018 1   Final   • Right Greater Saph AK Vessel 09/12/2018 1   Final   • Left Common Femoral Spont 09/12/2018 1   Final   • Left Profunda Femoral Spont 09/12/2018 1   Final   • Left Proximal Femoral Spont 09/12/2018 1   Final   • Left Mid Femoral Spont 09/12/2018 1   Final   • Left Distal Femoral Spont 09/12/2018 1   Final   • Left Popliteal Spont 09/12/2018 1   Final   • Left Posterior Tibial Vessel 09/12/2018 1   Final   • Left Peroneal Vessel 09/12/2018 1   Final   • Right Common Femoral Spont 09/12/2018 Y   Final   • Right Common Femoral Phasic 09/12/2018 Y    Final   • Right Common Femoral Augment 09/12/2018 Y   Final   • Right Common Femoral Compress 09/12/2018 P   Final   • Right Common Femoral Thrombus 09/12/2018 C   Final   • Right Saphenofemoral Junction Spont 09/12/2018 Y   Final   • Right Saphenofemoral Junction Phas* 09/12/2018 Y   Final   • Right Saphenofemoral Junction Augm* 09/12/2018 Y   Final   • Right Saphenofemoral Junction Comp* 09/12/2018 P   Final   • Right Saphenofemoral Junction Thro* 09/12/2018 C   Final   • Right Profunda Femoral Spont 09/12/2018 Y   Final   • Right Profunda Femoral Phasic 09/12/2018 Y   Final   • Right Profunda Femoral Augment 09/12/2018 Y   Final   • Right Profunda Femoral Compress 09/12/2018 P   Final   • Right Profunda Femoral Thrombus 09/12/2018 C   Final   • Right Proximal Femoral Spont 09/12/2018 Y   Final   • Right Proximal Femoral Augment 09/12/2018 Y   Final   • Right Proximal Femoral Compress 09/12/2018 P   Final   • Right Proximal Femoral Thrombus 09/12/2018 C   Final   • Right Mid Femoral Spont 09/12/2018 Y   Final   • Right Mid Femoral Phasic 09/12/2018 Y   Final   • Right Mid Femoral Augment 09/12/2018 Y   Final   • Right Mid Femoral Compress 09/12/2018 P   Final   • Right Mid Femoral Thrombus 09/12/2018 C   Final   • Right Distal Femoral Spont 09/12/2018 Y   Final   • Right Distal Femoral Phasic 09/12/2018 Y   Final   • Right Distal Femoral Compress 09/12/2018 P   Final   • Right Distal Femoral Thrombus 09/12/2018 C   Final   • Right Popliteal Spont 09/12/2018 Y   Final   • Right Popliteal Phasic 09/12/2018 Y   Final   • Right Popliteal Augment 09/12/2018 Y   Final   • Right Popliteal Compress 09/12/2018 P   Final   • Right Popliteal Thrombus 09/12/2018 C   Final   • Right Posterior Tibial Augment 09/12/2018 Y   Final   • Right Posterior Tibial Compress 09/12/2018 P   Final   • Right Posterior Tibial Thrombus 09/12/2018 C   Final   • Right Peroneal Augment 09/12/2018 Y   Final   • Right Peroneal Compress 09/12/2018 P    Final   • Right Peroneal Thrombus 09/12/2018 C   Final   • Right Greater Saph AK Spont 09/12/2018 Y   Final   • Right Greater Saph AK Phasic 09/12/2018 Y   Final   • Right Greater Saph AK Augment 09/12/2018 Y   Final   • Right Greater Saph AK Compress 09/12/2018 P   Final   • Right Greater Saph AK Thrombus 09/12/2018 C   Final   • Right Greater Saph BK Augment 09/12/2018 Y   Final   • Right Greater Saph BK Compress 09/12/2018 C   Final   • Right Lesser Saph Compress 09/12/2018 C   Final   • Left Common Femoral Spont 09/12/2018 Y   Final   • Left Common Femoral Phasic 09/12/2018 Y   Final   • Left Common Femoral Augment 09/12/2018 Y   Final   • Left Common Femoral Compress 09/12/2018 P   Final   • Left Common Femoral Thrombus 09/12/2018 C   Final   • Left Saphenofemoral Junction Spont 09/12/2018 Y   Final   • Left Saphenofemoral Junction Phasic 09/12/2018 Y   Final   • Left Saphenofemoral Junction Augme* 09/12/2018 Y   Final   • Left Saphenofemoral Junction Compe* 09/12/2018 Y   Final   • Left Saphenofemoral Junction Compr* 09/12/2018 C   Final   • Left Profunda Femoral Spont 09/12/2018 Y   Final   • Left Profunda Femoral Phasic 09/12/2018 Y   Final   • Left Profunda Femoral Augment 09/12/2018 Y   Final   • Left Profunda Femoral Compress 09/12/2018 P   Final   • Left Profunda Femoral Thrombus 09/12/2018 C   Final   • Left Proximal Femoral Spont 09/12/2018 Y   Final   • Left Proximal Femoral Phasic 09/12/2018 Y   Final   • Left Proximal Femoral Augment 09/12/2018 Y   Final   • Left Proximal Femoral Compress 09/12/2018 P   Final   • Left Proximal Femoral Thrombus 09/12/2018 C   Final   • Left Mid Femoral Spont 09/12/2018 Y   Final   • Left Mid Femoral Phasic 09/12/2018 Y   Final   • Left Mid Femoral Augment 09/12/2018 Y   Final   • Left Mid Femoral Compress 09/12/2018 P   Final   • Left Mid Femoral Thrombus 09/12/2018 C   Final   • Left Distal Femoral Spont 09/12/2018 N   Final   • Left Distal Femoral Phasic  09/12/2018 N   Final   • Left Distal Femoral Augment 09/12/2018 N   Final   • Left Distal Femoral Compress 09/12/2018 N   Final   • Left Distal Femoral Thrombus 09/12/2018 C   Final   • Left Popliteal Spont 09/12/2018 Y   Final   • Left Popliteal Phasic 09/12/2018 Y   Final   • Left Popliteal Augment 09/12/2018 Y   Final   • Left Popliteal Compress 09/12/2018 P   Final   • Left Popliteal Thrombus 09/12/2018 C   Final   • Left Posterior Tibial Augment 09/12/2018 Y   Final   • Left Posterior Tibial Compress 09/12/2018 P   Final   • Left Posterior Tibial Thrombus 09/12/2018 C   Final   • Left Peroneal Augment 09/12/2018 Y   Final   • Left Peroneal Compress 09/12/2018 P   Final   • Left Peroneal Thrombus 09/12/2018 C   Final   • Left Greater Saph AK Augment 09/12/2018 Y   Final   • Left Greater Saph AK Compress 09/12/2018 C   Final   • Left Lesser Saph Compress 09/12/2018 C   Final   • Right Proximal Femoral Phasic 09/12/2018 Y   Final   • Right Distal Femoral Augment 09/12/2018 Y   Final   Office Visit on 08/14/2018   Component Date Value Ref Range Status   • Glucose 08/14/2018 100  70 - 130 mg/dL Final   Office Visit on 07/16/2018   Component Date Value Ref Range Status   • Glucose 07/16/2018 110* 60 - 100 mg/dL Final   • BUN 07/16/2018 29* 7 - 21 mg/dL Final   • Creatinine 07/16/2018 1.34* 0.70 - 1.30 mg/dL Final   • Sodium 07/16/2018 131* 137 - 145 mmol/L Final   • Potassium 07/16/2018 4.6  3.5 - 5.1 mmol/L Final   • Chloride 07/16/2018 95  95 - 110 mmol/L Final   • CO2 07/16/2018 24.0  22.0 - 31.0 mmol/L Final   • Calcium 07/16/2018 8.9  8.4 - 10.2 mg/dL Final   • eGFR   Amer 07/16/2018 62  42 - 98 mL/min/1.73 Final   • BUN/Creatinine Ratio 07/16/2018 21.6  7.0 - 25.0 Final   • Anion Gap 07/16/2018 12.0  5.0 - 15.0 mmol/L Final       The following portions of the patient's history were reviewed and updated as appropriate: allergies, current medications, past family history, past medical history, past social  history, past surgical history and problem list.     Assessment:      Diagnosis Plan   1. Chronic diastolic congestive heart failure without clinical evidence of hypervolemia;  He is well perfused. AHA Stage C: ; NYHA Class II  BETA-BLOCKER: Not applicable from HFpEF  ACE/ARB: Lisinopril 20/12.5 mg mg daily  ENTRESTO: Not applicable  DIURETIC: Lasix 20 mg daily as needed for weight gain of 2-3 pounds overnight or 5 pounds in one week; if the patient should have any concerns regarding leg swelling or the manner in which to use his Lasix he is to contact the heart failure clinic.  ALDOSTERONT ANTAGONIST: History of KIRK/hyperkalemia  IMDUR/HYDRALAZINE: Hydralazine 25 mg three times daily  DIGOXIN: Not applicable  Fluid restriction: 2000 cc daily  Sodium restriction: 2000 mg daily     Recommended daily weight monitoring.  Discussed patient action plan for heart failure.  Recommended avoiding NSAIDs use.  Discussed warning signs requiring additional medical attention for heart failure.       2. Coronary artery disease involving native coronary artery of native heart without angina pectoris, stable      ASA plus statin therapy   3. Essential hypertension, stable      As per #1; consideration for white coat syndrome        4. Chronic kidney disease, stage II (mild), stable  As per Dr Reza         Patient's BMI is within normal parameters. No follow-up required; non smoker.          This document has been electronically signed by REGINA Carlos on November 6, 2018 9:15 AM

## 2018-11-12 ENCOUNTER — OFFICE VISIT (OUTPATIENT)
Dept: GASTROENTEROLOGY | Facility: CLINIC | Age: 83
End: 2018-11-12

## 2018-11-12 VITALS
HEIGHT: 67 IN | WEIGHT: 164.6 LBS | BODY MASS INDEX: 25.83 KG/M2 | HEART RATE: 80 BPM | DIASTOLIC BLOOD PRESSURE: 82 MMHG | SYSTOLIC BLOOD PRESSURE: 146 MMHG

## 2018-11-12 DIAGNOSIS — Z80.0 FAMILY HISTORY OF COLON CANCER: ICD-10-CM

## 2018-11-12 DIAGNOSIS — D12.6 TUBULAR ADENOMA OF COLON: Primary | ICD-10-CM

## 2018-11-12 PROCEDURE — 99213 OFFICE O/P EST LOW 20 MIN: CPT | Performed by: PHYSICIAN ASSISTANT

## 2018-11-12 NOTE — PATIENT INSTRUCTIONS

## 2018-11-12 NOTE — PROGRESS NOTES
Chief Complaint   Patient presents with   • FHx Colon Cancer   • Screening Colonoscopy Results       ENDO PROCEDURE ORDERED:    Subjective    General Keith Ruvalcaba Jr. is a 83 y.o. male. he is here today for follow-up.    History of Present Illness    Patient is seen on a recheck of his family history of colon cancer. Last seen 09/17/2018. The patient underwent colonoscopy on 10/31/2018 that showed a polyp in the descending colon, this was fairly flat. It was a tubular adenoma, 9 mm in greatest dimension. He was recommended to have a repeat colonoscopy at 3 years. The patient had a previous colonoscopy in 2013 showing hemorrhoids. He currently denies abdominal pain, nausea, vomiting, or dysphagia. Bowel movements are regular without blood or mucus. Weight is down 2.4 pounds since last visit.    Laboratory on 10/02/2018: Renal function panel showed glucose 113, creatinine 1.36, sodium 133, chloride 93, otherwise normal.    ASSESSMENT AND PLAN: Patient with adenomatous polyp with family history. Patient instructed on a high-fiber diet. Recommended repeat colonoscopy at 3 years. He was encouraged to follow up with his primary care, Mata Larkin MD.         The following portions of the patient's history were reviewed and updated as appropriate:   Past Medical History:   Diagnosis Date   • Acquired hallux rigidus    • Anemia    • Arthropathy of lumbar facet joint    • Carpal tunnel syndrome    • CHF (congestive heart failure) (CMS/HCC)    • Chronic diastolic congestive heart failure (CMS/HCC)    • Chronic kidney disease    • Chronic kidney disease, stage II (mild)     Chronic kidney disease stage 2   • Chronic obstructive lung disease (CMS/HCC)    • Chronic renal failure    • Coronary artery disease    • Cortical senile cataract    • Diabetes mellitus (CMS/HCC)    • Diabetes mellitus type 2, noninsulin dependent (CMS/HCC)     diabetes mellitus type 2, non-insulin treated, Medication treatment plan: oral diabetic medication    • Diabetes mellitus without complication (CMS/HCC)    • Diastolic dysfunction    • Diastolic heart failure (CMS/HCC)    • Essential hypertension     difficult to control      • Hip pain    • History of echocardiogram 08/19/2015    Echocardiogram W/ color flow 19757 (1) - Mild to moderate LVH with mild left atrial enlargement and normal aortic root.CLVH,preserved LV systolic function with Ef of 55%.Diastolic dysfunction.MV intact.AV thickened.Aortic sclerosis.   • Hyperlipidemia    • Influenza vaccine administered 10/02/2015    INFLUENZA IMMUN ADMIN OR PREV RECV'D  (1) - Ordered By: DIONNE MANDUJANO ([facility])    • Insomnia    • Insomnia     Other insomnia   • Joint pain    • Low back pain    • Muscle strain    • Nuclear cataract    • Pneumococcal vaccination given 07/22/2016    PNEUMOC VAC/ADMIN/RCVD 4040F (3) - Ordered By: DIONNE MANDUJANO ([facility])   • Prostate cancer (CMS/[facility])    • Pure hypercholesterolemia    • Sedentary lifestyle      Past Surgical History:   Procedure Laterality Date   • CARDIAC ABLATION     • CARDIAC CATHETERIZATION  02/04/1986    Cardiac cath 43266 (1) - normal left heart catherization,non cardiac chest pain   • CARPAL TUNNEL RELEASE  10/19/2015    Carpal tunnel surgery (1) - Release of left carpal tunnel   • ENDOSCOPY  08/05/2013    Colon endoscopy 79917 (2) - Hemorrhoids found.   • INGUINAL HERNIA REPAIR Right 06/07/1968    Inguinal hernia, repair (2)   • INJECTION OF MEDICATION  09/06/2012    Albuterol (2u) (1) - Ordered By: LIDYA STEINER ([facility])    • INJECTION OF MEDICATION  09/06/2012    Atrovent (1) - Ordered By: LIDYA STEINER ([facility])    • INJECTION OF MEDICATION  02/10/2012    Depo Medrol (Methylprednisone) (3) - Ordered By: HEATHER PAK ([facility])    • INJECTION OF MEDICATION  02/09/2014    Kenalog (4) - Ordered By: DIMAS ANDRADE ([facility])    • JOINT REPLACEMENT     • LUMBAR LAMINECTOMY  2007    Lumbar laminectomy (1)   • MANDIBLE FRACTURE SURGERY  10/11/1981     Treat nose/jaw fracture (1) - bilateral open reduction of fracture of mandible   • OTHER SURGICAL HISTORY  2011    Drain/Inject Major Joint  (1) - Ordered By: KARLOS HERNANDEZ ([facility])    • OTHER SURGICAL HISTORY      Insert IVC filter 09284 (1)   • OTHER SURGICAL HISTORY  10/11/2011    SPIROMETRY 91888 (1) - Ordered By: HEATHER PAK ([facility])   • TOTAL HIP ARTHROPLASTY Right      Family History   Problem Relation Age of Onset   • Diabetes Other    • Cancer Sister    • Heart disease Mother    • Hypertension Mother    • Heart disease Father    • Hypertension Father    • Coronary artery disease Neg Hx        Allergies   Allergen Reactions   • Aldactone [Spironolactone] Other (See Comments)     Hyponatremia and hyperkalemia     Social History     Socioeconomic History   • Marital status:      Spouse name: Not on file   • Number of children: Not on file   • Years of education: Not on file   • Highest education level: Not on file   Tobacco Use   • Smoking status: Former Smoker     Last attempt to quit:      Years since quittin.8   • Smokeless tobacco: Never Used   Substance and Sexual Activity   • Alcohol use: No   • Drug use: No   • Sexual activity: Defer     Comment: Marital status:        Current Outpatient Medications:   •  aspirin 81 MG EC tablet, Take 81 mg by mouth daily., Disp: , Rfl:   •  ferrous sulfate 325 (65 FE) MG tablet, Take 1 tablet by mouth Daily With Breakfast., Disp: 30 tablet, Rfl: 5  •  furosemide (LASIX) 20 MG tablet, Take 20 mg by mouth 2 (Two) Times a Day. If weight gain 2 t o 3 pounds overnight or 5 pounds in 1 week, Disp: , Rfl:   •  gabapentin (NEURONTIN) 300 MG capsule, Take 1 capsule by mouth 2 (Two) Times a Day., Disp: 60 capsule, Rfl: 5  •  glucose blood (TRUE METRIX BLOOD GLUCOSE TEST) test strip, Check glucose daily, Disp: 100 each, Rfl: 0  •  hydrALAZINE (APRESOLINE) 25 MG tablet, Take 1 tablet by mouth 3 (Three) Times a Day., Disp: 90 tablet, Rfl:  "6  •  JANUVIA 50 MG tablet, TAKE ONE TABLET BY MOUTH DAILY, Disp: 30 tablet, Rfl: 6  •  lisinopril-hydrochlorothiazide (PRINZIDE,ZESTORETIC) 20-12.5 MG per tablet, Take 1 tablet by mouth Daily., Disp: 30 tablet, Rfl: 5  •  pravastatin (PRAVACHOL) 40 MG tablet, Take 1 tablet by mouth Daily., Disp: 30 tablet, Rfl: 11  •  rivaroxaban (XARELTO) 10 MG tablet, Take 1 tablet by mouth Daily., Disp: 30 tablet, Rfl: 11  •  TRUEPLUS LANCETS 30G misc, 1 Device Daily. TEST, Disp: 100 each, Rfl: 3  Review of Systems  Review of Systems       Objective    /82 (BP Location: Right arm)   Pulse 80   Ht 170.2 cm (67\")   Wt 74.7 kg (164 lb 9.6 oz)   BMI 25.78 kg/m²   Physical Exam   Constitutional: He is oriented to person, place, and time. He appears well-developed and well-nourished. No distress.   AA   HENT:   Head: Normocephalic and atraumatic.   Eyes: EOM are normal. Pupils are equal, round, and reactive to light.   Neck: Normal range of motion.   Cardiovascular: Normal rate, regular rhythm and normal heart sounds.   Pulmonary/Chest: Effort normal and breath sounds normal.   Abdominal: Soft. Bowel sounds are normal. He exhibits no shifting dullness, no distension, no abdominal bruit, no ascites and no mass. There is no hepatosplenomegaly. There is no tenderness. There is no rigidity, no rebound, no guarding and no CVA tenderness. No hernia. Hernia confirmed negative in the ventral area.   Musculoskeletal: Normal range of motion.   Neurological: He is alert and oriented to person, place, and time.   Skin: Skin is warm and dry.   Psychiatric: He has a normal mood and affect. His behavior is normal. Judgment and thought content normal.   Nursing note and vitals reviewed.    Assessment/Plan      1. Tubular adenoma of colon    2. Family history of colon cancer    .   General was seen today for fhx colon cancer and screening colonoscopy results.    Diagnoses and all orders for this visit:    Tubular adenoma of colon    Family " history of colon cancer        Orders placed during this encounter include:  No orders of the defined types were placed in this encounter.      Medications prescribed:  No orders of the defined types were placed in this encounter.      Requested Prescriptions      No prescriptions requested or ordered in this encounter       Review and/or summary of lab tests, radiology, procedures, medications. Review and summary of old records and obtaining of history. The risks and benefits of my recommendations, as well as other treatment options were discussed with the patient today. Questions were answered.    Follow-up: Return if symptoms worsen or fail to improve, for Next scheduled follow up.     * Surgery not found *      This document has been electronically signed by Lazaro Healy PA-C on November 18, 2018 4:42 PM      Results for orders placed or performed in visit on 11/14/18   CBC Auto Differential   Result Value Ref Range    WBC 6.62 3.20 - 9.80 10*3/mm3    RBC 4.54 4.37 - 5.74 10*6/mm3    Hemoglobin 13.5 (L) 13.7 - 17.3 g/dL    Hematocrit 39.7 39.0 - 49.0 %    MCV 87.4 80.0 - 98.0 fL    MCH 29.7 26.5 - 34.0 pg    MCHC 34.0 31.5 - 36.3 g/dL    RDW 14.4 11.5 - 14.5 %    RDW-SD 46.3 (H) 35.1 - 43.9 fl    MPV 8.2 8.0 - 12.0 fL    Platelets 374 150 - 450 10*3/mm3    Neutrophil % 67.0 37.0 - 80.0 %    Lymphocyte % 19.5 10.0 - 50.0 %    Monocyte % 12.4 (H) 0.0 - 12.0 %    Eosinophil % 0.3 0.0 - 7.0 %    Basophil % 0.2 0.0 - 2.0 %    Immature Grans % 0.6 (H) 0.0 - 0.5 %    Neutrophils, Absolute 4.44 2.00 - 8.60 10*3/mm3    Lymphocytes, Absolute 1.29 0.60 - 4.20 10*3/mm3    Monocytes, Absolute 0.82 0.00 - 0.90 10*3/mm3    Eosinophils, Absolute 0.02 0.00 - 0.70 10*3/mm3    Basophils, Absolute 0.01 0.00 - 0.20 10*3/mm3    Immature Grans, Absolute 0.04 (H) 0.00 - 0.02 10*3/mm3   Hemoglobin A1c   Result Value Ref Range    Hemoglobin A1C 6.3 (H) 4 - 5.6 %   Lipid panel   Result Value Ref Range    Total Cholesterol 174 0 - 199  "mg/dL    Triglycerides 59 20 - 199 mg/dL    HDL Cholesterol 77 60 - 200 mg/dL    LDL Cholesterol  85 1 - 129 mg/dL    LDL/HDL Ratio 1.11 0.00 - 3.55   Comprehensive metabolic panel   Result Value Ref Range    Glucose 116 (H) 60 - 100 mg/dL    BUN 22 (H) 7 - 21 mg/dL    Creatinine 1.33 (H) 0.70 - 1.30 mg/dL    Sodium 132 (L) 137 - 145 mmol/L    Potassium 4.5 3.5 - 5.1 mmol/L    Chloride 92 (L) 95 - 110 mmol/L    CO2 26.0 22.0 - 31.0 mmol/L    Calcium 9.3 8.4 - 10.2 mg/dL    Total Protein 7.3 6.3 - 8.6 g/dL    Albumin 4.80 3.40 - 4.80 g/dL    ALT (SGPT) 19 (L) 21 - 72 U/L    AST (SGOT) 28 17 - 59 U/L    Alkaline Phosphatase 60 38 - 126 U/L    Total Bilirubin 0.6 0.2 - 1.3 mg/dL    eGFR  African Amer 62 42 - 98 mL/min/1.73    Globulin 2.5 2.3 - 3.5 gm/dL    A/G Ratio 1.9 (H) 1.1 - 1.8 g/dL    BUN/Creatinine Ratio 16.5 7.0 - 25.0    Anion Gap 14.0 5.0 - 15.0 mmol/L   Results for orders placed or performed during the hospital encounter of 10/31/18   Tissue Pathology Exam   Result Value Ref Range    Case Report       Surgical Pathology Report                         Case: [id number]                                  Authorizing Provider:  Herbie Christine MD        Collected:           10/31/2018 11:17 AM          Ordering Location:     [facility]             Received:            10/31/2018 12:06 PM                                 [place] [facility]                                                     Pathologist:           Jero Crabtree MD                                                           Specimen:    Large Intestine, Left / Descending Colon, polyp                                            Final Diagnosis       DESCENDING COLON, BIOPSY:  TUBULAR ADENOMA.      Gross Description       The specimen is labeled \"DESC P\" and consists of 6 tan fragments measuring 0.9 x 0.8 x 0.2 cm together.  Totally submitted.     Results for orders placed or performed in visit on 10/02/18   Vitamin D 25 Hydroxy   Result " Value Ref Range    25 Hydroxy, Vitamin D 58.0 30.0 - 100.0 ng/ml   Renal Function Panel   Result Value Ref Range    Glucose 113 (H) 60 - 100 mg/dL    BUN 20 7 - 21 mg/dL    Creatinine 1.36 (H) 0.70 - 1.30 mg/dL    Sodium 133 (L) 137 - 145 mmol/L    Potassium 4.4 3.5 - 5.1 mmol/L    Chloride 93 (L) 95 - 110 mmol/L    CO2 26.0 22.0 - 31.0 mmol/L    Calcium 9.1 8.4 - 10.2 mg/dL    Albumin 4.10 3.40 - 4.80 g/dL    Phosphorus 2.9 2.4 - 4.4 mg/dL    Anion Gap 14.0 5.0 - 15.0 mmol/L    BUN/Creatinine Ratio 14.7 7.0 - 25.0    eGFR  African Amer 61 42 - 98 mL/min/1.73   Results for orders placed or performed during the hospital encounter of 09/12/18   Duplex Venous Lower Extremity - Bilateral CAR   Result Value Ref Range    Right Common Femoral Spont 1     Right Saphenofemoral Junction Spont 1     Right Profunda Femoral Spont 1     Right Proximal Femoral Spont 1     Right Mid Femoral Spont 1     Right Distal Femoral Spont 1     Right Popliteal Spont 1     Right Posterior Tibial Vessel 1     Right Peroneal Vessel 1     Right Greater Saph AK Vessel 1     Left Common Femoral Spont 1     Left Profunda Femoral Spont 1     Left Proximal Femoral Spont 1     Left Mid Femoral Spont 1     Left Distal Femoral Spont 1     Left Popliteal Spont 1     Left Posterior Tibial Vessel 1     Left Peroneal Vessel 1     Right Common Femoral Spont Y     Right Common Femoral Phasic Y     Right Common Femoral Augment Y     Right Common Femoral Compress P     Right Common Femoral Thrombus C     Right Saphenofemoral Junction Spont Y     Right Saphenofemoral Junction Phasic Y     Right Saphenofemoral Junction Augment Y     Right Saphenofemoral Junction Compress P     Right Saphenofemoral Junction Thrombus C     Right Profunda Femoral Spont Y     Right Profunda Femoral Phasic Y     Right Profunda Femoral Augment Y     Right Profunda Femoral Compress P     Right Profunda Femoral Thrombus C     Right Proximal Femoral Spont Y     Right Proximal Femoral  Augment Y     Right Proximal Femoral Compress P     Right Proximal Femoral Thrombus C     Right Mid Femoral Spont Y     Right Mid Femoral Phasic Y     Right Mid Femoral Augment Y     Right Mid Femoral Compress P     Right Mid Femoral Thrombus C     Right Distal Femoral Spont Y     Right Distal Femoral Phasic Y     Right Distal Femoral Compress P     Right Distal Femoral Thrombus C     Right Popliteal Spont Y     Right Popliteal Phasic Y     Right Popliteal Augment Y     Right Popliteal Compress P     Right Popliteal Thrombus C     Right Posterior Tibial Augment Y     Right Posterior Tibial Compress P     Right Posterior Tibial Thrombus C     Right Peroneal Augment Y     Right Peroneal Compress P     Right Peroneal Thrombus C     Right Greater Saph AK Spont Y     Right Greater Saph AK Phasic Y     Right Greater Saph AK Augment Y     Right Greater Saph AK Compress P     Right Greater Saph AK Thrombus C     Right Greater Saph BK Augment Y     Right Greater Saph BK Compress C     Right Lesser Saph Compress C     Left Common Femoral Spont Y     Left Common Femoral Phasic Y     Left Common Femoral Augment Y     Left Common Femoral Compress P     Left Common Femoral Thrombus C     Left Saphenofemoral Junction Spont Y     Left Saphenofemoral Junction Phasic Y     Left Saphenofemoral Junction Augment Y     Left Saphenofemoral Junction Competent Y     Left Saphenofemoral Junction Compress C     Left Profunda Femoral Spont Y     Left Profunda Femoral Phasic Y     Left Profunda Femoral Augment Y     Left Profunda Femoral Compress P     Left Profunda Femoral Thrombus C     Left Proximal Femoral Spont Y     Left Proximal Femoral Phasic Y     Left Proximal Femoral Augment Y     Left Proximal Femoral Compress P     Left Proximal Femoral Thrombus C     Left Mid Femoral Spont Y     Left Mid Femoral Phasic Y     Left Mid Femoral Augment Y     Left Mid Femoral Compress P     Left Mid Femoral Thrombus C     Left Distal Femoral Spont N      Left Distal Femoral Phasic N     Left Distal Femoral Augment N     Left Distal Femoral Compress N     Left Distal Femoral Thrombus C     Left Popliteal Spont Y     Left Popliteal Phasic Y     Left Popliteal Augment Y     Left Popliteal Compress P     Left Popliteal Thrombus C     Left Posterior Tibial Augment Y     Left Posterior Tibial Compress P     Left Posterior Tibial Thrombus C     Left Peroneal Augment Y     Left Peroneal Compress P     Left Peroneal Thrombus C     Left Greater Saph AK Augment Y     Left Greater Saph AK Compress C     Left Lesser Saph Compress C     Right Proximal Femoral Phasic Y     Right Distal Femoral Augment Y      *Note: Due to a large number of results and/or encounters for the requested time period, some results have not been displayed. A complete set of results can be found in Results Review.       Some portions of this note have been dictated using voice recognition software and may contain errors and/or omissions.

## 2018-11-14 ENCOUNTER — LAB (OUTPATIENT)
Dept: LAB | Facility: HOSPITAL | Age: 83
End: 2018-11-14

## 2018-11-14 ENCOUNTER — OFFICE VISIT (OUTPATIENT)
Dept: FAMILY MEDICINE CLINIC | Facility: CLINIC | Age: 83
End: 2018-11-14

## 2018-11-14 VITALS
SYSTOLIC BLOOD PRESSURE: 178 MMHG | BODY MASS INDEX: 26.52 KG/M2 | OXYGEN SATURATION: 98 % | WEIGHT: 165 LBS | DIASTOLIC BLOOD PRESSURE: 60 MMHG | HEIGHT: 66 IN | HEART RATE: 87 BPM

## 2018-11-14 DIAGNOSIS — I25.10 CORONARY ARTERY DISEASE INVOLVING NATIVE CORONARY ARTERY OF NATIVE HEART WITHOUT ANGINA PECTORIS: ICD-10-CM

## 2018-11-14 DIAGNOSIS — I10 ESSENTIAL HYPERTENSION: ICD-10-CM

## 2018-11-14 DIAGNOSIS — E11.9 DIABETES MELLITUS WITHOUT COMPLICATION (HCC): ICD-10-CM

## 2018-11-14 DIAGNOSIS — E11.9 DIABETES MELLITUS WITHOUT COMPLICATION (HCC): Primary | ICD-10-CM

## 2018-11-14 DIAGNOSIS — Z23 NEED FOR IMMUNIZATION AGAINST INFLUENZA: ICD-10-CM

## 2018-11-14 LAB
ALBUMIN SERPL-MCNC: 4.8 G/DL (ref 3.4–4.8)
ALBUMIN/GLOB SERPL: 1.9 G/DL (ref 1.1–1.8)
ALP SERPL-CCNC: 60 U/L (ref 38–126)
ALT SERPL W P-5'-P-CCNC: 19 U/L (ref 21–72)
ANION GAP SERPL CALCULATED.3IONS-SCNC: 14 MMOL/L (ref 5–15)
ARTICHOKE IGE QN: 85 MG/DL (ref 1–129)
AST SERPL-CCNC: 28 U/L (ref 17–59)
BASOPHILS # BLD AUTO: 0.01 10*3/MM3 (ref 0–0.2)
BASOPHILS NFR BLD AUTO: 0.2 % (ref 0–2)
BILIRUB SERPL-MCNC: 0.6 MG/DL (ref 0.2–1.3)
BUN BLD-MCNC: 22 MG/DL (ref 7–21)
BUN/CREAT SERPL: 16.5 (ref 7–25)
CALCIUM SPEC-SCNC: 9.3 MG/DL (ref 8.4–10.2)
CHLORIDE SERPL-SCNC: 92 MMOL/L (ref 95–110)
CHOLEST SERPL-MCNC: 174 MG/DL (ref 0–199)
CO2 SERPL-SCNC: 26 MMOL/L (ref 22–31)
CREAT BLD-MCNC: 1.33 MG/DL (ref 0.7–1.3)
DEPRECATED RDW RBC AUTO: 46.3 FL (ref 35.1–43.9)
EOSINOPHIL # BLD AUTO: 0.02 10*3/MM3 (ref 0–0.7)
EOSINOPHIL NFR BLD AUTO: 0.3 % (ref 0–7)
ERYTHROCYTE [DISTWIDTH] IN BLOOD BY AUTOMATED COUNT: 14.4 % (ref 11.5–14.5)
GFR SERPL CREATININE-BSD FRML MDRD: 62 ML/MIN/1.73 (ref 42–98)
GLOBULIN UR ELPH-MCNC: 2.5 GM/DL (ref 2.3–3.5)
GLUCOSE BLD-MCNC: 116 MG/DL (ref 60–100)
HBA1C MFR BLD: 6.3 % (ref 4–5.6)
HCT VFR BLD AUTO: 39.7 % (ref 39–49)
HDLC SERPL-MCNC: 77 MG/DL (ref 60–200)
HGB BLD-MCNC: 13.5 G/DL (ref 13.7–17.3)
IMM GRANULOCYTES # BLD: 0.04 10*3/MM3 (ref 0–0.02)
IMM GRANULOCYTES NFR BLD: 0.6 % (ref 0–0.5)
LDLC/HDLC SERPL: 1.11 {RATIO} (ref 0–3.55)
LYMPHOCYTES # BLD AUTO: 1.29 10*3/MM3 (ref 0.6–4.2)
LYMPHOCYTES NFR BLD AUTO: 19.5 % (ref 10–50)
MCH RBC QN AUTO: 29.7 PG (ref 26.5–34)
MCHC RBC AUTO-ENTMCNC: 34 G/DL (ref 31.5–36.3)
MCV RBC AUTO: 87.4 FL (ref 80–98)
MONOCYTES # BLD AUTO: 0.82 10*3/MM3 (ref 0–0.9)
MONOCYTES NFR BLD AUTO: 12.4 % (ref 0–12)
NEUTROPHILS # BLD AUTO: 4.44 10*3/MM3 (ref 2–8.6)
NEUTROPHILS NFR BLD AUTO: 67 % (ref 37–80)
PLATELET # BLD AUTO: 374 10*3/MM3 (ref 150–450)
PMV BLD AUTO: 8.2 FL (ref 8–12)
POTASSIUM BLD-SCNC: 4.5 MMOL/L (ref 3.5–5.1)
PROT SERPL-MCNC: 7.3 G/DL (ref 6.3–8.6)
RBC # BLD AUTO: 4.54 10*6/MM3 (ref 4.37–5.74)
SODIUM BLD-SCNC: 132 MMOL/L (ref 137–145)
TRIGL SERPL-MCNC: 59 MG/DL (ref 20–199)
WBC NRBC COR # BLD: 6.62 10*3/MM3 (ref 3.2–9.8)

## 2018-11-14 PROCEDURE — 80053 COMPREHEN METABOLIC PANEL: CPT

## 2018-11-14 PROCEDURE — 83036 HEMOGLOBIN GLYCOSYLATED A1C: CPT

## 2018-11-14 PROCEDURE — 85025 COMPLETE CBC W/AUTO DIFF WBC: CPT

## 2018-11-14 PROCEDURE — 90662 IIV NO PRSV INCREASED AG IM: CPT | Performed by: FAMILY MEDICINE

## 2018-11-14 PROCEDURE — 36415 COLL VENOUS BLD VENIPUNCTURE: CPT

## 2018-11-14 PROCEDURE — 90471 IMMUNIZATION ADMIN: CPT | Performed by: FAMILY MEDICINE

## 2018-11-14 PROCEDURE — 99214 OFFICE O/P EST MOD 30 MIN: CPT | Performed by: FAMILY MEDICINE

## 2018-11-14 PROCEDURE — 80061 LIPID PANEL: CPT

## 2018-11-14 NOTE — PROGRESS NOTES
Subjective   General Keith Ruvalcaba Jr. is a 83 y.o. male.   Cc: Establish care  History of Present Illness The patient comes in for check of their chronic medical issues which include Diabetes Mellitus,Hypertension and Hyperlipidemia. He needs a flu shot. He denies Chest pain,Dyspnea and edema.  .       The following portions of the patient's history were reviewed and updated as appropriate: allergies, current medications, past family history, past medical history, past social history, past surgical history and problem list.    Review of Systems   Constitutional: Negative for fatigue and fever.   Respiratory: Negative for cough, chest tightness and stridor.    Cardiovascular: Negative for chest pain, palpitations and leg swelling.       Objective   Physical Exam   Constitutional: He appears well-developed and well-nourished.   HENT:   Head: Normocephalic and atraumatic.   Right Ear: External ear normal.   Left Ear: External ear normal.   Nose: Nose normal.   Mouth/Throat: Oropharynx is clear and moist.   Eyes: Pupils are equal, round, and reactive to light.   Neck: Normal range of motion.   Cardiovascular: Normal rate, regular rhythm and normal heart sounds. Exam reveals no friction rub.   No murmur heard.  Pulmonary/Chest: Effort normal and breath sounds normal. No stridor. No respiratory distress. He has no wheezes.   Abdominal: Soft. Bowel sounds are normal. He exhibits no distension. There is no tenderness.   Skin: Skin is warm and dry.   Diabetic foot exam was performed. He had no calluses,stereognosis is intact monofilament was normal.   Vitals reviewed.        Assessment/Plan   General was seen today for establish care.    Diagnoses and all orders for this visit:    Diabetes mellitus without complication (CMS/[facility])  -     Comprehensive metabolic panel; Future  -     Hemoglobin A1c; Future  -     Lipid panel; Future  -     CBC w AUTO Differential; Future    Need for immunization against influenza  -     Flu Vaccine  High Dose PF 65YR+ ([id number])    Essential hypertension    Coronary artery disease involving native coronary artery of native heart without angina pectoris      Return to the clinic in 3 month/s.  Will contact with results as needed.

## 2018-11-20 ENCOUNTER — OFFICE VISIT (OUTPATIENT)
Dept: ORTHOPEDIC SURGERY | Facility: CLINIC | Age: 83
End: 2018-11-20

## 2018-11-20 VITALS — BODY MASS INDEX: 26.84 KG/M2 | HEIGHT: 66 IN | WEIGHT: 167 LBS

## 2018-11-20 DIAGNOSIS — M25.552 PAIN OF LEFT HIP JOINT: ICD-10-CM

## 2018-11-20 DIAGNOSIS — M70.62 TROCHANTERIC BURSITIS, LEFT HIP: Primary | ICD-10-CM

## 2018-11-20 PROCEDURE — 99214 OFFICE O/P EST MOD 30 MIN: CPT | Performed by: NURSE PRACTITIONER

## 2018-11-20 PROCEDURE — 20610 DRAIN/INJ JOINT/BURSA W/O US: CPT | Performed by: NURSE PRACTITIONER

## 2018-11-20 RX ADMIN — LIDOCAINE HYDROCHLORIDE 2 ML: 20 INJECTION, SOLUTION INFILTRATION; PERINEURAL at 10:17

## 2018-11-20 RX ADMIN — TRIAMCINOLONE ACETONIDE 80 MG: 40 INJECTION, SUSPENSION INTRA-ARTICULAR; INTRAMUSCULAR at 10:17

## 2018-11-20 NOTE — PROGRESS NOTES
" Keith Ruvalcaba Jr. is a 83 y.o. male returns for     Chief Complaint   Patient presents with   • Left Hip - Follow-up        HISTORY OF PRESENT ILLNESS: Patient presents to office for follow-up of left hip pain.  Patient was last seen in office on 10/16/2018 for right hip pain and given a right hip bursa injection of steroid or trochanteric bursitis.  Patient reports his right hip pain is significantly improved and now his left hip has similar pain.  The left hip pain has recently started in the last week.  He denies any injury.  Patient is requesting an evaluation for a left hip bursa injection today to help with pain.  Patient is ambulatory in office today with a steady gait and use of his walker.  Patient is alert and pleasant and appears in no acute distress.     CONCURRENT MEDICAL HISTORY:    The following portions of the patient's history were reviewed and updated as appropriate: allergies, current medications, past family history, past medical history, past social history, past surgical history and problem list.     ROS  No fevers or chills.  No chest pain or shortness of air.  No GI or  disturbances. Left hip pain.     PHYSICAL EXAMINATION:       Ht 167.6 cm (66\")   Wt 75.8 kg (167 lb)   BMI 26.95 kg/m²     Physical Exam   Constitutional: He is oriented to person, place, and time. Vital signs are normal. He appears well-developed and well-nourished. He is active and cooperative. He does not appear ill. No distress.   HENT:   Head: Normocephalic.   Pulmonary/Chest: Effort normal. No respiratory distress.   Abdominal: Soft. He exhibits no distension.   Musculoskeletal: He exhibits tenderness (Left hip). He exhibits no edema or deformity.   Neurological: He is alert and oriented to person, place, and time. GCS eye subscore is 4. GCS verbal subscore is 5. GCS motor subscore is 6.   Skin: Skin is warm, dry and intact. Capillary refill takes less than 2 seconds. No erythema.   Psychiatric: He has a normal " mood and affect. His speech is normal and behavior is normal. Judgment and thought content normal. Cognition and memory are normal.   Vitals reviewed.      GAIT:     []  Normal  [x]  Antalgic    Assistive device: []  None  [x]  Walker     []  Crutches  []  Cane     []  Wheelchair  []  Stretcher    Right Hip Exam     Tenderness   The patient is experiencing no tenderness.     Range of Motion   Abduction: 40   Adduction: 30   Flexion: 120   External rotation: 60   Internal rotation: 20     Muscle Strength   Abduction: 4/5   Flexion: 4/5     Other   Erythema: absent  Sensation: normal  Pulse: present    Comments:  Good range of motion of the hip.  No pain or tenderness today on exam.  No erythema.  No warmth.  No signs of infection noted.  Stable joint exam.      Left Hip Exam     Tenderness   The patient is experiencing tenderness in the greater trochanter and lateral.    Range of Motion   Abduction: 30   Adduction: 30   Flexion: 120   External rotation: 60   Internal rotation: 20     Muscle Strength   Abduction: 4/5   Flexion: 4/5     Other   Erythema: absent  Sensation: normal  Pulse: present    Comments:  Mild pain and limitations with range of motion.  Tenderness to palpation at the greater trochanter.  No erythema.  No warmth.  No signs of infection noted.  Stable joint exam.              ASSESSMENT:    Diagnoses and all orders for this visit:    Trochanteric bursitis, left hip  -     Large Joint Arthrocentesis: L greater trochanteric bursa    Pain of left hip joint      Large Joint Arthrocentesis: L greater trochanteric bursa  Date/Time: 11/20/2018 10:17 AM  Consent given by: patient  Site marked: site marked  Timeout: Immediately prior to procedure a time out was called to verify the correct patient, procedure, equipment, support staff and site/side marked as required   Supporting Documentation  Indications: pain   Procedure Details  Location: hip - L greater trochanteric bursa  Preparation: Patient was prepped  and draped in the usual sterile fashion  Needle size: 22 G  Approach: lateral  Medications administered: 2 mL lidocaine 2%; 80 mg triamcinolone acetonide 40 MG/ML  Patient tolerance: patient tolerated the procedure well with no immediate complications      PLAN    Patient complains of left hip pain this past week that feels similar to the right hip pain he was having previously.  Right hip pain is significantly improved following a right hip bursa injection given at last office visit on 10/16/2018.  Subjective complaints and physical exam are consistent with left trochanteric bursitis.  Recommend a left hip bursa injection with steroid today for management of pain/inflammation.  No signs of infection noted on exam.  Recommend rest and activity modification as tolerated and based on his pain.  Progress activity as tolerated.  Recommend modified weight-bearing with use of his walker.  Recommend ice therapy to left hip intermittently 3-4 times daily for 20 minutes of time to minimize pain/swelling/inflammation.  Recommend Tylenol as needed for pain.  Patient is unable to take oral NSAIDs due to chronic kidney disease.  Follow-up in 4 weeks for recheck as needed for any new, worsening or persistent symptoms.  Follow-up sooner if needed.    Return in about 4 weeks (around 12/18/2018), or if symptoms worsen or fail to improve, for Recheck.        This document has been electronically signed by REGINA Muñoz on November 28, 2018 8:51 AM      REGINA Muñoz

## 2018-11-28 ENCOUNTER — EPISODE CHANGES (OUTPATIENT)
Dept: SOCIAL WORK | Facility: HOSPITAL | Age: 83
End: 2018-11-28

## 2018-11-28 ENCOUNTER — EPISODE CHANGES (OUTPATIENT)
Dept: CASE MANAGEMENT | Facility: OTHER | Age: 83
End: 2018-11-28

## 2018-11-28 ENCOUNTER — PATIENT OUTREACH (OUTPATIENT)
Dept: SOCIAL WORK | Facility: HOSPITAL | Age: 83
End: 2018-11-28

## 2018-11-28 PROBLEM — M25.552 PAIN OF LEFT HIP JOINT: Status: ACTIVE | Noted: 2018-11-28

## 2018-11-28 RX ORDER — TRIAMCINOLONE ACETONIDE 40 MG/ML
80 INJECTION, SUSPENSION INTRA-ARTICULAR; INTRAMUSCULAR
Status: COMPLETED | OUTPATIENT
Start: 2018-11-20 | End: 2018-11-20

## 2018-11-28 RX ORDER — LIDOCAINE HYDROCHLORIDE 20 MG/ML
2 INJECTION, SOLUTION INFILTRATION; PERINEURAL
Status: COMPLETED | OUTPATIENT
Start: 2018-11-20 | End: 2018-11-20

## 2018-12-02 ENCOUNTER — APPOINTMENT (OUTPATIENT)
Dept: GENERAL RADIOLOGY | Facility: HOSPITAL | Age: 83
End: 2018-12-02

## 2018-12-02 ENCOUNTER — HOSPITAL ENCOUNTER (OUTPATIENT)
Facility: HOSPITAL | Age: 83
Setting detail: OBSERVATION
Discharge: HOME | End: 2018-12-04
Attending: EMERGENCY MEDICINE | Admitting: INTERNAL MEDICINE

## 2018-12-02 DIAGNOSIS — N19 RENAL FAILURE, UNSPECIFIED CHRONICITY: ICD-10-CM

## 2018-12-02 DIAGNOSIS — R68.89 DECREASED FUNCTIONAL ACTIVITY TOLERANCE: ICD-10-CM

## 2018-12-02 DIAGNOSIS — K52.9 ENTERITIS: ICD-10-CM

## 2018-12-02 DIAGNOSIS — J18.9 PNEUMONIA OF RIGHT UPPER LOBE DUE TO INFECTIOUS ORGANISM: ICD-10-CM

## 2018-12-02 DIAGNOSIS — J12.3 HUMAN METAPNEUMOVIRUS (HMPV) PNEUMONIA: Primary | ICD-10-CM

## 2018-12-02 LAB
ALBUMIN SERPL-MCNC: 4 G/DL (ref 3.4–4.8)
ALBUMIN/GLOB SERPL: 1.7 G/DL (ref 1.1–1.8)
ALP SERPL-CCNC: 47 U/L (ref 38–126)
ALT SERPL W P-5'-P-CCNC: 21 U/L (ref 21–72)
ANION GAP SERPL CALCULATED.3IONS-SCNC: 11 MMOL/L (ref 5–15)
AST SERPL-CCNC: 23 U/L (ref 17–59)
BASOPHILS # BLD AUTO: 0.02 10*3/MM3 (ref 0–0.2)
BASOPHILS NFR BLD AUTO: 0.2 % (ref 0–2)
BILIRUB SERPL-MCNC: 0.6 MG/DL (ref 0.2–1.3)
BILIRUB UR QL STRIP: NEGATIVE
BUN BLD-MCNC: 44 MG/DL (ref 7–21)
BUN/CREAT SERPL: 21.3 (ref 7–25)
CALCIUM SPEC-SCNC: 8.5 MG/DL (ref 8.4–10.2)
CHLORIDE SERPL-SCNC: 98 MMOL/L (ref 95–110)
CLARITY UR: CLEAR
CO2 SERPL-SCNC: 24 MMOL/L (ref 22–31)
COLOR UR: YELLOW
CREAT BLD-MCNC: 2.07 MG/DL (ref 0.7–1.3)
D-LACTATE SERPL-SCNC: 1 MMOL/L (ref 0.5–2)
DEPRECATED RDW RBC AUTO: 44.9 FL (ref 35.1–43.9)
EOSINOPHIL # BLD AUTO: 0.02 10*3/MM3 (ref 0–0.7)
EOSINOPHIL NFR BLD AUTO: 0.2 % (ref 0–7)
ERYTHROCYTE [DISTWIDTH] IN BLOOD BY AUTOMATED COUNT: 14.3 % (ref 11.5–14.5)
GFR SERPL CREATININE-BSD FRML MDRD: 37 ML/MIN/1.73 (ref 42–98)
GLOBULIN UR ELPH-MCNC: 2.4 GM/DL (ref 2.3–3.5)
GLUCOSE BLD-MCNC: 109 MG/DL (ref 60–100)
GLUCOSE BLDC GLUCOMTR-MCNC: 101 MG/DL (ref 70–130)
GLUCOSE BLDC GLUCOMTR-MCNC: 102 MG/DL (ref 70–130)
GLUCOSE BLDC GLUCOMTR-MCNC: 118 MG/DL (ref 70–130)
GLUCOSE UR STRIP-MCNC: NEGATIVE MG/DL
HBA1C MFR BLD: 6.1 % (ref 4–5.6)
HCT VFR BLD AUTO: 35.7 % (ref 39–49)
HGB BLD-MCNC: 12.5 G/DL (ref 13.7–17.3)
HGB UR QL STRIP.AUTO: NEGATIVE
HOLD SPECIMEN: NORMAL
IMM GRANULOCYTES # BLD: 0.11 10*3/MM3 (ref 0–0.02)
IMM GRANULOCYTES NFR BLD: 0.8 % (ref 0–0.5)
KETONES UR QL STRIP: ABNORMAL
LEUKOCYTE ESTERASE UR QL STRIP.AUTO: NEGATIVE
LIPASE SERPL-CCNC: 24 U/L (ref 23–300)
LYMPHOCYTES # BLD AUTO: 1.29 10*3/MM3 (ref 0.6–4.2)
LYMPHOCYTES NFR BLD AUTO: 9.8 % (ref 10–50)
MAGNESIUM SERPL-MCNC: 2.2 MG/DL (ref 1.6–2.3)
MCH RBC QN AUTO: 30.3 PG (ref 26.5–34)
MCHC RBC AUTO-ENTMCNC: 35 G/DL (ref 31.5–36.3)
MCV RBC AUTO: 86.4 FL (ref 80–98)
MONOCYTES # BLD AUTO: 1.77 10*3/MM3 (ref 0–0.9)
MONOCYTES NFR BLD AUTO: 13.4 % (ref 0–12)
NEUTROPHILS # BLD AUTO: 9.96 10*3/MM3 (ref 2–8.6)
NEUTROPHILS NFR BLD AUTO: 75.6 % (ref 37–80)
NITRITE UR QL STRIP: NEGATIVE
NT-PROBNP SERPL-MCNC: 277 PG/ML (ref 0–1800)
PH UR STRIP.AUTO: <=5 [PH] (ref 5–9)
PLATELET # BLD AUTO: 388 10*3/MM3 (ref 150–450)
PMV BLD AUTO: 8.3 FL (ref 8–12)
POTASSIUM BLD-SCNC: 3.9 MMOL/L (ref 3.5–5.1)
PROT SERPL-MCNC: 6.4 G/DL (ref 6.3–8.6)
PROT UR QL STRIP: NEGATIVE
RBC # BLD AUTO: 4.13 10*6/MM3 (ref 4.37–5.74)
SODIUM BLD-SCNC: 133 MMOL/L (ref 137–145)
SP GR UR STRIP: 1.02 (ref 1–1.03)
TROPONIN I SERPL-MCNC: 0.05 NG/ML
TROPONIN I SERPL-MCNC: 0.05 NG/ML
UROBILINOGEN UR QL STRIP: ABNORMAL
WBC NRBC COR # BLD: 13.17 10*3/MM3 (ref 3.2–9.8)
WHOLE BLOOD HOLD SPECIMEN: NORMAL

## 2018-12-02 PROCEDURE — 25010000002 CEFTRIAXONE PER 250 MG: Performed by: EMERGENCY MEDICINE

## 2018-12-02 PROCEDURE — 83690 ASSAY OF LIPASE: CPT | Performed by: EMERGENCY MEDICINE

## 2018-12-02 PROCEDURE — 36415 COLL VENOUS BLD VENIPUNCTURE: CPT

## 2018-12-02 PROCEDURE — 93005 ELECTROCARDIOGRAM TRACING: CPT | Performed by: EMERGENCY MEDICINE

## 2018-12-02 PROCEDURE — G0378 HOSPITAL OBSERVATION PER HR: HCPCS

## 2018-12-02 PROCEDURE — 74022 RADEX COMPL AQT ABD SERIES: CPT

## 2018-12-02 PROCEDURE — 83735 ASSAY OF MAGNESIUM: CPT | Performed by: EMERGENCY MEDICINE

## 2018-12-02 PROCEDURE — 93010 ELECTROCARDIOGRAM REPORT: CPT | Performed by: INTERNAL MEDICINE

## 2018-12-02 PROCEDURE — 36415 COLL VENOUS BLD VENIPUNCTURE: CPT | Performed by: EMERGENCY MEDICINE

## 2018-12-02 PROCEDURE — 83605 ASSAY OF LACTIC ACID: CPT | Performed by: EMERGENCY MEDICINE

## 2018-12-02 PROCEDURE — 96361 HYDRATE IV INFUSION ADD-ON: CPT

## 2018-12-02 PROCEDURE — 85025 COMPLETE CBC W/AUTO DIFF WBC: CPT | Performed by: EMERGENCY MEDICINE

## 2018-12-02 PROCEDURE — 87150 DNA/RNA AMPLIFIED PROBE: CPT | Performed by: EMERGENCY MEDICINE

## 2018-12-02 PROCEDURE — 96367 TX/PROPH/DG ADDL SEQ IV INF: CPT

## 2018-12-02 PROCEDURE — 25010000002 AZITHROMYCIN PER 500 MG: Performed by: EMERGENCY MEDICINE

## 2018-12-02 PROCEDURE — 99284 EMERGENCY DEPT VISIT MOD MDM: CPT

## 2018-12-02 PROCEDURE — 84484 ASSAY OF TROPONIN QUANT: CPT | Performed by: EMERGENCY MEDICINE

## 2018-12-02 PROCEDURE — 83036 HEMOGLOBIN GLYCOSYLATED A1C: CPT | Performed by: INTERNAL MEDICINE

## 2018-12-02 PROCEDURE — 96365 THER/PROPH/DIAG IV INF INIT: CPT

## 2018-12-02 PROCEDURE — 83880 ASSAY OF NATRIURETIC PEPTIDE: CPT | Performed by: EMERGENCY MEDICINE

## 2018-12-02 PROCEDURE — 81003 URINALYSIS AUTO W/O SCOPE: CPT | Performed by: EMERGENCY MEDICINE

## 2018-12-02 PROCEDURE — 87040 BLOOD CULTURE FOR BACTERIA: CPT | Performed by: EMERGENCY MEDICINE

## 2018-12-02 PROCEDURE — 82962 GLUCOSE BLOOD TEST: CPT

## 2018-12-02 PROCEDURE — 80053 COMPREHEN METABOLIC PANEL: CPT | Performed by: EMERGENCY MEDICINE

## 2018-12-02 PROCEDURE — 94760 N-INVAS EAR/PLS OXIMETRY 1: CPT

## 2018-12-02 PROCEDURE — 94640 AIRWAY INHALATION TREATMENT: CPT

## 2018-12-02 RX ORDER — SODIUM CHLORIDE 9 MG/ML
100 INJECTION, SOLUTION INTRAVENOUS CONTINUOUS
Status: DISCONTINUED | OUTPATIENT
Start: 2018-12-02 | End: 2018-12-02

## 2018-12-02 RX ORDER — DEXTROSE MONOHYDRATE 25 G/50ML
25 INJECTION, SOLUTION INTRAVENOUS
Status: DISCONTINUED | OUTPATIENT
Start: 2018-12-02 | End: 2018-12-04 | Stop reason: HOSPADM

## 2018-12-02 RX ORDER — SODIUM CHLORIDE 0.9 % (FLUSH) 0.9 %
3 SYRINGE (ML) INJECTION EVERY 12 HOURS SCHEDULED
Status: DISCONTINUED | OUTPATIENT
Start: 2018-12-02 | End: 2018-12-04 | Stop reason: HOSPADM

## 2018-12-02 RX ORDER — SODIUM CHLORIDE 0.9 % (FLUSH) 0.9 %
10 SYRINGE (ML) INJECTION AS NEEDED
Status: DISCONTINUED | OUTPATIENT
Start: 2018-12-02 | End: 2018-12-04 | Stop reason: HOSPADM

## 2018-12-02 RX ORDER — ASPIRIN 325 MG
325 TABLET ORAL ONCE
Status: COMPLETED | OUTPATIENT
Start: 2018-12-02 | End: 2018-12-02

## 2018-12-02 RX ORDER — ONDANSETRON 2 MG/ML
4 INJECTION INTRAMUSCULAR; INTRAVENOUS EVERY 6 HOURS PRN
Status: DISCONTINUED | OUTPATIENT
Start: 2018-12-02 | End: 2018-12-04 | Stop reason: HOSPADM

## 2018-12-02 RX ORDER — ACETAMINOPHEN 325 MG/1
650 TABLET ORAL EVERY 6 HOURS PRN
Status: DISCONTINUED | OUTPATIENT
Start: 2018-12-02 | End: 2018-12-04 | Stop reason: HOSPADM

## 2018-12-02 RX ORDER — NICOTINE POLACRILEX 4 MG
15 LOZENGE BUCCAL
Status: DISCONTINUED | OUTPATIENT
Start: 2018-12-02 | End: 2018-12-04 | Stop reason: HOSPADM

## 2018-12-02 RX ORDER — FERROUS SULFATE TAB EC 324 MG (65 MG FE EQUIVALENT) 324 (65 FE) MG
324 TABLET DELAYED RESPONSE ORAL
Status: DISCONTINUED | OUTPATIENT
Start: 2018-12-02 | End: 2018-12-04 | Stop reason: HOSPADM

## 2018-12-02 RX ORDER — SODIUM CHLORIDE 0.9 % (FLUSH) 0.9 %
3-10 SYRINGE (ML) INJECTION AS NEEDED
Status: DISCONTINUED | OUTPATIENT
Start: 2018-12-02 | End: 2018-12-04 | Stop reason: HOSPADM

## 2018-12-02 RX ORDER — FAMOTIDINE 20 MG/1
20 TABLET, FILM COATED ORAL DAILY
Status: DISCONTINUED | OUTPATIENT
Start: 2018-12-02 | End: 2018-12-04 | Stop reason: HOSPADM

## 2018-12-02 RX ORDER — GABAPENTIN 300 MG/1
300 CAPSULE ORAL EVERY 12 HOURS SCHEDULED
Status: DISCONTINUED | OUTPATIENT
Start: 2018-12-02 | End: 2018-12-04 | Stop reason: HOSPADM

## 2018-12-02 RX ORDER — SODIUM CHLORIDE 9 MG/ML
125 INJECTION, SOLUTION INTRAVENOUS CONTINUOUS
Status: DISCONTINUED | OUTPATIENT
Start: 2018-12-02 | End: 2018-12-04 | Stop reason: HOSPADM

## 2018-12-02 RX ORDER — ATORVASTATIN CALCIUM 10 MG/1
10 TABLET, FILM COATED ORAL DAILY
Status: DISCONTINUED | OUTPATIENT
Start: 2018-12-02 | End: 2018-12-04 | Stop reason: HOSPADM

## 2018-12-02 RX ORDER — ASPIRIN 81 MG/1
81 TABLET ORAL DAILY
Status: DISCONTINUED | OUTPATIENT
Start: 2018-12-03 | End: 2018-12-04 | Stop reason: HOSPADM

## 2018-12-02 RX ORDER — IPRATROPIUM BROMIDE AND ALBUTEROL SULFATE 2.5; .5 MG/3ML; MG/3ML
3 SOLUTION RESPIRATORY (INHALATION) ONCE
Status: COMPLETED | OUTPATIENT
Start: 2018-12-02 | End: 2018-12-02

## 2018-12-02 RX ORDER — HYDRALAZINE HYDROCHLORIDE 25 MG/1
25 TABLET, FILM COATED ORAL 3 TIMES DAILY
Status: CANCELLED | OUTPATIENT
Start: 2018-12-02

## 2018-12-02 RX ADMIN — AZITHROMYCIN MONOHYDRATE 500 MG: 500 INJECTION, POWDER, LYOPHILIZED, FOR SOLUTION INTRAVENOUS at 10:00

## 2018-12-02 RX ADMIN — ATORVASTATIN CALCIUM 10 MG: 10 TABLET, FILM COATED ORAL at 14:01

## 2018-12-02 RX ADMIN — SODIUM CHLORIDE 125 ML/HR: 900 INJECTION, SOLUTION INTRAVENOUS at 17:52

## 2018-12-02 RX ADMIN — FERROUS SULFATE TAB EC 324 MG (65 MG FE EQUIVALENT) 324 MG: 324 (65 FE) TABLET DELAYED RESPONSE at 14:01

## 2018-12-02 RX ADMIN — SODIUM CHLORIDE 100 ML/HR: 9 INJECTION, SOLUTION INTRAVENOUS at 09:57

## 2018-12-02 RX ADMIN — ASPIRIN 325 MG: 325 TABLET, COATED ORAL at 09:52

## 2018-12-02 RX ADMIN — SODIUM CHLORIDE 2 G: 900 INJECTION INTRAVENOUS at 11:16

## 2018-12-02 RX ADMIN — SODIUM CHLORIDE 1000 ML: 9 INJECTION, SOLUTION INTRAVENOUS at 08:27

## 2018-12-02 RX ADMIN — IPRATROPIUM BROMIDE AND ALBUTEROL SULFATE 3 ML: 2.5; .5 SOLUTION RESPIRATORY (INHALATION) at 08:30

## 2018-12-02 RX ADMIN — GABAPENTIN 300 MG: 300 CAPSULE ORAL at 20:40

## 2018-12-02 RX ADMIN — SODIUM CHLORIDE 125 ML/HR: 900 INJECTION, SOLUTION INTRAVENOUS at 14:04

## 2018-12-02 RX ADMIN — FAMOTIDINE 20 MG: 20 TABLET ORAL at 14:01

## 2018-12-02 RX ADMIN — GABAPENTIN 300 MG: 300 CAPSULE ORAL at 14:01

## 2018-12-02 RX ADMIN — RIVAROXABAN 5 MG: 10 TABLET, FILM COATED ORAL at 14:04

## 2018-12-02 RX ADMIN — SODIUM CHLORIDE, PRESERVATIVE FREE 3 ML: 5 INJECTION INTRAVENOUS at 14:04

## 2018-12-02 NOTE — H&P
[facility]  INPATIENT HISTORY AND PHYSICAL       Patient Care Team:  Provider, No Known as PCP - General  Ada Cloud MD as PCP - Claims Attributed  GiancarloZachariah MD as Consulting Physician (Radiation Oncology)  David Groves MD (Urology)  Sarah Bartlett APRN as Nurse Practitioner (Oncology)  Yeimy Hannon APRN as Nurse Practitioner (Orthopedic Surgery)  Fernando Chacon MD as Consulting Physician (Orthopedic Surgery)  Tiffanie Garza, RN as Care Coordinator (Population Health)    Chief complaint   Chief Complaint   Patient presents with   • Diarrhea   • Shortness of Breath       Subjective     Patient is a 83 y.o. male history of hypertension, coronary artery disease, type 2 diabetes mellitus, chronic kidney disease, paroxysmal atrial fibrillation, dyslipidemia, chronic back pain, CHF presents with 3 day history of progressively worsening nonbloody diarrhea associated with weakness, decreased oral intake and subjective fever.  Patient has been having 8-10 diarrheal movements daily within the past 3 days and denies any recent antimicrobial use or contact with anyone with febrile illness.  He denies nausea, vomiting, chest pain, shortness of air, palpitation, syncope or presyncope.   On triage he had a low blood pressure of 91/51 with a heart rate of 110.  He was given bolus of IV fluids with some improvement.  He had blood work and noted to have creatinine of 2.07 (from a baseline of less than 1.4.  WBC was 13.1, potassium was normal and chest x-ray was suspicious for right upper lobe infiltrative changes.  KUB showed nonspecific bowel pattern.    Family and social history: Patient lives with one of his grand kids and denies history of alcohol or tobacco use.  Aidan family history of hypertension, diabetes and heart disease.        Review of Systems   Constitutional: Positive for activity change, appetite change, fatigue  and fever. Negative for chills and diaphoresis.   HENT: Negative for trouble swallowing and voice change.    Eyes: Negative for photophobia and visual disturbance.   Respiratory: Negative for cough, choking, chest tightness, shortness of breath, wheezing and stridor.    Cardiovascular: Negative for chest pain, palpitations and leg swelling.   Gastrointestinal: Positive for diarrhea. Negative for abdominal distention, abdominal pain, blood in stool, constipation, nausea and vomiting.   Endocrine: Negative for cold intolerance, heat intolerance, polydipsia, polyphagia and polyuria.   Genitourinary: Negative for decreased urine volume, difficulty urinating, dysuria, enuresis, flank pain, frequency, hematuria and urgency.   Musculoskeletal: Positive for back pain. Negative for arthralgias, gait problem, myalgias, neck pain and neck stiffness.   Skin: Negative for pallor, rash and wound.   Neurological: Positive for weakness. Negative for dizziness, tremors, seizures, syncope, facial asymmetry, speech difficulty, light-headedness, numbness and headaches.   Hematological: Does not bruise/bleed easily.   Psychiatric/Behavioral: Negative for agitation, behavioral problems and confusion.         History  Past Medical History:   Diagnosis Date   • Acquired hallux rigidus    • Anemia    • Arthropathy of lumbar facet joint    • Carpal tunnel syndrome    • CHF (congestive heart failure) (CMS/[facility])    • Chronic diastolic congestive heart failure (CMS/HCC)    • Chronic kidney disease    • Chronic kidney disease, stage II (mild)     Chronic kidney disease stage 2   • Chronic obstructive lung disease (CMS/[facility])    • Chronic renal failure    • Coronary artery disease    • Cortical senile cataract    • Diabetes mellitus (CMS/[facility])    • Diabetes mellitus type 2, noninsulin dependent (CMS/[facility])     diabetes mellitus type 2, non-insulin treated, Medication treatment plan: oral diabetic medication   • Diabetes mellitus without complication  (CMS/[facility])    • Diastolic dysfunction    • Diastolic heart failure (CMS/HCC)    • Essential hypertension     difficult to control      • Hip pain    • History of echocardiogram 08/19/2015    Echocardiogram W/ color flow 62262 (1) - Mild to moderate LVH with mild left atrial enlargement and normal aortic root.CLVH,preserved LV systolic function with Ef of 55%.Diastolic dysfunction.MV intact.AV thickened.Aortic sclerosis.   • Hyperlipidemia    • Influenza vaccine administered 10/02/2015    INFLUENZA IMMUN ADMIN OR PREV RECV'D  (1) - Ordered By: DIONNE MANDUJANO ([facility])    • Insomnia    • Insomnia     Other insomnia   • Joint pain    • Low back pain    • Muscle strain    • Nuclear cataract    • Pneumococcal vaccination given 07/22/2016    PNEUMOC VAC/ADMIN/RCVD 4040F (3) - Ordered By: DIONNE MANDUJANO ([facility])   • Prostate cancer (CMS/[facility])    • Pure hypercholesterolemia    • Sedentary lifestyle      Past Surgical History:   Procedure Laterality Date   • CARDIAC ABLATION     • CARDIAC CATHETERIZATION  02/04/1986    Cardiac cath 62255 (1) - normal left heart catherization,non cardiac chest pain   • CARPAL TUNNEL RELEASE  10/19/2015    Carpal tunnel surgery (1) - Release of left carpal tunnel   • ENDOSCOPY  08/05/2013    Colon endoscopy 86386 (2) - Hemorrhoids found.   • INGUINAL HERNIA REPAIR Right 06/07/1968    Inguinal hernia, repair (2)   • INJECTION OF MEDICATION  09/06/2012    Albuterol (2u) (1) - Ordered By: LIDYA STEINER ([facility])    • INJECTION OF MEDICATION  09/06/2012    Atrovent (1) - Ordered By: LIDYA STEINER ([facility])    • INJECTION OF MEDICATION  02/10/2012    Depo Medrol (Methylprednisone) (3) - Ordered By: HEATHER PAK ([facility])    • INJECTION OF MEDICATION  02/09/2014    Kenalog (4) - Ordered By: DIMAS ANDRADE ([facility])    • JOINT REPLACEMENT     • LUMBAR LAMINECTOMY  2007    Lumbar laminectomy (1)   • MANDIBLE FRACTURE SURGERY  10/11/1981    Treat nose/jaw fracture (1) - bilateral open  reduction of fracture of mandible   • OTHER SURGICAL HISTORY  2011    Drain/Inject Major Joint  (1) - Ordered By: KARLOS HERNANDEZ ([facility])    • OTHER SURGICAL HISTORY      Insert IVC filter 11349 (1)   • OTHER SURGICAL HISTORY  10/11/2011    SPIROMETRY 72297 (1) - Ordered By: HEATHER PAK ([facility])   • TOTAL HIP ARTHROPLASTY Right      Family History   Problem Relation Age of Onset   • Diabetes Other    • Cancer Sister    • Heart disease Mother    • Hypertension Mother    • Heart disease Father    • Hypertension Father    • Coronary artery disease Neg Hx      Social History     Tobacco Use   • Smoking status: Former Smoker     Last attempt to quit:      Years since quittin.9   • Smokeless tobacco: Never Used   Substance Use Topics   • Alcohol use: No   • Drug use: No       (Not in a hospital admission)  Allergies:  Aldactone [spironolactone]  Prior to Admission medications    Medication Sig Start Date End Date Taking? Authorizing Provider   aspirin 81 MG EC tablet Take 81 mg by mouth daily.   Yes Provider, MD Kelsey   ferrous sulfate 325 (65 FE) MG tablet Take 1 tablet by mouth Daily With Breakfast. 18  Yes Ada Cloud MD   gabapentin (NEURONTIN) 300 MG capsule Take 1 capsule by mouth 2 (Two) Times a Day. 18  Yes Ada Cloud MD   glucose blood (TRUE METRIX BLOOD GLUCOSE TEST) test strip Check glucose daily 10/23/18  Yes Ada Cloud MD   hydrALAZINE (APRESOLINE) 25 MG tablet Take 1 tablet by mouth 3 (Three) Times a Day. 18  Yes Ada Cloud MD   JANUVIA 50 MG tablet TAKE ONE TABLET BY MOUTH DAILY 18  Yes Ada Cloud MD   lisinopril-hydrochlorothiazide (PRINZIDE,ZESTORETIC) 20-12.5 MG per tablet Take 1 tablet by mouth Daily. 18  Yes Ada Cloud MD   pravastatin (PRAVACHOL) 40 MG tablet Take 1 tablet by mouth Daily. 18  Yes Ada Cloud MD   rivaroxaban (XARELTO) 10 MG tablet Take 1 tablet by mouth Daily. 18  Yes Heather Renteria  [address]   TRUEPLUS LANCETS 30G misc 1 Device Daily. TEST 8/14/18  Yes Ada Cloud MD   furosemide (LASIX) 20 MG tablet Take 20 mg by mouth 2 (Two) Times a Day. If weight gain 2 t o 3 pounds overnight or 5 pounds in 1 week    Provider, MD Kelsey       Objective        Vital Signs  Temp:  [98.5 °F (36.9 °C)] 98.5 °F (36.9 °C)  Heart Rate:  [] 110  Resp:  [16-20] 18  BP: ()/(50-94) 103/50      Physical Exam   Constitutional: He is oriented to person, place, and time. He appears well-developed and well-nourished. No distress.   HENT:   Head: Normocephalic and atraumatic.   Eyes: EOM are normal. Pupils are equal, round, and reactive to light. No scleral icterus.   Neck: Normal range of motion. Neck supple. No JVD present. No thyromegaly present.   Cardiovascular: Regular rhythm and normal heart sounds. Tachycardia present. Exam reveals no gallop and no friction rub.   No murmur heard.  Pulmonary/Chest: Effort normal and breath sounds normal. He has no wheezes. He has no rales. He exhibits no tenderness.   Abdominal: Soft. Bowel sounds are normal. He exhibits no distension and no mass. There is no tenderness. There is no rebound and no guarding.   Musculoskeletal: He exhibits no edema, tenderness or deformity.   Neurological: He is alert and oriented to person, place, and time. No cranial nerve deficit. He exhibits normal muscle tone. Coordination normal.   Skin: Skin is warm and dry. No rash noted. He is not diaphoretic. No erythema. No pallor.   Psychiatric: He has a normal mood and affect. His behavior is normal. Judgment and thought content normal.   Nursing note and vitals reviewed.        Results Review:     Results from last 7 days   Lab Units  12/02/18   0835   SODIUM mmol/L  133*   POTASSIUM mmol/L  3.9   CHLORIDE mmol/L  98   CO2 mmol/L  24.0   BUN mg/dL  44*   CREATININE mg/dL  2.07*   GLUCOSE mg/dL  109*   CALCIUM mg/dL  8.5   BILIRUBIN mg/dL  0.6   ALK PHOS U/L  47   ALT (SGPT) U/L   21   AST (SGOT) U/L  23       Results from last 7 days   Lab Units  12/02/18   0835   MAGNESIUM mg/dL  2.2       Results from last 7 days   Lab Units  12/02/18   0737   WBC 10*3/mm3  13.17*   HEMOGLOBIN g/dL  12.5*   HEMATOCRIT %  35.7*   PLATELETS 10*3/mm3  388           Imaging Results (last 7 days)     Procedure Component Value Units Date/Time    XR Abdomen 2 View With Chest 1 View [[id number]] Collected:  12/02/18 0816     Updated:  12/02/18 0846    Narrative:       Abdomen two view, chest    CLINICAL INDICATION: Abdominal pain, diarrhea       COMPARISON: Chest x-ray November 9, 2017    FINDINGS: Upright PA chest, Supine and upright views of the  abdomen (three    images).    Nonobstructive, nonspecific bowel gas pattern. Nondilated loops  of large and small bowel. Levels. Findings are suggestive of  enteritis. No evidence of free air.    Degenerative changes lumbar spine. Posterior spinal fusion L4,  L5-S1. Right hip bipolar femoral head arthroplasty.  There are new right upper lobe infiltrative changes suggesting  pneumonitis.      Impression:       CONCLUSION: There are new right upper lobe infiltrative changes  suggesting pneumonitis. Nonspecific bowel gas pattern. Nondilated  loops of large and small bowel. Scattered air-fluid levels. These  findings suggest an enteritis.     Electronically signed by:  Bruce Zepeda MD  12/2/2018 8:45 AM CST  Workstation: [id number]          Assessment / Plan       Hospital Problem List:  Active Problems:  - Gastroenteritis complicated by prerenal azotemia and hypertension (secondary to dehydration): Continue IV hydration and check GI panel.  - Acute on chronic kidney injury: This is likely prerenal arising from dehydration.  Continue IV hydration, avoid nephrotoxins and monitor renal function.  Consult nephrologist if the need arises.  - Possible sepsis secondary to Pneumonia of right upper lobe due to infectious organism.  Patient did screen positive for sepsis.  Continue IV  antibiotics, check respiratory cultures and await outcome of blood cultures.  Lactic acid is normal.  Leukocytosis is reactive and will be monitored.  - Hypertension: Hold antihypertensives for now due to borderline blood pressure.  - Diabetes mellitus: Begin Accu-Cheks and sliding scale insulin.  Check hemoglobin A1c.  - History of CHF: Hold Lasix for now as patient is currently volume depleted.  - ?  Paroxysmal atrial fibrillation: Patient is in normal sinus rhythm.  Continue anticoagulation.  - Begin GI prophylaxis.  - Additional orders and treatment plan as hospital course dictates.      I discussed the patient's findings and my recommendations with patient and her children..     Marcial Pryor MD  12/02/18  10:56 AM        EMR Dragon/Transcription disclaimer:   Much of this encounter note is an electronic transcription/translation of spoken language to printed text. The electronic translation of spoken language may permit erroneous, or at times, nonsensical words or phrases to be inadvertently transcribed; Although I have reviewed the note for such errors, some may still exist.

## 2018-12-02 NOTE — ED PROVIDER NOTES
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  He has 5-6 episodes of diarrhea last night and is feeling weak and tired all over with no energy to do his routine activities.  He denies any abdominal pain.  Has mild nausea but no vomiting.  Denies any blood in the stool.  No sick contact.  He is also having increasing shortness of breath and cough productive of yellow-green sputum with no hemoptysis.  Patient has COPD and his wheezing is worsening since last night.        History provided by:  Patient  Diarrhea   The primary symptoms include fatigue, nausea and diarrhea. Primary symptoms do not include abdominal pain or vomiting. The illness began 3 to 5 days ago. The onset was gradual. The problem has been gradually worsening.   The diarrhea began 3 to 5 days ago. The diarrhea is watery. The diarrhea occurs 2 to 4 times per day.   The illness does not include chills, bloating, constipation or back pain.   Shortness of Breath   Severity:  Moderate  Onset quality:  Gradual  Timing:  Constant  Progression:  Waxing and waning  Chronicity:  Recurrent  Relieved by:  Nothing  Worsened by:  Coughing  Ineffective treatments:  None tried  Associated symptoms: cough, sputum production and wheezing    Associated symptoms: no abdominal pain, no headaches and no vomiting    Risk factors: hx of PE/DVT        Review of Systems   Constitutional: Positive for fatigue. Negative for chills.   HENT: Negative for congestion, sinus pressure and sinus pain.    Eyes: Negative for pain.   Respiratory: Positive for cough, sputum production, shortness of breath and wheezing. Negative for chest tightness.    Gastrointestinal: Positive for diarrhea and nausea. Negative for abdominal pain, bloating, constipation  and vomiting.   Genitourinary: Negative for flank pain.   Musculoskeletal: Negative for back pain.   Skin: Negative for color change.   Neurological: Negative for syncope, speech difficulty and headaches.   Psychiatric/Behavioral: Negative for agitation.       Past Medical History:   Diagnosis Date   • Acquired hallux rigidus    • Anemia    • Arthropathy of lumbar facet joint    • Carpal tunnel syndrome    • CHF (congestive heart failure) (CMS/[facility])    • Chronic diastolic congestive heart failure (CMS/[facility])    • Chronic kidney disease    • Chronic kidney disease, stage II (mild)     Chronic kidney disease stage 2   • Chronic obstructive lung disease (CMS/[facility])    • Chronic renal failure    • Coronary artery disease    • Cortical senile cataract    • Diabetes mellitus (CMS/[facility])    • Diabetes mellitus type 2, noninsulin dependent (CMS/[facility])     diabetes mellitus type 2, non-insulin treated, Medication treatment plan: oral diabetic medication   • Diabetes mellitus without complication (CMS/[facility])    • Diastolic dysfunction    • Diastolic heart failure (CMS/[facility])    • Essential hypertension     difficult to control      • Hip pain    • History of echocardiogram 08/19/2015    Echocardiogram W/ color flow 39841 (1) - Mild to moderate LVH with mild left atrial enlargement and normal aortic root.CLVH,preserved LV systolic function with Ef of 55%.Diastolic dysfunction.MV intact.AV thickened.Aortic sclerosis.   • Hyperlipidemia    • Influenza vaccine administered 10/02/2015    INFLUENZA IMMUN ADMIN OR PREV RECV'D  (1) - Ordered By: DIONNE MANDUJANO ([facility])    • Insomnia    • Insomnia     Other insomnia   • Joint pain    • Low back pain    • Muscle strain    • Nuclear cataract    • Pneumococcal vaccination given 07/22/2016    PNEUMOC VAC/ADMIN/RCVD 4040F (3) - Ordered By: DIONNE MANDUJANO ([facility])   • Prostate cancer (CMS/[facility])    • Pure hypercholesterolemia    • Sedentary lifestyle        Allergies   Allergen Reactions   •  Aldactone [Spironolactone] Other (See Comments)     Hyponatremia and hyperkalemia       Past Surgical History:   Procedure Laterality Date   • CARDIAC ABLATION     • CARDIAC CATHETERIZATION  02/04/1986    Cardiac cath 74657 (1) - normal left heart catherization,non cardiac chest pain   • CARPAL TUNNEL RELEASE  10/19/2015    Carpal tunnel surgery (1) - Release of left carpal tunnel   • ENDOSCOPY  08/05/2013    Colon endoscopy 98083 (2) - Hemorrhoids found.   • INGUINAL HERNIA REPAIR Right 06/07/1968    Inguinal hernia, repair (2)   • INJECTION OF MEDICATION  09/06/2012    Albuterol (2u) (1) - Ordered By: LIDYA STEINER ([facility])    • INJECTION OF MEDICATION  09/06/2012    Atrovent (1) - Ordered By: LIDYA STEINER ([facility])    • INJECTION OF MEDICATION  02/10/2012    Depo Medrol (Methylprednisone) (3) - Ordered By: HEATHER PAK ([facility])    • INJECTION OF MEDICATION  02/09/2014    Kenalog (4) - Ordered By: DIMAS ANDRADE ([facility])    • JOINT REPLACEMENT     • LUMBAR LAMINECTOMY  2007    Lumbar laminectomy (1)   • MANDIBLE FRACTURE SURGERY  10/11/1981    Treat nose/jaw fracture (1) - bilateral open reduction of fracture of mandible   • OTHER SURGICAL HISTORY  09/24/2011    Drain/Inject Major Joint 90051 (1) - Ordered By: KARLOS HERNANDEZ ([facility])    • OTHER SURGICAL HISTORY      Insert IVC filter 96970 (1)   • OTHER SURGICAL HISTORY  10/11/2011    SPIROMETRY 25276 (1) - Ordered By: HEATHER PAK ([facility])   • TOTAL HIP ARTHROPLASTY Right        Family History   Problem Relation Age of Onset   • Diabetes Other    • Cancer Sister    • Heart disease Mother    • Hypertension Mother    • Heart disease Father    • Hypertension Father    • Coronary artery disease Neg Hx        Social History     Socioeconomic History   • Marital status:      Spouse name: Not on file   • Number of children: Not on file   • Years of education: Not on file   • Highest education level: Not on file   Tobacco Use   • Smoking status:  Former Smoker     Last attempt to quit:      Years since quittin.9   • Smokeless tobacco: Never Used   Substance and Sexual Activity   • Alcohol use: No   • Drug use: No   • Sexual activity: Defer     Comment: Marital status:            Objective   Physical Exam   Constitutional: He is oriented to person, place, and time. He appears well-developed and well-nourished.   HENT:   Head: Normocephalic and atraumatic.   Eyes: EOM are normal. Pupils are equal, round, and reactive to light.   Neck: Normal range of motion. Neck supple.   Cardiovascular: Normal rate, regular rhythm and normal heart sounds.   Pulmonary/Chest: Effort normal. He has wheezes.   Abdominal: Soft. Bowel sounds are normal. He exhibits no distension and no ascites. There is no tenderness. There is no rebound and no guarding.   Musculoskeletal: Normal range of motion.   Neurological: He is alert and oriented to person, place, and time.   Skin: Skin is warm and dry. Capillary refill takes less than 2 seconds.   Psychiatric: He has a normal mood and affect. His behavior is normal.   Nursing note and vitals reviewed.      Procedures           ED Course                  MDM  Number of Diagnoses or Management Options  Enteritis:   Pneumonia of right upper lobe due to infectious organism (CMS/HCC):   Renal failure, unspecified chronicity:   Diagnosis management comments: 83 years old presented with diarrhea and cough with shortness of breath.  He is given IV fluid and breathing treatment.  Workup showed acute on chronic renal failure and right upper lobe pneumonia.  Started him on antibiotics.  X-rays abdomen did not show any obstructive bowel pattern/perforation.  Has some air-fluid level secondary to enteritis/diarrhea.  Patient did not provide a stool sample yet.  Discussed with  and patient is admitted       Amount and/or Complexity of Data Reviewed  Clinical lab tests: ordered and reviewed  Tests in the radiology section of  CPT®: ordered and reviewed  Decide to obtain previous medical records or to obtain history from someone other than the patient: yes  Discuss the patient with other providers: yes      Labs Reviewed   BLOOD CULTURE - Abnormal; Notable for the following components:       Result Value    Blood Culture Abnormal Stain (*)     All other components within normal limits   RESPIRATORY PANEL, PCR - Abnormal; Notable for the following components:    Human Metapneumovirus Detected (*)     All other components within normal limits   BLOOD CULTURE ID - Abnormal; Notable for the following components:    BCID, PCR   (*)     Value: Staphylococcus species, not aureus. mecA (methicillin resistance gene) detected. Identification by BCID PCR.    All other components within normal limits   COMPREHENSIVE METABOLIC PANEL - Abnormal; Notable for the following components:    Glucose 109 (*)     BUN 44 (*)     Creatinine 2.07 (*)     Sodium 133 (*)     eGFR   Amer 37 (*)     All other components within normal limits    Narrative:     The MDRD GFR formula is only valid for adults with stable renal function between ages 18 and 70.   URINALYSIS W/ MICROSCOPIC IF INDICATED (NO CULTURE) - Abnormal; Notable for the following components:    Ketones, UA Trace (*)     All other components within normal limits    Narrative:     Urine microscopic not indicated.   TROPONIN (IN-HOUSE) - Abnormal; Notable for the following components:    Troponin I 0.049 (*)     All other components within normal limits   CBC WITH AUTO DIFFERENTIAL - Abnormal; Notable for the following components:    WBC 13.17 (*)     RBC 4.13 (*)     Hemoglobin 12.5 (*)     Hematocrit 35.7 (*)     RDW-SD 44.9 (*)     Lymphocyte % 9.8 (*)     Monocyte % 13.4 (*)     Immature Grans % 0.8 (*)     Neutrophils, Absolute 9.96 (*)     Monocytes, Absolute 1.77 (*)     Immature Grans, Absolute 0.11 (*)     All other components within normal limits   HEMOGLOBIN A1C - Abnormal; Notable for  the following components:    Hemoglobin A1C 6.1 (*)     All other components within normal limits   TROPONIN (IN-HOUSE) - Abnormal; Notable for the following components:    Troponin I 0.046 (*)     All other components within normal limits   BASIC METABOLIC PANEL - Abnormal; Notable for the following components:    BUN 24 (*)     Creatinine 1.43 (*)     Sodium 136 (*)     CO2 21.0 (*)     Calcium 8.0 (*)     All other components within normal limits    Narrative:     The MDRD GFR formula is only valid for adults with stable renal function between ages 18 and 70.   CBC WITH AUTO DIFFERENTIAL - Abnormal; Notable for the following components:    RBC 3.40 (*)     Hemoglobin 10.1 (*)     Hematocrit 29.8 (*)     RDW 14.6 (*)     RDW-SD 46.9 (*)     MPV 7.9 (*)     Monocyte % 12.2 (*)     Immature Grans % 1.4 (*)     Monocytes, Absolute 1.05 (*)     Immature Grans, Absolute 0.12 (*)     All other components within normal limits   BASIC METABOLIC PANEL - Abnormal; Notable for the following components:    CO2 21.0 (*)     Calcium 8.0 (*)     All other components within normal limits    Narrative:     The MDRD GFR formula is only valid for adults with stable renal function between ages 18 and 70.   CBC WITH AUTO DIFFERENTIAL - Abnormal; Notable for the following components:    RBC 3.09 (*)     Hemoglobin 9.2 (*)     Hematocrit 26.9 (*)     RDW-SD 46.0 (*)     Monocyte % 13.3 (*)     Immature Grans % 1.4 (*)     Immature Grans, Absolute 0.09 (*)     All other components within normal limits   LIPASE - Normal   BNP (IN-HOUSE) - Normal   MAGNESIUM - Normal   LACTIC ACID, PLASMA - Normal   POCT GLUCOSE FINGERSTICK - Normal   POCT GLUCOSE FINGERSTICK - Normal   POCT GLUCOSE FINGERSTICK - Normal   POCT GLUCOSE FINGERSTICK - Normal   POCT GLUCOSE FINGERSTICK - Normal   POCT GLUCOSE FINGERSTICK - Normal   POCT GLUCOSE FINGERSTICK - Normal   POCT GLUCOSE FINGERSTICK - Normal   POCT GLUCOSE FINGERSTICK - Normal   BLOOD CULTURE    GASTROINTESTINAL PANEL, PCR   RAINBOW DRAW    Narrative:     The following orders were created for panel order [place] Draw.  Procedure                               Abnormality         Status                     ---------                               -----------         ------                     Light Blue Top[[id number]]                                   Final result               Green Top (Gel)[[id number]]                                  Final result               Lavender Top[[id number]]                                     Final result               Gold Top - SST[[id number]]                                   Final result                 Please view results for these tests on the individual orders.   RAINBOW DRAW    Narrative:     The following orders were created for panel order [place] Draw.  Procedure                               Abnormality         Status                     ---------                               -----------         ------                     Light Blue Top[[id number]]                                   Final result               Green Top (Gel)[[id number]]                                  Final result               Lavender Top[[id number]]                                     Final result               Gold Top - SST[[id number]]                                   Final result                 Please view results for these tests on the individual orders.   LIGHT BLUE TOP   GREEN TOP   LAVENDER TOP   GOLD TOP - SST   CBC AND DIFFERENTIAL    Narrative:     The following orders were created for panel order CBC & Differential.  Procedure                               Abnormality         Status                     ---------                               -----------         ------                     CBC Auto Differential[[id number]]        Abnormal            Final result                 Please view results for these tests on the individual orders.   LIGHT BLUE TOP   GREEN TOP   LAVENDER TOP   GOLD  TOP - SST   CBC AND DIFFERENTIAL    Narrative:     The following orders were created for panel order CBC & Differential.  Procedure                               Abnormality         Status                     ---------                               -----------         ------                     CBC Auto Differential[[id number]]        Abnormal            Final result                 Please view results for these tests on the individual orders.   CBC AND DIFFERENTIAL    Narrative:     The following orders were created for panel order CBC & Differential.  Procedure                               Abnormality         Status                     ---------                               -----------         ------                     CBC Auto Differential[[id number]]        Abnormal            Final result                 Please view results for these tests on the individual orders.       Xr Abdomen 2 View With Chest 1 View    Result Date: 12/2/2018  Narrative: Abdomen two view, chest CLINICAL INDICATION: Abdominal pain, diarrhea   COMPARISON: Chest x-ray November 9, 2017 FINDINGS: Upright PA chest, Supine and upright views of the abdomen (three    images). Nonobstructive, nonspecific bowel gas pattern. Nondilated loops of large and small bowel. Levels. Findings are suggestive of enteritis. No evidence of free air. Degenerative changes lumbar spine. Posterior spinal fusion L4, L5-S1. Right hip bipolar femoral head arthroplasty. There are new right upper lobe infiltrative changes suggesting pneumonitis.     Impression: CONCLUSION: There are new right upper lobe infiltrative changes suggesting pneumonitis. Nonspecific bowel gas pattern. Nondilated loops of large and small bowel. Scattered air-fluid levels. These findings suggest an enteritis. Electronically signed by:  Bruce Zepeda MD  12/2/2018 8:45 AM CST Workstation: [id number]          Final diagnoses:   Pneumonia of right upper lobe due to infectious organism (CMS/[facility])   Renal  failure, unspecified chronicity   Enteritis            Wyatt Torres MD  12/04/18 [id number]

## 2018-12-03 PROBLEM — K52.9 ENTERITIS: Status: ACTIVE | Noted: 2018-12-03

## 2018-12-03 PROBLEM — N19 RENAL FAILURE: Status: ACTIVE | Noted: 2018-12-03

## 2018-12-03 LAB
ANION GAP SERPL CALCULATED.3IONS-SCNC: 12 MMOL/L (ref 5–15)
BACTERIA BLD CULT: ABNORMAL
BASOPHILS # BLD AUTO: 0.01 10*3/MM3 (ref 0–0.2)
BASOPHILS NFR BLD AUTO: 0.1 % (ref 0–2)
BUN BLD-MCNC: 24 MG/DL (ref 7–21)
BUN/CREAT SERPL: 16.8 (ref 7–25)
CALCIUM SPEC-SCNC: 8 MG/DL (ref 8.4–10.2)
CHLORIDE SERPL-SCNC: 103 MMOL/L (ref 95–110)
CO2 SERPL-SCNC: 21 MMOL/L (ref 22–31)
CREAT BLD-MCNC: 1.43 MG/DL (ref 0.7–1.3)
DEPRECATED RDW RBC AUTO: 46.9 FL (ref 35.1–43.9)
EOSINOPHIL # BLD AUTO: 0.07 10*3/MM3 (ref 0–0.7)
EOSINOPHIL NFR BLD AUTO: 0.8 % (ref 0–7)
ERYTHROCYTE [DISTWIDTH] IN BLOOD BY AUTOMATED COUNT: 14.6 % (ref 11.5–14.5)
GFR SERPL CREATININE-BSD FRML MDRD: 57 ML/MIN/1.73 (ref 42–98)
GLUCOSE BLD-MCNC: 95 MG/DL (ref 60–100)
GLUCOSE BLDC GLUCOMTR-MCNC: 105 MG/DL (ref 70–130)
GLUCOSE BLDC GLUCOMTR-MCNC: 125 MG/DL (ref 70–130)
GLUCOSE BLDC GLUCOMTR-MCNC: 96 MG/DL (ref 70–130)
GLUCOSE BLDC GLUCOMTR-MCNC: 96 MG/DL (ref 70–130)
HCT VFR BLD AUTO: 29.8 % (ref 39–49)
HGB BLD-MCNC: 10.1 G/DL (ref 13.7–17.3)
IMM GRANULOCYTES # BLD: 0.12 10*3/MM3 (ref 0–0.02)
IMM GRANULOCYTES NFR BLD: 1.4 % (ref 0–0.5)
LYMPHOCYTES # BLD AUTO: 1.18 10*3/MM3 (ref 0.6–4.2)
LYMPHOCYTES NFR BLD AUTO: 13.7 % (ref 10–50)
MCH RBC QN AUTO: 29.7 PG (ref 26.5–34)
MCHC RBC AUTO-ENTMCNC: 33.9 G/DL (ref 31.5–36.3)
MCV RBC AUTO: 87.6 FL (ref 80–98)
MONOCYTES # BLD AUTO: 1.05 10*3/MM3 (ref 0–0.9)
MONOCYTES NFR BLD AUTO: 12.2 % (ref 0–12)
NEUTROPHILS # BLD AUTO: 6.2 10*3/MM3 (ref 2–8.6)
NEUTROPHILS NFR BLD AUTO: 71.8 % (ref 37–80)
PLATELET # BLD AUTO: 349 10*3/MM3 (ref 150–450)
PMV BLD AUTO: 7.9 FL (ref 8–12)
POTASSIUM BLD-SCNC: 4.4 MMOL/L (ref 3.5–5.1)
RBC # BLD AUTO: 3.4 10*6/MM3 (ref 4.37–5.74)
SODIUM BLD-SCNC: 136 MMOL/L (ref 137–145)
WBC NRBC COR # BLD: 8.63 10*3/MM3 (ref 3.2–9.8)

## 2018-12-03 PROCEDURE — 96368 THER/DIAG CONCURRENT INF: CPT

## 2018-12-03 PROCEDURE — 94760 N-INVAS EAR/PLS OXIMETRY 1: CPT

## 2018-12-03 PROCEDURE — 25010000002 AZITHROMYCIN PER 500 MG: Performed by: INTERNAL MEDICINE

## 2018-12-03 PROCEDURE — 25010000002 CEFTRIAXONE PER 250 MG: Performed by: INTERNAL MEDICINE

## 2018-12-03 PROCEDURE — 96366 THER/PROPH/DIAG IV INF ADDON: CPT

## 2018-12-03 PROCEDURE — 94799 UNLISTED PULMONARY SVC/PX: CPT

## 2018-12-03 PROCEDURE — 85025 COMPLETE CBC W/AUTO DIFF WBC: CPT | Performed by: INTERNAL MEDICINE

## 2018-12-03 PROCEDURE — 80048 BASIC METABOLIC PNL TOTAL CA: CPT | Performed by: INTERNAL MEDICINE

## 2018-12-03 PROCEDURE — 96361 HYDRATE IV INFUSION ADD-ON: CPT

## 2018-12-03 PROCEDURE — G0378 HOSPITAL OBSERVATION PER HR: HCPCS

## 2018-12-03 PROCEDURE — 82962 GLUCOSE BLOOD TEST: CPT

## 2018-12-03 PROCEDURE — 94640 AIRWAY INHALATION TREATMENT: CPT

## 2018-12-03 RX ORDER — IPRATROPIUM BROMIDE AND ALBUTEROL SULFATE 2.5; .5 MG/3ML; MG/3ML
3 SOLUTION RESPIRATORY (INHALATION)
Status: DISCONTINUED | OUTPATIENT
Start: 2018-12-03 | End: 2018-12-04 | Stop reason: HOSPADM

## 2018-12-03 RX ADMIN — SODIUM CHLORIDE, PRESERVATIVE FREE 3 ML: 5 INJECTION INTRAVENOUS at 20:24

## 2018-12-03 RX ADMIN — GABAPENTIN 300 MG: 300 CAPSULE ORAL at 20:23

## 2018-12-03 RX ADMIN — GABAPENTIN 300 MG: 300 CAPSULE ORAL at 08:47

## 2018-12-03 RX ADMIN — ASPIRIN 81 MG: 81 TABLET, COATED ORAL at 08:47

## 2018-12-03 RX ADMIN — RIVAROXABAN 5 MG: 10 TABLET, FILM COATED ORAL at 08:47

## 2018-12-03 RX ADMIN — AZITHROMYCIN MONOHYDRATE 500 MG: 500 INJECTION, POWDER, LYOPHILIZED, FOR SOLUTION INTRAVENOUS at 11:26

## 2018-12-03 RX ADMIN — SODIUM CHLORIDE, PRESERVATIVE FREE 3 ML: 5 INJECTION INTRAVENOUS at 08:47

## 2018-12-03 RX ADMIN — ATORVASTATIN CALCIUM 10 MG: 10 TABLET, FILM COATED ORAL at 08:47

## 2018-12-03 RX ADMIN — IPRATROPIUM BROMIDE AND ALBUTEROL SULFATE 3 ML: 2.5; .5 SOLUTION RESPIRATORY (INHALATION) at 13:24

## 2018-12-03 RX ADMIN — SODIUM CHLORIDE 125 ML/HR: 900 INJECTION, SOLUTION INTRAVENOUS at 02:58

## 2018-12-03 RX ADMIN — FAMOTIDINE 20 MG: 20 TABLET ORAL at 08:47

## 2018-12-03 RX ADMIN — SODIUM CHLORIDE 125 ML/HR: 900 INJECTION, SOLUTION INTRAVENOUS at 19:10

## 2018-12-03 RX ADMIN — IPRATROPIUM BROMIDE AND ALBUTEROL SULFATE 3 ML: 2.5; .5 SOLUTION RESPIRATORY (INHALATION) at 19:54

## 2018-12-03 RX ADMIN — FERROUS SULFATE TAB EC 324 MG (65 MG FE EQUIVALENT) 324 MG: 324 (65 FE) TABLET DELAYED RESPONSE at 08:46

## 2018-12-03 RX ADMIN — CEFTRIAXONE SODIUM 1 G: 1 INJECTION, POWDER, FOR SOLUTION INTRAMUSCULAR; INTRAVENOUS at 11:26

## 2018-12-03 NOTE — PLAN OF CARE
Problem: Fall Risk (Adult)  Goal: Identify Related Risk Factors and Signs and Symptoms  Outcome: Ongoing (interventions implemented as appropriate)    Goal: Absence of Fall  Outcome: Outcome(s) achieved Date Met: 12/02/18      Problem: Patient Care Overview  Goal: Plan of Care Review  Outcome: Ongoing (interventions implemented as appropriate)   12/02/18 7050   Coping/Psychosocial   Plan of Care Reviewed With patient   Plan of Care Review   Progress no change   OTHER   Outcome Summary Patient currently resting with no complaints. VSS. Will continue to monitor.      Goal: Individualization and Mutuality  Outcome: Ongoing (interventions implemented as appropriate)    Goal: Discharge Needs Assessment  Outcome: Ongoing (interventions implemented as appropriate)    Goal: Interprofessional Rounds/Family Conf  Outcome: Ongoing (interventions implemented as appropriate)      Problem: Skin Injury Risk (Adult)  Goal: Identify Related Risk Factors and Signs and Symptoms  Outcome: Ongoing (interventions implemented as appropriate)    Goal: Skin Health and Integrity  Outcome: Ongoing (interventions implemented as appropriate)      Problem: Pneumonia (Adult)  Goal: Signs and Symptoms of Listed Potential Problems Will be Absent, Minimized or Managed (Pneumonia)  Outcome: Ongoing (interventions implemented as appropriate)      Problem: Sepsis/Septic Shock (Adult)  Goal: Signs and Symptoms of Listed Potential Problems Will be Absent, Minimized or Managed (Sepsis/Septic Shock)  Outcome: Ongoing (interventions implemented as appropriate)

## 2018-12-03 NOTE — PLAN OF CARE
Problem: Patient Care Overview  Goal: Plan of Care Review  Outcome: Ongoing (interventions implemented as appropriate)   12/03/18 1545   Coping/Psychosocial   Plan of Care Reviewed With patient   Plan of Care Review   Progress improving   OTHER   Outcome Summary Pt resting at this time; VSS; all needs met. Will continue to monitor.     Goal: Individualization and Mutuality  Outcome: Ongoing (interventions implemented as appropriate)    Goal: Discharge Needs Assessment  Outcome: Ongoing (interventions implemented as appropriate)    Goal: Interprofessional Rounds/Family Conf  Outcome: Ongoing (interventions implemented as appropriate)      Problem: Skin Injury Risk (Adult)  Goal: Identify Related Risk Factors and Signs and Symptoms  Outcome: Ongoing (interventions implemented as appropriate)    Goal: Skin Health and Integrity  Outcome: Ongoing (interventions implemented as appropriate)      Problem: Pneumonia (Adult)  Goal: Signs and Symptoms of Listed Potential Problems Will be Absent, Minimized or Managed (Pneumonia)  Outcome: Ongoing (interventions implemented as appropriate)      Problem: Sepsis/Septic Shock (Adult)  Goal: Signs and Symptoms of Listed Potential Problems Will be Absent, Minimized or Managed (Sepsis/Septic Shock)  Outcome: Ongoing (interventions implemented as appropriate)

## 2018-12-03 NOTE — PROGRESS NOTES
"DAILY PROGRESS NOTE  [facility]    Patient Identification:  Name: General Keith Ruvalcaba Jr.  Age: 83 y.o.  Sex: male  :  1935  MRN: [id number]         Primary Care Physician: Provider, No Known    Subjective:  Interval History:He is feeling better today.    Objective:    Scheduled Meds:  aspirin 81 mg Oral Daily   atorvastatin 10 mg Oral Daily   azithromycin 500 mg Intravenous Q24H   ceftriaxone 1 g Intravenous Q24H   famotidine 20 mg Oral Daily   ferrous sulfate 324 mg Oral Daily With Breakfast   gabapentin 300 mg Oral Q12H   insulin aspart 0-9 Units Subcutaneous 4x Daily AC & at Bedtime   ipratropium-albuterol 3 mL Nebulization 4x Daily - RT   rivaroxaban 5 mg Oral Daily   sodium chloride 3 mL Intravenous Q12H     Continuous Infusions:  sodium chloride 125 mL/hr Last Rate: 125 mL/hr (18 1330)       Vital signs in last 24 hours:  Temp:  [97.5 °F (36.4 °C)-99.4 °F (37.4 °C)] 97.8 °F (36.6 °C)  Heart Rate:  [76-91] 76  Resp:  [16-18] 16  BP: (113-137)/(62-78) 137/67    Intake/Output:    Intake/Output Summary (Last 24 hours) at 12/3/2018 1346  Last data filed at 12/3/2018 1102  Gross per 24 hour   Intake 950 ml   Output 1950 ml   Net -1000 ml       Exam:  /67 (BP Location: Right arm, Patient Position: Lying)   Pulse 76 Comment: heart rate of 79 after neb.  Temp 97.8 °F (36.6 °C) (Oral)   Resp 16   Ht 170.2 cm (67\")   Wt 72.3 kg (159 lb 8 oz)   SpO2 95%   BMI 24.98 kg/m²     General Appearance:    Alert, cooperative, no distress   Head:    Normocephalic, without obvious abnormality, atraumatic   Eyes:       Throat:   Lips, tongue, gums normal   Neck:   Supple, symmetrical, trachea midline, no JVD   Lungs:     crackles bilaterally, respirations unlabored   Chest Wall:    No tenderness or deformity    Heart:    Regular rate and rhythm, S1 and S2 normal, no murmur,no  Rub or gallop   Abdomen:     Soft, non-tender, bowel sounds active, no masses, no organomegaly    Extremities:   " Extremities normal, atraumatic, no cyanosis or edema   Pulses:      Skin:   Skin is warm and dry,  no rashes or palpable lesions   Neurologic:   no focal deficits noted      [unfilled]  Data Review:  Results from last 7 days   Lab Units  12/03/18   0539  12/02/18   0835   SODIUM mmol/L  136*  133*   POTASSIUM mmol/L  4.4  3.9   CHLORIDE mmol/L  103  98   CO2 mmol/L  21.0*  24.0   BUN mg/dL  24*  44*   CREATININE mg/dL  1.43*  2.07*   GLUCOSE mg/dL  95  109*   CALCIUM mg/dL  8.0*  8.5     Results from last 7 days   Lab Units  12/03/18   0539  12/02/18   0737   WBC 10*3/mm3  8.63  13.17*   HEMOGLOBIN g/dL  10.1*  12.5*   HEMATOCRIT %  29.8*  35.7*   PLATELETS 10*3/mm3  349  388         Results from last 7 days   Lab Units  12/02/18   0737   HEMOGLOBIN A1C %  6.1*     No results found for: TROPONINT      Results from last 7 days   Lab Units  12/02/18   0835   ALK PHOS U/L  47   BILIRUBIN mg/dL  0.6   ALT (SGPT) U/L  21   AST (SGOT) U/L  23         Results from last 7 days   Lab Units  12/02/18   0737   HEMOGLOBIN A1C %  6.1*     Glucose   Date/Time Value Ref Range Status   12/03/2018 1021 96 70 - 130 mg/dL Final     Comment:     : [id number] VALE Acemilena ID: [id number]   12/03/2018 0750 96 70 - 130 mg/dL Final     Comment:     : [id number] VALE Jama ID: [id number]   12/02/2018 1932 118 70 - 130 mg/dL Final     Comment:     RN NotifiedOperator: [id number] IRENA Ferreira ID: [id number]   12/02/2018 1644 101 70 - 130 mg/dL Final     Comment:     RN NotifiedOperator: [id number] TRENT Bolivar ID: [id number]   12/02/2018 1216 102 70 - 130 mg/dL Final     Comment:     RN NotifiedOperator: [id number] TRENT Bolivar ID: [id number]           Patient Active Problem List   Diagnosis Code   • Chronic diastolic congestive heart failure (CMS/[facility]) I50.32   • Hypertension I10   • Coronary artery disease I25.10   • Chronic kidney disease N18.9   • Status post total hip replacement,  right Z96.641   • Lumbar disc disease M51.9   • Osteoarthritis of multiple joints M15.9   • Iron deficiency E61.1   • Nuclear cataract [id number]   • Cortical age-related cataract H25.019   • Diabetes mellitus without complication (CMS/HCC) E11.9   • Trochanteric bursitis, left hip M70.62   • Carotid stenosis, asymptomatic I65.29   • Type 2 diabetes mellitus without complication, without long-term current use of insulin (CMS/[facility]) E11.9   • Elevated troponin R74.8   • Lower extremity edema R60.0   • Hematuria, microscopic R31.29   • H/O prostate cancer Z85.46   • Chronic kidney disease, stage II (mild) N18.2   • Right foot pain M79.671   • Tendonitis of foot M77.50   • Right hip pain M25.551   • Lumbar radiculopathy, right M54.16   • Trochanteric bursitis of right hip M70.61   • Family history of colon cancer Z80.0   • Encounter for screening for malignant neoplasm of colon Z12.11   • Chronic deep vein thrombosis (DVT) of femoral vein of both lower extremities (CMS/[facility]) I82.513   • Long term current use of anticoagulant Z79.01   • Pain of left hip joint M25.552   • Pneumonia of right upper lobe due to infectious organism (CMS/formerly [facility]) J18.1   • Enteritis K52.9   • Renal failure N19       Assessment:  Active Hospital Problems    Diagnosis Date Noted   • **Pneumonia of right upper lobe due to infectious organism (CMS/formerly [facility]) [J18.1] 12/02/2018   • Enteritis [K52.9] 12/03/2018   • Renal failure [N19] 12/03/2018   • Diabetes mellitus without complication (CMS/HCC) [E11.9] 10/24/2016   • Chronic diastolic congestive heart failure (CMS/[facility]) [I50.32] 08/11/2016   • Hypertension [I10] 08/11/2016   • Coronary artery disease [I25.10] 08/11/2016      Resolved Hospital Problems   No resolved problems to display.       Plan:  Continue with antibiotics, check respiratory PCR, PT eval. Holding diuretics, Maybe home tomorrow if feeling better.    Phani Gilliland MD  12/3/2018  1:46 PM

## 2018-12-04 ENCOUNTER — EPISODE CHANGES (OUTPATIENT)
Dept: CASE MANAGEMENT | Facility: OTHER | Age: 83
End: 2018-12-04

## 2018-12-04 VITALS
HEIGHT: 67 IN | HEART RATE: 98 BPM | RESPIRATION RATE: 18 BRPM | WEIGHT: 163.1 LBS | DIASTOLIC BLOOD PRESSURE: 54 MMHG | TEMPERATURE: 97.3 F | OXYGEN SATURATION: 95 % | SYSTOLIC BLOOD PRESSURE: 132 MMHG | BODY MASS INDEX: 25.6 KG/M2

## 2018-12-04 PROBLEM — N17.9 AKI (ACUTE KIDNEY INJURY): Status: ACTIVE | Noted: 2018-12-04

## 2018-12-04 PROBLEM — J12.3 HUMAN METAPNEUMOVIRUS (HMPV) PNEUMONIA: Status: ACTIVE | Noted: 2018-12-04

## 2018-12-04 LAB
ANION GAP SERPL CALCULATED.3IONS-SCNC: 7 MMOL/L (ref 5–15)
B PERT DNA SPEC QL NAA+PROBE: NOT DETECTED
BASOPHILS # BLD AUTO: 0.01 10*3/MM3 (ref 0–0.2)
BASOPHILS NFR BLD AUTO: 0.2 % (ref 0–2)
BUN BLD-MCNC: 16 MG/DL (ref 7–21)
BUN/CREAT SERPL: 12.9 (ref 7–25)
C PNEUM DNA NPH QL NAA+NON-PROBE: NOT DETECTED
CALCIUM SPEC-SCNC: 8 MG/DL (ref 8.4–10.2)
CHLORIDE SERPL-SCNC: 109 MMOL/L (ref 95–110)
CO2 SERPL-SCNC: 21 MMOL/L (ref 22–31)
CREAT BLD-MCNC: 1.24 MG/DL (ref 0.7–1.3)
DEPRECATED RDW RBC AUTO: 46 FL (ref 35.1–43.9)
EOSINOPHIL # BLD AUTO: 0.04 10*3/MM3 (ref 0–0.7)
EOSINOPHIL NFR BLD AUTO: 0.6 % (ref 0–7)
ERYTHROCYTE [DISTWIDTH] IN BLOOD BY AUTOMATED COUNT: 14.4 % (ref 11.5–14.5)
FLUAV H1 2009 PAND RNA NPH QL NAA+PROBE: NOT DETECTED
FLUAV H1 HA GENE NPH QL NAA+PROBE: NOT DETECTED
FLUAV H3 RNA NPH QL NAA+PROBE: NOT DETECTED
FLUAV SUBTYP SPEC NAA+PROBE: NOT DETECTED
FLUBV RNA ISLT QL NAA+PROBE: NOT DETECTED
GFR SERPL CREATININE-BSD FRML MDRD: 68 ML/MIN/1.73 (ref 42–98)
GLUCOSE BLD-MCNC: 93 MG/DL (ref 60–100)
GLUCOSE BLDC GLUCOMTR-MCNC: 105 MG/DL (ref 70–130)
GLUCOSE BLDC GLUCOMTR-MCNC: 116 MG/DL (ref 70–130)
HADV DNA SPEC NAA+PROBE: NOT DETECTED
HCOV 229E RNA SPEC QL NAA+PROBE: NOT DETECTED
HCOV HKU1 RNA SPEC QL NAA+PROBE: NOT DETECTED
HCOV NL63 RNA SPEC QL NAA+PROBE: NOT DETECTED
HCOV OC43 RNA SPEC QL NAA+PROBE: NOT DETECTED
HCT VFR BLD AUTO: 26.9 % (ref 39–49)
HGB BLD-MCNC: 9.2 G/DL (ref 13.7–17.3)
HMPV RNA NPH QL NAA+NON-PROBE: DETECTED
HPIV1 RNA SPEC QL NAA+PROBE: NOT DETECTED
HPIV2 RNA SPEC QL NAA+PROBE: NOT DETECTED
HPIV3 RNA NPH QL NAA+PROBE: NOT DETECTED
HPIV4 P GENE NPH QL NAA+PROBE: NOT DETECTED
IMM GRANULOCYTES # BLD: 0.09 10*3/MM3 (ref 0–0.02)
IMM GRANULOCYTES NFR BLD: 1.4 % (ref 0–0.5)
LYMPHOCYTES # BLD AUTO: 0.91 10*3/MM3 (ref 0.6–4.2)
LYMPHOCYTES NFR BLD AUTO: 13.8 % (ref 10–50)
M PNEUMO IGG SER IA-ACNC: NOT DETECTED
MCH RBC QN AUTO: 29.8 PG (ref 26.5–34)
MCHC RBC AUTO-ENTMCNC: 34.2 G/DL (ref 31.5–36.3)
MCV RBC AUTO: 87.1 FL (ref 80–98)
MONOCYTES # BLD AUTO: 0.88 10*3/MM3 (ref 0–0.9)
MONOCYTES NFR BLD AUTO: 13.3 % (ref 0–12)
NEUTROPHILS # BLD AUTO: 4.67 10*3/MM3 (ref 2–8.6)
NEUTROPHILS NFR BLD AUTO: 70.7 % (ref 37–80)
PLATELET # BLD AUTO: 342 10*3/MM3 (ref 150–450)
PMV BLD AUTO: 8.1 FL (ref 8–12)
POTASSIUM BLD-SCNC: 4 MMOL/L (ref 3.5–5.1)
RBC # BLD AUTO: 3.09 10*6/MM3 (ref 4.37–5.74)
RHINOVIRUS RNA SPEC NAA+PROBE: NOT DETECTED
RSV RNA NPH QL NAA+NON-PROBE: NOT DETECTED
SODIUM BLD-SCNC: 137 MMOL/L (ref 137–145)
WBC NRBC COR # BLD: 6.6 10*3/MM3 (ref 3.2–9.8)

## 2018-12-04 PROCEDURE — G8979 MOBILITY GOAL STATUS: HCPCS

## 2018-12-04 PROCEDURE — 96366 THER/PROPH/DIAG IV INF ADDON: CPT

## 2018-12-04 PROCEDURE — 94760 N-INVAS EAR/PLS OXIMETRY 1: CPT

## 2018-12-04 PROCEDURE — 87581 M.PNEUMON DNA AMP PROBE: CPT | Performed by: HOSPITALIST

## 2018-12-04 PROCEDURE — G0378 HOSPITAL OBSERVATION PER HR: HCPCS

## 2018-12-04 PROCEDURE — 80048 BASIC METABOLIC PNL TOTAL CA: CPT | Performed by: INTERNAL MEDICINE

## 2018-12-04 PROCEDURE — G8980 MOBILITY D/C STATUS: HCPCS

## 2018-12-04 PROCEDURE — 96361 HYDRATE IV INFUSION ADD-ON: CPT

## 2018-12-04 PROCEDURE — 87633 RESP VIRUS 12-25 TARGETS: CPT | Performed by: HOSPITALIST

## 2018-12-04 PROCEDURE — 25010000002 CEFTRIAXONE PER 250 MG: Performed by: INTERNAL MEDICINE

## 2018-12-04 PROCEDURE — 25010000002 AZITHROMYCIN: Performed by: INTERNAL MEDICINE

## 2018-12-04 PROCEDURE — 82962 GLUCOSE BLOOD TEST: CPT

## 2018-12-04 PROCEDURE — G8978 MOBILITY CURRENT STATUS: HCPCS

## 2018-12-04 PROCEDURE — 97161 PT EVAL LOW COMPLEX 20 MIN: CPT

## 2018-12-04 PROCEDURE — 94799 UNLISTED PULMONARY SVC/PX: CPT

## 2018-12-04 PROCEDURE — 85025 COMPLETE CBC W/AUTO DIFF WBC: CPT | Performed by: INTERNAL MEDICINE

## 2018-12-04 PROCEDURE — 87798 DETECT AGENT NOS DNA AMP: CPT | Performed by: HOSPITALIST

## 2018-12-04 PROCEDURE — 87486 CHLMYD PNEUM DNA AMP PROBE: CPT | Performed by: HOSPITALIST

## 2018-12-04 RX ADMIN — CEFTRIAXONE SODIUM 1 G: 1 INJECTION, POWDER, FOR SOLUTION INTRAMUSCULAR; INTRAVENOUS at 11:50

## 2018-12-04 RX ADMIN — GABAPENTIN 300 MG: 300 CAPSULE ORAL at 08:42

## 2018-12-04 RX ADMIN — FERROUS SULFATE TAB EC 324 MG (65 MG FE EQUIVALENT) 324 MG: 324 (65 FE) TABLET DELAYED RESPONSE at 08:42

## 2018-12-04 RX ADMIN — FAMOTIDINE 20 MG: 20 TABLET ORAL at 08:42

## 2018-12-04 RX ADMIN — ATORVASTATIN CALCIUM 10 MG: 10 TABLET, FILM COATED ORAL at 08:42

## 2018-12-04 RX ADMIN — ASPIRIN 81 MG: 81 TABLET, COATED ORAL at 08:42

## 2018-12-04 RX ADMIN — RIVAROXABAN 5 MG: 10 TABLET, FILM COATED ORAL at 08:42

## 2018-12-04 RX ADMIN — SODIUM CHLORIDE, PRESERVATIVE FREE 3 ML: 5 INJECTION INTRAVENOUS at 08:42

## 2018-12-04 RX ADMIN — IPRATROPIUM BROMIDE AND ALBUTEROL SULFATE 3 ML: 2.5; .5 SOLUTION RESPIRATORY (INHALATION) at 08:00

## 2018-12-04 RX ADMIN — SODIUM CHLORIDE 125 ML/HR: 900 INJECTION, SOLUTION INTRAVENOUS at 04:30

## 2018-12-04 RX ADMIN — AZITHROMYCIN MONOHYDRATE 500 MG: 500 INJECTION, POWDER, LYOPHILIZED, FOR SOLUTION INTRAVENOUS at 09:53

## 2018-12-04 NOTE — THERAPY DISCHARGE NOTE
Acute Care - Physical Therapy Initial Eval/Discharge  [facility]     Patient Name: General Keith Ruvalcaba Jr.  : 1935  MRN: [id number]  Today's Date: 2018   Onset of Illness/Injury or Date of Surgery: 18  Date of Referral to PT: 18  Referring Physician: Phani Gilliland MD      Admit Date: 2018    Visit Dx:    ICD-10-CM ICD-9-CM   1. Pneumonia of right upper lobe due to infectious organism (CMS/HCC) J18.1 486   2. Renal failure, unspecified chronicity N19 586   3. Enteritis K52.9 558.9   4. Decreased functional activity tolerance R68.89 780.99     Patient Active Problem List   Diagnosis   • Chronic diastolic congestive heart failure (CMS/HCC)   • Hypertension   • Coronary artery disease   • Chronic kidney disease   • Status post total hip replacement, right   • Lumbar disc disease   • Osteoarthritis of multiple joints   • Iron deficiency   • Nuclear cataract   • Cortical age-related cataract   • Diabetes mellitus without complication (CMS/HCC)   • Trochanteric bursitis, left hip   • Carotid stenosis, asymptomatic   • Type 2 diabetes mellitus without complication, without long-term current use of insulin (CMS/HCC)   • Elevated troponin   • Lower extremity edema   • Hematuria, microscopic   • H/O prostate cancer   • Chronic kidney disease, stage II (mild)   • Right foot pain   • Tendonitis of foot   • Right hip pain   • Lumbar radiculopathy, right   • Trochanteric bursitis of right hip   • Family history of colon cancer   • Encounter for screening for malignant neoplasm of colon   • Chronic deep vein thrombosis (DVT) of femoral vein of both lower extremities (CMS/HCC)   • Long term current use of anticoagulant   • Pain of left hip joint   • Pneumonia of right upper lobe due to infectious organism (CMS/HCC)   • Enteritis   • Renal failure   • Human metapneumovirus (hMPV) pneumonia     Past Medical History:   Diagnosis Date   • Acquired hallux rigidus    • Anemia    • Arthropathy of lumbar facet  joint    • Carpal tunnel syndrome    • CHF (congestive heart failure) (CMS/[facility])    • Chronic diastolic congestive heart failure (CMS/[facility])    • Chronic kidney disease    • Chronic kidney disease, stage II (mild)     Chronic kidney disease stage 2   • Chronic obstructive lung disease (CMS/[facility])    • Chronic renal failure    • Coronary artery disease    • Cortical senile cataract    • Diabetes mellitus (CMS/[facility])    • Diabetes mellitus type 2, noninsulin dependent (CMS/[facility])     diabetes mellitus type 2, non-insulin treated, Medication treatment plan: oral diabetic medication   • Diabetes mellitus without complication (CMS/[facility])    • Diastolic dysfunction    • Diastolic heart failure (CMS/[facility])    • Essential hypertension     difficult to control      • Hip pain    • History of echocardiogram 08/19/2015    Echocardiogram W/ color flow 88084 (1) - Mild to moderate LVH with mild left atrial enlargement and normal aortic root.CLVH,preserved LV systolic function with Ef of 55%.Diastolic dysfunction.MV intact.AV thickened.Aortic sclerosis.   • Hyperlipidemia    • Influenza vaccine administered 10/02/2015    INFLUENZA IMMUN ADMIN OR PREV RECV'D  (1) - Ordered By: DIONNE MANDUJANO ([facility])    • Insomnia    • Insomnia     Other insomnia   • Joint pain    • Low back pain    • Muscle strain    • Nuclear cataract    • Pneumococcal vaccination given 07/22/2016    PNEUMOC VAC/ADMIN/RCVD 4040F (3) - Ordered By: DIONNE MANDUJANO ([facility])   • Prostate cancer (CMS/[facility])    • Pure hypercholesterolemia    • Sedentary lifestyle      Past Surgical History:   Procedure Laterality Date   • CARDIAC ABLATION     • CARDIAC CATHETERIZATION  02/04/1986    Cardiac cath 61221 (1) - normal left heart catherization,non cardiac chest pain   • CARPAL TUNNEL RELEASE  10/19/2015    Carpal tunnel surgery (1) - Release of left carpal tunnel   • ENDOSCOPY  08/05/2013    Colon endoscopy 30797 (2) - Hemorrhoids found.   • INGUINAL HERNIA REPAIR Right  06/07/1968    Inguinal hernia, repair (2)   • INJECTION OF MEDICATION  09/06/2012    Albuterol (2u) (1) - Ordered By: LIDYA STEINER ([facility])    • INJECTION OF MEDICATION  09/06/2012    Atrovent (1) - Ordered By: LIDYA STEINER ([facility])    • INJECTION OF MEDICATION  02/10/2012    Depo Medrol (Methylprednisone) (3) - Ordered By: HEATHER PAK ([facility])    • INJECTION OF MEDICATION  02/09/2014    Kenalog (4) - Ordered By: DIMAS ANDRADE ([facility])    • JOINT REPLACEMENT     • LUMBAR LAMINECTOMY  2007    Lumbar laminectomy (1)   • MANDIBLE FRACTURE SURGERY  10/11/1981    Treat nose/jaw fracture (1) - bilateral open reduction of fracture of mandible   • OTHER SURGICAL HISTORY  09/24/2011    Drain/Inject Major Joint 33569 (1) - Ordered By: KARLOS HERNANDEZ ([facility])    • OTHER SURGICAL HISTORY      Insert IVC filter 54366 (1)   • OTHER SURGICAL HISTORY  10/11/2011    SPIROMETRY 38363 (1) - Ordered By: HEATHER PAK ([facility])   • TOTAL HIP ARTHROPLASTY Right           PT ASSESSMENT (last 12 hours)      Physical Therapy Evaluation     Row Name 12/04/18 1124          PT Evaluation Time/Intention    Subjective Information  no complaints  -LF     Document Type  evaluation  -LF     Mode of Treatment  individual therapy;physical therapy  -LF     Patient Effort  good  -LF     Symptoms Noted During/After Treatment  shortness of breath  -     Row Name 12/04/18 1124          General Information    Patient Profile Reviewed?  yes  -LF     Onset of Illness/Injury or Date of Surgery  12/02/18  -LF     Referring Physician  Phani Gilliland MD  -LF     Patient Observations  alert;cooperative;agree to therapy  -LF     Patient/Family Observations  no family present for most of the session, but daughter arrived at end of session.  -LF     General Observations of Patient  in bed, IV, room air, tele  -LF     Prior Level of Function  independent:;all household mobility;community mobility;ADL's;home management;driving;shopping  -LF      Equipment Currently Used at Home  walker, rolling;grab bar has shower chair, but doesn't need it currently  -     Pertinent History of Current Functional Problem  pt is an 83 year old male hospitalized due to pneumonia.  -     Existing Precautions/Restrictions  no known precautions/restrictions  -     Equipment Issued to Patient  gait belt  -LF     Risks Reviewed  patient:;LOB;nausea/vomiting;dizziness;increased discomfort;change in vital signs;lines disloged  -     Benefits Reviewed  patient:;improve function;increase independence;increase strength;increase balance;increase knowledge  -     Row Name 12/04/18 1124          Relationship/Environment    Lives With  child(danny), adult daughter  -     Row Name 12/04/18 1124          Resource/Environmental Concerns    Current Living Arrangements  home/apartment/condo  -     Row Name 12/04/18 1124          Home Main Entrance    Number of Stairs, Main Entrance  three  -LF     Stair Railings, Main Entrance  railings on both sides of stairs  -     Row Name 12/04/18 1124          Cognitive Assessment/Intervention- PT/OT    Orientation Status (Cognition)  oriented x 4  -     Follows Commands (Cognition)  WFL  -     Row Name 12/04/18 1124          Safety Issues, Functional Mobility    Safety Issues Affecting Function (Mobility)  awareness of need for assistance;safety precaution awareness  -     Impairments Affecting Function (Mobility)  endurance/activity tolerance;balance  -     Row Name 12/04/18 1124          Bed Mobility Assessment/Treatment    Bed Mobility Assessment/Treatment  supine-sit;sit-supine  -LF     Supine-Sit [place] (Bed Mobility)  conditional independence  -LF     Sit-Supine [place] (Bed Mobility)  conditional independence  -LF     Assistive Device (Bed Mobility)  bed rails;head of bed elevated  -     Row Name 12/04/18 1124          Transfer Assessment/Treatment    Transfer Assessment/Treatment  sit-stand transfer;stand-sit  transfer  -LF     Sit-Stand [place] (Transfers)  conditional independence  -LF     Stand-Sit [place] (Transfers)  conditional independence  -     Row Name 12/04/18 1124          Sit-Stand Transfer    Assistive Device (Sit-Stand Transfers)  walker, front-wheeled  -LF     Row Name 12/04/18 1124          Stand-Sit Transfer    Assistive Device (Stand-Sit Transfers)  walker, front-wheeled  -LF     Row Name 12/04/18 1124          Gait/Stairs Assessment/Training    [place] Level (Gait)  supervision  -     Assistive Device (Gait)  walker, front-wheeled  -LF     Distance in Feet (Gait)  200 feet  -     Bilateral Gait Deviations  forward flexed posture  -     Comment (Gait/Stairs)  Patient reported SOB with by the end of ambulation. HR was 144 after ambulating. Supervision required due to IV pole.  -     Row Name 12/04/18 1124          General ROM    GENERAL ROM COMMENTS  BLE AROM WNL  -     Row Name 12/04/18 1124          MMT (Manual Muscle Testing)    General MMT Comments  BLE strength 4+/5  -[facility] Name 12/04/18 1124          Sensory Assessment/Intervention    Sensory General Assessment  no sensation deficits identified  -[facility] Name 12/04/18 1124          Pain Assessment    Additional Documentation  Pain Scale: Numbers Pre/Post-Treatment (Group)  -[facility] Name 12/04/18 1124          Pain Scale: Numbers Pre/Post-Treatment    Pain Scale: Numbers, Pretreatment  0/10 - no pain  -     Pain Scale: Numbers, Post-Treatment  0/10 - no pain  -[facility] Name 12/04/18 1124          Plan of Care Review    Plan of Care Reviewed With  patient  -     Row Name 12/04/18 112          Physical Therapy Clinical Impression    Date of Referral to PT  12/03/18  -     Functional Level at Time of Evaluation (PT Clinical Impression)  supervision to Modified independence with transfers and ambulation with RW.  -     Patient/Family Goals Statement (PT Clinical Impression)  go home today  -LF      Criteria for Skilled Interventions Met (PT Clinical Impression)  current level of function same as previous level of function  -     Impairments Found (describe specific impairments)  aerobic capacity/endurance;gait, locomotion, and balance  -     Row Name 12/04/18 1124          Vital Signs    Intratreatment Heart Rate (beats/min)  143 with ambulation  -LF     Posttreatment Heart Rate (beats/min)  100  -LF     Pre SpO2 (%)  96 with ambulation  -LF     O2 Delivery Pre Treatment  room air  -LF     Intra SpO2 (%)  98  -LF     O2 Delivery Intra Treatment  room air  -LF     Intra Patient Position  Sitting  -LF     Post Patient Position  Sitting  -     Row Name 12/04/18 1124          Positioning and Restraints    Pre-Treatment Position  in bed  -LF     Post Treatment Position  bed  -LF     In Bed  fowlers;call light within reach;encouraged to call for assist;side rails up x2;with family/caregiver;notified nsg  -     Row Name 12/04/18 1124          Living Environment    Home Accessibility  stairs to enter home;tub/shower is not walk in  -       User Key  (r) = Recorded By, (t) = Taken By, (c) = Cosigned By    Initials Name Provider Type     Jacqui Bradford PT Physical Therapist          Physical Therapy Education     Title: PT OT SLP Therapies (Resolved)     Topic: Physical Therapy (Resolved)     Point: Mobility training (Resolved)     Learning Progress Summary           Patient Acceptance, E, VU by  at 12/4/2018  1:02 PM    Comment:  Role of PT, d/c recommendations                               User Key     Initials Effective Dates Name Provider Type Discipline     07/23/18 -  Jacqui Bradford PT Physical Therapist PT                PT Recommendation and Plan  Anticipated Discharge Disposition (PT): home with home health  Therapy Frequency (PT Clinical Impression): evaluation only  Outcome Summary/Treatment Plan (PT)  Anticipated Discharge Disposition (PT): home with home health  Plan of Care Reviewed With:  patient  Outcome Summary: PT eval completed. Patient was able to transfer with Modified independence and ambulated 200 feet with RW with supervision (just to manage IV pole). Patient reported feeling short of breath by the end of ambulating. SpO2 was 96% and HR was 143 with ambulation. Patient appears to be at or very close to his baseline function and should be safe to discharge to his home environment. A home health PT eval could be beneficial to ensure safe transition to home. This will be eval only for acute care PT.    Outcome Measures     Row Name 12/04/18 1124             How much help from another person do you currently need...    Turning from your back to your side while in flat bed without using bedrails?  4  -LF      Moving from lying on back to sitting on the side of a flat bed without bedrails?  4  -LF      Moving to and from a bed to a chair (including a wheelchair)?  4  -LF      Standing up from a chair using your arms (e.g., wheelchair, bedside chair)?  4  -LF      Climbing 3-5 steps with a railing?  3  -LF      To walk in hospital room?  4  -LF      AM-PAC 6 Clicks Score  23  -LF         Functional Assessment    Outcome Measure Options  AM-PAC 6 Clicks Basic Mobility (PT)  -LF        User Key  (r) = Recorded By, (t) = Taken By, (c) = Cosigned By    Initials Name Provider Type    LF Jacqui Bradford PT Physical Therapist           Time Calculation:   PT Charges     Row Name 12/04/18 1124             Time Calculation    Start Time  1124  -LF      Stop Time  1149  -LF      Time Calculation (min)  25 min  -LF      PT Received On  12/04/18  -LF        User Key  (r) = Recorded By, (t) = Taken By, (c) = Cosigned By    Initials Name Provider Type     Jacqui Bradford PT Physical Therapist        Therapy Suggested Charges     Code   Minutes Charges    None           Therapy Charges for Today     Code Description Service Date Service Provider Modifiers Qty    92473912366  PT MOBILITY CURRENT 12/4/2018 Suzette  Jacqui, PT GP, CI 1    89910254396 HC PT MOBILITY PROJECTED 12/4/2018 Jacqui Bradford, PT GP, CI 1    63539453331 HC PT MOBILITY DISCHARGE 12/4/2018 Jacqui Bradford, PT GP, CI 1    59116177156 HC PT EVAL LOW COMPLEXITY 2 12/4/2018 Jacqui Bradford, PT GP 1          PT G-Codes  Outcome Measure Options: AM-PAC 6 Clicks Basic Mobility (PT)  AM-PAC 6 Clicks Score: 23  Functional Limitation: Mobility: Walking and moving around  Mobility: Walking and Moving Around Current Status (): At least 1 percent but less than 20 percent impaired, limited or restricted  Mobility: Walking and Moving Around Goal Status (): At least 1 percent but less than 20 percent impaired, limited or restricted  Mobility: Walking and Moving Around Discharge Status (): At least 1 percent but less than 20 percent impaired, limited or restricted    PT Discharge Summary  Anticipated Discharge Disposition (PT): home with home health  Reason for Discharge: At baseline function  Outcomes Achieved: Other(PT eval only)    Jacqui Bradford, PT  12/4/2018

## 2018-12-04 NOTE — DISCHARGE SUMMARY
PHYSICIAN DISCHARGE SUMMARY                                                                       [facility]    Patient Identification:  Name: General Keith Ruvalcaba Jr.  Age: 83 y.o.  Sex: male  :  1935  MRN: [id number]  Primary Care Physician: Provider, No Known    Admit date: 2018  Discharge date and time:2018  Discharged Condition: good    Discharge Diagnoses:  Active Hospital Problems    Diagnosis Date Noted   • **Pneumonia of right upper lobe due to infectious organism (CMS/[facility]) [J18.1] 2018   • Human metapneumovirus (hMPV) pneumonia [J12.3] 2018   • KIRK (acute kidney injury) (CMS/HCC) [N17.9] 2018   • Enteritis [K52.9] 2018   • Renal failure [N19] 2018   • Diabetes mellitus without complication (CMS/HCC) [E11.9] 10/24/2016   • Chronic diastolic congestive heart failure (CMS/[facility]) [I50.32] 2016   • Hypertension [I10] 2016   • Coronary artery disease [I25.10] 2016      Resolved Hospital Problems   No resolved problems to display.      Patient Active Problem List   Diagnosis Code   • Chronic diastolic congestive heart failure (CMS/HCC) I50.32   • Hypertension I10   • Coronary artery disease I25.10   • Chronic kidney disease N18.9   • Status post total hip replacement, right Z96.641   • Lumbar disc disease M51.9   • Osteoarthritis of multiple joints M15.9   • Iron deficiency E61.1   • Nuclear cataract [id number]   • Cortical age-related cataract H25.019   • Diabetes mellitus without complication (CMS/HCC) E11.9   • Trochanteric bursitis, left hip M70.62   • Carotid stenosis, asymptomatic I65.29   • Type 2 diabetes mellitus without complication, without long-term current use of insulin (CMS/HCC) E11.9   • Elevated troponin R74.8   • Lower extremity edema R60.0   • Hematuria, microscopic R31.29   • H/O prostate cancer Z85.46   • Chronic kidney disease, stage II (mild) N18.2   •  Right foot pain M79.671   • Tendonitis of foot M77.50   • Right hip pain M25.551   • Lumbar radiculopathy, right M54.16   • Trochanteric bursitis of right hip M70.61   • Family history of colon cancer Z80.0   • Encounter for screening for malignant neoplasm of colon Z12.11   • Chronic deep vein thrombosis (DVT) of femoral vein of both lower extremities (CMS/[facility]) I82.513   • Long term current use of anticoagulant Z79.01   • Pain of left hip joint M25.552   • Pneumonia of right upper lobe due to infectious organism (CMS/[facility]) J18.1   • Enteritis K52.9   • Renal failure N19   • Human metapneumovirus (hMPV) pneumonia J12.3   • KIRK (acute kidney injury) (CMS/[facility]) N17.9       PMHX:   Past Medical History:   Diagnosis Date   • Acquired hallux rigidus    • Anemia    • Arthropathy of lumbar facet joint    • Carpal tunnel syndrome    • CHF (congestive heart failure) (CMS/[facility])    • Chronic diastolic congestive heart failure (CMS/[facility])    • Chronic kidney disease    • Chronic kidney disease, stage II (mild)     Chronic kidney disease stage 2   • Chronic obstructive lung disease (CMS/[facility])    • Chronic renal failure    • Coronary artery disease    • Cortical senile cataract    • Diabetes mellitus (CMS/[facility])    • Diabetes mellitus type 2, noninsulin dependent (CMS/[facility])     diabetes mellitus type 2, non-insulin treated, Medication treatment plan: oral diabetic medication   • Diabetes mellitus without complication (CMS/[facility])    • Diastolic dysfunction    • Diastolic heart failure (CMS/[facility])    • Essential hypertension     difficult to control      • Hip pain    • History of echocardiogram 08/19/2015    Echocardiogram W/ color flow 18765 (1) - Mild to moderate LVH with mild left atrial enlargement and normal aortic root.CLVH,preserved LV systolic function with Ef of 55%.Diastolic dysfunction.MV intact.AV thickened.Aortic sclerosis.   • Hyperlipidemia    • Influenza vaccine administered 10/02/2015    INFLUENZA IMMUN ADMIN OR PREV RECV'D   (1) - Ordered By: DIONNE MANDUJANO ([facility])    • Insomnia    • Insomnia     Other insomnia   • Joint pain    • Low back pain    • Muscle strain    • Nuclear cataract    • Pneumococcal vaccination given 07/22/2016    PNEUMOC VAC/ADMIN/RCVD 4040F (3) - Ordered By: DIONNE MANDUJANO ([facility])   • Prostate cancer (CMS/HCC)    • Pure hypercholesterolemia    • Sedentary lifestyle      PSHX:   Past Surgical History:   Procedure Laterality Date   • CARDIAC ABLATION     • CARDIAC CATHETERIZATION  02/04/1986    Cardiac cath 78910 (1) - normal left heart catherization,non cardiac chest pain   • CARPAL TUNNEL RELEASE  10/19/2015    Carpal tunnel surgery (1) - Release of left carpal tunnel   • ENDOSCOPY  08/05/2013    Colon endoscopy 54827 (2) - Hemorrhoids found.   • INGUINAL HERNIA REPAIR Right 06/07/1968    Inguinal hernia, repair (2)   • INJECTION OF MEDICATION  09/06/2012    Albuterol (2u) (1) - Ordered By: LIDYA STEINER ([facility])    • INJECTION OF MEDICATION  09/06/2012    Atrovent (1) - Ordered By: LIDYA STEINER ([facility])    • INJECTION OF MEDICATION  02/10/2012    Depo Medrol (Methylprednisone) (3) - Ordered By: HEATHER PAK ([facility])    • INJECTION OF MEDICATION  02/09/2014    Kenalog (4) - Ordered By: DIMAS ANDRADE ([facility])    • JOINT REPLACEMENT     • LUMBAR LAMINECTOMY  2007    Lumbar laminectomy (1)   • MANDIBLE FRACTURE SURGERY  10/11/1981    Treat nose/jaw fracture (1) - bilateral open reduction of fracture of mandible   • OTHER SURGICAL HISTORY  09/24/2011    Drain/Inject Major Joint 20610 (1) - Ordered By: KARLOS HERNANDEZ ([facility])    • OTHER SURGICAL HISTORY      Insert IVC filter 28204 (1)   • OTHER SURGICAL HISTORY  10/11/2011    SPIROMETRY 47707 (1) - Ordered By: HEATHER PAK ([facility])   • TOTAL HIP ARTHROPLASTY Right        Hospital Course:  Keith Ruvalcaba Jr.  is an 83 y.o. male history of hypertension, coronary artery disease, type 2 diabetes mellitus, chronic kidney disease, paroxysmal  atrial fibrillation, dyslipidemia, chronic back pain, CHF presents with 3 day history of progressively worsening nonbloody diarrhea associated with weakness, decreased oral intake and subjective fever.  Patient has been having 8-10 diarrheal movements daily within the past 3 days and denies any recent antimicrobial use or contact with anyone with febrile illness.  He denies nausea, vomiting, chest pain, shortness of air, palpitation, syncope or presyncope.   On triage he had a low blood pressure of 91/51 with a heart rate of 110.  He was given bolus of IV fluids with some improvement.  He had blood work and noted to have creatinine of 2.07 (from a baseline of less than 1.4.  WBC was 13.1, potassium was normal and chest x-ray was suspicious for right upper lobe infiltrative changes.  KUB showed nonspecific bowel pattern.         The patient was admitted hospital given some IV fluid for hydration and some of his blood pressure medication diuretic were held.  He was treated with IV antibiotics for possible pneumonia and respiratory PCR was positive for healing melanoma virus.  The patient was feeling better after being in the hospital for a few days and his creatinine came down to around 1.  He was feeling better and did well with PT.  He looked well enough to go home with home health and some physical therapy and skilled nursing.  We'll continue to hold some of his blood pressure medication and takes diuretics as needed.  He'll follow-up his primary care doctor in 1 week for continuing care.  He had a few days of antibiotics and appeared better and I suspect mainly has viral illness and antibiotics no longer indicated as he is improved.    Consults:     Consults     No orders found from 11/3/2018 to 12/3/2018.        Results from last 7 days   Lab Units  12/04/18   0523   WBC 10*3/mm3  6.60   HEMOGLOBIN g/dL  9.2*   HEMATOCRIT %  26.9*   PLATELETS 10*3/mm3  342     Results from last 7 days   Lab Units  12/04/18   0523    SODIUM mmol/L  137   POTASSIUM mmol/L  4.0   CHLORIDE mmol/L  109   CO2 mmol/L  21.0*   BUN mg/dL  16   CREATININE mg/dL  1.24   GLUCOSE mg/dL  93   CALCIUM mg/dL  8.0*     Significant Diagnostic Studies:   Lab Results   Component Value Date    WBC 6.60 12/04/2018    HGB 9.2 (L) 12/04/2018    HCT 26.9 (L) 12/04/2018     12/04/2018     Lab Results   Component Value Date     12/04/2018    K 4.0 12/04/2018     12/04/2018    CO2 21.0 (L) 12/04/2018    BUN 16 12/04/2018    CREATININE 1.24 12/04/2018    GLUCOSE 93 12/04/2018     Lab Results   Component Value Date    CALCIUM 8.0 (L) 12/04/2018    MG 2.2 12/02/2018     Lab Results   Component Value Date    AST 23 12/02/2018    ALT 21 12/02/2018    ALKPHOS 47 12/02/2018     No results found for: APTT, INR  Lab Results   Component Value Date    COLORU Yellow 12/02/2018    CLARITYU Clear 12/02/2018    PHUR <=5.0 12/02/2018    GLUCOSEU Negative 12/02/2018    KETONESU Trace (A) 12/02/2018    BLOODU Negative 12/02/2018    LEUKOCYTESUR Negative 12/02/2018    BILIRUBINUR Negative 12/02/2018    UROBILINOGEN 0.2 E.U./dL 12/02/2018     Lab Results   Component Value Date    TROPONINI 0.046 (H) 12/02/2018     No components found for: HGBA1C;2  No components found for: TSH;2  Imaging Results (all)     Procedure Component Value Units Date/Time    XR Abdomen 2 View With Chest 1 View [[id number]] Collected:  12/02/18 0816     Updated:  12/02/18 0846    Narrative:       Abdomen two view, chest    CLINICAL INDICATION: Abdominal pain, diarrhea       COMPARISON: Chest x-ray November 9, 2017    FINDINGS: Upright PA chest, Supine and upright views of the  abdomen (three    images).    Nonobstructive, nonspecific bowel gas pattern. Nondilated loops  of large and small bowel. Levels. Findings are suggestive of  enteritis. No evidence of free air.    Degenerative changes lumbar spine. Posterior spinal fusion L4,  L5-S1. Right hip bipolar femoral head arthroplasty.  There are new  right upper lobe infiltrative changes suggesting  pneumonitis.      Impression:       CONCLUSION: There are new right upper lobe infiltrative changes  suggesting pneumonitis. Nonspecific bowel gas pattern. Nondilated  loops of large and small bowel. Scattered air-fluid levels. These  findings suggest an enteritis.     Electronically signed by:  Bruce Zepeda MD  12/2/2018 8:45 AM CST  Workstation: [id number]        Lab Results (last 7 days)     Procedure Component Value Units Date/Time    POC Glucose Once [[id number]]  (Normal) Collected:  12/04/18 1116    Specimen:  Blood Updated:  12/04/18 1141     Glucose 116 mg/dL      Comment: RN NotifiedOperator: [id number] [employer] AM[employer]eter ID: [id number]       Blood Culture - Blood, Arm, Right [[id number]] Collected:  12/02/18 0835    Specimen:  Blood from Arm, Right Updated:  12/04/18 0845     Blood Culture No growth at 2 days    POC Glucose Once [[id number]]  (Normal) Collected:  12/04/18 0800    Specimen:  Blood Updated:  12/04/18 0816     Glucose 105 mg/dL      Comment: : [id number] [employer] ID: [id number]       Respiratory Panel, PCR - Swab, Nasopharynx [[id number]]  (Abnormal) Collected:  12/04/18 0509    Specimen:  Swab from Nasopharynx Updated:  12/04/18 0641     ADENOVIRUS, PCR Not Detected     Coronavirus 229E Not Detected     Coronavirus HKU1 Not Detected     Coronavirus NL63 Not Detected     Coronavirus OC43 Not Detected     Human Metapneumovirus Detected     Comment: droplet precautions requested  contact precautions requested          Human Rhinovirus/Enterovirus Not Detected     Influenza B PCR Not Detected     Parainfluenza Virus 1 Not Detected     Parainfluenza Virus 2 Not Detected     Parainfluenza Virus 3 Not Detected     Parainfluenza Virus 4 Not Detected     Bordetella pertussis pcr Not Detected     Influenza A H1 2009 PCR Not Detected     Chlamydophila pneumoniae PCR Not Detected     Mycoplasma pneumo by PCR Not Detected     Influenza A PCR  Not Detected     Influenza A H3 Not Detected     Influenza A H1 Not Detected     RSV, PCR Not Detected    Basic Metabolic Panel [[id number]]  (Abnormal) Collected:  12/04/18 0523    Specimen:  Blood Updated:  12/04/18 0635     Glucose 93 mg/dL      BUN 16 mg/dL      Creatinine 1.24 mg/dL      Sodium 137 mmol/L      Potassium 4.0 mmol/L      Chloride 109 mmol/L      CO2 21.0 mmol/L      Calcium 8.0 mg/dL      eGFR  African Amer 68 mL/min/1.73      BUN/Creatinine Ratio 12.9     Anion Gap 7.0 mmol/L     Narrative:       The MDRD GFR formula is only valid for adults with stable renal function between ages 18 and 70.    CBC & Differential [[id number]] Collected:  12/04/18 0523    Specimen:  Blood Updated:  12/04/18 0614    Narrative:       The following orders were created for panel order CBC & Differential.  Procedure                               Abnormality         Status                     ---------                               -----------         ------                     CBC Auto Differential[[id number]]        Abnormal            Final result                 Please view results for these tests on the individual orders.    CBC Auto Differential [[id number]]  (Abnormal) Collected:  12/04/18 0523    Specimen:  Blood Updated:  12/04/18 0614     WBC 6.60 10*3/mm3      RBC 3.09 10*6/mm3      Hemoglobin 9.2 g/dL      Hematocrit 26.9 %      MCV 87.1 fL      MCH 29.8 pg      MCHC 34.2 g/dL      RDW 14.4 %      RDW-SD 46.0 fl      MPV 8.1 fL      Platelets 342 10*3/mm3      Neutrophil % 70.7 %      Lymphocyte % 13.8 %      Monocyte % 13.3 %      Eosinophil % 0.6 %      Basophil % 0.2 %      Immature Grans % 1.4 %      Neutrophils, Absolute 4.67 10*3/mm3      Lymphocytes, Absolute 0.91 10*3/mm3      Monocytes, Absolute 0.88 10*3/mm3      Eosinophils, Absolute 0.04 10*3/mm3      Basophils, Absolute 0.01 10*3/mm3      Immature Grans, Absolute 0.09 10*3/mm3     Blood Culture - Blood, Arm, Right [[id number]]  (Abnormal) Collected:   12/02/18 1023    Specimen:  Blood from Arm, Right Updated:  12/03/18 2213     Blood Culture Abnormal Stain     Gram Stain Aerobic Bottle Gram positive cocci in pairs     Comment: 1 BOTTLE POSITIVE FOR GRAM POSITIVE COCCI OF 4 BOTTLES DRAWN       Blood Culture ID, PCR - Blood, Arm, Right [[id number]]  (Abnormal) Collected:  12/02/18 1023    Specimen:  Blood from Arm, Right Updated:  12/03/18 2213     BCID, PCR Staphylococcus species, not aureus. mecA (methicillin resistance gene) detected. Identification by BCID PCR.    POC Glucose Once [[id number]]  (Normal) Collected:  12/03/18 2042    Specimen:  Blood Updated:  12/03/18 2111     Glucose 125 mg/dL      Comment: RN NotifiedOperator: [id number] YOLANDE SHARMAONMeter ID: [id number]       POC Glucose Once [[id number]]  (Normal) Collected:  12/03/18 1652    Specimen:  Blood Updated:  12/03/18 1716     Glucose 105 mg/dL      Comment: : [id number] [employer] ID: [id number]       POC Glucose Once [[id number]]  (Normal) Collected:  12/03/18 1021    Specimen:  Blood Updated:  12/03/18 1057     Glucose 96 mg/dL      Comment: : [id number] E[place] ASHLEYMeter ID: [id number]       POC Glucose Once [[id number]]  (Normal) Collected:  12/03/18 0750    Specimen:  Blood Updated:  12/03/18 0835     Glucose 96 mg/dL      Comment: : [id number] E[place] ASHLEYMeter ID: [id number]       Basic Metabolic Panel [[id number]]  (Abnormal) Collected:  12/03/18 0539    Specimen:  Blood Updated:  12/03/18 0627     Glucose 95 mg/dL      BUN 24 mg/dL      Creatinine 1.43 mg/dL      Sodium 136 mmol/L      Potassium 4.4 mmol/L      Chloride 103 mmol/L      CO2 21.0 mmol/L      Calcium 8.0 mg/dL      eGFR  African Amer 57 mL/min/1.73      BUN/Creatinine Ratio 16.8     Anion Gap 12.0 mmol/L     Narrative:       The MDRD GFR formula is only valid for adults with stable renal function between ages 18 and 70.    CBC & Differential [[id number]] Collected:  12/03/18 0539    Specimen:   Blood Updated:  12/03/18 0613    Narrative:       The following orders were created for panel order CBC & Differential.  Procedure                               Abnormality         Status                     ---------                               -----------         ------                     CBC Auto Differential[[id number]]        Abnormal            Final result                 Please view results for these tests on the individual orders.    CBC Auto Differential [[id number]]  (Abnormal) Collected:  12/03/18 0539    Specimen:  Blood Updated:  12/03/18 0613     WBC 8.63 10*3/mm3      RBC 3.40 10*6/mm3      Hemoglobin 10.1 g/dL      Hematocrit 29.8 %      MCV 87.6 fL      MCH 29.7 pg      MCHC 33.9 g/dL      RDW 14.6 %      RDW-SD 46.9 fl      MPV 7.9 fL      Platelets 349 10*3/mm3      Neutrophil % 71.8 %      Lymphocyte % 13.7 %      Monocyte % 12.2 %      Eosinophil % 0.8 %      Basophil % 0.1 %      Immature Grans % 1.4 %      Neutrophils, Absolute 6.20 10*3/mm3      Lymphocytes, Absolute 1.18 10*3/mm3      Monocytes, Absolute 1.05 10*3/mm3      Eosinophils, Absolute 0.07 10*3/mm3      Basophils, Absolute 0.01 10*3/mm3      Immature Grans, Absolute 0.12 10*3/mm3     POC Glucose Once [[id number]]  (Normal) Collected:  12/02/18 1932    Specimen:  Blood Updated:  12/02/18 1958     Glucose 118 mg/dL      Comment: RN NotifiedOperator: [id number] IRENA Ferreira ID: [id number]       POC Glucose Once [[id number]]  (Normal) Collected:  12/02/18 1644    Specimen:  Blood Updated:  12/02/18 1912     Glucose 101 mg/dL      Comment: RN NotifiedOperator: [id number] TRENT POONNMsaundra ID: [id number]       Urinalysis With Microscopic If Indicated (No Culture) - Urine, Clean Catch [[id number]]  (Abnormal) Collected:  12/02/18 1305    Specimen:  Urine, Clean Catch Updated:  12/02/18 1315     Color, UA Yellow     Appearance, UA Clear     pH, UA <=5.0     Specific Gravity, UA 1.019     Glucose, UA Negative     Ketones, UA Trace      Bilirubin, UA Negative     Blood, UA Negative     Protein, UA Negative     Leuk Esterase, UA Negative     Nitrite, UA Negative     Urobilinogen, UA 0.2 E.U./dL    Narrative:       Urine microscopic not indicated.    POC Glucose Once [[id number]]  (Normal) Collected:  12/02/18 1216    Specimen:  Blood Updated:  12/02/18 1232     Glucose 102 mg/dL      Comment: RN NotifiedOperator: [id number] TRENT Bolivar ID: [id number]       Troponin [[id number]]  (Abnormal) Collected:  12/02/18 1129    Specimen:  Blood from Arm, Left Updated:  12/02/18 1210     Troponin I 0.046 ng/mL     Hemoglobin A1c [[id number]]  (Abnormal) Collected:  12/02/18 0737    Specimen:  Blood Updated:  12/02/18 1118     Hemoglobin A1C 6.1 %     [place] Draw [[id number]] Collected:  12/02/18 0835    Specimen:  Blood Updated:  12/02/18 0945    Narrative:       The following orders were created for panel order [place] Draw.  Procedure                               Abnormality         Status                     ---------                               -----------         ------                     Light Blue Top[[id number]]                                   Final result               Green Top (Gel)[[id number]]                                  Final result               Lavender Top[[id number]]                                     Final result               Gold Top - SST[[id number]]                                   Final result                 Please view results for these tests on the individual orders.    Light Blue Top [[id number]] Collected:  12/02/18 0835    Specimen:  Blood Updated:  12/02/18 0945     Extra Tube hold for add-on     Comment: Auto resulted       Green Top (Gel) [[id number]] Collected:  12/02/18 0835    Specimen:  Blood Updated:  12/02/18 0945     Extra Tube Hold for add-ons.     Comment: Auto resulted.       Lavender Top [[id number]] Collected:  12/02/18 0835    Specimen:  Blood Updated:  12/02/18 0945     Extra Tube hold for add-on      Comment: Auto resulted       Gold Top - SST [[id number]] Collected:  12/02/18 0835    Specimen:  Blood Updated:  12/02/18 0945     Extra Tube Hold for add-ons.     Comment: Auto resulted.       Troponin [[id number]]  (Abnormal) Collected:  12/02/18 0835    Specimen:  Blood Updated:  12/02/18 0909     Troponin I 0.049 ng/mL     BNP [[id number]]  (Normal) Collected:  12/02/18 0835    Specimen:  Blood Updated:  12/02/18 0909     proBNP 277.0 pg/mL     Comprehensive Metabolic Panel [[id number]]  (Abnormal) Collected:  12/02/18 0835    Specimen:  Blood Updated:  12/02/18 0901     Glucose 109 mg/dL      BUN 44 mg/dL      Creatinine 2.07 mg/dL      Sodium 133 mmol/L      Potassium 3.9 mmol/L      Chloride 98 mmol/L      CO2 24.0 mmol/L      Calcium 8.5 mg/dL      Total Protein 6.4 g/dL      Albumin 4.00 g/dL      ALT (SGPT) 21 U/L      AST (SGOT) 23 U/L      Alkaline Phosphatase 47 U/L      Total Bilirubin 0.6 mg/dL      eGFR  African Amer 37 mL/min/1.73      Globulin 2.4 gm/dL      A/G Ratio 1.7 g/dL      BUN/Creatinine Ratio 21.3     Anion Gap 11.0 mmol/L     Narrative:       The MDRD GFR formula is only valid for adults with stable renal function between ages 18 and 70.    Lactic Acid, Plasma [[id number]]  (Normal) Collected:  12/02/18 0835    Specimen:  Blood Updated:  12/02/18 0858     Lactate 1.0 mmol/L     Lipase [[id number]]  (Normal) Collected:  12/02/18 0835    Specimen:  Blood Updated:  12/02/18 0857     Lipase 24 U/L     Magnesium [[id number]]  (Normal) Collected:  12/02/18 0835    Specimen:  Blood Updated:  12/02/18 0857     Magnesium 2.2 mg/dL     [place] Draw [[id number]] Collected:  12/02/18 0737    Specimen:  Blood Updated:  12/02/18 0845    Narrative:       The following orders were created for panel order [place] Draw.  Procedure                               Abnormality         Status                     ---------                               -----------         ------                     Light Blue  Top[[id number]]                                   Final result               Green Top (Gel)[[id number]]                                  Final result               Lavender Top[[id number]]                                     Final result               Gold Top - SST[[id number]]                                   Final result                 Please view results for these tests on the individual orders.    Light Blue Top [[id number]] Collected:  12/02/18 0737    Specimen:  Blood Updated:  12/02/18 0845     Extra Tube hold for add-on     Comment: Auto resulted       Green Top (Gel) [[id number]] Collected:  12/02/18 0737    Specimen:  Blood Updated:  12/02/18 0845     Extra Tube Hold for add-ons.     Comment: Auto resulted.       Lavender Top [[id number]] Collected:  12/02/18 0737    Specimen:  Blood Updated:  12/02/18 0845     Extra Tube hold for add-on     Comment: Auto resulted       Gold Top - SST [[id number]] Collected:  12/02/18 0737    Specimen:  Blood Updated:  12/02/18 0845     Extra Tube Hold for add-ons.     Comment: Auto resulted.       CBC & Differential [[id number]] Collected:  12/02/18 0737    Specimen:  Blood Updated:  12/02/18 0818    Narrative:       The following orders were created for panel order CBC & Differential.  Procedure                               Abnormality         Status                     ---------                               -----------         ------                     CBC Auto Differential[[id number]]        Abnormal            Final result                 Please view results for these tests on the individual orders.    CBC Auto Differential [[id number]]  (Abnormal) Collected:  12/02/18 0737    Specimen:  Blood Updated:  12/02/18 0818     WBC 13.17 10*3/mm3      RBC 4.13 10*6/mm3      Hemoglobin 12.5 g/dL      Hematocrit 35.7 %      MCV 86.4 fL      MCH 30.3 pg      MCHC 35.0 g/dL      RDW 14.3 %      RDW-SD 44.9 fl      MPV 8.3 fL      Platelets 388 10*3/mm3      Neutrophil % 75.6 %       "Lymphocyte % 9.8 %      Monocyte % 13.4 %      Eosinophil % 0.2 %      Basophil % 0.2 %      Immature Grans % 0.8 %      Neutrophils, Absolute 9.96 10*3/mm3      Lymphocytes, Absolute 1.29 10*3/mm3      Monocytes, Absolute 1.77 10*3/mm3      Eosinophils, Absolute 0.02 10*3/mm3      Basophils, Absolute 0.02 10*3/mm3      Immature Grans, Absolute 0.11 10*3/mm3         Results from last 7 days   Lab Units  12/04/18   0523  12/03/18   0539  12/02/18   0835   SODIUM mmol/L  137  136*  133*   POTASSIUM mmol/L  4.0  4.4  3.9   CHLORIDE mmol/L  109  103  98   CO2 mmol/L  21.0*  21.0*  24.0   BUN mg/dL  16  24*  44*   CREATININE mg/dL  1.24  1.43*  2.07*   GLUCOSE mg/dL  93  95  109*   CALCIUM mg/dL  8.0*  8.0*  8.5     Results from last 7 days   Lab Units  12/04/18   0523  12/03/18   0539  12/02/18   0737   WBC 10*3/mm3  6.60  8.63  13.17*   HEMOGLOBIN g/dL  9.2*  10.1*  12.5*   HEMATOCRIT %  26.9*  29.8*  35.7*   PLATELETS 10*3/mm3  342  349  388       /54 (BP Location: Right arm, Patient Position: Sitting)   Pulse 98   Temp 97.3 °F (36.3 °C) (Oral)   Resp 18   Ht 170.2 cm (67\")   Wt 74 kg (163 lb 1.6 oz)   SpO2 95%   BMI 25.55 kg/m²     Discharge Exam:  General Appearance:    Alert, cooperative, no distress                          Head:    Normocephalic, without obvious abnormality, atraumatic                          Eyes:                            Throat:   Lips, tongue, gums normal                          Neck:   Supple, symmetrical, trachea midline, no JVD                        Lungs:     Clear to auscultation bilaterally, respirations unlabored                Chest Wall:    No tenderness or deformity                        Heart:    Regular rate and rhythm, S1 and S2 normal, no murmur,no  Rub or gallop                  Abdomen:     Soft, non-tender, bowel sounds active, no masses, no organomegaly                  Extremities:   Extremities normal, atraumatic, no cyanosis or edema                      "        Skin:   Skin is warm and dry,  no rashes or palpable lesions                  Neurologic:   no focal deficits noted     Disposition:  Home with home health    Patient Instructions:     Diet and activity as tolerated         Discharge Medications      Continue These Medications      Instructions Start Date   aspirin 81 MG EC tablet   81 mg, Oral, Daily      ferrous sulfate 325 (65 FE) MG tablet   1 tablet, Oral, Daily With Breakfast      furosemide 20 MG tablet  Commonly known as:  LASIX   20 mg, Oral, 2 Times Daily, If weight gain 2 t o 3 pounds overnight or 5 pounds in 1 week       gabapentin 300 MG capsule  Commonly known as:  NEURONTIN   300 mg, Oral, 2 Times Daily      glucose blood test strip  Commonly known as:  TRUE METRIX BLOOD GLUCOSE TEST   Check glucose daily      hydrALAZINE 25 MG tablet  Commonly known as:  APRESOLINE   25 mg, Oral, 3 Times Daily      JANUVIA 50 MG tablet  Generic drug:  SITagliptin   TAKE ONE TABLET BY MOUTH DAILY      pravastatin 40 MG tablet  Commonly known as:  PRAVACHOL   40 mg, Oral, Daily      rivaroxaban 10 MG tablet  Commonly known as:  XARELTO   10 mg, Oral, Daily      TRUEPLUS LANCETS 30G misc   1 Device, Does not apply, Daily, TEST          Stop These Medications    lisinopril-hydrochlorothiazide 20-12.5 MG per tablet  Commonly known as:  KSY MIN          Future Appointments   Date Time Provider Department Center   1/16/2019 10:00 AM Hamman, Jack L, MD [facility] None   2/7/2019  9:00 AM Maria Porter APRN [facility] None   2/18/2019  9:00 AM [facility]     Additional Instructions for the Follow-ups that You Need to Schedule     Ambulatory Referral to Home Health   As directed      Face to Face Visit Date:  12/4/2018    Follow-up Provider for Plan of Care?:  I treated the patient in an acute care facility and will not continue treatment after discharge.    Follow-up Provider:  JENA BETHEA [[id number]]     Reason/Clinical Findings:  weakness    Describe mobility limitations that make leaving home difficult:  weakness    Nursing/Therapeutic Services Requested:  Skilled Nursing Physical Therapy    Skilled nursing orders:  Other    PT orders:  Therapeutic exercise    Frequency:  1 Week 1           Follow-up Information     Provider, No Known Follow up.    Why:  Follow up with PCP in 1 week  Contact information:  [facility] [id number]             Ada Cloud MD .    Specialty:  Internal Medicine               Discharge Order (From admission, onward)    Start     Ordered    12/04/18 1352  Discharge patient  Once     Expected Discharge Date:  12/04/18    Discharge Disposition:  Home-Health Care [facility]    Physician of Record for Attribution - Please select from Treatment Team:  PHANI GILLILAND [[id number]]    Review needed by CMO to determine Physician of Record:  No       Question Answer Comment   Physician of Record for Attribution - Please select from Treatment Team PHANI GILLILAND    Review needed by CMO to determine Physician of Record No        12/04/18 0401          Total time spent discharging patient including evaluation,post hospitalization follow up,  medication and post hospitalization instructions and education total time exceeds 30 minutes.    Signed:  Phani Gilliland MD  12/4/2018  2:03 PM

## 2018-12-04 NOTE — PLAN OF CARE
Problem: Patient Care Overview  Goal: Plan of Care Review  Outcome: Ongoing (interventions implemented as appropriate)   12/04/18 1124   Coping/Psychosocial   Plan of Care Reviewed With patient   OTHER   Outcome Summary PT eval completed. Patient was able to transfer with Modified independence and ambulated 200 feet with RW with supervision (just to manage IV pole). Patient reported feeling short of breath by the end of ambulating. SpO2 was 96% and HR was 143 with ambulation. Patient appears to be at or very close to his baseline function and should be safe to discharge to his home environment. A home health PT eval could be beneficial to ensure safe transition to home. This will be eval only for acute care PT.

## 2018-12-04 NOTE — PLAN OF CARE
Problem: Fall Risk (Adult)  Goal: Identify Related Risk Factors and Signs and Symptoms  Outcome: Ongoing (interventions implemented as appropriate)      Problem: Patient Care Overview  Goal: Plan of Care Review  Outcome: Ongoing (interventions implemented as appropriate)    Goal: Individualization and Mutuality  Outcome: Ongoing (interventions implemented as appropriate)    Goal: Discharge Needs Assessment  Outcome: Ongoing (interventions implemented as appropriate)    Goal: Interprofessional Rounds/Family Conf  Outcome: Ongoing (interventions implemented as appropriate)      Problem: Skin Injury Risk (Adult)  Goal: Identify Related Risk Factors and Signs and Symptoms  Outcome: Ongoing (interventions implemented as appropriate)    Goal: Skin Health and Integrity  Outcome: Ongoing (interventions implemented as appropriate)      Problem: Pneumonia (Adult)  Goal: Signs and Symptoms of Listed Potential Problems Will be Absent, Minimized or Managed (Pneumonia)  Outcome: Ongoing (interventions implemented as appropriate)      Problem: Sepsis/Septic Shock (Adult)  Goal: Signs and Symptoms of Listed Potential Problems Will be Absent, Minimized or Managed (Sepsis/Septic Shock)  Outcome: Ongoing (interventions implemented as appropriate)

## 2018-12-05 ENCOUNTER — READMISSION MANAGEMENT (OUTPATIENT)
Dept: CALL CENTER | Facility: HOSPITAL | Age: 83
End: 2018-12-05

## 2018-12-05 ENCOUNTER — OFFICE VISIT (OUTPATIENT)
Dept: FAMILY MEDICINE CLINIC | Facility: CLINIC | Age: 83
End: 2018-12-05

## 2018-12-05 ENCOUNTER — EPISODE CHANGES (OUTPATIENT)
Dept: CASE MANAGEMENT | Facility: OTHER | Age: 83
End: 2018-12-05

## 2018-12-05 VITALS
DIASTOLIC BLOOD PRESSURE: 80 MMHG | BODY MASS INDEX: 27.35 KG/M2 | SYSTOLIC BLOOD PRESSURE: 136 MMHG | WEIGHT: 170.19 LBS | HEART RATE: 114 BPM | OXYGEN SATURATION: 97 % | HEIGHT: 66 IN

## 2018-12-05 DIAGNOSIS — J18.9 PNEUMONIA DUE TO INFECTIOUS ORGANISM, UNSPECIFIED LATERALITY, UNSPECIFIED PART OF LUNG: Primary | ICD-10-CM

## 2018-12-05 DIAGNOSIS — J44.9 CHRONIC OBSTRUCTIVE PULMONARY DISEASE, UNSPECIFIED COPD TYPE (HCC): ICD-10-CM

## 2018-12-05 DIAGNOSIS — R05.9 COUGH: ICD-10-CM

## 2018-12-05 LAB
BACTERIA SPEC AEROBE CULT: ABNORMAL
GRAM STN SPEC: ABNORMAL

## 2018-12-05 PROCEDURE — 99214 OFFICE O/P EST MOD 30 MIN: CPT | Performed by: FAMILY MEDICINE

## 2018-12-05 RX ORDER — BENZONATATE 100 MG/1
200 CAPSULE ORAL 3 TIMES DAILY PRN
Qty: 42 CAPSULE | Refills: 0 | Status: SHIPPED | OUTPATIENT
Start: 2018-12-05 | End: 2019-02-07

## 2018-12-05 RX ORDER — ALBUTEROL SULFATE 90 UG/1
2 AEROSOL, METERED RESPIRATORY (INHALATION) EVERY 4 HOURS PRN
Qty: 18 G | Refills: 1 | Status: SHIPPED | OUTPATIENT
Start: 2018-12-05 | End: 2019-04-24 | Stop reason: SDUPTHER

## 2018-12-05 NOTE — OUTREACH NOTE
Prep Survey      Responses   Facility patient discharged from?  [facility]   Is patient eligible?  Yes   Discharge diagnosis  Pneumonia of RUE due to infectious organism, human metapneumovirus pneumonia, KIRK, enteritis, renal failure, DM, Chronic Diastolic CHF, HTN, CAD   Does the patient have one of the following disease processes/diagnoses(primary or secondary)?  COPD/Pneumonia   Does the patient have Home health ordered?  Yes   What is the Home health agency?   No agency named in AVS   Is there a DME ordered?  No   Comments regarding appointments  See AVS   Prep survey completed?  Yes          Pattie Arriola RN

## 2018-12-05 NOTE — PROGRESS NOTES
Subjective   General Keith Ruvalcaba Jr. is a 83 y.o. male.   CC; Hospital follow up  History of Present Illness The patient comes in for hospital follow up. He was discharged yesterday. He is breathing better. He needs an Inhaler to help with his breathing.    The following portions of the patient's history were reviewed and updated as appropriate: allergies, current medications, past family history, past medical history, past social history, past surgical history and problem list.    Review of Systems   Constitutional: Negative for fatigue and fever.   Respiratory: Negative for cough, chest tightness and stridor.    Cardiovascular: Negative for chest pain, palpitations and leg swelling.       Objective   Physical Exam   Constitutional: He appears well-developed and well-nourished.   HENT:   Head: Normocephalic and atraumatic.   Right Ear: External ear normal.   Left Ear: External ear normal.   Nose: Nose normal.   Mouth/Throat: Oropharynx is clear and moist.   Eyes: Pupils are equal, round, and reactive to light.   Neck: Normal range of motion.   Cardiovascular: Normal rate, regular rhythm and normal heart sounds. Exam reveals no gallop and no friction rub.   No murmur heard.  Pulmonary/Chest: Effort normal.   Decreased air movement at the bases.   Abdominal: Soft. Bowel sounds are normal.   Skin: Skin is warm and dry.   Vitals reviewed.        Assessment/Plan   General was seen today for follow-up and shortness of breath.    Diagnoses and all orders for this visit:    Pneumonia due to infectious organism, unspecified laterality, unspecified part of lung    Cough    Chronic obstructive pulmonary disease, unspecified COPD type (CMS/[facility])    Other orders  -     albuterol 108 (90 Base) MCG/ACT inhaler; Inhale 2 puffs Every 4 (Four) Hours As Needed for Wheezing.  -     benzonatate (TESSALON PERLES) 100 MG capsule; Take 2 capsules by mouth 3 (Three) Times a Day As Needed for Cough.          Return to the clinic in 3  month/s.  Will contact with results as needed.

## 2018-12-07 ENCOUNTER — READMISSION MANAGEMENT (OUTPATIENT)
Dept: CALL CENTER | Facility: HOSPITAL | Age: 83
End: 2018-12-07

## 2018-12-07 ENCOUNTER — OFFICE VISIT (OUTPATIENT)
Dept: CARDIOLOGY | Facility: CLINIC | Age: 83
End: 2018-12-07

## 2018-12-07 VITALS
HEART RATE: 98 BPM | HEIGHT: 65 IN | SYSTOLIC BLOOD PRESSURE: 142 MMHG | OXYGEN SATURATION: 98 % | BODY MASS INDEX: 27.46 KG/M2 | DIASTOLIC BLOOD PRESSURE: 80 MMHG | WEIGHT: 164.8 LBS

## 2018-12-07 DIAGNOSIS — N18.2 CHRONIC KIDNEY DISEASE, STAGE II (MILD): ICD-10-CM

## 2018-12-07 DIAGNOSIS — I50.32 CHRONIC DIASTOLIC CONGESTIVE HEART FAILURE (HCC): Primary | Chronic | ICD-10-CM

## 2018-12-07 DIAGNOSIS — I25.10 CORONARY ARTERY DISEASE INVOLVING NATIVE CORONARY ARTERY OF NATIVE HEART WITHOUT ANGINA PECTORIS: ICD-10-CM

## 2018-12-07 DIAGNOSIS — I10 ESSENTIAL HYPERTENSION: ICD-10-CM

## 2018-12-07 LAB — BACTERIA SPEC AEROBE CULT: NORMAL

## 2018-12-07 PROCEDURE — 96372 THER/PROPH/DIAG INJ SC/IM: CPT | Performed by: NURSE PRACTITIONER

## 2018-12-07 PROCEDURE — 99214 OFFICE O/P EST MOD 30 MIN: CPT | Performed by: NURSE PRACTITIONER

## 2018-12-07 RX ORDER — FUROSEMIDE 10 MG/ML
40 INJECTION INTRAMUSCULAR; INTRAVENOUS ONCE
Status: COMPLETED | OUTPATIENT
Start: 2018-12-07 | End: 2018-12-07

## 2018-12-07 RX ADMIN — FUROSEMIDE 40 MG: 10 INJECTION INTRAMUSCULAR; INTRAVENOUS at 11:00

## 2018-12-07 NOTE — PROGRESS NOTES
"Subjective:     Leg Swelling (chief complaint)    Congestive Heart Failure   Presents for follow-up visit. Pertinent negatives include no chest pressure, claudication, edema (bilateral ankle at the end of the day; compliant with compression stockings), near-syncope, orthopnea, palpitations, paroxysmal nocturnal dyspnea, shortness of breath or unexpected weight change. The symptoms have been improving. Compliance with total regimen is %. Compliance problems include adherence to exercise.  Compliance with diet is %. Compliance with exercise is 26-50%. Compliance with medications is %.   Leg Swelling   This is a new problem. The current episode started in the past 7 days.     The patient contacted the office earlier this morning with concerns regarding evidence of bilateral leg edema.  He reports that he was hospitalized with discharge on Tuesday, December 4 regarding pneumonia.  During the course of hospitalization, the patient reports that he did receive IV fluids for what he was told was \"dehydration\".  The patient indicates compliance with his medications as prescribed along with dietary sodium restrictions.    1. CAD, non-obstructive 2005  2. HFpEF - diastolic dysfunction  3. Hypertension  4. CKD, Stage 2 with H/O KIRK  5. H/O Atrial Flutter S/P ablation per Dr Graves 2006  6. Prostate Cancer [place] Score 6/Stage 2 S/P RT   7. DM, Type II  8. Anterior right total hip arthroplasty 04/25/2016  9. Chronic DVT of bilateral femoral vein: on preventative dosing - Xarelto  10. Carotid disease followed annually by Dr Hamman    Mr. Ruvalcaba recently underwent lumbar surgery 10/27/2017 in [place]. He was discharge 11/7/2017. Following discharge he had several medications that were recommended be stopped.  On reviewing discharge summary from [place], it was made note that his hospitalization was complicated by hypotension and acute kidney injury.  He recieved IV fluids per nephrology consult and blood " pressure medicine discontinued.       The patient received evaluation with REGINA Vidal on 03/08/2018 in follow-up regarding leg edema for which lower extremity duplex have been ordered and was found with positive findings regarding DVT.   The patient was initiated on DOAC, Xarelto on that date.     The patient reports compliance with dietary sodium restrictions.  Also, he reports monitoring of his blood pressure in the home setting with average systolic blood pressure 120-140.    Review of Systems   Constitution: Negative for unexpected weight change.   HENT: Negative for nosebleeds.    Cardiovascular: Negative for claudication, cyanosis, dyspnea on exertion, irregular heartbeat, near-syncope, orthopnea, palpitations, paroxysmal nocturnal dyspnea and syncope.   Respiratory: Negative for shortness of breath, sleep disturbances due to breathing and snoring.    Endocrine: Negative for polydipsia, polyphagia and polyuria.   Hematologic/Lymphatic: Negative for bleeding problem. Does not bruise/bleed easily.   Skin: Negative for poor wound healing.   Musculoskeletal: Negative for falls.   Gastrointestinal: Negative for bloating.   Neurological: Negative for dizziness and light-headedness.   Psychiatric/Behavioral: Negative for altered mental status. The patient is not nervous/anxious.      Past Medical History:   Diagnosis Date   • Acquired hallux rigidus    • Anemia    • Arthropathy of lumbar facet joint    • Carpal tunnel syndrome    • CHF (congestive heart failure) (CMS/HCC)    • Chronic diastolic congestive heart failure (CMS/HCC)    • Chronic kidney disease    • Chronic kidney disease, stage II (mild)     Chronic kidney disease stage 2   • Chronic obstructive lung disease (CMS/HCC)    • Chronic renal failure    • Coronary artery disease    • Cortical senile cataract    • Diabetes mellitus (CMS/HCC)    • Diabetes mellitus type 2, noninsulin dependent (CMS/HCC)     diabetes mellitus type 2, non-insulin treated,  Medication treatment plan: oral diabetic medication   • Diabetes mellitus without complication (CMS/HCC)    • Diastolic dysfunction    • Diastolic heart failure (CMS/HCC)    • Essential hypertension     difficult to control      • Hip pain    • History of echocardiogram 08/19/2015    Echocardiogram W/ color flow 87408 (1) - Mild to moderate LVH with mild left atrial enlargement and normal aortic root.CLVH,preserved LV systolic function with Ef of 55%.Diastolic dysfunction.MV intact.AV thickened.Aortic sclerosis.   • Hyperlipidemia    • Influenza vaccine administered 10/02/2015    INFLUENZA IMMUN ADMIN OR PREV RECV'D  (1) - Ordered By: DIONNE MANDUJANO ([facility])    • Insomnia    • Insomnia     Other insomnia   • Joint pain    • Low back pain    • Muscle strain    • Nuclear cataract    • Pneumococcal vaccination given 07/22/2016    PNEUMOC VAC/ADMIN/RCVD 4040F (3) - Ordered By: DIONNE MANDUJANO ([facility])   • Prostate cancer (CMS/[facility])    • Pure hypercholesterolemia    • Sedentary lifestyle      Past Surgical History:   Procedure Laterality Date   • CARDIAC ABLATION     • CARDIAC CATHETERIZATION  02/04/1986    Cardiac cath 94076 (1) - normal left heart catherization,non cardiac chest pain   • CARPAL TUNNEL RELEASE  10/19/2015    Carpal tunnel surgery (1) - Release of left carpal tunnel   • ENDOSCOPY  08/05/2013    Colon endoscopy 11650 (2) - Hemorrhoids found.   • INGUINAL HERNIA REPAIR Right 06/07/1968    Inguinal hernia, repair (2)   • INJECTION OF MEDICATION  09/06/2012    Albuterol (2u) (1) - Ordered By: LIDYA STEINER ([facility])    • INJECTION OF MEDICATION  09/06/2012    Atrovent (1) - Ordered By: LIDYA STEINER ([facility])    • INJECTION OF MEDICATION  02/10/2012    Depo Medrol (Methylprednisone) (3) - Ordered By: HEATHER PAK ([facility])    • INJECTION OF MEDICATION  02/09/2014    Kenalog (4) - Ordered By: DIMAS ANDRADE ([facility])    • JOINT REPLACEMENT     • LUMBAR LAMINECTOMY  2007    Lumbar laminectomy  (1)   • MANDIBLE FRACTURE SURGERY  10/11/1981    Treat nose/jaw fracture (1) - bilateral open reduction of fracture of mandible   • OTHER SURGICAL HISTORY  2011    Drain/Inject Major Joint  (1) - Ordered By: KARLOS HERNANDEZ ([facility])    • OTHER SURGICAL HISTORY      Insert IVC filter 83572 (1)   • OTHER SURGICAL HISTORY  10/11/2011    SPIROMETRY 39045 (1) - Ordered By: HEATHER PAK ([facility])   • TOTAL HIP ARTHROPLASTY Right      Social History     Socioeconomic History   • Marital status:      Spouse name: Not on file   • Number of children: Not on file   • Years of education: Not on file   • Highest education level: Not on file   Tobacco Use   • Smoking status: Former Smoker     Last attempt to quit:      Years since quittin.9   • Smokeless tobacco: Never Used   Substance and Sexual Activity   • Alcohol use: No   • Drug use: No   • Sexual activity: Defer     Comment: Marital status:      Current Outpatient Medications   Medication Sig Dispense Refill   • albuterol 108 (90 Base) MCG/ACT inhaler Inhale 2 puffs Every 4 (Four) Hours As Needed for Wheezing. 18 g 1   • aspirin 81 MG EC tablet Take 81 mg by mouth daily.     • benzonatate (TESSALON PERLES) 100 MG capsule Take 2 capsules by mouth 3 (Three) Times a Day As Needed for Cough. 42 capsule 0   • ferrous sulfate 325 (65 FE) MG tablet Take 1 tablet by mouth Daily With Breakfast. 30 tablet 5   • furosemide (LASIX) 20 MG tablet Take 20 mg by mouth 2 (Two) Times a Day. If weight gain 2 t o 3 pounds overnight or 5 pounds in 1 week     • gabapentin (NEURONTIN) 300 MG capsule Take 1 capsule by mouth 2 (Two) Times a Day. 60 capsule 5   • glucose blood (TRUE METRIX BLOOD GLUCOSE TEST) test strip Check glucose daily 100 each 0   • hydrALAZINE (APRESOLINE) 25 MG tablet Take 1 tablet by mouth 3 (Three) Times a Day. 90 tablet 6   • JANUVIA 50 MG tablet TAKE ONE TABLET BY MOUTH DAILY 30 tablet 6   • pravastatin (PRAVACHOL) 40 MG tablet Take 1  "tablet by mouth Daily. 30 tablet 11   • rivaroxaban (XARELTO) 10 MG tablet Take 1 tablet by mouth Daily. 30 tablet 11   • TRUEPLUS LANCETS 30G misc 1 Device Daily. TEST 100 each 3     No current facility-administered medications for this visit.      Objective:     Vitals:    12/07/18 1036   BP: 142/80   BP Location: Left arm   Patient Position: Sitting   Cuff Size: Adult   Pulse: 98   SpO2: 98%   Weight: 74.8 kg (164 lb 12.8 oz)   Height: 165.1 cm (65\")      Physical Exam   Constitutional: He is oriented to person, place, and time. He appears well-nourished. No distress.   HENT:   Head: Normocephalic and atraumatic.   Eyes: Conjunctivae are normal. Right eye exhibits no discharge. Left eye exhibits no discharge.   Neck: No JVD ( 6 cm at 45°) present. No tracheal deviation present.   Cardiovascular: Normal rate, regular rhythm, S1 normal, S2 normal, normal heart sounds and intact distal pulses. Exam reveals no S3 and no S4.   No murmur heard.  Pulses:       Radial pulses are 2+ on the right side, and 2+ on the left side.   Pulmonary/Chest: Effort normal and breath sounds normal. No respiratory distress. He has no wheezes. He has no rales.   Abdominal: He exhibits no distension.   Musculoskeletal: He exhibits no edema or tenderness.   Neurological: He is alert and oriented to person, place, and time.   Skin: Skin is warm and dry. He is not diaphoretic.   Psychiatric: He has a normal mood and affect. His behavior is normal. Judgment and thought content normal.   Vitals reviewed.    Data Reviewed:    [facility]: 03/12/2005 - Dr Kj Green  Aortic pressure is 133/77.   Left ventricular pressure is 133.   Left ventricular end diastolic pressure is 13.   The left main is a very short caliber vessel, almost like a halfway ostium.   The LAD is a large caliber vessel, distally has diffuse 20-30 percent narrowing, gives rise to a large diagonal 1.   The circumflex is a large caliber vessel, which gives rise to an OM1, which had " diffuse 20-30 percent disease and also a posterior descending artery.   The RCA is a medium to large caliber vessel, which gave rise to a posterior descending artery and posterolateral, distal to the bifurcation has about 60-70 percent narrowing.   Left ventricular angiogram showed normal wall motion with ejection fraction of 65-75 percent.     Transthoracic echocardiogram: 11/08/2017  · Left ventricular wall thickness is consistent with mild concentric hypertrophy.  · Left ventricular systolic function is normal. Estimated EF = 55%.  · Left ventricular diastolic dysfunction (grade I) consistent with impaired relaxation.    Transthoracic Echocardiogram 10/31/3017 in [place]  Indication:  Hypotension  BP:           93/63  Findings       Left Ventricle:  The left ventricular chamber size is normal. Mild concentric left  ventricular hypertrophy is observed. The estimated ejection fraction is  65-70%. Abnormal left ventricular diastolic filling is observed,  consistent with impaired relaxation.    Left Atrium:  The left atrial chamber size is normal.   Right Ventricle:  The right ventricular cavity size is normal. The right ventricular  global systolic function is normal.   Right Atrium:  The right atrial cavity size is normal.   Aortic Valve:  Moderate aortic leaflet calcification is visualized. There is mild to  moderate aortic stenosis. The mean gradient of the aortic valve is 30  mmHg. The aortic valve area by velocity time interval is calculated at  1.84 cm2. There is no evidence of aortic regurgitation.   Mitral Valve:  The mitral valve leaflets appear normal. There is mild mitral  regurgitation.    Tricuspid Valve:  The tricuspid valve leaflets are normal.  There is mild tricuspid  regurgitation. The Right Ventricular Systolic Pressure is calculated at  35 mmHg.   Pulmonic Valve:  There is trace pulmonic regurgitation present.   Pericardium:  There is no pericardial effusion.   Conclusions  1. Mild concentric  left ventricular hypertrophy with thickened  papillary muscle is observed.   Hyperdynamic LV systolic function with an  estimated ejection fraction is 65-70%.  2. Abnormal left ventricular diastolic filling is observed, consistent  with impaired relaxation.   3. Aortic valve is trileaflet, moderately calcified with mild to  moderate aortic stenosis  4. Mild tricuspid regurgitation with RVSP 35 mm Hg    Echocardiogram: 08/26/2016  FINDINGS:  1. Both atria are normal in size.  2. The aortic valve is mildly sclerotic without any hemodynamically  significant aortic stenosis.  3. Mild concentric left ventricular hypertrophy with early diastolic  dysfunction. No evidence of any regional wall motion abnormality.  The estimated ejection fraction greater than 60%.  4. There is mild mitral annular calcification.  5. Normal right ventricular size and function.  6. On color flow Doppler, there is trace mitral and tricuspid  regurgitation without any hemodynamic significance.  7. No intracardiac mass, pericardial effusion, or cardiac thrombus  seen.  CLINICAL IMPRESSION:  1. Mild concentric left ventricular hypertrophy with early diastolic  dysfunction.  2. Estimated ejection fraction greater than 60%.  3. Mildly sclerotic aortic valve with mild mitral annular  calcification without any hemodynamic significant stenotic process.  4. On color flow Doppler, there is trace mitral and tricuspid  regurgitation without any hemodynamic significance.  5. No intracardiac mass, pericardial effusion, or cardiac thrombus  Seen.    Carotid Ulsd: 01/09/2017  Unable to retrieve results  Renal Artery U/S 11/1/2017  1. No color Doppler scan evidence of hemodynamically significant renal artery stenosis.  2. Elevated resistivity indices within the kidneys bilaterally which may be indicative of medical renal parenchymal disease.  3. Kidneys are slightly atrophic in size as detailed above.  4. Urinary bladder poorly distended for assessment with  suspected thickening the bladder wall which is nonspecific and may simply be accentuated by lack of distention or muscle hypertrophy.  CTA Chest: 11/08/2017  IMPRESSION:  1. No evidence of pulmonary embolus..  2. Mild centrilobular and paraseptal emphysematous changes..  3. Posterior segment right lobe of liver peripheral 1.56 cm  simple hepatic cyst..  4.T11, T12, and L1 bilateral decompressive laminectomies.    Venous duplex lower extremity: 03/08/2018  · Acute right lower extremity deep vein thrombosis noted in the common femoral, profunda femoral, proximal femoral, mid femoral, distal femoral, popliteal and peroneal.  · Acute right lower extremity superficial thrombophlebitis noted in the greater saphenous (above knee).  · Acute left lower extremity deep vein thrombosis noted in the common femoral, profunda femoral, proximal femoral, mid femoral, distal femoral, popliteal and peroneal.  · Partial flow is noted above mentioned veins.    Admission on 12/02/2018, Discharged on 12/04/2018   Component Date Value Ref Range Status   • Extra Tube 12/02/2018 hold for add-on   Final    Auto resulted   • Extra Tube 12/02/2018 Hold for add-ons.   Final    Auto resulted.   • Extra Tube 12/02/2018 hold for add-on   Final    Auto resulted   • Extra Tube 12/02/2018 Hold for add-ons.   Final    Auto resulted.   • Glucose 12/02/2018 109* 60 - 100 mg/dL Final   • BUN 12/02/2018 44* 7 - 21 mg/dL Final   • Creatinine 12/02/2018 2.07* 0.70 - 1.30 mg/dL Final   • Sodium 12/02/2018 133* 137 - 145 mmol/L Final   • Potassium 12/02/2018 3.9  3.5 - 5.1 mmol/L Final   • Chloride 12/02/2018 98  95 - 110 mmol/L Final   • CO2 12/02/2018 24.0  22.0 - 31.0 mmol/L Final   • Calcium 12/02/2018 8.5  8.4 - 10.2 mg/dL Final   • Total Protein 12/02/2018 6.4  6.3 - 8.6 g/dL Final   • Albumin 12/02/2018 4.00  3.40 - 4.80 g/dL Final   • ALT (SGPT) 12/02/2018 21  21 - 72 U/L Final   • AST (SGOT) 12/02/2018 23  17 - 59 U/L Final   • Alkaline Phosphatase  12/02/2018 47  38 - 126 U/L Final   • Total Bilirubin 12/02/2018 0.6  0.2 - 1.3 mg/dL Final   • eGFR   Amer 12/02/2018 37* 42 - 98 mL/min/1.73 Final   • Globulin 12/02/2018 2.4  2.3 - 3.5 gm/dL Final   • A/G Ratio 12/02/2018 1.7  1.1 - 1.8 g/dL Final   • BUN/Creatinine Ratio 12/02/2018 21.3  7.0 - 25.0 Final   • Anion Gap 12/02/2018 11.0  5.0 - 15.0 mmol/L Final   • Lipase 12/02/2018 24  23 - 300 U/L Final   • Color, UA 12/02/2018 Yellow  Yellow, Straw, Dark Yellow, Sachi Final   • Appearance, UA 12/02/2018 Clear  Clear Final   • pH, UA 12/02/2018 <=5.0  5.0 - 9.0 Final   • Specific Gravity, UA 12/02/2018 1.019  1.003 - 1.030 Final   • Glucose, UA 12/02/2018 Negative  Negative Final   • Ketones, UA 12/02/2018 Trace* Negative Final   • Bilirubin, UA 12/02/2018 Negative  Negative Final   • Blood, UA 12/02/2018 Negative  Negative Final   • Protein, UA 12/02/2018 Negative  Negative Final   • Leuk Esterase, UA 12/02/2018 Negative  Negative Final   • Nitrite, UA 12/02/2018 Negative  Negative Final   • Urobilinogen, UA 12/02/2018 0.2 E.U./dL  0.2 - 1.0 E.U./dL Final   • Troponin I 12/02/2018 0.049* <=0.034 ng/mL Final   • proBNP 12/02/2018 277.0  0.0-1,800.0 pg/mL Final   • Magnesium 12/02/2018 2.2  1.6 - 2.3 mg/dL Final   • Extra Tube 12/02/2018 hold for add-on   Final    Auto resulted   • Extra Tube 12/02/2018 Hold for add-ons.   Final    Auto resulted.   • Extra Tube 12/02/2018 hold for add-on   Final    Auto resulted   • Extra Tube 12/02/2018 Hold for add-ons.   Final    Auto resulted.   • WBC 12/02/2018 13.17* 3.20 - 9.80 10*3/mm3 Final   • RBC 12/02/2018 4.13* 4.37 - 5.74 10*6/mm3 Final   • Hemoglobin 12/02/2018 12.5* 13.7 - 17.3 g/dL Final   • Hematocrit 12/02/2018 35.7* 39.0 - 49.0 % Final   • MCV 12/02/2018 86.4  80.0 - 98.0 fL Final   • MCH 12/02/2018 30.3  26.5 - 34.0 pg Final   • MCHC 12/02/2018 35.0  31.5 - 36.3 g/dL Final   • RDW 12/02/2018 14.3  11.5 - 14.5 % Final   • RDW-SD 12/02/2018 44.9* 35.1 -  43.9 fl Final   • MPV 12/02/2018 8.3  8.0 - 12.0 fL Final   • Platelets 12/02/2018 388  150 - 450 10*3/mm3 Final   • Neutrophil % 12/02/2018 75.6  37.0 - 80.0 % Final   • Lymphocyte % 12/02/2018 9.8* 10.0 - 50.0 % Final   • Monocyte % 12/02/2018 13.4* 0.0 - 12.0 % Final   • Eosinophil % 12/02/2018 0.2  0.0 - 7.0 % Final   • Basophil % 12/02/2018 0.2  0.0 - 2.0 % Final   • Immature Grans % 12/02/2018 0.8* 0.0 - 0.5 % Final   • Neutrophils, Absolute 12/02/2018 9.96* 2.00 - 8.60 10*3/mm3 Final   • Lymphocytes, Absolute 12/02/2018 1.29  0.60 - 4.20 10*3/mm3 Final   • Monocytes, Absolute 12/02/2018 1.77* 0.00 - 0.90 10*3/mm3 Final   • Eosinophils, Absolute 12/02/2018 0.02  0.00 - 0.70 10*3/mm3 Final   • Basophils, Absolute 12/02/2018 0.02  0.00 - 0.20 10*3/mm3 Final   • Immature Grans, Absolute 12/02/2018 0.11* 0.00 - 0.02 10*3/mm3 Final   • Lactate 12/02/2018 1.0  0.5 - 2.0 mmol/L Final   • Blood Culture 12/02/2018 Staphylococcus, coagulase negative*  Final    Consistent with contamination at time of collection.  Susceptibility not indicated     • Gram Stain 12/02/2018 Aerobic Bottle Gram positive cocci in pairs   Final    1 BOTTLE POSITIVE FOR GRAM POSITIVE COCCI OF 4 BOTTLES DRAWN   • Blood Culture 12/02/2018 No growth at 5 days   Final   • Hemoglobin A1C 12/02/2018 6.1* 4 - 5.6 % Final   • Troponin I 12/02/2018 0.046* <=0.034 ng/mL Final   • Glucose 12/02/2018 102  70 - 130 mg/dL Final    RN NotifiedOperator: [id number] TRENT Bolivar ID: [id number]   • Glucose 12/02/2018 101  70 - 130 mg/dL Final    RN NotifiedOperator: [id number] TRENT Bolivar ID: [id number]   • Glucose 12/02/2018 118  70 - 130 mg/dL Final    RN NotifiedOperator: [id number] IRENA Ferreira ID: [id number]   • Glucose 12/03/2018 95  60 - 100 mg/dL Final   • BUN 12/03/2018 24* 7 - 21 mg/dL Final   • Creatinine 12/03/2018 1.43* 0.70 - 1.30 mg/dL Final   • Sodium 12/03/2018 136* 137 - 145 mmol/L Final   • Potassium 12/03/2018 4.4  3.5  - 5.1 mmol/L Final   • Chloride 12/03/2018 103  95 - 110 mmol/L Final   • CO2 12/03/2018 21.0* 22.0 - 31.0 mmol/L Final   • Calcium 12/03/2018 8.0* 8.4 - 10.2 mg/dL Final   • eGFR   Amer 12/03/2018 57  42 - 98 mL/min/1.73 Final   • BUN/Creatinine Ratio 12/03/2018 16.8  7.0 - 25.0 Final   • Anion Gap 12/03/2018 12.0  5.0 - 15.0 mmol/L Final   • WBC 12/03/2018 8.63  3.20 - 9.80 10*3/mm3 Final   • RBC 12/03/2018 3.40* 4.37 - 5.74 10*6/mm3 Final   • Hemoglobin 12/03/2018 10.1* 13.7 - 17.3 g/dL Final   • Hematocrit 12/03/2018 29.8* 39.0 - 49.0 % Final   • MCV 12/03/2018 87.6  80.0 - 98.0 fL Final   • MCH 12/03/2018 29.7  26.5 - 34.0 pg Final   • MCHC 12/03/2018 33.9  31.5 - 36.3 g/dL Final   • RDW 12/03/2018 14.6* 11.5 - 14.5 % Final   • RDW-SD 12/03/2018 46.9* 35.1 - 43.9 fl Final   • MPV 12/03/2018 7.9* 8.0 - 12.0 fL Final   • Platelets 12/03/2018 349  150 - 450 10*3/mm3 Final   • Neutrophil % 12/03/2018 71.8  37.0 - 80.0 % Final   • Lymphocyte % 12/03/2018 13.7  10.0 - 50.0 % Final   • Monocyte % 12/03/2018 12.2* 0.0 - 12.0 % Final   • Eosinophil % 12/03/2018 0.8  0.0 - 7.0 % Final   • Basophil % 12/03/2018 0.1  0.0 - 2.0 % Final   • Immature Grans % 12/03/2018 1.4* 0.0 - 0.5 % Final   • Neutrophils, Absolute 12/03/2018 6.20  2.00 - 8.60 10*3/mm3 Final   • Lymphocytes, Absolute 12/03/2018 1.18  0.60 - 4.20 10*3/mm3 Final   • Monocytes, Absolute 12/03/2018 1.05* 0.00 - 0.90 10*3/mm3 Final   • Eosinophils, Absolute 12/03/2018 0.07  0.00 - 0.70 10*3/mm3 Final   • Basophils, Absolute 12/03/2018 0.01  0.00 - 0.20 10*3/mm3 Final   • Immature Grans, Absolute 12/03/2018 0.12* 0.00 - 0.02 10*3/mm3 Final   • Glucose 12/03/2018 96  70 - 130 mg/dL Final    : [id number] [facility] ID: [id number]   • ADENOVIRUS, PCR 12/04/2018 Not Detected  Not Detected Final   • Coronavirus 229E 12/04/2018 Not Detected  Not Detected Final   • Coronavirus HKU1 12/04/2018 Not Detected  Not Detected Final   • Coronavirus NL63  12/04/2018 Not Detected  Not Detected Final   • Coronavirus OC43 12/04/2018 Not Detected  Not Detected Final   • Human Metapneumovirus 12/04/2018 Detected* Not Detected Final    droplet precautions requested  contact precautions requested     • Human Rhinovirus/Enterovirus 12/04/2018 Not Detected  Not Detected Final   • Influenza B PCR 12/04/2018 Not Detected  Not Detected Final   • Parainfluenza Virus 1 12/04/2018 Not Detected  Not Detected Final   • Parainfluenza Virus 2 12/04/2018 Not Detected  Not Detected Final   • Parainfluenza Virus 3 12/04/2018 Not Detected  Not Detected Final   • Parainfluenza Virus 4 12/04/2018 Not Detected  Not Detected Final   • Bordetella pertussis pcr 12/04/2018 Not Detected  Not Detected Final   • Influenza A H1 2009 PCR 12/04/2018 Not Detected  Not Detected Final   • Chlamydophila pneumoniae PCR 12/04/2018 Not Detected  Not Detected Final   • Mycoplasma pneumo by PCR 12/04/2018 Not Detected  Not Detected Final   • Influenza A PCR 12/04/2018 Not Detected  Not Detected Final   • Influenza A H3 12/04/2018 Not Detected  Not Detected Final   • Influenza A H1 12/04/2018 Not Detected  Not Detected Final   • RSV, PCR 12/04/2018 Not Detected  Not Detected Final   • Glucose 12/03/2018 96  70 - 130 mg/dL Final    : [id number] [facility]: [id number]   • Glucose 12/03/2018 105  70 - 130 mg/dL Final    : [id number] [facility] [id number]   • BCID, PCR 12/02/2018 Staphylococcus species, not aureus. mecA (methicillin resistance gene) detected. Identification by BCID PCR.* No organism detected by BCID PCR. Final   • Glucose 12/03/2018 125  70 - 130 mg/dL Final    RN NotifiedOperator: [id number] [facility]: [id number]   • Glucose 12/04/2018 93  60 - 100 mg/dL Final   • BUN 12/04/2018 16  7 - 21 mg/dL Final   • Creatinine 12/04/2018 1.24  0.70 - 1.30 mg/dL Final   • Sodium 12/04/2018 137  137 - 145 mmol/L Final   • Potassium 12/04/2018 4.0  3.5 - 5.1  mmol/L Final   • Chloride 12/04/2018 109  95 - 110 mmol/L Final   • CO2 12/04/2018 21.0* 22.0 - 31.0 mmol/L Final   • Calcium 12/04/2018 8.0* 8.4 - 10.2 mg/dL Final   • eGFR   Amer 12/04/2018 68  42 - 98 mL/min/1.73 Final   • BUN/Creatinine Ratio 12/04/2018 12.9  7.0 - 25.0 Final   • Anion Gap 12/04/2018 7.0  5.0 - 15.0 mmol/L Final   • WBC 12/04/2018 6.60  3.20 - 9.80 10*3/mm3 Final   • RBC 12/04/2018 3.09* 4.37 - 5.74 10*6/mm3 Final   • Hemoglobin 12/04/2018 9.2* 13.7 - 17.3 g/dL Final   • Hematocrit 12/04/2018 26.9* 39.0 - 49.0 % Final   • MCV 12/04/2018 87.1  80.0 - 98.0 fL Final   • MCH 12/04/2018 29.8  26.5 - 34.0 pg Final   • MCHC 12/04/2018 34.2  31.5 - 36.3 g/dL Final   • RDW 12/04/2018 14.4  11.5 - 14.5 % Final   • RDW-SD 12/04/2018 46.0* 35.1 - 43.9 fl Final   • MPV 12/04/2018 8.1  8.0 - 12.0 fL Final   • Platelets 12/04/2018 342  150 - 450 10*3/mm3 Final   • Neutrophil % 12/04/2018 70.7  37.0 - 80.0 % Final   • Lymphocyte % 12/04/2018 13.8  10.0 - 50.0 % Final   • Monocyte % 12/04/2018 13.3* 0.0 - 12.0 % Final   • Eosinophil % 12/04/2018 0.6  0.0 - 7.0 % Final   • Basophil % 12/04/2018 0.2  0.0 - 2.0 % Final   • Immature Grans % 12/04/2018 1.4* 0.0 - 0.5 % Final   • Neutrophils, Absolute 12/04/2018 4.67  2.00 - 8.60 10*3/mm3 Final   • Lymphocytes, Absolute 12/04/2018 0.91  0.60 - 4.20 10*3/mm3 Final   • Monocytes, Absolute 12/04/2018 0.88  0.00 - 0.90 10*3/mm3 Final   • Eosinophils, Absolute 12/04/2018 0.04  0.00 - 0.70 10*3/mm3 Final   • Basophils, Absolute 12/04/2018 0.01  0.00 - 0.20 10*3/mm3 Final   • Immature Grans, Absolute 12/04/2018 0.09* 0.00 - 0.02 10*3/mm3 Final   • Glucose 12/04/2018 105  70 - 130 mg/dL Final    : [id number] [facility] AMBERMeter ID: [id number]   • Glucose 12/04/2018 116  70 - 130 mg/dL Final    RN NotifiedOperator: [id number] [facility] GILESMeter ID: [id number]   Lab on 11/14/2018   Component Date Value Ref Range Status   • Glucose 11/14/2018 116* 60 - 100 mg/dL  Final   • BUN 11/14/2018 22* 7 - 21 mg/dL Final   • Creatinine 11/14/2018 1.33* 0.70 - 1.30 mg/dL Final   • Sodium 11/14/2018 132* 137 - 145 mmol/L Final   • Potassium 11/14/2018 4.5  3.5 - 5.1 mmol/L Final   • Chloride 11/14/2018 92* 95 - 110 mmol/L Final   • CO2 11/14/2018 26.0  22.0 - 31.0 mmol/L Final   • Calcium 11/14/2018 9.3  8.4 - 10.2 mg/dL Final   • Total Protein 11/14/2018 7.3  6.3 - 8.6 g/dL Final   • Albumin 11/14/2018 4.80  3.40 - 4.80 g/dL Final   • ALT (SGPT) 11/14/2018 19* 21 - 72 U/L Final   • AST (SGOT) 11/14/2018 28  17 - 59 U/L Final   • Alkaline Phosphatase 11/14/2018 60  38 - 126 U/L Final   • Total Bilirubin 11/14/2018 0.6  0.2 - 1.3 mg/dL Final   • eGFR   Amer 11/14/2018 62  42 - 98 mL/min/1.73 Final   • Globulin 11/14/2018 2.5  2.3 - 3.5 gm/dL Final   • A/G Ratio 11/14/2018 1.9* 1.1 - 1.8 g/dL Final   • BUN/Creatinine Ratio 11/14/2018 16.5  7.0 - 25.0 Final   • Anion Gap 11/14/2018 14.0  5.0 - 15.0 mmol/L Final   • Hemoglobin A1C 11/14/2018 6.3* 4 - 5.6 % Final   • Total Cholesterol 11/14/2018 174  0 - 199 mg/dL Final   • Triglycerides 11/14/2018 59  20 - 199 mg/dL Final   • HDL Cholesterol 11/14/2018 77  60 - 200 mg/dL Final   • LDL Cholesterol  11/14/2018 85  1 - 129 mg/dL Final   • LDL/HDL Ratio 11/14/2018 1.11  0.00 - 3.55 Final   • WBC 11/14/2018 6.62  3.20 - 9.80 10*3/mm3 Final   • RBC 11/14/2018 4.54  4.37 - 5.74 10*6/mm3 Final   • Hemoglobin 11/14/2018 13.5* 13.7 - 17.3 g/dL Final   • Hematocrit 11/14/2018 39.7  39.0 - 49.0 % Final   • MCV 11/14/2018 87.4  80.0 - 98.0 fL Final   • MCH 11/14/2018 29.7  26.5 - 34.0 pg Final   • MCHC 11/14/2018 34.0  31.5 - 36.3 g/dL Final   • RDW 11/14/2018 14.4  11.5 - 14.5 % Final   • RDW-SD 11/14/2018 46.3* 35.1 - 43.9 fl Final   • MPV 11/14/2018 8.2  8.0 - 12.0 fL Final   • Platelets 11/14/2018 374  150 - 450 10*3/mm3 Final   • Neutrophil % 11/14/2018 67.0  37.0 - 80.0 % Final   • Lymphocyte % 11/14/2018 19.5  10.0 - 50.0 % Final   •  Monocyte % 11/14/2018 12.4* 0.0 - 12.0 % Final   • Eosinophil % 11/14/2018 0.3  0.0 - 7.0 % Final   • Basophil % 11/14/2018 0.2  0.0 - 2.0 % Final   • Immature Grans % 11/14/2018 0.6* 0.0 - 0.5 % Final   • Neutrophils, Absolute 11/14/2018 4.44  2.00 - 8.60 10*3/mm3 Final   • Lymphocytes, Absolute 11/14/2018 1.29  0.60 - 4.20 10*3/mm3 Final   • Monocytes, Absolute 11/14/2018 0.82  0.00 - 0.90 10*3/mm3 Final   • Eosinophils, Absolute 11/14/2018 0.02  0.00 - 0.70 10*3/mm3 Final   • Basophils, Absolute 11/14/2018 0.01  0.00 - 0.20 10*3/mm3 Final   • Immature Grans, Absolute 11/14/2018 0.04* 0.00 - 0.02 10*3/mm3 Final   Admission on 10/31/2018, Discharged on 10/31/2018   Component Date Value Ref Range Status   • Case Report 10/31/2018    Final                    Value:Surgical Pathology Report                         Case: [id number]                                  Authorizing Provider:  Herbie Christine MD        Collected:           10/31/2018 11:17 AM          Ordering Location:     [facility]             Received:            10/31/2018 12:06 PM                                 [facility]                                                     Pathologist:           Jeor Crabtree MD                                                           Specimen:    Large Intestine, Left / Descending Colon, polyp                                           • Final Diagnosis 10/31/2018    Final                    Value:This result contains rich text formatting which cannot be displayed here.   • Gross Description 10/31/2018    Final                    Value:This result contains rich text formatting which cannot be displayed here.   Lab on 10/02/2018   Component Date Value Ref Range Status   • Glucose 10/02/2018 113* 60 - 100 mg/dL Final   • BUN 10/02/2018 20  7 - 21 mg/dL Final   • Creatinine 10/02/2018 1.36* 0.70 - 1.30 mg/dL Final   • Sodium 10/02/2018 133* 137 - 145 mmol/L Final   • Potassium 10/02/2018 4.4  3.5 -  5.1 mmol/L Final   • Chloride 10/02/2018 93* 95 - 110 mmol/L Final   • CO2 10/02/2018 26.0  22.0 - 31.0 mmol/L Final   • Calcium 10/02/2018 9.1  8.4 - 10.2 mg/dL Final   • Albumin 10/02/2018 4.10  3.40 - 4.80 g/dL Final   • Phosphorus 10/02/2018 2.9  2.4 - 4.4 mg/dL Final   • Anion Gap 10/02/2018 14.0  5.0 - 15.0 mmol/L Final   • BUN/Creatinine Ratio 10/02/2018 14.7  7.0 - 25.0 Final   • eGFR   Amer 10/02/2018 61  42 - 98 mL/min/1.73 Final   • 25 Hydroxy, Vitamin D 10/02/2018 58.0  30.0 - 100.0 ng/ml Final   Hospital Outpatient Visit on 09/12/2018   Component Date Value Ref Range Status   • Right Common Femoral Spont 09/12/2018 1   Final   • Right Saphenofemoral Junction Spont 09/12/2018 1   Final   • Right Profunda Femoral Spont 09/12/2018 1   Final   • Right Proximal Femoral Spont 09/12/2018 1   Final   • Right Mid Femoral Spont 09/12/2018 1   Final   • Right Distal Femoral Spont 09/12/2018 1   Final   • Right Popliteal Spont 09/12/2018 1   Final   • Right Posterior Tibial Vessel 09/12/2018 1   Final   • Right Peroneal Vessel 09/12/2018 1   Final   • Right Greater Saph AK Vessel 09/12/2018 1   Final   • Left Common Femoral Spont 09/12/2018 1   Final   • Left Profunda Femoral Spont 09/12/2018 1   Final   • Left Proximal Femoral Spont 09/12/2018 1   Final   • Left Mid Femoral Spont 09/12/2018 1   Final   • Left Distal Femoral Spont 09/12/2018 1   Final   • Left Popliteal Spont 09/12/2018 1   Final   • Left Posterior Tibial Vessel 09/12/2018 1   Final   • Left Peroneal Vessel 09/12/2018 1   Final   • Right Common Femoral Spont 09/12/2018 Y   Final   • Right Common Femoral Phasic 09/12/2018 Y   Final   • Right Common Femoral Augment 09/12/2018 Y   Final   • Right Common Femoral Compress 09/12/2018 P   Final   • Right Common Femoral Thrombus 09/12/2018 C   Final   • Right Saphenofemoral Junction Spont 09/12/2018 Y   Final   • Right Saphenofemoral Junction Phas* 09/12/2018 Y   Final   • Right Saphenofemoral  Junction Augm* 09/12/2018 Y   Final   • Right Saphenofemoral Junction Comp* 09/12/2018 P   Final   • Right Saphenofemoral Junction Thro* 09/12/2018 C   Final   • Right Profunda Femoral Spont 09/12/2018 Y   Final   • Right Profunda Femoral Phasic 09/12/2018 Y   Final   • Right Profunda Femoral Augment 09/12/2018 Y   Final   • Right Profunda Femoral Compress 09/12/2018 P   Final   • Right Profunda Femoral Thrombus 09/12/2018 C   Final   • Right Proximal Femoral Spont 09/12/2018 Y   Final   • Right Proximal Femoral Augment 09/12/2018 Y   Final   • Right Proximal Femoral Compress 09/12/2018 P   Final   • Right Proximal Femoral Thrombus 09/12/2018 C   Final   • Right Mid Femoral Spont 09/12/2018 Y   Final   • Right Mid Femoral Phasic 09/12/2018 Y   Final   • Right Mid Femoral Augment 09/12/2018 Y   Final   • Right Mid Femoral Compress 09/12/2018 P   Final   • Right Mid Femoral Thrombus 09/12/2018 C   Final   • Right Distal Femoral Spont 09/12/2018 Y   Final   • Right Distal Femoral Phasic 09/12/2018 Y   Final   • Right Distal Femoral Compress 09/12/2018 P   Final   • Right Distal Femoral Thrombus 09/12/2018 C   Final   • Right Popliteal Spont 09/12/2018 Y   Final   • Right Popliteal Phasic 09/12/2018 Y   Final   • Right Popliteal Augment 09/12/2018 Y   Final   • Right Popliteal Compress 09/12/2018 P   Final   • Right Popliteal Thrombus 09/12/2018 C   Final   • Right Posterior Tibial Augment 09/12/2018 Y   Final   • Right Posterior Tibial Compress 09/12/2018 P   Final   • Right Posterior Tibial Thrombus 09/12/2018 C   Final   • Right Peroneal Augment 09/12/2018 Y   Final   • Right Peroneal Compress 09/12/2018 P   Final   • Right Peroneal Thrombus 09/12/2018 C   Final   • Right Greater Saph AK Spont 09/12/2018 Y   Final   • Right Greater Saph AK Phasic 09/12/2018 Y   Final   • Right Greater Saph AK Augment 09/12/2018 Y   Final   • Right Greater Saph AK Compress 09/12/2018 P   Final   • Right Greater Saph AK Thrombus  09/12/2018 C   Final   • Right Greater Saph BK Augment 09/12/2018 Y   Final   • Right Greater Saph BK Compress 09/12/2018 C   Final   • Right Lesser Saph Compress 09/12/2018 C   Final   • Left Common Femoral Spont 09/12/2018 Y   Final   • Left Common Femoral Phasic 09/12/2018 Y   Final   • Left Common Femoral Augment 09/12/2018 Y   Final   • Left Common Femoral Compress 09/12/2018 P   Final   • Left Common Femoral Thrombus 09/12/2018 C   Final   • Left Saphenofemoral Junction Spont 09/12/2018 Y   Final   • Left Saphenofemoral Junction Phasic 09/12/2018 Y   Final   • Left Saphenofemoral Junction Augme* 09/12/2018 Y   Final   • Left Saphenofemoral Junction Compe* 09/12/2018 Y   Final   • Left Saphenofemoral Junction Compr* 09/12/2018 C   Final   • Left Profunda Femoral Spont 09/12/2018 Y   Final   • Left Profunda Femoral Phasic 09/12/2018 Y   Final   • Left Profunda Femoral Augment 09/12/2018 Y   Final   • Left Profunda Femoral Compress 09/12/2018 P   Final   • Left Profunda Femoral Thrombus 09/12/2018 C   Final   • Left Proximal Femoral Spont 09/12/2018 Y   Final   • Left Proximal Femoral Phasic 09/12/2018 Y   Final   • Left Proximal Femoral Augment 09/12/2018 Y   Final   • Left Proximal Femoral Compress 09/12/2018 P   Final   • Left Proximal Femoral Thrombus 09/12/2018 C   Final   • Left Mid Femoral Spont 09/12/2018 Y   Final   • Left Mid Femoral Phasic 09/12/2018 Y   Final   • Left Mid Femoral Augment 09/12/2018 Y   Final   • Left Mid Femoral Compress 09/12/2018 P   Final   • Left Mid Femoral Thrombus 09/12/2018 C   Final   • Left Distal Femoral Spont 09/12/2018 N   Final   • Left Distal Femoral Phasic 09/12/2018 N   Final   • Left Distal Femoral Augment 09/12/2018 N   Final   • Left Distal Femoral Compress 09/12/2018 N   Final   • Left Distal Femoral Thrombus 09/12/2018 C   Final   • Left Popliteal Spont 09/12/2018 Y   Final   • Left Popliteal Phasic 09/12/2018 Y   Final   • Left Popliteal Augment 09/12/2018 Y    Final   • Left Popliteal Compress 09/12/2018 P   Final   • Left Popliteal Thrombus 09/12/2018 C   Final   • Left Posterior Tibial Augment 09/12/2018 Y   Final   • Left Posterior Tibial Compress 09/12/2018 P   Final   • Left Posterior Tibial Thrombus 09/12/2018 C   Final   • Left Peroneal Augment 09/12/2018 Y   Final   • Left Peroneal Compress 09/12/2018 P   Final   • Left Peroneal Thrombus 09/12/2018 C   Final   • Left Greater Saph AK Augment 09/12/2018 Y   Final   • Left Greater Saph AK Compress 09/12/2018 C   Final   • Left Lesser Saph Compress 09/12/2018 C   Final   • Right Proximal Femoral Phasic 09/12/2018 Y   Final   • Right Distal Femoral Augment 09/12/2018 Y   Final   Office Visit on 08/14/2018   Component Date Value Ref Range Status   • Glucose 08/14/2018 100  70 - 130 mg/dL Final   Office Visit on 07/16/2018   Component Date Value Ref Range Status   • Glucose 07/16/2018 110* 60 - 100 mg/dL Final   • BUN 07/16/2018 29* 7 - 21 mg/dL Final   • Creatinine 07/16/2018 1.34* 0.70 - 1.30 mg/dL Final   • Sodium 07/16/2018 131* 137 - 145 mmol/L Final   • Potassium 07/16/2018 4.6  3.5 - 5.1 mmol/L Final   • Chloride 07/16/2018 95  95 - 110 mmol/L Final   • CO2 07/16/2018 24.0  22.0 - 31.0 mmol/L Final   • Calcium 07/16/2018 8.9  8.4 - 10.2 mg/dL Final   • eGFR   Amer 07/16/2018 62  42 - 98 mL/min/1.73 Final   • BUN/Creatinine Ratio 07/16/2018 21.6  7.0 - 25.0 Final   • Anion Gap 07/16/2018 12.0  5.0 - 15.0 mmol/L Final       The following portions of the patient's history were reviewed and updated as appropriate: allergies, current medications, past family history, past medical history, past social history, past surgical history and problem list.     Assessment:      Diagnosis Plan   1. Chronic diastolic congestive heart failure with mild clinical evidence of hypervolemia probably related to recent hospitalization during which he received intravenous fluids;  He is well perfused. AHA Stage C: ; NYHA Class  II  BETA-BLOCKER: Not applicable from HFpEF  ACE/ARB: Discontinued in the past  ENTRESTO: Not applicable  DIURETIC: Lasix 20 mg daily as needed for weight gain of 2-3 pounds overnight or 5 pounds in one week; if the patient should have any concerns regarding leg swelling or the manner in which to use his Lasix he is to contact the heart failure clinic.  Lasix 40 mg subq given today in office  ALDOSTERONT ANTAGONIST: History of KIRK/hyperkalemia  IMDUR/HYDRALAZINE: Hydralazine 25 mg three times daily  DIGOXIN: Not applicable  Fluid restriction: 2000 cc daily  Sodium restriction: 2000 mg daily     Recommended daily weight monitoring.  Discussed patient action plan for heart failure.  Recommended avoiding NSAIDs use.  Discussed warning signs requiring additional medical attention for heart failure.       2. Coronary artery disease involving native coronary artery of native heart without angina pectoris, stable      ASA plus statin therapy   3. Essential hypertension, stable      As per #1; consideration for white coat syndrome        4. Chronic kidney disease, stage II (mild), stable  As per Dr Reza         Patient's BMI is within normal parameters. No follow-up required; non smoker.    The patient will contact us on Monday, December 10 to update us as to his response to diuretic therapy adjustments performed today.          This document has been electronically signed by REGINA Carlos on December 7, 2018 10:45 AM

## 2018-12-07 NOTE — OUTREACH NOTE
COPD/PN Week 1 Survey      Responses   Facility patient discharged from?  [facility]   Does the patient have one of the following disease processes/diagnoses(primary or secondary)?  COPD/Pneumonia   Is there a successful TCM telephone encounter documented?  No   Was the primary reason for admission:  Pneumonia   Week 1 attempt successful?  Yes   Call start time  0936   Call end time  0944   Discharge diagnosis  Pneumonia of RUE due to infectious organism, human metapneumovirus pneumonia, KIRK, enteritis, renal failure, DM, Chronic Diastolic CHF, HTN, CAD   Meds reviewed with patient/caregiver?  Yes   Is the patient having any side effects they believe may be caused by any medication additions or changes?  No   Does the patient have all medications ordered at discharge?  N/A   Prescription comments  Ventolin inhaler added this week by PCP   Is the patient taking all medications as directed (includes completed medication regime)?  N/A   Does the patient have a primary care provider?   Yes   Does the patient have an appointment with their PCP or pulmonologist within 7 days of discharge?  Yes   Has the patient kept scheduled appointments due by today?  Yes   Has home health visited the patient within 72 hours of discharge?  N/A   Psychosocial issues?  No   Comments  lives with daughter and grandson   Did the patient receive a copy of their discharge instructions?  Yes   Nursing interventions  Reviewed instructions with patient   What is the patient's perception of their health status since discharge?  Improving   Nursing Interventions  Nurse provided patient education   Are the patient's immunizations up to date?   Yes   Is the patient/caregiver able to teach back the hierarchy of who to call/visit for symptoms/problems? PCP, Specialist, Home health nurse, Urgent Care, ED, 911  Yes   Is the patient/caregiver able to teach back signs and symptoms of worsening condition:  Fever/chills, Shortness of breath, Chest pain    Is the patient/caregiver able to teach back importance of completing antibiotic course of treatment?  Yes   Week 1 call completed?  Yes          Bria Diane RN

## 2018-12-10 ENCOUNTER — OFFICE VISIT (OUTPATIENT)
Dept: CARDIOLOGY | Facility: CLINIC | Age: 83
End: 2018-12-10

## 2018-12-10 VITALS
DIASTOLIC BLOOD PRESSURE: 72 MMHG | HEART RATE: 111 BPM | WEIGHT: 161.8 LBS | HEIGHT: 64 IN | BODY MASS INDEX: 27.62 KG/M2 | SYSTOLIC BLOOD PRESSURE: 140 MMHG | OXYGEN SATURATION: 98 %

## 2018-12-10 DIAGNOSIS — I50.32 CHRONIC DIASTOLIC CONGESTIVE HEART FAILURE (HCC): Primary | Chronic | ICD-10-CM

## 2018-12-10 DIAGNOSIS — R60.0 LOWER EXTREMITY EDEMA: ICD-10-CM

## 2018-12-10 PROCEDURE — 99214 OFFICE O/P EST MOD 30 MIN: CPT | Performed by: NURSE PRACTITIONER

## 2018-12-10 RX ORDER — FUROSEMIDE 20 MG/1
20 TABLET ORAL DAILY PRN
Qty: 30 TABLET | Refills: 3 | Status: SHIPPED | OUTPATIENT
Start: 2018-12-10 | End: 2021-05-14 | Stop reason: HOSPADM

## 2018-12-10 NOTE — PROGRESS NOTES
"Subjective:     Leg Swelling (chief complaint)    Leg Swelling   This is a recurrent problem. The current episode started in the past 7 days. The problem occurs intermittently. The problem has been gradually improving. Exacerbated by: increase in fluids. Treatments tried: additional lasix, compression hose, and elevation. The treatment provided significant relief.   Congestive Heart Failure   Presents for follow-up visit. Pertinent negatives include no chest pressure, claudication, edema (bilateral ankle at the end of the day; compliant with compression stockings), near-syncope, orthopnea, palpitations, paroxysmal nocturnal dyspnea, shortness of breath or unexpected weight change. The symptoms have been improving. Compliance with total regimen is %. Compliance problems include adherence to exercise.  Compliance with diet is %. Compliance with exercise is 26-50%. Compliance with medications is %.     The patient contacted the office earlier this morning with concerns regarding evidence of bilateral leg edema.  He reports that he was hospitalized with discharge on Tuesday, December 4 regarding pneumonia.  During the course of hospitalization, the patient reports that he did receive IV fluids for what he was told was \"dehydration\".  The patient indicates compliance with his medications as prescribed along with dietary sodium restrictions.    1. CAD, non-obstructive 2005  2. HFpEF - diastolic dysfunction  3. Hypertension  4. CKD, Stage 2 with H/O KIRK  5. H/O Atrial Flutter S/P ablation per Dr Graves 2006  6. Prostate Cancer Salvador Score 6/Stage 2 S/P RT   7. DM, Type II  8. Anterior right total hip arthroplasty 04/25/2016  9. Chronic DVT of bilateral femoral vein: on preventative dosing - Xarelto  10. Carotid disease followed annually by Dr Hamman    Mr. Ruvalcaba recently underwent lumbar surgery 10/27/2017 in [place]. He was discharge 11/7/2017. Following discharge he had several medications that were " recommended be stopped.  On reviewing discharge summary from [place], it was made note that his hospitalization was complicated by hypotension and acute kidney injury.  He recieved IV fluids per nephrology consult and blood pressure medicine discontinued.       The patient received evaluation with REGINA Vidal on 03/08/2018 in follow-up regarding leg edema for which lower extremity duplex have been ordered and was found with positive findings regarding DVT.   The patient was initiated on DOAC, Xarelto on that date.     The patient reports compliance with dietary sodium restrictions.  Also, he reports monitoring of his blood pressure in the home setting with average systolic blood pressure 120-140.    Mr. Ruvalcaba was seen 12/7/18 for leg edema following IVF administration in hospital. He was given 40mg SQ Lasix with improvement.     Hospitalization:  12/2/18- 12/4/18: pneumonia    Review of Systems   Constitution: Negative for unexpected weight change.   HENT: Negative for nosebleeds.    Cardiovascular: Negative for claudication, cyanosis, dyspnea on exertion, irregular heartbeat, near-syncope, orthopnea, palpitations, paroxysmal nocturnal dyspnea and syncope.   Respiratory: Negative for shortness of breath, sleep disturbances due to breathing and snoring.    Endocrine: Negative for polydipsia, polyphagia and polyuria.   Hematologic/Lymphatic: Negative for bleeding problem. Does not bruise/bleed easily.   Skin: Negative for poor wound healing.   Musculoskeletal: Negative for falls.   Gastrointestinal: Negative for bloating.   Neurological: Negative for dizziness and light-headedness.   Psychiatric/Behavioral: Negative for altered mental status. The patient is not nervous/anxious.      Past Medical History:   Diagnosis Date   • Acquired hallux rigidus    • Anemia    • Arthropathy of lumbar facet joint    • Carpal tunnel syndrome    • CHF (congestive heart failure) (CMS/[facility])    • Chronic diastolic congestive  heart failure (CMS/[facility])    • Chronic kidney disease    • Chronic kidney disease, stage II (mild)     Chronic kidney disease stage 2   • Chronic obstructive lung disease (CMS/[facility])    • Chronic renal failure    • Coronary artery disease    • Cortical senile cataract    • Diabetes mellitus (CMS/[facility])    • Diabetes mellitus type 2, noninsulin dependent (CMS/[facility])     diabetes mellitus type 2, non-insulin treated, Medication treatment plan: oral diabetic medication   • Diabetes mellitus without complication (CMS/[facility])    • Diastolic dysfunction    • Diastolic heart failure (CMS/[facility])    • Essential hypertension     difficult to control      • Hip pain    • History of echocardiogram 08/19/2015    Echocardiogram W/ color flow 98104 (1) - Mild to moderate LVH with mild left atrial enlargement and normal aortic root.[facility],preserved LV systolic function with Ef of 55%.Diastolic dysfunction.MV intact.AV thickened.Aortic sclerosis.   • Hyperlipidemia    • Influenza vaccine administered 10/02/2015    INFLUENZA IMMUN ADMIN OR PREV RECV'D  (1) - Ordered By: DIONNE MANDUJANO ([facility])    • Insomnia    • Insomnia     Other insomnia   • Joint pain    • Low back pain    • Muscle strain    • Nuclear cataract    • Pneumococcal vaccination given 07/22/2016    PNEUMOC VAC/ADMIN/RCVD 4040F (3) - Ordered By: DIONNE MANDUJANO ([facility])   • Prostate cancer (CMS/[facility])    • Pure hypercholesterolemia    • Sedentary lifestyle      Past Surgical History:   Procedure Laterality Date   • CARDIAC ABLATION     • CARDIAC CATHETERIZATION  02/04/1986    Cardiac cath 57806 (1) - normal left heart catherization,non cardiac chest pain   • CARPAL TUNNEL RELEASE  10/19/2015    Carpal tunnel surgery (1) - Release of left carpal tunnel   • ENDOSCOPY  08/05/2013    Colon endoscopy 73236 (2) - Hemorrhoids found.   • INGUINAL HERNIA REPAIR Right 06/07/1968    Inguinal hernia, repair (2)   • INJECTION OF MEDICATION  09/06/2012    Albuterol (2u) (1) - Ordered By: LIDYA  JATIN ([facility])    • INJECTION OF MEDICATION  2012    Atrovent (1) - Ordered By: LIDYA STEINER ([facility])    • INJECTION OF MEDICATION  02/10/2012    Depo Medrol (Methylprednisone) (3) - Ordered By: HEATHER PAK ([facility])    • INJECTION OF MEDICATION  2014    Kenalog (4) - Ordered By: DIMAS ANDRADE ([facility])    • JOINT REPLACEMENT     • LUMBAR LAMINECTOMY      Lumbar laminectomy (1)   • MANDIBLE FRACTURE SURGERY  10/11/1981    Treat nose/jaw fracture (1) - bilateral open reduction of fracture of mandible   • OTHER SURGICAL HISTORY  2011    Drain/Inject Major Joint  (1) - Ordered By: KARLOS HERNANDEZ ([facility])    • OTHER SURGICAL HISTORY      Insert IVC filter 44260 (1)   • OTHER SURGICAL HISTORY  10/11/2011    SPIROMETRY 39706 (1) - Ordered By: HEATHER PAK ([facility])   • TOTAL HIP ARTHROPLASTY Right      Social History     Socioeconomic History   • Marital status:      Spouse name: Not on file   • Number of children: Not on file   • Years of education: Not on file   • Highest education level: Not on file   Tobacco Use   • Smoking status: Former Smoker     Last attempt to quit:      Years since quittin.9   • Smokeless tobacco: Never Used   Substance and Sexual Activity   • Alcohol use: No   • Drug use: No   • Sexual activity: Defer     Comment: Marital status:      Current Outpatient Medications   Medication Sig Dispense Refill   • albuterol 108 (90 Base) MCG/ACT inhaler Inhale 2 puffs Every 4 (Four) Hours As Needed for Wheezing. 18 g 1   • aspirin 81 MG EC tablet Take 81 mg by mouth daily.     • benzonatate (TESSALON PERLES) 100 MG capsule Take 2 capsules by mouth 3 (Three) Times a Day As Needed for Cough. 42 capsule 0   • ferrous sulfate 325 (65 FE) MG tablet Take 1 tablet by mouth Daily With Breakfast. 30 tablet 5   • furosemide (LASIX) 20 MG tablet Take 20 mg by mouth 2 (Two) Times a Day. If weight gain 2 t o 3 pounds overnight or 5 pounds in 1 week  "    • gabapentin (NEURONTIN) 300 MG capsule Take 1 capsule by mouth 2 (Two) Times a Day. 60 capsule 5   • glucose blood (TRUE METRIX BLOOD GLUCOSE TEST) test strip Check glucose daily 100 each 0   • hydrALAZINE (APRESOLINE) 25 MG tablet Take 1 tablet by mouth 3 (Three) Times a Day. 90 tablet 6   • JANUVIA 50 MG tablet TAKE ONE TABLET BY MOUTH DAILY 30 tablet 6   • pravastatin (PRAVACHOL) 40 MG tablet Take 1 tablet by mouth Daily. 30 tablet 11   • rivaroxaban (XARELTO) 10 MG tablet Take 1 tablet by mouth Daily. 30 tablet 11   • TRUEPLUS LANCETS 30G misc 1 Device Daily. TEST 100 each 3     No current facility-administered medications for this visit.      Objective:     Vitals:    12/10/18 1022   BP: 140/72   BP Location: Left arm   Patient Position: Sitting   Cuff Size: Adult   Pulse: 111   SpO2: 98%   Weight: 73.4 kg (161 lb 12.8 oz)   Height: 162.6 cm (64\")      Physical Exam   Constitutional: He is oriented to person, place, and time. He appears well-nourished. No distress.   HENT:   Head: Normocephalic and atraumatic.   Eyes: Conjunctivae are normal. Right eye exhibits no discharge. Left eye exhibits no discharge.   Neck: No JVD ( 6 cm at 45°) present. No tracheal deviation present.   Cardiovascular: Normal rate, regular rhythm, S1 normal, S2 normal, normal heart sounds and intact distal pulses. Exam reveals no S3 and no S4.   No murmur heard.  Pulses:       Radial pulses are 2+ on the right side, and 2+ on the left side.   Pulmonary/Chest: Effort normal and breath sounds normal. No respiratory distress. He has no wheezes. He has no rales.   Abdominal: He exhibits no distension.   Musculoskeletal: He exhibits no edema or tenderness.   Neurological: He is alert and oriented to person, place, and time.   Skin: Skin is warm and dry. He is not diaphoretic.   Psychiatric: He has a normal mood and affect. His behavior is normal. Judgment and thought content normal.   Vitals reviewed.    Data Reviewed:    [facility]: 03/12/2005 " - Dr Kj Green  Aortic pressure is 133/77.   Left ventricular pressure is 133.   Left ventricular end diastolic pressure is 13.   The left main is a very short caliber vessel, almost like a FDC ostium.   The LAD is a large caliber vessel, distally has diffuse 20-30 percent narrowing, gives rise to a large diagonal 1.   The circumflex is a large caliber vessel, which gives rise to an OM1, which had diffuse 20-30 percent disease and also a posterior descending artery.   The RCA is a medium to large caliber vessel, which gave rise to a posterior descending artery and posterolateral, distal to the bifurcation has about 60-70 percent narrowing.   Left ventricular angiogram showed normal wall motion with ejection fraction of 65-75 percent.     Transthoracic echocardiogram: 11/08/2017  · Left ventricular wall thickness is consistent with mild concentric hypertrophy.  · Left ventricular systolic function is normal. Estimated EF = 55%.  · Left ventricular diastolic dysfunction (grade I) consistent with impaired relaxation.    Transthoracic Echocardiogram 10/31/3017 in [place]  Indication:  Hypotension  BP:           93/63  Findings       Left Ventricle:  The left ventricular chamber size is normal. Mild concentric left  ventricular hypertrophy is observed. The estimated ejection fraction is  65-70%. Abnormal left ventricular diastolic filling is observed,  consistent with impaired relaxation.    Left Atrium:  The left atrial chamber size is normal.   Right Ventricle:  The right ventricular cavity size is normal. The right ventricular  global systolic function is normal.   Right Atrium:  The right atrial cavity size is normal.   Aortic Valve:  Moderate aortic leaflet calcification is visualized. There is mild to  moderate aortic stenosis. The mean gradient of the aortic valve is 30  mmHg. The aortic valve area by velocity time interval is calculated at  1.84 cm2. There is no evidence of aortic regurgitation.    Mitral Valve:  The mitral valve leaflets appear normal. There is mild mitral  regurgitation.    Tricuspid Valve:  The tricuspid valve leaflets are normal.  There is mild tricuspid  regurgitation. The Right Ventricular Systolic Pressure is calculated at  35 mmHg.   Pulmonic Valve:  There is trace pulmonic regurgitation present.   Pericardium:  There is no pericardial effusion.   Conclusions  1. Mild concentric left ventricular hypertrophy with thickened  papillary muscle is observed.   Hyperdynamic LV systolic function with an  estimated ejection fraction is 65-70%.  2. Abnormal left ventricular diastolic filling is observed, consistent  with impaired relaxation.   3. Aortic valve is trileaflet, moderately calcified with mild to  moderate aortic stenosis  4. Mild tricuspid regurgitation with RVSP 35 mm Hg    Echocardiogram: 08/26/2016  FINDINGS:  1. Both atria are normal in size.  2. The aortic valve is mildly sclerotic without any hemodynamically  significant aortic stenosis.  3. Mild concentric left ventricular hypertrophy with early diastolic  dysfunction. No evidence of any regional wall motion abnormality.  The estimated ejection fraction greater than 60%.  4. There is mild mitral annular calcification.  5. Normal right ventricular size and function.  6. On color flow Doppler, there is trace mitral and tricuspid  regurgitation without any hemodynamic significance.  7. No intracardiac mass, pericardial effusion, or cardiac thrombus  seen.  CLINICAL IMPRESSION:  1. Mild concentric left ventricular hypertrophy with early diastolic  dysfunction.  2. Estimated ejection fraction greater than 60%.  3. Mildly sclerotic aortic valve with mild mitral annular  calcification without any hemodynamic significant stenotic process.  4. On color flow Doppler, there is trace mitral and tricuspid  regurgitation without any hemodynamic significance.  5. No intracardiac mass, pericardial effusion, or cardiac  thrombus  Seen.    Carotid Ulsd: 01/09/2017  Unable to retrieve results  Renal Artery U/S 11/1/2017  1. No color Doppler scan evidence of hemodynamically significant renal artery stenosis.  2. Elevated resistivity indices within the kidneys bilaterally which may be indicative of medical renal parenchymal disease.  3. Kidneys are slightly atrophic in size as detailed above.  4. Urinary bladder poorly distended for assessment with suspected thickening the bladder wall which is nonspecific and may simply be accentuated by lack of distention or muscle hypertrophy.  CTA Chest: 11/08/2017  IMPRESSION:  1. No evidence of pulmonary embolus..  2. Mild centrilobular and paraseptal emphysematous changes..  3. Posterior segment right lobe of liver peripheral 1.56 cm  simple hepatic cyst..  4.T11, T12, and L1 bilateral decompressive laminectomies.    Venous duplex lower extremity: 03/08/2018  · Acute right lower extremity deep vein thrombosis noted in the common femoral, profunda femoral, proximal femoral, mid femoral, distal femoral, popliteal and peroneal.  · Acute right lower extremity superficial thrombophlebitis noted in the greater saphenous (above knee).  · Acute left lower extremity deep vein thrombosis noted in the common femoral, profunda femoral, proximal femoral, mid femoral, distal femoral, popliteal and peroneal.  · Partial flow is noted above mentioned veins    Venous Duplex 9/12/2018  Interpretation Summary     · Chronic right lower extremity deep vein thrombosis noted in the common femoral, profunda femoral, proximal femoral, mid femoral, distal femoral, popliteal, posterior tibial and peroneal.  · Chronic right lower extremity superficial thrombophlebitis noted in the saphenofemoral junction and greater saphenous (above knee).  · Chronic left lower extremity deep vein thrombosis noted in the common femoral, profunda femoral, proximal femoral, mid femoral, distal femoral, popliteal, posterial tibial and  peroneal.       LABS:  Lab Results   Component Value Date    GLUCOSE 93 12/04/2018    BUN 16 12/04/2018    CREATININE 1.24 12/04/2018    EGFRIFAFRI 68 12/04/2018    BCR 12.9 12/04/2018    K 4.0 12/04/2018    CO2 21.0 (L) 12/04/2018    CALCIUM 8.0 (L) 12/04/2018    PROTENTOTREF 6.4 05/14/2015    ALBUMIN 4.00 12/02/2018    LABIL2 1.7 05/14/2015    AST 23 12/02/2018    ALT 21 12/02/2018     Lab Results   Component Value Date    WBC 6.60 12/04/2018    HGB 9.2 (L) 12/04/2018    HCT 26.9 (L) 12/04/2018    MCV 87.1 12/04/2018     12/04/2018         The following portions of the patient's history were reviewed and updated as appropriate: allergies, current medications, past family history, past medical history, past social history, past surgical history and problem list.     Assessment:      Diagnosis Plan   1. Chronic diastolic congestive heart failure with evidence of euvolemia post lasix injection on Friday.   He is well perfused. HD are acceptable.  AHA Stage C: ; NYHA Class II  BETA-BLOCKER: Not applicable from HFpEF  ACE/ARB: Discontinued in the past  ENTRESTO: Not applicable  DIURETIC: Lasix 20 mg daily as needed for weight gain of 2-3 pounds overnight or 5 pounds in one week; if the patient should have any concerns regarding leg swelling or the manner in which to use his Lasix he is to contact the heart failure clinic.    ALDOSTERONT ANTAGONIST: History of KIRK/hyperkalemia  IMDUR/HYDRALAZINE: Hydralazine 25 mg three times daily  DIGOXIN: Not applicable  Fluid restriction: 2000 cc daily  Sodium restriction: 2000 mg daily     Recommended daily weight monitoring.  Discussed patient action plan for heart failure.  Recommended avoiding NSAIDs use.  Discussed warning signs requiring additional medical attention for heart failure.    LASIX inject:  12/7/18: 40mg SQ       2. Bilateral leg edema Improved following Lasix injection.   Compression hose in place         Patient's BMI is within normal parameters. No  follow-up required; non smoker.    Follow up as scheduled in February.

## 2018-12-10 NOTE — PATIENT INSTRUCTIONS
Instructions for as needed Lasix.     Leg swelling or weight gain overnight (2-3lbs)    Call ANYTIME for questions on how to take Lasix.

## 2018-12-14 ENCOUNTER — READMISSION MANAGEMENT (OUTPATIENT)
Dept: CALL CENTER | Facility: HOSPITAL | Age: 83
End: 2018-12-14

## 2018-12-14 NOTE — OUTREACH NOTE
COPD/PN Week 2 Survey      Responses   Facility patient discharged from?  [facility]   Does the patient have one of the following disease processes/diagnoses(primary or secondary)?  COPD/Pneumonia   Was the primary reason for admission:  Pneumonia   Week 2 attempt successful?  No   Unsuccessful attempts  Attempt 1          Lazaro Flores RN

## 2018-12-15 ENCOUNTER — READMISSION MANAGEMENT (OUTPATIENT)
Dept: CALL CENTER | Facility: HOSPITAL | Age: 83
End: 2018-12-15

## 2018-12-15 NOTE — OUTREACH NOTE
COPD/PN Week 2 Survey      Responses   Facility patient discharged from?  [facility]   Does the patient have one of the following disease processes/diagnoses(primary or secondary)?  COPD/Pneumonia   Was the primary reason for admission:  Pneumonia   Week 2 attempt successful?  No   Unsuccessful attempts  Attempt 2          Sarah Suárez, RN

## 2018-12-16 ENCOUNTER — READMISSION MANAGEMENT (OUTPATIENT)
Dept: CALL CENTER | Facility: HOSPITAL | Age: 83
End: 2018-12-16

## 2018-12-16 NOTE — OUTREACH NOTE
COPD/PN Week 2 Survey      Responses   Facility patient discharged from?  [facility]   Does the patient have one of the following disease processes/diagnoses(primary or secondary)?  COPD/Pneumonia   Was the primary reason for admission:  Pneumonia   Week 2 attempt successful?  Yes   Call start time  1604   Call end time  1607   Is patient permission given to speak with other caregiver?  Yes   List who call center can speak with  Anyone that answers   Meds reviewed with patient/caregiver?  Yes   Is the patient taking all medications as directed (includes completed medication regime)?  Yes   Comments regarding PCP  12/21  Dr. Hannon   Has the patient kept scheduled appointments due by today?  Yes   What is the patient's perception of their health status since discharge?  Improving   Is the patient able to teach back COPD zones?  Yes   Nursing interventions  Education provided on various zones   Patient reports what zone on this call?  Green Zone   Green Zone  Reports doing well, Breathing without shortness of breath, Usual activity and exercise level, Usual amount of phlegm/mucus without difficulty coughing up, Sleeping well, Appetite is good   Green Zone interventions:  Take daily medications, Continue regular exercise/diet plan, Use oxygen as prescribed, Avoid indoor/outdoor triggers   Week 2 call completed?  Yes          Shirley Tom RN

## 2018-12-26 ENCOUNTER — READMISSION MANAGEMENT (OUTPATIENT)
Dept: CALL CENTER | Facility: HOSPITAL | Age: 83
End: 2018-12-26

## 2018-12-26 NOTE — OUTREACH NOTE
COPD/PN Week 3 Survey      Responses   Facility patient discharged from?  [facility]   Does the patient have one of the following disease processes/diagnoses(primary or secondary)?  COPD/Pneumonia   Was the primary reason for admission:  Pneumonia   Week 3 attempt successful?  Yes   Call start time  1235   Call end time  1239   Discharge diagnosis  Pneumonia of RUE due to infectious organism, human metapneumovirus pneumonia, KIRK, enteritis, renal failure, DM, Chronic Diastolic CHF, HTN, CAD   Meds reviewed with patient/caregiver?  Yes   Is the patient having any side effects they believe may be caused by any medication additions or changes?  No   Does the patient have all medications ordered at discharge?  Yes   Is the patient taking all medications as directed (includes completed medication regime)?  Yes   Does the patient have a primary care provider?   Yes   Does the patient have an appointment with their PCP or pulmonologist within 7 days of discharge?  Yes   Comments regarding PCP  Dr Larkin is PCP- has already seen.    Has the patient kept scheduled appointments due by today?  Yes   Has home health visited the patient within 72 hours of discharge?  N/A   Psychosocial issues?  No   Comments  lives with daughter and grandson   Did the patient receive a copy of their discharge instructions?  Yes   Nursing interventions  Reviewed instructions with patient   What is the patient's perception of their health status since discharge?  Improving   Nursing Interventions  Nurse provided patient education   Are the patient's immunizations up to date?   Yes   Nursing interventions  Educated on importance of maintaining up to date immunizations as advised by provider   Is the patient/caregiver able to teach back the hierarchy of who to call/visit for symptoms/problems? PCP, Specialist, Home health nurse, Urgent Care, ED, 911  Yes   Additional teach back comments  Patient reports he is doing well. No SOB or chest pain, No  fever.    Is the patient able to teach back COPD zones?  Yes   Nursing interventions  Education provided on various zones   Patient reports what zone on this call?  Green Zone   Green Zone  Reports doing well, Breathing without shortness of breath, Usual activity and exercise level, Usual amount of phlegm/mucus without difficulty coughing up, Sleeping well, Appetite is good   Green Zone interventions:  Take daily medications, Continue regular exercise/diet plan, Use oxygen as prescribed, Avoid indoor/outdoor triggers   Is the patient/caregiver able to teach back signs and symptoms of worsening condition:  Fever/chills, Shortness of breath, Chest pain   Is the patient/caregiver able to teach back importance of completing antibiotic course of treatment?  Yes   Week 3 call completed?  Yes          Franck Echeverria RN

## 2019-01-03 ENCOUNTER — EPISODE CHANGES (OUTPATIENT)
Dept: CASE MANAGEMENT | Facility: OTHER | Age: 84
End: 2019-01-03

## 2019-01-14 DIAGNOSIS — I65.23 ASYMPTOMATIC BILATERAL CAROTID ARTERY STENOSIS: Primary | ICD-10-CM

## 2019-01-16 ENCOUNTER — OFFICE VISIT (OUTPATIENT)
Dept: CARDIAC SURGERY | Facility: CLINIC | Age: 84
End: 2019-01-16

## 2019-01-16 VITALS
OXYGEN SATURATION: 99 % | DIASTOLIC BLOOD PRESSURE: 75 MMHG | HEART RATE: 79 BPM | WEIGHT: 165 LBS | TEMPERATURE: 98.4 F | BODY MASS INDEX: 26.52 KG/M2 | HEIGHT: 66 IN | SYSTOLIC BLOOD PRESSURE: 160 MMHG

## 2019-01-16 DIAGNOSIS — I65.23 ASYMPTOMATIC BILATERAL CAROTID ARTERY STENOSIS: Primary | ICD-10-CM

## 2019-01-16 PROCEDURE — 99212 OFFICE O/P EST SF 10 MIN: CPT | Performed by: THORACIC SURGERY (CARDIOTHORACIC VASCULAR SURGERY)

## 2019-01-17 NOTE — PROGRESS NOTES
[facility] Keith Ruvalcaba Jr.  1935            83 y.o.      1/16/2019    Chief Complaint   Patient presents with   • Carotid Artery Disease     2 year follow up      Chief Complaint   Patient presents with   • Carotid Artery Disease       2 year follow up      In August 2, 2005 this patient developed a DVT and pulmonary embolus despite adequate anticoagulation and thus underwent implantation of a vena caval filter which has remained stable and intact since that time.  He has long since been off anticoagulation.   when seen 2010  A study suggested a significant carotid stenosis bilaterally and patient has  been under surveillance since that time.  CT scan of his abdomen 5/14/14 demonstrated calcification in his abdominal aorta but NO ABDOMINAL AORTIC ANEURYSM        HPI  He remains asymptomatic referable to cerebrovascular disease      ROS       Current Outpatient Medications:   •  albuterol 108 (90 Base) MCG/ACT inhaler, Inhale 2 puffs Every 4 (Four) Hours As Needed for Wheezing., Disp: 18 g, Rfl: 1  •  aspirin 81 MG EC tablet, Take 81 mg by mouth daily., Disp: , Rfl:   •  benzonatate (TESSALON PERLES) 100 MG capsule, Take 2 capsules by mouth 3 (Three) Times a Day As Needed for Cough., Disp: 42 capsule, Rfl: 0  •  ferrous sulfate 325 (65 FE) MG tablet, Take 1 tablet by mouth Daily With Breakfast., Disp: 30 tablet, Rfl: 5  •  furosemide (LASIX) 20 MG tablet, Take 1 tablet by mouth Daily As Needed (weight gain of 3lbs overnight or leg swelling)., Disp: 30 tablet, Rfl: 3  •  gabapentin (NEURONTIN) 300 MG capsule, Take 1 capsule by mouth 2 (Two) Times a Day., Disp: 60 capsule, Rfl: 5  •  glucose blood (TRUE METRIX BLOOD GLUCOSE TEST) test strip, Check glucose daily, Disp: 100 each, Rfl: 0  •  hydrALAZINE (APRESOLINE) 25 MG tablet, Take 1 tablet by mouth 3 (Three) Times a Day., Disp: 90 tablet, Rfl: 6  •  JANUVIA 50 MG tablet, TAKE ONE TABLET BY MOUTH DAILY, Disp: 30 tablet, Rfl: 6  •  pravastatin (PRAVACHOL) 40 MG tablet,  Take 1 tablet by mouth Daily., Disp: 30 tablet, Rfl: 11  •  rivaroxaban (XARELTO) 10 MG tablet, Take 1 tablet by mouth Daily., Disp: 30 tablet, Rfl: 11  •  TRUEPLUS LANCETS 30G misc, 1 Device Daily. TEST, Disp: 100 each, Rfl: 3    The following portions of the patient's history were reviewed and updated as appropriate: allergies, current medications, past family history, past medical history, past social history, past surgical history and problem list.        Past Surgical History:   Procedure Laterality Date   • CARDIAC ABLATION     • CARDIAC CATHETERIZATION  02/04/1986    Cardiac cath 80864 (1) - normal left heart catherization,non cardiac chest pain   • CARPAL TUNNEL RELEASE  10/19/2015    Carpal tunnel surgery (1) - Release of left carpal tunnel   • CATARACT EXTRACTION W/ INTRAOCULAR LENS IMPLANT Left 3/31/2017    Procedure: REMOVE CATARACT AND IMPLANT INRAOCULAR LENS LEFT EYE;  Surgeon: Hector Navarrete MD;  Location: [facility] OR;  Service:    • CATARACT EXTRACTION W/ INTRAOCULAR LENS IMPLANT Right 4/14/2017    Procedure: REMOVE CATARACT AND IMPLANT INTRAOCULAR LENS RIGHT EYE;  Surgeon: Hector Navarrete MD;  Location: [facility] OR;  Service:    • COLONOSCOPY N/A 10/31/2018    Procedure: COLONOSCOPY;  Surgeon: Herbie Christine MD;  Location: [facility] ENDOSCOPY;  Service: Gastroenterology   • ENDOSCOPY  08/05/2013    Colon endoscopy 32706 (2) - Hemorrhoids found.   • INGUINAL HERNIA REPAIR Right 06/07/1968    Inguinal hernia, repair (2)   • INJECTION OF MEDICATION  09/06/2012    Albuterol (2u) (1) - Ordered By: LIDYA STEINER ([facility])    • INJECTION OF MEDICATION  09/06/2012    Atrovent (1) - Ordered By: LIDYA STEINER ([facility])    • INJECTION OF MEDICATION  02/10/2012    Depo Medrol (Methylprednisone) (3) - Ordered By: HEATHER PAK ([facility])    • INJECTION OF MEDICATION  02/09/2014    Kenalog (4) - Ordered By: DIMAS ANDRADE ([facility])    • JOINT REPLACEMENT     • LUMBAR LAMINECTOMY  2007    Lumbar  "laminectomy (1)   • MANDIBLE FRACTURE SURGERY  10/11/1981    Treat nose/jaw fracture (1) - bilateral open reduction of fracture of mandible   • OTHER SURGICAL HISTORY  09/24/2011    Drain/Inject Major Joint 20610 (1) - Ordered By: KARLOS HERNANDEZ ([facility])    • OTHER SURGICAL HISTORY      Insert IVC filter 85964 (1)   • OTHER SURGICAL HISTORY  10/11/2011    SPIROMETRY 45141 (1) - Ordered By: HEATHER PAK ([facility])   • TOTAL HIP ARTHROPLASTY Right            Physical Exam  /75 (BP Location: Left arm)   Pulse 79   Temp 98.4 °F (36.9 °C) (Temporal)   Ht 167.6 cm (66\")   Wt 74.8 kg (165 lb)   SpO2 99%   BMI 26.63 kg/m²     HEENT: sof sounds audible over carotid arteries    CHEST:clear    HEART:irregular rhythm    ABDOMEN:nontender    EXTREMITIES:ankle pulses weakly palpable    VASCULAR LAB STUDIES:CAROTID DUPLEX SCAN (I-[id number])                                    RIGHT 0-49%           LEFT 0-49%   Vertebral Flow       Antegrade                 Antegrade    Return 2 years VERO, Carotid duplex scan on return        RADIOLOGY:      Asymptomatic bilateral carotid artery stenosis [I65.23]    IMPRESSION:                  Assessment/Plan  General was seen today for carotid artery disease.    Diagnoses and all orders for this visit:    Asymptomatic bilateral carotid artery stenosis                  No Follow-up on file.            Jack L. Hamman, MD  1/17/2019  "

## 2019-01-18 ENCOUNTER — PATIENT OUTREACH (OUTPATIENT)
Dept: CASE MANAGEMENT | Facility: OTHER | Age: 84
End: 2019-01-18

## 2019-01-18 NOTE — OUTREACH NOTE
Care Management Plan 1/18/2019   Lifestyle Goals Self monitor blood sugar;Routine follow-up with doctor(s);Routine foot care;Eat a healthy diet   Barriers Disease education   Self Management Medication Adherence;Home Glucose Monitoring;Home BP Monitoring   Suggested Appointments -   Suggested Appointments -   Annual Wellness Visit:  Patient Has Completed   Specific Disease Process Teaching Diabetes   Specific Disease Process Teaching -   Does patient have depression diagnosis? No   Advanced Directives: Patient Has   Ed Visits past 12 months: 1   Hospitalizations past 12 months 2 or 3     The main concerns and/or symptoms the patient would like to address are: Talked with patient. Patient lives with daughter and grandson; independent with ADL's; meal preparation; and transportation. He ambulates with cane. He states to be compliant with medications; medical appointments and monitoring of blood sugar daily or QOD. States blood sugar this morning 96 and most of the time stays within 100 to 120 range. Patient states to weigh daily . He reports no difficulty with chest pain; SOB; appetite or sleeping.     Education/instruction provided by Care Coordinator: Reviewed with patient education regarding DM; CHF; previous history of pneumonia;  24/7 Nurse Line Telephone number; Advance Directives(completed needs to file); gaps in care (flu vaccine completed); MWV ( completed); My Chart (declines) and Care Advising program. Patient verbalized understanding. No further questions or concerns voiced at this time.     Follow Up Outreach Due: Follow up as needed.     Cally Black RN

## 2019-02-06 ENCOUNTER — PATIENT OUTREACH (OUTPATIENT)
Dept: CASE MANAGEMENT | Facility: OTHER | Age: 84
End: 2019-02-06

## 2019-02-06 NOTE — OUTREACH NOTE
Talked with patient. Patient states to be compliant with medications; medical appointments and daily weights. He has cardiology appointment  2/7/19.  Patient reports no difficulties with chest pain; SOB; appetite; sleeping or edema to lower extremities.He ambulates with cane or walker as needed. Patient reports episodes of both hands shaking and no pain. He is able hold objects Patient monitors blood sugar QOD with lowest value at 90 and no symptoms of hypoglycemia. He monitors.Reviewed with patient education regarding DM;benefits of daily weights; diabetic foot care; questions for physician; 24/7 Nurse Line Telephone number; gaps in care(flu and pneumonia vaccine completed and current; A1C and colonoscopy current; diabetic eye exam to have again in 2019) ; and  MWV (completed) . Patient verbalized understanding. No further questions or concerns voiced at this time.

## 2019-02-06 NOTE — PROGRESS NOTES
Subjective:     Congestive Heart Failure (chief complaint)    Congestive Heart Failure   Presents for follow-up visit. Pertinent negatives include no chest pressure, claudication, edema (bilateral ankle at the end of the day; compliant with compression stockings), near-syncope, orthopnea, palpitations, paroxysmal nocturnal dyspnea, shortness of breath or unexpected weight change. The symptoms have been improving. Compliance with total regimen is %. Compliance problems include adherence to exercise.  Compliance with diet is %. Compliance with exercise is 26-50%. Compliance with medications is %.     1. CAD, non-obstructive 2005  2. HFpEF - diastolic dysfunction  3. Hypertension  4. CKD, Stage 2 with H/O KIRK  5. H/O Atrial Flutter S/P ablation per Dr Graves 2006  6. Prostate Cancer Shelbina Score 6/Stage 2 S/P RT   7. DM, Type II  8. Anterior right total hip arthroplasty 04/25/2016  9. Chronic DVT of bilateral femoral vein: on preventative dosing - Xarelto  10. Carotid disease followed annually by Dr Hamman    Mr. Ruvalcaba underwent lumbar surgery 10/27/2017 in [place]. He was discharge 11/7/2017. Following discharge he had several medications that were recommended be stopped secondary to hypotension/KIRK.      The patient received evaluation with REGINA Vidal on 03/08/2018 in follow-up regarding leg edema for which lower extremity duplex have been ordered and was found with positive findings regarding DVT.   The patient was initiated on DOAC, Xarelto on that date.     02/07/2019  The patient presents to the office today for previously scheduled appointment. He indicates that approximately 2 week prior he began to notice a tremor in bilateral hands most prominent at rest. He reports negative impact on quality of life or activities of daily living. He feels that perhaps the prominence of the tremors diminishes in the evening and during sleep.   The patient reports fluid intake to be approximately  1400 - 1500 cc per day. He reports negative use of diuretics over the past 8 weeks approximate.    Review of Systems   Constitution: Negative for weakness, malaise/fatigue and unexpected weight change.   HENT: Negative for nosebleeds.    Eyes: Negative for visual disturbance.   Cardiovascular: Negative for claudication, cyanosis, dyspnea on exertion, irregular heartbeat, near-syncope, orthopnea, palpitations, paroxysmal nocturnal dyspnea and syncope.   Respiratory: Negative for shortness of breath, sleep disturbances due to breathing and snoring.    Endocrine: Negative for polydipsia, polyphagia and polyuria.   Hematologic/Lymphatic: Negative for bleeding problem. Does not bruise/bleed easily.   Skin: Negative for poor wound healing.   Musculoskeletal: Negative for falls.   Gastrointestinal: Negative for bloating.   Neurological: Positive for tremors. Negative for dizziness, focal weakness, headaches, light-headedness, loss of balance, numbness, paresthesias, seizures and vertigo.   Psychiatric/Behavioral: Negative for altered mental status. The patient is not nervous/anxious.      Past Medical History:   Diagnosis Date   • Acquired hallux rigidus    • Anemia    • Arthropathy of lumbar facet joint    • Carpal tunnel syndrome    • CHF (congestive heart failure) (CMS/HCC)    • Chronic diastolic congestive heart failure (CMS/HCC)    • Chronic kidney disease    • Chronic kidney disease, stage II (mild)     Chronic kidney disease stage 2   • Chronic obstructive lung disease (CMS/HCC)    • Chronic renal failure    • Coronary artery disease    • Cortical senile cataract    • Diabetes mellitus (CMS/HCC)    • Diabetes mellitus type 2, noninsulin dependent (CMS/HCC)     diabetes mellitus type 2, non-insulin treated, Medication treatment plan: oral diabetic medication   • Diabetes mellitus without complication (CMS/HCC)    • Diastolic dysfunction    • Diastolic heart failure (CMS/HCC)    • Essential hypertension     difficult  to control      • Hip pain    • History of echocardiogram 08/19/2015    Echocardiogram W/ color flow 22849 (1) - Mild to moderate LVH with mild left atrial enlargement and normal aortic root.CLVH,preserved LV systolic function with Ef of 55%.Diastolic dysfunction.MV intact.AV thickened.Aortic sclerosis.   • Hyperlipidemia    • Influenza vaccine administered 10/02/2015    INFLUENZA IMMUN ADMIN OR PREV RECV'D  (1) - Ordered By: DIONNE MANDUJANO ([facility])    • Insomnia    • Insomnia     Other insomnia   • Joint pain    • Low back pain    • Muscle strain    • Nuclear cataract    • Pneumococcal vaccination given 07/22/2016    PNEUMOC VAC/ADMIN/RCVD 4040F (3) - Ordered By: DIONNE MANDUJANO ([facility])   • Prostate cancer (CMS/HCC)    • Pure hypercholesterolemia    • Sedentary lifestyle    ,  Past Surgical History:   Procedure Laterality Date   • CARDIAC ABLATION     • CARDIAC CATHETERIZATION  02/04/1986    Cardiac cath 29859 (1) - normal left heart catherization,non cardiac chest pain   • CARPAL TUNNEL RELEASE  10/19/2015    Carpal tunnel surgery (1) - Release of left carpal tunnel   • CATARACT EXTRACTION W/ INTRAOCULAR LENS IMPLANT Left 3/31/2017    Procedure: REMOVE CATARACT AND IMPLANT INRAOCULAR LENS LEFT EYE;  Surgeon: Hector Navarrete MD;  Location: [facility] OR;  Service:    • CATARACT EXTRACTION W/ INTRAOCULAR LENS IMPLANT Right 4/14/2017    Procedure: REMOVE CATARACT AND IMPLANT INTRAOCULAR LENS RIGHT EYE;  Surgeon: Hector Navarrete MD;  Location: [facility] OR;  Service:    • COLONOSCOPY N/A 10/31/2018    Procedure: COLONOSCOPY;  Surgeon: Herbie Christine MD;  Location: [facility] ENDOSCOPY;  Service: Gastroenterology   • ENDOSCOPY  08/05/2013    Colon endoscopy 44134 (2) - Hemorrhoids found.   • INGUINAL HERNIA REPAIR Right 06/07/1968    Inguinal hernia, repair (2)   • INJECTION OF MEDICATION  09/06/2012    Albuterol (2u) (1) - Ordered By: LIDYA STEINER ([facility])    • INJECTION OF MEDICATION  09/06/2012     Atrovent (1) - Ordered By: LIDYA STEINER ([facility])    • INJECTION OF MEDICATION  02/10/2012    Depo Medrol (Methylprednisone) (3) - Ordered By: HEATHER PAK ([facility])    • INJECTION OF MEDICATION  2014    Kenalog (4) - Ordered By: DIMAS ANDRADE ([facility])    • JOINT REPLACEMENT     • LUMBAR LAMINECTOMY      Lumbar laminectomy (1)   • MANDIBLE FRACTURE SURGERY  10/11/1981    Treat nose/jaw fracture (1) - bilateral open reduction of fracture of mandible   • OTHER SURGICAL HISTORY  2011    Drain/Inject Major Joint  (1) - Ordered By: KARLOS HERNANDEZ ([facility])    • OTHER SURGICAL HISTORY      Insert IVC filter 66826 (1)   • OTHER SURGICAL HISTORY  10/11/2011    SPIROMETRY 41778 (1) - Ordered By: HEATHER PAK ([facility])   • TOTAL HIP ARTHROPLASTY Right    ,  Family History   Problem Relation Age of Onset   • Diabetes Other    • Cancer Sister    • Heart disease Mother    • Hypertension Mother    • Heart disease Father    • Hypertension Father    • Coronary artery disease Neg Hx    ,  Social History     Socioeconomic History   • Marital status:      Spouse name: Not on file   • Number of children: Not on file   • Years of education: Not on file   • Highest education level: Not on file   Tobacco Use   • Smoking status: Former Smoker     Last attempt to quit:      Years since quittin.1   • Smokeless tobacco: Never Used   Substance and Sexual Activity   • Alcohol use: No   • Drug use: No   • Sexual activity: Defer     Comment: Marital status:      Current Outpatient Medications   Medication Sig Dispense Refill   • albuterol 108 (90 Base) MCG/ACT inhaler Inhale 2 puffs Every 4 (Four) Hours As Needed for Wheezing. 18 g 1   • aspirin 81 MG EC tablet Take 81 mg by mouth daily.     • ferrous sulfate 325 (65 FE) MG tablet Take 1 tablet by mouth Daily With Breakfast. 30 tablet 5   • furosemide (LASIX) 20 MG tablet Take 1 tablet by mouth Daily As Needed (weight gain of 3lbs overnight  "or leg swelling). 30 tablet 3   • gabapentin (NEURONTIN) 300 MG capsule Take 1 capsule by mouth 2 (Two) Times a Day. 60 capsule 5   • glucose blood (TRUE METRIX BLOOD GLUCOSE TEST) test strip Check glucose daily 100 each 0   • hydrALAZINE (APRESOLINE) 25 MG tablet Take 1 tablet by mouth 3 (Three) Times a Day. 90 tablet 6   • JANUVIA 50 MG tablet TAKE ONE TABLET BY MOUTH DAILY 30 tablet 6   • pravastatin (PRAVACHOL) 40 MG tablet Take 1 tablet by mouth Daily. 30 tablet 11   • rivaroxaban (XARELTO) 10 MG tablet Take 1 tablet by mouth Daily. 30 tablet 11   • TRUEPLUS LANCETS 30G misc 1 Device Daily. TEST 100 each 3   • metoprolol succinate XL (TOPROL-XL) 25 MG 24 hr tablet Take 1 tablet by mouth Every Night. 30 tablet 2     No current facility-administered medications for this visit.      Objective:     Vitals:    02/07/19 0903   BP: 162/70   BP Location: Left arm   Patient Position: Sitting   Cuff Size: Adult   Pulse: 99   SpO2: 97%   Weight: 75.5 kg (166 lb 6.4 oz)   Height: 165.1 cm (65\")      Physical Exam   Constitutional: He is oriented to person, place, and time. He appears well-nourished. No distress.   HENT:   Head: Normocephalic and atraumatic.   Eyes: Conjunctivae are normal. Right eye exhibits no discharge. Left eye exhibits no discharge.   Neck: No JVD ( 6 cm at 45°) present. No tracheal deviation present.   Cardiovascular: Normal rate, regular rhythm, S1 normal, S2 normal, normal heart sounds and intact distal pulses. Exam reveals no S3 and no S4.   No murmur heard.  Pulses:       Radial pulses are 2+ on the right side, and 2+ on the left side.   Pulmonary/Chest: Effort normal and breath sounds normal. No respiratory distress. He has no wheezes. He has no rales.   Abdominal: He exhibits no distension.   Musculoskeletal: He exhibits no edema or tenderness.   Neurological: He is alert and oriented to person, place, and time. He has normal strength.   Reflex Scores:       Tricep reflexes are 2+ on the right " side.  Fine tremor noted in bilateral hands appears both postural and kinetic   Skin: Skin is warm and dry. He is not diaphoretic.   Psychiatric: He has a normal mood and affect. His behavior is normal. Judgment and thought content normal.   Vitals reviewed.    Data Reviewed:    Electrocardiogram: 02/07/2019  NSR with PAC's     LHC: 03/12/2005 - Dr Kj Green  Aortic pressure is 133/77.   Left ventricular pressure is 133.   Left ventricular end diastolic pressure is 13.   The left main is a very short caliber vessel, almost like a MCFP ostium.   The LAD is a large caliber vessel, distally has diffuse 20-30 percent narrowing, gives rise to a large diagonal 1.   The circumflex is a large caliber vessel, which gives rise to an OM1, which had diffuse 20-30 percent disease and also a posterior descending artery.   The RCA is a medium to large caliber vessel, which gave rise to a posterior descending artery and posterolateral, distal to the bifurcation has about 60-70 percent narrowing.   Left ventricular angiogram showed normal wall motion with ejection fraction of 65-75 percent.     Transthoracic echocardiogram: 11/08/2017  · Left ventricular wall thickness is consistent with mild concentric hypertrophy.  · Left ventricular systolic function is normal. Estimated EF = 55%.  · Left ventricular diastolic dysfunction (grade I) consistent with impaired relaxation.    Transthoracic Echocardiogram 10/31/3017 in [place]  Indication:  Hypotension  BP:           93/63  Findings       Left Ventricle:  The left ventricular chamber size is normal. Mild concentric left  ventricular hypertrophy is observed. The estimated ejection fraction is  65-70%. Abnormal left ventricular diastolic filling is observed,  consistent with impaired relaxation.    Left Atrium:  The left atrial chamber size is normal.   Right Ventricle:  The right ventricular cavity size is normal. The right ventricular  global systolic function is normal.    Right Atrium:  The right atrial cavity size is normal.   Aortic Valve:  Moderate aortic leaflet calcification is visualized. There is mild to  moderate aortic stenosis. The mean gradient of the aortic valve is 30  mmHg. The aortic valve area by velocity time interval is calculated at  1.84 cm2. There is no evidence of aortic regurgitation.   Mitral Valve:  The mitral valve leaflets appear normal. There is mild mitral  regurgitation.    Tricuspid Valve:  The tricuspid valve leaflets are normal.  There is mild tricuspid  regurgitation. The Right Ventricular Systolic Pressure is calculated at  35 mmHg.   Pulmonic Valve:  There is trace pulmonic regurgitation present.   Pericardium:  There is no pericardial effusion.   Conclusions  1. Mild concentric left ventricular hypertrophy with thickened  papillary muscle is observed.   Hyperdynamic LV systolic function with an  estimated ejection fraction is 65-70%.  2. Abnormal left ventricular diastolic filling is observed, consistent  with impaired relaxation.   3. Aortic valve is trileaflet, moderately calcified with mild to  moderate aortic stenosis  4. Mild tricuspid regurgitation with RVSP 35 mm Hg    Echocardiogram: 08/26/2016  FINDINGS:  1. Both atria are normal in size.  2. The aortic valve is mildly sclerotic without any hemodynamically  significant aortic stenosis.  3. Mild concentric left ventricular hypertrophy with early diastolic  dysfunction. No evidence of any regional wall motion abnormality.  The estimated ejection fraction greater than 60%.  4. There is mild mitral annular calcification.  5. Normal right ventricular size and function.  6. On color flow Doppler, there is trace mitral and tricuspid  regurgitation without any hemodynamic significance.  7. No intracardiac mass, pericardial effusion, or cardiac thrombus  seen.  CLINICAL IMPRESSION:  1. Mild concentric left ventricular hypertrophy with early diastolic  dysfunction.  2. Estimated ejection fraction  greater than 60%.  3. Mildly sclerotic aortic valve with mild mitral annular  calcification without any hemodynamic significant stenotic process.  4. On color flow Doppler, there is trace mitral and tricuspid  regurgitation without any hemodynamic significance.  5. No intracardiac mass, pericardial effusion, or cardiac thrombus  Seen.    Carotid Ulsd: 01/15/2019  Interpretation Summary   Study Impression   • Right ICA Prox: Imaging of the right ICA indicates 0-49% stenosis.  • Right Vertebral: Antegrade flow is present.   Imaging of the right ICA indicates 0-49% stenosis.    • Left ICA Prox: Imaging of the left ICA indicates 0-49% stenosis.  • Left Vertebral: Antegrade flow present.   Imaging of the left ICA indicates 0-49% stenosis.     Renal Artery U/S 11/1/2017  1. No color Doppler scan evidence of hemodynamically significant renal artery stenosis.  2. Elevated resistivity indices within the kidneys bilaterally which may be indicative of medical renal parenchymal disease.  3. Kidneys are slightly atrophic in size as detailed above.  4. Urinary bladder poorly distended for assessment with suspected thickening the bladder wall which is nonspecific and may simply be accentuated by lack of distention or muscle hypertrophy.    CTA Chest: 11/08/2017  IMPRESSION:  1. No evidence of pulmonary embolus..  2. Mild centrilobular and paraseptal emphysematous changes..  3. Posterior segment right lobe of liver peripheral 1.56 cm  simple hepatic cyst..  4.T11, T12, and L1 bilateral decompressive laminectomies.    Venous duplex lower extremity: 03/08/2018  · Acute right lower extremity deep vein thrombosis noted in the common femoral, profunda femoral, proximal femoral, mid femoral, distal femoral, popliteal and peroneal.  · Acute right lower extremity superficial thrombophlebitis noted in the greater saphenous (above knee).  · Acute left lower extremity deep vein thrombosis noted in the common femoral, profunda femoral,  proximal femoral, mid femoral, distal femoral, popliteal and peroneal.  · Partial flow is noted above mentioned veins.    The following portions of the patient's history were reviewed and updated as appropriate: allergies, current medications, past family history, past medical history, past social history, past surgical history and problem list.    Office Visit on 02/07/2019   Component Date Value Ref Range Status   • Glucose 02/07/2019 112* 60 - 100 mg/dL Final   • BUN 02/07/2019 18  7 - 21 mg/dL Final   • Creatinine 02/07/2019 1.26  0.70 - 1.30 mg/dL Final   • Sodium 02/07/2019 129* 137 - 145 mmol/L Final   • Potassium 02/07/2019 4.3  3.5 - 5.1 mmol/L Final   • Chloride 02/07/2019 95  95 - 110 mmol/L Final   • CO2 02/07/2019 25.0  22.0 - 31.0 mmol/L Final   • Calcium 02/07/2019 9.3  8.4 - 10.2 mg/dL Final   • eGFR   Amer 02/07/2019 66  42 - 98 mL/min/1.73 Final   • BUN/Creatinine Ratio 02/07/2019 14.3  7.0 - 25.0 Final   • Anion Gap 02/07/2019 9.0  5.0 - 15.0 mmol/L Final   Hospital Outpatient Visit on 01/16/2019   Component Date Value Ref Range Status   • Prox CCA PSV 01/16/2019 139.1  cm/sec Final   • Prox CCA PSV 01/16/2019 100.5  cm/sec Final   • Prox CCA EDV 01/16/2019 26.3  cm/sec Final   • Prox CCA EDV 01/16/2019 18.9  cm/sec Final   • Dist CCA PSV 01/16/2019 -72.2  cm/sec Final   • Dist CCA PSV 01/16/2019 70.7  cm/sec Final   • Dist CCA EDV 01/16/2019 -10.6  cm/sec Final   • Dist CCA EDV 01/16/2019 17.6  cm/sec Final   • Prox ECA PSV 01/16/2019 -87.7  cm/sec Final   • Prox ECA PSV 01/16/2019 -93.6  cm/sec Final   • Prox ECA EDV 01/16/2019 -11.7  cm/sec Final   • Prox ECA EDV 01/16/2019 -14.5  cm/sec Final   • Prox ICA PSV 01/16/2019 -56.8  cm/sec Final   • Prox ICA PSV 01/16/2019 -86.9  cm/sec Final   • Prox ICA EDV 01/16/2019 -16.1  cm/sec Final   • Prox ICA EDV 01/16/2019 -29.6  cm/sec Final   • Mid ICA PSV 01/16/2019 -103.1  cm/sec Final   • Mid ICA PSV 01/16/2019 -76.8  cm/sec Final   • Mid ICA  EDV 01/16/2019 -29.3  cm/sec Final   • Mid ICA EDV 01/16/2019 -28.0  cm/sec Final   • Dist ICA PSV 01/16/2019 -103.1  cm/sec Final   • Dist ICA PSV 01/16/2019 -103.7  cm/sec Final   • Dist ICA EDV 01/16/2019 -29.3  cm/sec Final   • Dist ICA EDV 01/16/2019 -33.0  cm/sec Final   • Vertebral A PSV 01/16/2019 68.4  cm/sec Final   • Vertebral A EDV 01/16/2019 22.9  cm/sec Final   • ICA/CCA ratio 01/16/2019 1.5   Final   • Vertebral A PSV 01/16/2019 67.8  cm/sec Final   • Vertebral A EDV 01/16/2019 20.5  cm/sec Final   • ICA/CCA ratio 01/16/2019 1.4   Final   • Diastolic ICA/CCA Ratio 01/16/2019 1.9   Final   • ICA/CCA diastolic ratio 01/16/2019 2.8   Final     Assessment:      Diagnosis Plan   1. Chronic diastolic congestive heart failure with negative clinical evidence of hypervolemia;  He is well perfused. AHA Stage C: ; NYHA Class II  BETA-BLOCKER: Addition of Toprol XL 25 mg nightly secondary to palpitations/PACs with history of ablation (atrial flutter)  ACE/ARB: Discontinued in the past in the presence of KIRK  ENTRESTO: Not applicable  DIURETIC: Lasix 20 mg daily as needed for weight gain of 2-3 pounds overnight or 5 pounds in one week; if the patient should have any concerns regarding leg swelling or the manner in which to use his Lasix he is to contact the heart failure clinic.  ALDOSTERONT ANTAGONIST: History of KIRK/hyperkalemia  IMDUR/HYDRALAZINE: Hydralazine 25 mg three times daily  DIGOXIN: Not applicable  Fluid restriction: 2000 cc daily  Sodium restriction: 2000 mg daily     Recommended daily weight monitoring.  Discussed patient action plan for heart failure.  Recommended avoiding NSAIDs use.  Discussed warning signs requiring additional medical attention for heart failure.    BMP       2. Coronary artery disease involving native coronary artery of native heart without angina pectoris, stable      ASA plus statin therapy   3. Essential hypertension, stable      As per #1; consideration for white coat  syndrome        4. Chronic kidney disease, stage II (mild), stable  As per Dr Reza       5. Mixed action and resting tremors     TSH   6.  Hyponatremia         Re: #5 and #6  I will make contact with Dr Reza re: findings and proceed according to discussion/plan.     Patient's BMI is within normal parameters. No follow-up required; non smoker.        This document has been electronically signed by REGINA Carlos on February 7, 2019 4:39 PM

## 2019-02-07 ENCOUNTER — OFFICE VISIT (OUTPATIENT)
Dept: CARDIOLOGY | Facility: CLINIC | Age: 84
End: 2019-02-07

## 2019-02-07 ENCOUNTER — APPOINTMENT (OUTPATIENT)
Dept: LAB | Facility: HOSPITAL | Age: 84
End: 2019-02-07

## 2019-02-07 VITALS
WEIGHT: 166.4 LBS | BODY MASS INDEX: 27.72 KG/M2 | SYSTOLIC BLOOD PRESSURE: 162 MMHG | HEART RATE: 99 BPM | HEIGHT: 65 IN | OXYGEN SATURATION: 97 % | DIASTOLIC BLOOD PRESSURE: 70 MMHG

## 2019-02-07 DIAGNOSIS — I50.32 CHRONIC DIASTOLIC CONGESTIVE HEART FAILURE (HCC): Primary | Chronic | ICD-10-CM

## 2019-02-07 DIAGNOSIS — N18.2 STAGE 2 CHRONIC KIDNEY DISEASE: Chronic | ICD-10-CM

## 2019-02-07 DIAGNOSIS — I25.10 CORONARY ARTERY DISEASE INVOLVING NATIVE CORONARY ARTERY OF NATIVE HEART WITHOUT ANGINA PECTORIS: ICD-10-CM

## 2019-02-07 DIAGNOSIS — R25.9 MIXED ACTION AND RESTING TREMOR: ICD-10-CM

## 2019-02-07 DIAGNOSIS — E87.1 HYPONATREMIA: ICD-10-CM

## 2019-02-07 DIAGNOSIS — I10 ESSENTIAL HYPERTENSION: ICD-10-CM

## 2019-02-07 LAB
ANION GAP SERPL CALCULATED.3IONS-SCNC: 9 MMOL/L (ref 5–15)
BUN BLD-MCNC: 18 MG/DL (ref 7–21)
BUN/CREAT SERPL: 14.3 (ref 7–25)
CALCIUM SPEC-SCNC: 9.3 MG/DL (ref 8.4–10.2)
CHLORIDE SERPL-SCNC: 95 MMOL/L (ref 95–110)
CO2 SERPL-SCNC: 25 MMOL/L (ref 22–31)
CREAT BLD-MCNC: 1.26 MG/DL (ref 0.7–1.3)
GFR SERPL CREATININE-BSD FRML MDRD: 66 ML/MIN/1.73 (ref 42–98)
GLUCOSE BLD-MCNC: 112 MG/DL (ref 60–100)
POTASSIUM BLD-SCNC: 4.3 MMOL/L (ref 3.5–5.1)
SODIUM BLD-SCNC: 129 MMOL/L (ref 137–145)

## 2019-02-07 PROCEDURE — 99215 OFFICE O/P EST HI 40 MIN: CPT | Performed by: NURSE PRACTITIONER

## 2019-02-07 PROCEDURE — 36415 COLL VENOUS BLD VENIPUNCTURE: CPT | Performed by: NURSE PRACTITIONER

## 2019-02-07 PROCEDURE — 93000 ELECTROCARDIOGRAM COMPLETE: CPT | Performed by: INTERNAL MEDICINE

## 2019-02-07 PROCEDURE — 80048 BASIC METABOLIC PNL TOTAL CA: CPT | Performed by: NURSE PRACTITIONER

## 2019-02-07 RX ORDER — METOPROLOL SUCCINATE 25 MG/1
25 TABLET, EXTENDED RELEASE ORAL NIGHTLY
Qty: 30 TABLET | Refills: 2 | Status: SHIPPED | OUTPATIENT
Start: 2019-02-07 | End: 2019-05-06 | Stop reason: SDUPTHER

## 2019-02-11 ENCOUNTER — OFFICE VISIT (OUTPATIENT)
Dept: FAMILY MEDICINE CLINIC | Facility: CLINIC | Age: 84
End: 2019-02-11

## 2019-02-11 ENCOUNTER — TELEPHONE (OUTPATIENT)
Dept: CARDIOLOGY | Facility: CLINIC | Age: 84
End: 2019-02-11

## 2019-02-11 VITALS
OXYGEN SATURATION: 99 % | HEART RATE: 76 BPM | WEIGHT: 169.31 LBS | DIASTOLIC BLOOD PRESSURE: 76 MMHG | BODY MASS INDEX: 28.21 KG/M2 | HEIGHT: 65 IN | SYSTOLIC BLOOD PRESSURE: 148 MMHG

## 2019-02-11 DIAGNOSIS — I10 ESSENTIAL HYPERTENSION: ICD-10-CM

## 2019-02-11 DIAGNOSIS — R25.1 TREMOR: ICD-10-CM

## 2019-02-11 DIAGNOSIS — E11.9 DIABETES MELLITUS WITHOUT COMPLICATION (HCC): Primary | ICD-10-CM

## 2019-02-11 DIAGNOSIS — I25.10 CORONARY ARTERY DISEASE INVOLVING NATIVE CORONARY ARTERY OF NATIVE HEART WITHOUT ANGINA PECTORIS: ICD-10-CM

## 2019-02-11 PROCEDURE — 99214 OFFICE O/P EST MOD 30 MIN: CPT | Performed by: FAMILY MEDICINE

## 2019-02-11 NOTE — PROGRESS NOTES
Subjective   General Keith Ruvalcaba Jr. is a 83 y.o. male.    cc:tremor.   History of Present Illness The patient comes in for check of their chronic medical issues which include Diabetes Mellitus,CAD and Hypertension. He has also developed a tremor that is almost a pill rolling type tremor.It has been going on for one month .     The following portions of the patient's history were reviewed and updated as appropriate: allergies, current medications, past family history, past medical history, past social history, past surgical history and problem list.    Review of Systems   Constitutional: Negative for fatigue and fever.   Respiratory: Negative for cough, chest tightness and stridor.    Cardiovascular: Negative for chest pain and leg swelling.   Neurological: Positive for tremors.       Objective   Physical Exam   Constitutional: He appears well-developed and well-nourished.   HENT:   Head: Normocephalic and atraumatic.   Right Ear: External ear normal.   Left Ear: External ear normal.   Nose: Nose normal.   Mouth/Throat: Oropharynx is clear and moist.   Eyes: Pupils are equal, round, and reactive to light.   Neck: Normal range of motion.   Cardiovascular: Normal rate, regular rhythm and normal heart sounds. Exam reveals no gallop and no friction rub.   No murmur heard.  Pulmonary/Chest: Effort normal and breath sounds normal. No stridor. No respiratory distress. He has no wheezes. He has no rales.   Abdominal: Soft. Bowel sounds are normal. He exhibits no distension. There is no tenderness. There is no guarding.   Musculoskeletal:   The patient has a pill rolling tremor. It goes away with movement.   Skin: Skin is warm and dry.   Vitals reviewed.        Assessment/Plan   General was seen today for tremors.    Diagnoses and all orders for this visit:    Diabetes mellitus without complication (CMS/[facility])  -     Comprehensive metabolic panel; Future  -     Hemoglobin A1c; Future  -     Lipid panel; Future  -     CBC w AUTO  Differential; Future    Coronary artery disease involving native coronary artery of native heart without angina pectoris    Essential hypertension    Tremor  -     Ambulatory Referral to Neurology    Other orders  -     carbidopa-levodopa (SINEMET)  MG per tablet; Take 1 tablet by mouth 3 (Three) Times a Day.        Return to the clinic in 3 month/s.  Will contact with results as needed.

## 2019-02-11 NOTE — TELEPHONE ENCOUNTER
Left patient a voicemail letting him know that Maria has not heard anything back from Dr. Reza's office and that if he had any more questions to give us a call

## 2019-02-12 ENCOUNTER — TELEPHONE (OUTPATIENT)
Dept: CARDIOLOGY | Facility: CLINIC | Age: 84
End: 2019-02-12

## 2019-02-12 NOTE — TELEPHONE ENCOUNTER
Contacted patient and let him know that we still have not heard anything back from Dr. Reza and that's what we were waiting on but he then said he had some more questions that he wanted to ask Maria about so I informed him that this week she is in the hospital but I would send her message and that she would see it and get back with him as soon as she was available

## 2019-02-13 ENCOUNTER — TELEPHONE (OUTPATIENT)
Dept: CARDIOLOGY | Facility: CLINIC | Age: 84
End: 2019-02-13

## 2019-02-13 ENCOUNTER — TELEPHONE (OUTPATIENT)
Dept: FAMILY MEDICINE CLINIC | Facility: CLINIC | Age: 84
End: 2019-02-13

## 2019-02-13 NOTE — TELEPHONE ENCOUNTER
Contacted patient and let him know that carmita would like for him to contact his pcp with questions about the neurologist. He expressed his understanding and was glad that I called back and was informed to give us a call if he had any more questions  
No

## 2019-02-13 NOTE — TELEPHONE ENCOUNTER
Patient called and said he wants Dr Lrakin to go ahead and set him up an appt with Dr Rodriguez in [place]. Please call patient at [phone] when the referral has been sent

## 2019-02-13 NOTE — TELEPHONE ENCOUNTER
Spoke with patient this am and he wants to speak with Maria about seeing a neurologist informed him that she is in the hospital this week and that I would send her a message and she would get back with him as soon as possible

## 2019-02-14 DIAGNOSIS — I50.32 CHRONIC DIASTOLIC CONGESTIVE HEART FAILURE (HCC): Chronic | ICD-10-CM

## 2019-02-14 RX ORDER — GABAPENTIN 300 MG/1
300 CAPSULE ORAL 2 TIMES DAILY
Qty: 180 CAPSULE | Refills: 2 | Status: SHIPPED | OUTPATIENT
Start: 2019-02-14 | End: 2019-05-14

## 2019-02-19 ENCOUNTER — LAB (OUTPATIENT)
Dept: LAB | Facility: HOSPITAL | Age: 84
End: 2019-02-19

## 2019-02-19 DIAGNOSIS — E11.9 DIABETES MELLITUS WITHOUT COMPLICATION (HCC): ICD-10-CM

## 2019-02-19 LAB
ALBUMIN SERPL-MCNC: 4.2 G/DL (ref 3.4–4.8)
ALBUMIN/GLOB SERPL: 1.9 G/DL (ref 1.1–1.8)
ALP SERPL-CCNC: 51 U/L (ref 38–126)
ALT SERPL W P-5'-P-CCNC: 12 U/L (ref 21–72)
ANION GAP SERPL CALCULATED.3IONS-SCNC: 8 MMOL/L (ref 5–15)
ARTICHOKE IGE QN: 63 MG/DL (ref 1–129)
AST SERPL-CCNC: 24 U/L (ref 17–59)
BASOPHILS # BLD AUTO: 0.07 10*3/MM3 (ref 0–0.2)
BASOPHILS NFR BLD AUTO: 1.1 % (ref 0–1.5)
BILIRUB SERPL-MCNC: 0.3 MG/DL (ref 0.2–1.3)
BUN BLD-MCNC: 17 MG/DL (ref 7–21)
BUN/CREAT SERPL: 13.5 (ref 7–25)
CALCIUM SPEC-SCNC: 9.3 MG/DL (ref 8.4–10.2)
CHLORIDE SERPL-SCNC: 94 MMOL/L (ref 95–110)
CHOLEST SERPL-MCNC: 145 MG/DL (ref 0–199)
CO2 SERPL-SCNC: 26 MMOL/L (ref 22–31)
CREAT BLD-MCNC: 1.26 MG/DL (ref 0.7–1.3)
DEPRECATED RDW RBC AUTO: 48.9 FL (ref 37–54)
EOSINOPHIL # BLD AUTO: 0.06 10*3/MM3 (ref 0–0.4)
EOSINOPHIL NFR BLD AUTO: 0.9 % (ref 0.3–6.2)
ERYTHROCYTE [DISTWIDTH] IN BLOOD BY AUTOMATED COUNT: 14.6 % (ref 12.3–15.4)
GFR SERPL CREATININE-BSD FRML MDRD: 66 ML/MIN/1.73 (ref 42–98)
GLOBULIN UR ELPH-MCNC: 2.2 GM/DL (ref 2.3–3.5)
GLUCOSE BLD-MCNC: 107 MG/DL (ref 60–100)
HBA1C MFR BLD: 5.4 % (ref 4–5.6)
HCT VFR BLD AUTO: 32.8 % (ref 37.5–51)
HDLC SERPL-MCNC: 71 MG/DL (ref 60–200)
HGB BLD-MCNC: 10.7 G/DL (ref 13–17.7)
IMM GRANULOCYTES # BLD AUTO: 0.05 10*3/MM3 (ref 0–0.05)
IMM GRANULOCYTES NFR BLD AUTO: 0.8 % (ref 0–0.5)
LDLC/HDLC SERPL: 0.92 {RATIO} (ref 0–3.55)
LYMPHOCYTES # BLD AUTO: 1.82 10*3/MM3 (ref 0.7–3.1)
LYMPHOCYTES NFR BLD AUTO: 27.8 % (ref 19.6–45.3)
MCH RBC QN AUTO: 29.8 PG (ref 26.6–33)
MCHC RBC AUTO-ENTMCNC: 32.6 G/DL (ref 31.5–35.7)
MCV RBC AUTO: 91.4 FL (ref 79–97)
MONOCYTES # BLD AUTO: 0.9 10*3/MM3 (ref 0.1–0.9)
MONOCYTES NFR BLD AUTO: 13.8 % (ref 5–12)
NEUTROPHILS # BLD AUTO: 3.64 10*3/MM3 (ref 1.4–7)
NEUTROPHILS NFR BLD AUTO: 55.6 % (ref 42.7–76)
NRBC BLD AUTO-RTO: 0 /100 WBC (ref 0–0)
PLATELET # BLD AUTO: 389 10*3/MM3 (ref 140–450)
PMV BLD AUTO: 8.7 FL (ref 6–12)
POTASSIUM BLD-SCNC: 4 MMOL/L (ref 3.5–5.1)
PROT SERPL-MCNC: 6.4 G/DL (ref 6.3–8.6)
RBC # BLD AUTO: 3.59 10*6/MM3 (ref 4.14–5.8)
SODIUM BLD-SCNC: 128 MMOL/L (ref 137–145)
TRIGL SERPL-MCNC: 44 MG/DL (ref 20–199)
WBC NRBC COR # BLD: 6.54 10*3/MM3 (ref 3.4–10.8)

## 2019-02-19 PROCEDURE — 80053 COMPREHEN METABOLIC PANEL: CPT

## 2019-02-19 PROCEDURE — 80061 LIPID PANEL: CPT

## 2019-02-19 PROCEDURE — 85025 COMPLETE CBC W/AUTO DIFF WBC: CPT

## 2019-02-19 PROCEDURE — 36415 COLL VENOUS BLD VENIPUNCTURE: CPT

## 2019-02-19 PROCEDURE — 83036 HEMOGLOBIN GLYCOSYLATED A1C: CPT

## 2019-02-21 ENCOUNTER — TELEPHONE (OUTPATIENT)
Dept: FAMILY MEDICINE CLINIC | Facility: CLINIC | Age: 84
End: 2019-02-21

## 2019-02-21 NOTE — TELEPHONE ENCOUNTER
Pt has an apt with Dr Rodriguez in [place] and got a letter from their office today stating that he needs a MRI within 30 days of his appointment.  Could you please give pt a call?  Thanks!

## 2019-02-26 ENCOUNTER — TELEPHONE (OUTPATIENT)
Dept: CARDIOLOGY | Facility: CLINIC | Age: 84
End: 2019-02-26

## 2019-02-27 ENCOUNTER — TELEPHONE (OUTPATIENT)
Dept: CARDIOLOGY | Facility: CLINIC | Age: 84
End: 2019-02-27

## 2019-02-27 NOTE — TELEPHONE ENCOUNTER
Called back to check on swelling.   Absent in the AM. Worse in the evening. I told him that is acceptable.   Compression hose made the swelling worse before. Would like him to try using again first this in the morning.   Take Lasix today and tomorrow.   Weight today down 1lb.   F/U appt Tuesday 3/5. Would like to keep from bringing him in without weight gain or dyspnea.   Will call him Friday to check on weight.           This document has been electronically signed by REGINA Lopez on February 27, 2019 11:36 AM

## 2019-02-28 RX ORDER — LISINOPRIL AND HYDROCHLOROTHIAZIDE 20; 12.5 MG/1; MG/1
1 TABLET ORAL DAILY
Qty: 30 TABLET | Refills: 3 | Status: SHIPPED | OUTPATIENT
Start: 2019-02-28 | End: 2019-04-24 | Stop reason: SDUPTHER

## 2019-03-01 ENCOUNTER — TELEPHONE (OUTPATIENT)
Dept: CARDIOLOGY | Facility: CLINIC | Age: 84
End: 2019-03-01

## 2019-03-01 NOTE — TELEPHONE ENCOUNTER
3/1/19: Weight today was 166lbs. He feels that leg swelling is better. Will take 20mg Lasix today. That makes 4 days in a row. Will not take any for the weekend and re-evaluate on Tuesday. He is happy with the diuresis he has had.    2/27/19: Called back to check on swelling.   Absent in the AM. Worse in the evening. I told him that is acceptable.   Compression hose made the swelling worse before. Would like him to try using again first this in the morning.   Take Lasix today and tomorrow.   Weight today down 1lb.   F/U appt Tuesday 3/5. Would like to keep from bringing him in without weight gain or dyspnea.   Will call him Friday to check on weight.     2/26/19: Mr. Ruvalcaba called regarding leg swelling. He has been instructed to use Lasix 20mg PRN daily for weight gain of 3lbs overnight.   Last weight in office was 166lbs. Weight at home has been between 160-163lbs. He says the swelling is mild, at his ankles.   Instructed Mr. Ruvalcaba to take a 20mg Lasix pill today and tomorrow. I asked him to call us back tomorrow mid morning to report back on the swelling and if there was weight loss.

## 2019-03-05 ENCOUNTER — OFFICE VISIT (OUTPATIENT)
Dept: CARDIOLOGY | Facility: CLINIC | Age: 84
End: 2019-03-05

## 2019-03-05 VITALS
BODY MASS INDEX: 27.99 KG/M2 | HEART RATE: 66 BPM | HEIGHT: 65 IN | OXYGEN SATURATION: 98 % | DIASTOLIC BLOOD PRESSURE: 60 MMHG | SYSTOLIC BLOOD PRESSURE: 142 MMHG | WEIGHT: 168 LBS

## 2019-03-05 DIAGNOSIS — I25.10 CORONARY ARTERY DISEASE INVOLVING NATIVE CORONARY ARTERY OF NATIVE HEART WITHOUT ANGINA PECTORIS: ICD-10-CM

## 2019-03-05 DIAGNOSIS — N18.2 STAGE 2 CHRONIC KIDNEY DISEASE: Chronic | ICD-10-CM

## 2019-03-05 DIAGNOSIS — I10 ESSENTIAL HYPERTENSION: ICD-10-CM

## 2019-03-05 DIAGNOSIS — I50.32 CHRONIC DIASTOLIC CONGESTIVE HEART FAILURE (HCC): Primary | Chronic | ICD-10-CM

## 2019-03-05 PROCEDURE — 99214 OFFICE O/P EST MOD 30 MIN: CPT | Performed by: NURSE PRACTITIONER

## 2019-03-05 NOTE — PROGRESS NOTES
Subjective:     Congestive Heart Failure (chief complaint)    Congestive Heart Failure   Presents for follow-up visit. Pertinent negatives include no chest pressure, claudication, edema (bilateral ankle at the end of the day; compliant with compression stockings), near-syncope, orthopnea, palpitations, paroxysmal nocturnal dyspnea, shortness of breath or unexpected weight change. The symptoms have been improving. Compliance with total regimen is %. Compliance problems include adherence to exercise.  Compliance with diet is %. Compliance with exercise is 26-50%. Compliance with medications is %.     1. CAD, non-obstructive 2005  2. HFpEF - diastolic dysfunction  3. Hypertension  4. CKD, Stage 2 with H/O KIRK  5. H/O Atrial Flutter S/P ablation per Dr Graves 2006  6. Prostate Cancer [place] Score 6/Stage 2 S/P RT   7. DM, Type II  8. Anterior right total hip arthroplasty 04/25/2016  9. Chronic DVT of bilateral femoral vein: on preventative dosing - Xarelto  10. Carotid disease followed annually by Dr Hamman    Mr. Ruvalcaba underwent lumbar surgery 10/27/2017 in [place]. He was discharge 11/7/2017. Following discharge he had several medications that were recommended be stopped secondary to hypotension/KIRK.      The patient received evaluation with REGINA Vidal on 03/08/2018 in follow-up regarding leg edema for which lower extremity duplex have been ordered and was found with positive findings regarding DVT.   The patient was initiated on DOAC, Xarelto on that date.     02/07/2019  The patient presents to the office today for previously scheduled appointment. He indicates that approximately 2 week prior he began to notice a tremor in bilateral hands most prominent at rest. He reports negative impact on quality of life or activities of daily living. He feels that perhaps the prominence of the tremors diminishes in the evening and during sleep.   The patient reports fluid intake to be approximately  1400 - 1500 cc per day. He reports negative use of diuretics over the past 8 weeks approximate.    03/05/2019  The patient presents to the office today for re-evaluation following diuretic therapy (OP) last week secondary to reported leg swelling.   The patient reports Lasix 20 mg on 02/27-03/01/2019.   Next appointment with Dr Reza: 10/08/2019                                       Dr Larkin: 05/10/2019  Neurology in [place]: 04/25/2019    Review of Systems   Constitution: Negative for weakness, malaise/fatigue and unexpected weight change.   HENT: Negative for nosebleeds.    Eyes: Negative for visual disturbance.   Cardiovascular: Negative for claudication, cyanosis, dyspnea on exertion, irregular heartbeat, near-syncope, orthopnea, palpitations, paroxysmal nocturnal dyspnea and syncope.   Respiratory: Negative for shortness of breath, sleep disturbances due to breathing and snoring.    Endocrine: Negative for polydipsia, polyphagia and polyuria.   Hematologic/Lymphatic: Negative for bleeding problem. Does not bruise/bleed easily.   Skin: Negative for poor wound healing.   Musculoskeletal: Negative for falls.   Gastrointestinal: Negative for bloating.   Neurological: Positive for tremors. Negative for dizziness, focal weakness, headaches, light-headedness, loss of balance, numbness, paresthesias, seizures and vertigo.   Psychiatric/Behavioral: Negative for altered mental status. The patient is not nervous/anxious.      Past Medical History:   Diagnosis Date   • Acquired hallux rigidus    • Anemia    • Arthropathy of lumbar facet joint    • Carpal tunnel syndrome    • CHF (congestive heart failure) (CMS/HCC)    • Chronic diastolic congestive heart failure (CMS/HCC)    • Chronic kidney disease    • Chronic kidney disease, stage II (mild)     Chronic kidney disease stage 2   • Chronic obstructive lung disease (CMS/HCC)    • Chronic renal failure    • Coronary artery disease    • Cortical senile cataract    •  Diabetes mellitus (CMS/[facility])    • Diabetes mellitus type 2, noninsulin dependent (CMS/[facility])     diabetes mellitus type 2, non-insulin treated, Medication treatment plan: oral diabetic medication   • Diabetes mellitus without complication (CMS/[facility])    • Diastolic dysfunction    • Diastolic heart failure (CMS/[facility])    • Essential hypertension     difficult to control      • Hip pain    • History of echocardiogram 08/19/2015    Echocardiogram W/ color flow 06930 (1) - Mild to moderate LVH with mild left atrial enlargement and normal aortic root.CLVH,preserved LV systolic function with Ef of 55%.Diastolic dysfunction.MV intact.AV thickened.Aortic sclerosis.   • Hyperlipidemia    • Influenza vaccine administered 10/02/2015    INFLUENZA IMMUN ADMIN OR PREV RECV'D  (1) - Ordered By: DIONNE MANDUJANO ([facility])    • Insomnia    • Insomnia     Other insomnia   • Joint pain    • Low back pain    • Muscle strain    • Nuclear cataract    • Pneumococcal vaccination given 07/22/2016    PNEUMOC VAC/ADMIN/RCVD 4040F (3) - Ordered By: DIONNE MANDUJANO ([facility])   • Prostate cancer (CMS/[facility])    • Pure hypercholesterolemia    • Sedentary lifestyle    ,  Past Surgical History:   Procedure Laterality Date   • CARDIAC ABLATION     • CARDIAC CATHETERIZATION  02/04/1986    Cardiac cath 88739 (1) - normal left heart catherization,non cardiac chest pain   • CARPAL TUNNEL RELEASE  10/19/2015    Carpal tunnel surgery (1) - Release of left carpal tunnel   • CATARACT EXTRACTION W/ INTRAOCULAR LENS IMPLANT Left 3/31/2017    Procedure: REMOVE CATARACT AND IMPLANT INRAOCULAR LENS LEFT EYE;  Surgeon: Hector Navarrete MD;  Location: [facility] OR;  Service:    • CATARACT EXTRACTION W/ INTRAOCULAR LENS IMPLANT Right 4/14/2017    Procedure: REMOVE CATARACT AND IMPLANT INTRAOCULAR LENS RIGHT EYE;  Surgeon: Hector Navarrete MD;  Location: [facility] OR;  Service:    • COLONOSCOPY N/A 10/31/2018    Procedure: COLONOSCOPY;  Surgeon: Herbie Christine MD;   Location: [facility] ENDOSCOPY;  Service: Gastroenterology   • ENDOSCOPY  2013    Colon endoscopy 53724 (2) - Hemorrhoids found.   • INGUINAL HERNIA REPAIR Right 1968    Inguinal hernia, repair (2)   • INJECTION OF MEDICATION  2012    Albuterol (2u) (1) - Ordered By: LIDYA STEINER ([facility])    • INJECTION OF MEDICATION  2012    Atrovent (1) - Ordered By: LIDYA STEINER ([facility])    • INJECTION OF MEDICATION  02/10/2012    Depo Medrol (Methylprednisone) (3) - Ordered By: HEATHER PAK ([facility])    • INJECTION OF MEDICATION  2014    Kenalog (4) - Ordered By: DIMAS ANDRADE ([facility])    • JOINT REPLACEMENT     • LUMBAR LAMINECTOMY      Lumbar laminectomy (1)   • MANDIBLE FRACTURE SURGERY  10/11/1981    Treat nose/jaw fracture (1) - bilateral open reduction of fracture of mandible   • OTHER SURGICAL HISTORY  2011    Drain/Inject Major Joint  (1) - Ordered By: KARLOS HERNANDEZ ([facility])    • OTHER SURGICAL HISTORY      Insert IVC filter 24951 (1)   • OTHER SURGICAL HISTORY  10/11/2011    SPIROMETRY 41841 (1) - Ordered By: HEATHER PAK ([facility])   • TOTAL HIP ARTHROPLASTY Right    ,  Family History   Problem Relation Age of Onset   • Diabetes Other    • Cancer Sister    • Heart disease Mother    • Hypertension Mother    • Heart disease Father    • Hypertension Father    • Coronary artery disease Neg Hx    ,  Social History     Socioeconomic History   • Marital status:      Spouse name: Not on file   • Number of children: Not on file   • Years of education: Not on file   • Highest education level: Not on file   Tobacco Use   • Smoking status: Former Smoker     Last attempt to quit:      Years since quittin.1   • Smokeless tobacco: Never Used   Substance and Sexual Activity   • Alcohol use: No   • Drug use: No   • Sexual activity: Defer     Comment: Marital status:      Current Outpatient Medications   Medication Sig Dispense Refill   • albuterol 108  "(90 Base) MCG/ACT inhaler Inhale 2 puffs Every 4 (Four) Hours As Needed for Wheezing. 18 g 1   • aspirin 81 MG EC tablet Take 81 mg by mouth daily.     • carbidopa-levodopa (SINEMET)  MG per tablet Take 1 tablet by mouth 3 (Three) Times a Day. 90 tablet 2   • ferrous sulfate 325 (65 FE) MG tablet Take 1 tablet by mouth Daily With Breakfast. 30 tablet 5   • furosemide (LASIX) 20 MG tablet Take 1 tablet by mouth Daily As Needed (weight gain of 3lbs overnight or leg swelling). 30 tablet 3   • gabapentin (NEURONTIN) 300 MG capsule Take 1 capsule by mouth 2 (Two) Times a Day. 180 capsule 2   • glucose blood (TRUE METRIX BLOOD GLUCOSE TEST) test strip Check glucose daily 100 each 0   • hydrALAZINE (APRESOLINE) 25 MG tablet Take 1 tablet by mouth 3 (Three) Times a Day. 90 tablet 6   • JANUVIA 50 MG tablet TAKE ONE TABLET BY MOUTH DAILY 30 tablet 6   • lisinopril-hydrochlorothiazide (PRINZIDE,ZESTORETIC) 20-12.5 MG per tablet Take 1 tablet by mouth Daily. 30 tablet 3   • metoprolol succinate XL (TOPROL-XL) 25 MG 24 hr tablet Take 1 tablet by mouth Every Night. 30 tablet 2   • pravastatin (PRAVACHOL) 40 MG tablet Take 1 tablet by mouth Daily. 30 tablet 11   • rivaroxaban (XARELTO) 10 MG tablet Take 1 tablet by mouth Daily. 30 tablet 11   • TRUEPLUS LANCETS 30G misc 1 Device Daily. TEST 100 each 3     No current facility-administered medications for this visit.      Objective:     Vitals:    03/05/19 0900 03/05/19 0931   BP: 162/80 142/60   BP Location: Left arm Left arm   Patient Position: Sitting Sitting   Cuff Size: Adult Adult   Pulse: 66    SpO2: 98%    Weight: 76.2 kg (168 lb)    Height: 165.1 cm (65\")       Physical Exam   Constitutional: He is oriented to person, place, and time. He appears well-nourished. No distress.   HENT:   Head: Normocephalic and atraumatic.   Eyes: Conjunctivae are normal. Right eye exhibits no discharge. Left eye exhibits no discharge.   Neck: No JVD ( 6 cm at 45°) present. No tracheal " deviation present.   Cardiovascular: Normal rate, regular rhythm, S1 normal, S2 normal, normal heart sounds and intact distal pulses. Exam reveals no S3 and no S4.   No murmur heard.  Pulses:       Radial pulses are 2+ on the right side, and 2+ on the left side.   Pulmonary/Chest: Effort normal and breath sounds normal. No respiratory distress. He has no wheezes. He has no rales.   Abdominal: He exhibits no distension.   Musculoskeletal: He exhibits no edema or tenderness.   Neurological: He is alert and oriented to person, place, and time. He has normal strength.   Reflex Scores:       Tricep reflexes are 2+ on the right side.  Fine tremor noted in bilateral hands appears both postural and kinetic   Skin: Skin is warm and dry. He is not diaphoretic.   Psychiatric: He has a normal mood and affect. His behavior is normal. Judgment and thought content normal.   Vitals reviewed.    Data Reviewed:    Electrocardiogram: 02/07/2019  NSR with PAC's     LHC: 03/12/2005 - Dr Kj Green  Aortic pressure is 133/77.   Left ventricular pressure is 133.   Left ventricular end diastolic pressure is 13.   The left main is a very short caliber vessel, almost like a snf ostium.   The LAD is a large caliber vessel, distally has diffuse 20-30 percent narrowing, gives rise to a large diagonal 1.   The circumflex is a large caliber vessel, which gives rise to an OM1, which had diffuse 20-30 percent disease and also a posterior descending artery.   The RCA is a medium to large caliber vessel, which gave rise to a posterior descending artery and posterolateral, distal to the bifurcation has about 60-70 percent narrowing.   Left ventricular angiogram showed normal wall motion with ejection fraction of 65-75 percent.     Transthoracic echocardiogram: 11/08/2017  · Left ventricular wall thickness is consistent with mild concentric hypertrophy.  · Left ventricular systolic function is normal. Estimated EF = 55%.  · Left ventricular  diastolic dysfunction (grade I) consistent with impaired relaxation.    Transthoracic Echocardiogram 10/31/3017 in [place]  Indication:  Hypotension  BP:           93/63  Findings       Left Ventricle:  The left ventricular chamber size is normal. Mild concentric left  ventricular hypertrophy is observed. The estimated ejection fraction is  65-70%. Abnormal left ventricular diastolic filling is observed,  consistent with impaired relaxation.    Left Atrium:  The left atrial chamber size is normal.   Right Ventricle:  The right ventricular cavity size is normal. The right ventricular  global systolic function is normal.   Right Atrium:  The right atrial cavity size is normal.   Aortic Valve:  Moderate aortic leaflet calcification is visualized. There is mild to  moderate aortic stenosis. The mean gradient of the aortic valve is 30  mmHg. The aortic valve area by velocity time interval is calculated at  1.84 cm2. There is no evidence of aortic regurgitation.   Mitral Valve:  The mitral valve leaflets appear normal. There is mild mitral  regurgitation.    Tricuspid Valve:  The tricuspid valve leaflets are normal.  There is mild tricuspid  regurgitation. The Right Ventricular Systolic Pressure is calculated at  35 mmHg.   Pulmonic Valve:  There is trace pulmonic regurgitation present.   Pericardium:  There is no pericardial effusion.   Conclusions  1. Mild concentric left ventricular hypertrophy with thickened  papillary muscle is observed.   Hyperdynamic LV systolic function with an  estimated ejection fraction is 65-70%.  2. Abnormal left ventricular diastolic filling is observed, consistent  with impaired relaxation.   3. Aortic valve is trileaflet, moderately calcified with mild to  moderate aortic stenosis  4. Mild tricuspid regurgitation with RVSP 35 mm Hg    Echocardiogram: 08/26/2016  FINDINGS:  1. Both atria are normal in size.  2. The aortic valve is mildly sclerotic without any hemodynamically  significant  aortic stenosis.  3. Mild concentric left ventricular hypertrophy with early diastolic  dysfunction. No evidence of any regional wall motion abnormality.  The estimated ejection fraction greater than 60%.  4. There is mild mitral annular calcification.  5. Normal right ventricular size and function.  6. On color flow Doppler, there is trace mitral and tricuspid  regurgitation without any hemodynamic significance.  7. No intracardiac mass, pericardial effusion, or cardiac thrombus  seen.  CLINICAL IMPRESSION:  1. Mild concentric left ventricular hypertrophy with early diastolic  dysfunction.  2. Estimated ejection fraction greater than 60%.  3. Mildly sclerotic aortic valve with mild mitral annular  calcification without any hemodynamic significant stenotic process.  4. On color flow Doppler, there is trace mitral and tricuspid  regurgitation without any hemodynamic significance.  5. No intracardiac mass, pericardial effusion, or cardiac thrombus  Seen.    Carotid Ulsd: 01/15/2019  Interpretation Summary   Study Impression   • Right ICA Prox: Imaging of the right ICA indicates 0-49% stenosis.  • Right Vertebral: Antegrade flow is present.   Imaging of the right ICA indicates 0-49% stenosis.    • Left ICA Prox: Imaging of the left ICA indicates 0-49% stenosis.  • Left Vertebral: Antegrade flow present.   Imaging of the left ICA indicates 0-49% stenosis.     Renal Artery U/S 11/1/2017  1. No color Doppler scan evidence of hemodynamically significant renal artery stenosis.  2. Elevated resistivity indices within the kidneys bilaterally which may be indicative of medical renal parenchymal disease.  3. Kidneys are slightly atrophic in size as detailed above.  4. Urinary bladder poorly distended for assessment with suspected thickening the bladder wall which is nonspecific and may simply be accentuated by lack of distention or muscle hypertrophy.    CTA Chest: 11/08/2017  IMPRESSION:  1. No evidence of pulmonary  embolus..  2. Mild centrilobular and paraseptal emphysematous changes..  3. Posterior segment right lobe of liver peripheral 1.56 cm  simple hepatic cyst..  4.T11, T12, and L1 bilateral decompressive laminectomies.    Venous duplex lower extremity: 03/08/2018  · Acute right lower extremity deep vein thrombosis noted in the common femoral, profunda femoral, proximal femoral, mid femoral, distal femoral, popliteal and peroneal.  · Acute right lower extremity superficial thrombophlebitis noted in the greater saphenous (above knee).  · Acute left lower extremity deep vein thrombosis noted in the common femoral, profunda femoral, proximal femoral, mid femoral, distal femoral, popliteal and peroneal.  · Partial flow is noted above mentioned veins.    The following portions of the patient's history were reviewed and updated as appropriate: allergies, current medications, past family history, past medical history, past social history, past surgical history and problem list.    Lab on 02/19/2019   Component Date Value Ref Range Status   • Glucose 02/19/2019 107* 60 - 100 mg/dL Final   • BUN 02/19/2019 17  7 - 21 mg/dL Final   • Creatinine 02/19/2019 1.26  0.70 - 1.30 mg/dL Final   • Sodium 02/19/2019 128* 137 - 145 mmol/L Final   • Potassium 02/19/2019 4.0  3.5 - 5.1 mmol/L Final   • Chloride 02/19/2019 94* 95 - 110 mmol/L Final   • CO2 02/19/2019 26.0  22.0 - 31.0 mmol/L Final   • Calcium 02/19/2019 9.3  8.4 - 10.2 mg/dL Final   • Total Protein 02/19/2019 6.4  6.3 - 8.6 g/dL Final   • Albumin 02/19/2019 4.20  3.40 - 4.80 g/dL Final   • ALT (SGPT) 02/19/2019 12* 21 - 72 U/L Final   • AST (SGOT) 02/19/2019 24  17 - 59 U/L Final   • Alkaline Phosphatase 02/19/2019 51  38 - 126 U/L Final   • Total Bilirubin 02/19/2019 0.3  0.2 - 1.3 mg/dL Final   • eGFR   Amer 02/19/2019 66  42 - 98 mL/min/1.73 Final   • Globulin 02/19/2019 2.2* 2.3 - 3.5 gm/dL Final   • A/G Ratio 02/19/2019 1.9* 1.1 - 1.8 g/dL Final   • BUN/Creatinine  Ratio 02/19/2019 13.5  7.0 - 25.0 Final   • Anion Gap 02/19/2019 8.0  5.0 - 15.0 mmol/L Final   • Hemoglobin A1C 02/19/2019 5.4  4 - 5.6 % Final   • Total Cholesterol 02/19/2019 145  0 - 199 mg/dL Final   • Triglycerides 02/19/2019 44  20 - 199 mg/dL Final   • HDL Cholesterol 02/19/2019 71  60 - 200 mg/dL Final   • LDL Cholesterol  02/19/2019 63  1 - 129 mg/dL Final   • LDL/HDL Ratio 02/19/2019 0.92  0.00 - 3.55 Final   • WBC 02/19/2019 6.54  3.40 - 10.80 10*3/mm3 Final   • RBC 02/19/2019 3.59* 4.14 - 5.80 10*6/mm3 Final   • Hemoglobin 02/19/2019 10.7* 13.0 - 17.7 g/dL Final   • Hematocrit 02/19/2019 32.8* 37.5 - 51.0 % Final   • MCV 02/19/2019 91.4  79.0 - 97.0 fL Final   • MCH 02/19/2019 29.8  26.6 - 33.0 pg Final   • MCHC 02/19/2019 32.6  31.5 - 35.7 g/dL Final   • RDW 02/19/2019 14.6  12.3 - 15.4 % Final   • RDW-SD 02/19/2019 48.9  37.0 - 54.0 fl Final   • MPV 02/19/2019 8.7  6.0 - 12.0 fL Final   • Platelets 02/19/2019 389  140 - 450 10*3/mm3 Final   • Neutrophil % 02/19/2019 55.6  42.7 - 76.0 % Final   • Lymphocyte % 02/19/2019 27.8  19.6 - 45.3 % Final   • Monocyte % 02/19/2019 13.8* 5.0 - 12.0 % Final   • Eosinophil % 02/19/2019 0.9  0.3 - 6.2 % Final   • Basophil % 02/19/2019 1.1  0.0 - 1.5 % Final   • Immature Grans % 02/19/2019 0.8* 0.0 - 0.5 % Final   • Neutrophils, Absolute 02/19/2019 3.64  1.40 - 7.00 10*3/mm3 Final   • Lymphocytes, Absolute 02/19/2019 1.82  0.70 - 3.10 10*3/mm3 Final   • Monocytes, Absolute 02/19/2019 0.90  0.10 - 0.90 10*3/mm3 Final   • Eosinophils, Absolute 02/19/2019 0.06  0.00 - 0.40 10*3/mm3 Final   • Basophils, Absolute 02/19/2019 0.07  0.00 - 0.20 10*3/mm3 Final   • Immature Grans, Absolute 02/19/2019 0.05  0.00 - 0.05 10*3/mm3 Final   • nRBC 02/19/2019 0.0  0.0 - 0.0 /100 WBC Final   Office Visit on 02/07/2019   Component Date Value Ref Range Status   • Glucose 02/07/2019 112* 60 - 100 mg/dL Final   • BUN 02/07/2019 18  7 - 21 mg/dL Final   • Creatinine 02/07/2019 1.26  0.70 -  1.30 mg/dL Final   • Sodium 02/07/2019 129* 137 - 145 mmol/L Final   • Potassium 02/07/2019 4.3  3.5 - 5.1 mmol/L Final   • Chloride 02/07/2019 95  95 - 110 mmol/L Final   • CO2 02/07/2019 25.0  22.0 - 31.0 mmol/L Final   • Calcium 02/07/2019 9.3  8.4 - 10.2 mg/dL Final   • eGFR   Amer 02/07/2019 66  42 - 98 mL/min/1.73 Final   • BUN/Creatinine Ratio 02/07/2019 14.3  7.0 - 25.0 Final   • Anion Gap 02/07/2019 9.0  5.0 - 15.0 mmol/L Final     Assessment:      Diagnosis Plan   1. Chronic diastolic congestive heart failure with negative clinical evidence of hypervolemia;  He is well perfused. AHA Stage C: ; NYHA Class II  BETA-BLOCKER: Toprol XL 25 mg nightly secondary to palpitations/PACs with history of ablation (atrial flutter)  ACE/ARB: Discontinued in the past in the presence of KIRK  ENTRESTO: Not applicable  DIURETIC: Lasix 20 mg daily as needed for weight gain of 2-3 pounds overnight or 5 pounds in one week; if the patient should have any concerns regarding leg swelling or the manner in which to use his Lasix he is to contact the heart failure clinic.  ALDOSTERONT ANTAGONIST: History of KIRK/hyperkalemia  IMDUR/HYDRALAZINE: Hydralazine 25 mg three times daily  DIGOXIN: Not applicable  Fluid restriction: 2000 cc daily  Sodium restriction: 2000 mg daily     Recommended daily weight monitoring.  Discussed patient action plan for heart failure.  Recommended avoiding NSAIDs use.  Discussed warning signs requiring additional medical attention for heart failure.    BMP       2. Coronary artery disease involving native coronary artery of native heart without angina pectoris, stable      ASA plus statin therapy   3. Essential hypertension, stable      As per #1; consideration for white coat syndrome        4. Chronic kidney disease, stage II (mild), stable  As per Dr Reza         Patient's BMI is within normal parameters. No follow-up required; non smoker.        This document has been electronically signed by  Maria Porter, REGINA on March 5, 2019 9:32 AM

## 2019-03-06 ENCOUNTER — PATIENT OUTREACH (OUTPATIENT)
Dept: CASE MANAGEMENT | Facility: OTHER | Age: 84
End: 2019-03-06

## 2019-03-06 ENCOUNTER — EPISODE CHANGES (OUTPATIENT)
Dept: CASE MANAGEMENT | Facility: OTHER | Age: 84
End: 2019-03-06

## 2019-03-06 NOTE — OUTREACH NOTE
Talked with Dionne/ staff at office of Dr. Partha Campos (podiatrist) to schedule appointment for patient as requested. Appointment scheduled for 3/18/19 at 1 PM.

## 2019-03-06 NOTE — OUTREACH NOTE
Talked with patient. Patient states to be compliant with medications; medical appointments and recommendations. Patient states tremors to hands have improved; is taking Sinemet and has appointment with neurologist on 4/25/19. He reports edema to lower extremities has improved. He monitors his weight daily and noticed increase in weight overnight. He  contacted PCP; received recommendations and took Lasix. He monitors blood sugar daily with lowest value of 89. He reports no episodes of hypoglycemia. He states to have no difficulty with chest pain; SOB; appetite; sleeping or S/SX of bleeding (on Xarelto). Reviewed with patient 24/7 Nurse Line Telephone number; [place] contact information; Advance Directives (completed); My Chart (declines/no computer); gaps in care (completed: flu;pneumonia vaccines; colonoscopy; A1C) and Medicare Annual Wellness Visit (current).  Patient  Verbalized understanding. Patient requests CA to assist scheduling podiatrist appointment. Patient has met care plan goals; and  all care gaps have been addressed. Patient has a strong sense of health self management and adequate support system.

## 2019-03-06 NOTE — OUTREACH NOTE
Contacted and informed patient of scheduled podiatrist appointment (as patient requested)  with Dr. Campos for 3/18/19 at 1 PM with address and phone number information  Patient verbalized understanding. Patient states to have appreciated phone calls. No further questions or concerns voiced at this time.

## 2019-03-07 ENCOUNTER — TELEPHONE (OUTPATIENT)
Dept: FAMILY MEDICINE CLINIC | Facility: CLINIC | Age: 84
End: 2019-03-07

## 2019-03-07 RX ORDER — FERROUS SULFATE 325(65) MG
1 TABLET ORAL
Qty: 30 TABLET | Refills: 5 | Status: SHIPPED | OUTPATIENT
Start: 2019-03-07 | End: 2019-08-20 | Stop reason: SDUPTHER

## 2019-03-07 NOTE — TELEPHONE ENCOUNTER
Patient needs refills on his ferrous sulfate 325 (65 FE) MG tablet. [facility]

## 2019-03-13 ENCOUNTER — EPISODE CHANGES (OUTPATIENT)
Dept: CASE MANAGEMENT | Facility: OTHER | Age: 84
End: 2019-03-13

## 2019-03-18 ENCOUNTER — OFFICE VISIT (OUTPATIENT)
Dept: PODIATRY | Facility: CLINIC | Age: 84
End: 2019-03-18

## 2019-03-18 VITALS — BODY MASS INDEX: 26.81 KG/M2 | HEIGHT: 67 IN | WEIGHT: 170.8 LBS | OXYGEN SATURATION: 97 % | HEART RATE: 75 BPM

## 2019-03-18 DIAGNOSIS — M79.674 TOE PAIN, BILATERAL: ICD-10-CM

## 2019-03-18 DIAGNOSIS — E11.9 TYPE 2 DIABETES MELLITUS WITHOUT COMPLICATION, WITHOUT LONG-TERM CURRENT USE OF INSULIN (HCC): Primary | ICD-10-CM

## 2019-03-18 DIAGNOSIS — Z79.01 ANTICOAGULANT LONG-TERM USE: ICD-10-CM

## 2019-03-18 DIAGNOSIS — B35.1 ONYCHOMYCOSIS: ICD-10-CM

## 2019-03-18 DIAGNOSIS — M79.675 TOE PAIN, BILATERAL: ICD-10-CM

## 2019-03-18 PROCEDURE — 99203 OFFICE O/P NEW LOW 30 MIN: CPT | Performed by: PODIATRIST

## 2019-03-18 PROCEDURE — 11721 DEBRIDE NAIL 6 OR MORE: CPT | Performed by: PODIATRIST

## 2019-03-18 NOTE — PROGRESS NOTES
General Keith Ruvalcaba Jr.  1935  83 y.o. male   MILENA Larkin - 2/11/2019  A1c -5.4 (2/19/2019)    Patient presents today with a request of diabetic foot care.    03/18/2019    Chief Complaint   Patient presents with   • Left Foot - diabetic foot care   • Right Foot - diabetic foot care       History of Present Illness    General Keith Ruvalcaba Jr. is a 83 y.o.male who presents to clinic today for diabetic foot exam and diabetic foot care.  Patient admits to being a type II diabetic.  He states his blood sugars are very well controlled.  He denies numbness, burning or tingling in his feet.  He does complain of long, thick and painful toenails.  Patient states that he has a difficult time trimming them himself.  The last time he trimmed them he cut the toes pretty severely.  He denies any other injuries to his feet.  He has no other pedal complaints.      Past Medical History:   Diagnosis Date   • Acquired hallux rigidus    • Anemia    • Arthropathy of lumbar facet joint    • Carpal tunnel syndrome    • CHF (congestive heart failure) (CMS/HCC)    • Chronic diastolic congestive heart failure (CMS/HCC)    • Chronic kidney disease    • Chronic kidney disease, stage II (mild)     Chronic kidney disease stage 2   • Chronic obstructive lung disease (CMS/HCC)    • Chronic renal failure    • Coronary artery disease    • Cortical senile cataract    • Diabetes mellitus (CMS/HCC)    • Diabetes mellitus type 2, noninsulin dependent (CMS/HCC)     diabetes mellitus type 2, non-insulin treated, Medication treatment plan: oral diabetic medication   • Diabetes mellitus without complication (CMS/HCC)    • Diastolic dysfunction    • Diastolic heart failure (CMS/HCC)    • Essential hypertension     difficult to control      • Hip pain    • History of echocardiogram 08/19/2015    Echocardiogram W/ color flow 32227 (1) - Mild to moderate LVH with mild left atrial enlargement and normal aortic root.CLVH,preserved LV systolic function with Ef of  55%.Diastolic dysfunction.MV intact.AV thickened.Aortic sclerosis.   • Hyperlipidemia    • Influenza vaccine administered 10/02/2015    INFLUENZA IMMUN ADMIN OR PREV RECV'D  (1) - Ordered By: DIONNE MANDUJANO ([facility])    • Insomnia    • Insomnia     Other insomnia   • Joint pain    • Low back pain    • Muscle strain    • Nuclear cataract    • Pneumococcal vaccination given 07/22/2016    PNEUMOC VAC/ADMIN/RCVD 4040F (3) - Ordered By: DIONNE MANDUJANO ([facility])   • Prostate cancer (CMS/HCC)    • Pure hypercholesterolemia    • Sedentary lifestyle          Past Surgical History:   Procedure Laterality Date   • CARDIAC ABLATION     • CARDIAC CATHETERIZATION  02/04/1986    Cardiac cath 54968 (1) - normal left heart catherization,non cardiac chest pain   • CARPAL TUNNEL RELEASE  10/19/2015    Carpal tunnel surgery (1) - Release of left carpal tunnel   • CATARACT EXTRACTION W/ INTRAOCULAR LENS IMPLANT Left 3/31/2017    Procedure: REMOVE CATARACT AND IMPLANT INRAOCULAR LENS LEFT EYE;  Surgeon: Hector Navarrete MD;  Location: [facility] OR;  Service:    • CATARACT EXTRACTION W/ INTRAOCULAR LENS IMPLANT Right 4/14/2017    Procedure: REMOVE CATARACT AND IMPLANT INTRAOCULAR LENS RIGHT EYE;  Surgeon: Hector Navarrete MD;  Location: [facility] OR;  Service:    • COLONOSCOPY N/A 10/31/2018    Procedure: COLONOSCOPY;  Surgeon: Herbie Christine MD;  Location: [facility] ENDOSCOPY;  Service: Gastroenterology   • ENDOSCOPY  08/05/2013    Colon endoscopy 53208 (2) - Hemorrhoids found.   • INGUINAL HERNIA REPAIR Right 06/07/1968    Inguinal hernia, repair (2)   • INJECTION OF MEDICATION  09/06/2012    Albuterol (2u) (1) - Ordered By: LIDYA STEINER ([facility])    • INJECTION OF MEDICATION  09/06/2012    Atrovent (1) - Ordered By: LIDYA STEINER ([facility])    • INJECTION OF MEDICATION  02/10/2012    Depo Medrol (Methylprednisone) (3) - Ordered By: HEATHER PAK ([facility])    • INJECTION OF MEDICATION  02/09/2014    Kenalog (4) -  Ordered By: DIMAS ANDRADE ([facility])    • JOINT REPLACEMENT     • LUMBAR LAMINECTOMY      Lumbar laminectomy (1)   • MANDIBLE FRACTURE SURGERY  10/11/1981    Treat nose/jaw fracture (1) - bilateral open reduction of fracture of mandible   • OTHER SURGICAL HISTORY  2011    Drain/Inject Major Joint  (1) - Ordered By: KARLOS HERNANDEZ ([facility])    • OTHER SURGICAL HISTORY      Insert IVC filter 84611 (1)   • OTHER SURGICAL HISTORY  10/11/2011    SPIROMETRY 20409 (1) - Ordered By: HEATHER PAK ([facility])   • TOTAL HIP ARTHROPLASTY Right          Family History   Problem Relation Age of Onset   • Diabetes Other    • Cancer Sister    • Heart disease Mother    • Hypertension Mother    • Heart disease Father    • Hypertension Father    • Coronary artery disease Neg Hx        Allergies   Allergen Reactions   • Aldactone [Spironolactone] Other (See Comments)     Hyponatremia and hyperkalemia       Social History     Socioeconomic History   • Marital status:      Spouse name: Not on file   • Number of children: Not on file   • Years of education: Not on file   • Highest education level: Not on file   Social Needs   • Financial resource strain: Not on file   • Food insecurity - worry: Not on file   • Food insecurity - inability: Not on file   • Transportation needs - medical: Not on file   • Transportation needs - non-medical: Not on file   Occupational History   • Not on file   Tobacco Use   • Smoking status: Former Smoker     Last attempt to quit:      Years since quittin.2   • Smokeless tobacco: Never Used   Substance and Sexual Activity   • Alcohol use: No   • Drug use: No   • Sexual activity: Defer     Comment: Marital status:    Other Topics Concern   • Not on file   Social History Narrative   • Not on file         Current Outpatient Medications   Medication Sig Dispense Refill   • albuterol 108 (90 Base) MCG/ACT inhaler Inhale 2 puffs Every 4 (Four) Hours As Needed for Wheezing. 18  "g 1   • aspirin 81 MG EC tablet Take 81 mg by mouth daily.     • carbidopa-levodopa (SINEMET)  MG per tablet Take 1 tablet by mouth 3 (Three) Times a Day. 90 tablet 2   • ferrous sulfate 325 (65 FE) MG tablet Take 1 tablet by mouth Daily With Breakfast. 30 tablet 5   • furosemide (LASIX) 20 MG tablet Take 1 tablet by mouth Daily As Needed (weight gain of 3lbs overnight or leg swelling). 30 tablet 3   • gabapentin (NEURONTIN) 300 MG capsule Take 1 capsule by mouth 2 (Two) Times a Day. 180 capsule 2   • glucose blood (TRUE METRIX BLOOD GLUCOSE TEST) test strip Check glucose daily 100 each 0   • hydrALAZINE (APRESOLINE) 25 MG tablet Take 1 tablet by mouth 3 (Three) Times a Day. 90 tablet 6   • lisinopril-hydrochlorothiazide (PRINZIDE,ZESTORETIC) 20-12.5 MG per tablet Take 1 tablet by mouth Daily. 30 tablet 3   • metoprolol succinate XL (TOPROL-XL) 25 MG 24 hr tablet Take 1 tablet by mouth Every Night. 30 tablet 2   • pravastatin (PRAVACHOL) 40 MG tablet Take 1 tablet by mouth Daily. 30 tablet 11   • rivaroxaban (XARELTO) 10 MG tablet Take 1 tablet by mouth Daily. 30 tablet 11   • SITagliptin (JANUVIA) 50 MG tablet Take 1 tablet by mouth Daily. 30 tablet 3   • TRUEPLUS LANCETS 30G misc 1 Device Daily. TEST 100 each 3     No current facility-administered medications for this visit.        Review of Systems   Constitutional: Negative.    HENT: Positive for hearing loss. Negative for facial swelling.    Eyes: Negative.    Respiratory: Negative.    Cardiovascular: Negative.    Gastrointestinal: Negative.    Musculoskeletal: Negative.         Toe pain   Skin: Negative.    Neurological: Negative.    Psychiatric/Behavioral: Negative.          OBJECTIVE    Pulse 75   Ht 170.2 cm (67\")   Wt 77.5 kg (170 lb 12.8 oz)   SpO2 97%   BMI 26.75 kg/m²       Physical Exam   Constitutional: He is oriented to person, place, and time. He appears well-developed and well-nourished.   HENT:   Head: Normocephalic and atraumatic. "   Nose: Nose normal.   Eyes: Conjunctivae and EOM are normal. Pupils are equal, round, and reactive to light.   Pulmonary/Chest: Effort normal. No respiratory distress. He has no wheezes.    General had a diabetic foot exam performed today.  Neurological: He is alert and oriented to person, place, and time. He displays normal reflexes.   Skin: Skin is warm and dry. Capillary refill takes less than 2 seconds.   Psychiatric: He has a normal mood and affect. His behavior is normal.   Vitals reviewed.      Gait: Normal    Assistive Device: Walker    Lower Extremity    Cardiovascular:    DP/PT pulses palpable bilateral  CFT brisk  to all digits  Skin temp is warm to warm from proximal tibia to distal digits bilateral  Pedal hair growth decreased.   No erythema noted   Nonpitting vanesa-malleolar edema bilateral    Musculoskeletal:  Muscle strength is 5/5 for all muscle groups tested   ROM of the 1st MTP is WNL bilateral   ROM of the MTJ is WNL bilateral   ROM of the STJ is WNL bilateral   ROM of the ankle joint is  WNL bilateral     Dermatological:   Skin is warm, dry and intact bilateral  Webspaces 1-4 bilateral are clean, dry and intact.   No subcutaneous nodules or masses noted    Nails 1-5 bilateral are  discolored, elongated with subungual debris.  Pain on palpation to the nail plates.    Neurological:   Protective sensation intact   Sensation intact to light touch        Procedures        ASSESSMENT AND PLAN    General was seen today for diabetic foot care and diabetic foot care.    Diagnoses and all orders for this visit:    Type 2 diabetes mellitus without complication, without long-term current use of insulin (CMS/[facility])    Onychomycosis    Toe pain, bilateral    Anticoagulant long-term use      -A diabetic foot screening exam was performed and the patient was educated on the foot complications related to diabetes,  preventative foot care and what signs and symptoms to watch for.  Instructed to contact our office if  any foot problems develop before next visit.  - Nails 1-5 bilateral were debrided in length and thickness with nail nipper and electric  to decrease fungal load and risk of infection.   -All his questions were answered  -Return to clinic 3 months as needed          This document has been electronically signed by Partha Campos DPM on March 18, 2019 1:40 PM     3/18/2019  1:40 PM

## 2019-03-20 ENCOUNTER — OFFICE VISIT (OUTPATIENT)
Dept: ORTHOPEDIC SURGERY | Facility: CLINIC | Age: 84
End: 2019-03-20

## 2019-03-20 VITALS — BODY MASS INDEX: 26.53 KG/M2 | HEIGHT: 67 IN | WEIGHT: 169 LBS

## 2019-03-20 DIAGNOSIS — Z96.641 PRESENCE OF RIGHT HIP IMPLANT: ICD-10-CM

## 2019-03-20 DIAGNOSIS — M25.551 RIGHT HIP PAIN: Primary | ICD-10-CM

## 2019-03-20 DIAGNOSIS — M70.61 TROCHANTERIC BURSITIS OF RIGHT HIP: ICD-10-CM

## 2019-03-20 DIAGNOSIS — M79.604 RIGHT LEG PAIN: ICD-10-CM

## 2019-03-20 PROCEDURE — 96372 THER/PROPH/DIAG INJ SC/IM: CPT | Performed by: NURSE PRACTITIONER

## 2019-03-20 PROCEDURE — 99214 OFFICE O/P EST MOD 30 MIN: CPT | Performed by: NURSE PRACTITIONER

## 2019-03-20 RX ORDER — TRIAMCINOLONE ACETONIDE 40 MG/ML
80 INJECTION, SUSPENSION INTRA-ARTICULAR; INTRAMUSCULAR ONCE
Status: COMPLETED | OUTPATIENT
Start: 2019-03-20 | End: 2019-03-20

## 2019-03-20 RX ADMIN — TRIAMCINOLONE ACETONIDE 80 MG: 40 INJECTION, SUSPENSION INTRA-ARTICULAR; INTRAMUSCULAR at 11:05

## 2019-03-20 NOTE — PROGRESS NOTES
" Keith Ruvalcaba Jr. is a 83 y.o. male returns for     Chief Complaint   Patient presents with   • Right Hip - Pain       HISTORY OF PRESENT ILLNESS: Patient presents to office for follow up of chronic right hip pain. Patient states he had had right hip pain that radiates into his right upper leg for the past two days. He denies any known injury. Patient was treated previously for right trochanteric bursitis. Last office visit was on 11/20/2018. Patient has been doing well until the past two days. He states this pain seems different than the bursitis. Patient has a history of previous right hip arthroplasty. He also has known degenerative disc disease in the lumbar spine with two prior back surgeries. Patient is ambulatory in office today with a steady gait and use of his walker. No numbness or tingling reported. Patient is alert and pleasant and appears in no distress.      CONCURRENT MEDICAL HISTORY:    The following portions of the patient's history were reviewed and updated as appropriate: allergies, current medications, past family history, past medical history, past social history, past surgical history and problem list.     ROS  No fevers or chills.  No chest pain or shortness of air.  No GI or  disturbances. Right hip pain. Right leg pain.     PHYSICAL EXAMINATION:       Ht 170.2 cm (67\")   Wt 76.7 kg (169 lb)   BMI 26.47 kg/m²     Physical Exam   Constitutional: He is oriented to person, place, and time. Vital signs are normal. He appears well-developed and well-nourished. He is active and cooperative. He does not appear ill. No distress.   HENT:   Head: Normocephalic.   Pulmonary/Chest: Effort normal. No respiratory distress.   Abdominal: Soft. He exhibits no distension.   Musculoskeletal: He exhibits tenderness (Mild, right upper leg/groin). He exhibits no edema or deformity.   Neurological: He is alert and oriented to person, place, and time. GCS eye subscore is 4. GCS verbal subscore is 5. GCS " motor subscore is 6.   Skin: Skin is warm, dry and intact. Capillary refill takes less than 2 seconds. No erythema.   Psychiatric: He has a normal mood and affect. His speech is normal and behavior is normal. Judgment and thought content normal. Cognition and memory are normal.   Vitals reviewed.      GAIT:     []  Normal  [x]  Antalgic    Assistive device: []  None  [x]  Walker     []  Crutches  []  Cane     []  Wheelchair  []  Stretcher    Right Hip Exam     Tenderness   The patient is experiencing tenderness in the anterior (Mild, right anterior hip/groin/upper leg).    Range of Motion   Abduction: 40   Adduction: 30   Flexion: 120   External rotation: 60   Internal rotation: 20     Muscle Strength   Abduction: 4/5   Flexion: 4/5     Other   Erythema: absent  Sensation: normal  Pulse: present    Comments:  Good range of motion of the hip with mild pain elicited. Mild tenderness to palpation to the anterior hip/groin/upper leg.  No erythema.  No swelling. No ecchymosis. NO signs of injury noted.  Stable joint exam.      Left Hip Exam     Tenderness   The patient is experiencing no tenderness.     Range of Motion   Abduction: 30   Adduction: 30   Flexion: 120   External rotation: 60   Internal rotation: 20     Muscle Strength   Abduction: 4/5   Flexion: 4/5     Other   Erythema: absent  Sensation: normal  Pulse: present              ASSESSMENT:    Diagnoses and all orders for this visit:    Right hip pain  -     triamcinolone acetonide (KENALOG-40) injection 80 mg    Trochanteric bursitis of right hip  -     triamcinolone acetonide (KENALOG-40) injection 80 mg    Presence of right hip implant  -     triamcinolone acetonide (KENALOG-40) injection 80 mg    Right leg pain    PLAN    Patient complains of some right hip and right upper leg pain for the past two days with no known injury. He is ambulating without difficulty with use of his walker, which is his baseline. He has good motion of the right hip on exam.  Patient underwent a right hip arthroplasty several years ago with no complications. He has been treated previously for right trochanteric bursitis, but this pain is somewhat different. He is not tender over the greater trochanter. We discussed the possibility of a right hip/upper leg strain/sprain. We also discussed possible radicular pain from his lumbar spine as he has known degenerative issues in the lumbar spine with two previous surgeries. Recommend IM injection of Kenalog today for management of pain/inflammation. Patient is cautioned regarding possible hyperglycemia with use of steroids. Patient is instructed to monitor his blood sugar closely and follow a diabetic diet. Recommend rest and activity modification as tolerated and based on his pain.  Progress activity as tolerated.  Recommend modified weight-bearing with use of his walker. Recommend trying some ice and/or heat therapy to the right anterior hip/groin and upper leg for pain. Recommend Tylenol as needed for pain.  Patient is unable to take oral NSAIDs due to chronic kidney disease. Follow up in 2 weeks for recheck.     Return in about 2 weeks (around 4/3/2019), or if symptoms worsen or fail to improve, for Recheck.        This document has been electronically signed by REGINA Muñoz on March 24, 2019 7:18 PM      REGINA Muñoz

## 2019-03-24 PROBLEM — M79.604 RIGHT LEG PAIN: Status: ACTIVE | Noted: 2019-03-24

## 2019-03-29 ENCOUNTER — TELEPHONE (OUTPATIENT)
Dept: FAMILY MEDICINE CLINIC | Facility: CLINIC | Age: 84
End: 2019-03-29

## 2019-03-29 NOTE — TELEPHONE ENCOUNTER
Patient called and state he needs his hydrALAZINE (APRESOLINE) 25 MG tablet refilled today. He has 3 pills left. [facility]

## 2019-04-08 ENCOUNTER — TELEPHONE (OUTPATIENT)
Dept: FAMILY MEDICINE CLINIC | Facility: CLINIC | Age: 84
End: 2019-04-08

## 2019-04-08 NOTE — TELEPHONE ENCOUNTER
Pt says he needs a new walker, would like a script sent to [facility]. He uses a rolling walker. Thanks!

## 2019-04-09 NOTE — TELEPHONE ENCOUNTER
PATIENT CALLED AGAIN TO SEE IF DR BETHEA HAS SENT RX IN FOR HIS WALKER. HE IS ASKING IF YOU CALL HIM WHEN CALLED IN

## 2019-04-12 NOTE — TELEPHONE ENCOUNTER
Patient has called yet again to see if Dr Larkin will call in a RX for a Walker. He states the one he has is getting bad. The wheels are in bad shape and scaring the floor up when used. He needs to know if Dr Larkin is going to send a RX in for him. Please call him

## 2019-04-15 ENCOUNTER — TELEPHONE (OUTPATIENT)
Dept: FAMILY MEDICINE CLINIC | Facility: CLINIC | Age: 84
End: 2019-04-15

## 2019-04-16 ENCOUNTER — TELEPHONE (OUTPATIENT)
Dept: FAMILY MEDICINE CLINIC | Facility: CLINIC | Age: 84
End: 2019-04-16

## 2019-04-24 DIAGNOSIS — E11.40 TYPE 2 DIABETES MELLITUS WITH DIABETIC NEUROPATHY, WITHOUT LONG-TERM CURRENT USE OF INSULIN (HCC): ICD-10-CM

## 2019-04-24 DIAGNOSIS — E11.9 DIABETES MELLITUS WITHOUT COMPLICATION (HCC): ICD-10-CM

## 2019-04-24 RX ORDER — ALBUTEROL SULFATE 90 UG/1
2 AEROSOL, METERED RESPIRATORY (INHALATION) EVERY 4 HOURS PRN
Qty: 18 G | Refills: 6 | Status: SHIPPED | OUTPATIENT
Start: 2019-04-24 | End: 2021-03-08

## 2019-04-24 RX ORDER — LISINOPRIL AND HYDROCHLOROTHIAZIDE 20; 12.5 MG/1; MG/1
1 TABLET ORAL DAILY
Qty: 30 TABLET | Refills: 3 | Status: SHIPPED | OUTPATIENT
Start: 2019-04-24 | End: 2019-10-31 | Stop reason: SDUPTHER

## 2019-04-24 RX ORDER — HYDRALAZINE HYDROCHLORIDE 25 MG/1
25 TABLET, FILM COATED ORAL 3 TIMES DAILY
Qty: 90 TABLET | Refills: 4 | Status: SHIPPED | OUTPATIENT
Start: 2019-04-24 | End: 2019-11-21 | Stop reason: SDUPTHER

## 2019-04-24 RX ORDER — PRAVASTATIN SODIUM 40 MG
40 TABLET ORAL DAILY
Qty: 30 TABLET | Refills: 4 | Status: SHIPPED | OUTPATIENT
Start: 2019-04-24 | End: 2019-05-22 | Stop reason: SDUPTHER

## 2019-04-24 RX ORDER — GLUCOSAM/CHON-MSM1/C/MANG/BOSW 500-416.6
1 TABLET ORAL DAILY
Qty: 100 EACH | Refills: 3 | Status: SHIPPED | OUTPATIENT
Start: 2019-04-24 | End: 2019-05-09 | Stop reason: SDUPTHER

## 2019-05-06 RX ORDER — METOPROLOL SUCCINATE 25 MG/1
TABLET, EXTENDED RELEASE ORAL
Qty: 30 TABLET | Refills: 2 | Status: SHIPPED | OUTPATIENT
Start: 2019-05-06 | End: 2019-08-02 | Stop reason: SDUPTHER

## 2019-05-09 ENCOUNTER — TELEPHONE (OUTPATIENT)
Dept: FAMILY MEDICINE CLINIC | Facility: CLINIC | Age: 84
End: 2019-05-09

## 2019-05-09 DIAGNOSIS — E11.40 TYPE 2 DIABETES MELLITUS WITH DIABETIC NEUROPATHY, WITHOUT LONG-TERM CURRENT USE OF INSULIN (HCC): ICD-10-CM

## 2019-05-09 DIAGNOSIS — E11.9 DIABETES MELLITUS WITHOUT COMPLICATION (HCC): ICD-10-CM

## 2019-05-09 RX ORDER — GLUCOSAM/CHON-MSM1/C/MANG/BOSW 500-416.6
TABLET ORAL
Qty: 100 EACH | Refills: 6 | Status: SHIPPED | OUTPATIENT
Start: 2019-05-09 | End: 2019-05-16 | Stop reason: SDUPTHER

## 2019-05-09 NOTE — TELEPHONE ENCOUNTER
GENERAL LOMBARDO IS NEEDING REFILL ON TEST STRIPS AND LANCETS TO BE SENT TO [facility]  TESTING 1XDAY

## 2019-05-14 ENCOUNTER — LAB (OUTPATIENT)
Dept: LAB | Facility: HOSPITAL | Age: 84
End: 2019-05-14

## 2019-05-14 ENCOUNTER — OFFICE VISIT (OUTPATIENT)
Dept: FAMILY MEDICINE CLINIC | Facility: CLINIC | Age: 84
End: 2019-05-14

## 2019-05-14 VITALS
SYSTOLIC BLOOD PRESSURE: 138 MMHG | HEART RATE: 77 BPM | WEIGHT: 167.19 LBS | OXYGEN SATURATION: 98 % | HEIGHT: 66 IN | DIASTOLIC BLOOD PRESSURE: 74 MMHG | BODY MASS INDEX: 26.87 KG/M2

## 2019-05-14 DIAGNOSIS — I10 ESSENTIAL HYPERTENSION: Primary | ICD-10-CM

## 2019-05-14 DIAGNOSIS — E11.9 DIABETES MELLITUS WITHOUT COMPLICATION (HCC): ICD-10-CM

## 2019-05-14 DIAGNOSIS — I25.10 CORONARY ARTERY DISEASE INVOLVING NATIVE CORONARY ARTERY OF NATIVE HEART WITHOUT ANGINA PECTORIS: ICD-10-CM

## 2019-05-14 DIAGNOSIS — I50.32 CHRONIC DIASTOLIC CONGESTIVE HEART FAILURE (HCC): Chronic | ICD-10-CM

## 2019-05-14 LAB
ALBUMIN SERPL-MCNC: 4.2 G/DL (ref 3.5–5.2)
ALBUMIN/GLOB SERPL: 1.8 G/DL
ALP SERPL-CCNC: 56 U/L (ref 39–117)
ALT SERPL W P-5'-P-CCNC: 10 U/L (ref 1–41)
ANION GAP SERPL CALCULATED.3IONS-SCNC: 13.9 MMOL/L
AST SERPL-CCNC: 22 U/L (ref 1–40)
BASOPHILS # BLD AUTO: 0.05 10*3/MM3 (ref 0–0.2)
BASOPHILS NFR BLD AUTO: 0.7 % (ref 0–1.5)
BILIRUB SERPL-MCNC: 0.3 MG/DL (ref 0.2–1.2)
BUN BLD-MCNC: 19 MG/DL (ref 8–23)
BUN/CREAT SERPL: 14.2 (ref 7–25)
CALCIUM SPEC-SCNC: 9.2 MG/DL (ref 8.6–10.5)
CHLORIDE SERPL-SCNC: 92 MMOL/L (ref 98–107)
CHOLEST SERPL-MCNC: 144 MG/DL (ref 0–200)
CO2 SERPL-SCNC: 25.1 MMOL/L (ref 22–29)
CREAT BLD-MCNC: 1.34 MG/DL (ref 0.76–1.27)
DEPRECATED RDW RBC AUTO: 42.6 FL (ref 37–54)
EOSINOPHIL # BLD AUTO: 0.03 10*3/MM3 (ref 0–0.4)
EOSINOPHIL NFR BLD AUTO: 0.4 % (ref 0.3–6.2)
ERYTHROCYTE [DISTWIDTH] IN BLOOD BY AUTOMATED COUNT: 13.4 % (ref 12.3–15.4)
GFR SERPL CREATININE-BSD FRML MDRD: 62 ML/MIN/1.73
GLOBULIN UR ELPH-MCNC: 2.3 GM/DL
GLUCOSE BLD-MCNC: 63 MG/DL (ref 65–99)
HBA1C MFR BLD: 6.25 % (ref 4.8–5.6)
HCT VFR BLD AUTO: 36.2 % (ref 37.5–51)
HDLC SERPL-MCNC: 63 MG/DL (ref 40–60)
HGB BLD-MCNC: 12.3 G/DL (ref 13–17.7)
IMM GRANULOCYTES # BLD AUTO: 0.08 10*3/MM3 (ref 0–0.05)
IMM GRANULOCYTES NFR BLD AUTO: 1.1 % (ref 0–0.5)
LDLC SERPL CALC-MCNC: 73 MG/DL (ref 0–100)
LDLC/HDLC SERPL: 1.16 {RATIO}
LYMPHOCYTES # BLD AUTO: 1.66 10*3/MM3 (ref 0.7–3.1)
LYMPHOCYTES NFR BLD AUTO: 23.3 % (ref 19.6–45.3)
MCH RBC QN AUTO: 29.5 PG (ref 26.6–33)
MCHC RBC AUTO-ENTMCNC: 34 G/DL (ref 31.5–35.7)
MCV RBC AUTO: 86.8 FL (ref 79–97)
MONOCYTES # BLD AUTO: 0.95 10*3/MM3 (ref 0.1–0.9)
MONOCYTES NFR BLD AUTO: 13.4 % (ref 5–12)
NEUTROPHILS # BLD AUTO: 4.34 10*3/MM3 (ref 1.7–7)
NEUTROPHILS NFR BLD AUTO: 61.1 % (ref 42.7–76)
NRBC BLD AUTO-RTO: 0 /100 WBC (ref 0–0.2)
PLATELET # BLD AUTO: 457 10*3/MM3 (ref 140–450)
PMV BLD AUTO: 8.9 FL (ref 6–12)
POTASSIUM BLD-SCNC: 4.8 MMOL/L (ref 3.5–5.2)
PROT SERPL-MCNC: 6.5 G/DL (ref 6–8.5)
RBC # BLD AUTO: 4.17 10*6/MM3 (ref 4.14–5.8)
SODIUM BLD-SCNC: 131 MMOL/L (ref 136–145)
TRIGL SERPL-MCNC: 41 MG/DL (ref 0–150)
VLDLC SERPL-MCNC: 8.2 MG/DL (ref 5–40)
WBC NRBC COR # BLD: 7.11 10*3/MM3 (ref 3.4–10.8)

## 2019-05-14 PROCEDURE — 99214 OFFICE O/P EST MOD 30 MIN: CPT | Performed by: FAMILY MEDICINE

## 2019-05-14 PROCEDURE — 36415 COLL VENOUS BLD VENIPUNCTURE: CPT

## 2019-05-14 PROCEDURE — 85025 COMPLETE CBC W/AUTO DIFF WBC: CPT

## 2019-05-14 PROCEDURE — 80061 LIPID PANEL: CPT

## 2019-05-14 PROCEDURE — 80053 COMPREHEN METABOLIC PANEL: CPT

## 2019-05-14 PROCEDURE — 83036 HEMOGLOBIN GLYCOSYLATED A1C: CPT

## 2019-05-14 RX ORDER — GABAPENTIN 300 MG/1
CAPSULE ORAL
Qty: 270 CAPSULE | Refills: 2 | Status: SHIPPED | OUTPATIENT
Start: 2019-05-14 | End: 2019-05-14 | Stop reason: SDUPTHER

## 2019-05-14 RX ORDER — GABAPENTIN 300 MG/1
CAPSULE ORAL
Qty: 270 CAPSULE | Refills: 2 | Status: SHIPPED | OUTPATIENT
Start: 2019-05-14 | End: 2019-11-22 | Stop reason: SDUPTHER

## 2019-05-14 NOTE — PROGRESS NOTES
Subjective   General Keith Ruvalcaba Jr. is a 83 y.o. male.    cc:follow up  History of Present Illness The patient comes in for check of their chronic medical issues which include Diabetes Mellitus,Hypertension and CAD.He is doing well with his chronic medical issues.   .       The following portions of the patient's history were reviewed and updated as appropriate: allergies, current medications, past family history, past medical history, past social history, past surgical history and problem list.    Review of Systems   Constitutional: Negative for fatigue and fever.   Respiratory: Positive for shortness of breath. Negative for cough, chest tightness and stridor.    Cardiovascular: Negative for chest pain, palpitations and leg swelling.       Objective   Physical Exam   Constitutional: He appears well-developed and well-nourished.   HENT:   Head: Normocephalic and atraumatic.   Right Ear: External ear normal.   Left Ear: External ear normal.   Nose: Nose normal.   Mouth/Throat: Oropharynx is clear and moist.   Eyes: Conjunctivae are normal.   Neck: Normal range of motion.   Cardiovascular: Normal rate, regular rhythm and normal heart sounds. Exam reveals no gallop and no friction rub.   No murmur heard.  Pulmonary/Chest: Effort normal and breath sounds normal. No stridor. No respiratory distress. He has no wheezes. He has no rales.   Abdominal: Soft. Bowel sounds are normal. He exhibits no distension and no mass. There is no tenderness. There is no guarding.   Neurological: He is alert.   Skin: Skin is warm and dry.   No calluses,No lesions. Stereognosis is intact. Monofilament exam is normal.     Vitals reviewed.        Assessment/Plan   General was seen today for follow-up and diabetes.    Diagnoses and all orders for this visit:    Essential hypertension    Diabetes mellitus without complication (CMS/HCC)  -     Comprehensive metabolic panel; Future  -     Hemoglobin A1c; Future  -     Lipid panel; Future  -     CBC  w AUTO Differential; Future    Coronary artery disease involving native coronary artery of native heart without angina pectoris    Chronic diastolic congestive heart failure (CMS/HCC)  -     Discontinue: gabapentin (NEURONTIN) 300 MG capsule; One tablet by mouth tid  -     gabapentin (NEURONTIN) 300 MG capsule; One tablet by mouth tid        Return to the clinic in 3 month/s.  Will contact with results as needed.

## 2019-05-16 DIAGNOSIS — E11.9 DIABETES MELLITUS WITHOUT COMPLICATION (HCC): ICD-10-CM

## 2019-05-16 DIAGNOSIS — E11.40 TYPE 2 DIABETES MELLITUS WITH DIABETIC NEUROPATHY, WITHOUT LONG-TERM CURRENT USE OF INSULIN (HCC): ICD-10-CM

## 2019-05-16 DIAGNOSIS — R79.89 ABNORMAL CBC: Primary | ICD-10-CM

## 2019-05-16 RX ORDER — GLUCOSAM/CHON-MSM1/C/MANG/BOSW 500-416.6
TABLET ORAL
Qty: 100 EACH | Refills: 6 | Status: SHIPPED | OUTPATIENT
Start: 2019-05-16 | End: 2020-07-28 | Stop reason: SDUPTHER

## 2019-05-22 RX ORDER — PRAVASTATIN SODIUM 40 MG
40 TABLET ORAL DAILY
Qty: 90 TABLET | Refills: 2 | Status: SHIPPED | OUTPATIENT
Start: 2019-05-22 | End: 2020-06-19

## 2019-06-04 ENCOUNTER — OFFICE VISIT (OUTPATIENT)
Dept: CARDIOLOGY | Facility: CLINIC | Age: 84
End: 2019-06-04

## 2019-06-04 VITALS
BODY MASS INDEX: 27.06 KG/M2 | DIASTOLIC BLOOD PRESSURE: 72 MMHG | SYSTOLIC BLOOD PRESSURE: 142 MMHG | HEART RATE: 75 BPM | WEIGHT: 168.4 LBS | OXYGEN SATURATION: 98 % | HEIGHT: 66 IN

## 2019-06-04 DIAGNOSIS — N18.2 STAGE 2 CHRONIC KIDNEY DISEASE: Chronic | ICD-10-CM

## 2019-06-04 DIAGNOSIS — I25.10 CORONARY ARTERY DISEASE INVOLVING NATIVE CORONARY ARTERY OF NATIVE HEART WITHOUT ANGINA PECTORIS: ICD-10-CM

## 2019-06-04 DIAGNOSIS — I10 ESSENTIAL HYPERTENSION: ICD-10-CM

## 2019-06-04 DIAGNOSIS — I50.32 CHRONIC DIASTOLIC CONGESTIVE HEART FAILURE (HCC): Primary | Chronic | ICD-10-CM

## 2019-06-04 PROCEDURE — 99214 OFFICE O/P EST MOD 30 MIN: CPT | Performed by: NURSE PRACTITIONER

## 2019-06-04 NOTE — PROGRESS NOTES
Subjective:     Congestive Heart Failure (chief complaint)    Congestive Heart Failure   Presents for follow-up visit. Pertinent negatives include no chest pressure, claudication, edema (bilateral ankle at the end of the day; compliant with compression stockings), near-syncope, orthopnea, palpitations, paroxysmal nocturnal dyspnea, shortness of breath or unexpected weight change. The symptoms have been improving. Compliance with total regimen is %. Compliance problems include adherence to exercise.  Compliance with diet is %. Compliance with exercise is 26-50%. Compliance with medications is %.     1. CAD, non-obstructive 2005  2. HFpEF - diastolic dysfunction  3. Hypertension  4. CKD, Stage 2 with H/O KIRK  5. H/O Atrial Flutter S/P ablation per Dr Graves 2006  6. Prostate Cancer Hadley Score 6/Stage 2 S/P RT   7. DM, Type II  8. Anterior right total hip arthroplasty 04/25/2016  9. Chronic DVT of bilateral femoral vein: on preventative dosing - Xarelto  10. Carotid disease followed annually by Dr Hamman    Mr. Ruvalcaba underwent lumbar surgery 10/27/2017 in [place]. He was discharge 11/7/2017. Following discharge he had several medications that were recommended be stopped secondary to hypotension/KIRK.      The patient received evaluation with REGINA Vidal on 03/08/2018 in follow-up regarding leg edema for which lower extremity duplex have been ordered and was found with positive findings regarding DVT.   The patient was initiated on DOAC, Xarelto on that date.     02/07/2019  The patient presents to the office today for previously scheduled appointment. He indicates that approximately 2 week prior he began to notice a tremor in bilateral hands most prominent at rest. He reports negative impact on quality of life or activities of daily living. He feels that perhaps the prominence of the tremors diminishes in the evening and during sleep.   The patient reports fluid intake to be approximately  1400 - 1500 cc per day. He reports negative use of diuretics over the past 8 weeks approximate.    03/05/2019  The patient presents to the office today for re-evaluation following diuretic therapy (OP) last week secondary to reported leg swelling.   The patient reports Lasix 20 mg on 02/27-03/01/2019.   Next appointment with Dr Reza: 10/08/2019                                       Dr Larkin: 05/10/2019  Neurology in [place]: 04/25/2019 06/04/2019  The patient presents to the office today for routine scheduled office evaluation.  The patient reports that he has been doing well with outpatient monitoring of blood pressure and weights demonstrating stability.  He has received evaluation by neurologist, Dr. Rodriguez in [place] regarding tremors with increase in gabapentin, otherwise no changes recommended.    Review of Systems   Constitution: Negative for weakness, malaise/fatigue and unexpected weight change.   HENT: Negative for nosebleeds.    Eyes: Negative for visual disturbance.   Cardiovascular: Negative for claudication, cyanosis, dyspnea on exertion, irregular heartbeat, near-syncope, orthopnea, palpitations, paroxysmal nocturnal dyspnea and syncope.   Respiratory: Negative for shortness of breath, sleep disturbances due to breathing and snoring.    Endocrine: Negative for polydipsia, polyphagia and polyuria.   Hematologic/Lymphatic: Negative for bleeding problem. Does not bruise/bleed easily.   Skin: Negative for poor wound healing.   Musculoskeletal: Negative for falls.   Gastrointestinal: Negative for bloating.   Neurological: Positive for tremors. Negative for dizziness, focal weakness, headaches, light-headedness, loss of balance, numbness, paresthesias, seizures and vertigo.   Psychiatric/Behavioral: Negative for altered mental status. The patient is not nervous/anxious.      Past Medical History:   Diagnosis Date   • Acquired hallux rigidus    • Anemia    • Arthropathy of lumbar facet joint    •  Carpal tunnel syndrome    • CHF (congestive heart failure) (CMS/[facility])    • Chronic diastolic congestive heart failure (CMS/[facility])    • Chronic kidney disease    • Chronic kidney disease, stage II (mild)     Chronic kidney disease stage 2   • Chronic obstructive lung disease (CMS/[facility])    • Chronic renal failure    • Coronary artery disease    • Cortical senile cataract    • Diabetes mellitus (CMS/[facility])    • Diabetes mellitus type 2, noninsulin dependent (CMS/[facility])     diabetes mellitus type 2, non-insulin treated, Medication treatment plan: oral diabetic medication   • Diabetes mellitus without complication (CMS/[facility])    • Diastolic dysfunction    • Diastolic heart failure (CMS/[facility])    • Essential hypertension     difficult to control      • Hip pain    • History of echocardiogram 08/19/2015    Echocardiogram W/ color flow 06237 (1) - Mild to moderate LVH with mild left atrial enlargement and normal aortic root.CLVH,preserved LV systolic function with Ef of 55%.Diastolic dysfunction.MV intact.AV thickened.Aortic sclerosis.   • Hyperlipidemia    • Influenza vaccine administered 10/02/2015    INFLUENZA IMMUN ADMIN OR PREV RECV'D  (1) - Ordered By: DIONNE MANDUJANO ([facility])    • Insomnia    • Insomnia     Other insomnia   • Joint pain    • Low back pain    • Muscle strain    • Nuclear cataract    • Pneumococcal vaccination given 07/22/2016    PNEUMOC VAC/ADMIN/RCVD 4040F (3) - Ordered By: DIONNE MANDUJANO ([facility])   • Prostate cancer (CMS/[facility])    • Pure hypercholesterolemia    • Sedentary lifestyle    ,  Past Surgical History:   Procedure Laterality Date   • CARDIAC ABLATION     • CARDIAC CATHETERIZATION  02/04/1986    Cardiac cath 18994 (1) - normal left heart catherization,non cardiac chest pain   • CARPAL TUNNEL RELEASE  10/19/2015    Carpal tunnel surgery (1) - Release of left carpal tunnel   • CATARACT EXTRACTION W/ INTRAOCULAR LENS IMPLANT Left 3/31/2017    Procedure: REMOVE CATARACT AND IMPLANT INRAOCULAR LENS  LEFT EYE;  Surgeon: Hector Navarrete MD;  Location: [facility] OR;  Service:    • CATARACT EXTRACTION W/ INTRAOCULAR LENS IMPLANT Right 4/14/2017    Procedure: REMOVE CATARACT AND IMPLANT INTRAOCULAR LENS RIGHT EYE;  Surgeon: Hector Navarrete MD;  Location: [facility] OR;  Service:    • COLONOSCOPY N/A 10/31/2018    Procedure: COLONOSCOPY;  Surgeon: Herbie Christine MD;  Location: [facility] ENDOSCOPY;  Service: Gastroenterology   • ENDOSCOPY  08/05/2013    Colon endoscopy 23640 (2) - Hemorrhoids found.   • INGUINAL HERNIA REPAIR Right 06/07/1968    Inguinal hernia, repair (2)   • INJECTION OF MEDICATION  09/06/2012    Albuterol (2u) (1) - Ordered By: LIDYA STEINER ([facility])    • INJECTION OF MEDICATION  09/06/2012    Atrovent (1) - Ordered By: LIDYA STEINER ([facility])    • INJECTION OF MEDICATION  02/10/2012    Depo Medrol (Methylprednisone) (3) - Ordered By: HEATHER PAK ([facility])    • INJECTION OF MEDICATION  02/09/2014    Kenalog (4) - Ordered By: DIMAS ANDRADE ([facility])    • JOINT REPLACEMENT     • LUMBAR LAMINECTOMY  2007    Lumbar laminectomy (1)   • MANDIBLE FRACTURE SURGERY  10/11/1981    Treat nose/jaw fracture (1) - bilateral open reduction of fracture of mandible   • OTHER SURGICAL HISTORY  09/24/2011    Drain/Inject Major Joint 20610 (1) - Ordered By: KARLOS HERNANDEZ ([facility])    • OTHER SURGICAL HISTORY      Insert IVC filter 53187 (1)   • OTHER SURGICAL HISTORY  10/11/2011    SPIROMETRY 31632 (1) - Ordered By: HEATHER PAK ([facility])   • TOTAL HIP ARTHROPLASTY Right    ,  Family History   Problem Relation Age of Onset   • Diabetes Other    • Cancer Sister    • Heart disease Mother    • Hypertension Mother    • Heart disease Father    • Hypertension Father    • Coronary artery disease Neg Hx    ,  Social History     Socioeconomic History   • Marital status:      Spouse name: Not on file   • Number of children: Not on file   • Years of education: Not on file   • Highest education level:  Not on file   Tobacco Use   • Smoking status: Former Smoker     Last attempt to quit:      Years since quittin.4   • Smokeless tobacco: Never Used   Substance and Sexual Activity   • Alcohol use: No   • Drug use: No   • Sexual activity: Defer     Comment: Marital status:      Current Outpatient Medications   Medication Sig Dispense Refill   • albuterol sulfate  (90 Base) MCG/ACT inhaler Inhale 2 puffs Every 4 (Four) Hours As Needed for Wheezing. 18 g 6   • aspirin 81 MG EC tablet Take 81 mg by mouth daily.     • carbidopa-levodopa (SINEMET)  MG per tablet Take 1 tablet by mouth 3 (Three) Times a Day. 90 tablet 2   • ferrous sulfate 325 (65 FE) MG tablet Take 1 tablet by mouth Daily With Breakfast. 30 tablet 5   • furosemide (LASIX) 20 MG tablet Take 1 tablet by mouth Daily As Needed (weight gain of 3lbs overnight or leg swelling). 30 tablet 3   • gabapentin (NEURONTIN) 300 MG capsule One tablet by mouth tid 270 capsule 2   • glucose blood (TRUE METRIX BLOOD GLUCOSE TEST) test strip Check glucose Once daily 100 each 6   • hydrALAZINE (APRESOLINE) 25 MG tablet Take 1 tablet by mouth 3 (Three) Times a Day. 90 tablet 4   • lisinopril-hydrochlorothiazide (PRINZIDE,ZESTORETIC) 20-12.5 MG per tablet Take 1 tablet by mouth Daily. 30 tablet 3   • metoprolol succinate XL (TOPROL-XL) 25 MG 24 hr tablet TAKE ONE TABLET BY MOUTH EVERY NIGHT 30 tablet 2   • pravastatin (PRAVACHOL) 40 MG tablet Take 1 tablet by mouth Daily. 90 tablet 2   • rivaroxaban (XARELTO) 10 MG tablet Take 1 tablet by mouth Daily. 30 tablet 11   • SITagliptin (JANUVIA) 50 MG tablet Take 1 tablet by mouth Daily. 30 tablet 3   • TRUEPLUS LANCETS 30G misc Use to test Blood Sugars Once Daily 100 each 6     No current facility-administered medications for this visit.      Objective:     Vitals:    19 0856   BP: 142/72   BP Location: Left arm   Patient Position: Sitting   Cuff Size: Adult   Pulse: 75   SpO2: 98%   Weight: 76.4 kg  "(168 lb 6.4 oz)   Height: 167.6 cm (66\")      Physical Exam   Constitutional: He is oriented to person, place, and time. He appears well-nourished. No distress.   HENT:   Head: Normocephalic and atraumatic.   Eyes: Conjunctivae are normal. Right eye exhibits no discharge. Left eye exhibits no discharge.   Neck: No JVD ( 6 cm at 45°) present. No tracheal deviation present.   Cardiovascular: Normal rate, regular rhythm, S1 normal, S2 normal, normal heart sounds and intact distal pulses. Exam reveals no S3 and no S4.   No murmur heard.  Pulses:       Radial pulses are 2+ on the right side, and 2+ on the left side.   Pulmonary/Chest: Effort normal and breath sounds normal. No respiratory distress. He has no wheezes. He has no rales.   Abdominal: He exhibits no distension.   Musculoskeletal: He exhibits no edema or tenderness.   Neurological: He is alert and oriented to person, place, and time. He has normal strength.   Reflex Scores:       Tricep reflexes are 2+ on the right side.  Fine tremor noted in bilateral hands appears both postural and kinetic   Skin: Skin is warm and dry. He is not diaphoretic.   Psychiatric: He has a normal mood and affect. His behavior is normal. Judgment and thought content normal.   Vitals reviewed.    Data Reviewed:    Electrocardiogram: 02/07/2019      LHC: 03/12/2005 - Dr Kj Green  Aortic pressure is 133/77.   Left ventricular pressure is 133.   Left ventricular end diastolic pressure is 13.   The left main is a very short caliber vessel, almost like a senior care ostium.   The LAD is a large caliber vessel, distally has diffuse 20-30 percent narrowing, gives rise to a large diagonal 1.   The circumflex is a large caliber vessel, which gives rise to an OM1, which had diffuse 20-30 percent disease and also a posterior descending artery.   The RCA is a medium to large caliber vessel, which gave rise to a posterior descending artery and posterolateral, distal to the bifurcation has about " 60-70 percent narrowing.   Left ventricular angiogram showed normal wall motion with ejection fraction of 65-75 percent.     Transthoracic echocardiogram: 11/08/2017  · Left ventricular wall thickness is consistent with mild concentric hypertrophy.  · Left ventricular systolic function is normal. Estimated EF = 55%.  · Left ventricular diastolic dysfunction (grade I) consistent with impaired relaxation.    Transthoracic Echocardiogram 10/31/3017 in [place]  Indication:  Hypotension  BP:           93/63  Findings       Left Ventricle:  The left ventricular chamber size is normal. Mild concentric left  ventricular hypertrophy is observed. The estimated ejection fraction is  65-70%. Abnormal left ventricular diastolic filling is observed,  consistent with impaired relaxation.    Left Atrium:  The left atrial chamber size is normal.   Right Ventricle:  The right ventricular cavity size is normal. The right ventricular  global systolic function is normal.   Right Atrium:  The right atrial cavity size is normal.   Aortic Valve:  Moderate aortic leaflet calcification is visualized. There is mild to  moderate aortic stenosis. The mean gradient of the aortic valve is 30  mmHg. The aortic valve area by velocity time interval is calculated at  1.84 cm2. There is no evidence of aortic regurgitation.   Mitral Valve:  The mitral valve leaflets appear normal. There is mild mitral  regurgitation.    Tricuspid Valve:  The tricuspid valve leaflets are normal.  There is mild tricuspid  regurgitation. The Right Ventricular Systolic Pressure is calculated at  35 mmHg.   Pulmonic Valve:  There is trace pulmonic regurgitation present.   Pericardium:  There is no pericardial effusion.   Conclusions  1. Mild concentric left ventricular hypertrophy with thickened  papillary muscle is observed.   Hyperdynamic LV systolic function with an  estimated ejection fraction is 65-70%.  2. Abnormal left ventricular diastolic filling is observed,  consistent  with impaired relaxation.   3. Aortic valve is trileaflet, moderately calcified with mild to  moderate aortic stenosis  4. Mild tricuspid regurgitation with RVSP 35 mm Hg    Echocardiogram: 08/26/2016  FINDINGS:  1. Both atria are normal in size.  2. The aortic valve is mildly sclerotic without any hemodynamically  significant aortic stenosis.  3. Mild concentric left ventricular hypertrophy with early diastolic  dysfunction. No evidence of any regional wall motion abnormality.  The estimated ejection fraction greater than 60%.  4. There is mild mitral annular calcification.  5. Normal right ventricular size and function.  6. On color flow Doppler, there is trace mitral and tricuspid  regurgitation without any hemodynamic significance.  7. No intracardiac mass, pericardial effusion, or cardiac thrombus  seen.  CLINICAL IMPRESSION:  1. Mild concentric left ventricular hypertrophy with early diastolic  dysfunction.  2. Estimated ejection fraction greater than 60%.  3. Mildly sclerotic aortic valve with mild mitral annular  calcification without any hemodynamic significant stenotic process.  4. On color flow Doppler, there is trace mitral and tricuspid  regurgitation without any hemodynamic significance.  5. No intracardiac mass, pericardial effusion, or cardiac thrombus  Seen.    Carotid Ulsd: 01/15/2019  Interpretation Summary   Study Impression   • Right ICA Prox: Imaging of the right ICA indicates 0-49% stenosis.  • Right Vertebral: Antegrade flow is present.   Imaging of the right ICA indicates 0-49% stenosis.    • Left ICA Prox: Imaging of the left ICA indicates 0-49% stenosis.  • Left Vertebral: Antegrade flow present.   Imaging of the left ICA indicates 0-49% stenosis.     Renal Artery U/S 11/1/2017  1. No color Doppler scan evidence of hemodynamically significant renal artery stenosis.  2. Elevated resistivity indices within the kidneys bilaterally which may be indicative of medical renal  parenchymal disease.  3. Kidneys are slightly atrophic in size as detailed above.  4. Urinary bladder poorly distended for assessment with suspected thickening the bladder wall which is nonspecific and may simply be accentuated by lack of distention or muscle hypertrophy.    CTA Chest: 11/08/2017  IMPRESSION:  1. No evidence of pulmonary embolus..  2. Mild centrilobular and paraseptal emphysematous changes..  3. Posterior segment right lobe of liver peripheral 1.56 cm  simple hepatic cyst..  4.T11, T12, and L1 bilateral decompressive laminectomies.    Venous duplex lower extremity: 03/08/2018  · Acute right lower extremity deep vein thrombosis noted in the common femoral, profunda femoral, proximal femoral, mid femoral, distal femoral, popliteal and peroneal.  · Acute right lower extremity superficial thrombophlebitis noted in the greater saphenous (above knee).  · Acute left lower extremity deep vein thrombosis noted in the common femoral, profunda femoral, proximal femoral, mid femoral, distal femoral, popliteal and peroneal.  · Partial flow is noted above mentioned veins.    The following portions of the patient's history were reviewed and updated as appropriate: allergies, current medications, past family history, past medical history, past social history, past surgical history and problem list.    Lab on 05/14/2019   Component Date Value Ref Range Status   • Glucose 05/14/2019 63* 65 - 99 mg/dL Final   • BUN 05/14/2019 19  8 - 23 mg/dL Final   • Creatinine 05/14/2019 1.34* 0.76 - 1.27 mg/dL Final   • Sodium 05/14/2019 131* 136 - 145 mmol/L Final   • Potassium 05/14/2019 4.8  3.5 - 5.2 mmol/L Final   • Chloride 05/14/2019 92* 98 - 107 mmol/L Final   • CO2 05/14/2019 25.1  22.0 - 29.0 mmol/L Final   • Calcium 05/14/2019 9.2  8.6 - 10.5 mg/dL Final   • Total Protein 05/14/2019 6.5  6.0 - 8.5 g/dL Final   • Albumin 05/14/2019 4.20  3.50 - 5.20 g/dL Final   • ALT (SGPT) 05/14/2019 10  1 - 41 U/L Final   • AST (SGOT)  05/14/2019 22  1 - 40 U/L Final   • Alkaline Phosphatase 05/14/2019 56  39 - 117 U/L Final   • Total Bilirubin 05/14/2019 0.3  0.2 - 1.2 mg/dL Final   • eGFR   Amer 05/14/2019 62  >60 mL/min/1.73 Final   • Globulin 05/14/2019 2.3  gm/dL Final   • A/G Ratio 05/14/2019 1.8  g/dL Final   • BUN/Creatinine Ratio 05/14/2019 14.2  7.0 - 25.0 Final   • Anion Gap 05/14/2019 13.9  mmol/L Final   • Hemoglobin A1C 05/14/2019 6.25* 4.80 - 5.60 % Final   • Total Cholesterol 05/14/2019 144  0 - 200 mg/dL Final   • Triglycerides 05/14/2019 41  0 - 150 mg/dL Final   • HDL Cholesterol 05/14/2019 63* 40 - 60 mg/dL Final   • LDL Cholesterol  05/14/2019 73  0 - 100 mg/dL Final   • VLDL Cholesterol 05/14/2019 8.2  5 - 40 mg/dL Final   • LDL/HDL Ratio 05/14/2019 1.16   Final   • WBC 05/14/2019 7.11  3.40 - 10.80 10*3/mm3 Final   • RBC 05/14/2019 4.17  4.14 - 5.80 10*6/mm3 Final   • Hemoglobin 05/14/2019 12.3* 13.0 - 17.7 g/dL Final   • Hematocrit 05/14/2019 36.2* 37.5 - 51.0 % Final   • MCV 05/14/2019 86.8  79.0 - 97.0 fL Final   • MCH 05/14/2019 29.5  26.6 - 33.0 pg Final   • MCHC 05/14/2019 34.0  31.5 - 35.7 g/dL Final   • RDW 05/14/2019 13.4  12.3 - 15.4 % Final   • RDW-SD 05/14/2019 42.6  37.0 - 54.0 fl Final   • MPV 05/14/2019 8.9  6.0 - 12.0 fL Final   • Platelets 05/14/2019 457* 140 - 450 10*3/mm3 Final   • Neutrophil % 05/14/2019 61.1  42.7 - 76.0 % Final   • Lymphocyte % 05/14/2019 23.3  19.6 - 45.3 % Final   • Monocyte % 05/14/2019 13.4* 5.0 - 12.0 % Final   • Eosinophil % 05/14/2019 0.4  0.3 - 6.2 % Final   • Basophil % 05/14/2019 0.7  0.0 - 1.5 % Final   • Immature Grans % 05/14/2019 1.1* 0.0 - 0.5 % Final   • Neutrophils, Absolute 05/14/2019 4.34  1.70 - 7.00 10*3/mm3 Final   • Lymphocytes, Absolute 05/14/2019 1.66  0.70 - 3.10 10*3/mm3 Final   • Monocytes, Absolute 05/14/2019 0.95* 0.10 - 0.90 10*3/mm3 Final   • Eosinophils, Absolute 05/14/2019 0.03  0.00 - 0.40 10*3/mm3 Final   • Basophils, Absolute 05/14/2019 0.05   0.00 - 0.20 10*3/mm3 Final   • Immature Grans, Absolute 05/14/2019 0.08* 0.00 - 0.05 10*3/mm3 Final   • nRBC 05/14/2019 0.0  0.0 - 0.2 /100 WBC Final     Assessment:      Diagnosis Plan   1. Chronic diastolic congestive heart failure with negative clinical evidence of hypervolemia;  He is well perfused. AHA Stage C: ; NYHA Class II  BETA-BLOCKER: Toprol XL 25 mg nightly secondary to palpitations/PACs with history of ablation (atrial flutter)  ACE/ARB: Discontinued in the past in the presence of KIRK  ENTRESTO: Not applicable  DIURETIC: Lasix 20 mg daily as needed for weight gain of 2-3 pounds overnight or 5 pounds in one week; if the patient should have any concerns regarding leg swelling or the manner in which to use his Lasix he is to contact the heart failure clinic.  ALDOSTERONT ANTAGONIST: History of KIRK/hyperkalemia  IMDUR/HYDRALAZINE: Hydralazine 25 mg three times daily  DIGOXIN: Not applicable  Fluid restriction: 2000 cc daily  Sodium restriction: 2000 mg daily     Recommended daily weight monitoring.  Discussed patient action plan for heart failure.  Recommended avoiding NSAIDs use.  Discussed warning signs requiring additional medical attention for heart failure.       2. Coronary artery disease involving native coronary artery of native heart without angina pectoris, stable      ASA plus statin therapy   3. Essential hypertension, stable      As per #1; consideration for white coat syndrome        4. Chronic kidney disease, stage II (mild), stable  As per Dr Reza         Patient's BMI is within normal parameters. No follow-up required; non smoker.        This document has been electronically signed by REGINA Carlos on June 4, 2019 9:20 AM

## 2019-06-18 ENCOUNTER — OFFICE VISIT (OUTPATIENT)
Dept: PODIATRY | Facility: CLINIC | Age: 84
End: 2019-06-18

## 2019-06-18 VITALS — WEIGHT: 168 LBS | HEIGHT: 66 IN | OXYGEN SATURATION: 97 % | HEART RATE: 65 BPM | BODY MASS INDEX: 27 KG/M2

## 2019-06-18 DIAGNOSIS — M79.674 CHRONIC TOE PAIN, BILATERAL: ICD-10-CM

## 2019-06-18 DIAGNOSIS — Z79.01 ANTICOAGULANT LONG-TERM USE: ICD-10-CM

## 2019-06-18 DIAGNOSIS — E11.9 TYPE 2 DIABETES MELLITUS WITHOUT COMPLICATION, WITHOUT LONG-TERM CURRENT USE OF INSULIN (HCC): Primary | ICD-10-CM

## 2019-06-18 DIAGNOSIS — M79.675 CHRONIC TOE PAIN, BILATERAL: ICD-10-CM

## 2019-06-18 DIAGNOSIS — G89.29 CHRONIC TOE PAIN, BILATERAL: ICD-10-CM

## 2019-06-18 DIAGNOSIS — B35.1 ONYCHOMYCOSIS: ICD-10-CM

## 2019-06-18 PROCEDURE — 11721 DEBRIDE NAIL 6 OR MORE: CPT | Performed by: PODIATRIST

## 2019-06-18 NOTE — PROGRESS NOTES
[facility] Keith Ruvalcaba .  1935  83 y.o. male   MILENA Larkin - 5/14/2019  A1c -5.4 (2/19/2019)    Patient presents today with a request of diabetic foot care.    06/18/2019     Chief Complaint   Patient presents with   • Left Foot - Follow-up   • Right Foot - Follow-up       History of Present Illness    Patient presents to clinic today for routine diabetic foot care.  States that his toenails have become long and painful.  Pain is relieved with debridement.  He has no other pedal complaints today.      Past Medical History:   Diagnosis Date   • Acquired hallux rigidus    • Anemia    • Arthropathy of lumbar facet joint    • Carpal tunnel syndrome    • CHF (congestive heart failure) (CMS/HCC)    • Chronic diastolic congestive heart failure (CMS/HCC)    • Chronic kidney disease    • Chronic kidney disease, stage II (mild)     Chronic kidney disease stage 2   • Chronic obstructive lung disease (CMS/HCC)    • Chronic renal failure    • Coronary artery disease    • Cortical senile cataract    • Diabetes mellitus (CMS/HCC)    • Diabetes mellitus type 2, noninsulin dependent (CMS/HCC)     diabetes mellitus type 2, non-insulin treated, Medication treatment plan: oral diabetic medication   • Diabetes mellitus without complication (CMS/HCC)    • Diastolic dysfunction    • Diastolic heart failure (CMS/HCC)    • Essential hypertension     difficult to control      • Hip pain    • History of echocardiogram 08/19/2015    Echocardiogram W/ color flow 38031 (1) - Mild to moderate LVH with mild left atrial enlargement and normal aortic root.CLVH,preserved LV systolic function with Ef of 55%.Diastolic dysfunction.MV intact.AV thickened.Aortic sclerosis.   • Hyperlipidemia    • Influenza vaccine administered 10/02/2015    INFLUENZA IMMUN ADMIN OR PREV RECV'D  (1) - Ordered By: DIONNE MANDUJANO ([facility])    • Insomnia    • Insomnia     Other insomnia   • Joint pain    • Low back pain    • Muscle strain    • Nuclear cataract    •  Pneumococcal vaccination given 07/22/2016    PNEUMOC VAC/ADMIN/RCVD 4040F (3) - Ordered By: DIONNE MANDUJANO ([facility])   • Prostate cancer (CMS/[facility])    • Pure hypercholesterolemia    • Sedentary lifestyle          Past Surgical History:   Procedure Laterality Date   • CARDIAC ABLATION     • CARDIAC CATHETERIZATION  02/04/1986    Cardiac cath 75346 (1) - normal left heart catherization,non cardiac chest pain   • CARPAL TUNNEL RELEASE  10/19/2015    Carpal tunnel surgery (1) - Release of left carpal tunnel   • CATARACT EXTRACTION W/ INTRAOCULAR LENS IMPLANT Left 3/31/2017    Procedure: REMOVE CATARACT AND IMPLANT INRAOCULAR LENS LEFT EYE;  Surgeon: Hector Navarrete MD;  Location: [facility] OR;  Service:    • CATARACT EXTRACTION W/ INTRAOCULAR LENS IMPLANT Right 4/14/2017    Procedure: REMOVE CATARACT AND IMPLANT INTRAOCULAR LENS RIGHT EYE;  Surgeon: Hector Navarrete MD;  Location: [facility] OR;  Service:    • COLONOSCOPY N/A 10/31/2018    Procedure: COLONOSCOPY;  Surgeon: Herbie Christine MD;  Location: [facility] ENDOSCOPY;  Service: Gastroenterology   • ENDOSCOPY  08/05/2013    Colon endoscopy 94661 (2) - Hemorrhoids found.   • INGUINAL HERNIA REPAIR Right 06/07/1968    Inguinal hernia, repair (2)   • INJECTION OF MEDICATION  09/06/2012    Albuterol (2u) (1) - Ordered By: LIDYA STEINER ([facility])    • INJECTION OF MEDICATION  09/06/2012    Atrovent (1) - Ordered By: LIDYA STEINER ([facility])    • INJECTION OF MEDICATION  02/10/2012    Depo Medrol (Methylprednisone) (3) - Ordered By: HEATHER PAK ([facility])    • INJECTION OF MEDICATION  02/09/2014    Kenalog (4) - Ordered By: DIMAS ANDRADE ([facility])    • JOINT REPLACEMENT     • LUMBAR LAMINECTOMY  2007    Lumbar laminectomy (1)   • MANDIBLE FRACTURE SURGERY  10/11/1981    Treat nose/jaw fracture (1) - bilateral open reduction of fracture of mandible   • OTHER SURGICAL HISTORY  09/24/2011    Drain/Inject Major Joint 20610 (1) - Ordered By: KARLOS HERNANDEZ ([facility])    • OTHER SURGICAL HISTORY      Insert IVC filter 47858 (1)   • OTHER SURGICAL HISTORY  10/11/2011    SPIROMETRY 54344 (1) - Ordered By: HEATHER PAK ([facility])   • TOTAL HIP ARTHROPLASTY Right          Family History   Problem Relation Age of Onset   • Diabetes Other    • Cancer Sister    • Heart disease Mother    • Hypertension Mother    • Heart disease Father    • Hypertension Father    • Coronary artery disease Neg Hx        Allergies   Allergen Reactions   • Aldactone [Spironolactone] Other (See Comments)     Hyponatremia and hyperkalemia       Social History     Socioeconomic History   • Marital status:      Spouse name: Not on file   • Number of children: Not on file   • Years of education: Not on file   • Highest education level: Not on file   Tobacco Use   • Smoking status: Former Smoker     Last attempt to quit:      Years since quittin.4   • Smokeless tobacco: Never Used   Substance and Sexual Activity   • Alcohol use: No   • Drug use: No   • Sexual activity: Defer     Comment: Marital status:          Current Outpatient Medications   Medication Sig Dispense Refill   • albuterol sulfate  (90 Base) MCG/ACT inhaler Inhale 2 puffs Every 4 (Four) Hours As Needed for Wheezing. 18 g 6   • aspirin 81 MG EC tablet Take 81 mg by mouth daily.     • carbidopa-levodopa (SINEMET)  MG per tablet Take 1 tablet by mouth 3 (Three) Times a Day. 90 tablet 2   • ferrous sulfate 325 (65 FE) MG tablet Take 1 tablet by mouth Daily With Breakfast. 30 tablet 5   • furosemide (LASIX) 20 MG tablet Take 1 tablet by mouth Daily As Needed (weight gain of 3lbs overnight or leg swelling). 30 tablet 3   • gabapentin (NEURONTIN) 300 MG capsule One tablet by mouth tid 270 capsule 2   • glucose blood (TRUE METRIX BLOOD GLUCOSE TEST) test strip Check glucose Once daily 100 each 6   • hydrALAZINE (APRESOLINE) 25 MG tablet Take 1 tablet by mouth 3 (Three) Times a Day. 90 tablet 4   •  "lisinopril-hydrochlorothiazide (PRINZIDE,ZESTORETIC) 20-12.5 MG per tablet Take 1 tablet by mouth Daily. 30 tablet 3   • metoprolol succinate XL (TOPROL-XL) 25 MG 24 hr tablet TAKE ONE TABLET BY MOUTH EVERY NIGHT 30 tablet 2   • pravastatin (PRAVACHOL) 40 MG tablet Take 1 tablet by mouth Daily. 90 tablet 2   • rivaroxaban (XARELTO) 10 MG tablet Take 1 tablet by mouth Daily. 30 tablet 11   • SITagliptin (JANUVIA) 50 MG tablet Take 1 tablet by mouth Daily. 30 tablet 3   • TRUEPLUS LANCETS 30G misc Use to test Blood Sugars Once Daily 100 each 6     No current facility-administered medications for this visit.        Review of Systems   Constitutional: Negative.    HENT: Negative.    Eyes: Negative.    Respiratory: Negative.    Cardiovascular: Negative.    Gastrointestinal: Negative.    Musculoskeletal: Negative.         Toe pain   Neurological: Negative.    Psychiatric/Behavioral: Negative.          OBJECTIVE    Pulse 65   Ht 167.6 cm (66\")   Wt 76.2 kg (168 lb)   SpO2 97%   BMI 27.12 kg/m²       Physical Exam   Constitutional: He is oriented to person, place, and time. He appears well-developed and well-nourished. No distress.   Eyes: Conjunctivae and EOM are normal. Pupils are equal, round, and reactive to light.   Pulmonary/Chest: Effort normal. No respiratory distress. He has no wheezes.   Neurological: He is alert and oriented to person, place, and time.   Skin: Skin is warm and dry. Capillary refill takes less than 2 seconds.   Psychiatric: He has a normal mood and affect. His behavior is normal.   Vitals reviewed.      Gait: Normal    Assistive Device: Walker    Lower Extremity    Cardiovascular:    DP/PT pulses palpable bilateral  CFT brisk  to all digits  Skin temp is warm to warm from proximal tibia to distal digits bilateral  Pedal hair growth decreased.   No erythema noted   Nonpitting vanesa-malleolar edema bilateral    Musculoskeletal:  Muscle strength is 5/5 for all muscle groups tested   ROM of the 1st " MTP is WNL bilateral   ROM of the MTJ is WNL bilateral   ROM of the STJ is WNL bilateral   ROM of the ankle joint is  WNL bilateral     Dermatological:   Skin is warm, dry and intact bilateral  Webspaces 1-4 bilateral are clean, dry and intact.   No subcutaneous nodules or masses noted    Nails 1-5 bilateral are  discolored, elongated with subungual debris.  Pain on palpation to the nail plates.    Neurological:   Protective sensation intact   Sensation intact to light touch        Procedures        ASSESSMENT AND PLAN    General was seen today for follow-up and follow-up.    Diagnoses and all orders for this visit:    Type 2 diabetes mellitus without complication, without long-term current use of insulin (CMS/[facility])    Onychomycosis    Chronic toe pain, bilateral    Anticoagulant long-term use      - Nails 1-5 bilateral were debrided in length and thickness with nail nipper and electric  to decrease fungal load and risk of infection.   -All his questions were answered  -Return to clinic 3 months as needed          This document has been electronically signed by Partha Campos DPM on June 18, 2019 2:21 PM     6/18/2019  2:21 PM

## 2019-06-28 ENCOUNTER — LAB (OUTPATIENT)
Dept: LAB | Facility: HOSPITAL | Age: 84
End: 2019-06-28

## 2019-06-28 DIAGNOSIS — R79.89 ABNORMAL CBC: ICD-10-CM

## 2019-06-28 PROCEDURE — 36415 COLL VENOUS BLD VENIPUNCTURE: CPT

## 2019-06-28 PROCEDURE — 85025 COMPLETE CBC W/AUTO DIFF WBC: CPT

## 2019-06-29 LAB
BASOPHILS # BLD AUTO: 0.06 10*3/MM3 (ref 0–0.2)
BASOPHILS NFR BLD AUTO: 1 % (ref 0–1.5)
DEPRECATED RDW RBC AUTO: 46.3 FL (ref 37–54)
EOSINOPHIL # BLD AUTO: 0.05 10*3/MM3 (ref 0–0.4)
EOSINOPHIL NFR BLD AUTO: 0.8 % (ref 0.3–6.2)
ERYTHROCYTE [DISTWIDTH] IN BLOOD BY AUTOMATED COUNT: 14.4 % (ref 12.3–15.4)
HCT VFR BLD AUTO: 34.6 % (ref 37.5–51)
HGB BLD-MCNC: 11.7 G/DL (ref 13–17.7)
IMM GRANULOCYTES # BLD AUTO: 0.06 10*3/MM3 (ref 0–0.05)
IMM GRANULOCYTES NFR BLD AUTO: 1 % (ref 0–0.5)
LYMPHOCYTES # BLD AUTO: 1.73 10*3/MM3 (ref 0.7–3.1)
LYMPHOCYTES NFR BLD AUTO: 28.2 % (ref 19.6–45.3)
MCH RBC QN AUTO: 29.7 PG (ref 26.6–33)
MCHC RBC AUTO-ENTMCNC: 33.8 G/DL (ref 31.5–35.7)
MCV RBC AUTO: 87.8 FL (ref 79–97)
MONOCYTES # BLD AUTO: 0.98 10*3/MM3 (ref 0.1–0.9)
MONOCYTES NFR BLD AUTO: 16 % (ref 5–12)
NEUTROPHILS # BLD AUTO: 3.26 10*3/MM3 (ref 1.7–7)
NEUTROPHILS NFR BLD AUTO: 53 % (ref 42.7–76)
NRBC BLD AUTO-RTO: 0 /100 WBC (ref 0–0.2)
PLATELET # BLD AUTO: 425 10*3/MM3 (ref 140–450)
PMV BLD AUTO: 8.7 FL (ref 6–12)
RBC # BLD AUTO: 3.94 10*6/MM3 (ref 4.14–5.8)
WBC NRBC COR # BLD: 6.14 10*3/MM3 (ref 3.4–10.8)

## 2019-07-02 RX ORDER — SITAGLIPTIN 50 MG/1
TABLET, FILM COATED ORAL
Qty: 30 TABLET | Refills: 3 | Status: SHIPPED | OUTPATIENT
Start: 2019-07-02 | End: 2019-10-31 | Stop reason: SDUPTHER

## 2019-08-02 RX ORDER — METOPROLOL SUCCINATE 25 MG/1
TABLET, EXTENDED RELEASE ORAL
Qty: 30 TABLET | Refills: 2 | Status: SHIPPED | OUTPATIENT
Start: 2019-08-02 | End: 2019-11-04 | Stop reason: SDUPTHER

## 2019-08-14 ENCOUNTER — OFFICE VISIT (OUTPATIENT)
Dept: FAMILY MEDICINE CLINIC | Facility: CLINIC | Age: 84
End: 2019-08-14

## 2019-08-14 VITALS
HEART RATE: 69 BPM | OXYGEN SATURATION: 99 % | DIASTOLIC BLOOD PRESSURE: 68 MMHG | BODY MASS INDEX: 26.68 KG/M2 | SYSTOLIC BLOOD PRESSURE: 126 MMHG | WEIGHT: 166 LBS | HEIGHT: 66 IN

## 2019-08-14 DIAGNOSIS — D50.9 IRON DEFICIENCY ANEMIA, UNSPECIFIED IRON DEFICIENCY ANEMIA TYPE: ICD-10-CM

## 2019-08-14 DIAGNOSIS — E11.9 DIABETES MELLITUS WITHOUT COMPLICATION (HCC): Primary | ICD-10-CM

## 2019-08-14 DIAGNOSIS — I10 ESSENTIAL HYPERTENSION: ICD-10-CM

## 2019-08-14 DIAGNOSIS — E78.01 FAMILIAL HYPERCHOLESTEROLEMIA: ICD-10-CM

## 2019-08-14 PROCEDURE — 99214 OFFICE O/P EST MOD 30 MIN: CPT | Performed by: FAMILY MEDICINE

## 2019-08-14 NOTE — PROGRESS NOTES
Subjective   General Keith Ruvalcaba Jr. is a 83 y.o. male.     Cc: follow up  History of Present Illness The patient comes in for check of their chronic medical issues which include Diabetes Mellitus,Hypertension and Hyperlipidemia.He is at his base line.   .       The following portions of the patient's history were reviewed and updated as appropriate: allergies, current medications, past family history, past medical history, past social history, past surgical history and problem list.    Review of Systems   Constitutional: Negative for fatigue and fever.   Respiratory: Negative for cough, chest tightness and stridor.    Cardiovascular: Negative for chest pain, palpitations and leg swelling.       Objective   Physical Exam   Constitutional: He appears well-developed and well-nourished.   HENT:   Head: Normocephalic and atraumatic.   Right Ear: External ear normal.   Left Ear: External ear normal.   Nose: Nose normal.   Mouth/Throat: Oropharynx is clear and moist.   Eyes: Conjunctivae are normal.   Neck: Normal range of motion.   Cardiovascular: Normal rate, regular rhythm and normal heart sounds. Exam reveals no gallop and no friction rub.   No murmur heard.  Pulmonary/Chest: Effort normal and breath sounds normal. No stridor. No respiratory distress. He has no wheezes. He has no rales.   Abdominal: Soft. Bowel sounds are normal. He exhibits no distension. There is no tenderness. There is no guarding.   Neurological: He is alert.   Skin: Skin is dry.   No calluses,No lesions. Stereognosis is intact. Monofilament exam is normal.     Vitals reviewed.        Assessment/Plan   General was seen today for follow-up, hypertension and diabetes.    Diagnoses and all orders for this visit:    Diabetes mellitus without complication (CMS/[facility])  -     Hemoglobin A1c; Future    Essential hypertension  -     Comprehensive metabolic panel; Future  -     CBC w AUTO Differential; Future    Familial hypercholesterolemia  -     Lipid panel;  Future        Return to the clinic in 3 month/s.  Will contact with results as needed.

## 2019-08-16 ENCOUNTER — LAB (OUTPATIENT)
Dept: LAB | Facility: HOSPITAL | Age: 84
End: 2019-08-16

## 2019-08-16 DIAGNOSIS — I10 ESSENTIAL HYPERTENSION: ICD-10-CM

## 2019-08-16 DIAGNOSIS — E11.9 DIABETES MELLITUS WITHOUT COMPLICATION (HCC): ICD-10-CM

## 2019-08-16 DIAGNOSIS — D50.9 IRON DEFICIENCY ANEMIA, UNSPECIFIED IRON DEFICIENCY ANEMIA TYPE: ICD-10-CM

## 2019-08-16 DIAGNOSIS — E78.01 FAMILIAL HYPERCHOLESTEROLEMIA: ICD-10-CM

## 2019-08-16 LAB
ALBUMIN SERPL-MCNC: 4.4 G/DL (ref 3.5–5.2)
ALBUMIN/GLOB SERPL: 1.8 G/DL
ALP SERPL-CCNC: 62 U/L (ref 39–117)
ALT SERPL W P-5'-P-CCNC: 6 U/L (ref 1–41)
ANION GAP SERPL CALCULATED.3IONS-SCNC: 12.3 MMOL/L (ref 5–15)
AST SERPL-CCNC: 16 U/L (ref 1–40)
BASOPHILS # BLD AUTO: 0.08 10*3/MM3 (ref 0–0.2)
BASOPHILS NFR BLD AUTO: 1.4 % (ref 0–1.5)
BILIRUB SERPL-MCNC: 0.3 MG/DL (ref 0.2–1.2)
BUN BLD-MCNC: 21 MG/DL (ref 8–23)
BUN/CREAT SERPL: 15.2 (ref 7–25)
CALCIUM SPEC-SCNC: 9.4 MG/DL (ref 8.6–10.5)
CHLORIDE SERPL-SCNC: 96 MMOL/L (ref 98–107)
CHOLEST SERPL-MCNC: 164 MG/DL (ref 0–200)
CO2 SERPL-SCNC: 25.7 MMOL/L (ref 22–29)
CREAT BLD-MCNC: 1.38 MG/DL (ref 0.76–1.27)
DEPRECATED RDW RBC AUTO: 45.8 FL (ref 37–54)
EOSINOPHIL # BLD AUTO: 0.08 10*3/MM3 (ref 0–0.4)
EOSINOPHIL NFR BLD AUTO: 1.4 % (ref 0.3–6.2)
ERYTHROCYTE [DISTWIDTH] IN BLOOD BY AUTOMATED COUNT: 14.1 % (ref 12.3–15.4)
FERRITIN SERPL-MCNC: 66.1 NG/ML (ref 30–400)
GFR SERPL CREATININE-BSD FRML MDRD: 60 ML/MIN/1.73
GLOBULIN UR ELPH-MCNC: 2.5 GM/DL
GLUCOSE BLD-MCNC: 107 MG/DL (ref 65–99)
HBA1C MFR BLD: 6.06 % (ref 4.8–5.6)
HCT VFR BLD AUTO: 35.9 % (ref 37.5–51)
HDLC SERPL-MCNC: 65 MG/DL (ref 40–60)
HGB BLD-MCNC: 11.9 G/DL (ref 13–17.7)
IMM GRANULOCYTES # BLD AUTO: 0.04 10*3/MM3 (ref 0–0.05)
IMM GRANULOCYTES NFR BLD AUTO: 0.7 % (ref 0–0.5)
IRON 24H UR-MRATE: 51 MCG/DL (ref 59–158)
IRON SATN MFR SERPL: 14 % (ref 20–50)
LDLC SERPL CALC-MCNC: 89 MG/DL (ref 0–100)
LDLC/HDLC SERPL: 1.38 {RATIO}
LYMPHOCYTES # BLD AUTO: 1.98 10*3/MM3 (ref 0.7–3.1)
LYMPHOCYTES NFR BLD AUTO: 34.2 % (ref 19.6–45.3)
MCH RBC QN AUTO: 29.5 PG (ref 26.6–33)
MCHC RBC AUTO-ENTMCNC: 33.1 G/DL (ref 31.5–35.7)
MCV RBC AUTO: 88.9 FL (ref 79–97)
MONOCYTES # BLD AUTO: 0.81 10*3/MM3 (ref 0.1–0.9)
MONOCYTES NFR BLD AUTO: 14 % (ref 5–12)
NEUTROPHILS # BLD AUTO: 2.8 10*3/MM3 (ref 1.7–7)
NEUTROPHILS NFR BLD AUTO: 48.3 % (ref 42.7–76)
NRBC BLD AUTO-RTO: 0 /100 WBC (ref 0–0.2)
PLATELET # BLD AUTO: 482 10*3/MM3 (ref 140–450)
PMV BLD AUTO: 8.7 FL (ref 6–12)
POTASSIUM BLD-SCNC: 4.4 MMOL/L (ref 3.5–5.2)
PROT SERPL-MCNC: 6.9 G/DL (ref 6–8.5)
RBC # BLD AUTO: 4.04 10*6/MM3 (ref 4.14–5.8)
SODIUM BLD-SCNC: 134 MMOL/L (ref 136–145)
TIBC SERPL-MCNC: 365 MCG/DL (ref 298–536)
TRANSFERRIN SERPL-MCNC: 245 MG/DL (ref 200–360)
TRIGL SERPL-MCNC: 48 MG/DL (ref 0–150)
VLDLC SERPL-MCNC: 9.6 MG/DL (ref 5–40)
WBC NRBC COR # BLD: 5.79 10*3/MM3 (ref 3.4–10.8)

## 2019-08-16 PROCEDURE — 83540 ASSAY OF IRON: CPT

## 2019-08-16 PROCEDURE — 83036 HEMOGLOBIN GLYCOSYLATED A1C: CPT

## 2019-08-16 PROCEDURE — 84466 ASSAY OF TRANSFERRIN: CPT

## 2019-08-16 PROCEDURE — 80061 LIPID PANEL: CPT

## 2019-08-16 PROCEDURE — 85025 COMPLETE CBC W/AUTO DIFF WBC: CPT

## 2019-08-16 PROCEDURE — 80053 COMPREHEN METABOLIC PANEL: CPT

## 2019-08-16 PROCEDURE — 82728 ASSAY OF FERRITIN: CPT

## 2019-08-16 PROCEDURE — 36415 COLL VENOUS BLD VENIPUNCTURE: CPT

## 2019-08-20 DIAGNOSIS — E61.1 IRON DEFICIENCY: Primary | ICD-10-CM

## 2019-08-20 RX ORDER — FERROUS SULFATE 325(65) MG
1 TABLET ORAL
Qty: 30 TABLET | Refills: 5 | Status: SHIPPED | OUTPATIENT
Start: 2019-08-20 | End: 2020-03-09

## 2019-08-20 NOTE — TELEPHONE ENCOUNTER
Patient needs to continue Ferrous Sulfate with CBC being abnormal still. New RX has been sent to pharmacy for patient

## 2019-08-22 ENCOUNTER — TELEPHONE (OUTPATIENT)
Dept: FAMILY MEDICINE CLINIC | Facility: CLINIC | Age: 84
End: 2019-08-22

## 2019-08-22 NOTE — TELEPHONE ENCOUNTER
Pt wants results of bloodwork a week ago.   Feels funny when urinate .  [phone]     Thank You

## 2019-09-04 NOTE — PROGRESS NOTES
Subjective:     Congestive Heart Failure (chief complaint)    Patient Care Team:  Mata Larkin MD as PCP - General (Family Medicine)  Maria Porter APRN as PCP - Claims Attributed  GiancarloZachariah MD as Consulting Physician (Radiation Oncology)  David Groves MD (Urology)  Sarah Bartlett APRN as Nurse Practitioner (Oncology)  Yeimy Hannon APRN as Nurse Practitioner (Orthopedic Surgery)  Fernando Chacon MD as Consulting Physician (Orthopedic Surgery)    Congestive Heart Failure   Presents for follow-up visit. Pertinent negatives include no chest pressure, claudication, edema (bilateral ankle at the end of the day; compliant with compression stockings), near-syncope, orthopnea, palpitations, paroxysmal nocturnal dyspnea, shortness of breath or unexpected weight change. The symptoms have been improving. Compliance with total regimen is %. Compliance problems include adherence to exercise.  Compliance with diet is %. Compliance with exercise is 26-50%. Compliance with medications is %.     1. CAD, non-obstructive 2005  2. HFpEF - diastolic dysfunction  3. Hypertension  4. CKD with H/O KIRK follows with Dr Reza  5. H/O Atrial Flutter S/P ablation per Dr Graves 2006  6. Prostate Cancer Salvador Score 6/Stage 2 S/P RT   7. DM, Type II  8. Anterior right total hip arthroplasty 04/25/2016  9. Chronic DVT of bilateral femoral vein: on preventative dosing - Xarelto  10. Carotid disease followed annually by Dr Hamman    Mr. Ruvalcaba underwent lumbar surgery 10/27/2017 in [place]. He was discharge 11/7/2017. Following discharge he had several medications that were recommended be stopped secondary to hypotension/KIRK.      The patient received evaluation with REGINA Vidal on 03/08/2018 in follow-up regarding leg edema for which lower extremity duplex have been ordered and was found with positive findings regarding DVT.   The patient was initiated on DOAC, Xarelto on  that date.     02/07/2019  The patient presents to the office today for previously scheduled appointment. He indicates that approximately 2 week prior he began to notice a tremor in bilateral hands most prominent at rest. He reports negative impact on quality of life or activities of daily living. He feels that perhaps the prominence of the tremors diminishes in the evening and during sleep.   The patient reports fluid intake to be approximately 1400 - 1500 cc per day. He reports negative use of diuretics over the past 8 weeks approximate.    03/05/2019  The patient presents to the office today for re-evaluation following diuretic therapy (OP) last week secondary to reported leg swelling.   The patient reports Lasix 20 mg on 02/27-03/01/2019.   Next appointment with Dr Reza: 10/08/2019                                       Dr Larkin: 05/10/2019  Neurology in [place]: 04/25/2019 06/04/2019  The patient presents to the office today for routine scheduled office evaluation.  The patient reports that he has been doing well with outpatient monitoring of blood pressure and weights demonstrating stability.  He has received evaluation by neurologist, Dr. Rodriguez in [place] regarding tremors with increase in gabapentin, otherwise no changes recommended.    09/04/2019  The patient indicates that he will see Dr Reza in October.   General indicates that he is been doing very well since our last encounter and is without concerns regarding weight gain, leg edema or change in activity tolerance.    Review of Systems   Constitution: Negative for weakness, malaise/fatigue and unexpected weight change.   HENT: Negative for nosebleeds.    Eyes: Negative for visual disturbance.   Cardiovascular: Negative for claudication, cyanosis, dyspnea on exertion, irregular heartbeat, near-syncope, orthopnea, palpitations, paroxysmal nocturnal dyspnea and syncope.   Respiratory: Negative for shortness of breath, sleep disturbances due to  breathing and snoring.    Endocrine: Negative for polydipsia, polyphagia and polyuria.   Hematologic/Lymphatic: Negative for bleeding problem. Does not bruise/bleed easily.   Skin: Negative for poor wound healing.   Musculoskeletal: Negative for falls.   Gastrointestinal: Negative for bloating.   Neurological: Positive for tremors. Negative for dizziness, focal weakness, headaches, light-headedness, loss of balance, numbness, paresthesias, seizures and vertigo.   Psychiatric/Behavioral: Negative for altered mental status. The patient is not nervous/anxious.      Past Medical History:   Diagnosis Date   • Acquired hallux rigidus    • Anemia    • Arthropathy of lumbar facet joint    • Carpal tunnel syndrome    • CHF (congestive heart failure) (CMS/HCC)    • Chronic diastolic congestive heart failure (CMS/HCC)    • Chronic kidney disease    • Chronic kidney disease, stage II (mild)     Chronic kidney disease stage 2   • Chronic obstructive lung disease (CMS/HCC)    • Chronic renal failure    • Coronary artery disease    • Cortical senile cataract    • Diabetes mellitus (CMS/HCC)    • Diabetes mellitus type 2, noninsulin dependent (CMS/HCC)     diabetes mellitus type 2, non-insulin treated, Medication treatment plan: oral diabetic medication   • Diabetes mellitus without complication (CMS/HCC)    • Diastolic dysfunction    • Diastolic heart failure (CMS/HCC)    • Essential hypertension     difficult to control      • Hip pain    • History of echocardiogram 08/19/2015    Echocardiogram W/ color flow 11319 (1) - Mild to moderate LVH with mild left atrial enlargement and normal aortic root.CLVH,preserved LV systolic function with Ef of 55%.Diastolic dysfunction.MV intact.AV thickened.Aortic sclerosis.   • Hyperlipidemia    • Influenza vaccine administered 10/02/2015    INFLUENZA IMMUN ADMIN OR PREV RECV'D  (1) - Ordered By: DIONNE MANDUJANO ([facility])    • Insomnia    • Insomnia     Other insomnia   • Joint pain    •  Low back pain    • Muscle strain    • Nuclear cataract    • Pneumococcal vaccination given 07/22/2016    PNEUMOC VAC/ADMIN/RCVD 4040F (3) - Ordered By: DIONNE MANDUJANO ([facility])   • Prostate cancer (CMS/[facility])    • Pure hypercholesterolemia    • Sedentary lifestyle    ,  Past Surgical History:   Procedure Laterality Date   • CARDIAC ABLATION     • CARDIAC CATHETERIZATION  02/04/1986    Cardiac cath 13415 (1) - normal left heart catherization,non cardiac chest pain   • CARPAL TUNNEL RELEASE  10/19/2015    Carpal tunnel surgery (1) - Release of left carpal tunnel   • CATARACT EXTRACTION W/ INTRAOCULAR LENS IMPLANT Left 3/31/2017    Procedure: REMOVE CATARACT AND IMPLANT INRAOCULAR LENS LEFT EYE;  Surgeon: Hector Navarrete MD;  Location: [facility] [place] OR;  Service:    • CATARACT EXTRACTION W/ INTRAOCULAR LENS IMPLANT Right 4/14/2017    Procedure: REMOVE CATARACT AND IMPLANT INTRAOCULAR LENS RIGHT EYE;  Surgeon: Hector Navarrete MD;  Location: [facility] [place] OR;  Service:    • COLONOSCOPY N/A 10/31/2018    Procedure: COLONOSCOPY;  Surgeon: Herbie Christine MD;  Location: [facility] [place] ENDOSCOPY;  Service: Gastroenterology   • ENDOSCOPY  08/05/2013    Colon endoscopy 62820 (2) - Hemorrhoids found.   • INGUINAL HERNIA REPAIR Right 06/07/1968    Inguinal hernia, repair (2)   • INJECTION OF MEDICATION  09/06/2012    Albuterol (2u) (1) - Ordered By: LIDYA STEINER ([facility])    • INJECTION OF MEDICATION  09/06/2012    Atrovent (1) - Ordered By: LIDYA STEINER ([facility])    • INJECTION OF MEDICATION  02/10/2012    Depo Medrol (Methylprednisone) (3) - Ordered By: HEATHER PAK ([facility])    • INJECTION OF MEDICATION  02/09/2014    Kenalog (4) - Ordered By: DIMAS ANDRADE ([facility])    • JOINT REPLACEMENT     • LUMBAR LAMINECTOMY  2007    Lumbar laminectomy (1)   • MANDIBLE FRACTURE SURGERY  10/11/1981    Treat nose/jaw fracture (1) - bilateral open reduction of fracture of mandible   • OTHER SURGICAL HISTORY  09/24/2011    Drain/Inject  Major Joint  (1) - Ordered By: KARLOS HERNANDEZ ([facility])    • OTHER SURGICAL HISTORY      Insert IVC filter 59910 (1)   • OTHER SURGICAL HISTORY  10/11/2011    SPIROMETRY 73163 (1) - Ordered By: HEATHER PAK ([facility])   • TOTAL HIP ARTHROPLASTY Right    ,  Family History   Problem Relation Age of Onset   • Diabetes Other    • Cancer Sister    • Heart disease Mother    • Hypertension Mother    • Heart disease Father    • Hypertension Father    • Coronary artery disease Neg Hx    ,  Social History     Socioeconomic History   • Marital status:      Spouse name: Not on file   • Number of children: Not on file   • Years of education: Not on file   • Highest education level: Not on file   Tobacco Use   • Smoking status: Former Smoker     Last attempt to quit:      Years since quittin.6   • Smokeless tobacco: Never Used   Substance and Sexual Activity   • Alcohol use: No   • Drug use: No   • Sexual activity: Defer     Comment: Marital status:      Current Outpatient Medications   Medication Sig Dispense Refill   • albuterol sulfate  (90 Base) MCG/ACT inhaler Inhale 2 puffs Every 4 (Four) Hours As Needed for Wheezing. 18 g 6   • aspirin 81 MG EC tablet Take 81 mg by mouth daily.     • carbidopa-levodopa (SINEMET)  MG per tablet Take 1 tablet by mouth 3 (Three) Times a Day. 90 tablet 2   • ferrous sulfate 325 (65 FE) MG tablet Take 1 tablet by mouth Daily With Breakfast. 30 tablet 5   • furosemide (LASIX) 20 MG tablet Take 1 tablet by mouth Daily As Needed (weight gain of 3lbs overnight or leg swelling). 30 tablet 3   • gabapentin (NEURONTIN) 300 MG capsule One tablet by mouth tid 270 capsule 2   • glucose blood (TRUE METRIX BLOOD GLUCOSE TEST) test strip Check glucose Once daily 100 each 6   • hydrALAZINE (APRESOLINE) 25 MG tablet Take 1 tablet by mouth 3 (Three) Times a Day. 90 tablet 4   • JANUVIA 50 MG tablet TAKE ONE TABLET BY MOUTH DAILY 30 tablet 3   •  "lisinopril-hydrochlorothiazide (PRINZIDE,ZESTORETIC) 20-12.5 MG per tablet Take 1 tablet by mouth Daily. 30 tablet 3   • metoprolol succinate XL (TOPROL-XL) 25 MG 24 hr tablet TAKE ONE TABLET BY MOUTH EVERY NIGHT 30 tablet 2   • pravastatin (PRAVACHOL) 40 MG tablet Take 1 tablet by mouth Daily. 90 tablet 2   • rivaroxaban (XARELTO) 10 MG tablet Take 1 tablet by mouth Daily. 30 tablet 11   • TRUEPLUS LANCETS 30G misc Use to test Blood Sugars Once Daily 100 each 6     No current facility-administered medications for this visit.      Objective:     Vitals:    09/05/19 0857   BP: 132/70   BP Location: Left arm   Patient Position: Sitting   Cuff Size: Adult   Pulse: 66   SpO2: 96%   Weight: 74.8 kg (164 lb 12.8 oz)   Height: 165.1 cm (65\")      Physical Exam   Constitutional: He is oriented to person, place, and time. He appears well-nourished. No distress.   HENT:   Head: Normocephalic and atraumatic.   Eyes: Conjunctivae are normal. Right eye exhibits no discharge. Left eye exhibits no discharge.   Neck: No JVD ( 6 cm at 45°) present. No tracheal deviation present.   Cardiovascular: Normal rate, regular rhythm, S1 normal, S2 normal, normal heart sounds and intact distal pulses. Exam reveals no S3 and no S4.   No murmur heard.  Pulses:       Radial pulses are 2+ on the right side, and 2+ on the left side.   Pulmonary/Chest: Effort normal and breath sounds normal. No respiratory distress. He has no wheezes. He has no rales.   Abdominal: He exhibits no distension.   Musculoskeletal: He exhibits no edema or tenderness.   Neurological: He is alert and oriented to person, place, and time. He has normal strength.   Reflex Scores:       Tricep reflexes are 2+ on the right side.  Fine tremor noted in bilateral hands appears both postural and kinetic   Skin: Skin is warm and dry. He is not diaphoretic.   Psychiatric: He has a normal mood and affect. His behavior is normal. Judgment and thought content normal.   Vitals " reviewed.    Data Reviewed:    [facility]: 03/12/2005 - Dr Kj Green  Aortic pressure is 133/77.   Left ventricular pressure is 133.   Left ventricular end diastolic pressure is 13.   The left main is a very short caliber vessel, almost like a [facility] ostium.   The LAD is a large caliber vessel, distally has diffuse 20-30 percent narrowing, gives rise to a large diagonal 1.   The circumflex is a large caliber vessel, which gives rise to an OM1, which had diffuse 20-30 percent disease and also a posterior descending artery.   The RCA is a medium to large caliber vessel, which gave rise to a posterior descending artery and posterolateral, distal to the bifurcation has about 60-70 percent narrowing.   Left ventricular angiogram showed normal wall motion with ejection fraction of 65-75 percent.     Results for orders placed during the hospital encounter of 11/08/17   Adult Transthoracic Echo Complete W/ Cont if Necessary Per Protocol    Narrative · Left ventricular wall thickness is consistent with mild concentric   hypertrophy.  · Left ventricular systolic function is normal. Estimated EF = 55%.  · Left ventricular diastolic dysfunction (grade I) consistent with   impaired relaxation.        Transthoracic Echocardiogram 10/31/3017 in [place]  Indication:  Hypotension  BP:           93/63  Findings       Left Ventricle:  The left ventricular chamber size is normal. Mild concentric left  ventricular hypertrophy is observed. The estimated ejection fraction is  65-70%. Abnormal left ventricular diastolic filling is observed,  consistent with impaired relaxation.    Left Atrium:  The left atrial chamber size is normal.   Right Ventricle:  The right ventricular cavity size is normal. The right ventricular  global systolic function is normal.   Right Atrium:  The right atrial cavity size is normal.   Aortic Valve:  Moderate aortic leaflet calcification is visualized. There is mild to  moderate aortic stenosis. The mean  gradient of the aortic valve is 30  mmHg. The aortic valve area by velocity time interval is calculated at  1.84 cm2. There is no evidence of aortic regurgitation.   Mitral Valve:  The mitral valve leaflets appear normal. There is mild mitral  regurgitation.    Tricuspid Valve:  The tricuspid valve leaflets are normal.  There is mild tricuspid  regurgitation. The Right Ventricular Systolic Pressure is calculated at  35 mmHg.   Pulmonic Valve:  There is trace pulmonic regurgitation present.   Pericardium:  There is no pericardial effusion.   Conclusions  1. Mild concentric left ventricular hypertrophy with thickened  papillary muscle is observed.   Hyperdynamic LV systolic function with an  estimated ejection fraction is 65-70%.  2. Abnormal left ventricular diastolic filling is observed, consistent  with impaired relaxation.   3. Aortic valve is trileaflet, moderately calcified with mild to  moderate aortic stenosis  4. Mild tricuspid regurgitation with RVSP 35 mm Hg    Echocardiogram: 08/26/2016  FINDINGS:  1. Both atria are normal in size.  2. The aortic valve is mildly sclerotic without any hemodynamically  significant aortic stenosis.  3. Mild concentric left ventricular hypertrophy with early diastolic  dysfunction. No evidence of any regional wall motion abnormality.  The estimated ejection fraction greater than 60%.  4. There is mild mitral annular calcification.  5. Normal right ventricular size and function.  6. On color flow Doppler, there is trace mitral and tricuspid  regurgitation without any hemodynamic significance.  7. No intracardiac mass, pericardial effusion, or cardiac thrombus  seen.  CLINICAL IMPRESSION:  1. Mild concentric left ventricular hypertrophy with early diastolic  dysfunction.  2. Estimated ejection fraction greater than 60%.  3. Mildly sclerotic aortic valve with mild mitral annular  calcification without any hemodynamic significant stenotic process.  4. On color flow Doppler, there  is trace mitral and tricuspid  regurgitation without any hemodynamic significance.  5. No intracardiac mass, pericardial effusion, or cardiac thrombus  Seen.    Carotid Ulsd: 01/15/2019  Interpretation Summary   Study Impression   • Right ICA Prox: Imaging of the right ICA indicates 0-49% stenosis.  • Right Vertebral: Antegrade flow is present.   Imaging of the right ICA indicates 0-49% stenosis.    • Left ICA Prox: Imaging of the left ICA indicates 0-49% stenosis.  • Left Vertebral: Antegrade flow present.   Imaging of the left ICA indicates 0-49% stenosis.     Renal Artery U/S 11/1/2017  1. No color Doppler scan evidence of hemodynamically significant renal artery stenosis.  2. Elevated resistivity indices within the kidneys bilaterally which may be indicative of medical renal parenchymal disease.  3. Kidneys are slightly atrophic in size as detailed above.  4. Urinary bladder poorly distended for assessment with suspected thickening the bladder wall which is nonspecific and may simply be accentuated by lack of distention or muscle hypertrophy.    CTA Chest: 11/08/2017  IMPRESSION:  1. No evidence of pulmonary embolus..  2. Mild centrilobular and paraseptal emphysematous changes..  3. Posterior segment right lobe of liver peripheral 1.56 cm  simple hepatic cyst..  4.T11, T12, and L1 bilateral decompressive laminectomies.    Venous duplex lower extremity: 03/08/2018  · Acute right lower extremity deep vein thrombosis noted in the common femoral, profunda femoral, proximal femoral, mid femoral, distal femoral, popliteal and peroneal.  · Acute right lower extremity superficial thrombophlebitis noted in the greater saphenous (above knee).  · Acute left lower extremity deep vein thrombosis noted in the common femoral, profunda femoral, proximal femoral, mid femoral, distal femoral, popliteal and peroneal.  · Partial flow is noted above mentioned veins.    The following portions of the patient's history were reviewed  and updated as appropriate: allergies, current medications, past family history, past medical history, past social history, past surgical history and problem list.    Lab on 08/16/2019   Component Date Value Ref Range Status   • Glucose 08/16/2019 107* 65 - 99 mg/dL Final   • BUN 08/16/2019 21  8 - 23 mg/dL Final   • Creatinine 08/16/2019 1.38* 0.76 - 1.27 mg/dL Final   • Sodium 08/16/2019 134* 136 - 145 mmol/L Final   • Potassium 08/16/2019 4.4  3.5 - 5.2 mmol/L Final   • Chloride 08/16/2019 96* 98 - 107 mmol/L Final   • CO2 08/16/2019 25.7  22.0 - 29.0 mmol/L Final   • Calcium 08/16/2019 9.4  8.6 - 10.5 mg/dL Final   • Total Protein 08/16/2019 6.9  6.0 - 8.5 g/dL Final   • Albumin 08/16/2019 4.40  3.50 - 5.20 g/dL Final   • ALT (SGPT) 08/16/2019 6  1 - 41 U/L Final   • AST (SGOT) 08/16/2019 16  1 - 40 U/L Final   • Alkaline Phosphatase 08/16/2019 62  39 - 117 U/L Final   • Total Bilirubin 08/16/2019 0.3  0.2 - 1.2 mg/dL Final   • eGFR   Amer 08/16/2019 60* >60 mL/min/1.73 Final   • Globulin 08/16/2019 2.5  gm/dL Final   • A/G Ratio 08/16/2019 1.8  g/dL Final   • BUN/Creatinine Ratio 08/16/2019 15.2  7.0 - 25.0 Final   • Anion Gap 08/16/2019 12.3  5.0 - 15.0 mmol/L Final   • Hemoglobin A1C 08/16/2019 6.06* 4.80 - 5.60 % Final   • Total Cholesterol 08/16/2019 164  0 - 200 mg/dL Final   • Triglycerides 08/16/2019 48  0 - 150 mg/dL Final   • HDL Cholesterol 08/16/2019 65* 40 - 60 mg/dL Final   • LDL Cholesterol  08/16/2019 89  0 - 100 mg/dL Final   • VLDL Cholesterol 08/16/2019 9.6  5 - 40 mg/dL Final   • LDL/HDL Ratio 08/16/2019 1.38   Final   • Iron 08/16/2019 51* 59 - 158 mcg/dL Final   • Iron Saturation 08/16/2019 14* 20 - 50 % Final   • Transferrin 08/16/2019 245  200 - 360 mg/dL Final   • TIBC 08/16/2019 365  298 - 536 mcg/dL Final   • Ferritin 08/16/2019 66.10  30.00 - 400.00 ng/mL Final   • WBC 08/16/2019 5.79  3.40 - 10.80 10*3/mm3 Final   • RBC 08/16/2019 4.04* 4.14 - 5.80 10*6/mm3 Final   •  Hemoglobin 08/16/2019 11.9* 13.0 - 17.7 g/dL Final   • Hematocrit 08/16/2019 35.9* 37.5 - 51.0 % Final   • MCV 08/16/2019 88.9  79.0 - 97.0 fL Final   • MCH 08/16/2019 29.5  26.6 - 33.0 pg Final   • MCHC 08/16/2019 33.1  31.5 - 35.7 g/dL Final   • RDW 08/16/2019 14.1  12.3 - 15.4 % Final   • RDW-SD 08/16/2019 45.8  37.0 - 54.0 fl Final   • MPV 08/16/2019 8.7  6.0 - 12.0 fL Final   • Platelets 08/16/2019 482* 140 - 450 10*3/mm3 Final   • Neutrophil % 08/16/2019 48.3  42.7 - 76.0 % Final   • Lymphocyte % 08/16/2019 34.2  19.6 - 45.3 % Final   • Monocyte % 08/16/2019 14.0* 5.0 - 12.0 % Final   • Eosinophil % 08/16/2019 1.4  0.3 - 6.2 % Final   • Basophil % 08/16/2019 1.4  0.0 - 1.5 % Final   • Immature Grans % 08/16/2019 0.7* 0.0 - 0.5 % Final   • Neutrophils, Absolute 08/16/2019 2.80  1.70 - 7.00 10*3/mm3 Final   • Lymphocytes, Absolute 08/16/2019 1.98  0.70 - 3.10 10*3/mm3 Final   • Monocytes, Absolute 08/16/2019 0.81  0.10 - 0.90 10*3/mm3 Final   • Eosinophils, Absolute 08/16/2019 0.08  0.00 - 0.40 10*3/mm3 Final   • Basophils, Absolute 08/16/2019 0.08  0.00 - 0.20 10*3/mm3 Final   • Immature Grans, Absolute 08/16/2019 0.04  0.00 - 0.05 10*3/mm3 Final   • nRBC 08/16/2019 0.0  0.0 - 0.2 /100 WBC Final     Assessment:      Diagnosis Plan   1. Chronic diastolic congestive heart failure with negative clinical evidence of hypervolemia;  He is well perfused. AHA Stage C: ; NYHA Class II  BETA-BLOCKER: Toprol XL 25 mg nightly secondary to palpitations/PACs with history of ablation (atrial flutter)  ACE/ARB: Lisinopril/HCTZ 20/12.5 mg daily (ordered per Dr Larkin)  ENTRESTO: Not applicable  DIURETIC: Lasix 20 mg daily as needed for weight gain of 2-3 pounds overnight or 5 pounds in one week; if the patient should have any concerns regarding leg swelling or the manner in which to use his Lasix he is to contact the heart failure clinic.  ALDOSTERONT ANTAGONIST: History of KIRK/hyperkalemia  IMDUR/HYDRALAZINE: Hydralazine 25 mg  three times daily  DIGOXIN: Not applicable  Fluid restriction: 2000 cc daily  Sodium restriction: 2000 mg daily     Recommended daily weight monitoring.  Discussed patient action plan for heart failure.  Recommended avoiding NSAIDs use.  Discussed warning signs requiring additional medical attention for heart failure.       2. Coronary artery disease involving native coronary artery of native heart without angina pectoris, stable      ASA plus statin therapy   3. Essential hypertension, stable      As per #1       4. Chronic kidney disease, stage II (mild), stable  As per Dr Reza         Patient's BMI is within normal parameters. No follow-up required; non smoker.        This document has been electronically signed by REGINA Carlos on September 5, 2019 9:05 AM

## 2019-09-05 ENCOUNTER — OFFICE VISIT (OUTPATIENT)
Dept: CARDIOLOGY | Facility: CLINIC | Age: 84
End: 2019-09-05

## 2019-09-05 VITALS
HEIGHT: 65 IN | SYSTOLIC BLOOD PRESSURE: 132 MMHG | BODY MASS INDEX: 27.46 KG/M2 | WEIGHT: 164.8 LBS | OXYGEN SATURATION: 96 % | HEART RATE: 66 BPM | DIASTOLIC BLOOD PRESSURE: 70 MMHG

## 2019-09-05 DIAGNOSIS — I50.32 CHRONIC DIASTOLIC CONGESTIVE HEART FAILURE (HCC): Primary | Chronic | ICD-10-CM

## 2019-09-05 DIAGNOSIS — N18.2 STAGE 2 CHRONIC KIDNEY DISEASE: Chronic | ICD-10-CM

## 2019-09-05 DIAGNOSIS — I25.10 CORONARY ARTERY DISEASE INVOLVING NATIVE CORONARY ARTERY OF NATIVE HEART WITHOUT ANGINA PECTORIS: ICD-10-CM

## 2019-09-05 DIAGNOSIS — I10 ESSENTIAL HYPERTENSION: ICD-10-CM

## 2019-09-05 PROCEDURE — 99214 OFFICE O/P EST MOD 30 MIN: CPT | Performed by: NURSE PRACTITIONER

## 2019-09-13 RX ORDER — RIVAROXABAN 10 MG/1
TABLET, FILM COATED ORAL
Qty: 30 TABLET | Refills: 11 | OUTPATIENT
Start: 2019-09-13

## 2019-09-18 ENCOUNTER — OFFICE VISIT (OUTPATIENT)
Dept: PODIATRY | Facility: CLINIC | Age: 84
End: 2019-09-18

## 2019-09-18 VITALS — HEIGHT: 65 IN | BODY MASS INDEX: 28.32 KG/M2 | WEIGHT: 170 LBS | OXYGEN SATURATION: 97 % | HEART RATE: 74 BPM

## 2019-09-18 DIAGNOSIS — B35.1 ONYCHOMYCOSIS: ICD-10-CM

## 2019-09-18 DIAGNOSIS — Z79.01 ANTICOAGULANT LONG-TERM USE: ICD-10-CM

## 2019-09-18 DIAGNOSIS — M79.675 CHRONIC TOE PAIN, BILATERAL: ICD-10-CM

## 2019-09-18 DIAGNOSIS — E11.9 TYPE 2 DIABETES MELLITUS WITHOUT COMPLICATION, WITHOUT LONG-TERM CURRENT USE OF INSULIN (HCC): Primary | ICD-10-CM

## 2019-09-18 DIAGNOSIS — M79.674 CHRONIC TOE PAIN, BILATERAL: ICD-10-CM

## 2019-09-18 DIAGNOSIS — G89.29 CHRONIC TOE PAIN, BILATERAL: ICD-10-CM

## 2019-09-18 PROCEDURE — 11721 DEBRIDE NAIL 6 OR MORE: CPT | Performed by: PODIATRIST

## 2019-09-18 NOTE — PROGRESS NOTES
[facility] Keith Ruvalcaba .  1935  84 y.o. male   MILENA Larkin - 8/14/2019  A1c - 6.06 (8/16/2019)    Patient presents today for routine diabetic foot care.    09/18/2019     Chief Complaint   Patient presents with   • Left Foot - diabetic foot care   • Right Foot - diabetic foot care       History of Present Illness    Patient presents to clinic today for routine diabetic foot care.  States that his toenails have become long and painful.  Pain is relieved with debridement.        Past Medical History:   Diagnosis Date   • Acquired hallux rigidus    • Anemia    • Arthropathy of lumbar facet joint    • Carpal tunnel syndrome    • CHF (congestive heart failure) (CMS/HCC)    • Chronic diastolic congestive heart failure (CMS/HCC)    • Chronic kidney disease    • Chronic kidney disease, stage II (mild)     Chronic kidney disease stage 2   • Chronic obstructive lung disease (CMS/HCC)    • Chronic renal failure    • Coronary artery disease    • Cortical senile cataract    • Diabetes mellitus (CMS/HCC)    • Diabetes mellitus type 2, noninsulin dependent (CMS/HCC)     diabetes mellitus type 2, non-insulin treated, Medication treatment plan: oral diabetic medication   • Diabetes mellitus without complication (CMS/HCC)    • Diastolic dysfunction    • Diastolic heart failure (CMS/HCC)    • Essential hypertension     difficult to control      • Hip pain    • History of echocardiogram 08/19/2015    Echocardiogram W/ color flow 67091 (1) - Mild to moderate LVH with mild left atrial enlargement and normal aortic root.CLVH,preserved LV systolic function with Ef of 55%.Diastolic dysfunction.MV intact.AV thickened.Aortic sclerosis.   • Hyperlipidemia    • Influenza vaccine administered 10/02/2015    INFLUENZA IMMUN ADMIN OR PREV RECV'D  (1) - Ordered By: DIONNE MANDUJANO ([facility])    • Insomnia    • Insomnia     Other insomnia   • Joint pain    • Low back pain    • Muscle strain    • Nuclear cataract    • Onychomycosis    • Pneumococcal  vaccination given 07/22/2016    PNEUMOC VAC/ADMIN/RCVD 4040F (3) - Ordered By: DIONNE MANDUJANO ([facility])   • Prostate cancer (CMS/HCC)    • Pure hypercholesterolemia    • Sedentary lifestyle          Past Surgical History:   Procedure Laterality Date   • CARDIAC ABLATION     • CARDIAC CATHETERIZATION  02/04/1986    Cardiac cath 30756 (1) - normal left heart catherization,non cardiac chest pain   • CARPAL TUNNEL RELEASE  10/19/2015    Carpal tunnel surgery (1) - Release of left carpal tunnel   • CATARACT EXTRACTION W/ INTRAOCULAR LENS IMPLANT Left 3/31/2017    Procedure: REMOVE CATARACT AND IMPLANT INRAOCULAR LENS LEFT EYE;  Surgeon: Hector Navarrete MD;  Location: [facility] OR;  Service:    • CATARACT EXTRACTION W/ INTRAOCULAR LENS IMPLANT Right 4/14/2017    Procedure: REMOVE CATARACT AND IMPLANT INTRAOCULAR LENS RIGHT EYE;  Surgeon: Hector Navarrete MD;  Location: [facility] OR;  Service:    • COLONOSCOPY N/A 10/31/2018    Procedure: COLONOSCOPY;  Surgeon: Herbie Christine MD;  Location: [facility] ENDOSCOPY;  Service: Gastroenterology   • ENDOSCOPY  08/05/2013    Colon endoscopy 31841 (2) - Hemorrhoids found.   • INGUINAL HERNIA REPAIR Right 06/07/1968    Inguinal hernia, repair (2)   • INJECTION OF MEDICATION  09/06/2012    Albuterol (2u) (1) - Ordered By: LIDYA STEINER ([facility])    • INJECTION OF MEDICATION  09/06/2012    Atrovent (1) - Ordered By: LIDYA STIENER ([facility])    • INJECTION OF MEDICATION  02/10/2012    Depo Medrol (Methylprednisone) (3) - Ordered By: HEATHER PAK ([facility])    • INJECTION OF MEDICATION  02/09/2014    Kenalog (4) - Ordered By: DIMAS ANDRADE ([facility])    • JOINT REPLACEMENT     • LUMBAR LAMINECTOMY  2007    Lumbar laminectomy (1)   • MANDIBLE FRACTURE SURGERY  10/11/1981    Treat nose/jaw fracture (1) - bilateral open reduction of fracture of mandible   • OTHER SURGICAL HISTORY  09/24/2011    Drain/Inject Major Joint 20610 (1) - Ordered By: KARLOS HERNANDEZ ([facility])    •  OTHER SURGICAL HISTORY      Insert IVC filter 42152 (1)   • OTHER SURGICAL HISTORY  10/11/2011    SPIROMETRY 28517 (1) - Ordered By: HEATHER PAK ([facility])   • TOTAL HIP ARTHROPLASTY Right          Family History   Problem Relation Age of Onset   • Diabetes Other    • Cancer Sister    • Heart disease Mother    • Hypertension Mother    • Heart disease Father    • Hypertension Father    • Coronary artery disease Neg Hx        Allergies   Allergen Reactions   • Aldactone [Spironolactone] Other (See Comments)     Hyponatremia and hyperkalemia       Social History     Socioeconomic History   • Marital status:      Spouse name: Not on file   • Number of children: Not on file   • Years of education: Not on file   • Highest education level: Not on file   Tobacco Use   • Smoking status: Former Smoker     Last attempt to quit:      Years since quittin.7   • Smokeless tobacco: Never Used   Substance and Sexual Activity   • Alcohol use: No   • Drug use: No   • Sexual activity: Defer     Comment: Marital status:          Current Outpatient Medications   Medication Sig Dispense Refill   • albuterol sulfate  (90 Base) MCG/ACT inhaler Inhale 2 puffs Every 4 (Four) Hours As Needed for Wheezing. 18 g 6   • aspirin 81 MG EC tablet Take 81 mg by mouth daily.     • carbidopa-levodopa (SINEMET)  MG per tablet Take 1 tablet by mouth 3 (Three) Times a Day. 90 tablet 2   • ferrous sulfate 325 (65 FE) MG tablet Take 1 tablet by mouth Daily With Breakfast. 30 tablet 5   • furosemide (LASIX) 20 MG tablet Take 1 tablet by mouth Daily As Needed (weight gain of 3lbs overnight or leg swelling). 30 tablet 3   • gabapentin (NEURONTIN) 300 MG capsule One tablet by mouth tid 270 capsule 2   • glucose blood (TRUE METRIX BLOOD GLUCOSE TEST) test strip Check glucose Once daily 100 each 6   • hydrALAZINE (APRESOLINE) 25 MG tablet Take 1 tablet by mouth 3 (Three) Times a Day. 90 tablet 4   • JANUVIA 50 MG tablet TAKE  "ONE TABLET BY MOUTH DAILY 30 tablet 3   • lisinopril-hydrochlorothiazide (PRINZIDE,ZESTORETIC) 20-12.5 MG per tablet Take 1 tablet by mouth Daily. 30 tablet 3   • metoprolol succinate XL (TOPROL-XL) 25 MG 24 hr tablet TAKE ONE TABLET BY MOUTH EVERY NIGHT 30 tablet 2   • pravastatin (PRAVACHOL) 40 MG tablet Take 1 tablet by mouth Daily. 90 tablet 2   • rivaroxaban (XARELTO) 10 MG tablet Take 1 tablet by mouth Daily. 30 tablet 3   • TRUEPLUS LANCETS 30G misc Use to test Blood Sugars Once Daily 100 each 6     No current facility-administered medications for this visit.        Review of Systems   Constitutional: Negative.    HENT: Negative.    Eyes: Negative.    Respiratory: Negative.    Musculoskeletal:        Toe pain   Skin: Negative.    Psychiatric/Behavioral: Negative.          OBJECTIVE    Pulse 74   Ht 165.1 cm (65\")   Wt 77.1 kg (170 lb)   SpO2 97%   BMI 28.29 kg/m²       Physical Exam   Constitutional: He is oriented to person, place, and time. He appears well-developed and well-nourished. No distress.   Pulmonary/Chest: Effort normal. No respiratory distress.   Neurological: He is alert and oriented to person, place, and time.   Skin: Skin is warm and dry. Capillary refill takes less than 2 seconds.   Psychiatric: He has a normal mood and affect. His behavior is normal.   Vitals reviewed.      Gait: Normal    Assistive Device: Walker    Lower Extremity    Cardiovascular:    DP/PT pulses palpable bilateral  CFT brisk  to all digits  Skin temp is warm to warm from proximal tibia to distal digits bilateral  Pedal hair growth decreased.   No erythema noted   Nonpitting vanesa-malleolar edema bilateral  Musculoskeletal:  Muscle strength is 5/5 for all muscle groups tested   ROM of the 1st MTP is WNL bilateral   ROM of the MTJ is WNL bilateral   ROM of the STJ is WNL bilateral   ROM of the ankle joint is  WNL bilateral   Dermatological:   Skin is warm, dry and intact bilateral  Webspaces 1-4 bilateral are clean, " dry and intact.   No subcutaneous nodules or masses noted    Nails 1-5 bilateral are  discolored, elongated with subungual debris.  Pain on palpation to the nail plates.  Neurological:   Protective sensation intact   Sensation intact to light touch        Procedures        ASSESSMENT AND PLAN    General was seen today for diabetic foot care and diabetic foot care.    Diagnoses and all orders for this visit:    Type 2 diabetes mellitus without complication, without long-term current use of insulin (CMS/[facility])    Onychomycosis    Chronic toe pain, bilateral    Anticoagulant long-term use      - Nails 1-5 bilateral were debrided in length and thickness with nail nipper and electric  to decrease fungal load and risk of infection.    -All his questions were answered  -Return to clinic 3 months as needed          This document has been electronically signed by Partha Campos DPM on September 18, 2019 9:40 AM     9/18/2019  9:40 AM

## 2019-09-30 ENCOUNTER — TRANSCRIBE ORDERS (OUTPATIENT)
Dept: LAB | Facility: HOSPITAL | Age: 84
End: 2019-09-30

## 2019-09-30 ENCOUNTER — APPOINTMENT (OUTPATIENT)
Dept: LAB | Facility: HOSPITAL | Age: 84
End: 2019-09-30

## 2019-09-30 DIAGNOSIS — N18.2 CHRONIC KIDNEY DISEASE, STAGE II (MILD): Primary | ICD-10-CM

## 2019-09-30 DIAGNOSIS — E78.5 HYPERLIPIDEMIA, UNSPECIFIED HYPERLIPIDEMIA TYPE: ICD-10-CM

## 2019-09-30 DIAGNOSIS — I10 ESSENTIAL (PRIMARY) HYPERTENSION: ICD-10-CM

## 2019-09-30 DIAGNOSIS — E55.9 VITAMIN D DEFICIENCY: ICD-10-CM

## 2019-09-30 LAB
ALBUMIN SERPL-MCNC: 4.4 G/DL (ref 3.5–5.2)
ANION GAP SERPL CALCULATED.3IONS-SCNC: 13.1 MMOL/L (ref 5–15)
BUN BLD-MCNC: 20 MG/DL (ref 8–23)
BUN/CREAT SERPL: 15.4 (ref 7–25)
CALCIUM SPEC-SCNC: 9.1 MG/DL (ref 8.6–10.5)
CHLORIDE SERPL-SCNC: 94 MMOL/L (ref 98–107)
CO2 SERPL-SCNC: 25.9 MMOL/L (ref 22–29)
CREAT BLD-MCNC: 1.3 MG/DL (ref 0.76–1.27)
CREAT UR-MCNC: 55.1 MG/DL
GFR SERPL CREATININE-BSD FRML MDRD: 64 ML/MIN/1.73
GLUCOSE BLD-MCNC: 100 MG/DL (ref 65–99)
HCT VFR BLD AUTO: 36.7 % (ref 37.5–51)
HGB BLD-MCNC: 12 G/DL (ref 13–17.7)
PHOSPHATE SERPL-MCNC: 3.1 MG/DL (ref 2.5–4.5)
POTASSIUM BLD-SCNC: 5 MMOL/L (ref 3.5–5.2)
PROT UR-MCNC: 5 MG/DL
PROT/CREAT UR: 90.7 MG/G CREA (ref 0–200)
SODIUM BLD-SCNC: 133 MMOL/L (ref 136–145)

## 2019-09-30 PROCEDURE — 84156 ASSAY OF PROTEIN URINE: CPT | Performed by: INTERNAL MEDICINE

## 2019-09-30 PROCEDURE — 85014 HEMATOCRIT: CPT | Performed by: INTERNAL MEDICINE

## 2019-09-30 PROCEDURE — 85018 HEMOGLOBIN: CPT | Performed by: INTERNAL MEDICINE

## 2019-09-30 PROCEDURE — 80069 RENAL FUNCTION PANEL: CPT | Performed by: INTERNAL MEDICINE

## 2019-09-30 PROCEDURE — 36415 COLL VENOUS BLD VENIPUNCTURE: CPT | Performed by: INTERNAL MEDICINE

## 2019-09-30 PROCEDURE — 82570 ASSAY OF URINE CREATININE: CPT | Performed by: INTERNAL MEDICINE

## 2019-10-15 ENCOUNTER — TELEPHONE (OUTPATIENT)
Dept: CARDIOLOGY | Facility: CLINIC | Age: 84
End: 2019-10-15

## 2019-10-15 NOTE — TELEPHONE ENCOUNTER
"The patient contacts the office regarding a 2 to 3 pound weight gain over the course of the past 2 to 3 days with \"a little bit of swelling at the distal bilateral extremities\".    The patient is instructed to take 120 mg Lasix today or in the a.m.    He is to contact our office should he lack resolution, or if worsening of his symptoms.          This document has been electronically signed by REGINA Carlos on October 15, 2019 3:54 PM      "

## 2019-10-15 NOTE — TELEPHONE ENCOUNTER
Patient said that he was having some swelling around his ankles where is socks are but when he takes them off the swelling sometimes goes away and its been going on for the last two days. Wants Maria to contact him

## 2019-10-31 RX ORDER — LISINOPRIL AND HYDROCHLOROTHIAZIDE 20; 12.5 MG/1; MG/1
TABLET ORAL
Qty: 30 TABLET | Refills: 3 | Status: SHIPPED | OUTPATIENT
Start: 2019-10-31 | End: 2020-03-02

## 2019-10-31 RX ORDER — SITAGLIPTIN 50 MG/1
TABLET, FILM COATED ORAL
Qty: 30 TABLET | Refills: 3 | Status: SHIPPED | OUTPATIENT
Start: 2019-10-31 | End: 2020-03-02

## 2019-11-04 RX ORDER — METOPROLOL SUCCINATE 25 MG/1
TABLET, EXTENDED RELEASE ORAL
Qty: 30 TABLET | Refills: 2 | Status: SHIPPED | OUTPATIENT
Start: 2019-11-04 | End: 2020-02-05

## 2019-11-05 ENCOUNTER — OFFICE VISIT (OUTPATIENT)
Dept: CARDIOLOGY | Facility: CLINIC | Age: 84
End: 2019-11-05

## 2019-11-05 VITALS
HEIGHT: 65 IN | SYSTOLIC BLOOD PRESSURE: 124 MMHG | OXYGEN SATURATION: 98 % | HEART RATE: 81 BPM | DIASTOLIC BLOOD PRESSURE: 66 MMHG | BODY MASS INDEX: 27.99 KG/M2 | WEIGHT: 168 LBS

## 2019-11-05 DIAGNOSIS — I10 ESSENTIAL HYPERTENSION: ICD-10-CM

## 2019-11-05 DIAGNOSIS — J44.9 CHRONIC OBSTRUCTIVE PULMONARY DISEASE, UNSPECIFIED COPD TYPE (HCC): ICD-10-CM

## 2019-11-05 DIAGNOSIS — N18.2 STAGE 2 CHRONIC KIDNEY DISEASE: Chronic | ICD-10-CM

## 2019-11-05 DIAGNOSIS — I50.32 CHRONIC DIASTOLIC CONGESTIVE HEART FAILURE (HCC): Primary | Chronic | ICD-10-CM

## 2019-11-05 DIAGNOSIS — I25.10 CORONARY ARTERY DISEASE INVOLVING NATIVE CORONARY ARTERY OF NATIVE HEART WITHOUT ANGINA PECTORIS: ICD-10-CM

## 2019-11-05 DIAGNOSIS — E11.40 TYPE 2 DIABETES MELLITUS WITH DIABETIC NEUROPATHY, WITHOUT LONG-TERM CURRENT USE OF INSULIN (HCC): ICD-10-CM

## 2019-11-05 PROCEDURE — 99214 OFFICE O/P EST MOD 30 MIN: CPT | Performed by: NURSE PRACTITIONER

## 2019-11-05 NOTE — PROGRESS NOTES
Subjective:     Edema (chief complaint)    Patient Care Team:  Mata Larkin MD as PCP - General (Family Medicine)  Maria Porter APRN as PCP - Claims Attributed  GiancarloZachariah MD as Consulting Physician (Radiation Oncology)  David Groves MD (Urology)  Sarah Bartlett APRN as Nurse Practitioner (Oncology)  Yeimy Hannon APRN as Nurse Practitioner (Orthopedic Surgery)  Fernando Chacon MD as Consulting Physician (Orthopedic Surgery)    Congestive Heart Failure   Presents for follow-up visit. Pertinent negatives include no chest pressure, claudication, edema (bilateral ankle at the end of the day; compliant with compression stockings), near-syncope, orthopnea, palpitations, paroxysmal nocturnal dyspnea, shortness of breath or unexpected weight change. The symptoms have been improving. Compliance with total regimen is %. Compliance problems include adherence to exercise.  Compliance with diet is %. Compliance with exercise is 26-50%. Compliance with medications is %.     1. CAD, non-obstructive 2005  2. HFpEF - diastolic dysfunction  3. Hypertension  4. CKD with H/O KIRK follows with Dr Reza  5. H/O Atrial Flutter S/P ablation per Dr Graves 2006  6. Prostate Cancer [place] Score 6/Stage 2 S/P RT   7. DM, Type II  8. Anterior right total hip arthroplasty 04/25/2016  9. Chronic DVT of bilateral femoral vein: on preventative dosing - Xarelto  10. Carotid disease followed annually by Dr Hamman    Mr. Ruvalcaba underwent lumbar surgery 10/27/2017 in [place]. He was discharge 11/7/2017. Following discharge he had several medications that were recommended be stopped secondary to hypotension/KIRK.      The patient received evaluation with REGINA Vidal on 03/08/2018 in follow-up regarding leg edema for which lower extremity duplex have been ordered and was found with positive findings regarding DVT.   The patient was initiated on DOAC, Xarelto on that date.  "    02/07/2019  The patient presents to the office today for previously scheduled appointment. He indicates that approximately 2 week prior he began to notice a tremor in bilateral hands most prominent at rest. He reports negative impact on quality of life or activities of daily living. He feels that perhaps the prominence of the tremors diminishes in the evening and during sleep.   The patient reports fluid intake to be approximately 1400 - 1500 cc per day. He reports negative use of diuretics over the past 8 weeks approximate.    03/05/2019  The patient presents to the office today for re-evaluation following diuretic therapy (OP) last week secondary to reported leg swelling.   The patient reports Lasix 20 mg on 02/27-03/01/2019.   Next appointment with Dr Reza: 10/08/2019                                       Dr Larkin: 05/10/2019  Neurology in [place]: 04/25/2019 06/04/2019  The patient presents to the office today for routine scheduled office evaluation.  The patient reports that he has been doing well with outpatient monitoring of blood pressure and weights demonstrating stability.  He has received evaluation by neurologist, Dr. Rodriguez in [place] regarding tremors with increase in gabapentin, otherwise no changes recommended.    09/04/2019  The patient indicates that he will see Dr Reza in October.   General indicates that he is been doing very well since our last encounter and is without concerns regarding weight gain, leg edema or change in activity tolerance.    11/05/2019  The patient is receiving office evaluation today secondary to his contact with us yesterday indicating concerns regarding leg edema.  The patient indicates that when he awakens of the morning he has absolutely no evidence of edema, however along about mid to late afternoon/evening he begins to notice some \"puffiness\" around bilateral ankles and distal extremities.  He reports compliance with his dietary sodium restrictions.  He " indicates that perhaps his activity level has declined a little bit over the past year or so and that perhaps he is more sedentary and as a result his legs are in a dependent position more often than in the past.  He received evaluation with Dr. Reza last month and indicates that he is now on an annual rotation and nephrology.    Review of Systems   Constitution: Negative for weakness, malaise/fatigue and unexpected weight change.   HENT: Negative for nosebleeds.    Eyes: Negative for visual disturbance.   Cardiovascular: Negative for claudication, cyanosis, dyspnea on exertion, irregular heartbeat, near-syncope, orthopnea, palpitations, paroxysmal nocturnal dyspnea and syncope.   Respiratory: Negative for shortness of breath, sleep disturbances due to breathing and snoring.    Endocrine: Negative for polydipsia, polyphagia and polyuria.   Hematologic/Lymphatic: Negative for bleeding problem. Does not bruise/bleed easily.   Skin: Negative for poor wound healing.   Musculoskeletal: Negative for falls.   Gastrointestinal: Negative for bloating.   Neurological: Positive for tremors. Negative for dizziness, focal weakness, headaches, light-headedness, loss of balance, numbness, paresthesias, seizures and vertigo.   Psychiatric/Behavioral: Negative for altered mental status. The patient is not nervous/anxious.      Past Medical History:   Diagnosis Date   • Acquired hallux rigidus    • Anemia    • Arthropathy of lumbar facet joint    • Carpal tunnel syndrome    • CHF (congestive heart failure) (CMS/HCC)    • Chronic diastolic congestive heart failure (CMS/HCC)    • Chronic kidney disease    • Chronic kidney disease, stage II (mild)     Chronic kidney disease stage 2   • Chronic obstructive lung disease (CMS/HCC)    • Chronic renal failure    • Coronary artery disease    • Cortical senile cataract    • Diabetes mellitus (CMS/HCC)    • Diabetes mellitus type 2, noninsulin dependent (CMS/HCC)     diabetes mellitus type 2,  non-insulin treated, Medication treatment plan: oral diabetic medication   • Diabetes mellitus without complication (CMS/HCC)    • Diastolic dysfunction    • Diastolic heart failure (CMS/HCC)    • Essential hypertension     difficult to control      • Hip pain    • History of echocardiogram 08/19/2015    Echocardiogram W/ color flow 64057 (1) - Mild to moderate LVH with mild left atrial enlargement and normal aortic root.CLVH,preserved LV systolic function with Ef of 55%.Diastolic dysfunction.MV intact.AV thickened.Aortic sclerosis.   • Hyperlipidemia    • Influenza vaccine administered 10/02/2015    INFLUENZA IMMUN ADMIN OR PREV RECV'D  (1) - Ordered By: DIONNE MANDUJANO ([facility])    • Insomnia    • Insomnia     Other insomnia   • Joint pain    • Low back pain    • Muscle strain    • Nuclear cataract    • Onychomycosis    • Pneumococcal vaccination given 07/22/2016    PNEUMOC VAC/ADMIN/RCVD 4040F (3) - Ordered By: DIONNE MANDUJANO ([facility])   • Prostate cancer (CMS/HCC)    • Pure hypercholesterolemia    • Sedentary lifestyle    ,  Past Surgical History:   Procedure Laterality Date   • CARDIAC ABLATION     • CARDIAC CATHETERIZATION  02/04/1986    Cardiac cath 46504 (1) - normal left heart catherization,non cardiac chest pain   • CARPAL TUNNEL RELEASE  10/19/2015    Carpal tunnel surgery (1) - Release of left carpal tunnel   • CATARACT EXTRACTION W/ INTRAOCULAR LENS IMPLANT Left 3/31/2017    Procedure: REMOVE CATARACT AND IMPLANT INRAOCULAR LENS LEFT EYE;  Surgeon: Hector Navarrete MD;  Location: [facility] OR;  Service:    • CATARACT EXTRACTION W/ INTRAOCULAR LENS IMPLANT Right 4/14/2017    Procedure: REMOVE CATARACT AND IMPLANT INTRAOCULAR LENS RIGHT EYE;  Surgeon: Hector Navarrete MD;  Location: [facility] OR;  Service:    • COLONOSCOPY N/A 10/31/2018    Procedure: COLONOSCOPY;  Surgeon: Herbie Christine MD;  Location: [facility] ENDOSCOPY;  Service: Gastroenterology   • ENDOSCOPY  08/05/2013    Colon endoscopy  84325 (2) - Hemorrhoids found.   • INGUINAL HERNIA REPAIR Right 1968    Inguinal hernia, repair (2)   • INJECTION OF MEDICATION  2012    Albuterol (2u) (1) - Ordered By: LIDYA STEINER ([facility])    • INJECTION OF MEDICATION  2012    Atrovent (1) - Ordered By: LIDYA STEINER ([facility])    • INJECTION OF MEDICATION  02/10/2012    Depo Medrol (Methylprednisone) (3) - Ordered By: HEATHER PAK ([facility])    • INJECTION OF MEDICATION  2014    Kenalog (4) - Ordered By: DIMAS ANDRADE ([facility])    • JOINT REPLACEMENT     • LUMBAR LAMINECTOMY      Lumbar laminectomy (1)   • MANDIBLE FRACTURE SURGERY  10/11/1981    Treat nose/jaw fracture (1) - bilateral open reduction of fracture of mandible   • OTHER SURGICAL HISTORY  2011    Drain/Inject Major Joint 20524 (1) - Ordered By: KARLOS HERNANDEZ ([facility])    • OTHER SURGICAL HISTORY      Insert IVC filter 33517 (1)   • OTHER SURGICAL HISTORY  10/11/2011    SPIROMETRY 65567 (1) - Ordered By: HEATHER PAK ([facility])   • TOTAL HIP ARTHROPLASTY Right    ,  Family History   Problem Relation Age of Onset   • Diabetes Other    • Cancer Sister    • Heart disease Mother    • Hypertension Mother    • Heart disease Father    • Hypertension Father    • Coronary artery disease Neg Hx    ,  Social History     Socioeconomic History   • Marital status:      Spouse name: Not on file   • Number of children: Not on file   • Years of education: Not on file   • Highest education level: Not on file   Tobacco Use   • Smoking status: Former Smoker     Last attempt to quit:      Years since quittin.8   • Smokeless tobacco: Never Used   Substance and Sexual Activity   • Alcohol use: No   • Drug use: No   • Sexual activity: Defer     Comment: Marital status:      Current Outpatient Medications   Medication Sig Dispense Refill   • albuterol sulfate  (90 Base) MCG/ACT inhaler Inhale 2 puffs Every 4 (Four) Hours As Needed for Wheezing. 18 g  "6   • aspirin 81 MG EC tablet Take 81 mg by mouth daily.     • carbidopa-levodopa (SINEMET)  MG per tablet Take 1 tablet by mouth 3 (Three) Times a Day. 90 tablet 2   • ferrous sulfate 325 (65 FE) MG tablet Take 1 tablet by mouth Daily With Breakfast. 30 tablet 5   • furosemide (LASIX) 20 MG tablet Take 1 tablet by mouth Daily As Needed (weight gain of 3lbs overnight or leg swelling). 30 tablet 3   • gabapentin (NEURONTIN) 300 MG capsule One tablet by mouth tid 270 capsule 2   • glucose blood (TRUE METRIX BLOOD GLUCOSE TEST) test strip Check glucose Once daily 100 each 6   • hydrALAZINE (APRESOLINE) 25 MG tablet Take 1 tablet by mouth 3 (Three) Times a Day. 90 tablet 4   • JANUVIA 50 MG tablet TAKE ONE TABLET BY MOUTH DAILY 30 tablet 3   • lisinopril-hydrochlorothiazide (PRINZIDE,ZESTORETIC) 20-12.5 MG per tablet TAKE ONE TABLET BY MOUTH DAILY 30 tablet 3   • metoprolol succinate XL (TOPROL-XL) 25 MG 24 hr tablet TAKE ONE TABLET BY MOUTH EVERY NIGHT 30 tablet 2   • pravastatin (PRAVACHOL) 40 MG tablet Take 1 tablet by mouth Daily. 90 tablet 2   • rivaroxaban (XARELTO) 10 MG tablet Take 1 tablet by mouth Daily. 30 tablet 3   • TRUEPLUS LANCETS 30G misc Use to test Blood Sugars Once Daily 100 each 6     No current facility-administered medications for this visit.      Objective:     Vitals:    11/05/19 0927   BP: 124/66   BP Location: Left arm   Patient Position: Sitting   Cuff Size: Adult   Pulse: 81   SpO2: 98%   Weight: 76.2 kg (168 lb)   Height: 165.1 cm (65\")      Physical Exam   Constitutional: He is oriented to person, place, and time. He appears well-nourished. No distress.   HENT:   Head: Normocephalic and atraumatic.   Eyes: Conjunctivae are normal. Right eye exhibits no discharge. Left eye exhibits no discharge.   Neck: No JVD ( 6 cm at 45°) present. No tracheal deviation present.   Cardiovascular: Normal rate, regular rhythm, S1 normal, S2 normal, normal heart sounds and intact distal pulses. Exam " reveals no S3 and no S4.   No murmur heard.  Pulses:       Radial pulses are 2+ on the right side, and 2+ on the left side.   Pulmonary/Chest: Effort normal and breath sounds normal. No respiratory distress. He has no wheezes. He has no rales.   Abdominal: He exhibits no distension.   Musculoskeletal: He exhibits no edema or tenderness.   Neurological: He is alert and oriented to person, place, and time. He has normal strength.   Reflex Scores:       Tricep reflexes are 2+ on the right side.  Fine tremor noted in bilateral hands appears both postural and kinetic   Skin: Skin is warm and dry. He is not diaphoretic.   Psychiatric: He has a normal mood and affect. His behavior is normal. Judgment and thought content normal.   Vitals reviewed.    Data Reviewed:    [facility]: 03/12/2005 - Dr Kj Green  Aortic pressure is 133/77.   Left ventricular pressure is 133.   Left ventricular end diastolic pressure is 13.   The left main is a very short caliber vessel, almost like a senior care ostium.   The LAD is a large caliber vessel, distally has diffuse 20-30 percent narrowing, gives rise to a large diagonal 1.   The circumflex is a large caliber vessel, which gives rise to an OM1, which had diffuse 20-30 percent disease and also a posterior descending artery.   The RCA is a medium to large caliber vessel, which gave rise to a posterior descending artery and posterolateral, distal to the bifurcation has about 60-70 percent narrowing.   Left ventricular angiogram showed normal wall motion with ejection fraction of 65-75 percent.     Results for orders placed during the hospital encounter of 11/08/17   Adult Transthoracic Echo Complete W/ Cont if Necessary Per Protocol    Narrative · Left ventricular wall thickness is consistent with mild concentric   hypertrophy.  · Left ventricular systolic function is normal. Estimated EF = 55%.  · Left ventricular diastolic dysfunction (grade I) consistent with   impaired relaxation.         Transthoracic Echocardiogram 10/31/3017 in [place]  Indication:  Hypotension  BP:           93/63  Findings       Left Ventricle:  The left ventricular chamber size is normal. Mild concentric left  ventricular hypertrophy is observed. The estimated ejection fraction is  65-70%. Abnormal left ventricular diastolic filling is observed,  consistent with impaired relaxation.    Left Atrium:  The left atrial chamber size is normal.   Right Ventricle:  The right ventricular cavity size is normal. The right ventricular  global systolic function is normal.   Right Atrium:  The right atrial cavity size is normal.   Aortic Valve:  Moderate aortic leaflet calcification is visualized. There is mild to  moderate aortic stenosis. The mean gradient of the aortic valve is 30  mmHg. The aortic valve area by velocity time interval is calculated at  1.84 cm2. There is no evidence of aortic regurgitation.   Mitral Valve:  The mitral valve leaflets appear normal. There is mild mitral  regurgitation.    Tricuspid Valve:  The tricuspid valve leaflets are normal.  There is mild tricuspid  regurgitation. The Right Ventricular Systolic Pressure is calculated at  35 mmHg.   Pulmonic Valve:  There is trace pulmonic regurgitation present.   Pericardium:  There is no pericardial effusion.   Conclusions  1. Mild concentric left ventricular hypertrophy with thickened  papillary muscle is observed.   Hyperdynamic LV systolic function with an  estimated ejection fraction is 65-70%.  2. Abnormal left ventricular diastolic filling is observed, consistent  with impaired relaxation.   3. Aortic valve is trileaflet, moderately calcified with mild to  moderate aortic stenosis  4. Mild tricuspid regurgitation with RVSP 35 mm Hg    Echocardiogram: 08/26/2016  FINDINGS:  1. Both atria are normal in size.  2. The aortic valve is mildly sclerotic without any hemodynamically  significant aortic stenosis.  3. Mild concentric left ventricular hypertrophy  with early diastolic  dysfunction. No evidence of any regional wall motion abnormality.  The estimated ejection fraction greater than 60%.  4. There is mild mitral annular calcification.  5. Normal right ventricular size and function.  6. On color flow Doppler, there is trace mitral and tricuspid  regurgitation without any hemodynamic significance.  7. No intracardiac mass, pericardial effusion, or cardiac thrombus  seen.  CLINICAL IMPRESSION:  1. Mild concentric left ventricular hypertrophy with early diastolic  dysfunction.  2. Estimated ejection fraction greater than 60%.  3. Mildly sclerotic aortic valve with mild mitral annular  calcification without any hemodynamic significant stenotic process.  4. On color flow Doppler, there is trace mitral and tricuspid  regurgitation without any hemodynamic significance.  5. No intracardiac mass, pericardial effusion, or cardiac thrombus  Seen.    Carotid Ulsd: 01/15/2019  Interpretation Summary   Study Impression   • Right ICA Prox: Imaging of the right ICA indicates 0-49% stenosis.  • Right Vertebral: Antegrade flow is present.   Imaging of the right ICA indicates 0-49% stenosis.    • Left ICA Prox: Imaging of the left ICA indicates 0-49% stenosis.  • Left Vertebral: Antegrade flow present.   Imaging of the left ICA indicates 0-49% stenosis.     Renal Artery U/S 11/1/2017  1. No color Doppler scan evidence of hemodynamically significant renal artery stenosis.  2. Elevated resistivity indices within the kidneys bilaterally which may be indicative of medical renal parenchymal disease.  3. Kidneys are slightly atrophic in size as detailed above.  4. Urinary bladder poorly distended for assessment with suspected thickening the bladder wall which is nonspecific and may simply be accentuated by lack of distention or muscle hypertrophy.    CTA Chest: 11/08/2017  IMPRESSION:  1. No evidence of pulmonary embolus..  2. Mild centrilobular and paraseptal emphysematous changes..  3.  Posterior segment right lobe of liver peripheral 1.56 cm  simple hepatic cyst..  4.T11, T12, and L1 bilateral decompressive laminectomies.    Venous duplex lower extremity: 03/08/2018  · Acute right lower extremity deep vein thrombosis noted in the common femoral, profunda femoral, proximal femoral, mid femoral, distal femoral, popliteal and peroneal.  · Acute right lower extremity superficial thrombophlebitis noted in the greater saphenous (above knee).  · Acute left lower extremity deep vein thrombosis noted in the common femoral, profunda femoral, proximal femoral, mid femoral, distal femoral, popliteal and peroneal.  · Partial flow is noted above mentioned veins.    The following portions of the patient's history were reviewed and updated as appropriate: allergies, current medications, past family history, past medical history, past social history, past surgical history and problem list.    No visits with results within 1 Month(s) from this visit.   Latest known visit with results is:   Transcribe Orders on 09/30/2019   Component Date Value Ref Range Status   • Glucose 09/30/2019 100* 65 - 99 mg/dL Final   • BUN 09/30/2019 20  8 - 23 mg/dL Final   • Creatinine 09/30/2019 1.30* 0.76 - 1.27 mg/dL Final   • Sodium 09/30/2019 133* 136 - 145 mmol/L Final   • Potassium 09/30/2019 5.0  3.5 - 5.2 mmol/L Final   • Chloride 09/30/2019 94* 98 - 107 mmol/L Final   • CO2 09/30/2019 25.9  22.0 - 29.0 mmol/L Final   • Calcium 09/30/2019 9.1  8.6 - 10.5 mg/dL Final   • Albumin 09/30/2019 4.40  3.50 - 5.20 g/dL Final   • Phosphorus 09/30/2019 3.1  2.5 - 4.5 mg/dL Final   • Anion Gap 09/30/2019 13.1  5.0 - 15.0 mmol/L Final   • BUN/Creatinine Ratio 09/30/2019 15.4  7.0 - 25.0 Final   • eGFR   Amer 09/30/2019 64  >60 mL/min/1.73 Final   • Hemoglobin 09/30/2019 12.0* 13.0 - 17.7 g/dL Final   • Hematocrit 09/30/2019 36.7* 37.5 - 51.0 % Final   • Protein/Creatinine Ratio, Urine 09/30/2019 90.7  0.0 - 200.0 mg/G Crea Final   •  Creatinine, Urine 09/30/2019 55.1  mg/dL Final   • Total Protein, Urine 09/30/2019 5.0  mg/dL Final     Assessment:      Diagnosis Plan   1. Chronic diastolic congestive heart failure with negative clinical evidence of hypervolemia;  He is well perfused. AHA Stage C: ; NYHA Class II  BETA-BLOCKER: Toprol XL 25 mg nightly secondary to palpitations/PACs with history of ablation (atrial flutter)  ACE/ARB: Lisinopril/HCTZ 20/12.5 mg daily (ordered per Dr Larkin)  ENTRESTO: Not applicable  DIURETIC: Lasix 20 mg daily as needed for weight gain of 2-3 pounds overnight or 5 pounds in one week; if the patient should have any concerns regarding leg swelling or the manner in which to use his Lasix he is to contact the heart failure clinic.  ALDOSTERONT ANTAGONIST: History of KIRK/hyperkalemia  IMDUR/HYDRALAZINE: Hydralazine 25 mg three times daily  DIGOXIN: Not applicable  Fluid restriction: 2000 cc daily  Sodium restriction: 2000 mg daily     Recommended daily weight monitoring.  Discussed patient action plan for heart failure.  Recommended avoiding NSAIDs use.  Discussed warning signs requiring additional medical attention for heart failure.    Periodic transthoracic echocardiogram surveillance due 2020    The patient is encouraged to keep his legs elevated if he is in a sitting position as much as possible; and to review his dietary selections to assure compliance and dietary sodium not to exceed more than 2000 mg daily       2. Coronary artery disease involving native coronary artery of native heart without angina pectoris, stable      ASA plus statin therapy   3. Essential hypertension, stable      As per #1       4. Chronic kidney disease, stage II (mild), stable  As per Dr Reza         The patient will be seen by his primary care provider next week and plans on obtaining an influenza vaccine at that time.    Future Appointments       Provider Department Center    11/14/2019 11:30 AM Mata Larkin MD [facility]  [facility]     12/5/2019 9:00 AM Maria Porter APRN [facility] CARDIOLOGY     12/18/2019 8:45 AM Partha Campos, JOSH [facility] PODIATRY             This document has been electronically signed by REGINA Carlos on November 5, 2019 9:37 AM

## 2019-11-14 ENCOUNTER — OFFICE VISIT (OUTPATIENT)
Dept: FAMILY MEDICINE CLINIC | Facility: CLINIC | Age: 84
End: 2019-11-14

## 2019-11-14 VITALS
BODY MASS INDEX: 29.97 KG/M2 | SYSTOLIC BLOOD PRESSURE: 124 MMHG | WEIGHT: 179.9 LBS | OXYGEN SATURATION: 98 % | HEIGHT: 65 IN | HEART RATE: 68 BPM | DIASTOLIC BLOOD PRESSURE: 60 MMHG

## 2019-11-14 DIAGNOSIS — I10 ESSENTIAL HYPERTENSION: ICD-10-CM

## 2019-11-14 DIAGNOSIS — E11.9 TYPE 2 DIABETES MELLITUS WITHOUT COMPLICATION, UNSPECIFIED WHETHER LONG TERM INSULIN USE (HCC): ICD-10-CM

## 2019-11-14 DIAGNOSIS — Z23 NEEDS FLU SHOT: Primary | ICD-10-CM

## 2019-11-14 DIAGNOSIS — I25.10 CORONARY ARTERY DISEASE INVOLVING NATIVE CORONARY ARTERY OF NATIVE HEART WITHOUT ANGINA PECTORIS: ICD-10-CM

## 2019-11-14 PROCEDURE — 90653 IIV ADJUVANT VACCINE IM: CPT | Performed by: FAMILY MEDICINE

## 2019-11-14 PROCEDURE — 99214 OFFICE O/P EST MOD 30 MIN: CPT | Performed by: FAMILY MEDICINE

## 2019-11-14 PROCEDURE — 90471 IMMUNIZATION ADMIN: CPT | Performed by: FAMILY MEDICINE

## 2019-11-14 NOTE — PROGRESS NOTES
Subjective   General Keith Ruvalcaba Jr. is a 84 y.o. male.    cc: follow up of chronic medical issues  History of Present Illness The patient comes in for check of their chronic medical issues which include Diabetes Mellitus,Hypertension and CAD. He is at his base line..   .       The following portions of the patient's history were reviewed and updated as appropriate: allergies, current medications, past family history, past medical history, past social history, past surgical history and problem list.    Review of Systems   Constitutional: Negative for fever and unexpected weight gain.   Respiratory: Negative for cough, chest tightness and stridor.    Cardiovascular: Negative for chest pain, palpitations and leg swelling.       Objective   Physical Exam   Constitutional: He is oriented to person, place, and time. He appears well-developed and well-nourished.   HENT:   Head: Normocephalic and atraumatic.   Right Ear: External ear normal.   Left Ear: External ear normal.   Nose: Nose normal.   Mouth/Throat: Oropharynx is clear and moist.   Eyes: Conjunctivae are normal.   Neck: Normal range of motion. Neck supple.   Cardiovascular: Normal rate, regular rhythm and normal heart sounds. Exam reveals no gallop and no friction rub.   No murmur heard.  Pulmonary/Chest: Effort normal and breath sounds normal. No stridor. No respiratory distress. He has no wheezes. He has no rales.   Abdominal: Soft. Bowel sounds are normal. He exhibits no distension. There is no tenderness.   Musculoskeletal:   Back is non tender.   Neurological: He is alert and oriented to person, place, and time.   Skin: Skin is warm.   Vitals reviewed.        Assessment/Plan   General was seen today for hypertension and follow-up.    Diagnoses and all orders for this visit:    Coronary artery disease involving native coronary artery of native heart without angina pectoris  -     Lipid panel; Future    Essential hypertension  -     Comprehensive metabolic  panel; Future  -     CBC w AUTO Differential; Future    Type 2 diabetes mellitus without complication, unspecified whether long term insulin use (CMS/[facility], located within [facility])  -     Hemoglobin A1c; Future    Needs flu shot    Other orders  -     Fluad Quad >65 years ([id number])      Return to the clinic in 3 month/s.  Will contact with results as needed.

## 2019-11-15 ENCOUNTER — TELEPHONE (OUTPATIENT)
Dept: FAMILY MEDICINE CLINIC | Facility: CLINIC | Age: 84
End: 2019-11-15

## 2019-11-15 NOTE — TELEPHONE ENCOUNTER
Kath with [facility] called to say the pt is requesting an ankle support and saw Dr Larkin yesterday and he told them that the Dr had already sent the fax in but they have not received anything.  Please call them at [phone] extension [phone] or fax signed orders at [phone]  Thank You

## 2019-11-18 ENCOUNTER — TELEPHONE (OUTPATIENT)
Dept: FAMILY MEDICINE CLINIC | Facility: CLINIC | Age: 84
End: 2019-11-18

## 2019-11-18 NOTE — TELEPHONE ENCOUNTER
He called and said he needs to talk to nurse-said is regarding paperwork that office that was to have been faxed. He can be reached at [phone].

## 2019-11-21 DIAGNOSIS — I50.32 CHRONIC DIASTOLIC CONGESTIVE HEART FAILURE (HCC): Chronic | ICD-10-CM

## 2019-11-21 RX ORDER — GABAPENTIN 300 MG/1
CAPSULE ORAL
Qty: 270 CAPSULE | Refills: 1 | Status: CANCELLED | OUTPATIENT
Start: 2019-11-21

## 2019-11-21 NOTE — TELEPHONE ENCOUNTER
Dr. Larkin,    Mr General Ruvalcaba is requesting a refill on his Gabapentin 300 mg #270 Take 1 capsule TID    Last OV    11/14/19    Next Dyllan OV    02/13/19    Last Script Written  05/14/19  #270 with 2 refills (controlled scripts are only good for 6 mos,)      Last Shawn     06/15/18 on file    Please advise on refill    Thank you

## 2019-11-22 DIAGNOSIS — I50.32 CHRONIC DIASTOLIC CONGESTIVE HEART FAILURE (HCC): Chronic | ICD-10-CM

## 2019-11-22 RX ORDER — HYDRALAZINE HYDROCHLORIDE 25 MG/1
TABLET, FILM COATED ORAL
Qty: 90 TABLET | Refills: 4 | Status: SHIPPED | OUTPATIENT
Start: 2019-11-22 | End: 2020-04-21

## 2019-11-22 RX ORDER — GABAPENTIN 300 MG/1
CAPSULE ORAL
Qty: 270 CAPSULE | Refills: 2 | Status: SHIPPED | OUTPATIENT
Start: 2019-11-22 | End: 2020-05-21

## 2019-12-03 NOTE — PROGRESS NOTES
Subjective:     Congestive Heart Failure (chief complaint)    Patient Care Team:  Mata Larkin MD as PCP - General (Family Medicine)  Maria Porter APRN as PCP - Claims Attributed  GiancarloZachariah MD as Consulting Physician (Radiation Oncology)  David Groves MD (Urology)  Sarah Bartlett APRN as Nurse Practitioner (Oncology)  Yeimy Hannon APRN as Nurse Practitioner (Orthopedic Surgery)  Fernando Chacon MD as Consulting Physician (Orthopedic Surgery)    Congestive Heart Failure   Presents for follow-up visit. Pertinent negatives include no chest pressure, claudication, edema (bilateral ankle at the end of the day; compliant with compression stockings), near-syncope, orthopnea, palpitations, paroxysmal nocturnal dyspnea, shortness of breath or unexpected weight change. The symptoms have been improving. Compliance with total regimen is %. Compliance problems include adherence to exercise.  Compliance with diet is %. Compliance with exercise is 26-50%. Compliance with medications is %.     1. CAD, non-obstructive 2005  2. HFpEF - diastolic dysfunction  3. Hypertension  4. CKD with H/O KIRK follows with Dr Reza  5. H/O Atrial Flutter S/P ablation per Dr Graves 2006  6. Prostate Cancer Salvador Score 6/Stage 2 S/P RT   7. DM, Type II  8. Anterior right total hip arthroplasty 04/25/2016  9. Chronic DVT of bilateral femoral vein: on preventative dosing - Xarelto  10. Carotid disease followed annually by Dr Hamman    Mr. Ruvalcaba underwent lumbar surgery 10/27/2017 in [place]. He was discharge 11/7/2017. Following discharge he had several medications that were recommended be stopped secondary to hypotension/KIRK.      The patient received evaluation with REGINA Vidal on 03/08/2018 in follow-up regarding leg edema for which lower extremity duplex have been ordered and was found with positive findings regarding DVT.   The patient was initiated on DOAC, Xarelto on  "that date.     02/07/2019  The patient presents to the office today for previously scheduled appointment. He indicates that approximately 2 week prior he began to notice a tremor in bilateral hands most prominent at rest. He reports negative impact on quality of life or activities of daily living. He feels that perhaps the prominence of the tremors diminishes in the evening and during sleep.   The patient reports fluid intake to be approximately 1400 - 1500 cc per day. He reports negative use of diuretics over the past 8 weeks approximate.    03/05/2019  The patient presents to the office today for re-evaluation following diuretic therapy (OP) last week secondary to reported leg swelling.   The patient reports Lasix 20 mg on 02/27-03/01/2019.   Next appointment with Dr Reza: 10/08/2019                                       Dr Larkin: 05/10/2019  Neurology in [place]: 04/25/2019 06/04/2019  The patient presents to the office today for routine scheduled office evaluation.  The patient reports that he has been doing well with outpatient monitoring of blood pressure and weights demonstrating stability.  He has received evaluation by neurologist, Dr. Rodriguez in [place] regarding tremors with increase in gabapentin, otherwise no changes recommended.    09/04/2019  The patient indicates that he will see Dr Reza in October.   General indicates that he is been doing very well since our last encounter and is without concerns regarding weight gain, leg edema or change in activity tolerance.    11/05/2019  The patient is receiving office evaluation today secondary to his contact with us yesterday indicating concerns regarding leg edema.  The patient indicates that when he awakens of the morning he has absolutely no evidence of edema, however along about mid to late afternoon/evening he begins to notice some \"puffiness\" around bilateral ankles and distal extremities.  He reports compliance with his dietary sodium " restrictions.  He indicates that perhaps his activity level has declined a little bit over the past year or so and that perhaps he is more sedentary and as a result his legs are in a dependent position more often than in the past.  He received evaluation with Dr. Reza last month and indicates that he is now on an annual rotation and nephrology.    12/05/2019  The patient presents today for previously scheduled office visit.  The patient indicates that overall he feels that he is doing well from cardiology perspective.  The patient does indicate that over the past 1 week he has developed a nonproductive cough not associated with fever or chills, nausea or vomiting.  There has been no diarrhea.  He indicates knowledge of an occasional wheeze.    Review of Systems   Constitution: Negative for weakness, malaise/fatigue and unexpected weight change.   HENT: Negative for nosebleeds.    Eyes: Negative for visual disturbance.   Cardiovascular: Negative for claudication, cyanosis, dyspnea on exertion, irregular heartbeat, near-syncope, orthopnea, palpitations, paroxysmal nocturnal dyspnea and syncope.   Respiratory: Negative for shortness of breath, sleep disturbances due to breathing and snoring.    Endocrine: Negative for polydipsia, polyphagia and polyuria.   Hematologic/Lymphatic: Negative for bleeding problem. Does not bruise/bleed easily.   Skin: Negative for poor wound healing.   Musculoskeletal: Negative for falls.   Gastrointestinal: Negative for bloating.   Neurological: Positive for tremors. Negative for dizziness, focal weakness, headaches, light-headedness, loss of balance, numbness, paresthesias, seizures and vertigo.   Psychiatric/Behavioral: Negative for altered mental status. The patient is not nervous/anxious.      Past Medical History:   Diagnosis Date   • Acquired hallux rigidus    • Anemia    • Arthropathy of lumbar facet joint    • Carpal tunnel syndrome    • CHF (congestive heart failure) (CMS/[facility])     • Chronic diastolic congestive heart failure (CMS/HCC)    • Chronic kidney disease    • Chronic kidney disease, stage II (mild)     Chronic kidney disease stage 2   • Chronic obstructive lung disease (CMS/[facility])    • Chronic renal failure    • Coronary artery disease    • Cortical senile cataract    • Diabetes mellitus (CMS/[facility])    • Diabetes mellitus type 2, noninsulin dependent (CMS/[facility])     diabetes mellitus type 2, non-insulin treated, Medication treatment plan: oral diabetic medication   • Diabetes mellitus without complication (CMS/[facility])    • Diastolic dysfunction    • Diastolic heart failure (CMS/[facility])    • Essential hypertension     difficult to control      • Hip pain    • History of echocardiogram 08/19/2015    Echocardiogram W/ color flow 53458 (1) - Mild to moderate LVH with mild left atrial enlargement and normal aortic root.CLVH,preserved LV systolic function with Ef of 55%.Diastolic dysfunction.MV intact.AV thickened.Aortic sclerosis.   • Hyperlipidemia    • Influenza vaccine administered 10/02/2015    INFLUENZA IMMUN ADMIN OR PREV RECV'D  (1) - Ordered By: DIONNE MANDUJANO ([facility])    • Insomnia    • Insomnia     Other insomnia   • Joint pain    • Low back pain    • Muscle strain    • Nuclear cataract    • Onychomycosis    • Pneumococcal vaccination given 07/22/2016    PNEUMOC VAC/ADMIN/RCVD 4040F (3) - Ordered By: DIONNE MANDUJANO ([facility])   • Prostate cancer (CMS/[facility])    • Pure hypercholesterolemia    • Sedentary lifestyle    ,  Past Surgical History:   Procedure Laterality Date   • CARDIAC ABLATION     • CARDIAC CATHETERIZATION  02/04/1986    Cardiac cath 28097 (1) - normal left heart catherization,non cardiac chest pain   • CARPAL TUNNEL RELEASE  10/19/2015    Carpal tunnel surgery (1) - Release of left carpal tunnel   • CATARACT EXTRACTION W/ INTRAOCULAR LENS IMPLANT Left 3/31/2017    Procedure: REMOVE CATARACT AND IMPLANT INRAOCULAR LENS LEFT EYE;  Surgeon: Hector Navarrete MD;   Location: [facility] OR;  Service:    • CATARACT EXTRACTION W/ INTRAOCULAR LENS IMPLANT Right 4/14/2017    Procedure: REMOVE CATARACT AND IMPLANT INTRAOCULAR LENS RIGHT EYE;  Surgeon: Hector Navarrete MD;  Location: [facility] OR;  Service:    • COLONOSCOPY N/A 10/31/2018    Procedure: COLONOSCOPY;  Surgeon: Herbie Christine MD;  Location: [facility] ENDOSCOPY;  Service: Gastroenterology   • ENDOSCOPY  08/05/2013    Colon endoscopy 76497 (2) - Hemorrhoids found.   • INGUINAL HERNIA REPAIR Right 06/07/1968    Inguinal hernia, repair (2)   • INJECTION OF MEDICATION  09/06/2012    Albuterol (2u) (1) - Ordered By: LIDYA STEINER ([facility])    • INJECTION OF MEDICATION  09/06/2012    Atrovent (1) - Ordered By: LIDYA STEINER ([facility])    • INJECTION OF MEDICATION  02/10/2012    Depo Medrol (Methylprednisone) (3) - Ordered By: HEATHER PAK ([facility])    • INJECTION OF MEDICATION  02/09/2014    Kenalog (4) - Ordered By: DIMAS ANDRADE ([facility])    • JOINT REPLACEMENT     • LUMBAR LAMINECTOMY  2007    Lumbar laminectomy (1)   • MANDIBLE FRACTURE SURGERY  10/11/1981    Treat nose/jaw fracture (1) - bilateral open reduction of fracture of mandible   • OTHER SURGICAL HISTORY  09/24/2011    Drain/Inject Major Joint 20610 (1) - Ordered By: KARLOS HERNANDEZ ([facility])    • OTHER SURGICAL HISTORY      Insert IVC filter 85386 (1)   • OTHER SURGICAL HISTORY  10/11/2011    SPIROMETRY 98887 (1) - Ordered By: HEATHER PAK ([facility])   • TOTAL HIP ARTHROPLASTY Right    ,  Family History   Problem Relation Age of Onset   • Diabetes Other    • Cancer Sister    • Heart disease Mother    • Hypertension Mother    • Heart disease Father    • Hypertension Father    • Coronary artery disease Neg Hx    ,  Social History     Socioeconomic History   • Marital status:      Spouse name: Not on file   • Number of children: Not on file   • Years of education: Not on file   • Highest education level: Not on file   Tobacco Use   • Smoking status:  Former Smoker     Last attempt to quit:      Years since quittin.9   • Smokeless tobacco: Never Used   Substance and Sexual Activity   • Alcohol use: No   • Drug use: No   • Sexual activity: Defer     Comment: Marital status:      Current Outpatient Medications   Medication Sig Dispense Refill   • albuterol sulfate  (90 Base) MCG/ACT inhaler Inhale 2 puffs Every 4 (Four) Hours As Needed for Wheezing. 18 g 6   • aspirin 81 MG EC tablet Take 81 mg by mouth daily.     • carbidopa-levodopa (SINEMET)  MG per tablet Take 1 tablet by mouth 3 (Three) Times a Day. 90 tablet 2   • ferrous sulfate 325 (65 FE) MG tablet Take 1 tablet by mouth Daily With Breakfast. 30 tablet 5   • furosemide (LASIX) 20 MG tablet Take 1 tablet by mouth Daily As Needed (weight gain of 3lbs overnight or leg swelling). 30 tablet 3   • gabapentin (NEURONTIN) 300 MG capsule One tablet by mouth tid 270 capsule 2   • glucose blood (TRUE METRIX BLOOD GLUCOSE TEST) test strip Check glucose Once daily 100 each 6   • hydrALAZINE (APRESOLINE) 25 MG tablet TAKE ONE TABLET BY MOUTH THREE TIMES A DAY 90 tablet 4   • JANUVIA 50 MG tablet TAKE ONE TABLET BY MOUTH DAILY 30 tablet 3   • lisinopril-hydrochlorothiazide (PRINZIDE,ZESTORETIC) 20-12.5 MG per tablet TAKE ONE TABLET BY MOUTH DAILY 30 tablet 3   • metoprolol succinate XL (TOPROL-XL) 25 MG 24 hr tablet TAKE ONE TABLET BY MOUTH EVERY NIGHT 30 tablet 2   • pravastatin (PRAVACHOL) 40 MG tablet Take 1 tablet by mouth Daily. 90 tablet 2   • rivaroxaban (XARELTO) 10 MG tablet Take 1 tablet by mouth Daily. 30 tablet 3   • TRUEPLUS LANCETS 30G misc Use to test Blood Sugars Once Daily 100 each 6   • azithromycin (ZITHROMAX) 250 MG tablet Take 2 tablets the first day, then 1 tablet daily for 4 days. 6 tablet 0     No current facility-administered medications for this visit.      Objective:     Vitals:    19 0903   BP: 138/76   BP Location: Left arm   Patient Position: Sitting   Cuff  "Size: Adult   Pulse: 76   SpO2: 98%   Weight: 78.7 kg (173 lb 9.6 oz)   Height: 165.1 cm (65\")      Physical Exam   Constitutional: He is oriented to person, place, and time. He appears well-nourished. No distress.   HENT:   Head: Normocephalic and atraumatic.   Eyes: Conjunctivae are normal. Right eye exhibits no discharge. Left eye exhibits no discharge.   Neck: No JVD ( 6 cm at 45°) present. No tracheal deviation present.   Cardiovascular: Normal rate, regular rhythm, S1 normal, S2 normal, normal heart sounds and intact distal pulses. Exam reveals no S3 and no S4.   No murmur heard.  Pulses:       Radial pulses are 2+ on the right side, and 2+ on the left side.   Pulmonary/Chest: Effort normal and breath sounds normal. No respiratory distress. He has no wheezes. He has no rales.   Abdominal: He exhibits no distension.   Musculoskeletal: He exhibits no edema or tenderness.   Neurological: He is alert and oriented to person, place, and time. He has normal strength.   Reflex Scores:       Tricep reflexes are 2+ on the right side.  Fine tremor noted in bilateral hands appears both postural and kinetic   Skin: Skin is warm and dry. He is not diaphoretic.   Psychiatric: He has a normal mood and affect. His behavior is normal. Judgment and thought content normal.   Vitals reviewed.    Data Reviewed:  [facility]: 03/12/2005 - Dr Kj Green  Aortic pressure is 133/77.   Left ventricular pressure is 133.   Left ventricular end diastolic pressure is 13.   The left main is a very short caliber vessel, almost like a long-term ostium.   The LAD is a large caliber vessel, distally has diffuse 20-30 percent narrowing, gives rise to a large diagonal 1.   The circumflex is a large caliber vessel, which gives rise to an OM1, which had diffuse 20-30 percent disease and also a posterior descending artery.   The RCA is a medium to large caliber vessel, which gave rise to a posterior descending artery and posterolateral, distal to the " bifurcation has about 60-70 percent narrowing.   Left ventricular angiogram showed normal wall motion with ejection fraction of 65-75 percent.     Results for orders placed during the hospital encounter of 11/08/17   Adult Transthoracic Echo Complete W/ Cont if Necessary Per Protocol    Narrative · Left ventricular wall thickness is consistent with mild concentric   hypertrophy.  · Left ventricular systolic function is normal. Estimated EF = 55%.  · Left ventricular diastolic dysfunction (grade I) consistent with   impaired relaxation.        Transthoracic Echocardiogram 10/31/3017 in [place]  Indication:  Hypotension  BP:           93/63  Findings       Left Ventricle:  The left ventricular chamber size is normal. Mild concentric left  ventricular hypertrophy is observed. The estimated ejection fraction is  65-70%. Abnormal left ventricular diastolic filling is observed,  consistent with impaired relaxation.    Left Atrium:  The left atrial chamber size is normal.   Right Ventricle:  The right ventricular cavity size is normal. The right ventricular  global systolic function is normal.   Right Atrium:  The right atrial cavity size is normal.   Aortic Valve:  Moderate aortic leaflet calcification is visualized. There is mild to  moderate aortic stenosis. The mean gradient of the aortic valve is 30  mmHg. The aortic valve area by velocity time interval is calculated at  1.84 cm2. There is no evidence of aortic regurgitation.   Mitral Valve:  The mitral valve leaflets appear normal. There is mild mitral  regurgitation.    Tricuspid Valve:  The tricuspid valve leaflets are normal.  There is mild tricuspid  regurgitation. The Right Ventricular Systolic Pressure is calculated at  35 mmHg.   Pulmonic Valve:  There is trace pulmonic regurgitation present.   Pericardium:  There is no pericardial effusion.   Conclusions  1. Mild concentric left ventricular hypertrophy with thickened  papillary muscle is observed.    Hyperdynamic LV systolic function with an  estimated ejection fraction is 65-70%.  2. Abnormal left ventricular diastolic filling is observed, consistent  with impaired relaxation.   3. Aortic valve is trileaflet, moderately calcified with mild to  moderate aortic stenosis  4. Mild tricuspid regurgitation with RVSP 35 mm Hg    Echocardiogram: 08/26/2016  FINDINGS:  1. Both atria are normal in size.  2. The aortic valve is mildly sclerotic without any hemodynamically  significant aortic stenosis.  3. Mild concentric left ventricular hypertrophy with early diastolic  dysfunction. No evidence of any regional wall motion abnormality.  The estimated ejection fraction greater than 60%.  4. There is mild mitral annular calcification.  5. Normal right ventricular size and function.  6. On color flow Doppler, there is trace mitral and tricuspid  regurgitation without any hemodynamic significance.  7. No intracardiac mass, pericardial effusion, or cardiac thrombus  seen.  CLINICAL IMPRESSION:  1. Mild concentric left ventricular hypertrophy with early diastolic  dysfunction.  2. Estimated ejection fraction greater than 60%.  3. Mildly sclerotic aortic valve with mild mitral annular  calcification without any hemodynamic significant stenotic process.  4. On color flow Doppler, there is trace mitral and tricuspid  regurgitation without any hemodynamic significance.  5. No intracardiac mass, pericardial effusion, or cardiac thrombus  Seen.    Carotid Ulsd: 01/15/2019  Interpretation Summary   Study Impression   • Right ICA Prox: Imaging of the right ICA indicates 0-49% stenosis.  • Right Vertebral: Antegrade flow is present.   Imaging of the right ICA indicates 0-49% stenosis.    • Left ICA Prox: Imaging of the left ICA indicates 0-49% stenosis.  • Left Vertebral: Antegrade flow present.   Imaging of the left ICA indicates 0-49% stenosis.     Renal Artery U/S 11/1/2017  1. No color Doppler scan evidence of hemodynamically  significant renal artery stenosis.  2. Elevated resistivity indices within the kidneys bilaterally which may be indicative of medical renal parenchymal disease.  3. Kidneys are slightly atrophic in size as detailed above.  4. Urinary bladder poorly distended for assessment with suspected thickening the bladder wall which is nonspecific and may simply be accentuated by lack of distention or muscle hypertrophy.    CTA Chest: 11/08/2017  IMPRESSION:  1. No evidence of pulmonary embolus..  2. Mild centrilobular and paraseptal emphysematous changes..  3. Posterior segment right lobe of liver peripheral 1.56 cm  simple hepatic cyst..  4.T11, T12, and L1 bilateral decompressive laminectomies.    Venous duplex lower extremity: 03/08/2018  · Acute right lower extremity deep vein thrombosis noted in the common femoral, profunda femoral, proximal femoral, mid femoral, distal femoral, popliteal and peroneal.  · Acute right lower extremity superficial thrombophlebitis noted in the greater saphenous (above knee).  · Acute left lower extremity deep vein thrombosis noted in the common femoral, profunda femoral, proximal femoral, mid femoral, distal femoral, popliteal and peroneal.  · Partial flow is noted above mentioned veins.    The following portions of the patient's history were reviewed and updated as appropriate: allergies, current medications, past family history, past medical history, past social history, past surgical history and problem list.    No visits with results within 1 Month(s) from this visit.   Latest known visit with results is:   Transcribe Orders on 09/30/2019   Component Date Value Ref Range Status   • Glucose 09/30/2019 100* 65 - 99 mg/dL Final   • BUN 09/30/2019 20  8 - 23 mg/dL Final   • Creatinine 09/30/2019 1.30* 0.76 - 1.27 mg/dL Final   • Sodium 09/30/2019 133* 136 - 145 mmol/L Final   • Potassium 09/30/2019 5.0  3.5 - 5.2 mmol/L Final   • Chloride 09/30/2019 94* 98 - 107 mmol/L Final   • CO2  09/30/2019 25.9  22.0 - 29.0 mmol/L Final   • Calcium 09/30/2019 9.1  8.6 - 10.5 mg/dL Final   • Albumin 09/30/2019 4.40  3.50 - 5.20 g/dL Final   • Phosphorus 09/30/2019 3.1  2.5 - 4.5 mg/dL Final   • Anion Gap 09/30/2019 13.1  5.0 - 15.0 mmol/L Final   • BUN/Creatinine Ratio 09/30/2019 15.4  7.0 - 25.0 Final   • eGFR   Amer 09/30/2019 64  >60 mL/min/1.73 Final   • Hemoglobin 09/30/2019 12.0* 13.0 - 17.7 g/dL Final   • Hematocrit 09/30/2019 36.7* 37.5 - 51.0 % Final   • Protein/Creatinine Ratio, Urine 09/30/2019 90.7  0.0 - 200.0 mg/G Crea Final   • Creatinine, Urine 09/30/2019 55.1  mg/dL Final   • Total Protein, Urine 09/30/2019 5.0  mg/dL Final     Assessment:      Diagnosis Plan   1. Chronic diastolic congestive heart failure with negative clinical evidence of hypervolemia;  He is well perfused. AHA Stage C: ; NYHA Class II  BETA-BLOCKER: Toprol XL 25 mg nightly secondary to palpitations/PACs with history of ablation (atrial flutter)  ACE/ARB: Lisinopril/HCTZ 20/12.5 mg daily (ordered per Dr Larkin)  ENTRESTO: Not applicable  DIURETIC: Lasix 20 mg daily as needed for weight gain of 2-3 pounds overnight or 5 pounds in one week; if the patient should have any concerns regarding leg swelling or the manner in which to use his Lasix he is to contact the heart failure clinic.  ALDOSTERONT ANTAGONIST: History of KIRK/hyperkalemia  IMDUR/HYDRALAZINE: Hydralazine 25 mg three times daily  DIGOXIN: Not applicable  Fluid restriction: 2000 cc daily  Sodium restriction: 2000 mg daily     Recommended daily weight monitoring.  Discussed patient action plan for heart failure.  Recommended avoiding NSAIDs use.  Discussed warning signs requiring additional medical attention for heart failure.    Periodic transthoracic echocardiogram surveillance due 2020    The patient is encouraged to keep his legs elevated if he is in a sitting position as much as possible; and to review his dietary selections to assure compliance and  dietary sodium not to exceed more than 2000 mg daily       2. Coronary artery disease involving native coronary artery of native heart without angina pectoris, stable      ASA plus statin therapy   3. Essential hypertension, stable      As per #1       4. Chronic kidney disease, stage II (mild), stable  As per Dr Reza         The patient presents with clinical symptoms suggestive of upper respiratory infection for which I have sent in a prescription for azithromycin over a course of 5 days.  He will seek reevaluation if lack of improvement or worsening of his symptoms.   The patient has a bronchodilator available to him and has been instructed in the use thereof.  I will contact him by telephone regarding results of today's basic metabolic profile.    Future Appointments       Provider Department Center    12/18/2019 8:45 AM Partha Campos DPM [facility] PODIATRY     2/13/2020 8:30 AM Mata Larkin MD [facility] FAMILY MEDICINE     3/5/2020 9:00 AM Jaimie Valverde APRN [facility] CARDIOLOGY     1/19/2021 10:00 AM Hamman, Jack L, MD [facility] CARDIOTHORACIC AND VASCULAR SURGERY             This document has been electronically signed by REGINA Carlos on December 5, 2019 9:37 AM

## 2019-12-05 ENCOUNTER — OFFICE VISIT (OUTPATIENT)
Dept: CARDIOLOGY | Facility: CLINIC | Age: 84
End: 2019-12-05

## 2019-12-05 ENCOUNTER — LAB (OUTPATIENT)
Dept: LAB | Facility: HOSPITAL | Age: 84
End: 2019-12-05

## 2019-12-05 VITALS
WEIGHT: 173.6 LBS | DIASTOLIC BLOOD PRESSURE: 76 MMHG | BODY MASS INDEX: 28.92 KG/M2 | SYSTOLIC BLOOD PRESSURE: 138 MMHG | OXYGEN SATURATION: 98 % | HEART RATE: 76 BPM | HEIGHT: 65 IN

## 2019-12-05 DIAGNOSIS — N18.2 STAGE 2 CHRONIC KIDNEY DISEASE: Chronic | ICD-10-CM

## 2019-12-05 DIAGNOSIS — I50.32 CHRONIC DIASTOLIC CONGESTIVE HEART FAILURE (HCC): Chronic | ICD-10-CM

## 2019-12-05 DIAGNOSIS — I10 ESSENTIAL HYPERTENSION: ICD-10-CM

## 2019-12-05 DIAGNOSIS — I50.32 CHRONIC DIASTOLIC CONGESTIVE HEART FAILURE (HCC): Primary | Chronic | ICD-10-CM

## 2019-12-05 DIAGNOSIS — I25.10 CORONARY ARTERY DISEASE INVOLVING NATIVE CORONARY ARTERY OF NATIVE HEART WITHOUT ANGINA PECTORIS: ICD-10-CM

## 2019-12-05 LAB
ANION GAP SERPL CALCULATED.3IONS-SCNC: 10 MMOL/L (ref 5–15)
BUN BLD-MCNC: 16 MG/DL (ref 8–23)
BUN/CREAT SERPL: 12.1 (ref 7–25)
CALCIUM SPEC-SCNC: 9.1 MG/DL (ref 8.6–10.5)
CHLORIDE SERPL-SCNC: 96 MMOL/L (ref 98–107)
CO2 SERPL-SCNC: 26 MMOL/L (ref 22–29)
CREAT BLD-MCNC: 1.32 MG/DL (ref 0.76–1.27)
GFR SERPL CREATININE-BSD FRML MDRD: 63 ML/MIN/1.73
GLUCOSE BLD-MCNC: 110 MG/DL (ref 65–99)
POTASSIUM BLD-SCNC: 4.3 MMOL/L (ref 3.5–5.2)
SODIUM BLD-SCNC: 132 MMOL/L (ref 136–145)

## 2019-12-05 PROCEDURE — 80048 BASIC METABOLIC PNL TOTAL CA: CPT

## 2019-12-05 PROCEDURE — 36415 COLL VENOUS BLD VENIPUNCTURE: CPT

## 2019-12-05 PROCEDURE — 99214 OFFICE O/P EST MOD 30 MIN: CPT | Performed by: NURSE PRACTITIONER

## 2019-12-05 RX ORDER — AZITHROMYCIN 250 MG/1
TABLET, FILM COATED ORAL
Qty: 6 TABLET | Refills: 0 | Status: SHIPPED | OUTPATIENT
Start: 2019-12-05 | End: 2020-02-13

## 2019-12-09 ENCOUNTER — TELEPHONE (OUTPATIENT)
Dept: FAMILY MEDICINE CLINIC | Facility: CLINIC | Age: 84
End: 2019-12-09

## 2019-12-09 NOTE — TELEPHONE ENCOUNTER
maury with [facility] called to see if we received a fax concerning the pts request for back support . For questions call [phone] ext [phone].  Thank You

## 2019-12-18 ENCOUNTER — OFFICE VISIT (OUTPATIENT)
Dept: PODIATRY | Facility: CLINIC | Age: 84
End: 2019-12-18

## 2019-12-18 VITALS — BODY MASS INDEX: 28.82 KG/M2 | HEIGHT: 65 IN | OXYGEN SATURATION: 98 % | HEART RATE: 59 BPM | WEIGHT: 173 LBS

## 2019-12-18 DIAGNOSIS — E11.9 ENCOUNTER FOR DIABETIC FOOT EXAM (HCC): ICD-10-CM

## 2019-12-18 DIAGNOSIS — Z79.01 ANTICOAGULANT LONG-TERM USE: ICD-10-CM

## 2019-12-18 DIAGNOSIS — M79.674 CHRONIC TOE PAIN, BILATERAL: ICD-10-CM

## 2019-12-18 DIAGNOSIS — G89.29 CHRONIC TOE PAIN, BILATERAL: ICD-10-CM

## 2019-12-18 DIAGNOSIS — M79.675 CHRONIC TOE PAIN, BILATERAL: ICD-10-CM

## 2019-12-18 DIAGNOSIS — E11.9 TYPE 2 DIABETES MELLITUS WITHOUT COMPLICATION, WITHOUT LONG-TERM CURRENT USE OF INSULIN (HCC): Primary | ICD-10-CM

## 2019-12-18 DIAGNOSIS — B35.1 ONYCHOMYCOSIS: ICD-10-CM

## 2019-12-18 PROCEDURE — 11721 DEBRIDE NAIL 6 OR MORE: CPT | Performed by: PODIATRIST

## 2019-12-18 PROCEDURE — 99213 OFFICE O/P EST LOW 20 MIN: CPT | Performed by: PODIATRIST

## 2019-12-18 NOTE — PROGRESS NOTES
[facility] Keith Ruvalcaba .  1935  84 y.o. male   MILENA Larkin - 11/14/2019  BS- 89 per patient     Patient presents today for routine diabetic foot care.    12/18/2019     Chief Complaint   Patient presents with   • Left Foot - diabetic nail trim   • Right Foot - diabetic nail trim       History of Present Illness    Patient presents to clinic today for routine diabetic foot care.  States his blood sugars are well controlled.  His most recent blood sugar was 89 mg/dL.  Complains of long, thickened painful toenails.  Pain is relieved with debridement.  He has no other pedal complaints.      Past Medical History:   Diagnosis Date   • Acquired hallux rigidus    • Anemia    • Arthropathy of lumbar facet joint    • Carpal tunnel syndrome    • CHF (congestive heart failure) (CMS/HCC)    • Chronic diastolic congestive heart failure (CMS/HCC)    • Chronic kidney disease    • Chronic kidney disease, stage II (mild)     Chronic kidney disease stage 2   • Chronic obstructive lung disease (CMS/HCC)    • Chronic renal failure    • Coronary artery disease    • Cortical senile cataract    • Diabetes mellitus (CMS/HCC)    • Diabetes mellitus type 2, noninsulin dependent (CMS/HCC)     diabetes mellitus type 2, non-insulin treated, Medication treatment plan: oral diabetic medication   • Diabetes mellitus without complication (CMS/HCC)    • Diastolic dysfunction    • Diastolic heart failure (CMS/HCC)    • Essential hypertension     difficult to control      • Hip pain    • History of echocardiogram 08/19/2015    Echocardiogram W/ color flow 37991 (1) - Mild to moderate LVH with mild left atrial enlargement and normal aortic root.CLVH,preserved LV systolic function with Ef of 55%.Diastolic dysfunction.MV intact.AV thickened.Aortic sclerosis.   • Hyperlipidemia    • Influenza vaccine administered 10/02/2015    INFLUENZA IMMUN ADMIN OR PREV RECV'D  (1) - Ordered By: DIONNE MANDUJANO ([facility])    • Insomnia    • Insomnia     Other insomnia    • Joint pain    • Low back pain    • Muscle strain    • Nuclear cataract    • Onychomycosis    • Pneumococcal vaccination given 07/22/2016    PNEUMOC VAC/ADMIN/RCVD 4040F (3) - Ordered By: DIONNE MANDUJANO ([facility])   • Prostate cancer (CMS/[facility])    • Pure hypercholesterolemia    • Sedentary lifestyle          Past Surgical History:   Procedure Laterality Date   • CARDIAC ABLATION     • CARDIAC CATHETERIZATION  02/04/1986    Cardiac cath 81182 (1) - normal left heart catherization,non cardiac chest pain   • CARPAL TUNNEL RELEASE  10/19/2015    Carpal tunnel surgery (1) - Release of left carpal tunnel   • CATARACT EXTRACTION W/ INTRAOCULAR LENS IMPLANT Left 3/31/2017    Procedure: REMOVE CATARACT AND IMPLANT INRAOCULAR LENS LEFT EYE;  Surgeon: Hector Navarrete MD;  Location: [facility] OR;  Service:    • CATARACT EXTRACTION W/ INTRAOCULAR LENS IMPLANT Right 4/14/2017    Procedure: REMOVE CATARACT AND IMPLANT INTRAOCULAR LENS RIGHT EYE;  Surgeon: Hector Navarrete MD;  Location: [facility] OR;  Service:    • COLONOSCOPY N/A 10/31/2018    Procedure: COLONOSCOPY;  Surgeon: Herbie Christine MD;  Location: [facility] ENDOSCOPY;  Service: Gastroenterology   • ENDOSCOPY  08/05/2013    Colon endoscopy 93791 (2) - Hemorrhoids found.   • INGUINAL HERNIA REPAIR Right 06/07/1968    Inguinal hernia, repair (2)   • INJECTION OF MEDICATION  09/06/2012    Albuterol (2u) (1) - Ordered By: LIDYA STEINER ([facility])    • INJECTION OF MEDICATION  09/06/2012    Atrovent (1) - Ordered By: LIDYA STEINER ([facility])    • INJECTION OF MEDICATION  02/10/2012    Depo Medrol (Methylprednisone) (3) - Ordered By: HEATHER PAK ([facility])    • INJECTION OF MEDICATION  02/09/2014    Kenalog (4) - Ordered By: DIMAS ANDRADE ([facility])    • JOINT REPLACEMENT     • LUMBAR LAMINECTOMY  2007    Lumbar laminectomy (1)   • MANDIBLE FRACTURE SURGERY  10/11/1981    Treat nose/jaw fracture (1) - bilateral open reduction of fracture of mandible   • OTHER  SURGICAL HISTORY  2011    Drain/Inject Major Joint  (1) - Ordered By: KARLOS HERNANDEZ ([facility])    • OTHER SURGICAL HISTORY      Insert IVC filter 48653 (1)   • OTHER SURGICAL HISTORY  10/11/2011    SPIROMETRY 81670 (1) - Ordered By: HEATHER PAK ([facility])   • TOTAL HIP ARTHROPLASTY Right          Family History   Problem Relation Age of Onset   • Diabetes Other    • Cancer Sister    • Heart disease Mother    • Hypertension Mother    • Heart disease Father    • Hypertension Father    • Coronary artery disease Neg Hx        Allergies   Allergen Reactions   • Aldactone [Spironolactone] Other (See Comments)     Hyponatremia and hyperkalemia       Social History     Socioeconomic History   • Marital status:      Spouse name: Not on file   • Number of children: Not on file   • Years of education: Not on file   • Highest education level: Not on file   Tobacco Use   • Smoking status: Former Smoker     Last attempt to quit:      Years since quittin.9   • Smokeless tobacco: Never Used   Substance and Sexual Activity   • Alcohol use: No   • Drug use: No   • Sexual activity: Defer     Comment: Marital status:          Current Outpatient Medications   Medication Sig Dispense Refill   • albuterol sulfate  (90 Base) MCG/ACT inhaler Inhale 2 puffs Every 4 (Four) Hours As Needed for Wheezing. 18 g 6   • aspirin 81 MG EC tablet Take 81 mg by mouth daily.     • azithromycin (ZITHROMAX) 250 MG tablet Take 2 tablets the first day, then 1 tablet daily for 4 days. 6 tablet 0   • carbidopa-levodopa (SINEMET)  MG per tablet Take 1 tablet by mouth 3 (Three) Times a Day. 90 tablet 2   • ferrous sulfate 325 (65 FE) MG tablet Take 1 tablet by mouth Daily With Breakfast. 30 tablet 5   • furosemide (LASIX) 20 MG tablet Take 1 tablet by mouth Daily As Needed (weight gain of 3lbs overnight or leg swelling). 30 tablet 3   • gabapentin (NEURONTIN) 300 MG capsule One tablet by mouth tid 270 capsule 2  "  • glucose blood (TRUE METRIX BLOOD GLUCOSE TEST) test strip Check glucose Once daily 100 each 6   • hydrALAZINE (APRESOLINE) 25 MG tablet TAKE ONE TABLET BY MOUTH THREE TIMES A DAY 90 tablet 4   • JANUVIA 50 MG tablet TAKE ONE TABLET BY MOUTH DAILY 30 tablet 3   • lisinopril-hydrochlorothiazide (PRINZIDE,ZESTORETIC) 20-12.5 MG per tablet TAKE ONE TABLET BY MOUTH DAILY 30 tablet 3   • metoprolol succinate XL (TOPROL-XL) 25 MG 24 hr tablet TAKE ONE TABLET BY MOUTH EVERY NIGHT 30 tablet 2   • pravastatin (PRAVACHOL) 40 MG tablet Take 1 tablet by mouth Daily. 90 tablet 2   • rivaroxaban (XARELTO) 10 MG tablet Take 1 tablet by mouth Daily. 30 tablet 3   • TRUEPLUS LANCETS 30G misc Use to test Blood Sugars Once Daily 100 each 6     No current facility-administered medications for this visit.        Review of Systems   Constitutional: Negative.    HENT: Negative.    Eyes: Negative.    Respiratory: Negative.    Cardiovascular: Negative.    Gastrointestinal: Negative.    Musculoskeletal:        Toe pain   Skin: Negative.    Psychiatric/Behavioral: Negative.          OBJECTIVE    Pulse 59   Ht 165.1 cm (65\")   Wt 78.5 kg (173 lb)   SpO2 98%   BMI 28.79 kg/m²       Physical Exam   Constitutional: He is oriented to person, place, and time. He appears well-developed and well-nourished. No distress.   Eyes: Pupils are equal, round, and reactive to light. EOM are normal.   Pulmonary/Chest: Effort normal. No respiratory distress.    General had a diabetic foot exam performed today.   During the foot exam he had a monofilament test performed.  Neurological: He is alert and oriented to person, place, and time.   Skin: Skin is warm and dry. Capillary refill takes less than 2 seconds.   Psychiatric: He has a normal mood and affect. His behavior is normal.   Vitals reviewed.      Gait: Normal    Assistive Device: Walker    Lower Extremity    Cardiovascular:    DP/PT pulses palpable bilateral  CFT brisk  to all digits  Skin temp is " warm to warm from proximal tibia to distal digits bilateral  Pedal hair growth decreased.   No erythema noted   Nonpitting vanesa-malleolar edema bilateral  Musculoskeletal:  Muscle strength is 5/5 for all muscle groups tested   ROM of the 1st MTP is WNL bilateral   ROM of the MTJ is WNL bilateral   ROM of the STJ is WNL bilateral   ROM of the ankle joint is  WNL bilateral   Dermatological:   Skin is warm, dry and intact bilateral  Webspaces 1-4 bilateral are clean, dry and intact.   No subcutaneous nodules or masses noted    Nails 1-5 bilateral are  discolored, elongated with subungual debris.  Pain on palpation to the nail plates.  Neurological:   Protective sensation intact   Sensation intact to light touch        Procedures        ASSESSMENT AND PLAN    General was seen today for diabetic nail trim and diabetic nail trim.    Diagnoses and all orders for this visit:    Type 2 diabetes mellitus without complication, without long-term current use of insulin (CMS/[facility])    Onychomycosis    Chronic toe pain, bilateral    Anticoagulant long-term use    Encounter for diabetic foot exam (CMS/[facility])        - A diabetic foot screening exam was performed and the patient was educated on the foot complications related to diabetes,  preventative foot care and what signs and symptoms to watch for.  Instructed to contact our office if any foot problems develop before next visit.  - Nails 1-5 bilateral were debrided in length and thickness with nail nipper and electric  to decrease fungal load and risk of infection.  - All the patients questions were answered.  - RTC 3 months or sooner if needed.               This document has been electronically signed by Partha Campos DPM on December 19, 2019 3:56 PM     12/19/2019  3:56 PM

## 2020-01-07 RX ORDER — RIVAROXABAN 10 MG/1
TABLET, FILM COATED ORAL
Qty: 30 TABLET | Refills: 3 | Status: SHIPPED | OUTPATIENT
Start: 2020-01-07 | End: 2020-05-05

## 2020-01-30 DIAGNOSIS — M79.641 RIGHT HAND PAIN: Primary | ICD-10-CM

## 2020-01-31 ENCOUNTER — OFFICE VISIT (OUTPATIENT)
Dept: ORTHOPEDIC SURGERY | Facility: CLINIC | Age: 85
End: 2020-01-31

## 2020-01-31 VITALS — HEIGHT: 65 IN | BODY MASS INDEX: 28.71 KG/M2 | WEIGHT: 172.3 LBS

## 2020-01-31 DIAGNOSIS — M79.641 RIGHT HAND PAIN: Primary | ICD-10-CM

## 2020-01-31 DIAGNOSIS — M19.041 OSTEOARTHRITIS OF FINGER OF RIGHT HAND: ICD-10-CM

## 2020-01-31 PROCEDURE — 20600 DRAIN/INJ JOINT/BURSA W/O US: CPT | Performed by: NURSE PRACTITIONER

## 2020-01-31 PROCEDURE — 99214 OFFICE O/P EST MOD 30 MIN: CPT | Performed by: NURSE PRACTITIONER

## 2020-01-31 RX ADMIN — TRIAMCINOLONE ACETONIDE 20 MG: 40 INJECTION, SUSPENSION INTRA-ARTICULAR; INTRAMUSCULAR at 10:10

## 2020-01-31 RX ADMIN — LIDOCAINE HYDROCHLORIDE 0.5 ML: 10 INJECTION, SOLUTION EPIDURAL; INFILTRATION; INTRACAUDAL; PERINEURAL at 10:10

## 2020-02-02 PROBLEM — M19.041 OSTEOARTHRITIS OF FINGER OF RIGHT HAND: Status: ACTIVE | Noted: 2020-02-02

## 2020-02-02 RX ORDER — TRIAMCINOLONE ACETONIDE 40 MG/ML
20 INJECTION, SUSPENSION INTRA-ARTICULAR; INTRAMUSCULAR
Status: COMPLETED | OUTPATIENT
Start: 2020-01-31 | End: 2020-01-31

## 2020-02-02 RX ORDER — LIDOCAINE HYDROCHLORIDE 10 MG/ML
0.5 INJECTION, SOLUTION EPIDURAL; INFILTRATION; INTRACAUDAL; PERINEURAL
Status: COMPLETED | OUTPATIENT
Start: 2020-01-31 | End: 2020-01-31

## 2020-02-05 RX ORDER — METOPROLOL SUCCINATE 25 MG/1
TABLET, EXTENDED RELEASE ORAL
Qty: 30 TABLET | Refills: 2 | Status: SHIPPED | OUTPATIENT
Start: 2020-02-05 | End: 2020-05-05

## 2020-02-13 ENCOUNTER — LAB (OUTPATIENT)
Dept: LAB | Facility: HOSPITAL | Age: 85
End: 2020-02-13

## 2020-02-13 ENCOUNTER — OFFICE VISIT (OUTPATIENT)
Dept: FAMILY MEDICINE CLINIC | Facility: CLINIC | Age: 85
End: 2020-02-13

## 2020-02-13 ENCOUNTER — TELEPHONE (OUTPATIENT)
Dept: FAMILY MEDICINE CLINIC | Facility: CLINIC | Age: 85
End: 2020-02-13

## 2020-02-13 VITALS
SYSTOLIC BLOOD PRESSURE: 134 MMHG | HEART RATE: 65 BPM | BODY MASS INDEX: 28.57 KG/M2 | HEIGHT: 65 IN | OXYGEN SATURATION: 99 % | WEIGHT: 171.5 LBS | DIASTOLIC BLOOD PRESSURE: 78 MMHG

## 2020-02-13 DIAGNOSIS — I10 ESSENTIAL HYPERTENSION: Primary | ICD-10-CM

## 2020-02-13 DIAGNOSIS — E78.5 HYPERLIPIDEMIA, UNSPECIFIED HYPERLIPIDEMIA TYPE: ICD-10-CM

## 2020-02-13 DIAGNOSIS — I25.10 CORONARY ARTERY DISEASE INVOLVING NATIVE CORONARY ARTERY OF NATIVE HEART WITHOUT ANGINA PECTORIS: ICD-10-CM

## 2020-02-13 DIAGNOSIS — I10 ESSENTIAL HYPERTENSION: ICD-10-CM

## 2020-02-13 LAB
ALBUMIN SERPL-MCNC: 4.5 G/DL (ref 3.5–5.2)
ALBUMIN/GLOB SERPL: 2 G/DL
ALP SERPL-CCNC: 59 U/L (ref 39–117)
ALT SERPL W P-5'-P-CCNC: 8 U/L (ref 1–41)
ANION GAP SERPL CALCULATED.3IONS-SCNC: 14.6 MMOL/L (ref 5–15)
AST SERPL-CCNC: 18 U/L (ref 1–40)
BASOPHILS # BLD AUTO: 0.06 10*3/MM3 (ref 0–0.2)
BASOPHILS NFR BLD AUTO: 1.2 % (ref 0–1.5)
BILIRUB SERPL-MCNC: 0.3 MG/DL (ref 0.2–1.2)
BUN BLD-MCNC: 17 MG/DL (ref 8–23)
BUN/CREAT SERPL: 14 (ref 7–25)
CALCIUM SPEC-SCNC: 9.6 MG/DL (ref 8.6–10.5)
CHLORIDE SERPL-SCNC: 95 MMOL/L (ref 98–107)
CHOLEST SERPL-MCNC: 163 MG/DL (ref 0–200)
CO2 SERPL-SCNC: 22.4 MMOL/L (ref 22–29)
CREAT BLD-MCNC: 1.21 MG/DL (ref 0.76–1.27)
DEPRECATED RDW RBC AUTO: 44.3 FL (ref 37–54)
EOSINOPHIL # BLD AUTO: 0.05 10*3/MM3 (ref 0–0.4)
EOSINOPHIL NFR BLD AUTO: 1 % (ref 0.3–6.2)
ERYTHROCYTE [DISTWIDTH] IN BLOOD BY AUTOMATED COUNT: 14.1 % (ref 12.3–15.4)
GFR SERPL CREATININE-BSD FRML MDRD: 69 ML/MIN/1.73
GLOBULIN UR ELPH-MCNC: 2.3 GM/DL
GLUCOSE BLD-MCNC: 103 MG/DL (ref 65–99)
HCT VFR BLD AUTO: 36.7 % (ref 37.5–51)
HDLC SERPL-MCNC: 69 MG/DL (ref 40–60)
HGB BLD-MCNC: 12.6 G/DL (ref 13–17.7)
IMM GRANULOCYTES # BLD AUTO: 0.03 10*3/MM3 (ref 0–0.05)
IMM GRANULOCYTES NFR BLD AUTO: 0.6 % (ref 0–0.5)
LDLC SERPL CALC-MCNC: 83 MG/DL (ref 0–100)
LDLC/HDLC SERPL: 1.2 {RATIO}
LYMPHOCYTES # BLD AUTO: 1.59 10*3/MM3 (ref 0.7–3.1)
LYMPHOCYTES NFR BLD AUTO: 31.8 % (ref 19.6–45.3)
MCH RBC QN AUTO: 29.8 PG (ref 26.6–33)
MCHC RBC AUTO-ENTMCNC: 34.3 G/DL (ref 31.5–35.7)
MCV RBC AUTO: 86.8 FL (ref 79–97)
MONOCYTES # BLD AUTO: 0.81 10*3/MM3 (ref 0.1–0.9)
MONOCYTES NFR BLD AUTO: 16.2 % (ref 5–12)
NEUTROPHILS # BLD AUTO: 2.46 10*3/MM3 (ref 1.7–7)
NEUTROPHILS NFR BLD AUTO: 49.2 % (ref 42.7–76)
NRBC BLD AUTO-RTO: 0 /100 WBC (ref 0–0.2)
PLATELET # BLD AUTO: 379 10*3/MM3 (ref 140–450)
PMV BLD AUTO: 9 FL (ref 6–12)
POTASSIUM BLD-SCNC: 4.8 MMOL/L (ref 3.5–5.2)
PROT SERPL-MCNC: 6.8 G/DL (ref 6–8.5)
RBC # BLD AUTO: 4.23 10*6/MM3 (ref 4.14–5.8)
SODIUM BLD-SCNC: 132 MMOL/L (ref 136–145)
TRIGL SERPL-MCNC: 55 MG/DL (ref 0–150)
VLDLC SERPL-MCNC: 11 MG/DL (ref 5–40)
WBC NRBC COR # BLD: 5 10*3/MM3 (ref 3.4–10.8)

## 2020-02-13 PROCEDURE — 80061 LIPID PANEL: CPT

## 2020-02-13 PROCEDURE — 85025 COMPLETE CBC W/AUTO DIFF WBC: CPT

## 2020-02-13 PROCEDURE — 80053 COMPREHEN METABOLIC PANEL: CPT

## 2020-02-13 PROCEDURE — 36415 COLL VENOUS BLD VENIPUNCTURE: CPT

## 2020-02-13 PROCEDURE — 99214 OFFICE O/P EST MOD 30 MIN: CPT | Performed by: FAMILY MEDICINE

## 2020-02-13 RX ORDER — METHOCARBAMOL 500 MG/1
500 TABLET, FILM COATED ORAL 4 TIMES DAILY
Qty: 56 TABLET | Refills: 1 | Status: SHIPPED | OUTPATIENT
Start: 2020-02-13 | End: 2020-10-13

## 2020-02-13 NOTE — PROGRESS NOTES
Subjective   General Keith Ruvalcaba Jr. is a 84 y.o. male.     Hypertension   This is a chronic problem. The current episode started more than 1 year ago. The problem is unchanged. The problem is controlled. Pertinent negatives include no anxiety, blurred vision, chest pain, headaches, neck pain, orthopnea, palpitations or shortness of breath. There are no known risk factors for coronary artery disease. Past treatments include ACE inhibitors, diuretics and alpha 1 blockers. Current antihypertension treatment includes alpha 1 blockers, ACE inhibitors and diuretics.   Hyperlipidemia   This is a chronic problem. The current episode started more than 1 year ago. Recent lipid tests were reviewed and are variable. Pertinent negatives include no chest pain or shortness of breath. The current treatment provides moderate improvement of lipids. There are no compliance problems.    Coronary Artery Disease   Presents for follow-up visit. Pertinent negatives include no chest pain, chest pressure, chest tightness, dizziness, palpitations, shortness of breath or weight gain. Risk factors include hyperlipidemia.     The patient is at his base line.  The following portions of the patient's history were reviewed and updated as appropriate: allergies, current medications, past family history, past medical history, past social history, past surgical history and problem list.    Review of Systems   Constitutional: Negative for weight gain.   Eyes: Negative for blurred vision.   Respiratory: Negative for chest tightness and shortness of breath.    Cardiovascular: Negative for chest pain, palpitations and orthopnea.   Musculoskeletal: Negative for neck pain.   Neurological: Negative for dizziness and headaches.       Objective   Physical Exam   Constitutional: He is oriented to person, place, and time. He appears well-developed and well-nourished.   HENT:   Head: Normocephalic and atraumatic.   Right Ear: External ear normal.   Left Ear:  "External ear normal.   Nose: Nose normal.   Mouth/Throat: Oropharynx is clear and moist.   Eyes: Conjunctivae are normal.   Neck: Normal range of motion.   Cardiovascular: Normal rate, regular rhythm and normal heart sounds. Exam reveals no gallop and no friction rub.   No murmur heard.  Pulmonary/Chest: Effort normal and breath sounds normal. No stridor. No respiratory distress. He has no wheezes. He has no rales.   Abdominal: Soft. Bowel sounds are normal. He exhibits no distension and no mass. There is no tenderness. There is no guarding.   Neurological: He is alert and oriented to person, place, and time.   Skin: Skin is warm and dry.   Vitals reviewed.        Visit Vitals  /78   Pulse 65   Ht 165.1 cm (65\")   Wt 77.8 kg (171 lb 8 oz)   SpO2 99%   BMI 28.54 kg/m²     Body mass index is 28.54 kg/m².      Assessment/Plan   General was seen today for follow-up, diabetes and hypertension.    Diagnoses and all orders for this visit:    Essential hypertension  -     Comprehensive metabolic panel; Future  -     CBC w AUTO Differential; Future    Coronary artery disease involving native coronary artery of native heart without angina pectoris    Hyperlipidemia, unspecified hyperlipidemia type  -     Lipid panel; Future    Return to the clinic in 3 month/s.  Will contact with results as needed.    "

## 2020-02-13 NOTE — TELEPHONE ENCOUNTER
PT WAS SEEN THIS MORNING. HE FORGOT TO TELL DR BETHEA ABOUT THE TIGHTNESS AND PAIN HE IS HAVING IN HIS BACK. HE IS ASKING IF DR BETHEA WILL [facility]. CALL PATIENT [phone]

## 2020-03-02 RX ORDER — SITAGLIPTIN 50 MG/1
TABLET, FILM COATED ORAL
Qty: 30 TABLET | Refills: 3 | Status: SHIPPED | OUTPATIENT
Start: 2020-03-02 | End: 2020-06-29

## 2020-03-02 RX ORDER — LISINOPRIL AND HYDROCHLOROTHIAZIDE 20; 12.5 MG/1; MG/1
TABLET ORAL
Qty: 30 TABLET | Refills: 3 | Status: SHIPPED | OUTPATIENT
Start: 2020-03-02 | End: 2020-06-29

## 2020-03-05 ENCOUNTER — OFFICE VISIT (OUTPATIENT)
Dept: CARDIOLOGY | Facility: CLINIC | Age: 85
End: 2020-03-05

## 2020-03-05 ENCOUNTER — OFFICE VISIT (OUTPATIENT)
Dept: FAMILY MEDICINE CLINIC | Facility: CLINIC | Age: 85
End: 2020-03-05

## 2020-03-05 ENCOUNTER — TELEPHONE (OUTPATIENT)
Dept: FAMILY MEDICINE CLINIC | Facility: CLINIC | Age: 85
End: 2020-03-05

## 2020-03-05 VITALS
OXYGEN SATURATION: 99 % | SYSTOLIC BLOOD PRESSURE: 152 MMHG | BODY MASS INDEX: 27.74 KG/M2 | HEART RATE: 73 BPM | DIASTOLIC BLOOD PRESSURE: 80 MMHG | WEIGHT: 166.5 LBS | HEIGHT: 65 IN

## 2020-03-05 VITALS
DIASTOLIC BLOOD PRESSURE: 60 MMHG | OXYGEN SATURATION: 100 % | HEIGHT: 65 IN | SYSTOLIC BLOOD PRESSURE: 120 MMHG | BODY MASS INDEX: 28.24 KG/M2 | WEIGHT: 169.5 LBS | HEART RATE: 65 BPM

## 2020-03-05 DIAGNOSIS — Z00.00 ENCOUNTER FOR WELLNESS EXAMINATION: Primary | ICD-10-CM

## 2020-03-05 DIAGNOSIS — I25.10 CORONARY ARTERY DISEASE INVOLVING NATIVE CORONARY ARTERY OF NATIVE HEART WITHOUT ANGINA PECTORIS: ICD-10-CM

## 2020-03-05 DIAGNOSIS — I82.513 CHRONIC DEEP VEIN THROMBOSIS (DVT) OF FEMORAL VEIN OF BOTH LOWER EXTREMITIES (HCC): ICD-10-CM

## 2020-03-05 DIAGNOSIS — I50.32 CHRONIC DIASTOLIC CONGESTIVE HEART FAILURE (HCC): Primary | Chronic | ICD-10-CM

## 2020-03-05 PROCEDURE — G0439 PPPS, SUBSEQ VISIT: HCPCS | Performed by: FAMILY MEDICINE

## 2020-03-05 PROCEDURE — 99214 OFFICE O/P EST MOD 30 MIN: CPT | Performed by: NURSE PRACTITIONER

## 2020-03-05 NOTE — PROGRESS NOTES
The ABCs of the Annual Wellness Visit  Subsequent Medicare Wellness Visit    Chief Complaint   Patient presents with   • Medicare Wellness-subsequent       Subjective   History of Present Illness:  General Keith Ruvalcaba Jr. is a 84 y.o. male who presents for a Subsequent Medicare Wellness Visit.    HEALTH RISK ASSESSMENT    Recent Hospitalizations:  No hospitalization(s) within the last year.    Current Medical Providers:  Patient Care Team:  Mata Larkin MD as PCP - General (Family Medicine)  Maria Porter APRN (Inactive) as PCP - Claims Attributed  [place] hassan MD as Consulting Physician (Radiation Oncology)  David Groves MD (Urology)  Sarah Bartlett APRN as Nurse Practitioner (Oncology)  Yeimy Hannon APRN as Nurse Practitioner (Orthopedic Surgery)  Fernando Chacon MD as Consulting Physician (Orthopedic Surgery)    Smoking Status:  Social History     Tobacco Use   Smoking Status Former Smoker   • Last attempt to quit:    • Years since quittin.1   Smokeless Tobacco Never Used       Alcohol Consumption:  Social History     Substance and Sexual Activity   Alcohol Use No       Depression Screen:   PHQ-2/PHQ-9 Depression Screening 3/5/2020   Little interest or pleasure in doing things 0   Feeling down, depressed, or hopeless 0   Total Score 0       Fall Risk Screen:  STEADI Fall Risk Assessment has not been completed.    Health Habits and Functional and Cognitive Screening:  Functional & Cognitive Status 3/5/2020   Do you have difficulty preparing food and eating? No   Do you have difficulty bathing yourself, getting dressed or grooming yourself? No   Do you have difficulty using the toilet? No   Do you have difficulty moving around from place to place? Yes   Do you have trouble with steps or getting out of a bed or a chair? -   Current Diet Well Balanced Diet   Dental Exam Not up to date   Eye Exam Not up to date   Exercise (times per week) 4 times per week   Current  Exercise Activities Include Stationary Bicycling/Spin Class   Do you need help using the phone?  No   Are you deaf or do you have serious difficulty hearing?  No   Do you need help with transportation? Yes   Do you need help shopping? No   Do you need help preparing meals?  No   Do you need help with housework?  No   Do you need help with laundry? No   Do you need help taking your medications? No   Do you need help managing money? No   Do you ever drive or ride in a car without wearing a seat belt? No   Have you felt unusual stress, anger or loneliness in the last month? No   Who do you live with? Child   If you need help, do you have trouble finding someone available to you? No   Have you been bothered in the last four weeks by sexual problems? No   Do you have difficulty concentrating, remembering or making decisions? No         Does the patient have evidence of cognitive impairment? Yes    Asprin use counseling:Taking ASA appropriately as indicated    Age-appropriate Screening Schedule:  Refer to the list below for future screening recommendations based on patient's age, sex and/or medical conditions. Orders for these recommended tests are listed in the plan section. The patient has been provided with a written plan.    Health Maintenance   Topic Date Due   • DIABETIC EYE EXAM  04/14/2018   • ZOSTER VACCINE (2 of 2) 06/08/2018   • HEMOGLOBIN A1C  02/16/2020   • URINE MICROALBUMIN  09/30/2020   • LIPID PANEL  02/13/2021   • COLONOSCOPY  10/31/2021   • TDAP/TD VACCINES (2 - Td) 01/26/2027   • INFLUENZA VACCINE  Completed          The following portions of the patient's history were reviewed and updated as appropriate: allergies, current medications, past family history, past medical history, past social history, past surgical history and problem list.    Outpatient Medications Prior to Visit   Medication Sig Dispense Refill   • albuterol sulfate  (90 Base) MCG/ACT inhaler Inhale 2 puffs Every 4 (Four) Hours  As Needed for Wheezing. 18 g 6   • aspirin 81 MG EC tablet Take 81 mg by mouth daily.     • carbidopa-levodopa (SINEMET)  MG per tablet Take 1 tablet by mouth 3 (Three) Times a Day. 90 tablet 2   • ferrous sulfate 325 (65 FE) MG tablet Take 1 tablet by mouth Daily With Breakfast. 30 tablet 5   • furosemide (LASIX) 20 MG tablet Take 1 tablet by mouth Daily As Needed (weight gain of 3lbs overnight or leg swelling). 30 tablet 3   • gabapentin (NEURONTIN) 300 MG capsule One tablet by mouth tid 270 capsule 2   • glucose blood (TRUE METRIX BLOOD GLUCOSE TEST) test strip Check glucose Once daily 100 each 6   • hydrALAZINE (APRESOLINE) 25 MG tablet TAKE ONE TABLET BY MOUTH THREE TIMES A DAY 90 tablet 4   • JANUVIA 50 MG tablet TAKE ONE TABLET BY MOUTH DAILY 30 tablet 3   • lisinopril-hydrochlorothiazide (PRINZIDE,ZESTORETIC) 20-12.5 MG per tablet TAKE ONE TABLET BY MOUTH DAILY 30 tablet 3   • methocarbamol (ROBAXIN) 500 MG tablet Take 1 tablet by mouth 4 (Four) Times a Day. 56 tablet 1   • metoprolol succinate XL (TOPROL-XL) 25 MG 24 hr tablet TAKE ONE TABLET BY MOUTH EVERY NIGHT 30 tablet 2   • pravastatin (PRAVACHOL) 40 MG tablet Take 1 tablet by mouth Daily. 90 tablet 2   • TRUEPLUS LANCETS 30G misc Use to test Blood Sugars Once Daily 100 each 6   • XARELTO 10 MG tablet TAKE ONE TABLET BY MOUTH DAILY 30 tablet 3     No facility-administered medications prior to visit.        Patient Active Problem List   Diagnosis   • Chronic diastolic congestive heart failure (CMS/HCC)   • Hypertension   • Coronary artery disease   • Chronic kidney disease   • Status post total hip replacement, right   • Lumbar disc disease   • Osteoarthritis of multiple joints   • Iron deficiency   • Nuclear cataract   • Cortical age-related cataract   • Diabetes mellitus without complication (CMS/HCC)   • Trochanteric bursitis, left hip   • Carotid stenosis, asymptomatic   • Type 2 diabetes mellitus without complication, without long-term  "current use of insulin (CMS/[facility])   • Elevated troponin   • Lower extremity edema   • Hematuria, microscopic   • H/O prostate cancer   • Chronic kidney disease, stage II (mild)   • Right foot pain   • Tendonitis of foot   • Right hip pain   • Lumbar radiculopathy, right   • Trochanteric bursitis of right hip   • Family history of colon cancer   • Encounter for screening for malignant neoplasm of colon   • Chronic deep vein thrombosis (DVT) of femoral vein of both lower extremities (CMS/[facility])   • Long term current use of anticoagulant   • Pain of left hip joint   • Pneumonia of right upper lobe due to infectious organism (CMS/[facility])   • Enteritis   • Renal failure   • Human metapneumovirus (hMPV) pneumonia   • KIRK (acute kidney injury) (CMS/[facility])   • Presence of right hip implant   • Right leg pain   • Right hand pain   • Osteoarthritis of finger of right hand       Advanced Care Planning:  ACP discussion was held with the patient during this visit.    Review of Systems    Compared to one year ago, the patient feels his physical health is better.  Compared to one year ago, the patient feels his mental health is the same.    Reviewed chart for potential of high risk medication in the elderly: yes  Reviewed chart for potential of harmful drug interactions in the elderly:yes    Objective         Vitals:    03/05/20 1108   BP: 120/60   Pulse: 65   SpO2: 100%   Weight: 76.9 kg (169 lb 8 oz)   Height: 165.1 cm (65\")       Body mass index is 28.21 kg/m².  Discussed the patient's BMI with him. The BMI is in the acceptable range.    Physical Exam    Lab Results   Component Value Date    TRIG 55 02/13/2020    HDL 69 (H) 02/13/2020    LDL 83 02/13/2020    VLDL 11 02/13/2020        Assessment/Plan   Medicare Risks and Personalized Health Plan  CMS Preventative Services Quick Reference  Advance Directive Discussion    The above risks/problems have been discussed with the patient.  Pertinent information has been shared with the patient " in the After Visit Summary.  Follow up plans and orders are seen below in the Assessment/Plan Section.    There are no diagnoses linked to this encounter.  Follow Up:  No follow-ups on file.     An After Visit Summary and PPPS were given to the patient.

## 2020-03-05 NOTE — TELEPHONE ENCOUNTER
Pt wants a call back concerning a power chair, due to his back surgery history and current physical condition. Please call, thanks!

## 2020-03-05 NOTE — PROGRESS NOTES
[facility] CHF CLINIC OFFICE VISIT    Subjective:     Congestive Heart Failure (chief complaint)      History of Present Illness  Congestive Heart Failure   Presents for follow-up visit. Pertinent negatives include no chest pressure, claudication, edema (bilateral ankle at the end of the day; compliant with compression stockings), near-syncope, orthopnea, palpitations, paroxysmal nocturnal dyspnea, shortness of breath or unexpected weight change. The symptoms have been improving. Compliance with total regimen is %. Compliance problems include adherence to exercise.  Compliance with diet is %. Compliance with exercise is 26-50%. Compliance with medications is %.      1. CAD, non-obstructive 2005  2. HFpEF - diastolic dysfunction  3. Hypertension  4. CKD with H/O KIRK follows with Dr Reza  5. H/O Atrial Flutter S/P ablation per Dr Graves 2006  6. Prostate Cancer Salvador Score 6/Stage 2 S/P RT   7. DM, Type II  8. Anterior right total hip arthroplasty 04/25/2016  9. Chronic DVT of bilateral femoral vein: on preventative dosing - Xarelto  10. Carotid disease followed annually by Dr Hamman     Mr. Ruvalcaba underwent lumbar surgery 10/27/2017 in [place]. He was discharge 11/7/2017. Following discharge he had several medications that were recommended be stopped secondary to hypotension/KIRK.      The patient received evaluation with REGINA Vidal on 03/08/2018 in follow-up regarding leg edema for which lower extremity duplex have been ordered and was found with positive findings regarding DVT.   The patient was initiated on DOAC, Xarelto on that date.      02/07/2019  The patient presents to the office today for previously scheduled appointment. He indicates that approximately 2 week prior he began to notice a tremor in bilateral hands most prominent at rest. He reports negative impact on quality of life or activities of daily living. He feels that perhaps the prominence of the tremors diminishes in the  "evening and during sleep.   The patient reports fluid intake to be approximately 1400 - 1500 cc per day. He reports negative use of diuretics over the past 8 weeks approximate.     03/05/2019  The patient presents to the office today for re-evaluation following diuretic therapy (OP) last week secondary to reported leg swelling.   The patient reports Lasix 20 mg on 02/27-03/01/2019.   Next appointment with Dr Reza: 10/08/2019                                       Dr Larkin: 05/10/2019  Neurology in [place]: 04/25/2019 06/04/2019  The patient presents to the office today for routine scheduled office evaluation.  The patient reports that he has been doing well with outpatient monitoring of blood pressure and weights demonstrating stability.  He has received evaluation by neurologist, Dr. Rodriguez in [place] regarding tremors with increase in gabapentin, otherwise no changes recommended.     09/04/2019  The patient indicates that he will see Dr Reza in October.   General indicates that he is been doing very well since our last encounter and is without concerns regarding weight gain, leg edema or change in activity tolerance.     11/05/2019  The patient is receiving office evaluation today secondary to his contact with us yesterday indicating concerns regarding leg edema.  The patient indicates that when he awakens of the morning he has absolutely no evidence of edema, however along about mid to late afternoon/evening he begins to notice some \"puffiness\" around bilateral ankles and distal extremities.  He reports compliance with his dietary sodium restrictions.  He indicates that perhaps his activity level has declined a little bit over the past year or so and that perhaps he is more sedentary and as a result his legs are in a dependent position more often than in the past.  He received evaluation with Dr. Reza last month and indicates that he is now on an annual rotation and nephrology.     12/05/2019  The " patient presents today for previously scheduled office visit.  The patient indicates that overall he feels that he is doing well from cardiology perspective.  The patient does indicate that over the past 1 week he has developed a nonproductive cough not associated with fever or chills, nausea or vomiting.  There has been no diarrhea.  He indicates knowledge of an occasional wheeze.     3/5/20  Mr. Ruvalcaba is doing well. He has no falls or problems the last few months. He is eating well and maintaining his weight. He has no leg swelling. We discussed the foods he eats.       Diet: Lives with Daughter and grandson. Cereal, teresa and eggs for breakfast. Globe for lunch. Eats dinner as family. Adds some salt to his plate.   Fluids: Juice, water (3-4 a day), coffee in the morning.   Activity: Gets out of the house often and visits friends. He still drives. He does not get SOA doing his normal activities.     PCP: Dr. Larkin  Cardiologist: REGINA, former [facility] patient  Nephrologist: Dr. Reza  Pulmonologist: n/a    Hospitalizations:  n/a    Past Medical History:   Diagnosis Date   • Acquired hallux rigidus    • Anemia    • Arthropathy of lumbar facet joint    • Carpal tunnel syndrome    • CHF (congestive heart failure) (CMS/HCC)    • Chronic diastolic congestive heart failure (CMS/HCC)    • Chronic kidney disease    • Chronic kidney disease, stage II (mild)     Chronic kidney disease stage 2   • Chronic obstructive lung disease (CMS/HCC)    • Chronic renal failure    • Coronary artery disease    • Cortical senile cataract    • Diabetes mellitus (CMS/HCC)    • Diabetes mellitus type 2, noninsulin dependent (CMS/HCC)     diabetes mellitus type 2, non-insulin treated, Medication treatment plan: oral diabetic medication   • Diabetes mellitus without complication (CMS/HCC)    • Diastolic dysfunction    • Diastolic heart failure (CMS/HCC)    • Essential hypertension     difficult to control      • Hip pain    • History of  echocardiogram 08/19/2015    Echocardiogram W/ color flow 96645 (1) - Mild to moderate LVH with mild left atrial enlargement and normal aortic root.CLVH,preserved LV systolic function with Ef of 55%.Diastolic dysfunction.MV intact.AV thickened.Aortic sclerosis.   • Hyperlipidemia    • Influenza vaccine administered 10/02/2015    INFLUENZA IMMUN ADMIN OR PREV RECV'D  (1) - Ordered By: DIONNE MANDUJANO ([facility])    • Insomnia    • Insomnia     Other insomnia   • Joint pain    • Low back pain    • Muscle strain    • Nuclear cataract    • Onychomycosis    • Pneumococcal vaccination given 07/22/2016    PNEUMOC VAC/ADMIN/RCVD 4040F (3) - Ordered By: DIONNE MANDUJANO ([facility])   • Prostate cancer (CMS/HCC)    • Pure hypercholesterolemia    • Sedentary lifestyle      Past Surgical History:   Procedure Laterality Date   • CARDIAC ABLATION     • CARDIAC CATHETERIZATION  02/04/1986    Cardiac cath 99986 (1) - normal left heart catherization,non cardiac chest pain   • CARPAL TUNNEL RELEASE  10/19/2015    Carpal tunnel surgery (1) - Release of left carpal tunnel   • CATARACT EXTRACTION W/ INTRAOCULAR LENS IMPLANT Left 3/31/2017    Procedure: REMOVE CATARACT AND IMPLANT INRAOCULAR LENS LEFT EYE;  Surgeon: Hector Navarrete MD;  Location: [facility] OR;  Service:    • CATARACT EXTRACTION W/ INTRAOCULAR LENS IMPLANT Right 4/14/2017    Procedure: REMOVE CATARACT AND IMPLANT INTRAOCULAR LENS RIGHT EYE;  Surgeon: Hector Navarrete MD;  Location: [facility] OR;  Service:    • COLONOSCOPY N/A 10/31/2018    Procedure: COLONOSCOPY;  Surgeon: Herbie Christine MD;  Location: [facility] ENDOSCOPY;  Service: Gastroenterology   • ENDOSCOPY  08/05/2013    Colon endoscopy 95440 (2) - Hemorrhoids found.   • INGUINAL HERNIA REPAIR Right 06/07/1968    Inguinal hernia, repair (2)   • INJECTION OF MEDICATION  09/06/2012    Albuterol (2u) (1) - Ordered By: LIDYA STEINER ([facility])    • INJECTION OF MEDICATION  09/06/2012    Atrovent (1) - Ordered  By: LIDYA STEINER ([facility])    • INJECTION OF MEDICATION  02/10/2012    Depo Medrol (Methylprednisone) (3) - Ordered By: HEATHER PAK ([facility])    • INJECTION OF MEDICATION  2014    Kenalog (4) - Ordered By: DIMAS ANDRADE ([facility])    • JOINT REPLACEMENT     • LUMBAR LAMINECTOMY      Lumbar laminectomy (1)   • MANDIBLE FRACTURE SURGERY  10/11/1981    Treat nose/jaw fracture (1) - bilateral open reduction of fracture of mandible   • OTHER SURGICAL HISTORY  2011    Drain/Inject Major Joint  (1) - Ordered By: KARLOS HERNANDEZ ([facility])    • OTHER SURGICAL HISTORY      Insert IVC filter 36872 (1)   • OTHER SURGICAL HISTORY  10/11/2011    SPIROMETRY 62921 (1) - Ordered By: HEATHER PAK ([facility])   • TOTAL HIP ARTHROPLASTY Right      Social History     Socioeconomic History   • Marital status:      Spouse name: Not on file   • Number of children: Not on file   • Years of education: Not on file   • Highest education level: Not on file   Tobacco Use   • Smoking status: Former Smoker     Last attempt to quit:      Years since quittin.1   • Smokeless tobacco: Never Used   Substance and Sexual Activity   • Alcohol use: No   • Drug use: No   • Sexual activity: Defer     Comment: Marital status:      Family History   Problem Relation Age of Onset   • Diabetes Other    • Cancer Sister    • Heart disease Mother    • Hypertension Mother    • Heart disease Father    • Hypertension Father    • Coronary artery disease Neg Hx        Allergies:  Allergies   Allergen Reactions   • Aldactone [Spironolactone] Other (See Comments)     Hyponatremia and hyperkalemia       Review of Systems   Constitution: Negative for malaise/fatigue.   Cardiovascular: Negative for chest pain, dyspnea on exertion, orthopnea and palpitations.   Respiratory: Negative for cough and shortness of breath.    Neurological: Negative for dizziness and weakness.       Current Outpatient Medications   Medication Sig  "Dispense Refill   • albuterol sulfate  (90 Base) MCG/ACT inhaler Inhale 2 puffs Every 4 (Four) Hours As Needed for Wheezing. 18 g 6   • aspirin 81 MG EC tablet Take 81 mg by mouth daily.     • carbidopa-levodopa (SINEMET)  MG per tablet Take 1 tablet by mouth 3 (Three) Times a Day. 90 tablet 2   • ferrous sulfate 325 (65 FE) MG tablet Take 1 tablet by mouth Daily With Breakfast. 30 tablet 5   • furosemide (LASIX) 20 MG tablet Take 1 tablet by mouth Daily As Needed (weight gain of 3lbs overnight or leg swelling). 30 tablet 3   • gabapentin (NEURONTIN) 300 MG capsule One tablet by mouth tid 270 capsule 2   • glucose blood (TRUE METRIX BLOOD GLUCOSE TEST) test strip Check glucose Once daily 100 each 6   • hydrALAZINE (APRESOLINE) 25 MG tablet TAKE ONE TABLET BY MOUTH THREE TIMES A DAY 90 tablet 4   • JANUVIA 50 MG tablet TAKE ONE TABLET BY MOUTH DAILY 30 tablet 3   • lisinopril-hydrochlorothiazide (PRINZIDE,ZESTORETIC) 20-12.5 MG per tablet TAKE ONE TABLET BY MOUTH DAILY 30 tablet 3   • methocarbamol (ROBAXIN) 500 MG tablet Take 1 tablet by mouth 4 (Four) Times a Day. 56 tablet 1   • metoprolol succinate XL (TOPROL-XL) 25 MG 24 hr tablet TAKE ONE TABLET BY MOUTH EVERY NIGHT 30 tablet 2   • pravastatin (PRAVACHOL) 40 MG tablet Take 1 tablet by mouth Daily. 90 tablet 2   • TRUEPLUS LANCETS 30G misc Use to test Blood Sugars Once Daily 100 each 6   • XARELTO 10 MG tablet TAKE ONE TABLET BY MOUTH DAILY 30 tablet 3     No current facility-administered medications for this visit.         Objective:     Vitals:    03/05/20 0853   BP: 152/80   BP Location: Left arm   Patient Position: Sitting   Cuff Size: Adult   Pulse: 73   SpO2: 99%   Weight: 75.5 kg (166 lb 8 oz)   Height: 165.1 cm (65\")       Wt Readings from Last 3 Encounters:   03/05/20 75.5 kg (166 lb 8 oz)   02/13/20 77.8 kg (171 lb 8 oz)   01/31/20 78.2 kg (172 lb 4.8 oz)            Physical Exam   Constitutional: He is oriented to person, place, and time. " He appears well-developed and well-nourished. No distress.   Neck: Normal range of motion. No JVD present.   Cardiovascular: Normal rate, regular rhythm, normal heart sounds and intact distal pulses.   Pulmonary/Chest: Effort normal and breath sounds normal. No respiratory distress.   Abdominal: Soft. He exhibits no distension.   Musculoskeletal: Normal range of motion. He exhibits no edema.   Neurological: He is alert and oriented to person, place, and time.       Cardiographics  Results for orders placed during the hospital encounter of 11/08/17   Adult Transthoracic Echo Complete W/ Cont if Necessary Per Protocol    Narrative · Left ventricular wall thickness is consistent with mild concentric   hypertrophy.  · Left ventricular systolic function is normal. Estimated EF = 55%.  · Left ventricular diastolic dysfunction (grade I) consistent with   impaired relaxation.          No radiology results for the last 30 days.      Lab Review     Lab Results   Component Value Date    TSH 0.990 08/23/2017     Lab Results   Component Value Date    GLUCOSE 103 (H) 02/13/2020    BUN 17 02/13/2020    CREATININE 1.21 02/13/2020    EGFRIFAFRI 69 02/13/2020    BCR 14.0 02/13/2020    K 4.8 02/13/2020    CO2 22.4 02/13/2020    CALCIUM 9.6 02/13/2020    PROTENTOTREF 6.4 05/14/2015    ALBUMIN 4.50 02/13/2020    LABIL2 1.7 05/14/2015    AST 18 02/13/2020    ALT 8 02/13/2020     Lab Results   Component Value Date    WBC 5.00 02/13/2020    HGB 12.6 (L) 02/13/2020    HCT 36.7 (L) 02/13/2020    MCV 86.8 02/13/2020     02/13/2020       Lab Results   Component Value Date    CKTOTAL 97 11/08/2017    CKMB 1.47 11/08/2017    TROPONINI 0.046 (H) 12/02/2018     Lab Results   Component Value Date    PROBNP 277.0 12/02/2018       PFTS:         The following portions of the patient's history were reviewed and updated as appropriate: allergies, current medications, past family history, past medical history, past social history, past surgical  history and problem list.     Old records reviewed and pertinent information is included in the above objective data.     Assessment/Plan:      Diagnosis Plan   1. Chronic diastolic congestive heart failure (CMS/HCC)  EF:65%. NYHA Class II, Stage C. Patient appears euvolemic. and in a well perfused physiologic state. Hemodynamics are acceptable  BETA-BLOCKER:   Metoprolol Succinate  25mg   CORLANOR:  n/a  ACE/ARB: Lisinopril 20mg / HCTZ 12.5mg   DIURETIC: Lasix 20mg PRN  ALDOSTERONE ANTAGONIST: n/a, allergy  IMDUR/HYDRALAZINE: hydralazine 25mg   DIGOXIN: n/a  Fluid restriction: 2 L  Sodium restriction:2 grams  Daily Weights  6MWT: n/a    Reiterated all educational points this visit.  Continue daily weight monitoring.  Continue restricted dietary sodium intake.  Discussed patient action plan for heart failure. Contact information for this office verbalized.  Recommended avoiding NSAIDs use.  Discussed warning signs requiring additional medical attention for heart failure.   Education points repeated back by patient.        2. Chronic deep vein thrombosis (DVT) of femoral vein of both lower extremities (CMS/HCC)  Xarelto 10mg daily       3. Coronary artery disease involving native coronary artery of native heart without angina pectoris  ASCAD.   Continue current treatment regimen.  Dietary sodium restriction.  Weight loss.  Regular aerobic exercise.  Continue current medications.  Antiplatelet therapy: n/a    Beta-blocker:  Toprol XL 25mg    Statin: Prvastatin 40mg    ASA: 81mg     CCS Functional Classification of Angina : 0  Class Activity Evoking Angina Limits to Physical Activity   I Prolonged exertion None   II Walking > 2 blocks or > 1 flight of stairs Slight   III Walking < 2 blocks or < 1 flight of stairs Marked   IV Minimal or at rest Severe             Follow 3 months.           This document has been electronically signed by REGINA Lopez on March 5, 2020 9:23 AM

## 2020-03-07 DIAGNOSIS — E61.1 IRON DEFICIENCY: ICD-10-CM

## 2020-03-09 RX ORDER — FERROUS SULFATE 325(65) MG
TABLET ORAL
Qty: 30 TABLET | Refills: 5 | Status: SHIPPED | OUTPATIENT
Start: 2020-03-09 | End: 2020-09-02

## 2020-03-17 ENCOUNTER — TELEPHONE (OUTPATIENT)
Dept: FAMILY MEDICINE CLINIC | Facility: CLINIC | Age: 85
End: 2020-03-17

## 2020-03-17 NOTE — TELEPHONE ENCOUNTER
Anna from [facility] called to check and see if Mr Ruvalcaba had and evaluation for the power chair ordered on 3-6-2020. Please let patient know if he needs to come back in for an evaluation.

## 2020-03-19 ENCOUNTER — TELEPHONE (OUTPATIENT)
Dept: FAMILY MEDICINE CLINIC | Facility: CLINIC | Age: 85
End: 2020-03-19

## 2020-03-20 ENCOUNTER — OFFICE VISIT (OUTPATIENT)
Dept: FAMILY MEDICINE CLINIC | Facility: CLINIC | Age: 85
End: 2020-03-20

## 2020-03-20 VITALS
SYSTOLIC BLOOD PRESSURE: 124 MMHG | HEART RATE: 59 BPM | DIASTOLIC BLOOD PRESSURE: 66 MMHG | BODY MASS INDEX: 28.18 KG/M2 | HEIGHT: 65 IN | WEIGHT: 169.13 LBS | OXYGEN SATURATION: 99 %

## 2020-03-20 DIAGNOSIS — G62.9 NEUROPATHY: ICD-10-CM

## 2020-03-20 DIAGNOSIS — R26.89 POOR BALANCE: Primary | ICD-10-CM

## 2020-03-20 DIAGNOSIS — M47.816 OSTEOARTHRITIS OF LUMBAR SPINE, UNSPECIFIED SPINAL OSTEOARTHRITIS COMPLICATION STATUS: ICD-10-CM

## 2020-03-20 DIAGNOSIS — M47.814 OSTEOARTHRITIS OF THORACIC SPINE, UNSPECIFIED SPINAL OSTEOARTHRITIS COMPLICATION STATUS: ICD-10-CM

## 2020-03-20 PROCEDURE — 99214 OFFICE O/P EST MOD 30 MIN: CPT | Performed by: FAMILY MEDICINE

## 2020-03-20 NOTE — PROGRESS NOTES
Subjective   General Keith Ruvalcaba Jr. is a 84 y.o. male.   Cc: balance issues   HPI The patient comes in for an evaluation for a Powered Operated Vehicle. He has been having issues with balance and falling. He has fallen most recently in the last two to three months. He states that he can walk about fifty feet and he must rest as his legs act like they are going to give out.His balance is off also and he can have issues with standing. Normally he uses a walker to ambulate.He has a history of Degenerative Joint Disease. He has had three total surgeries on his back.His feet also have what sounds like Neuropathy as they burn.He also has Diabetes and that limits the use of nonsteroidals. He also is on Xarelto. He has been taking Robaxin for muscle pain. And Tylenol. He is on Gabapentin for his Neuropathy.His ambulation has gradually worsened. He ambulates at a slow pace.His falls occur due to balance and leg weakness. He was using a walker when he fell.He is to the point that a Power Operated Vehicle would be best to help him get about in his home. He has some decreased strength in his upper extremities.He lives in a home that has halls wide enough for a Power Operated Vehicle.    The following portions of the patient's history were reviewed and updated as appropriate: allergies, current medications, past family history, past medical history, past social history, past surgical history and problem list.    Review of Systems   Constitutional: Negative for fatigue and fever.   Respiratory: Negative for chest tightness, shortness of breath and wheezing.    Cardiovascular: Negative for chest pain, palpitations and leg swelling.   Musculoskeletal: Positive for arthralgias, back pain and gait problem. Negative for joint swelling and myalgias.       Objective   Physical Exam   Constitutional: He appears well-developed and well-nourished.   HENT:   Head: Normocephalic and atraumatic.   Right Ear: External ear normal.   Left Ear:  "External ear normal.   Nose: Nose normal.   Mouth/Throat: Oropharynx is clear and moist.   Eyes: Conjunctivae are normal.   Neck: Normal range of motion.   Cardiovascular: Normal rate, regular rhythm and normal heart sounds. Exam reveals no gallop and no friction rub.   No murmur heard.  Pulmonary/Chest: Effort normal and breath sounds normal. No stridor. No respiratory distress. He has no wheezes. He has no rales.   Abdominal: Soft. Bowel sounds are normal. He exhibits no distension. There is no tenderness. There is no guarding.   Musculoskeletal:   He has three scars on his back at the midline at the upper Thoracic,Lower Thoracic and Lower Lumbar. He walks with a shuffling gait and leans forward without the walker. He has 2/4 strength in the upper extremities and 2.4 strength in the lower extremities.He has good ROM of the hips and knees. Normal ROM at the elbows and shoulders.He is grossly intact with neuro exam.His balance appears to be less than normal when he is walking.When he ambulates with a walker , he gets along well.    Neurological: He is alert.   Skin: Skin is warm and dry.   Vitals reviewed.        Visit Vitals  /66   Pulse 59   Ht 165.1 cm (65\")   Wt 76.7 kg (169 lb 2 oz)   SpO2 99%   BMI 28.14 kg/m²     Body mass index is 28.14 kg/m².      Assessment/Plan   General was seen today for evalution for power chair.    Diagnoses and all orders for this visit:    Poor balance    Neuropathy    Osteoarthritis of thoracic spine, unspecified spinal osteoarthritis complication status    Osteoarthritis of lumbar spine, unspecified spinal osteoarthritis complication status    Have written a prescription for a Powered Operated Vehicle.  I feel that his balance and overall strength put him at risk for a fall.  "

## 2020-04-21 RX ORDER — HYDRALAZINE HYDROCHLORIDE 25 MG/1
TABLET, FILM COATED ORAL
Qty: 90 TABLET | Refills: 4 | Status: SHIPPED | OUTPATIENT
Start: 2020-04-21 | End: 2020-09-18

## 2020-05-05 RX ORDER — RIVAROXABAN 10 MG/1
TABLET, FILM COATED ORAL
Qty: 30 TABLET | Refills: 3 | Status: SHIPPED | OUTPATIENT
Start: 2020-05-05 | End: 2020-09-02

## 2020-05-05 RX ORDER — METOPROLOL SUCCINATE 25 MG/1
TABLET, EXTENDED RELEASE ORAL
Qty: 30 TABLET | Refills: 2 | Status: SHIPPED | OUTPATIENT
Start: 2020-05-05 | End: 2020-08-03

## 2020-05-21 DIAGNOSIS — I50.32 CHRONIC DIASTOLIC CONGESTIVE HEART FAILURE (HCC): Chronic | ICD-10-CM

## 2020-05-21 RX ORDER — GABAPENTIN 300 MG/1
CAPSULE ORAL
Qty: 270 CAPSULE | Refills: 2 | Status: SHIPPED | OUTPATIENT
Start: 2020-05-21 | End: 2020-11-18

## 2020-06-08 ENCOUNTER — OFFICE VISIT (OUTPATIENT)
Dept: CARDIOLOGY | Facility: CLINIC | Age: 85
End: 2020-06-08

## 2020-06-08 VITALS
SYSTOLIC BLOOD PRESSURE: 152 MMHG | OXYGEN SATURATION: 99 % | HEART RATE: 80 BPM | WEIGHT: 170 LBS | DIASTOLIC BLOOD PRESSURE: 78 MMHG | HEIGHT: 65 IN | BODY MASS INDEX: 28.32 KG/M2

## 2020-06-08 DIAGNOSIS — I82.513 CHRONIC DEEP VEIN THROMBOSIS (DVT) OF FEMORAL VEIN OF BOTH LOWER EXTREMITIES (HCC): ICD-10-CM

## 2020-06-08 DIAGNOSIS — I25.10 CORONARY ARTERY DISEASE INVOLVING NATIVE CORONARY ARTERY OF NATIVE HEART WITHOUT ANGINA PECTORIS: ICD-10-CM

## 2020-06-08 DIAGNOSIS — I50.32 CHRONIC DIASTOLIC CONGESTIVE HEART FAILURE (HCC): Primary | Chronic | ICD-10-CM

## 2020-06-08 PROCEDURE — 99214 OFFICE O/P EST MOD 30 MIN: CPT | Performed by: NURSE PRACTITIONER

## 2020-06-08 PROCEDURE — 93000 ELECTROCARDIOGRAM COMPLETE: CPT | Performed by: INTERNAL MEDICINE

## 2020-06-08 NOTE — PROGRESS NOTES
[facility] CHF CLINIC OFFICE VISIT    Subjective:     Congestive Heart Failure (chief complaint)      History of Present Illness  Congestive Heart Failure   Presents for follow-up visit. Pertinent negatives include no chest pressure, claudication, edema (bilateral ankle at the end of the day; compliant with compression stockings), near-syncope, orthopnea, palpitations, paroxysmal nocturnal dyspnea, shortness of breath or unexpected weight change. The symptoms have been improving. Compliance with total regimen is %. Compliance problems include adherence to exercise.  Compliance with diet is %. Compliance with exercise is 26-50%. Compliance with medications is %.      1. CAD, non-obstructive 2005  2. HFpEF - diastolic dysfunction  3. Hypertension  4. CKD with H/O KIRK follows with Dr Reza  5. H/O Atrial Flutter S/P ablation per Dr Graves 2006  6. Prostate Cancer Salvador Score 6/Stage 2 S/P RT   7. DM, Type II  8. Anterior right total hip arthroplasty 04/25/2016  9. Chronic DVT of bilateral femoral vein: on preventative dosing - Xarelto  10. Carotid disease followed annually by Dr Hamman     Mr. Ruvalcaba underwent lumbar surgery 10/27/2017 in [place]. He was discharge 11/7/2017. Following discharge he had several medications that were recommended be stopped secondary to hypotension/KIRK.      The patient received evaluation with REGINA Vidal on 03/08/2018 in follow-up regarding leg edema for which lower extremity duplex have been ordered and was found with positive findings regarding DVT.   The patient was initiated on DOAC, Xarelto on that date.      02/07/2019  The patient presents to the office today for previously scheduled appointment. He indicates that approximately 2 week prior he began to notice a tremor in bilateral hands most prominent at rest. He reports negative impact on quality of life or activities of daily living. He feels that perhaps the prominence of the tremors diminishes in the  "evening and during sleep.   The patient reports fluid intake to be approximately 1400 - 1500 cc per day. He reports negative use of diuretics over the past 8 weeks approximate.     03/05/2019  The patient presents to the office today for re-evaluation following diuretic therapy (OP) last week secondary to reported leg swelling.   The patient reports Lasix 20 mg on 02/27-03/01/2019.   Next appointment with Dr Reza: 10/08/2019                                       Dr Larkin: 05/10/2019  Neurology in [place]: 04/25/2019 06/04/2019  The patient presents to the office today for routine scheduled office evaluation.  The patient reports that he has been doing well with outpatient monitoring of blood pressure and weights demonstrating stability.  He has received evaluation by neurologist, Dr. Rodriguez in [place] regarding tremors with increase in gabapentin, otherwise no changes recommended.     09/04/2019  The patient indicates that he will see Dr Reza in October.   General indicates that he is been doing very well since our last encounter and is without concerns regarding weight gain, leg edema or change in activity tolerance.     11/05/2019  The patient is receiving office evaluation today secondary to his contact with us yesterday indicating concerns regarding leg edema.  The patient indicates that when he awakens of the morning he has absolutely no evidence of edema, however along about mid to late afternoon/evening he begins to notice some \"puffiness\" around bilateral ankles and distal extremities.  He reports compliance with his dietary sodium restrictions.  He indicates that perhaps his activity level has declined a little bit over the past year or so and that perhaps he is more sedentary and as a result his legs are in a dependent position more often than in the past.  He received evaluation with Dr. Reza last month and indicates that he is now on an annual rotation and nephrology.     12/05/2019  The " patient presents today for previously scheduled office visit.  The patient indicates that overall he feels that he is doing well from cardiology perspective.  The patient does indicate that over the past 1 week he has developed a nonproductive cough not associated with fever or chills, nausea or vomiting.  There has been no diarrhea.  He indicates knowledge of an occasional wheeze.     3/5/20  Mr. Ruvalcaba is doing well. He has no falls or problems the last few months. He is eating well and maintaining his weight. He has no leg swelling. We discussed the foods he eats.     6/8/20  3 month follow up. Has been doing well without falls. Weight stable. Labs from Feb reviewed. Doing well. Still on Xarelto 10mg for DVT prevention. Using exercise bike twice a day.         Diet: Lives with Daughter and grandson. Cereal, teresa and eggs for breakfast. Holloway for lunch. Eats dinner as family. Adds some salt to his plate.   Fluids: Juice, water (3-4 a day), coffee in the morning.   Activity: Gets out of the house often and visits friends. He still drives. He does not get SOA doing his normal activities.     PCP: Dr. Larkin  Cardiologist: REGINA, former [facility] patient  Nephrologist: Dr. Reza  Pulmonologist: n/a    Hospitalizations:  n/a    Past Medical History:   Diagnosis Date   • Acquired hallux rigidus    • Anemia    • Arthropathy of lumbar facet joint    • Carpal tunnel syndrome    • CHF (congestive heart failure) (CMS/HCC)    • Chronic diastolic congestive heart failure (CMS/HCC)    • Chronic kidney disease    • Chronic kidney disease, stage II (mild)     Chronic kidney disease stage 2   • Chronic obstructive lung disease (CMS/HCC)    • Chronic renal failure    • Coronary artery disease    • Cortical senile cataract    • Diabetes mellitus (CMS/HCC)    • Diabetes mellitus type 2, noninsulin dependent (CMS/HCC)     diabetes mellitus type 2, non-insulin treated, Medication treatment plan: oral diabetic medication   • Diabetes  mellitus without complication (CMS/HCC)    • Diastolic dysfunction    • Diastolic heart failure (CMS/HCC)    • Essential hypertension     difficult to control      • Hip pain    • History of echocardiogram 08/19/2015    Echocardiogram W/ color flow 07948 (1) - Mild to moderate LVH with mild left atrial enlargement and normal aortic root.CLVH,preserved LV systolic function with Ef of 55%.Diastolic dysfunction.MV intact.AV thickened.Aortic sclerosis.   • Hyperlipidemia    • Influenza vaccine administered 10/02/2015    INFLUENZA IMMUN ADMIN OR PREV RECV'D  (1) - Ordered By: DIONNE MANDUJANO ([facility])    • Insomnia    • Insomnia     Other insomnia   • Joint pain    • Low back pain    • Muscle strain    • Nuclear cataract    • Onychomycosis    • Pneumococcal vaccination given 07/22/2016    PNEUMOC VAC/ADMIN/RCVD 4040F (3) - Ordered By: DIONNE MANDUJANO ([facility])   • Prostate cancer (CMS/HCC)    • Pure hypercholesterolemia    • Sedentary lifestyle      Past Surgical History:   Procedure Laterality Date   • CARDIAC ABLATION     • CARDIAC CATHETERIZATION  02/04/1986    Cardiac cath 41716 (1) - normal left heart catherization,non cardiac chest pain   • CARPAL TUNNEL RELEASE  10/19/2015    Carpal tunnel surgery (1) - Release of left carpal tunnel   • CATARACT EXTRACTION W/ INTRAOCULAR LENS IMPLANT Left 3/31/2017    Procedure: REMOVE CATARACT AND IMPLANT INRAOCULAR LENS LEFT EYE;  Surgeon: Hector Navarrete MD;  Location: [facility] OR;  Service:    • CATARACT EXTRACTION W/ INTRAOCULAR LENS IMPLANT Right 4/14/2017    Procedure: REMOVE CATARACT AND IMPLANT INTRAOCULAR LENS RIGHT EYE;  Surgeon: Hector Navarrete MD;  Location: [facility] OR;  Service:    • COLONOSCOPY N/A 10/31/2018    Procedure: COLONOSCOPY;  Surgeon: Herbie Christine MD;  Location: [facility] ENDOSCOPY;  Service: Gastroenterology   • ENDOSCOPY  08/05/2013    Colon endoscopy 53878 (2) - Hemorrhoids found.   • INGUINAL HERNIA REPAIR Right 06/07/1968    Inguinal  hernia, repair (2)   • INJECTION OF MEDICATION  2012    Albuterol (2u) (1) - Ordered By: LIDYA STEINER ([facility])    • INJECTION OF MEDICATION  2012    Atrovent (1) - Ordered By: LIDYA STEINER ([facility])    • INJECTION OF MEDICATION  02/10/2012    Depo Medrol (Methylprednisone) (3) - Ordered By: HEATHER PAK ([facility])    • INJECTION OF MEDICATION  2014    Kenalog (4) - Ordered By: DIMAS ANDRADE ([facility])    • JOINT REPLACEMENT     • LUMBAR LAMINECTOMY      Lumbar laminectomy (1)   • MANDIBLE FRACTURE SURGERY  10/11/1981    Treat nose/jaw fracture (1) - bilateral open reduction of fracture of mandible   • OTHER SURGICAL HISTORY  2011    Drain/Inject Major Joint  (1) - Ordered By: KARLOS HERNANDEZ ([facility])    • OTHER SURGICAL HISTORY      Insert IVC filter 53889 (1)   • OTHER SURGICAL HISTORY  10/11/2011    SPIROMETRY 31077 (1) - Ordered By: HEATHER PAK ([facility])   • TOTAL HIP ARTHROPLASTY Right      Social History     Socioeconomic History   • Marital status:      Spouse name: Not on file   • Number of children: Not on file   • Years of education: Not on file   • Highest education level: Not on file   Tobacco Use   • Smoking status: Former Smoker     Last attempt to quit:      Years since quittin.4   • Smokeless tobacco: Never Used   Substance and Sexual Activity   • Alcohol use: No   • Drug use: No   • Sexual activity: Defer     Comment: Marital status:      Family History   Problem Relation Age of Onset   • Diabetes Other    • Cancer Sister    • Heart disease Mother    • Hypertension Mother    • Heart disease Father    • Hypertension Father    • Coronary artery disease Neg Hx        Allergies:  Allergies   Allergen Reactions   • Aldactone [Spironolactone] Other (See Comments)     Hyponatremia and hyperkalemia       Review of Systems   Constitution: Negative for malaise/fatigue.   Cardiovascular: Negative for chest pain, dyspnea on exertion,  "orthopnea and palpitations.   Respiratory: Negative for cough and shortness of breath.    Neurological: Negative for dizziness and weakness.       Current Outpatient Medications   Medication Sig Dispense Refill   • albuterol sulfate  (90 Base) MCG/ACT inhaler Inhale 2 puffs Every 4 (Four) Hours As Needed for Wheezing. 18 g 6   • aspirin 81 MG EC tablet Take 81 mg by mouth daily.     • carbidopa-levodopa (SINEMET)  MG per tablet Take 1 tablet by mouth 3 (Three) Times a Day. 90 tablet 2   • FEROSUL 325 (65 Fe) MG tablet TAKE 1 TABLET BY MOUTH DAILY WITH BREAKFAST. 30 tablet 5   • furosemide (LASIX) 20 MG tablet Take 1 tablet by mouth Daily As Needed (weight gain of 3lbs overnight or leg swelling). 30 tablet 3   • gabapentin (NEURONTIN) 300 MG capsule TAKE ONE CAPSULE BY MOUTH THREE TIMES A  capsule 2   • glucose blood (TRUE METRIX BLOOD GLUCOSE TEST) test strip Check glucose Once daily 100 each 6   • hydrALAZINE (APRESOLINE) 25 MG tablet TAKE ONE TABLET BY MOUTH THREE TIMES A DAY 90 tablet 4   • JANUVIA 50 MG tablet TAKE ONE TABLET BY MOUTH DAILY 30 tablet 3   • lisinopril-hydrochlorothiazide (PRINZIDE,ZESTORETIC) 20-12.5 MG per tablet TAKE ONE TABLET BY MOUTH DAILY 30 tablet 3   • methocarbamol (ROBAXIN) 500 MG tablet Take 1 tablet by mouth 4 (Four) Times a Day. 56 tablet 1   • metoprolol succinate XL (TOPROL-XL) 25 MG 24 hr tablet TAKE ONE TABLET BY MOUTH EVERY NIGHT 30 tablet 2   • pravastatin (PRAVACHOL) 40 MG tablet Take 1 tablet by mouth Daily. 90 tablet 2   • TRUEPLUS LANCETS 30G misc Use to test Blood Sugars Once Daily 100 each 6   • XARELTO 10 MG tablet TAKE ONE TABLET BY MOUTH DAILY 30 tablet 3     No current facility-administered medications for this visit.         Objective:     Vitals:    06/08/20 0928   BP: 162/80   BP Location: Left arm   Patient Position: Sitting   Cuff Size: Adult   Pulse: 80   SpO2: 99%   Weight: 77.1 kg (170 lb)   Height: 165.1 cm (65\")       Wt Readings from " Last 3 Encounters:   06/08/20 77.1 kg (170 lb)   03/20/20 76.7 kg (169 lb 2 oz)   03/05/20 76.9 kg (169 lb 8 oz)            Physical Exam   Constitutional: He is oriented to person, place, and time. He appears well-developed and well-nourished. No distress.   Neck: Normal range of motion. No JVD present.   Cardiovascular: Normal rate, regular rhythm, normal heart sounds and intact distal pulses.   Pulmonary/Chest: Effort normal and breath sounds normal. No respiratory distress.   Abdominal: Soft. He exhibits no distension.   Musculoskeletal: Normal range of motion. He exhibits no edema.   Neurological: He is alert and oriented to person, place, and time.       Cardiographics  Results for orders placed during the hospital encounter of 11/08/17   Adult Transthoracic Echo Complete W/ Cont if Necessary Per Protocol    Narrative · Left ventricular wall thickness is consistent with mild concentric   hypertrophy.  · Left ventricular systolic function is normal. Estimated EF = 55%.  · Left ventricular diastolic dysfunction (grade I) consistent with   impaired relaxation.          No radiology results for the last 30 days.     Lab Review     Lab Results   Component Value Date    TSH 0.990 08/23/2017     Lab Results   Component Value Date    GLUCOSE 103 (H) 02/13/2020    BUN 17 02/13/2020    CREATININE 1.21 02/13/2020    EGFRIFAFRI 69 02/13/2020    BCR 14.0 02/13/2020    K 4.8 02/13/2020    CO2 22.4 02/13/2020    CALCIUM 9.6 02/13/2020    PROTENTOTREF 6.4 05/14/2015    ALBUMIN 4.50 02/13/2020    LABIL2 1.7 05/14/2015    AST 18 02/13/2020    ALT 8 02/13/2020     Lab Results   Component Value Date    WBC 5.00 02/13/2020    HGB 12.6 (L) 02/13/2020    HCT 36.7 (L) 02/13/2020    MCV 86.8 02/13/2020     02/13/2020       Lab Results   Component Value Date    CKTOTAL 97 11/08/2017    CKMB 1.47 11/08/2017    TROPONINI 0.046 (H) 12/02/2018     Lab Results   Component Value Date    PROBNP 277.0 12/02/2018          The following  portions of the patient's history were reviewed and updated as appropriate: allergies, current medications, past family history, past medical history, past social history, past surgical history and problem list.     Old records reviewed and pertinent information is included in the above objective data.     Assessment/Plan:      Diagnosis Plan   1. Chronic diastolic congestive heart failure (CMS/HCC)  EF:65%. NYHA Class II, Stage C. Patient appears euvolemic. and in a well perfused physiologic state. Hemodynamics are acceptable  BETA-BLOCKER:   Metoprolol Succinate  25mg   CORLANOR:  n/a  ACE/ARB: Lisinopril 20mg / HCTZ 12.5mg   DIURETIC: Lasix 20mg PRN. Has not had to have in several months.   ALDOSTERONE ANTAGONIST: n/a, allergy  IMDUR/HYDRALAZINE: hydralazine 25mg   DIGOXIN: n/a  Fluid restriction: 2 L  Sodium restriction:2 grams  Daily Weights  6MWT: n/a    Reiterated all educational points this visit.  Continue daily weight monitoring.  Continue restricted dietary sodium intake.  Discussed patient action plan for heart failure. Contact information for this office verbalized.  Recommended avoiding NSAIDs use.  Discussed warning signs requiring additional medical attention for heart failure.   Education points repeated back by patient.        2. Chronic deep vein thrombosis (DVT) of femoral vein of both lower extremities (CMS/[facility])  Xarelto 10mg daily       3. Coronary artery disease involving native coronary artery of native heart without angina pectoris  ASCAD.   Continue current treatment regimen.  Dietary sodium restriction.  Regular aerobic exercise. Using exercise bike at home BID.  Continue current medications.  Antiplatelet therapy: n/a    Beta-blocker:  Toprol XL 25mg    Statin: Pravastatin 40mg    ASA: 81mg     CCS Functional Classification of Angina : 0  Class Activity Evoking Angina Limits to Physical Activity   I Prolonged exertion None   II Walking > 2 blocks or > 1 flight of stairs Slight   III  Walking < 2 blocks or < 1 flight of stairs Marked   IV Minimal or at rest Severe     Lab Results   Component Value Date    CHOL 163 02/13/2020    CHLPL 141 08/26/2016    TRIG 55 02/13/2020    HDL 69 (H) 02/13/2020    LDL 83 02/13/2020               Follow 6 months.           This document has been electronically signed by REGINA Lopez on June 8, 2020 09:31

## 2020-06-10 PROBLEM — Z09 FOLLOW-UP EXAMINATION AFTER NEUROLOGICAL SURGERY: Status: ACTIVE | Noted: 2017-11-20

## 2020-06-10 PROBLEM — I10 ESSENTIAL HYPERTENSION: Status: ACTIVE | Noted: 2017-11-01

## 2020-06-10 PROBLEM — M47.14 MYELOPATHY OF THORACIC REGION: Status: ACTIVE | Noted: 2017-10-26

## 2020-06-11 ENCOUNTER — OFFICE VISIT (OUTPATIENT)
Dept: FAMILY MEDICINE CLINIC | Facility: CLINIC | Age: 85
End: 2020-06-11

## 2020-06-11 VITALS — RESPIRATION RATE: 16 BRPM | HEART RATE: 64 BPM

## 2020-06-11 DIAGNOSIS — E11.9 TYPE 2 DIABETES MELLITUS WITHOUT COMPLICATION, UNSPECIFIED WHETHER LONG TERM INSULIN USE (HCC): ICD-10-CM

## 2020-06-11 DIAGNOSIS — I25.10 CORONARY ARTERY DISEASE INVOLVING NATIVE CORONARY ARTERY OF NATIVE HEART WITHOUT ANGINA PECTORIS: Primary | ICD-10-CM

## 2020-06-11 DIAGNOSIS — I10 ESSENTIAL HYPERTENSION: ICD-10-CM

## 2020-06-11 DIAGNOSIS — E78.5 HYPERLIPIDEMIA, UNSPECIFIED HYPERLIPIDEMIA TYPE: ICD-10-CM

## 2020-06-11 PROCEDURE — 99214 OFFICE O/P EST MOD 30 MIN: CPT | Performed by: FAMILY MEDICINE

## 2020-06-11 NOTE — PROGRESS NOTES
Subjective   General Keith Ruvalcaba Jr. is a 84 y.o. male.   Cc: follow up of chronic medical issues    Hypertension   This is a chronic problem. The current episode started more than 1 year ago. The problem has been gradually improving since onset. Pertinent negatives include no anxiety, blurred vision, chest pain, headaches, malaise/fatigue, neck pain, orthopnea, palpitations, peripheral edema, PND, shortness of breath or sweats. Current antihypertension treatment includes diuretics, beta blockers and ACE inhibitors.   Hyperlipidemia   This is a chronic problem. The current episode started more than 1 year ago. The problem is controlled. Recent lipid tests were reviewed and are normal. Pertinent negatives include no chest pain or shortness of breath. Current antihyperlipidemic treatment includes statins.   Diabetes   He presents for his follow-up diabetic visit. He has type 2 diabetes mellitus. The initial diagnosis of diabetes was made 5 years ago. Pertinent negatives for hypoglycemia include no dizziness, headaches or sweats. Associated symptoms include weakness. Pertinent negatives for diabetes include no blurred vision, no chest pain, no fatigue, no foot paresthesias, no foot ulcerations, no polydipsia, no polyphagia, no polyuria, no visual change and no weight loss.   Coronary Artery Disease   Presents for follow-up visit. Pertinent negatives include no chest pain, chest pressure, chest tightness, dizziness, leg swelling, muscle weakness, palpitations, shortness of breath or weight gain. Risk factors include hyperlipidemia.       The following portions of the patient's history were reviewed and updated as appropriate: allergies, current medications, past family history, past medical history, past social history, past surgical history and problem list.    Review of Systems   Constitutional: Negative for fatigue, malaise/fatigue, weight gain and weight loss.   Eyes: Negative for blurred vision.   Respiratory:  Negative for chest tightness and shortness of breath.    Cardiovascular: Negative for chest pain, palpitations, orthopnea, leg swelling and PND.   Endocrine: Negative for polydipsia, polyphagia and polyuria.   Musculoskeletal: Negative for muscle weakness and neck pain.   Neurological: Positive for weakness. Negative for dizziness and headaches.       Objective   Physical Exam   Constitutional: He is oriented to person, place, and time. He appears well-developed and well-nourished.   HENT:   Head: Normocephalic and atraumatic.   Right Ear: External ear normal.   Left Ear: External ear normal.   Nose: Nose normal.   Mouth/Throat: Oropharynx is clear and moist.   Eyes: Pupils are equal, round, and reactive to light. Conjunctivae and EOM are normal.   Neck: Normal range of motion. Neck supple.   Pulmonary/Chest: Effort normal.   Abdominal: Soft. There is no tenderness.   Musculoskeletal:   Back is non tender. Knees and ankles are  Non tender.   Neurological: He is alert and oriented to person, place, and time.   Skin: Skin is warm and dry.   Vitals reviewed.  Exam is non tender.  Exam was done at a distance.    Visit Vitals  Pulse 64   Resp 16     There is no height or weight on file to calculate BMI.      Assessment/Plan   Diagnoses and all orders for this visit:    Coronary artery disease involving native coronary artery of native heart without angina pectoris    Hyperlipidemia, unspecified hyperlipidemia type  -     Lipid panel; Future    Essential hypertension  -     Comprehensive metabolic panel; Future  -     CBC w AUTO Differential; Future    Type 2 diabetes mellitus without complication, unspecified whether long term insulin use (CMS/[facility])  -     Hemoglobin A1c; Future    Return to the clinic in 3 month/s.  Will contact with results as needed.

## 2020-06-15 ENCOUNTER — OFFICE VISIT (OUTPATIENT)
Dept: PODIATRY | Facility: CLINIC | Age: 85
End: 2020-06-15

## 2020-06-15 VITALS — HEART RATE: 76 BPM | HEIGHT: 65 IN | BODY MASS INDEX: 28.32 KG/M2 | WEIGHT: 170 LBS | OXYGEN SATURATION: 98 %

## 2020-06-15 DIAGNOSIS — Z79.01 ANTICOAGULANT LONG-TERM USE: ICD-10-CM

## 2020-06-15 DIAGNOSIS — M79.675 CHRONIC TOE PAIN, BILATERAL: ICD-10-CM

## 2020-06-15 DIAGNOSIS — M79.674 CHRONIC TOE PAIN, BILATERAL: ICD-10-CM

## 2020-06-15 DIAGNOSIS — G89.29 CHRONIC TOE PAIN, BILATERAL: ICD-10-CM

## 2020-06-15 DIAGNOSIS — B35.1 ONYCHOMYCOSIS: ICD-10-CM

## 2020-06-15 DIAGNOSIS — E11.9 TYPE 2 DIABETES MELLITUS WITHOUT COMPLICATION, WITHOUT LONG-TERM CURRENT USE OF INSULIN (HCC): Primary | ICD-10-CM

## 2020-06-15 PROCEDURE — 11721 DEBRIDE NAIL 6 OR MORE: CPT | Performed by: PODIATRIST

## 2020-06-15 NOTE — PROGRESS NOTES
[facility] Keith Ruvalcaba .  1935  84 y.o. male   MILENA Larkin - 06/11/2020  BS- 99 per patient     Patient presents today for routine diabetic foot care.    06/15/2020     Chief Complaint   Patient presents with   • Left Foot - diabetic foot care   • Right Foot - diabetic foot care       History of Present Illness    Patient presents to clinic today for routine diabetic foot care.       Past Medical History:   Diagnosis Date   • Acquired hallux rigidus    • Anemia    • Arthropathy of lumbar facet joint    • Carpal tunnel syndrome    • CHF (congestive heart failure) (CMS/HCC)    • Chronic diastolic congestive heart failure (CMS/HCC)    • Chronic kidney disease    • Chronic kidney disease, stage II (mild)     Chronic kidney disease stage 2   • Chronic obstructive lung disease (CMS/HCC)    • Chronic renal failure    • Coronary artery disease    • Cortical senile cataract    • Diabetes mellitus (CMS/HCC)    • Diabetes mellitus type 2, noninsulin dependent (CMS/HCC)     diabetes mellitus type 2, non-insulin treated, Medication treatment plan: oral diabetic medication   • Diabetes mellitus without complication (CMS/HCC)    • Diastolic dysfunction    • Diastolic heart failure (CMS/HCC)    • Essential hypertension     difficult to control      • Hip pain    • History of echocardiogram 08/19/2015    Echocardiogram W/ color flow 76812 (1) - Mild to moderate LVH with mild left atrial enlargement and normal aortic root.CLVH,preserved LV systolic function with Ef of 55%.Diastolic dysfunction.MV intact.AV thickened.Aortic sclerosis.   • Hyperlipidemia    • Influenza vaccine administered 10/02/2015    INFLUENZA IMMUN ADMIN OR PREV RECV'D  (1) - Ordered By: DIONNE MANDUJANO ([facility])    • Insomnia    • Insomnia     Other insomnia   • Joint pain    • Low back pain    • Muscle strain    • Nuclear cataract    • Onychomycosis    • Pneumococcal vaccination given 07/22/2016    PNEUMOC VAC/ADMIN/RCVD 4040F (3) - Ordered By: DIONNE MANDUJANO  ([facility])   • Prostate cancer (CMS/HCC)    • Pure hypercholesterolemia    • Sedentary lifestyle          Past Surgical History:   Procedure Laterality Date   • CARDIAC ABLATION     • CARDIAC CATHETERIZATION  02/04/1986    Cardiac cath 09142 (1) - normal left heart catherization,non cardiac chest pain   • CARPAL TUNNEL RELEASE  10/19/2015    Carpal tunnel surgery (1) - Release of left carpal tunnel   • CATARACT EXTRACTION W/ INTRAOCULAR LENS IMPLANT Left 3/31/2017    Procedure: REMOVE CATARACT AND IMPLANT INRAOCULAR LENS LEFT EYE;  Surgeon: Hector Navarrete MD;  Location: [facility] OR;  Service:    • CATARACT EXTRACTION W/ INTRAOCULAR LENS IMPLANT Right 4/14/2017    Procedure: REMOVE CATARACT AND IMPLANT INTRAOCULAR LENS RIGHT EYE;  Surgeon: Hector Navarrete MD;  Location: [facility] OR;  Service:    • COLONOSCOPY N/A 10/31/2018    Procedure: COLONOSCOPY;  Surgeon: Herbie Christine MD;  Location: [facility] ENDOSCOPY;  Service: Gastroenterology   • ENDOSCOPY  08/05/2013    Colon endoscopy 71706 (2) - Hemorrhoids found.   • INGUINAL HERNIA REPAIR Right 06/07/1968    Inguinal hernia, repair (2)   • INJECTION OF MEDICATION  09/06/2012    Albuterol (2u) (1) - Ordered By: LIDYA STEINER ([facility])    • INJECTION OF MEDICATION  09/06/2012    Atrovent (1) - Ordered By: LIDYA STEINER ([facility])    • INJECTION OF MEDICATION  02/10/2012    Depo Medrol (Methylprednisone) (3) - Ordered By: HEATHER PAK ([facility])    • INJECTION OF MEDICATION  02/09/2014    Kenalog (4) - Ordered By: DIMAS ANDRADE ([facility])    • JOINT REPLACEMENT     • LUMBAR LAMINECTOMY  2007    Lumbar laminectomy (1)   • MANDIBLE FRACTURE SURGERY  10/11/1981    Treat nose/jaw fracture (1) - bilateral open reduction of fracture of mandible   • OTHER SURGICAL HISTORY  09/24/2011    Drain/Inject Major Joint 20610 (1) - Ordered By: KARLOS HERNANDEZ ([facility])    • OTHER SURGICAL HISTORY      Insert IVC filter 17735 (1)   • OTHER SURGICAL HISTORY   10/11/2011    SPIROMETRY 64425 (1) - Ordered By: HEATHER PAK ([facility])   • TOTAL HIP ARTHROPLASTY Right          Family History   Problem Relation Age of Onset   • Diabetes Other    • Cancer Sister    • Heart disease Mother    • Hypertension Mother    • Heart disease Father    • Hypertension Father    • Coronary artery disease Neg Hx        Allergies   Allergen Reactions   • Aldactone [Spironolactone] Other (See Comments)     Hyponatremia and hyperkalemia       Social History     Socioeconomic History   • Marital status:      Spouse name: Not on file   • Number of children: Not on file   • Years of education: Not on file   • Highest education level: Not on file   Tobacco Use   • Smoking status: Former Smoker     Last attempt to quit:      Years since quittin.4   • Smokeless tobacco: Never Used   Substance and Sexual Activity   • Alcohol use: No   • Drug use: No   • Sexual activity: Defer     Comment: Marital status:          Current Outpatient Medications   Medication Sig Dispense Refill   • albuterol sulfate  (90 Base) MCG/ACT inhaler Inhale 2 puffs Every 4 (Four) Hours As Needed for Wheezing. 18 g 6   • aspirin 81 MG EC tablet Take 81 mg by mouth daily.     • carbidopa-levodopa (SINEMET)  MG per tablet Take 1 tablet by mouth 3 (Three) Times a Day. 90 tablet 2   • FEROSUL 325 (65 Fe) MG tablet TAKE 1 TABLET BY MOUTH DAILY WITH BREAKFAST. 30 tablet 5   • furosemide (LASIX) 20 MG tablet Take 1 tablet by mouth Daily As Needed (weight gain of 3lbs overnight or leg swelling). 30 tablet 3   • gabapentin (NEURONTIN) 300 MG capsule TAKE ONE CAPSULE BY MOUTH THREE TIMES A  capsule 2   • glucose blood (TRUE METRIX BLOOD GLUCOSE TEST) test strip Check glucose Once daily 100 each 6   • hydrALAZINE (APRESOLINE) 25 MG tablet TAKE ONE TABLET BY MOUTH THREE TIMES A DAY 90 tablet 4   • JANUVIA 50 MG tablet TAKE ONE TABLET BY MOUTH DAILY 30 tablet 3   • lisinopril-hydrochlorothiazide  "(PRINZIDE,ZESTORETIC) 20-12.5 MG per tablet TAKE ONE TABLET BY MOUTH DAILY 30 tablet 3   • methocarbamol (ROBAXIN) 500 MG tablet Take 1 tablet by mouth 4 (Four) Times a Day. 56 tablet 1   • metoprolol succinate XL (TOPROL-XL) 25 MG 24 hr tablet TAKE ONE TABLET BY MOUTH EVERY NIGHT 30 tablet 2   • pravastatin (PRAVACHOL) 40 MG tablet Take 1 tablet by mouth Daily. 90 tablet 2   • TRUEPLUS LANCETS 30G misc Use to test Blood Sugars Once Daily 100 each 6   • XARELTO 10 MG tablet TAKE ONE TABLET BY MOUTH DAILY 30 tablet 3     No current facility-administered medications for this visit.        Review of Systems   Constitutional: Negative.    HENT: Negative.    Respiratory: Negative.    Cardiovascular: Negative.    Gastrointestinal: Negative.    Musculoskeletal:        Toe pain   Skin: Negative.    Psychiatric/Behavioral: Negative.          OBJECTIVE    Pulse 76   Ht 165.1 cm (65\")   Wt 77.1 kg (170 lb)   SpO2 98%   BMI 28.29 kg/m²       Physical Exam   Constitutional: He is oriented to person, place, and time. He appears well-developed and well-nourished. No distress.   Eyes: Pupils are equal, round, and reactive to light. EOM are normal.   Pulmonary/Chest: Effort normal. No respiratory distress.   Neurological: He is alert and oriented to person, place, and time.   Skin: Skin is warm and dry. Capillary refill takes less than 2 seconds.   Psychiatric: He has a normal mood and affect. His behavior is normal.   Vitals reviewed.      Gait: Normal    Assistive Device: Walker    Lower Extremity    Cardiovascular:    DP/PT pulses palpable bilateral  CFT brisk  to all digits  Skin temp is warm to warm from proximal tibia to distal digits bilateral  Pedal hair growth decreased.   No erythema noted   Nonpitting vanesa-malleolar edema bilateral  Musculoskeletal:  Muscle strength is 5/5 for all muscle groups tested   ROM of the 1st MTP is WNL bilateral   ROM of the MTJ is WNL bilateral   ROM of the STJ is WNL bilateral   ROM of the " ankle joint is  WNL bilateral   Dermatological:   Skin is warm, dry and intact bilateral  Webspaces 1-4 bilateral are clean, dry and intact.   No subcutaneous nodules or masses noted    Nails 1-5 bilateral are  discolored, elongated with subungual debris.  Pain on palpation to the nail plates.  Neurological:   Protective sensation intact   Sensation intact to light touch        Procedures        ASSESSMENT AND PLAN    General was seen today for diabetic foot care and diabetic foot care.    Diagnoses and all orders for this visit:    Type 2 diabetes mellitus without complication, without long-term current use of insulin (CMS/[facility])    Onychomycosis    Chronic toe pain, bilateral    Anticoagulant long-term use        - Nails 1-5 bilateral were debrided in length and thickness with nail nipper and electric  to decrease fungal load and risk of infection.   - All the patients questions were answered.  - RTC 3 months or sooner if needed.               This document has been electronically signed by Partha Campos DPM on Lois 15, 2020 10:03     6/15/2020  10:03

## 2020-06-16 ENCOUNTER — LAB (OUTPATIENT)
Dept: LAB | Facility: HOSPITAL | Age: 85
End: 2020-06-16

## 2020-06-16 DIAGNOSIS — I10 ESSENTIAL HYPERTENSION: ICD-10-CM

## 2020-06-16 DIAGNOSIS — E78.5 HYPERLIPIDEMIA, UNSPECIFIED HYPERLIPIDEMIA TYPE: ICD-10-CM

## 2020-06-16 DIAGNOSIS — I25.10 CORONARY ARTERY DISEASE INVOLVING NATIVE CORONARY ARTERY OF NATIVE HEART WITHOUT ANGINA PECTORIS: ICD-10-CM

## 2020-06-16 DIAGNOSIS — E11.9 TYPE 2 DIABETES MELLITUS WITHOUT COMPLICATION, UNSPECIFIED WHETHER LONG TERM INSULIN USE (HCC): ICD-10-CM

## 2020-06-16 LAB
ALBUMIN SERPL-MCNC: 4.4 G/DL (ref 3.5–5.2)
ALBUMIN/GLOB SERPL: 1.8 G/DL
ALP SERPL-CCNC: 62 U/L (ref 39–117)
ALT SERPL W P-5'-P-CCNC: 10 U/L (ref 1–41)
ANION GAP SERPL CALCULATED.3IONS-SCNC: 10.2 MMOL/L (ref 5–15)
AST SERPL-CCNC: 19 U/L (ref 1–40)
BASOPHILS # BLD AUTO: 0.05 10*3/MM3 (ref 0–0.2)
BASOPHILS NFR BLD AUTO: 1.1 % (ref 0–1.5)
BILIRUB SERPL-MCNC: 0.3 MG/DL (ref 0.2–1.2)
BUN BLD-MCNC: 21 MG/DL (ref 8–23)
BUN/CREAT SERPL: 15.7 (ref 7–25)
CALCIUM SPEC-SCNC: 9.8 MG/DL (ref 8.6–10.5)
CHLORIDE SERPL-SCNC: 96 MMOL/L (ref 98–107)
CHOLEST SERPL-MCNC: 152 MG/DL (ref 0–200)
CO2 SERPL-SCNC: 25.8 MMOL/L (ref 22–29)
CREAT BLD-MCNC: 1.34 MG/DL (ref 0.76–1.27)
DEPRECATED RDW RBC AUTO: 43.3 FL (ref 37–54)
EOSINOPHIL # BLD AUTO: 0.04 10*3/MM3 (ref 0–0.4)
EOSINOPHIL NFR BLD AUTO: 0.9 % (ref 0.3–6.2)
ERYTHROCYTE [DISTWIDTH] IN BLOOD BY AUTOMATED COUNT: 13.3 % (ref 12.3–15.4)
GFR SERPL CREATININE-BSD FRML MDRD: 62 ML/MIN/1.73
GLOBULIN UR ELPH-MCNC: 2.4 GM/DL
GLUCOSE BLD-MCNC: 109 MG/DL (ref 65–99)
HBA1C MFR BLD: 5.7 % (ref 4.8–5.6)
HCT VFR BLD AUTO: 34.7 % (ref 37.5–51)
HDLC SERPL-MCNC: 63 MG/DL (ref 40–60)
HGB BLD-MCNC: 11.8 G/DL (ref 13–17.7)
IMM GRANULOCYTES # BLD AUTO: 0.01 10*3/MM3 (ref 0–0.05)
IMM GRANULOCYTES NFR BLD AUTO: 0.2 % (ref 0–0.5)
LDLC SERPL CALC-MCNC: 80 MG/DL (ref 0–100)
LDLC/HDLC SERPL: 1.27 {RATIO}
LYMPHOCYTES # BLD AUTO: 1.11 10*3/MM3 (ref 0.7–3.1)
LYMPHOCYTES NFR BLD AUTO: 24.8 % (ref 19.6–45.3)
MCH RBC QN AUTO: 30.3 PG (ref 26.6–33)
MCHC RBC AUTO-ENTMCNC: 34 G/DL (ref 31.5–35.7)
MCV RBC AUTO: 89 FL (ref 79–97)
MONOCYTES # BLD AUTO: 0.76 10*3/MM3 (ref 0.1–0.9)
MONOCYTES NFR BLD AUTO: 17 % (ref 5–12)
NEUTROPHILS # BLD AUTO: 2.51 10*3/MM3 (ref 1.7–7)
NEUTROPHILS NFR BLD AUTO: 56 % (ref 42.7–76)
NRBC BLD AUTO-RTO: 0 /100 WBC (ref 0–0.2)
PLATELET # BLD AUTO: 390 10*3/MM3 (ref 140–450)
PMV BLD AUTO: 9.2 FL (ref 6–12)
POTASSIUM BLD-SCNC: 4.5 MMOL/L (ref 3.5–5.2)
PROT SERPL-MCNC: 6.8 G/DL (ref 6–8.5)
RBC # BLD AUTO: 3.9 10*6/MM3 (ref 4.14–5.8)
SODIUM BLD-SCNC: 132 MMOL/L (ref 136–145)
TRIGL SERPL-MCNC: 46 MG/DL (ref 0–150)
VLDLC SERPL-MCNC: 9.2 MG/DL (ref 5–40)
WBC NRBC COR # BLD: 4.48 10*3/MM3 (ref 3.4–10.8)

## 2020-06-16 PROCEDURE — 80053 COMPREHEN METABOLIC PANEL: CPT

## 2020-06-16 PROCEDURE — 36415 COLL VENOUS BLD VENIPUNCTURE: CPT

## 2020-06-16 PROCEDURE — 80061 LIPID PANEL: CPT

## 2020-06-16 PROCEDURE — 85025 COMPLETE CBC W/AUTO DIFF WBC: CPT

## 2020-06-16 PROCEDURE — 83036 HEMOGLOBIN GLYCOSYLATED A1C: CPT

## 2020-06-19 ENCOUNTER — PATIENT OUTREACH (OUTPATIENT)
Dept: CASE MANAGEMENT | Facility: OTHER | Age: 85
End: 2020-06-19

## 2020-06-19 ENCOUNTER — EPISODE CHANGES (OUTPATIENT)
Dept: CASE MANAGEMENT | Facility: OTHER | Age: 85
End: 2020-06-19

## 2020-06-19 RX ORDER — PRAVASTATIN SODIUM 40 MG
TABLET ORAL
Qty: 90 TABLET | Refills: 2 | Status: SHIPPED | OUTPATIENT
Start: 2020-06-19 | End: 2020-12-14 | Stop reason: SDUPTHER

## 2020-06-19 NOTE — OUTREACH NOTE
"Care Plan Note      Responses   Annual Wellness Visit:   Patient Has Completed [completed 3-5-2020]   Care Gaps Addressed  Colon Cancer Screening, Diabetic A1C, Flu Shot, Pneumonia Vaccine   Colon Cancer Screening Type  Colonoscopy   Colonoscopy Status  Up to Date (< 10 yrs)   Colon Cancer Screening Completion at [facility] or Other  [facility]   Colon Cancer Screening Comments  completed 10-31-18   HbA1c Status  Up to Date-within defined limits   HbA1c Completion at [facility] or Other  [facility]   HbA1c Comments  5.70 on 6-   Flu Shot Status  Up to Date   Flu Shot Completion at [facility] or Other  [facility]   Flu Shot Comments  completed 11-14-19   Pneumonia Vaccine Status  Up to Date   Pneumonia Vaccine Completion at [facility] or Other  [facility]   Pneumonia Vaccine Comments  completed PPSV 23 11-10-17   Other Patient Education/Resources   -- [provided AC contact information]   Does patient have depression diagnosis?  No   Ed Visits past 12 months:  None   Hospitalizations past 12 months  None   Medication Adherence  Medications understood   Health Literacy  Good        ACM HRCM outreach on a patient considered high risk of developing complications from COVID-19 should an exposure occur. ACM spoke with patient \"I am doing really good\".  He is independent of ADL's and drives. He has no food or medication insecurities. He has a walker and cane;denies falls. He is socially active with his Confucian[employer] and family. He monitors his blood sugar routinely;Hgb A1C acceptable. He denied needs and was appreciative of the outreach. Provided patient with AC contact information. Care gaps listed and SDOH completed.     Martita Morrell, RN  Ambulatory     6/19/2020, 15:33    "

## 2020-06-19 NOTE — OUTREACH NOTE
Care Coordination Assessment    Documented/Reviewed By:  Martita Morrell RN Date/time:  6/19/2020  3:22 PM   Assessment completed with:  patient  Enrolled in care management program:  Yes  Living arrangement:  family members  Support system:  family, children  Type of residence:  private residence  Equipment used at home:  walker, cane (Comment: glucometer)  Communication device:  Yes  Bed or wheelchair confined:  No  Inadequate nutrition:  No  Medication adherence problem:  No (Comment: Denied)  Experiencing side effects from current medications:  No  History of fall(s) in last 6 months:  No  Difficulty keeping appointments:  No

## 2020-06-24 ENCOUNTER — EPISODE CHANGES (OUTPATIENT)
Dept: CASE MANAGEMENT | Facility: OTHER | Age: 85
End: 2020-06-24

## 2020-06-29 RX ORDER — LISINOPRIL AND HYDROCHLOROTHIAZIDE 20; 12.5 MG/1; MG/1
TABLET ORAL
Qty: 30 TABLET | Refills: 3 | Status: SHIPPED | OUTPATIENT
Start: 2020-06-29 | End: 2020-10-29

## 2020-06-29 RX ORDER — SITAGLIPTIN 50 MG/1
TABLET, FILM COATED ORAL
Qty: 30 TABLET | Refills: 3 | Status: SHIPPED | OUTPATIENT
Start: 2020-06-29 | End: 2020-10-29

## 2020-07-28 DIAGNOSIS — E11.9 DIABETES MELLITUS WITHOUT COMPLICATION (HCC): ICD-10-CM

## 2020-07-28 DIAGNOSIS — E11.40 TYPE 2 DIABETES MELLITUS WITH DIABETIC NEUROPATHY, WITHOUT LONG-TERM CURRENT USE OF INSULIN (HCC): ICD-10-CM

## 2020-07-28 NOTE — TELEPHONE ENCOUNTER
Patient called and requested a refill on the following medications:    TRUEPLUS LANCETS 30G misc    glucose blood (TRUE METRIX BLOOD GLUCOSE TEST) test strip    Verified [facility] on [address] in [place], [place].     Patient can be contacted best at [phone] to clarify and confirm any further needed information.

## 2020-07-29 RX ORDER — GLUCOSAM/CHON-MSM1/C/MANG/BOSW 500-416.6
TABLET ORAL
Qty: 100 EACH | Refills: 6 | Status: SHIPPED | OUTPATIENT
Start: 2020-07-29 | End: 2021-05-20 | Stop reason: SDUPTHER

## 2020-08-03 RX ORDER — METOPROLOL SUCCINATE 25 MG/1
TABLET, EXTENDED RELEASE ORAL
Qty: 30 TABLET | Refills: 2 | Status: SHIPPED | OUTPATIENT
Start: 2020-08-03 | End: 2020-11-02

## 2020-09-02 DIAGNOSIS — E61.1 IRON DEFICIENCY: ICD-10-CM

## 2020-09-02 RX ORDER — RIVAROXABAN 10 MG/1
TABLET, FILM COATED ORAL
Qty: 30 TABLET | Refills: 3 | Status: SHIPPED | OUTPATIENT
Start: 2020-09-02 | End: 2021-01-05

## 2020-09-02 RX ORDER — FERROUS SULFATE 325(65) MG
TABLET ORAL
Qty: 30 TABLET | Refills: 5 | Status: SHIPPED | OUTPATIENT
Start: 2020-09-02 | End: 2021-03-08

## 2020-09-14 ENCOUNTER — LAB (OUTPATIENT)
Dept: LAB | Facility: HOSPITAL | Age: 85
End: 2020-09-14

## 2020-09-14 ENCOUNTER — OFFICE VISIT (OUTPATIENT)
Dept: FAMILY MEDICINE CLINIC | Facility: CLINIC | Age: 85
End: 2020-09-14

## 2020-09-14 VITALS
HEIGHT: 65 IN | OXYGEN SATURATION: 94 % | WEIGHT: 167.19 LBS | DIASTOLIC BLOOD PRESSURE: 80 MMHG | BODY MASS INDEX: 27.86 KG/M2 | HEART RATE: 94 BPM | SYSTOLIC BLOOD PRESSURE: 168 MMHG

## 2020-09-14 DIAGNOSIS — E78.01 FAMILIAL HYPERCHOLESTEROLEMIA: ICD-10-CM

## 2020-09-14 DIAGNOSIS — E11.9 DIABETES MELLITUS WITHOUT COMPLICATION (HCC): ICD-10-CM

## 2020-09-14 DIAGNOSIS — I10 ESSENTIAL HYPERTENSION: Primary | ICD-10-CM

## 2020-09-14 DIAGNOSIS — J44.0 COPD WITH LOWER RESPIRATORY INFECTION (HCC): ICD-10-CM

## 2020-09-14 DIAGNOSIS — I10 ESSENTIAL HYPERTENSION: ICD-10-CM

## 2020-09-14 LAB
ALBUMIN SERPL-MCNC: 4.4 G/DL (ref 3.5–5.2)
ALBUMIN/GLOB SERPL: 1.8 G/DL
ALP SERPL-CCNC: 67 U/L (ref 39–117)
ALT SERPL W P-5'-P-CCNC: 14 U/L (ref 1–41)
ANION GAP SERPL CALCULATED.3IONS-SCNC: 12.9 MMOL/L (ref 5–15)
AST SERPL-CCNC: 21 U/L (ref 1–40)
BASOPHILS # BLD AUTO: 0.07 10*3/MM3 (ref 0–0.2)
BASOPHILS NFR BLD AUTO: 1.5 % (ref 0–1.5)
BILIRUB SERPL-MCNC: 0.3 MG/DL (ref 0–1.2)
BUN SERPL-MCNC: 18 MG/DL (ref 8–23)
BUN/CREAT SERPL: 13.5 (ref 7–25)
CALCIUM SPEC-SCNC: 9.5 MG/DL (ref 8.6–10.5)
CHLORIDE SERPL-SCNC: 94 MMOL/L (ref 98–107)
CHOLEST SERPL-MCNC: 150 MG/DL (ref 0–200)
CO2 SERPL-SCNC: 27.1 MMOL/L (ref 22–29)
CREAT SERPL-MCNC: 1.33 MG/DL (ref 0.76–1.27)
DEPRECATED RDW RBC AUTO: 42.2 FL (ref 37–54)
EOSINOPHIL # BLD AUTO: 0.12 10*3/MM3 (ref 0–0.4)
EOSINOPHIL NFR BLD AUTO: 2.5 % (ref 0.3–6.2)
ERYTHROCYTE [DISTWIDTH] IN BLOOD BY AUTOMATED COUNT: 12.9 % (ref 12.3–15.4)
GFR SERPL CREATININE-BSD FRML MDRD: 62 ML/MIN/1.73
GLOBULIN UR ELPH-MCNC: 2.5 GM/DL
GLUCOSE SERPL-MCNC: 94 MG/DL (ref 65–99)
HBA1C MFR BLD: 5.71 % (ref 4.8–5.6)
HCT VFR BLD AUTO: 38.2 % (ref 37.5–51)
HDLC SERPL-MCNC: 65 MG/DL (ref 40–60)
HGB BLD-MCNC: 12.6 G/DL (ref 13–17.7)
IMM GRANULOCYTES # BLD AUTO: 0.02 10*3/MM3 (ref 0–0.05)
IMM GRANULOCYTES NFR BLD AUTO: 0.4 % (ref 0–0.5)
LDLC SERPL CALC-MCNC: 70 MG/DL (ref 0–100)
LDLC/HDLC SERPL: 1.07 {RATIO}
LYMPHOCYTES # BLD AUTO: 0.98 10*3/MM3 (ref 0.7–3.1)
LYMPHOCYTES NFR BLD AUTO: 20.3 % (ref 19.6–45.3)
MCH RBC QN AUTO: 29.7 PG (ref 26.6–33)
MCHC RBC AUTO-ENTMCNC: 33 G/DL (ref 31.5–35.7)
MCV RBC AUTO: 90.1 FL (ref 79–97)
MONOCYTES # BLD AUTO: 0.73 10*3/MM3 (ref 0.1–0.9)
MONOCYTES NFR BLD AUTO: 15.1 % (ref 5–12)
NEUTROPHILS NFR BLD AUTO: 2.9 10*3/MM3 (ref 1.7–7)
NEUTROPHILS NFR BLD AUTO: 60.2 % (ref 42.7–76)
NRBC BLD AUTO-RTO: 0 /100 WBC (ref 0–0.2)
PLATELET # BLD AUTO: 391 10*3/MM3 (ref 140–450)
PMV BLD AUTO: 9.3 FL (ref 6–12)
POTASSIUM SERPL-SCNC: 4.5 MMOL/L (ref 3.5–5.2)
PROT SERPL-MCNC: 6.9 G/DL (ref 6–8.5)
RBC # BLD AUTO: 4.24 10*6/MM3 (ref 4.14–5.8)
SODIUM SERPL-SCNC: 134 MMOL/L (ref 136–145)
TRIGL SERPL-MCNC: 76 MG/DL (ref 0–150)
VLDLC SERPL-MCNC: 15.2 MG/DL (ref 5–40)
WBC # BLD AUTO: 4.82 10*3/MM3 (ref 3.4–10.8)

## 2020-09-14 PROCEDURE — 83036 HEMOGLOBIN GLYCOSYLATED A1C: CPT

## 2020-09-14 PROCEDURE — 99214 OFFICE O/P EST MOD 30 MIN: CPT | Performed by: FAMILY MEDICINE

## 2020-09-14 PROCEDURE — 36415 COLL VENOUS BLD VENIPUNCTURE: CPT

## 2020-09-14 PROCEDURE — 80053 COMPREHEN METABOLIC PANEL: CPT

## 2020-09-14 PROCEDURE — 85025 COMPLETE CBC W/AUTO DIFF WBC: CPT

## 2020-09-14 PROCEDURE — 80061 LIPID PANEL: CPT

## 2020-09-14 RX ORDER — AMOXICILLIN 500 MG/1
500 CAPSULE ORAL 3 TIMES DAILY
Qty: 30 CAPSULE | Refills: 0 | Status: SHIPPED | OUTPATIENT
Start: 2020-09-14 | End: 2020-12-14

## 2020-09-14 RX ORDER — TIOTROPIUM BROMIDE AND OLODATEROL 3.124; 2.736 UG/1; UG/1
2 SPRAY, METERED RESPIRATORY (INHALATION)
Qty: 4 G | Refills: 0 | Status: SHIPPED | OUTPATIENT
Start: 2020-09-14 | End: 2021-12-08

## 2020-09-14 NOTE — PROGRESS NOTES
Subjective   General Keith Ruvalcaba Jr. is a 85 y.o. male.   Cc: follow up of chronic medical issues    Hypertension  This is a chronic problem. The current episode started more than 1 year ago. The problem has been gradually improving since onset. Associated symptoms include orthopnea. Pertinent negatives include no anxiety, blurred vision, chest pain, headaches, palpitations, peripheral edema, PND, shortness of breath or sweats. Current antihypertension treatment includes alpha 1 blockers, diuretics and ACE inhibitors.   Hyperlipidemia  This is a chronic problem. The current episode started more than 1 year ago. Recent lipid tests were reviewed and are variable. Pertinent negatives include no chest pain or shortness of breath. Current antihyperlipidemic treatment includes statins.   Diabetes  He presents for his follow-up diabetic visit. He has type 2 diabetes mellitus. The initial diagnosis of diabetes was made 6 years ago. Pertinent negatives for hypoglycemia include no headaches or sweats. Pertinent negatives for diabetes include no blurred vision, no chest pain, no fatigue, no foot paresthesias, no foot ulcerations, no polydipsia, no polyphagia, no polyuria, no visual change, no weakness and no weight loss.   He is at his base line except that he has been coughin up thick white sputum.    The following portions of the patient's history were reviewed and updated as appropriate: allergies, current medications, past family history, past medical history, past social history, past surgical history and problem list.    Review of Systems   Constitutional: Negative for fatigue and weight loss.   Eyes: Negative for blurred vision.   Respiratory: Negative for shortness of breath.    Cardiovascular: Positive for orthopnea. Negative for chest pain, palpitations and PND.   Endocrine: Negative for polydipsia, polyphagia and polyuria.   Neurological: Negative for weakness and headaches.       Objective   Physical Exam  Nursing  "note reviewed.   Constitutional:       Appearance: Normal appearance.   HENT:      Head: Normocephalic and atraumatic.      Right Ear: External ear normal.      Left Ear: External ear normal.      Nose: Nose normal.   Eyes:      Pupils: Pupils are equal, round, and reactive to light.   Neck:      Musculoskeletal: Normal range of motion.   Cardiovascular:      Rate and Rhythm: Normal rate and regular rhythm.      Heart sounds: No murmur. No friction rub. No gallop.    Pulmonary:      Effort: Pulmonary effort is normal. No respiratory distress.      Breath sounds: Normal breath sounds. No stridor. No wheezing, rhonchi or rales.   Chest:      Chest wall: No tenderness.   Abdominal:      General: Abdomen is flat. Bowel sounds are normal.   Skin:     General: Skin is warm and dry.   Neurological:      General: No focal deficit present.      Mental Status: He is alert.           Visit Vitals  /80   Pulse 94   Ht 165.1 cm (65\")   Wt 75.8 kg (167 lb 3 oz)   SpO2 94%   BMI 27.82 kg/m²     Body mass index is 27.82 kg/m².      Assessment/Plan   General was seen today for follow-up and coronary artery disease.    Diagnoses and all orders for this visit:    Essential hypertension  -     Comprehensive metabolic panel; Future  -     CBC w AUTO Differential; Future    Diabetes mellitus without complication (CMS/HCC)  -     Hemoglobin A1c; Future    Familial hypercholesterolemia  -     Lipid panel; Future    COPD with lower respiratory infection (CMS/HCC)    Other orders  -     tiotropium bromide-olodaterol (Stiolto Respimat) 2.5-2.5 MCG/ACT aerosol solution inhaler; Inhale 2 puffs Daily.  -     amoxicillin (AMOXIL) 500 MG capsule; Take 1 capsule by mouth 3 (Three) Times a Day.    Return to the clinic in 3 month/s.  Will contact with results as needed.    "

## 2020-09-18 RX ORDER — HYDRALAZINE HYDROCHLORIDE 25 MG/1
TABLET, FILM COATED ORAL
Qty: 90 TABLET | Refills: 4 | Status: SHIPPED | OUTPATIENT
Start: 2020-09-18 | End: 2021-02-16

## 2020-09-21 ENCOUNTER — OFFICE VISIT (OUTPATIENT)
Dept: PODIATRY | Facility: CLINIC | Age: 85
End: 2020-09-21

## 2020-09-21 VITALS — HEIGHT: 67 IN | OXYGEN SATURATION: 98 % | BODY MASS INDEX: 26.21 KG/M2 | WEIGHT: 167 LBS | HEART RATE: 68 BPM

## 2020-09-21 DIAGNOSIS — E11.9 TYPE 2 DIABETES MELLITUS WITHOUT COMPLICATION, WITHOUT LONG-TERM CURRENT USE OF INSULIN (HCC): ICD-10-CM

## 2020-09-21 DIAGNOSIS — B35.1 ONYCHOMYCOSIS: ICD-10-CM

## 2020-09-21 DIAGNOSIS — E11.9 ENCOUNTER FOR DIABETIC FOOT EXAM (HCC): Primary | ICD-10-CM

## 2020-09-21 DIAGNOSIS — G89.29 CHRONIC TOE PAIN, BILATERAL: ICD-10-CM

## 2020-09-21 DIAGNOSIS — M79.674 CHRONIC TOE PAIN, BILATERAL: ICD-10-CM

## 2020-09-21 DIAGNOSIS — Z79.01 ANTICOAGULANT LONG-TERM USE: ICD-10-CM

## 2020-09-21 DIAGNOSIS — M79.675 CHRONIC TOE PAIN, BILATERAL: ICD-10-CM

## 2020-09-21 PROCEDURE — 11721 DEBRIDE NAIL 6 OR MORE: CPT | Performed by: PODIATRIST

## 2020-09-21 PROCEDURE — 99213 OFFICE O/P EST LOW 20 MIN: CPT | Performed by: PODIATRIST

## 2020-09-21 NOTE — PROGRESS NOTES
[facility] Keith Ruvalcaba .  1935  85 y.o. male   MILENA Larkin - 09/14/2020  BS- 101 per patient     Patient presents today for routine diabetic foot care.    09/21/2020     Chief Complaint   Patient presents with   • Left Foot - diabetic foot care   • Right Foot - diabetic foot care       History of Present Illness    Patient presents to clinic today for routine diabetic foot care.  Relates his blood sugars are well controlled.  Admits to occasional numbness in his feet.  Denies pain associated to numbness and tingling in his feet.  Does complain of long, thickened painful toenails.  Pain is relieved with treatment.  He has no other complaints.    Past Medical History:   Diagnosis Date   • Acquired hallux rigidus    • Anemia    • Arthropathy of lumbar facet joint    • Carpal tunnel syndrome    • CHF (congestive heart failure) (CMS/HCC)    • Chronic diastolic congestive heart failure (CMS/HCC)    • Chronic kidney disease    • Chronic kidney disease, stage II (mild)     Chronic kidney disease stage 2   • Chronic obstructive lung disease (CMS/HCC)    • Chronic renal failure    • Coronary artery disease    • Cortical senile cataract    • Diabetes mellitus (CMS/HCC)    • Diabetes mellitus type 2, noninsulin dependent (CMS/HCC)     diabetes mellitus type 2, non-insulin treated, Medication treatment plan: oral diabetic medication   • Diabetes mellitus without complication (CMS/HCC)    • Diastolic dysfunction    • Diastolic heart failure (CMS/HCC)    • Essential hypertension     difficult to control      • Hip pain    • History of echocardiogram 08/19/2015    Echocardiogram W/ color flow 42646 (1) - Mild to moderate LVH with mild left atrial enlargement and normal aortic root.CLVH,preserved LV systolic function with Ef of 55%.Diastolic dysfunction.MV intact.AV thickened.Aortic sclerosis.   • Hyperlipidemia    • Influenza vaccine administered 10/02/2015    INFLUENZA IMMUN ADMIN OR PREV RECV'D  (1) - Ordered By: DIONNE MANDUJANO  ([facility])    • Insomnia    • Insomnia     Other insomnia   • Joint pain    • Low back pain    • Muscle strain    • Nuclear cataract    • Onychomycosis    • Pneumococcal vaccination given 07/22/2016    PNEUMOC VAC/ADMIN/RCVD 4040F (3) - Ordered By: DIONNE MANDUJANO ([facility])   • Prostate cancer (CMS/HCC)    • Pure hypercholesterolemia    • Sedentary lifestyle          Past Surgical History:   Procedure Laterality Date   • CARDIAC ABLATION     • CARDIAC CATHETERIZATION  02/04/1986    Cardiac cath 91233 (1) - normal left heart catherization,non cardiac chest pain   • CARPAL TUNNEL RELEASE  10/19/2015    Carpal tunnel surgery (1) - Release of left carpal tunnel   • CATARACT EXTRACTION W/ INTRAOCULAR LENS IMPLANT Left 3/31/2017    Procedure: REMOVE CATARACT AND IMPLANT INRAOCULAR LENS LEFT EYE;  Surgeon: Hector Navarrete MD;  Location: [facility] OR;  Service:    • CATARACT EXTRACTION W/ INTRAOCULAR LENS IMPLANT Right 4/14/2017    Procedure: REMOVE CATARACT AND IMPLANT INTRAOCULAR LENS RIGHT EYE;  Surgeon: Hector Navarrete MD;  Location: [facility] OR;  Service:    • COLONOSCOPY N/A 10/31/2018    Procedure: COLONOSCOPY;  Surgeon: Herbie Christine MD;  Location: [facility] ENDOSCOPY;  Service: Gastroenterology   • ENDOSCOPY  08/05/2013    Colon endoscopy 95448 (2) - Hemorrhoids found.   • INGUINAL HERNIA REPAIR Right 06/07/1968    Inguinal hernia, repair (2)   • INJECTION OF MEDICATION  09/06/2012    Albuterol (2u) (1) - Ordered By: LIDYA STEINER ([facility])    • INJECTION OF MEDICATION  09/06/2012    Atrovent (1) - Ordered By: LIDYA STEINER ([facility])    • INJECTION OF MEDICATION  02/10/2012    Depo Medrol (Methylprednisone) (3) - Ordered By: HEATHER PAK ([facility])    • INJECTION OF MEDICATION  02/09/2014    Kenalog (4) - Ordered By: DIMAS ANDRADE ([facility])    • JOINT REPLACEMENT     • LUMBAR LAMINECTOMY  2007    Lumbar laminectomy (1)   • MANDIBLE FRACTURE SURGERY  10/11/1981    Treat nose/jaw fracture (1)  - bilateral open reduction of fracture of mandible   • OTHER SURGICAL HISTORY  2011    Drain/Inject Major Joint  (1) - Ordered By: KARLOS HERNANDEZ ([facility])    • OTHER SURGICAL HISTORY      Insert IVC filter 85780 (1)   • OTHER SURGICAL HISTORY  10/11/2011    SPIROMETRY 17691 (1) - Ordered By: HEATHER PAK ([facility])   • TOTAL HIP ARTHROPLASTY Right          Family History   Problem Relation Age of Onset   • Diabetes Other    • Cancer Sister    • Heart disease Mother    • Hypertension Mother    • Heart disease Father    • Hypertension Father    • Coronary artery disease Neg Hx        Allergies   Allergen Reactions   • Aldactone [Spironolactone] Other (See Comments)     Hyponatremia and hyperkalemia       Social History     Socioeconomic History   • Marital status:      Spouse name: Not on file   • Number of children: Not on file   • Years of education: Not on file   • Highest education level: Not on file   Social Needs   • Financial resource strain: Not on file   • Food insecurity     Worry: Never true     Inability: Never true   • Transportation needs     Medical: No     Non-medical: No   Tobacco Use   • Smoking status: Former Smoker     Quit date:      Years since quittin.7   • Smokeless tobacco: Never Used   Substance and Sexual Activity   • Alcohol use: No   • Drug use: No   • Sexual activity: Defer     Comment: Marital status:    Lifestyle   • Physical activity     Days per week: 0 days     Minutes per session: 0 min   • Stress: Not at all   Relationships   • Social connections     Talks on phone: More than three times a week     Gets together: More than three times a week     Attends [employer] service: More than 4 times per year     Active member of club or organization: No     Attends meetings of clubs or organizations: Never     Relationship status:          Current Outpatient Medications   Medication Sig Dispense Refill   • albuterol sulfate  (90 Base) MCG/ACT  inhaler Inhale 2 puffs Every 4 (Four) Hours As Needed for Wheezing. 18 g 6   • amoxicillin (AMOXIL) 500 MG capsule Take 1 capsule by mouth 3 (Three) Times a Day. 30 capsule 0   • aspirin 81 MG EC tablet Take 81 mg by mouth daily.     • carbidopa-levodopa (SINEMET)  MG per tablet Take 1 tablet by mouth 3 (Three) Times a Day. 90 tablet 2   • FEROSUL 325 (65 Fe) MG tablet TAKE 1 TABLET BY MOUTH DAILY WITH BREAKFAST. 30 tablet 5   • furosemide (LASIX) 20 MG tablet Take 1 tablet by mouth Daily As Needed (weight gain of 3lbs overnight or leg swelling). 30 tablet 3   • gabapentin (NEURONTIN) 300 MG capsule TAKE ONE CAPSULE BY MOUTH THREE TIMES A  capsule 2   • glucose blood (True Metrix Blood Glucose Test) test strip Check glucose Once daily 100 each 6   • hydrALAZINE (APRESOLINE) 25 MG tablet TAKE ONE TABLET BY MOUTH THREE TIMES A DAY 90 tablet 4   • JANUVIA 50 MG tablet TAKE ONE TABLET BY MOUTH DAILY 30 tablet 3   • lisinopril-hydrochlorothiazide (PRINZIDE,ZESTORETIC) 20-12.5 MG per tablet TAKE ONE TABLET BY MOUTH DAILY 30 tablet 3   • methocarbamol (ROBAXIN) 500 MG tablet Take 1 tablet by mouth 4 (Four) Times a Day. 56 tablet 1   • metoprolol succinate XL (TOPROL-XL) 25 MG 24 hr tablet TAKE ONE TABLET BY MOUTH EVERY NIGHT 30 tablet 2   • pravastatin (PRAVACHOL) 40 MG tablet TAKE ONE TABLET BY MOUTH DAILY 90 tablet 2   • tiotropium bromide-olodaterol (Stiolto Respimat) 2.5-2.5 MCG/ACT aerosol solution inhaler Inhale 2 puffs Daily. 4 g 0   • TRUEplus Lancets 30G misc Use to test Blood Sugars Once Daily 100 each 6   • XARELTO 10 MG tablet TAKE ONE TABLET BY MOUTH DAILY 30 tablet 3     No current facility-administered medications for this visit.        Review of Systems   Constitutional: Negative.    HENT: Negative.    Respiratory: Negative.    Cardiovascular: Negative.    Gastrointestinal: Negative.    Endocrine: Negative.    Musculoskeletal:        Toe pain   Skin: Negative.    Psychiatric/Behavioral:  "Negative.          OBJECTIVE    Pulse 68   Ht 170.2 cm (67\")   Wt 75.8 kg (167 lb)   SpO2 98%   BMI 26.16 kg/m²       Physical Exam   Constitutional: He is oriented to person, place, and time. He appears well-developed. No distress.   HENT:   Nose: Nose normal.   Eyes: Pupils are equal, round, and reactive to light.   Pulmonary/Chest: Effort normal. No respiratory distress.   Musculoskeletal: Normal range of motion. No signs of injury.    General had a diabetic foot exam performed today.   During the foot exam he had a monofilament test performed.  Neurological: He is alert and oriented to person, place, and time.   Skin: Skin is warm and dry. Capillary refill takes less than 2 seconds.   Psychiatric: His behavior is normal. Mood normal.   Vitals reviewed.      Gait: Normal    Assistive Device: Walker    Lower Extremity    Cardiovascular:    DP/PT pulses palpable bilateral  CFT brisk  to all digits  Skin temp is warm to warm from proximal tibia to distal digits bilateral  Pedal hair growth decreased.   Nonpitting vanesa-malleolar edema bilateral  Musculoskeletal:  Muscle strength is 5/5 for all muscle groups tested   ROM of the 1st MTP is WNL bilateral   ROM of the ankle joint is  WNL bilateral   Dermatological:   Skin is warm, dry and intact bilateral  Webspaces 1-4 bilateral are clean, dry and intact.   No subcutaneous nodules or masses noted    Nails 1-5 bilateral are  discolored, elongated with subungual debris.  Pain on palpation to the nail plates.  Neurological:   Protective sensation intact   Sensation intact to light touch        Procedures        ASSESSMENT AND PLAN    General was seen today for diabetic foot care and diabetic foot care.    Diagnoses and all orders for this visit:    Encounter for diabetic foot exam (CMS/[facility])    Type 2 diabetes mellitus without complication, without long-term current use of insulin (CMS/[facility])    Onychomycosis    Chronic toe pain, bilateral    Anticoagulant long-term " use        - A diabetic foot screening exam was performed and the patient was educated on the foot complications related to diabetes,  preventative foot care and what signs and symptoms to watch for.  Instructed to contact our office if any foot problems develop before next visit.  - Nails 1-5 bilateral were debrided in length and thickness with nail nipper and electric  to decrease fungal load and risk of infection.  - All the patients questions were answered.  - RTC 3 months or sooner if needed.              This document has been electronically signed by Partha Campos DPM on September 27, 2020 12:55 CDT     9/27/2020  12:55 CDT

## 2020-10-01 ENCOUNTER — TELEPHONE (OUTPATIENT)
Dept: FAMILY MEDICINE CLINIC | Facility: CLINIC | Age: 85
End: 2020-10-01

## 2020-10-01 ENCOUNTER — TRANSCRIBE ORDERS (OUTPATIENT)
Dept: LAB | Facility: HOSPITAL | Age: 85
End: 2020-10-01

## 2020-10-01 ENCOUNTER — LAB (OUTPATIENT)
Dept: LAB | Facility: HOSPITAL | Age: 85
End: 2020-10-01

## 2020-10-01 DIAGNOSIS — I10 ESSENTIAL (PRIMARY) HYPERTENSION: ICD-10-CM

## 2020-10-01 DIAGNOSIS — E78.5 DYSLIPIDEMIA: ICD-10-CM

## 2020-10-01 DIAGNOSIS — E55.9 VITAMIN D DEFICIENCY: ICD-10-CM

## 2020-10-01 DIAGNOSIS — N18.2 STAGE 2 CHRONIC KIDNEY DISEASE: Primary | ICD-10-CM

## 2020-10-01 LAB
25(OH)D3 SERPL-MCNC: 55.4 NG/ML (ref 30–100)
ALBUMIN SERPL-MCNC: 4.3 G/DL (ref 3.5–5.2)
ALBUMIN/GLOB SERPL: 2 G/DL
ALP SERPL-CCNC: 71 U/L (ref 39–117)
ALT SERPL W P-5'-P-CCNC: 11 U/L (ref 1–41)
ANION GAP SERPL CALCULATED.3IONS-SCNC: 11.8 MMOL/L (ref 5–15)
AST SERPL-CCNC: 17 U/L (ref 1–40)
BILIRUB SERPL-MCNC: 0.4 MG/DL (ref 0–1.2)
BUN SERPL-MCNC: 17 MG/DL (ref 8–23)
BUN/CREAT SERPL: 13 (ref 7–25)
CALCIUM SPEC-SCNC: 9.4 MG/DL (ref 8.6–10.5)
CHLORIDE SERPL-SCNC: 100 MMOL/L (ref 98–107)
CO2 SERPL-SCNC: 24.2 MMOL/L (ref 22–29)
CREAT SERPL-MCNC: 1.31 MG/DL (ref 0.76–1.27)
GFR SERPL CREATININE-BSD FRML MDRD: 63 ML/MIN/1.73
GLOBULIN UR ELPH-MCNC: 2.1 GM/DL
GLUCOSE SERPL-MCNC: 98 MG/DL (ref 65–99)
POTASSIUM SERPL-SCNC: 4.1 MMOL/L (ref 3.5–5.2)
PROT SERPL-MCNC: 6.4 G/DL (ref 6–8.5)
SODIUM SERPL-SCNC: 136 MMOL/L (ref 136–145)

## 2020-10-01 PROCEDURE — 82306 VITAMIN D 25 HYDROXY: CPT | Performed by: INTERNAL MEDICINE

## 2020-10-01 PROCEDURE — 80053 COMPREHEN METABOLIC PANEL: CPT | Performed by: INTERNAL MEDICINE

## 2020-10-01 PROCEDURE — 36415 COLL VENOUS BLD VENIPUNCTURE: CPT | Performed by: INTERNAL MEDICINE

## 2020-10-13 ENCOUNTER — TELEPHONE (OUTPATIENT)
Dept: FAMILY MEDICINE CLINIC | Facility: CLINIC | Age: 85
End: 2020-10-13

## 2020-10-13 RX ORDER — METHOCARBAMOL 500 MG/1
500 TABLET, FILM COATED ORAL NIGHTLY
Qty: 7 TABLET | Refills: 0 | Status: SHIPPED | OUTPATIENT
Start: 2020-10-13 | End: 2020-12-14 | Stop reason: SDUPTHER

## 2020-10-13 NOTE — TELEPHONE ENCOUNTER
Called in Mehtocarbamol 500 mg to take at bed time. Also he is to take Tylenol two tabs three times daily for a week.

## 2020-10-13 NOTE — TELEPHONE ENCOUNTER
PATIENT CALLED STATING HE HAS LOWER BACK PAIN DUE TO PREVIOUS BACK SURGERY AND WOULD LIKE A CALL BACK TO SEE IF HE COULD GET SOMETHING FOR THIS.    PLEASE CALL AND ADVISE.  [phone]

## 2020-10-29 ENCOUNTER — DOCUMENTATION (OUTPATIENT)
Dept: FAMILY MEDICINE CLINIC | Facility: CLINIC | Age: 85
End: 2020-10-29

## 2020-10-29 RX ORDER — LISINOPRIL AND HYDROCHLOROTHIAZIDE 20; 12.5 MG/1; MG/1
TABLET ORAL
Qty: 30 TABLET | Refills: 3 | Status: SHIPPED | OUTPATIENT
Start: 2020-10-29 | End: 2020-12-14 | Stop reason: SDUPTHER

## 2020-10-29 RX ORDER — SITAGLIPTIN 50 MG/1
TABLET, FILM COATED ORAL
Qty: 30 TABLET | Refills: 3 | Status: SHIPPED | OUTPATIENT
Start: 2020-10-29 | End: 2020-12-14 | Stop reason: SDUPTHER

## 2020-10-29 NOTE — PROGRESS NOTES
Refill requests for lisinopril-hydrochlorothiazide (PRINZIDE,ZESTORETIC) 20-12.5 MG per tablet and Januvia 50 MG tablet have been approved and sent to the pharmacy

## 2020-11-02 RX ORDER — METOPROLOL SUCCINATE 25 MG/1
TABLET, EXTENDED RELEASE ORAL
Qty: 30 TABLET | Refills: 2 | Status: SHIPPED | OUTPATIENT
Start: 2020-11-02 | End: 2021-02-09 | Stop reason: SDUPTHER

## 2020-11-18 DIAGNOSIS — I50.32 CHRONIC DIASTOLIC CONGESTIVE HEART FAILURE (HCC): Chronic | ICD-10-CM

## 2020-11-18 RX ORDER — GABAPENTIN 300 MG/1
CAPSULE ORAL
Qty: 270 CAPSULE | Refills: 2 | Status: SHIPPED | OUTPATIENT
Start: 2020-11-18 | End: 2021-06-09

## 2020-11-18 NOTE — TELEPHONE ENCOUNTER
Requesting refill on her Gabapentin 300 mg  #270      Last OV    09/14/2020     Next Dyllan OV    12/14/2020    Last Script Written  05/21/2020  #270, 2 refills      Last Shawn     05/21/2020    Please advise on refill    Thank you

## 2020-12-03 DIAGNOSIS — R09.89 DECREASED PEDAL PULSES: Primary | ICD-10-CM

## 2020-12-03 DIAGNOSIS — I65.23 ASYMPTOMATIC BILATERAL CAROTID ARTERY STENOSIS: ICD-10-CM

## 2020-12-07 ENCOUNTER — OFFICE VISIT (OUTPATIENT)
Dept: CARDIOLOGY | Facility: CLINIC | Age: 85
End: 2020-12-07

## 2020-12-07 VITALS
DIASTOLIC BLOOD PRESSURE: 67 MMHG | BODY MASS INDEX: 26.03 KG/M2 | SYSTOLIC BLOOD PRESSURE: 126 MMHG | HEIGHT: 66 IN | WEIGHT: 162 LBS

## 2020-12-07 DIAGNOSIS — I10 ESSENTIAL HYPERTENSION: ICD-10-CM

## 2020-12-07 DIAGNOSIS — I82.513 CHRONIC DEEP VEIN THROMBOSIS (DVT) OF FEMORAL VEIN OF BOTH LOWER EXTREMITIES (HCC): ICD-10-CM

## 2020-12-07 DIAGNOSIS — I50.32 CHRONIC DIASTOLIC CONGESTIVE HEART FAILURE (HCC): Primary | Chronic | ICD-10-CM

## 2020-12-07 DIAGNOSIS — I25.10 CORONARY ARTERY DISEASE INVOLVING NATIVE CORONARY ARTERY OF NATIVE HEART WITHOUT ANGINA PECTORIS: ICD-10-CM

## 2020-12-07 PROCEDURE — 99441 PR PHYS/QHP TELEPHONE EVALUATION 5-10 MIN: CPT | Performed by: NURSE PRACTITIONER

## 2020-12-07 NOTE — PROGRESS NOTES
[facility] CHF CLINIC OFFICE VISIT    Subjective:     Congestive Heart Failure      History of Present Illness  Congestive Heart Failure   Presents for follow-up visit. Pertinent negatives include no chest pressure, claudication, edema (bilateral ankle at the end of the day; compliant with compression stockings), near-syncope, orthopnea, palpitations, paroxysmal nocturnal dyspnea, shortness of breath or unexpected weight change. The symptoms have been improving. Compliance with total regimen is %. Compliance problems include adherence to exercise.  Compliance with diet is %. Compliance with exercise is 26-50%. Compliance with medications is %.      1. CAD, non-obstructive 2005  2. HFpEF - diastolic dysfunction  3. Hypertension  4. CKD with H/O KIRK follows with Dr Reza  5. H/O Atrial Flutter S/P ablation per Dr Graves 2006  6. Prostate Cancer Salvador Score 6/Stage 2 S/P RT   7. DM, Type II  8. Anterior right total hip arthroplasty 04/25/2016  9. Chronic DVT of bilateral femoral vein: on preventative dosing - Xarelto  10. Carotid disease followed annually by Dr Hamman     12/7/20:    TELEPHONE VISIT  Time Spent: 7 minutes  General attempted the [employer] video visit, however connection with video was unsuccessful. Patient then called and visit was performed via telephone service in lieu of a follow-up appointment for results and advice.  This patient is an established patient.  There is no face-to-face service planned within the next 24 hours.  There also has been no E/M in the past 7 days  You have chosen to receive care through a telephone visit. Do you consent to use a telephone visit for your medical care today? Yes  This visit has been rescheduled as a phone visit to comply with patient safety concerns in accordance with CDC recommendations. Total time of discussion was 7 minutes.     Been doing well. No leg swelling. Has not had to have additional lasix. Using exercise bike. No falls. Xarelto 10mg for  DVT prevention. No hospitalizations or ER visits.   Does not need any home medical equipment. Using cane and walker. No shortness of breath. No chest pain. Last echo 2017 without structural changes or valve disease.       Diet: Lives with Daughter and grandson. Cereal, teresa and eggs for breakfast. Darby for lunch. Eats dinner as family. Adds some salt to his plate.   Fluids: Juice, water (3-4 a day), coffee in the morning.   Activity: Gets out of the house often and visits friends. He still drives. He does not get SOA doing his normal activities.     PCP: Dr. Larkin  Cardiologist: REGINA, former [facility] patient  Nephrologist: Dr. Reza  Pulmonologist: n/a    Hospitalizations:  n/a    Past Medical History:   Diagnosis Date   • Acquired hallux rigidus    • Anemia    • Arthropathy of lumbar facet joint    • Carpal tunnel syndrome    • CHF (congestive heart failure) (CMS/HCC)    • Chronic diastolic congestive heart failure (CMS/HCC)    • Chronic kidney disease    • Chronic kidney disease, stage II (mild)     Chronic kidney disease stage 2   • Chronic obstructive lung disease (CMS/HCC)    • Chronic renal failure    • Coronary artery disease    • Cortical senile cataract    • Diabetes mellitus (CMS/HCC)    • Diabetes mellitus type 2, noninsulin dependent (CMS/HCC)     diabetes mellitus type 2, non-insulin treated, Medication treatment plan: oral diabetic medication   • Diabetes mellitus without complication (CMS/HCC)    • Diastolic dysfunction    • Diastolic heart failure (CMS/HCC)    • Essential hypertension     difficult to control      • Hip pain    • History of echocardiogram 08/19/2015    Echocardiogram W/ color flow 88893 (1) - Mild to moderate LVH with mild left atrial enlargement and normal aortic root.CLVH,preserved LV systolic function with Ef of 55%.Diastolic dysfunction.MV intact.AV thickened.Aortic sclerosis.   • Hyperlipidemia    • Influenza vaccine administered 10/02/2015    INFLUENZA IMMUN ADMIN OR  PREV RECV'D  (1) - Ordered By: DIONNE MANDUJANO ([facility])    • Insomnia    • Insomnia     Other insomnia   • Joint pain    • Low back pain    • Muscle strain    • Nuclear cataract    • Onychomycosis    • Pneumococcal vaccination given 07/22/2016    PNEUMOC VAC/ADMIN/RCVD 4040F (3) - Ordered By: DIONNE MANDUJANO ([facility])   • Prostate cancer (CMS/HCC)    • Pure hypercholesterolemia    • Sedentary lifestyle      Past Surgical History:   Procedure Laterality Date   • CARDIAC ABLATION     • CARDIAC CATHETERIZATION  02/04/1986    Cardiac cath 76702 (1) - normal left heart catherization,non cardiac chest pain   • CARPAL TUNNEL RELEASE  10/19/2015    Carpal tunnel surgery (1) - Release of left carpal tunnel   • CATARACT EXTRACTION W/ INTRAOCULAR LENS IMPLANT Left 3/31/2017    Procedure: REMOVE CATARACT AND IMPLANT INRAOCULAR LENS LEFT EYE;  Surgeon: Hector Navarrete MD;  Location: [facility] OR;  Service:    • CATARACT EXTRACTION W/ INTRAOCULAR LENS IMPLANT Right 4/14/2017    Procedure: REMOVE CATARACT AND IMPLANT INTRAOCULAR LENS RIGHT EYE;  Surgeon: Hector Navarrete MD;  Location: [facility] OR;  Service:    • COLONOSCOPY N/A 10/31/2018    Procedure: COLONOSCOPY;  Surgeon: Herbie Christine MD;  Location: [facility] ENDOSCOPY;  Service: Gastroenterology   • ENDOSCOPY  08/05/2013    Colon endoscopy 69495 (2) - Hemorrhoids found.   • INGUINAL HERNIA REPAIR Right 06/07/1968    Inguinal hernia, repair (2)   • INJECTION OF MEDICATION  09/06/2012    Albuterol (2u) (1) - Ordered By: LIDYA STEINER ([facility])    • INJECTION OF MEDICATION  09/06/2012    Atrovent (1) - Ordered By: LIDYA STEINER ([facility])    • INJECTION OF MEDICATION  02/10/2012    Depo Medrol (Methylprednisone) (3) - Ordered By: HEATHER PAK ([facility])    • INJECTION OF MEDICATION  02/09/2014    Kenalog (4) - Ordered By: DIMAS ANDRADE ([facility])    • JOINT REPLACEMENT     • LUMBAR LAMINECTOMY  2007    Lumbar laminectomy (1)   • MANDIBLE FRACTURE SURGERY   10/11/1981    Treat nose/jaw fracture (1) - bilateral open reduction of fracture of mandible   • OTHER SURGICAL HISTORY  2011    Drain/Inject Major Joint  (1) - Ordered By: KARLOS HERNANDEZ ([facility])    • OTHER SURGICAL HISTORY      Insert IVC filter 70542 (1)   • OTHER SURGICAL HISTORY  10/11/2011    SPIROMETRY 80306 (1) - Ordered By: HEATHER PAK ([facility])   • TOTAL HIP ARTHROPLASTY Right      Social History     Socioeconomic History   • Marital status:      Spouse name: Not on file   • Number of children: Not on file   • Years of education: Not on file   • Highest education level: Not on file   Social Needs   • Financial resource strain: Not on file   • Food insecurity     Worry: Never true     Inability: Never true   • Transportation needs     Medical: No     Non-medical: No   Tobacco Use   • Smoking status: Former Smoker     Quit date:      Years since quittin.9   • Smokeless tobacco: Never Used   Substance and Sexual Activity   • Alcohol use: No   • Drug use: No   • Sexual activity: Defer     Comment: Marital status:    Lifestyle   • Physical activity     Days per week: 0 days     Minutes per session: 0 min   • Stress: Not at all   Relationships   • Social connections     Talks on phone: More than three times a week     Gets together: More than three times a week     Attends [employer]ologist service: More than 4 times per year     Active member of club or organization: No     Attends meetings of clubs or organizations: Never     Relationship status:      Family History   Problem Relation Age of Onset   • Diabetes Other    • Cancer Sister    • Heart disease Mother    • Hypertension Mother    • Heart disease Father    • Hypertension Father    • Coronary artery disease Neg Hx        Allergies:  Allergies   Allergen Reactions   • Aldactone [Spironolactone] Other (See Comments)     Hyponatremia and hyperkalemia       Review of Systems   Constitution: Negative for malaise/fatigue.    Cardiovascular: Negative for chest pain, dyspnea on exertion, orthopnea and palpitations.   Respiratory: Negative for cough and shortness of breath.    Neurological: Negative for dizziness and weakness.       Current Outpatient Medications   Medication Sig Dispense Refill   • albuterol sulfate  (90 Base) MCG/ACT inhaler Inhale 2 puffs Every 4 (Four) Hours As Needed for Wheezing. 18 g 6   • amoxicillin (AMOXIL) 500 MG capsule Take 1 capsule by mouth 3 (Three) Times a Day. 30 capsule 0   • aspirin 81 MG EC tablet Take 81 mg by mouth daily.     • carbidopa-levodopa (SINEMET)  MG per tablet Take 1 tablet by mouth 3 (Three) Times a Day. 90 tablet 2   • FEROSUL 325 (65 Fe) MG tablet TAKE 1 TABLET BY MOUTH DAILY WITH BREAKFAST. 30 tablet 5   • furosemide (LASIX) 20 MG tablet Take 1 tablet by mouth Daily As Needed (weight gain of 3lbs overnight or leg swelling). 30 tablet 3   • gabapentin (NEURONTIN) 300 MG capsule TAKE ONE CAPSULE BY MOUTH THREE TIMES A  capsule 2   • glucose blood (True Metrix Blood Glucose Test) test strip Check glucose Once daily 100 each 6   • hydrALAZINE (APRESOLINE) 25 MG tablet TAKE ONE TABLET BY MOUTH THREE TIMES A DAY 90 tablet 4   • Januvia 50 MG tablet TAKE ONE TABLET BY MOUTH DAILY 30 tablet 3   • lisinopril-hydrochlorothiazide (PRINZIDE,ZESTORETIC) 20-12.5 MG per tablet TAKE ONE TABLET BY MOUTH DAILY 30 tablet 3   • methocarbamol (Robaxin) 500 MG tablet Take 1 tablet by mouth Every Night. 7 tablet 0   • metoprolol succinate XL (TOPROL-XL) 25 MG 24 hr tablet TAKE ONE TABLET BY MOUTH EVERY NIGHT 30 tablet 2   • pravastatin (PRAVACHOL) 40 MG tablet TAKE ONE TABLET BY MOUTH DAILY 90 tablet 2   • tiotropium bromide-olodaterol (Stiolto Respimat) 2.5-2.5 MCG/ACT aerosol solution inhaler Inhale 2 puffs Daily. 4 g 0   • TRUEplus Lancets 30G misc Use to test Blood Sugars Once Daily 100 each 6   • XARELTO 10 MG tablet TAKE ONE TABLET BY MOUTH DAILY 30 tablet 3     No current  facility-administered medications for this visit.         Objective:     There were no vitals filed for this visit.    Wt Readings from Last 3 Encounters:   09/21/20 75.8 kg (167 lb)   09/14/20 75.8 kg (167 lb 3 oz)   06/15/20 77.1 kg (170 lb)            Physical Exam   Constitutional: He is oriented to person, place, and time. He appears well-developed and well-nourished. No distress.   Neck: Normal range of motion. No JVD present.   Cardiovascular: Normal rate, regular rhythm, normal heart sounds and intact distal pulses.   Pulmonary/Chest: Effort normal and breath sounds normal. No respiratory distress.   Abdominal: Soft. He exhibits no distension.   Musculoskeletal: Normal range of motion.         General: No edema.   Neurological: He is alert and oriented to person, place, and time.       Cardiographics  Results for orders placed during the hospital encounter of 11/08/17   Adult Transthoracic Echo Complete W/ Cont if Necessary Per Protocol    Narrative · Left ventricular wall thickness is consistent with mild concentric   hypertrophy.  · Left ventricular systolic function is normal. Estimated EF = 55%.  · Left ventricular diastolic dysfunction (grade I) consistent with   impaired relaxation.          No radiology results for the last 30 days.     Lab Review     Lab Results   Component Value Date    TSH 0.990 08/23/2017     Lab Results   Component Value Date    GLUCOSE 98 10/01/2020    BUN 17 10/01/2020    CREATININE 1.31 (H) 10/01/2020    EGFRIFAFRI 63 10/01/2020    BCR 13.0 10/01/2020    K 4.1 10/01/2020    CO2 24.2 10/01/2020    CALCIUM 9.4 10/01/2020    PROTENTOTREF 6.4 05/14/2015    ALBUMIN 4.30 10/01/2020    LABIL2 1.7 05/14/2015    AST 17 10/01/2020    ALT 11 10/01/2020     Lab Results   Component Value Date    WBC 4.82 09/14/2020    HGB 12.6 (L) 09/14/2020    HCT 38.2 09/14/2020    MCV 90.1 09/14/2020     09/14/2020       Lab Results   Component Value Date    CKTOTAL 97 11/08/2017    CKMB 1.47  11/08/2017    TROPONINI 0.046 (H) 12/02/2018     Lab Results   Component Value Date    PROBNP 277.0 12/02/2018          The following portions of the patient's history were reviewed and updated as appropriate: allergies, current medications, past family history, past medical history, past social history, past surgical history and problem list.     Old records reviewed and pertinent information is included in the above objective data.     Assessment/Plan:      Diagnosis Plan   1. Chronic diastolic congestive heart failure (CMS/[facility])  EF:65%. NYHA Class II, Stage C. Patient appears euvolemic. and in a well perfused physiologic state. Hemodynamics are acceptable  BETA-BLOCKER:   Metoprolol Succinate  25mg   CORLANOR:  n/a  ACE/ARB: Lisinopril 20mg / HCTZ 12.5mg   DIURETIC: Lasix 20mg PRN. Has not had to have in several months.   ALDOSTERONE ANTAGONIST: n/a, allergy  IMDUR/HYDRALAZINE: hydralazine 25mg TID  DIGOXIN: n/a  Fluid restriction: 2 L  Sodium restriction:2 grams  Daily Weights  6MWT: n/a    Reiterated all educational points this visit.  Continue daily weight monitoring.  Continue restricted dietary sodium intake.  Discussed patient action plan for heart failure. Contact information for this office verbalized.  Recommended avoiding NSAIDs use.  Discussed warning signs requiring additional medical attention for heart failure.   Education points repeated back by patient.        2. Chronic deep vein thrombosis (DVT) of femoral vein of both lower extremities (CMS/[facility])  Xarelto 10mg daily       3. Coronary artery disease involving native coronary artery of native heart without angina pectoris  ASCAD.   Continue current treatment regimen.  Dietary sodium restriction.  Regular aerobic exercise. Using exercise bike at home BID.  Continue current medications.  Antiplatelet therapy: n/a    Beta-blocker:  Toprol XL 25mg    Statin: Pravastatin 40mg    ASA: 81mg     CCS Functional Classification of Angina : 0  Class Activity  Evoking Angina Limits to Physical Activity   I Prolonged exertion None   II Walking > 2 blocks or > 1 flight of stairs Slight   III Walking < 2 blocks or < 1 flight of stairs Marked   IV Minimal or at rest Severe     Lab Results   Component Value Date    CHOL 150 09/14/2020    CHLPL 141 08/26/2016    TRIG 76 09/14/2020    HDL 65 (H) 09/14/2020    LDL 70 09/14/2020     LDL controlled on Pravastatin 40mg.       4. Essential HTN Elevated at most office visits, but no issues.   - Hydralazine 25 mg TID  - Lisinopril HCTZ 20-12.5mg   - Toprol XL 25mg           Follow up 6 months.           This document has been electronically signed by REGINA Lopez on December 7, 2020 09:15 CST

## 2020-12-14 ENCOUNTER — OFFICE VISIT (OUTPATIENT)
Dept: FAMILY MEDICINE CLINIC | Facility: CLINIC | Age: 85
End: 2020-12-14

## 2020-12-14 VITALS
OXYGEN SATURATION: 98 % | WEIGHT: 169.25 LBS | HEART RATE: 70 BPM | BODY MASS INDEX: 27.2 KG/M2 | HEIGHT: 66 IN | SYSTOLIC BLOOD PRESSURE: 118 MMHG | DIASTOLIC BLOOD PRESSURE: 64 MMHG

## 2020-12-14 DIAGNOSIS — Z23 NEEDS FLU SHOT: ICD-10-CM

## 2020-12-14 DIAGNOSIS — E11.9 TYPE 2 DIABETES MELLITUS WITHOUT COMPLICATION, WITHOUT LONG-TERM CURRENT USE OF INSULIN (HCC): ICD-10-CM

## 2020-12-14 DIAGNOSIS — I10 ESSENTIAL HYPERTENSION: Primary | ICD-10-CM

## 2020-12-14 DIAGNOSIS — E78.00 PURE HYPERCHOLESTEROLEMIA: ICD-10-CM

## 2020-12-14 PROCEDURE — 90694 VACC AIIV4 NO PRSRV 0.5ML IM: CPT | Performed by: FAMILY MEDICINE

## 2020-12-14 PROCEDURE — G0008 ADMIN INFLUENZA VIRUS VAC: HCPCS | Performed by: FAMILY MEDICINE

## 2020-12-14 PROCEDURE — 99214 OFFICE O/P EST MOD 30 MIN: CPT | Performed by: FAMILY MEDICINE

## 2020-12-14 RX ORDER — PRAVASTATIN SODIUM 40 MG
40 TABLET ORAL DAILY
Qty: 90 TABLET | Refills: 2 | Status: SHIPPED | OUTPATIENT
Start: 2020-12-14 | End: 2021-07-07 | Stop reason: SDUPTHER

## 2020-12-14 RX ORDER — METHOCARBAMOL 500 MG/1
500 TABLET, FILM COATED ORAL NIGHTLY
Qty: 30 TABLET | Refills: 1 | Status: SHIPPED | OUTPATIENT
Start: 2020-12-14 | End: 2021-05-12

## 2020-12-14 RX ORDER — LISINOPRIL AND HYDROCHLOROTHIAZIDE 20; 12.5 MG/1; MG/1
1 TABLET ORAL DAILY
Qty: 30 TABLET | Refills: 3 | Status: SHIPPED | OUTPATIENT
Start: 2020-12-14 | End: 2021-07-02

## 2020-12-14 NOTE — PROGRESS NOTES
Subjective   General Keith Ruvalcaba Jr. is a 85 y.o. male.   .Cco    Hypertension  This is a chronic problem. The current episode started more than 1 year ago. The problem has been gradually improving since onset. Pertinent negatives include no anxiety, blurred vision, chest pain, malaise/fatigue, orthopnea, palpitations, peripheral edema, PND, shortness of breath or sweats. Current antihypertension treatment includes diuretics, calcium channel blockers, alpha 1 blockers and ACE inhibitors.   Hyperlipidemia  This is a chronic problem. The current episode started more than 1 year ago. Recent lipid tests were reviewed and are variable. Pertinent negatives include no chest pain or shortness of breath. Current antihyperlipidemic treatment includes statins.   Diabetes  He presents for his follow-up diabetic visit. He has type 2 diabetes mellitus. The initial diagnosis of diabetes was made 5 years ago. Pertinent negatives for hypoglycemia include no sweats. Pertinent negatives for diabetes include no blurred vision, no chest pain, no fatigue, no foot paresthesias, no foot ulcerations, no polydipsia, no polyphagia, no polyuria and no weight loss.   He has been hurting in his Hands for the last four to five days. It is hard to make a fist.    The following portions of the patient's history were reviewed and updated as appropriate: allergies, current medications, past family history, past medical history, past social history, past surgical history and problem list.    Review of Systems   Constitutional: Negative for fatigue, malaise/fatigue and weight loss.   Eyes: Negative for blurred vision.   Respiratory: Negative for shortness of breath.    Cardiovascular: Negative for chest pain, palpitations, orthopnea and PND.   Endocrine: Negative for polydipsia, polyphagia and polyuria.       Objective   Physical Exam  Vitals signs reviewed.   Constitutional:       Appearance: Normal appearance.   HENT:      Head: Normocephalic and  "atraumatic.      Right Ear: Tympanic membrane, ear canal and external ear normal.      Left Ear: Tympanic membrane, ear canal and external ear normal.      Nose: Nose normal.      Mouth/Throat:      Mouth: Mucous membranes are moist.      Pharynx: Oropharynx is clear.   Eyes:      Extraocular Movements: Extraocular movements intact.      Pupils: Pupils are equal, round, and reactive to light.   Neck:      Musculoskeletal: Normal range of motion and neck supple.   Cardiovascular:      Rate and Rhythm: Normal rate and regular rhythm.      Heart sounds: Normal heart sounds. No murmur. No friction rub. No gallop.    Pulmonary:      Effort: Pulmonary effort is normal. No respiratory distress.      Breath sounds: Normal breath sounds. No stridor. No wheezing, rhonchi or rales.   Chest:      Chest wall: No tenderness.   Abdominal:      General: Abdomen is flat. Bowel sounds are normal. There is no distension.      Palpations: Abdomen is soft.      Tenderness: There is no abdominal tenderness.   Musculoskeletal:      Comments: He has tenderness on palpation of the MP joints.   Skin:     General: Skin is warm and dry.   Neurological:      Mental Status: He is alert.           Visit Vitals  /64   Pulse 70   Ht 167.6 cm (66\")   Wt 76.8 kg (169 lb 4 oz)   SpO2 98%   BMI 27.32 kg/m²     Body mass index is 27.32 kg/m².      Assessment/Plan   Diagnoses and all orders for this visit:    1. Essential hypertension (Primary)  -     Comprehensive metabolic panel; Future  -     CBC w AUTO Differential; Future    2. Pure hypercholesterolemia  -     Lipid panel; Future    3. Type 2 diabetes mellitus without complication, without long-term current use of insulin (CMS/[facility])  -     Hemoglobin A1c; Future    4. Needs flu shot    Other orders  -     pravastatin (PRAVACHOL) 40 MG tablet; Take 1 tablet by mouth Daily.  Dispense: 90 tablet; Refill: 2  -     lisinopril-hydrochlorothiazide (PRINZIDE,ZESTORETIC) 20-12.5 MG per tablet; Take 1 " tablet by mouth Daily.  Dispense: 30 tablet; Refill: 3  -     SITagliptin (Januvia) 50 MG tablet; Take 1 tablet by mouth Daily.  Dispense: 30 tablet; Refill: 3  -     Fluad Quad 65+ yrs (5010-9371)    Tylenol two tablets three times daily.

## 2020-12-15 ENCOUNTER — LAB (OUTPATIENT)
Dept: LAB | Facility: HOSPITAL | Age: 85
End: 2020-12-15

## 2020-12-15 DIAGNOSIS — E11.9 TYPE 2 DIABETES MELLITUS WITHOUT COMPLICATION, WITHOUT LONG-TERM CURRENT USE OF INSULIN (HCC): ICD-10-CM

## 2020-12-15 DIAGNOSIS — E78.00 PURE HYPERCHOLESTEROLEMIA: ICD-10-CM

## 2020-12-15 DIAGNOSIS — I10 ESSENTIAL HYPERTENSION: ICD-10-CM

## 2020-12-15 LAB
ALBUMIN SERPL-MCNC: 4.3 G/DL (ref 3.5–5.2)
ALBUMIN/GLOB SERPL: 2 G/DL
ALP SERPL-CCNC: 61 U/L (ref 39–117)
ALT SERPL W P-5'-P-CCNC: 9 U/L (ref 1–41)
ANION GAP SERPL CALCULATED.3IONS-SCNC: 10.9 MMOL/L (ref 5–15)
AST SERPL-CCNC: 17 U/L (ref 1–40)
BASOPHILS # BLD AUTO: 0.07 10*3/MM3 (ref 0–0.2)
BASOPHILS NFR BLD AUTO: 1.1 % (ref 0–1.5)
BILIRUB SERPL-MCNC: 0.4 MG/DL (ref 0–1.2)
BUN SERPL-MCNC: 17 MG/DL (ref 8–23)
BUN/CREAT SERPL: 13 (ref 7–25)
CALCIUM SPEC-SCNC: 9.3 MG/DL (ref 8.6–10.5)
CHLORIDE SERPL-SCNC: 101 MMOL/L (ref 98–107)
CHOLEST SERPL-MCNC: 144 MG/DL (ref 0–200)
CO2 SERPL-SCNC: 25.1 MMOL/L (ref 22–29)
CREAT SERPL-MCNC: 1.31 MG/DL (ref 0.76–1.27)
DEPRECATED RDW RBC AUTO: 42 FL (ref 37–54)
EOSINOPHIL # BLD AUTO: 0.1 10*3/MM3 (ref 0–0.4)
EOSINOPHIL NFR BLD AUTO: 1.5 % (ref 0.3–6.2)
ERYTHROCYTE [DISTWIDTH] IN BLOOD BY AUTOMATED COUNT: 13.4 % (ref 12.3–15.4)
GFR SERPL CREATININE-BSD FRML MDRD: 63 ML/MIN/1.73
GLOBULIN UR ELPH-MCNC: 2.2 GM/DL
GLUCOSE SERPL-MCNC: 95 MG/DL (ref 65–99)
HBA1C MFR BLD: 5.28 % (ref 4.8–5.6)
HCT VFR BLD AUTO: 36.4 % (ref 37.5–51)
HDLC SERPL-MCNC: 64 MG/DL (ref 40–60)
HGB BLD-MCNC: 12.1 G/DL (ref 13–17.7)
IMM GRANULOCYTES # BLD AUTO: 0.02 10*3/MM3 (ref 0–0.05)
IMM GRANULOCYTES NFR BLD AUTO: 0.3 % (ref 0–0.5)
LDLC SERPL CALC-MCNC: 69 MG/DL (ref 0–100)
LDLC/HDLC SERPL: 1.09 {RATIO}
LYMPHOCYTES # BLD AUTO: 1.51 10*3/MM3 (ref 0.7–3.1)
LYMPHOCYTES NFR BLD AUTO: 23.2 % (ref 19.6–45.3)
MCH RBC QN AUTO: 29.1 PG (ref 26.6–33)
MCHC RBC AUTO-ENTMCNC: 33.2 G/DL (ref 31.5–35.7)
MCV RBC AUTO: 87.5 FL (ref 79–97)
MONOCYTES # BLD AUTO: 1.15 10*3/MM3 (ref 0.1–0.9)
MONOCYTES NFR BLD AUTO: 17.7 % (ref 5–12)
NEUTROPHILS NFR BLD AUTO: 3.66 10*3/MM3 (ref 1.7–7)
NEUTROPHILS NFR BLD AUTO: 56.2 % (ref 42.7–76)
NRBC BLD AUTO-RTO: 0 /100 WBC (ref 0–0.2)
PLATELET # BLD AUTO: 357 10*3/MM3 (ref 140–450)
PMV BLD AUTO: 9.2 FL (ref 6–12)
POTASSIUM SERPL-SCNC: 4.5 MMOL/L (ref 3.5–5.2)
PROT SERPL-MCNC: 6.5 G/DL (ref 6–8.5)
RBC # BLD AUTO: 4.16 10*6/MM3 (ref 4.14–5.8)
SODIUM SERPL-SCNC: 137 MMOL/L (ref 136–145)
TRIGL SERPL-MCNC: 52 MG/DL (ref 0–150)
VLDLC SERPL-MCNC: 11 MG/DL (ref 5–40)
WBC # BLD AUTO: 6.51 10*3/MM3 (ref 3.4–10.8)

## 2020-12-15 PROCEDURE — 80053 COMPREHEN METABOLIC PANEL: CPT

## 2020-12-15 PROCEDURE — 85025 COMPLETE CBC W/AUTO DIFF WBC: CPT

## 2020-12-15 PROCEDURE — 83036 HEMOGLOBIN GLYCOSYLATED A1C: CPT

## 2020-12-15 PROCEDURE — 36415 COLL VENOUS BLD VENIPUNCTURE: CPT

## 2020-12-15 PROCEDURE — 80061 LIPID PANEL: CPT

## 2020-12-21 ENCOUNTER — OFFICE VISIT (OUTPATIENT)
Dept: PODIATRY | Facility: CLINIC | Age: 85
End: 2020-12-21

## 2020-12-21 VITALS — BODY MASS INDEX: 27.16 KG/M2 | HEART RATE: 84 BPM | HEIGHT: 66 IN | OXYGEN SATURATION: 98 % | WEIGHT: 169 LBS

## 2020-12-21 DIAGNOSIS — Z79.01 ANTICOAGULANT LONG-TERM USE: ICD-10-CM

## 2020-12-21 DIAGNOSIS — E11.9 TYPE 2 DIABETES MELLITUS WITHOUT COMPLICATION, WITHOUT LONG-TERM CURRENT USE OF INSULIN (HCC): Primary | ICD-10-CM

## 2020-12-21 DIAGNOSIS — B35.1 ONYCHOMYCOSIS: ICD-10-CM

## 2020-12-21 DIAGNOSIS — M79.674 CHRONIC TOE PAIN, BILATERAL: ICD-10-CM

## 2020-12-21 DIAGNOSIS — G89.29 CHRONIC TOE PAIN, BILATERAL: ICD-10-CM

## 2020-12-21 DIAGNOSIS — M79.675 CHRONIC TOE PAIN, BILATERAL: ICD-10-CM

## 2020-12-21 PROCEDURE — 11721 DEBRIDE NAIL 6 OR MORE: CPT | Performed by: PODIATRIST

## 2020-12-21 RX ORDER — BROMPHENIRAMINE MALEATE, PSEUDOEPHEDRINE HYDROCHLORIDE, AND DEXTROMETHORPHAN HYDROBROMIDE 2; 30; 10 MG/5ML; MG/5ML; MG/5ML
SYRUP ORAL
COMMUNITY
Start: 2020-12-19 | End: 2021-05-12

## 2020-12-21 RX ORDER — CEFUROXIME AXETIL 500 MG/1
TABLET ORAL
COMMUNITY
Start: 2020-12-19 | End: 2021-01-12

## 2020-12-21 NOTE — PROGRESS NOTES
[facility] Keith Ruvalcaba .  1935  85 y.o. male   PCP: MILENA Larkin - 12/14/2020  BS- 101 per patient     Patient presents today for routine diabetic foot care.    12/21/2020     Chief Complaint   Patient presents with   • Left Foot - Follow-up, Diabetic foot care   • Right Foot - Follow-up, Diabetic foot care       History of Present Illness    Patient presents to clinic today for routine diabetic foot care.      Past Medical History:   Diagnosis Date   • Acquired hallux rigidus    • Anemia    • Arthropathy of lumbar facet joint    • Carpal tunnel syndrome    • CHF (congestive heart failure) (CMS/HCC)    • Chronic diastolic congestive heart failure (CMS/HCC)    • Chronic kidney disease    • Chronic kidney disease, stage II (mild)     Chronic kidney disease stage 2   • Chronic obstructive lung disease (CMS/HCC)    • Chronic renal failure    • Coronary artery disease    • Cortical senile cataract    • Diabetes mellitus (CMS/HCC)    • Diabetes mellitus type 2, noninsulin dependent (CMS/HCC)     diabetes mellitus type 2, non-insulin treated, Medication treatment plan: oral diabetic medication   • Diabetes mellitus without complication (CMS/HCC)    • Diastolic dysfunction    • Diastolic heart failure (CMS/HCC)    • Essential hypertension     difficult to control      • Hip pain    • History of echocardiogram 08/19/2015    Echocardiogram W/ color flow 32880 (1) - Mild to moderate LVH with mild left atrial enlargement and normal aortic root.CLVH,preserved LV systolic function with Ef of 55%.Diastolic dysfunction.MV intact.AV thickened.Aortic sclerosis.   • Hyperlipidemia    • Influenza vaccine administered 10/02/2015    INFLUENZA IMMUN ADMIN OR PREV RECV'D  (1) - Ordered By: DIONNE MANDUJANO ([facility])    • Insomnia    • Insomnia     Other insomnia   • Joint pain    • Low back pain    • Muscle strain    • Nuclear cataract    • Onychomycosis    • Pneumococcal vaccination given 07/22/2016    PNEUMOC VAC/ADMIN/RCVD 4040F (3) -  Ordered By: DIONNE MANDUJANO ([facility])   • Prostate cancer (CMS/HCC)    • Pure hypercholesterolemia    • Sedentary lifestyle          Past Surgical History:   Procedure Laterality Date   • CARDIAC ABLATION     • CARDIAC CATHETERIZATION  02/04/1986    Cardiac cath 48395 (1) - normal left heart catherization,non cardiac chest pain   • CARPAL TUNNEL RELEASE  10/19/2015    Carpal tunnel surgery (1) - Release of left carpal tunnel   • CATARACT EXTRACTION W/ INTRAOCULAR LENS IMPLANT Left 3/31/2017    Procedure: REMOVE CATARACT AND IMPLANT INRAOCULAR LENS LEFT EYE;  Surgeon: Hector Navarrete MD;  Location: [facility] OR;  Service:    • CATARACT EXTRACTION W/ INTRAOCULAR LENS IMPLANT Right 4/14/2017    Procedure: REMOVE CATARACT AND IMPLANT INTRAOCULAR LENS RIGHT EYE;  Surgeon: Hector Navarrete MD;  Location: [facility] OR;  Service:    • COLONOSCOPY N/A 10/31/2018    Procedure: COLONOSCOPY;  Surgeon: Herbie Christine MD;  Location: [facility] ENDOSCOPY;  Service: Gastroenterology   • ENDOSCOPY  08/05/2013    Colon endoscopy 87847 (2) - Hemorrhoids found.   • INGUINAL HERNIA REPAIR Right 06/07/1968    Inguinal hernia, repair (2)   • INJECTION OF MEDICATION  09/06/2012    Albuterol (2u) (1) - Ordered By: LIDYA STEINER ([facility])    • INJECTION OF MEDICATION  09/06/2012    Atrovent (1) - Ordered By: LIDYA STEINER ([facility])    • INJECTION OF MEDICATION  02/10/2012    Depo Medrol (Methylprednisone) (3) - Ordered By: HEATHER PAK ([facility])    • INJECTION OF MEDICATION  02/09/2014    Kenalog (4) - Ordered By: DIMAS ANDRADE ([facility])    • JOINT REPLACEMENT     • LUMBAR LAMINECTOMY  2007    Lumbar laminectomy (1)   • MANDIBLE FRACTURE SURGERY  10/11/1981    Treat nose/jaw fracture (1) - bilateral open reduction of fracture of mandible   • OTHER SURGICAL HISTORY  09/24/2011    Drain/Inject Major Joint 20610 (1) - Ordered By: KARLOS HERNANDEZ ([facility])    • OTHER SURGICAL HISTORY      Insert IVC filter 52289 (1)   • OTHER  SURGICAL HISTORY  10/11/2011    SPIROMETRY 95733 (1) - Ordered By: HEATHER PAK ([facility])   • TOTAL HIP ARTHROPLASTY Right          Family History   Problem Relation Age of Onset   • Diabetes Other    • Cancer Sister    • Heart disease Mother    • Hypertension Mother    • Heart disease Father    • Hypertension Father    • Coronary artery disease Neg Hx        Allergies   Allergen Reactions   • Aldactone [Spironolactone] Other (See Comments)     Hyponatremia and hyperkalemia       Social History     Socioeconomic History   • Marital status:      Spouse name: Not on file   • Number of children: Not on file   • Years of education: Not on file   • Highest education level: Not on file   Social Needs   • Financial resource strain: Not on file   • Food insecurity     Worry: Never true     Inability: Never true   • Transportation needs     Medical: No     Non-medical: No   Tobacco Use   • Smoking status: Former Smoker     Quit date:      Years since quittin.9   • Smokeless tobacco: Never Used   Substance and Sexual Activity   • Alcohol use: No   • Drug use: No   • Sexual activity: Defer     Comment: Marital status:    Lifestyle   • Physical activity     Days per week: 0 days     Minutes per session: 0 min   • Stress: Not at all   Relationships   • Social connections     Talks on phone: More than three times a week     Gets together: More than three times a week     Attends Buddhist service: More than 4 times per year     Active member of club or organization: No     Attends meetings of clubs or organizations: Never     Relationship status:          Current Outpatient Medications   Medication Sig Dispense Refill   • albuterol sulfate  (90 Base) MCG/ACT inhaler Inhale 2 puffs Every 4 (Four) Hours As Needed for Wheezing. 18 g 6   • aspirin 81 MG EC tablet Take 81 mg by mouth daily.     • brompheniramine-pseudoephedrine-DM 30-2-10 MG/5ML syrup      • carbidopa-levodopa (SINEMET)   "MG per tablet Take 1 tablet by mouth 3 (Three) Times a Day. 90 tablet 2   • cefuroxime (CEFTIN) 500 MG tablet      • FEROSUL 325 (65 Fe) MG tablet TAKE 1 TABLET BY MOUTH DAILY WITH BREAKFAST. 30 tablet 5   • furosemide (LASIX) 20 MG tablet Take 1 tablet by mouth Daily As Needed (weight gain of 3lbs overnight or leg swelling). 30 tablet 3   • gabapentin (NEURONTIN) 300 MG capsule TAKE ONE CAPSULE BY MOUTH THREE TIMES A  capsule 2   • glucose blood (True Metrix Blood Glucose Test) test strip Check glucose Once daily 100 each 6   • hydrALAZINE (APRESOLINE) 25 MG tablet TAKE ONE TABLET BY MOUTH THREE TIMES A DAY 90 tablet 4   • lisinopril-hydrochlorothiazide (PRINZIDE,ZESTORETIC) 20-12.5 MG per tablet Take 1 tablet by mouth Daily. 30 tablet 3   • methocarbamol (Robaxin) 500 MG tablet Take 1 tablet by mouth Every Night. 30 tablet 1   • metoprolol succinate XL (TOPROL-XL) 25 MG 24 hr tablet TAKE ONE TABLET BY MOUTH EVERY NIGHT 30 tablet 2   • pravastatin (PRAVACHOL) 40 MG tablet Take 1 tablet by mouth Daily. 90 tablet 2   • SITagliptin (Januvia) 50 MG tablet Take 1 tablet by mouth Daily. 30 tablet 3   • tiotropium bromide-olodaterol (Stiolto Respimat) 2.5-2.5 MCG/ACT aerosol solution inhaler Inhale 2 puffs Daily. 4 g 0   • TRUEplus Lancets 30G misc Use to test Blood Sugars Once Daily 100 each 6   • XARELTO 10 MG tablet TAKE ONE TABLET BY MOUTH DAILY 30 tablet 3     No current facility-administered medications for this visit.        Review of Systems   Constitutional: Negative.    HENT: Negative.    Eyes: Negative.    Respiratory: Negative.    Cardiovascular: Negative.    Gastrointestinal: Negative.    Endocrine: Negative.    Genitourinary: Negative.    Musculoskeletal:        Toe pain   Skin: Negative.    Allergic/Immunologic: Negative.    Neurological: Negative.    Hematological: Negative.    Psychiatric/Behavioral: Negative.          OBJECTIVE    Pulse 84   Ht 167.6 cm (66\")   Wt 76.7 kg (169 lb)   SpO2 98%   " BMI 27.28 kg/m²       Physical Exam   Constitutional: He is oriented to person, place, and time. He appears well-developed. No distress.   Pulmonary/Chest: Effort normal. No respiratory distress.   Musculoskeletal: Normal range of motion. No signs of injury.   Neurological: He is alert and oriented to person, place, and time.   Skin: Skin is warm and dry. Capillary refill takes less than 2 seconds.   Psychiatric: His behavior is normal. Mood normal.   Vitals reviewed.      Gait: Normal    Assistive Device: Walker    Lower Extremity    Cardiovascular:    DP/PT pulses palpable bilateral  CFT brisk  to all digits  Skin temp is warm to warm from proximal tibia to distal digits bilateral  Pedal hair growth decreased.   Nonpitting vanesa-malleolar edema bilateral  Musculoskeletal:  Muscle strength is 5/5 for all muscle groups tested   ROM of the 1st MTP is WNL bilateral   ROM of the ankle joint is  WNL bilateral   Dermatological:   Skin is warm, dry and intact bilateral  Webspaces 1-4 bilateral are clean, dry and intact.   No subcutaneous nodules or masses noted    Nails 1-5 bilateral are  discolored, elongated with subungual debris.  Pain on palpation to the nail plates.  Neurological:   Protective sensation intact   Sensation intact to light touch        Procedures        ASSESSMENT AND PLAN    Diagnoses and all orders for this visit:    1. Type 2 diabetes mellitus without complication, without long-term current use of insulin (CMS/[facility]) (Primary)    2. Onychomycosis    3. Chronic toe pain, bilateral    4. Anticoagulant long-term use        - Nails 1-5 bilateral were debrided in length and thickness with nail nipper and electric  to decrease fungal load and risk of infection.   - All the patients questions were answered.  - RTC 3 months or sooner if needed.              This document has been electronically signed by Partha Campos DPM on December 21, 2020 08:18 CST     12/21/2020  08:18 CST

## 2021-01-05 RX ORDER — RIVAROXABAN 10 MG/1
TABLET, FILM COATED ORAL
Qty: 30 TABLET | Refills: 3 | Status: SHIPPED | OUTPATIENT
Start: 2021-01-05 | End: 2021-05-04

## 2021-01-11 ENCOUNTER — TELEPHONE (OUTPATIENT)
Dept: FAMILY MEDICINE CLINIC | Facility: CLINIC | Age: 86
End: 2021-01-11

## 2021-01-12 ENCOUNTER — OFFICE VISIT (OUTPATIENT)
Dept: FAMILY MEDICINE CLINIC | Facility: CLINIC | Age: 86
End: 2021-01-12

## 2021-01-12 VITALS
HEIGHT: 66 IN | HEART RATE: 80 BPM | BODY MASS INDEX: 27.07 KG/M2 | DIASTOLIC BLOOD PRESSURE: 62 MMHG | WEIGHT: 168.44 LBS | OXYGEN SATURATION: 99 % | SYSTOLIC BLOOD PRESSURE: 136 MMHG

## 2021-01-12 DIAGNOSIS — J40 BRONCHITIS: Primary | ICD-10-CM

## 2021-01-12 DIAGNOSIS — R04.2 HEMOPTYSIS: ICD-10-CM

## 2021-01-12 PROCEDURE — 99214 OFFICE O/P EST MOD 30 MIN: CPT | Performed by: FAMILY MEDICINE

## 2021-01-12 RX ORDER — AZITHROMYCIN 250 MG/1
TABLET, FILM COATED ORAL
Qty: 6 TABLET | Refills: 0 | Status: SHIPPED | OUTPATIENT
Start: 2021-01-12 | End: 2021-05-12

## 2021-01-12 NOTE — PROGRESS NOTES
Subjective   General Keith Ruvalcaba Jr. is a 85 y.o. male.   Cc: follow up of chronic medical issues    Cough  This is a new problem. The current episode started in the past 7 days. The problem has been gradually improving. The problem occurs every few hours. The cough is productive of blood-tinged sputum. Associated symptoms include hemoptysis. Pertinent negatives include no chest pain, chills, ear congestion, ear pain, headaches, myalgias, nasal congestion, postnasal drip, rhinorrhea, sore throat, shortness of breath or sweats.   He has coughed up blood one to two times daily since last Saturday. He was checked for COVID 19 and was negative.the X-ray was negative.    The following portions of the patient's history were reviewed and updated as appropriate: allergies, current medications, past family history, past medical history, past social history, past surgical history and problem list.    Review of Systems   Constitutional: Negative for chills.   HENT: Negative for ear pain, postnasal drip, rhinorrhea and sore throat.    Respiratory: Positive for cough and hemoptysis. Negative for shortness of breath.    Cardiovascular: Negative for chest pain.   Musculoskeletal: Negative for myalgias.   Neurological: Negative for headaches.       Objective   Physical Exam  Vitals signs reviewed.   Constitutional:       Appearance: Normal appearance.   HENT:      Head: Normocephalic.      Right Ear: Tympanic membrane, ear canal and external ear normal.      Left Ear: Tympanic membrane, ear canal and external ear normal.      Nose: Nose normal.      Mouth/Throat:      Mouth: Mucous membranes are moist.      Pharynx: Oropharynx is clear.   Eyes:      Extraocular Movements: Extraocular movements intact.      Pupils: Pupils are equal, round, and reactive to light.   Neck:      Musculoskeletal: Normal range of motion and neck supple.   Cardiovascular:      Rate and Rhythm: Normal rate and regular rhythm.      Heart sounds: No murmur. No  "friction rub. No gallop.    Pulmonary:      Effort: Pulmonary effort is normal. No respiratory distress.      Breath sounds: Normal breath sounds. No stridor. No wheezing, rhonchi or rales.   Chest:      Chest wall: No tenderness.   Abdominal:      General: Abdomen is flat. Bowel sounds are normal. There is no distension.      Tenderness: There is no abdominal tenderness. There is no guarding.   Skin:     General: Skin is warm and dry.   Neurological:      Mental Status: He is alert.           Visit Vitals  /62   Pulse 80   Ht 167.6 cm (66\")   Wt 76.4 kg (168 lb 7 oz)   SpO2 99%   BMI 27.19 kg/m²     Body mass index is 27.19 kg/m².      Assessment/Plan   Diagnoses and all orders for this visit:    1. Bronchitis (Primary)    2. Hemoptysis    Other orders  -     azithromycin (Zithromax Z-Paulino) 250 MG tablet; Take 2 tablets by mouth on day 1, then 1 tablet daily on days 2-5  Dispense: 6 tablet; Refill: 0    Complete antibiotics.  Return at next appointment.  "

## 2021-01-18 ENCOUNTER — TELEPHONE (OUTPATIENT)
Dept: FAMILY MEDICINE CLINIC | Facility: CLINIC | Age: 86
End: 2021-01-18

## 2021-01-18 DIAGNOSIS — R04.2 HEMOPTYSIS: Primary | ICD-10-CM

## 2021-01-18 NOTE — TELEPHONE ENCOUNTER
PATIENT CALLED. HE SAW DR. BETHEA ON THE 12TH OF THIS MONTH. TOOK ALL OF THE MEDICATION HE PRESCRIBED AND HAS STARTED TO COUGH UP BLOOD. PLEASE ADVISE WHAT TO DO.    CALL BACK: [phone]

## 2021-01-19 ENCOUNTER — HOSPITAL ENCOUNTER (OUTPATIENT)
Dept: CT IMAGING | Facility: HOSPITAL | Age: 86
Discharge: HOME | End: 2021-01-19
Admitting: FAMILY MEDICINE

## 2021-01-19 ENCOUNTER — OFFICE VISIT (OUTPATIENT)
Dept: CARDIAC SURGERY | Facility: CLINIC | Age: 86
End: 2021-01-19

## 2021-01-19 VITALS
OXYGEN SATURATION: 98 % | HEIGHT: 66 IN | HEART RATE: 81 BPM | WEIGHT: 170 LBS | DIASTOLIC BLOOD PRESSURE: 78 MMHG | BODY MASS INDEX: 27.32 KG/M2 | SYSTOLIC BLOOD PRESSURE: 149 MMHG

## 2021-01-19 DIAGNOSIS — R04.2 HEMOPTYSIS: ICD-10-CM

## 2021-01-19 DIAGNOSIS — R09.89 ABDOMINAL BRUIT: ICD-10-CM

## 2021-01-19 DIAGNOSIS — I65.23 ASYMPTOMATIC BILATERAL CAROTID ARTERY STENOSIS: Primary | ICD-10-CM

## 2021-01-19 PROCEDURE — 99213 OFFICE O/P EST LOW 20 MIN: CPT | Performed by: THORACIC SURGERY (CARDIOTHORACIC VASCULAR SURGERY)

## 2021-01-19 PROCEDURE — 25010000002 IOPAMIDOL 61 % SOLUTION: Performed by: FAMILY MEDICINE

## 2021-01-19 PROCEDURE — 71260 CT THORAX DX C+: CPT

## 2021-01-19 RX ADMIN — IOPAMIDOL 85 ML: 612 INJECTION, SOLUTION INTRAVENOUS at 08:28

## 2021-01-20 DIAGNOSIS — R04.2 HEMOPTYSIS: Primary | ICD-10-CM

## 2021-01-21 PROBLEM — R09.89 ABDOMINAL BRUIT: Status: ACTIVE | Noted: 2021-01-21

## 2021-01-21 NOTE — PROGRESS NOTES
General Keith Ruvalcaba Jr.  1935            85 y.o.      1/19/2021    Chief Complaint   Patient presents with   • Carotid Artery Disease   • Peripheral Vascular Disease   PCP JENA BETHEA MD  • Carotid Artery Disease       2 year follow up      In August 2, 2005 this patient developed a DVT and pulmonary embolus despite adequate anticoagulation and thus underwent implantation of a vena caval filter which has remained stable and intact since that time.  He has long since been off anticoagulation.   when seen 2010  A study suggested a significant carotid stenosis bilaterally and patient has  been under surveillance since that time.  CT scan of his abdomen 5/14/14 demonstrated calcification in his abdominal aorta but NO ABDOMINAL AORTIC ANEURYSM           HPI  He remains asymptomatic referable to cerebrovascular disease      1- the patient remains asymptomatic referable to cerebrovascular status      Current Outpatient Medications:   •  albuterol sulfate  (90 Base) MCG/ACT inhaler, Inhale 2 puffs Every 4 (Four) Hours As Needed for Wheezing., Disp: 18 g, Rfl: 6  •  aspirin 81 MG EC tablet, Take 81 mg by mouth daily., Disp: , Rfl:   •  azithromycin (Zithromax Z-Paulino) 250 MG tablet, Take 2 tablets by mouth on day 1, then 1 tablet daily on days 2-5, Disp: 6 tablet, Rfl: 0  •  brompheniramine-pseudoephedrine-DM 30-2-10 MG/5ML syrup, , Disp: , Rfl:   •  carbidopa-levodopa (SINEMET)  MG per tablet, Take 1 tablet by mouth 3 (Three) Times a Day., Disp: 90 tablet, Rfl: 2  •  FEROSUL 325 (65 Fe) MG tablet, TAKE 1 TABLET BY MOUTH DAILY WITH BREAKFAST., Disp: 30 tablet, Rfl: 5  •  furosemide (LASIX) 20 MG tablet, Take 1 tablet by mouth Daily As Needed (weight gain of 3lbs overnight or leg swelling)., Disp: 30 tablet, Rfl: 3  •  gabapentin (NEURONTIN) 300 MG capsule, TAKE ONE CAPSULE BY MOUTH THREE TIMES A DAY, Disp: 270 capsule, Rfl: 2  •  glucose blood (True Metrix Blood Glucose Test) test strip, Check  glucose Once daily, Disp: 100 each, Rfl: 6  •  hydrALAZINE (APRESOLINE) 25 MG tablet, TAKE ONE TABLET BY MOUTH THREE TIMES A DAY, Disp: 90 tablet, Rfl: 4  •  lisinopril-hydrochlorothiazide (PRINZIDE,ZESTORETIC) 20-12.5 MG per tablet, Take 1 tablet by mouth Daily., Disp: 30 tablet, Rfl: 3  •  methocarbamol (Robaxin) 500 MG tablet, Take 1 tablet by mouth Every Night., Disp: 30 tablet, Rfl: 1  •  metoprolol succinate XL (TOPROL-XL) 25 MG 24 hr tablet, TAKE ONE TABLET BY MOUTH EVERY NIGHT, Disp: 30 tablet, Rfl: 2  •  pravastatin (PRAVACHOL) 40 MG tablet, Take 1 tablet by mouth Daily., Disp: 90 tablet, Rfl: 2  •  SITagliptin (Januvia) 50 MG tablet, Take 1 tablet by mouth Daily., Disp: 30 tablet, Rfl: 3  •  tiotropium bromide-olodaterol (Stiolto Respimat) 2.5-2.5 MCG/ACT aerosol solution inhaler, Inhale 2 puffs Daily., Disp: 4 g, Rfl: 0  •  TRUEplus Lancets 30G misc, Use to test Blood Sugars Once Daily, Disp: 100 each, Rfl: 6  •  Xarelto 10 MG tablet, TAKE ONE TABLET BY MOUTH DAILY, Disp: 30 tablet, Rfl: 3    The following portions of the patient's history were reviewed and updated as appropriate: allergies, current medications, past family history, past medical history, past social history, past surgical history and problem list.        Past Surgical History:   Procedure Laterality Date   • CARDIAC ABLATION     • CARDIAC CATHETERIZATION  02/04/1986    Cardiac cath 95991 (1) - normal left heart catherization,non cardiac chest pain   • CARPAL TUNNEL RELEASE  10/19/2015    Carpal tunnel surgery (1) - Release of left carpal tunnel   • CATARACT EXTRACTION W/ INTRAOCULAR LENS IMPLANT Left 3/31/2017    Procedure: REMOVE CATARACT AND IMPLANT INRAOCULAR LENS LEFT EYE;  Surgeon: Hector Navarrete MD;  Location: [facility];  Service:    • CATARACT EXTRACTION W/ INTRAOCULAR LENS IMPLANT Right 4/14/2017    Procedure: REMOVE CATARACT AND IMPLANT INTRAOCULAR LENS RIGHT EYE;  Surgeon: Hector Navarrete MD;  Location[facility] OR;  " Service:    • COLONOSCOPY N/A 10/31/2018    Procedure: COLONOSCOPY;  Surgeon: Herbie Christine MD;  Location: [facility] ENDOSCOPY;  Service: Gastroenterology   • ENDOSCOPY  08/05/2013    Colon endoscopy 73479 (2) - Hemorrhoids found.   • INGUINAL HERNIA REPAIR Right 06/07/1968    Inguinal hernia, repair (2)   • INJECTION OF MEDICATION  09/06/2012    Albuterol (2u) (1) - Ordered By: LIDYA STEINER ([facility])    • INJECTION OF MEDICATION  09/06/2012    Atrovent (1) - Ordered By: LIDYA STEINER ([facility])    • INJECTION OF MEDICATION  02/10/2012    Depo Medrol (Methylprednisone) (3) - Ordered By: HEATHER APK ([facility])    • INJECTION OF MEDICATION  02/09/2014    Kenalog (4) - Ordered By: DIMAS ANDRADE ([facility])    • JOINT REPLACEMENT     • LUMBAR LAMINECTOMY  2007    Lumbar laminectomy (1)   • MANDIBLE FRACTURE SURGERY  10/11/1981    Treat nose/jaw fracture (1) - bilateral open reduction of fracture of mandible   • OTHER SURGICAL HISTORY  09/24/2011    Drain/Inject Major Joint 95690 (1) - Ordered By: KARLOS HERNANDEZ ([facility])    • OTHER SURGICAL HISTORY      Insert IVC filter [id number] (1)   • OTHER SURGICAL HISTORY  10/11/2011    SPIROMETRY 05341 (1) - Ordered By: HEATHER PAK ([facility])   • TOTAL HIP ARTHROPLASTY Right            Physical Exam  /78 (BP Location: Left arm, Patient Position: Sitting, Cuff Size: Adult)   Pulse 81   Ht 167.6 cm (66\")   Wt 77.1 kg (170 lb)   SpO2 98%   BMI 27.44 kg/m²     HEENT: Soft bruits over carotid arteries    CHEST: Clear to auscultation    HEART: Regular rate and rhythm    ABDOMEN: Soft bruit over upper abdomen no abdominal tenderness    EXTREMITIES: Ankle pulses remain easily palpable no significant edema lower extremities  VASCULAR LAB STUDIES:CAROTID DUPLEX SCAN (I-[id number])                                    RIGHT 0-49%           LEFT 0-49%   Vertebral Flow       Antegrade                 Antegrade     Return 2 years VERO, Carotid duplex scan on return U/S " AORTA      VASCULAR LAB STUDIES VERO (1-)                                            RIGHT 1.15         LEFT 1.05  WAVE FORMS       PT     Triphasic            Triphasic                                 DP      Triphasic            Triphasic    CAROTID DUPLEX SCAN (1-)                                           RIGHT 0-49%       LEFT 0-49%  Vertebral Flow               Antegrade            Antegrade    •           RADIOLOGY:      Asymptomatic bilateral carotid artery stenosis [I65.23]    IMPRESSION:  Minimal carotid stenosis bilaterally with antegrade flow in both vertebral arteries asymptomatic  Easily palpable ankle pulses bilaterally  No AAA per CT scan by 2014 will be surveillance aorta upon return because of soft bruit upper abdomen.  Return 2 years carotid duplex scan and U/S aorta              Assessment/Plan  Diagnoses and all orders for this visit:    1. Asymptomatic bilateral carotid artery stenosis (Primary)    2. Abdominal bruit                  No follow-ups on file.                This document has been electronically signed by Jack L. Hamman, MD on January 21, 2021 13:43 CST    Jack L. Hamman, MD  1/21/2021

## 2021-02-09 NOTE — TELEPHONE ENCOUNTER
Spoke with patient and let him know that we did get his request for the metoprolol and that we would get it handled, pt voiced his understanding

## 2021-02-10 RX ORDER — METOPROLOL SUCCINATE 25 MG/1
25 TABLET, EXTENDED RELEASE ORAL
Qty: 30 TABLET | Refills: 5 | Status: SHIPPED | OUTPATIENT
Start: 2021-02-10 | End: 2021-08-16

## 2021-02-16 RX ORDER — HYDRALAZINE HYDROCHLORIDE 25 MG/1
TABLET, FILM COATED ORAL
Qty: 90 TABLET | Refills: 4 | Status: SHIPPED | OUTPATIENT
Start: 2021-02-16 | End: 2021-07-07 | Stop reason: SDUPTHER

## 2021-02-22 ENCOUNTER — IMMUNIZATION (OUTPATIENT)
Dept: VACCINE CLINIC | Facility: HOSPITAL | Age: 86
End: 2021-02-22

## 2021-02-22 PROCEDURE — 0001A: CPT | Performed by: THORACIC SURGERY (CARDIOTHORACIC VASCULAR SURGERY)

## 2021-02-22 PROCEDURE — 91300 HC SARSCOV02 VAC 30MCG/0.3ML IM: CPT | Performed by: THORACIC SURGERY (CARDIOTHORACIC VASCULAR SURGERY)

## 2021-03-04 ENCOUNTER — TELEPHONE (OUTPATIENT)
Dept: FAMILY MEDICINE CLINIC | Facility: CLINIC | Age: 86
End: 2021-03-04

## 2021-03-08 DIAGNOSIS — E61.1 IRON DEFICIENCY: ICD-10-CM

## 2021-03-08 RX ORDER — ALBUTEROL SULFATE 90 UG/1
AEROSOL, METERED RESPIRATORY (INHALATION)
Qty: 18 G | Refills: 6 | Status: SHIPPED | OUTPATIENT
Start: 2021-03-08 | End: 2021-08-19 | Stop reason: SDUPTHER

## 2021-03-08 RX ORDER — FERROUS SULFATE 325(65) MG
TABLET ORAL
Qty: 30 TABLET | Refills: 5 | Status: SHIPPED | OUTPATIENT
Start: 2021-03-08 | End: 2021-05-20 | Stop reason: SDUPTHER

## 2021-03-15 ENCOUNTER — IMMUNIZATION (OUTPATIENT)
Dept: VACCINE CLINIC | Facility: HOSPITAL | Age: 86
End: 2021-03-15

## 2021-03-15 ENCOUNTER — OFFICE VISIT (OUTPATIENT)
Dept: FAMILY MEDICINE CLINIC | Facility: CLINIC | Age: 86
End: 2021-03-15

## 2021-03-15 ENCOUNTER — LAB (OUTPATIENT)
Dept: LAB | Facility: HOSPITAL | Age: 86
End: 2021-03-15

## 2021-03-15 VITALS
BODY MASS INDEX: 26.2 KG/M2 | SYSTOLIC BLOOD PRESSURE: 150 MMHG | TEMPERATURE: 97.5 F | OXYGEN SATURATION: 97 % | HEART RATE: 68 BPM | DIASTOLIC BLOOD PRESSURE: 62 MMHG | HEIGHT: 66 IN | WEIGHT: 163 LBS

## 2021-03-15 DIAGNOSIS — I10 ESSENTIAL HYPERTENSION: ICD-10-CM

## 2021-03-15 DIAGNOSIS — E11.9 TYPE 2 DIABETES MELLITUS WITHOUT COMPLICATION, WITHOUT LONG-TERM CURRENT USE OF INSULIN (HCC): ICD-10-CM

## 2021-03-15 DIAGNOSIS — I10 ESSENTIAL HYPERTENSION: Primary | ICD-10-CM

## 2021-03-15 DIAGNOSIS — E78.5 HYPERLIPIDEMIA, UNSPECIFIED HYPERLIPIDEMIA TYPE: ICD-10-CM

## 2021-03-15 LAB
ALBUMIN SERPL-MCNC: 4.2 G/DL (ref 3.5–5.2)
ALBUMIN/GLOB SERPL: 1.6 G/DL
ALP SERPL-CCNC: 70 U/L (ref 39–117)
ALT SERPL W P-5'-P-CCNC: 7 U/L (ref 1–41)
ANION GAP SERPL CALCULATED.3IONS-SCNC: 10.5 MMOL/L (ref 5–15)
AST SERPL-CCNC: 19 U/L (ref 1–40)
BASOPHILS # BLD AUTO: 0.06 10*3/MM3 (ref 0–0.2)
BASOPHILS NFR BLD AUTO: 1.3 % (ref 0–1.5)
BILIRUB SERPL-MCNC: 0.3 MG/DL (ref 0–1.2)
BUN SERPL-MCNC: 16 MG/DL (ref 8–23)
BUN/CREAT SERPL: 12 (ref 7–25)
CALCIUM SPEC-SCNC: 9.5 MG/DL (ref 8.6–10.5)
CHLORIDE SERPL-SCNC: 98 MMOL/L (ref 98–107)
CHOLEST SERPL-MCNC: 153 MG/DL (ref 0–200)
CO2 SERPL-SCNC: 26.5 MMOL/L (ref 22–29)
CREAT SERPL-MCNC: 1.33 MG/DL (ref 0.76–1.27)
DEPRECATED RDW RBC AUTO: 42.1 FL (ref 37–54)
EOSINOPHIL # BLD AUTO: 0.04 10*3/MM3 (ref 0–0.4)
EOSINOPHIL NFR BLD AUTO: 0.9 % (ref 0.3–6.2)
ERYTHROCYTE [DISTWIDTH] IN BLOOD BY AUTOMATED COUNT: 13.5 % (ref 12.3–15.4)
GFR SERPL CREATININE-BSD FRML MDRD: 62 ML/MIN/1.73
GLOBULIN UR ELPH-MCNC: 2.6 GM/DL
GLUCOSE SERPL-MCNC: 99 MG/DL (ref 65–99)
HBA1C MFR BLD: 5.7 % (ref 4.8–5.6)
HCT VFR BLD AUTO: 39.3 % (ref 37.5–51)
HDLC SERPL-MCNC: 65 MG/DL (ref 40–60)
HGB BLD-MCNC: 13.3 G/DL (ref 13–17.7)
IMM GRANULOCYTES # BLD AUTO: 0.01 10*3/MM3 (ref 0–0.05)
IMM GRANULOCYTES NFR BLD AUTO: 0.2 % (ref 0–0.5)
LDLC SERPL CALC-MCNC: 77 MG/DL (ref 0–100)
LDLC/HDLC SERPL: 1.2 {RATIO}
LYMPHOCYTES # BLD AUTO: 1.15 10*3/MM3 (ref 0.7–3.1)
LYMPHOCYTES NFR BLD AUTO: 25.8 % (ref 19.6–45.3)
MCH RBC QN AUTO: 29.2 PG (ref 26.6–33)
MCHC RBC AUTO-ENTMCNC: 33.8 G/DL (ref 31.5–35.7)
MCV RBC AUTO: 86.4 FL (ref 79–97)
MONOCYTES # BLD AUTO: 0.6 10*3/MM3 (ref 0.1–0.9)
MONOCYTES NFR BLD AUTO: 13.5 % (ref 5–12)
NEUTROPHILS NFR BLD AUTO: 2.6 10*3/MM3 (ref 1.7–7)
NEUTROPHILS NFR BLD AUTO: 58.3 % (ref 42.7–76)
NRBC BLD AUTO-RTO: 0 /100 WBC (ref 0–0.2)
PLATELET # BLD AUTO: 386 10*3/MM3 (ref 140–450)
PMV BLD AUTO: 9.4 FL (ref 6–12)
POTASSIUM SERPL-SCNC: 4.5 MMOL/L (ref 3.5–5.2)
PROT SERPL-MCNC: 6.8 G/DL (ref 6–8.5)
RBC # BLD AUTO: 4.55 10*6/MM3 (ref 4.14–5.8)
SODIUM SERPL-SCNC: 135 MMOL/L (ref 136–145)
TRIGL SERPL-MCNC: 49 MG/DL (ref 0–150)
VLDLC SERPL-MCNC: 11 MG/DL (ref 5–40)
WBC # BLD AUTO: 4.46 10*3/MM3 (ref 3.4–10.8)

## 2021-03-15 PROCEDURE — 99214 OFFICE O/P EST MOD 30 MIN: CPT | Performed by: FAMILY MEDICINE

## 2021-03-15 PROCEDURE — 83036 HEMOGLOBIN GLYCOSYLATED A1C: CPT

## 2021-03-15 PROCEDURE — 80053 COMPREHEN METABOLIC PANEL: CPT

## 2021-03-15 PROCEDURE — 85025 COMPLETE CBC W/AUTO DIFF WBC: CPT

## 2021-03-15 PROCEDURE — 0002A: CPT | Performed by: THORACIC SURGERY (CARDIOTHORACIC VASCULAR SURGERY)

## 2021-03-15 PROCEDURE — 36415 COLL VENOUS BLD VENIPUNCTURE: CPT

## 2021-03-15 PROCEDURE — 80061 LIPID PANEL: CPT

## 2021-03-15 PROCEDURE — 91300 HC SARSCOV02 VAC 30MCG/0.3ML IM: CPT | Performed by: THORACIC SURGERY (CARDIOTHORACIC VASCULAR SURGERY)

## 2021-03-15 NOTE — PROGRESS NOTES
Subjective   General Keith Ruvalcaba Jr. is a 85 y.o. male.   Cc: follow up of chronic medical issues    Hypertension  This is a chronic problem. The current episode started more than 1 year ago. The problem has been gradually improving since onset. Pertinent negatives include no anxiety, blurred vision, chest pain, headaches, malaise/fatigue, neck pain, orthopnea, palpitations, peripheral edema, PND, shortness of breath or sweats. Current antihypertension treatment includes diuretics, alpha 1 blockers, ACE inhibitors and beta blockers.   Hyperlipidemia  This is a chronic problem. The current episode started more than 1 year ago. The problem is resistant. Pertinent negatives include no chest pain or shortness of breath. Current antihyperlipidemic treatment includes statins.   Diabetes  He presents for his follow-up diabetic visit. He has type 2 diabetes mellitus. The initial diagnosis of diabetes was made 5 years ago. Pertinent negatives for hypoglycemia include no headaches or sweats. Pertinent negatives for diabetes include no blurred vision, no chest pain, no fatigue, no foot paresthesias, no foot ulcerations, no polydipsia, no polyphagia and no polyuria.       The following portions of the patient's history were reviewed and updated as appropriate: allergies, current medications, past family history, past medical history, past social history, past surgical history and problem list.    Review of Systems   Constitutional: Negative for fatigue and malaise/fatigue.   Eyes: Negative for blurred vision.   Respiratory: Negative for shortness of breath.    Cardiovascular: Negative for chest pain, palpitations, orthopnea and PND.   Endocrine: Negative for polydipsia, polyphagia and polyuria.   Musculoskeletal: Negative for neck pain.   Neurological: Negative for headaches.       Objective   Physical Exam  Vitals reviewed.   Constitutional:       Appearance: Normal appearance.   HENT:      Head: Normocephalic and atraumatic.     "  Right Ear: Tympanic membrane, ear canal and external ear normal.      Left Ear: Tympanic membrane, ear canal and external ear normal.      Nose: Nose normal.      Mouth/Throat:      Mouth: Mucous membranes are moist.      Pharynx: Oropharynx is clear.   Eyes:      Extraocular Movements: Extraocular movements intact.      Pupils: Pupils are equal, round, and reactive to light.   Cardiovascular:      Rate and Rhythm: Normal rate and regular rhythm.      Heart sounds: Normal heart sounds. No murmur. No friction rub. No gallop.    Pulmonary:      Effort: Pulmonary effort is normal. No respiratory distress.      Breath sounds: Normal breath sounds. No stridor. No wheezing, rhonchi or rales.   Chest:      Chest wall: No tenderness.   Abdominal:      General: Abdomen is flat. There is no distension.      Palpations: Abdomen is soft.      Tenderness: There is no abdominal tenderness. There is no guarding.   Musculoskeletal:         General: Normal range of motion.      Cervical back: Normal range of motion and neck supple.   Skin:     General: Skin is warm and dry.   Neurological:      Mental Status: He is alert.           Visit Vitals  /62   Pulse 68   Temp 97.5 °F (36.4 °C)   Ht 167.6 cm (66\")   Wt 73.9 kg (163 lb)   SpO2 97%   BMI 26.31 kg/m²     Body mass index is 26.31 kg/m².      Assessment/Plan   Diagnoses and all orders for this visit:    1. Essential hypertension (Primary)  -     Comprehensive metabolic panel; Future  -     CBC w AUTO Differential; Future    2. Hyperlipidemia, unspecified hyperlipidemia type    3. Type 2 diabetes mellitus without complication, without long-term current use of insulin (CMS/[facility])  -     Lipid panel; Future  -     Hemoglobin A1c; Future    Return to the clinic in 3 month/s.  Will contact with results as needed.    "

## 2021-03-22 ENCOUNTER — OFFICE VISIT (OUTPATIENT)
Dept: PODIATRY | Facility: CLINIC | Age: 86
End: 2021-03-22

## 2021-03-22 VITALS — OXYGEN SATURATION: 99 % | WEIGHT: 163 LBS | HEIGHT: 66 IN | HEART RATE: 79 BPM | BODY MASS INDEX: 26.2 KG/M2

## 2021-03-22 DIAGNOSIS — M79.675 CHRONIC TOE PAIN, BILATERAL: ICD-10-CM

## 2021-03-22 DIAGNOSIS — E11.9 ENCOUNTER FOR DIABETIC FOOT EXAM (HCC): Primary | ICD-10-CM

## 2021-03-22 DIAGNOSIS — E11.9 TYPE 2 DIABETES MELLITUS WITHOUT COMPLICATION, WITHOUT LONG-TERM CURRENT USE OF INSULIN (HCC): ICD-10-CM

## 2021-03-22 DIAGNOSIS — G89.29 CHRONIC TOE PAIN, BILATERAL: ICD-10-CM

## 2021-03-22 DIAGNOSIS — B35.1 ONYCHOMYCOSIS: ICD-10-CM

## 2021-03-22 DIAGNOSIS — M79.674 CHRONIC TOE PAIN, BILATERAL: ICD-10-CM

## 2021-03-22 PROCEDURE — 99213 OFFICE O/P EST LOW 20 MIN: CPT | Performed by: PODIATRIST

## 2021-03-22 PROCEDURE — 11721 DEBRIDE NAIL 6 OR MORE: CPT | Performed by: PODIATRIST

## 2021-03-22 NOTE — PROGRESS NOTES
[facility] Ruvalcaba .  1935  85 y.o. male   PCP: MILENA Larkin - 3/15/2021  BS- 109 per patient   A1c 5.7 3/15/21    Patient presents today for routine diabetic foot care.    03/22/2021     Chief Complaint   Patient presents with   • Left Foot - diabetic nail care   • Right Foot - diabetic nail care       History of Present Illness    Patient presents to clinic today for routine diabetic foot care.      Past Medical History:   Diagnosis Date   • Acquired hallux rigidus    • Anemia    • Arthropathy of lumbar facet joint    • Carpal tunnel syndrome    • CHF (congestive heart failure) (CMS/HCC)    • Chronic diastolic congestive heart failure (CMS/HCC)    • Chronic kidney disease    • Chronic kidney disease, stage II (mild)     Chronic kidney disease stage 2   • Chronic obstructive lung disease (CMS/HCC)    • Chronic renal failure    • Coronary artery disease    • Cortical senile cataract    • Diabetes mellitus (CMS/HCC)    • Diabetes mellitus type 2, noninsulin dependent (CMS/HCC)     diabetes mellitus type 2, non-insulin treated, Medication treatment plan: oral diabetic medication   • Diabetes mellitus without complication (CMS/HCC)    • Diastolic dysfunction    • Diastolic heart failure (CMS/HCC)    • Essential hypertension     difficult to control      • Hip pain    • History of echocardiogram 08/19/2015    Echocardiogram W/ color flow 35893 (1) - Mild to moderate LVH with mild left atrial enlargement and normal aortic root.CLVH,preserved LV systolic function with Ef of 55%.Diastolic dysfunction.MV intact.AV thickened.Aortic sclerosis.   • Hyperlipidemia    • Influenza vaccine administered 10/02/2015    INFLUENZA IMMUN ADMIN OR PREV RECV'D  (1) - Ordered By: DIONNE MANDUJANO ([facility])    • Insomnia    • Insomnia     Other insomnia   • Joint pain    • Low back pain    • Muscle strain    • Nuclear cataract    • Onychomycosis    • Pneumococcal vaccination given 07/22/2016    PNEUMOC VAC/ADMIN/RCVD 4040F (3) - Ordered  By: DIONNE MANDUJANO ([facility])   • Prostate cancer (CMS/HCC)    • Pure hypercholesterolemia    • Sedentary lifestyle          Past Surgical History:   Procedure Laterality Date   • CARDIAC ABLATION     • CARDIAC CATHETERIZATION  02/04/1986    Cardiac cath 30697 (1) - normal left heart catherization,non cardiac chest pain   • CARPAL TUNNEL RELEASE  10/19/2015    Carpal tunnel surgery (1) - Release of left carpal tunnel   • CATARACT EXTRACTION W/ INTRAOCULAR LENS IMPLANT Left 3/31/2017    Procedure: REMOVE CATARACT AND IMPLANT INRAOCULAR LENS LEFT EYE;  Surgeon: Hector Navarerte MD;  Location: [facility] OR;  Service:    • CATARACT EXTRACTION W/ INTRAOCULAR LENS IMPLANT Right 4/14/2017    Procedure: REMOVE CATARACT AND IMPLANT INTRAOCULAR LENS RIGHT EYE;  Surgeon: Hector Navarrete MD;  Location: [facility] OR;  Service:    • COLONOSCOPY N/A 10/31/2018    Procedure: COLONOSCOPY;  Surgeon: Herbie Christine MD;  Location: [facility] ENDOSCOPY;  Service: Gastroenterology   • ENDOSCOPY  08/05/2013    Colon endoscopy 30567 (2) - Hemorrhoids found.   • INGUINAL HERNIA REPAIR Right 06/07/1968    Inguinal hernia, repair (2)   • INJECTION OF MEDICATION  09/06/2012    Albuterol (2u) (1) - Ordered By: LIDYA STEINER ([facility])    • INJECTION OF MEDICATION  09/06/2012    Atrovent (1) - Ordered By: LIDYA STEINER ([facility])    • INJECTION OF MEDICATION  02/10/2012    Depo Medrol (Methylprednisone) (3) - Ordered By: HEATHER PAK ([facility])    • INJECTION OF MEDICATION  02/09/2014    Kenalog (4) - Ordered By: DIMAS ANDRADE ([facility])    • JOINT REPLACEMENT     • LUMBAR LAMINECTOMY  2007    Lumbar laminectomy (1)   • MANDIBLE FRACTURE SURGERY  10/11/1981    Treat nose/jaw fracture (1) - bilateral open reduction of fracture of mandible   • OTHER SURGICAL HISTORY  09/24/2011    Drain/Inject Major Joint 20610 (1) - Ordered By: KARLOS HERNANDEZ ([facility])    • OTHER SURGICAL HISTORY      Insert IVC filter 56930 (1)   • OTHER SURGICAL  HISTORY  10/11/2011    SPIROMETRY 18071 (1) - Ordered By: HEATHER PAK ([facility])   • TOTAL HIP ARTHROPLASTY Right          Family History   Problem Relation Age of Onset   • Diabetes Other    • Cancer Sister    • Heart disease Mother    • Hypertension Mother    • Heart disease Father    • Hypertension Father    • Coronary artery disease Neg Hx        Allergies   Allergen Reactions   • Aldactone [Spironolactone] Other (See Comments)     Hyponatremia and hyperkalemia       Social History     Socioeconomic History   • Marital status:      Spouse name: Not on file   • Number of children: Not on file   • Years of education: Not on file   • Highest education level: Not on file   Tobacco Use   • Smoking status: Former Smoker     Quit date:      Years since quittin.2   • Smokeless tobacco: Never Used   Vaping Use   • Vaping Use: Never used   Substance and Sexual Activity   • Alcohol use: No   • Drug use: No   • Sexual activity: Defer     Comment: Marital status:          Current Outpatient Medications   Medication Sig Dispense Refill   • albuterol sulfate  (90 Base) MCG/ACT inhaler TWO PUFFS EVERY FOUR HOURS AS NEEDED FOR WHEEZING 18 g 6   • aspirin 81 MG EC tablet Take 81 mg by mouth daily.     • azithromycin (Zithromax Z-Paulino) 250 MG tablet Take 2 tablets by mouth on day 1, then 1 tablet daily on days 2-5 6 tablet 0   • brompheniramine-pseudoephedrine-DM 30-2-10 MG/5ML syrup      • carbidopa-levodopa (SINEMET)  MG per tablet Take 1 tablet by mouth 3 (Three) Times a Day. 90 tablet 2   • FeroSul 325 (65 Fe) MG tablet TAKE 1 TABLET BY MOUTH DAILY WITH BREAKFAST. 30 tablet 5   • furosemide (LASIX) 20 MG tablet Take 1 tablet by mouth Daily As Needed (weight gain of 3lbs overnight or leg swelling). 30 tablet 3   • gabapentin (NEURONTIN) 300 MG capsule TAKE ONE CAPSULE BY MOUTH THREE TIMES A  capsule 2   • glucose blood (True Metrix Blood Glucose Test) test strip Check glucose Once  "daily 100 each 6   • hydrALAZINE (APRESOLINE) 25 MG tablet TAKE ONE TABLET BY MOUTH THREE TIMES A DAY 90 tablet 4   • lisinopril-hydrochlorothiazide (PRINZIDE,ZESTORETIC) 20-12.5 MG per tablet Take 1 tablet by mouth Daily. 30 tablet 3   • methocarbamol (Robaxin) 500 MG tablet Take 1 tablet by mouth Every Night. 30 tablet 1   • metoprolol succinate XL (TOPROL-XL) 25 MG 24 hr tablet Take 1 tablet by mouth every night at bedtime. 30 tablet 5   • pravastatin (PRAVACHOL) 40 MG tablet Take 1 tablet by mouth Daily. 90 tablet 2   • SITagliptin (Januvia) 50 MG tablet Take 1 tablet by mouth Daily. 30 tablet 3   • tiotropium bromide-olodaterol (Stiolto Respimat) 2.5-2.5 MCG/ACT aerosol solution inhaler Inhale 2 puffs Daily. 4 g 0   • TRUEplus Lancets 30G misc Use to test Blood Sugars Once Daily 100 each 6   • Xarelto 10 MG tablet TAKE ONE TABLET BY MOUTH DAILY 30 tablet 3     No current facility-administered medications for this visit.       Review of Systems   Constitutional: Negative.    HENT: Negative.    Eyes: Negative.    Respiratory: Negative.    Cardiovascular: Negative.    Gastrointestinal: Negative.    Endocrine: Negative.    Genitourinary: Negative.    Musculoskeletal:        Toe pain   Skin: Negative.    Allergic/Immunologic: Negative.    Neurological: Negative.    Hematological: Negative.    Psychiatric/Behavioral: Negative.          OBJECTIVE    Pulse 79   Ht 167.6 cm (66\")   Wt 73.9 kg (163 lb)   SpO2 99%   BMI 26.31 kg/m²       Physical Exam   Constitutional: He is oriented to person, place, and time. He appears well-developed. No distress.   Pulmonary/Chest: Effort normal. No respiratory distress.   Musculoskeletal: Normal range of motion. No signs of injury.    General had a diabetic foot exam performed today.   During the foot exam he had a monofilament test performed.  Neurological: He is alert and oriented to person, place, and time.   Skin: Skin is warm and dry. Capillary refill takes less than 2 " seconds.   Psychiatric: His behavior is normal. Mood normal.   Vitals reviewed.      Gait: Normal    Assistive Device: Walker    Lower Extremity    Cardiovascular:    DP/PT pulses palpable bilateral  CFT brisk  to all digits  Skin temp is warm to warm from proximal tibia to distal digits bilateral  Pedal hair growth decreased.   Nonpitting vanesa-malleolar edema bilateral  Musculoskeletal:  Muscle strength is 5/5 for all muscle groups tested   ROM of the 1st MTP is WNL bilateral   ROM of the ankle joint is  WNL bilateral   Dermatological:   Skin is warm, dry and intact bilateral  Webspaces 1-4 bilateral are clean, dry and intact.   No subcutaneous nodules or masses noted    Nails 1-5 bilateral are  discolored, elongated with subungual debris.  Pain on palpation to the nail plates.  Neurological:   Protective sensation intact   Sensation intact to light touch        Procedures        ASSESSMENT AND PLAN    Diagnoses and all orders for this visit:    1. Encounter for diabetic foot exam (CMS/[facility]) (Primary)    2. Type 2 diabetes mellitus without complication, without long-term current use of insulin (CMS/[facility])    3. Onychomycosis    4. Chronic toe pain, bilateral        - A diabetic foot screening exam was performed and the patient was educated on the foot complications related to diabetes,  preventative foot care and what signs and symptoms to watch for.  Instructed to contact our office if any foot problems develop before next visit.  - Nails 1-5 bilateral were debrided in length and thickness with nail nipper and electric  to decrease fungal load and risk of infection.  - All the patients questions were answered.  - RTC 3 months or sooner if needed.               This document has been electronically signed by Partha Campos DPM on March 22, 2021 08:21 CDT     3/22/2021  08:21 CDT

## 2021-04-06 ENCOUNTER — TELEPHONE (OUTPATIENT)
Dept: FAMILY MEDICINE CLINIC | Facility: CLINIC | Age: 86
End: 2021-04-06

## 2021-04-06 NOTE — TELEPHONE ENCOUNTER
IMPRESSION:  1.Left thyroid lobe small circular subcentimeter hypodense  nodule. This could be further evaluated with thyroid ultrasound  if felt to be clinically warranted.   2.Centrilobular and paraseptal emphysematous changes.  3. Right upper lobe posterior segment small focus of discoid  atelectasis.  4. Right middle lobe interstitial, groundglass, and inferior  patchy opacity. This would be suspicious for pneumonia. Cannot  exclude atypical viral etiology. Recommend follow-up CT in three  months to document resolution.  5.Right lower lobe superior segment 7.3 mm noncalcified  nodule.Right lower lobe posterior basilar segment medial 5.8 mm  noncalcified nodule. The differential for these nodules would  include inflammatory nodules versus noncalcified granulomas  versus pulmonary nodules. Recommend follow-up CT in 3-6 months to  further evaluate.  6.Right lower lobe superior segment small sub-5 mm circular  groundglass opacity. This most likely represents inflammatory  changes.  7. Stable benign posterior segment right lobe hepatic cyst.

## 2021-04-21 ENCOUNTER — TELEPHONE (OUTPATIENT)
Dept: FAMILY MEDICINE CLINIC | Facility: CLINIC | Age: 86
End: 2021-04-21

## 2021-04-21 DIAGNOSIS — R52 PAIN: Primary | ICD-10-CM

## 2021-04-21 RX ORDER — ACETAMINOPHEN AND CODEINE PHOSPHATE 300; 30 MG/1; MG/1
1 TABLET ORAL 4 TIMES DAILY PRN
Qty: 56 TABLET | Refills: 0 | Status: SHIPPED | OUTPATIENT
Start: 2021-04-21 | End: 2021-07-07 | Stop reason: SDUPTHER

## 2021-04-21 NOTE — TELEPHONE ENCOUNTER
Back and leg pain. Pt would like something to help with the pain.    Would need delivered    [facility] - [address] - [phone]  - [phone] FX

## 2021-05-04 RX ORDER — RIVAROXABAN 10 MG/1
TABLET, FILM COATED ORAL
Qty: 30 TABLET | Refills: 3 | Status: SHIPPED | OUTPATIENT
Start: 2021-05-04 | End: 2021-05-14 | Stop reason: HOSPADM

## 2021-05-12 ENCOUNTER — APPOINTMENT (OUTPATIENT)
Dept: GENERAL RADIOLOGY | Facility: HOSPITAL | Age: 86
End: 2021-05-12

## 2021-05-12 ENCOUNTER — HOSPITAL ENCOUNTER (OUTPATIENT)
Facility: HOSPITAL | Age: 86
Setting detail: OBSERVATION
Discharge: HOME | End: 2021-05-14
Attending: FAMILY MEDICINE | Admitting: INTERNAL MEDICINE

## 2021-05-12 ENCOUNTER — APPOINTMENT (OUTPATIENT)
Dept: CT IMAGING | Facility: HOSPITAL | Age: 86
End: 2021-05-12

## 2021-05-12 DIAGNOSIS — Z96.641 STATUS POST TOTAL HIP REPLACEMENT, RIGHT: Primary | ICD-10-CM

## 2021-05-12 PROBLEM — J18.9 PNEUMONIA: Status: ACTIVE | Noted: 2021-05-12

## 2021-05-12 LAB
ALBUMIN SERPL-MCNC: 4.3 G/DL (ref 3.5–5.2)
ALBUMIN/GLOB SERPL: 2 G/DL
ALP SERPL-CCNC: 70 U/L (ref 39–117)
ALT SERPL W P-5'-P-CCNC: 9 U/L (ref 1–41)
ANION GAP SERPL CALCULATED.3IONS-SCNC: 7 MMOL/L (ref 5–15)
ARTERIAL PATENCY WRIST A: ABNORMAL
AST SERPL-CCNC: 17 U/L (ref 1–40)
ATMOSPHERIC PRESS: 756 MMHG
BASE EXCESS BLDA CALC-SCNC: 2.6 MMOL/L (ref 0–2)
BASOPHILS # BLD AUTO: 0.04 10*3/MM3 (ref 0–0.2)
BASOPHILS NFR BLD AUTO: 0.3 % (ref 0–1.5)
BDY SITE: ABNORMAL
BILIRUB SERPL-MCNC: 0.5 MG/DL (ref 0–1.2)
BUN SERPL-MCNC: 24 MG/DL (ref 8–23)
BUN/CREAT SERPL: 19 (ref 7–25)
CALCIUM SPEC-SCNC: 9.1 MG/DL (ref 8.6–10.5)
CHLORIDE SERPL-SCNC: 97 MMOL/L (ref 98–107)
CK SERPL-CCNC: 111 U/L (ref 20–200)
CO2 SERPL-SCNC: 28 MMOL/L (ref 22–29)
CREAT SERPL-MCNC: 1.26 MG/DL (ref 0.76–1.27)
D-LACTATE SERPL-SCNC: 1.8 MMOL/L (ref 0.5–2)
DEPRECATED RDW RBC AUTO: 49.4 FL (ref 37–54)
EOSINOPHIL # BLD AUTO: 0.01 10*3/MM3 (ref 0–0.4)
EOSINOPHIL NFR BLD AUTO: 0.1 % (ref 0.3–6.2)
ERYTHROCYTE [DISTWIDTH] IN BLOOD BY AUTOMATED COUNT: 15.6 % (ref 12.3–15.4)
FLUAV RNA RESP QL NAA+PROBE: NOT DETECTED
FLUBV RNA RESP QL NAA+PROBE: NOT DETECTED
GAS FLOW AIRWAY: 2 LPM
GFR SERPL CREATININE-BSD FRML MDRD: 66 ML/MIN/1.73
GLOBULIN UR ELPH-MCNC: 2.1 GM/DL
GLUCOSE BLDC GLUCOMTR-MCNC: 205 MG/DL (ref 70–130)
GLUCOSE BLDC GLUCOMTR-MCNC: 65 MG/DL (ref 70–130)
GLUCOSE SERPL-MCNC: 153 MG/DL (ref 65–99)
HCO3 BLDA-SCNC: 26.6 MMOL/L (ref 20–26)
HCT VFR BLD AUTO: 41.1 % (ref 37.5–51)
HGB BLD-MCNC: 13.4 G/DL (ref 13–17.7)
HOLD SPECIMEN: NORMAL
HOLD SPECIMEN: NORMAL
IMM GRANULOCYTES # BLD AUTO: 0.05 10*3/MM3 (ref 0–0.05)
IMM GRANULOCYTES NFR BLD AUTO: 0.4 % (ref 0–0.5)
L PNEUMO1 AG UR QL IA: NEGATIVE
LYMPHOCYTES # BLD AUTO: 0.3 10*3/MM3 (ref 0.7–3.1)
LYMPHOCYTES NFR BLD AUTO: 2.4 % (ref 19.6–45.3)
Lab: ABNORMAL
MAGNESIUM SERPL-MCNC: 1.8 MG/DL (ref 1.6–2.4)
MCH RBC QN AUTO: 28.4 PG (ref 26.6–33)
MCHC RBC AUTO-ENTMCNC: 32.6 G/DL (ref 31.5–35.7)
MCV RBC AUTO: 87.1 FL (ref 79–97)
MODALITY: ABNORMAL
MONOCYTES # BLD AUTO: 0.82 10*3/MM3 (ref 0.1–0.9)
MONOCYTES NFR BLD AUTO: 6.5 % (ref 5–12)
NEUTROPHILS NFR BLD AUTO: 11.41 10*3/MM3 (ref 1.7–7)
NEUTROPHILS NFR BLD AUTO: 90.3 % (ref 42.7–76)
NRBC BLD AUTO-RTO: 0 /100 WBC (ref 0–0.2)
NT-PROBNP SERPL-MCNC: 420.6 PG/ML (ref 0–1800)
PCO2 BLDA: 38.1 MM HG (ref 35–45)
PH BLDA: 7.45 PH UNITS (ref 7.35–7.45)
PLATELET # BLD AUTO: 300 10*3/MM3 (ref 140–450)
PMV BLD AUTO: 8.6 FL (ref 6–12)
PO2 BLDA: 69 MM HG (ref 83–108)
POTASSIUM SERPL-SCNC: 4.1 MMOL/L (ref 3.5–5.2)
PROCALCITONIN SERPL-MCNC: 0.73 NG/ML (ref 0–0.25)
PROT SERPL-MCNC: 6.4 G/DL (ref 6–8.5)
RBC # BLD AUTO: 4.72 10*6/MM3 (ref 4.14–5.8)
S PNEUM AG SPEC QL LA: NEGATIVE
SAO2 % BLDCOA: 94.5 % (ref 94–99)
SARS-COV-2 RNA RESP QL NAA+PROBE: NOT DETECTED
SODIUM SERPL-SCNC: 132 MMOL/L (ref 136–145)
TROPONIN T SERPL-MCNC: <0.01 NG/ML (ref 0–0.03)
VENTILATOR MODE: ABNORMAL
WBC # BLD AUTO: 12.63 10*3/MM3 (ref 3.4–10.8)
WHOLE BLOOD HOLD SPECIMEN: NORMAL
WHOLE BLOOD HOLD SPECIMEN: NORMAL

## 2021-05-12 PROCEDURE — 71260 CT THORAX DX C+: CPT

## 2021-05-12 PROCEDURE — G0378 HOSPITAL OBSERVATION PER HR: HCPCS

## 2021-05-12 PROCEDURE — 25010000002 AZITHROMYCIN PER 500 MG: Performed by: INTERNAL MEDICINE

## 2021-05-12 PROCEDURE — 99285 EMERGENCY DEPT VISIT HI MDM: CPT

## 2021-05-12 PROCEDURE — 84484 ASSAY OF TROPONIN QUANT: CPT | Performed by: FAMILY MEDICINE

## 2021-05-12 PROCEDURE — 82803 BLOOD GASES ANY COMBINATION: CPT

## 2021-05-12 PROCEDURE — 71045 X-RAY EXAM CHEST 1 VIEW: CPT

## 2021-05-12 PROCEDURE — 96368 THER/DIAG CONCURRENT INF: CPT

## 2021-05-12 PROCEDURE — 96366 THER/PROPH/DIAG IV INF ADDON: CPT

## 2021-05-12 PROCEDURE — 25010000002 IOPAMIDOL 61 % SOLUTION: Performed by: FAMILY MEDICINE

## 2021-05-12 PROCEDURE — 82962 GLUCOSE BLOOD TEST: CPT

## 2021-05-12 PROCEDURE — 87899 AGENT NOS ASSAY W/OPTIC: CPT | Performed by: INTERNAL MEDICINE

## 2021-05-12 PROCEDURE — 96361 HYDRATE IV INFUSION ADD-ON: CPT

## 2021-05-12 PROCEDURE — 80053 COMPREHEN METABOLIC PANEL: CPT | Performed by: FAMILY MEDICINE

## 2021-05-12 PROCEDURE — 87636 SARSCOV2 & INF A&B AMP PRB: CPT | Performed by: FAMILY MEDICINE

## 2021-05-12 PROCEDURE — 83880 ASSAY OF NATRIURETIC PEPTIDE: CPT | Performed by: FAMILY MEDICINE

## 2021-05-12 PROCEDURE — 96365 THER/PROPH/DIAG IV INF INIT: CPT

## 2021-05-12 PROCEDURE — 93005 ELECTROCARDIOGRAM TRACING: CPT

## 2021-05-12 PROCEDURE — 36415 COLL VENOUS BLD VENIPUNCTURE: CPT | Performed by: FAMILY MEDICINE

## 2021-05-12 PROCEDURE — C9803 HOPD COVID-19 SPEC COLLECT: HCPCS

## 2021-05-12 PROCEDURE — 93005 ELECTROCARDIOGRAM TRACING: CPT | Performed by: FAMILY MEDICINE

## 2021-05-12 PROCEDURE — 84145 PROCALCITONIN (PCT): CPT | Performed by: FAMILY MEDICINE

## 2021-05-12 PROCEDURE — 87040 BLOOD CULTURE FOR BACTERIA: CPT | Performed by: FAMILY MEDICINE

## 2021-05-12 PROCEDURE — 82550 ASSAY OF CK (CPK): CPT | Performed by: FAMILY MEDICINE

## 2021-05-12 PROCEDURE — 36600 WITHDRAWAL OF ARTERIAL BLOOD: CPT

## 2021-05-12 PROCEDURE — 83605 ASSAY OF LACTIC ACID: CPT | Performed by: FAMILY MEDICINE

## 2021-05-12 PROCEDURE — 83735 ASSAY OF MAGNESIUM: CPT | Performed by: FAMILY MEDICINE

## 2021-05-12 PROCEDURE — 63710000001 INSULIN ASPART PER 5 UNITS: Performed by: INTERNAL MEDICINE

## 2021-05-12 PROCEDURE — 93010 ELECTROCARDIOGRAM REPORT: CPT | Performed by: INTERNAL MEDICINE

## 2021-05-12 PROCEDURE — 85025 COMPLETE CBC W/AUTO DIFF WBC: CPT | Performed by: FAMILY MEDICINE

## 2021-05-12 PROCEDURE — 25010000002 CEFTRIAXONE PER 250 MG: Performed by: INTERNAL MEDICINE

## 2021-05-12 RX ORDER — SODIUM CHLORIDE 9 MG/ML
75 INJECTION, SOLUTION INTRAVENOUS CONTINUOUS
Status: DISCONTINUED | OUTPATIENT
Start: 2021-05-12 | End: 2021-05-13

## 2021-05-12 RX ORDER — METOPROLOL SUCCINATE 25 MG/1
25 TABLET, EXTENDED RELEASE ORAL
Status: DISCONTINUED | OUTPATIENT
Start: 2021-05-12 | End: 2021-05-14 | Stop reason: HOSPADM

## 2021-05-12 RX ORDER — BUDESONIDE AND FORMOTEROL FUMARATE DIHYDRATE 160; 4.5 UG/1; UG/1
2 AEROSOL RESPIRATORY (INHALATION)
Status: DISCONTINUED | OUTPATIENT
Start: 2021-05-12 | End: 2021-05-14 | Stop reason: HOSPADM

## 2021-05-12 RX ORDER — SODIUM CHLORIDE 0.9 % (FLUSH) 0.9 %
10 SYRINGE (ML) INJECTION EVERY 12 HOURS SCHEDULED
Status: DISCONTINUED | OUTPATIENT
Start: 2021-05-12 | End: 2021-05-14 | Stop reason: HOSPADM

## 2021-05-12 RX ORDER — HYDRALAZINE HYDROCHLORIDE 25 MG/1
25 TABLET, FILM COATED ORAL 3 TIMES DAILY
Status: DISCONTINUED | OUTPATIENT
Start: 2021-05-12 | End: 2021-05-14 | Stop reason: HOSPADM

## 2021-05-12 RX ORDER — GABAPENTIN 300 MG/1
300 CAPSULE ORAL 3 TIMES DAILY
Status: DISCONTINUED | OUTPATIENT
Start: 2021-05-12 | End: 2021-05-14 | Stop reason: HOSPADM

## 2021-05-12 RX ORDER — ACETAMINOPHEN 650 MG/1
650 SUPPOSITORY RECTAL EVERY 4 HOURS PRN
Status: DISCONTINUED | OUTPATIENT
Start: 2021-05-12 | End: 2021-05-14 | Stop reason: HOSPADM

## 2021-05-12 RX ORDER — ALBUTEROL SULFATE 90 UG/1
2 AEROSOL, METERED RESPIRATORY (INHALATION) EVERY 6 HOURS PRN
Status: DISCONTINUED | OUTPATIENT
Start: 2021-05-12 | End: 2021-05-14 | Stop reason: HOSPADM

## 2021-05-12 RX ORDER — ALUMINA, MAGNESIA, AND SIMETHICONE 2400; 2400; 240 MG/30ML; MG/30ML; MG/30ML
15 SUSPENSION ORAL EVERY 6 HOURS PRN
Status: DISCONTINUED | OUTPATIENT
Start: 2021-05-12 | End: 2021-05-14 | Stop reason: HOSPADM

## 2021-05-12 RX ORDER — ACETAMINOPHEN 160 MG/5ML
650 SOLUTION ORAL EVERY 4 HOURS PRN
Status: DISCONTINUED | OUTPATIENT
Start: 2021-05-12 | End: 2021-05-14 | Stop reason: HOSPADM

## 2021-05-12 RX ORDER — METHOCARBAMOL 500 MG/1
500 TABLET, FILM COATED ORAL NIGHTLY
Status: DISCONTINUED | OUTPATIENT
Start: 2021-05-12 | End: 2021-05-14 | Stop reason: HOSPADM

## 2021-05-12 RX ORDER — SODIUM CHLORIDE 0.9 % (FLUSH) 0.9 %
10 SYRINGE (ML) INJECTION AS NEEDED
Status: DISCONTINUED | OUTPATIENT
Start: 2021-05-12 | End: 2021-05-14 | Stop reason: HOSPADM

## 2021-05-12 RX ORDER — FUROSEMIDE 20 MG/1
20 TABLET ORAL DAILY
Status: DISCONTINUED | OUTPATIENT
Start: 2021-05-13 | End: 2021-05-14 | Stop reason: HOSPADM

## 2021-05-12 RX ORDER — FERROUS SULFATE TAB EC 324 MG (65 MG FE EQUIVALENT) 324 (65 FE) MG
324 TABLET DELAYED RESPONSE ORAL
Status: DISCONTINUED | OUTPATIENT
Start: 2021-05-12 | End: 2021-05-14 | Stop reason: HOSPADM

## 2021-05-12 RX ORDER — ASPIRIN 81 MG/1
81 TABLET ORAL DAILY
Status: DISCONTINUED | OUTPATIENT
Start: 2021-05-12 | End: 2021-05-14 | Stop reason: HOSPADM

## 2021-05-12 RX ORDER — ONDANSETRON 2 MG/ML
4 INJECTION INTRAMUSCULAR; INTRAVENOUS EVERY 6 HOURS PRN
Status: DISCONTINUED | OUTPATIENT
Start: 2021-05-12 | End: 2021-05-14 | Stop reason: HOSPADM

## 2021-05-12 RX ORDER — NICOTINE POLACRILEX 4 MG
15 LOZENGE BUCCAL
Status: DISCONTINUED | OUTPATIENT
Start: 2021-05-12 | End: 2021-05-14 | Stop reason: HOSPADM

## 2021-05-12 RX ORDER — DEXTROSE MONOHYDRATE 25 G/50ML
25 INJECTION, SOLUTION INTRAVENOUS
Status: DISCONTINUED | OUTPATIENT
Start: 2021-05-12 | End: 2021-05-14 | Stop reason: HOSPADM

## 2021-05-12 RX ORDER — ONDANSETRON 4 MG/1
4 TABLET, FILM COATED ORAL EVERY 6 HOURS PRN
Status: DISCONTINUED | OUTPATIENT
Start: 2021-05-12 | End: 2021-05-14 | Stop reason: HOSPADM

## 2021-05-12 RX ORDER — PRAVASTATIN SODIUM 40 MG
40 TABLET ORAL NIGHTLY
Status: DISCONTINUED | OUTPATIENT
Start: 2021-05-12 | End: 2021-05-14 | Stop reason: HOSPADM

## 2021-05-12 RX ORDER — ACETAMINOPHEN 325 MG/1
650 TABLET ORAL EVERY 4 HOURS PRN
Status: DISCONTINUED | OUTPATIENT
Start: 2021-05-12 | End: 2021-05-14 | Stop reason: HOSPADM

## 2021-05-12 RX ADMIN — GABAPENTIN 300 MG: 300 CAPSULE ORAL at 17:34

## 2021-05-12 RX ADMIN — METHOCARBAMOL 500 MG: 500 TABLET, FILM COATED ORAL at 20:44

## 2021-05-12 RX ADMIN — ASPIRIN 81 MG: 81 TABLET, COATED ORAL at 13:17

## 2021-05-12 RX ADMIN — SODIUM CHLORIDE, PRESERVATIVE FREE 10 ML: 5 INJECTION INTRAVENOUS at 20:45

## 2021-05-12 RX ADMIN — PRAVASTATIN SODIUM 40 MG: 40 TABLET ORAL at 20:44

## 2021-05-12 RX ADMIN — AZITHROMYCIN MONOHYDRATE 500 MG: 500 INJECTION, POWDER, LYOPHILIZED, FOR SOLUTION INTRAVENOUS at 13:16

## 2021-05-12 RX ADMIN — CEFTRIAXONE SODIUM 1 G: 1 INJECTION, POWDER, FOR SOLUTION INTRAMUSCULAR; INTRAVENOUS at 13:54

## 2021-05-12 RX ADMIN — GABAPENTIN 300 MG: 300 CAPSULE ORAL at 20:44

## 2021-05-12 RX ADMIN — METOPROLOL SUCCINATE 25 MG: 25 TABLET, EXTENDED RELEASE ORAL at 13:55

## 2021-05-12 RX ADMIN — SODIUM CHLORIDE 75 ML/HR: 9 INJECTION, SOLUTION INTRAVENOUS at 18:22

## 2021-05-12 RX ADMIN — SODIUM CHLORIDE 75 ML/HR: 9 INJECTION, SOLUTION INTRAVENOUS at 13:15

## 2021-05-12 RX ADMIN — FERROUS SULFATE TAB EC 324 MG (65 MG FE EQUIVALENT) 324 MG: 324 (65 FE) TABLET DELAYED RESPONSE at 13:55

## 2021-05-12 RX ADMIN — LINAGLIPTIN 5 MG: 5 TABLET, FILM COATED ORAL at 13:55

## 2021-05-12 RX ADMIN — HYDRALAZINE HYDROCHLORIDE 25 MG: 25 TABLET, FILM COATED ORAL at 20:44

## 2021-05-12 RX ADMIN — INSULIN ASPART 3 UNITS: 100 INJECTION, SOLUTION INTRAVENOUS; SUBCUTANEOUS at 13:17

## 2021-05-12 RX ADMIN — HYDRALAZINE HYDROCHLORIDE 25 MG: 25 TABLET, FILM COATED ORAL at 17:34

## 2021-05-12 RX ADMIN — SODIUM CHLORIDE, PRESERVATIVE FREE 10 ML: 5 INJECTION INTRAVENOUS at 13:54

## 2021-05-12 RX ADMIN — IOPAMIDOL 90 ML: 612 INJECTION, SOLUTION INTRAVENOUS at 13:31

## 2021-05-12 RX ADMIN — CARBIDOPA AND LEVODOPA 1 TABLET: 10; 100 TABLET ORAL at 20:44

## 2021-05-13 LAB
ANION GAP SERPL CALCULATED.3IONS-SCNC: 4 MMOL/L (ref 5–15)
BASOPHILS # BLD AUTO: 0.03 10*3/MM3 (ref 0–0.2)
BASOPHILS NFR BLD AUTO: 0.3 % (ref 0–1.5)
BUN SERPL-MCNC: 22 MG/DL (ref 8–23)
BUN/CREAT SERPL: 18.3 (ref 7–25)
CALCIUM SPEC-SCNC: 8.7 MG/DL (ref 8.6–10.5)
CHLORIDE SERPL-SCNC: 104 MMOL/L (ref 98–107)
CO2 SERPL-SCNC: 28 MMOL/L (ref 22–29)
CREAT SERPL-MCNC: 1.2 MG/DL (ref 0.76–1.27)
DEPRECATED RDW RBC AUTO: 48.4 FL (ref 37–54)
EOSINOPHIL # BLD AUTO: 0.08 10*3/MM3 (ref 0–0.4)
EOSINOPHIL NFR BLD AUTO: 0.9 % (ref 0.3–6.2)
ERYTHROCYTE [DISTWIDTH] IN BLOOD BY AUTOMATED COUNT: 15.5 % (ref 12.3–15.4)
GFR SERPL CREATININE-BSD FRML MDRD: 70 ML/MIN/1.73
GLUCOSE BLDC GLUCOMTR-MCNC: 105 MG/DL (ref 70–130)
GLUCOSE BLDC GLUCOMTR-MCNC: 105 MG/DL (ref 70–130)
GLUCOSE BLDC GLUCOMTR-MCNC: 114 MG/DL (ref 70–130)
GLUCOSE BLDC GLUCOMTR-MCNC: 116 MG/DL (ref 70–130)
GLUCOSE BLDC GLUCOMTR-MCNC: 123 MG/DL (ref 70–130)
GLUCOSE SERPL-MCNC: 100 MG/DL (ref 65–99)
HCT VFR BLD AUTO: 33.2 % (ref 37.5–51)
HGB BLD-MCNC: 11.1 G/DL (ref 13–17.7)
IMM GRANULOCYTES # BLD AUTO: 0.04 10*3/MM3 (ref 0–0.05)
IMM GRANULOCYTES NFR BLD AUTO: 0.4 % (ref 0–0.5)
LYMPHOCYTES # BLD AUTO: 1.16 10*3/MM3 (ref 0.7–3.1)
LYMPHOCYTES NFR BLD AUTO: 12.5 % (ref 19.6–45.3)
MCH RBC QN AUTO: 28.9 PG (ref 26.6–33)
MCHC RBC AUTO-ENTMCNC: 33.4 G/DL (ref 31.5–35.7)
MCV RBC AUTO: 86.5 FL (ref 79–97)
MONOCYTES # BLD AUTO: 1.03 10*3/MM3 (ref 0.1–0.9)
MONOCYTES NFR BLD AUTO: 11.1 % (ref 5–12)
NEUTROPHILS NFR BLD AUTO: 6.92 10*3/MM3 (ref 1.7–7)
NEUTROPHILS NFR BLD AUTO: 74.8 % (ref 42.7–76)
NRBC BLD AUTO-RTO: 0 /100 WBC (ref 0–0.2)
PLATELET # BLD AUTO: 280 10*3/MM3 (ref 140–450)
PMV BLD AUTO: 8.8 FL (ref 6–12)
POTASSIUM SERPL-SCNC: 4.5 MMOL/L (ref 3.5–5.2)
RBC # BLD AUTO: 3.84 10*6/MM3 (ref 4.14–5.8)
SODIUM SERPL-SCNC: 136 MMOL/L (ref 136–145)
WBC # BLD AUTO: 9.26 10*3/MM3 (ref 3.4–10.8)

## 2021-05-13 PROCEDURE — 94799 UNLISTED PULMONARY SVC/PX: CPT

## 2021-05-13 PROCEDURE — 85025 COMPLETE CBC W/AUTO DIFF WBC: CPT | Performed by: INTERNAL MEDICINE

## 2021-05-13 PROCEDURE — 94640 AIRWAY INHALATION TREATMENT: CPT

## 2021-05-13 PROCEDURE — 80048 BASIC METABOLIC PNL TOTAL CA: CPT | Performed by: INTERNAL MEDICINE

## 2021-05-13 PROCEDURE — 96366 THER/PROPH/DIAG IV INF ADDON: CPT

## 2021-05-13 PROCEDURE — G0378 HOSPITAL OBSERVATION PER HR: HCPCS

## 2021-05-13 PROCEDURE — 96361 HYDRATE IV INFUSION ADD-ON: CPT

## 2021-05-13 PROCEDURE — 25010000002 CEFTRIAXONE PER 250 MG: Performed by: INTERNAL MEDICINE

## 2021-05-13 PROCEDURE — 82962 GLUCOSE BLOOD TEST: CPT

## 2021-05-13 PROCEDURE — 94760 N-INVAS EAR/PLS OXIMETRY 1: CPT

## 2021-05-13 PROCEDURE — 25010000002 AZITHROMYCIN PER 500 MG: Performed by: INTERNAL MEDICINE

## 2021-05-13 RX ADMIN — HYDRALAZINE HYDROCHLORIDE 25 MG: 25 TABLET, FILM COATED ORAL at 21:22

## 2021-05-13 RX ADMIN — CARBIDOPA AND LEVODOPA 1 TABLET: 10; 100 TABLET ORAL at 17:18

## 2021-05-13 RX ADMIN — CEFTRIAXONE SODIUM 1 G: 1 INJECTION, POWDER, FOR SOLUTION INTRAMUSCULAR; INTRAVENOUS at 14:14

## 2021-05-13 RX ADMIN — GABAPENTIN 300 MG: 300 CAPSULE ORAL at 17:18

## 2021-05-13 RX ADMIN — HYDRALAZINE HYDROCHLORIDE 25 MG: 25 TABLET, FILM COATED ORAL at 08:18

## 2021-05-13 RX ADMIN — HYDRALAZINE HYDROCHLORIDE 25 MG: 25 TABLET, FILM COATED ORAL at 17:18

## 2021-05-13 RX ADMIN — CARBIDOPA AND LEVODOPA 1 TABLET: 10; 100 TABLET ORAL at 21:22

## 2021-05-13 RX ADMIN — GABAPENTIN 300 MG: 300 CAPSULE ORAL at 21:22

## 2021-05-13 RX ADMIN — AZITHROMYCIN MONOHYDRATE 500 MG: 500 INJECTION, POWDER, LYOPHILIZED, FOR SOLUTION INTRAVENOUS at 15:01

## 2021-05-13 RX ADMIN — FUROSEMIDE 20 MG: 20 TABLET ORAL at 08:17

## 2021-05-13 RX ADMIN — METHOCARBAMOL 500 MG: 500 TABLET, FILM COATED ORAL at 21:22

## 2021-05-13 RX ADMIN — HYDROCHLOROTHIAZIDE: 12.5 TABLET ORAL at 14:13

## 2021-05-13 RX ADMIN — METOPROLOL SUCCINATE 25 MG: 25 TABLET, EXTENDED RELEASE ORAL at 08:17

## 2021-05-13 RX ADMIN — SODIUM CHLORIDE, PRESERVATIVE FREE 10 ML: 5 INJECTION INTRAVENOUS at 08:19

## 2021-05-13 RX ADMIN — BUDESONIDE AND FORMOTEROL FUMARATE DIHYDRATE 2 PUFF: 160; 4.5 AEROSOL RESPIRATORY (INHALATION) at 20:16

## 2021-05-13 RX ADMIN — ASPIRIN 81 MG: 81 TABLET, COATED ORAL at 08:18

## 2021-05-13 RX ADMIN — CARBIDOPA AND LEVODOPA 1 TABLET: 10; 100 TABLET ORAL at 08:18

## 2021-05-13 RX ADMIN — GABAPENTIN 300 MG: 300 CAPSULE ORAL at 08:18

## 2021-05-13 RX ADMIN — SODIUM CHLORIDE, PRESERVATIVE FREE 10 ML: 5 INJECTION INTRAVENOUS at 21:23

## 2021-05-13 RX ADMIN — FERROUS SULFATE TAB EC 324 MG (65 MG FE EQUIVALENT) 324 MG: 324 (65 FE) TABLET DELAYED RESPONSE at 08:17

## 2021-05-13 RX ADMIN — PRAVASTATIN SODIUM 40 MG: 40 TABLET ORAL at 21:22

## 2021-05-13 RX ADMIN — LINAGLIPTIN 5 MG: 5 TABLET, FILM COATED ORAL at 08:18

## 2021-05-14 ENCOUNTER — READMISSION MANAGEMENT (OUTPATIENT)
Dept: CALL CENTER | Facility: HOSPITAL | Age: 86
End: 2021-05-14

## 2021-05-14 VITALS
HEIGHT: 66 IN | BODY MASS INDEX: 25.71 KG/M2 | WEIGHT: 160 LBS | RESPIRATION RATE: 18 BRPM | HEART RATE: 63 BPM | SYSTOLIC BLOOD PRESSURE: 135 MMHG | DIASTOLIC BLOOD PRESSURE: 70 MMHG | OXYGEN SATURATION: 96 % | TEMPERATURE: 97.2 F

## 2021-05-14 LAB
BASOPHILS # BLD AUTO: 0.04 10*3/MM3 (ref 0–0.2)
BASOPHILS NFR BLD AUTO: 0.7 % (ref 0–1.5)
DEPRECATED RDW RBC AUTO: 47.6 FL (ref 37–54)
EOSINOPHIL # BLD AUTO: 0.07 10*3/MM3 (ref 0–0.4)
EOSINOPHIL NFR BLD AUTO: 1.2 % (ref 0.3–6.2)
ERYTHROCYTE [DISTWIDTH] IN BLOOD BY AUTOMATED COUNT: 15.2 % (ref 12.3–15.4)
GLUCOSE BLDC GLUCOMTR-MCNC: 107 MG/DL (ref 70–130)
GLUCOSE BLDC GLUCOMTR-MCNC: 145 MG/DL (ref 70–130)
HCT VFR BLD AUTO: 36.1 % (ref 37.5–51)
HGB BLD-MCNC: 11.9 G/DL (ref 13–17.7)
HOLD SPECIMEN: NORMAL
IMM GRANULOCYTES # BLD AUTO: 0.02 10*3/MM3 (ref 0–0.05)
IMM GRANULOCYTES NFR BLD AUTO: 0.4 % (ref 0–0.5)
LYMPHOCYTES # BLD AUTO: 0.89 10*3/MM3 (ref 0.7–3.1)
LYMPHOCYTES NFR BLD AUTO: 15.8 % (ref 19.6–45.3)
MCH RBC QN AUTO: 28.3 PG (ref 26.6–33)
MCHC RBC AUTO-ENTMCNC: 33 G/DL (ref 31.5–35.7)
MCV RBC AUTO: 85.7 FL (ref 79–97)
MONOCYTES # BLD AUTO: 0.72 10*3/MM3 (ref 0.1–0.9)
MONOCYTES NFR BLD AUTO: 12.8 % (ref 5–12)
NEUTROPHILS NFR BLD AUTO: 3.9 10*3/MM3 (ref 1.7–7)
NEUTROPHILS NFR BLD AUTO: 69.1 % (ref 42.7–76)
NRBC BLD AUTO-RTO: 0 /100 WBC (ref 0–0.2)
PLATELET # BLD AUTO: 291 10*3/MM3 (ref 140–450)
PMV BLD AUTO: 8.8 FL (ref 6–12)
RBC # BLD AUTO: 4.21 10*6/MM3 (ref 4.14–5.8)
WBC # BLD AUTO: 5.64 10*3/MM3 (ref 3.4–10.8)

## 2021-05-14 PROCEDURE — G0378 HOSPITAL OBSERVATION PER HR: HCPCS

## 2021-05-14 PROCEDURE — 85025 COMPLETE CBC W/AUTO DIFF WBC: CPT | Performed by: INTERNAL MEDICINE

## 2021-05-14 PROCEDURE — 82962 GLUCOSE BLOOD TEST: CPT

## 2021-05-14 RX ORDER — AMOXICILLIN AND CLAVULANATE POTASSIUM 875; 125 MG/1; MG/1
1 TABLET, FILM COATED ORAL 2 TIMES DAILY
Qty: 10 TABLET | Refills: 0 | Status: SHIPPED | OUTPATIENT
Start: 2021-05-14 | End: 2021-05-19

## 2021-05-14 RX ADMIN — GABAPENTIN 300 MG: 300 CAPSULE ORAL at 08:55

## 2021-05-14 RX ADMIN — ASPIRIN 81 MG: 81 TABLET, COATED ORAL at 08:56

## 2021-05-14 RX ADMIN — METOPROLOL SUCCINATE 25 MG: 25 TABLET, EXTENDED RELEASE ORAL at 08:56

## 2021-05-14 RX ADMIN — HYDROCHLOROTHIAZIDE: 12.5 TABLET ORAL at 08:56

## 2021-05-14 RX ADMIN — LINAGLIPTIN 5 MG: 5 TABLET, FILM COATED ORAL at 08:56

## 2021-05-14 RX ADMIN — HYDRALAZINE HYDROCHLORIDE 25 MG: 25 TABLET, FILM COATED ORAL at 08:56

## 2021-05-14 RX ADMIN — FUROSEMIDE 20 MG: 20 TABLET ORAL at 08:56

## 2021-05-14 RX ADMIN — FERROUS SULFATE TAB EC 324 MG (65 MG FE EQUIVALENT) 324 MG: 324 (65 FE) TABLET DELAYED RESPONSE at 08:56

## 2021-05-14 RX ADMIN — CARBIDOPA AND LEVODOPA 1 TABLET: 10; 100 TABLET ORAL at 08:55

## 2021-05-17 ENCOUNTER — TRANSITIONAL CARE MANAGEMENT TELEPHONE ENCOUNTER (OUTPATIENT)
Dept: CALL CENTER | Facility: HOSPITAL | Age: 86
End: 2021-05-17

## 2021-05-17 LAB
BACTERIA SPEC AEROBE CULT: NORMAL
BACTERIA SPEC AEROBE CULT: NORMAL

## 2021-05-17 NOTE — OUTREACH NOTE
Call Center TCM Note      Responses   [facility] patient discharged from?  [place]   Does the patient have one of the following disease processes/diagnoses(primary or secondary)?  COPD/Pneumonia   TCM attempt successful?  Yes [Jerri-daughter]   Call start time  1546   Call end time  1549   Discharge diagnosis  acute hypoxic resp failure d/t PNA   Person spoke with today (if not patient) and relationship  Patient   Meds reviewed with patient/caregiver?  Yes   Is the patient having any side effects they believe may be caused by any medication additions or changes?  No   Does the patient have all medications ordered at discharge?  Yes   Is the patient taking all medications as directed (includes completed medication regime)?  Yes   Does the patient have a primary care provider?   Yes   Does the patient have an appointment with their PCP or specialist within 7 days of discharge?  Yes   Comments regarding PCP  PCP [facility] appt on 5/20/21 at 10:45   Has the patient kept scheduled appointments due by today?  N/A   What is the Home health agency?   No HH   Pulse Ox monitoring  None   Psychosocial issues?  No   Did the patient receive a copy of their discharge instructions?  Yes   Nursing interventions  Reviewed instructions with patient   What is the patient's perception of their health status since discharge?  Improving   Nursing Interventions  Nurse provided patient education   Are the patient's immunizations up to date?   Yes   Nursing interventions  Educated on importance of maintaining up to date immunizations as advised by provider   If the patient is a current smoker, are they able to teach back resources for cessation?  Not a smoker   Is the patient/caregiver able to teach back the hierarchy of who to call/visit for symptoms/problems? PCP, Specialist, Home health nurse, Urgent Care, ED, 911  Yes   Is the patient/caregiver able to teach back signs and symptoms of worsening condition:  Fever/chills,  Shortness of breath, Chest pain   Is the patient/caregiver able to teach back importance of completing antibiotic course of treatment?  Yes   TCM call completed?  Yes   Wrap up additional comments  Pt states he is doing ok. Pt acknowledge hospital fu appt on 5/20/21. No questions/concerns.          Tracee Doran RN    5/17/2021, 15:50 EDT

## 2021-05-20 ENCOUNTER — TELEPHONE (OUTPATIENT)
Dept: FAMILY MEDICINE CLINIC | Facility: CLINIC | Age: 86
End: 2021-05-20

## 2021-05-20 ENCOUNTER — OFFICE VISIT (OUTPATIENT)
Dept: FAMILY MEDICINE CLINIC | Facility: CLINIC | Age: 86
End: 2021-05-20

## 2021-05-20 VITALS
HEART RATE: 76 BPM | DIASTOLIC BLOOD PRESSURE: 58 MMHG | HEIGHT: 66 IN | OXYGEN SATURATION: 96 % | SYSTOLIC BLOOD PRESSURE: 120 MMHG | TEMPERATURE: 97.4 F | WEIGHT: 158 LBS | BODY MASS INDEX: 25.39 KG/M2

## 2021-05-20 DIAGNOSIS — E61.1 IRON DEFICIENCY: ICD-10-CM

## 2021-05-20 DIAGNOSIS — Z09 HOSPITAL DISCHARGE FOLLOW-UP: Primary | ICD-10-CM

## 2021-05-20 DIAGNOSIS — E11.40 TYPE 2 DIABETES MELLITUS WITH DIABETIC NEUROPATHY, WITHOUT LONG-TERM CURRENT USE OF INSULIN (HCC): ICD-10-CM

## 2021-05-20 DIAGNOSIS — Z87.01 HISTORY OF PNEUMONIA: ICD-10-CM

## 2021-05-20 DIAGNOSIS — I82.513 CHRONIC DEEP VEIN THROMBOSIS (DVT) OF FEMORAL VEIN OF BOTH LOWER EXTREMITIES (HCC): ICD-10-CM

## 2021-05-20 PROCEDURE — 99214 OFFICE O/P EST MOD 30 MIN: CPT | Performed by: FAMILY MEDICINE

## 2021-05-20 RX ORDER — CALCIUM CITRATE/VITAMIN D3 200MG-6.25
TABLET ORAL
Qty: 100 EACH | Refills: 6 | Status: SHIPPED | OUTPATIENT
Start: 2021-05-20

## 2021-05-20 RX ORDER — GLUCOSAM/CHON-MSM1/C/MANG/BOSW 500-416.6
TABLET ORAL
Qty: 100 EACH | Refills: 6 | Status: SHIPPED | OUTPATIENT
Start: 2021-05-20

## 2021-05-20 RX ORDER — LIDOCAINE PAIN RELIEF 40 MG/1000MG
PATCH TOPICAL
COMMUNITY
Start: 2021-04-22

## 2021-05-20 RX ORDER — FERROUS SULFATE 325(65) MG
1 TABLET ORAL
Qty: 30 TABLET | Refills: 5 | Status: SHIPPED | OUTPATIENT
Start: 2021-05-20 | End: 2021-08-19 | Stop reason: SDUPTHER

## 2021-05-20 NOTE — PROGRESS NOTES
Subjective   General Keith Ruvalcaba Jr. is a 85 y.o. male.   Cc: follow up of chronic medical issues  The patient comes in for hospital follow up. He had Pneumonia. He also was coughing up blood.He responded to antibiotics. He had some hemoptysis. CT scan cleared him from any serious cause for the bleeding.He responded well. He was taken off of his Xarrelto. He was on this for DVT . This was several years ago and was his first. He is off of his Xarelto currently.  Pneumonia  There is no chest tightness, cough, difficulty breathing, frequent throat clearing, hemoptysis, hoarse voice, shortness of breath, sputum production or wheezing. Pertinent negatives include no chest pain, fever or malaise/fatigue.   Diabetes  He presents for his follow-up diabetic visit. He has type 2 diabetes mellitus. Pertinent negatives for hypoglycemia include no confusion. Pertinent negatives for diabetes include no blurred vision, no chest pain, no fatigue, no foot paresthesias, no foot ulcerations, no polydipsia, no polyphagia and no polyuria.   Anemia  Presents for follow-up visit. There has been no abdominal pain, anorexia, bruising/bleeding easily, confusion, fever, leg swelling, light-headedness, malaise/fatigue, palpitations, paresthesias or pica. Signs of blood loss that are not present include hematemesis, hematochezia and melena.       The following portions of the patient's history were reviewed and updated as appropriate: allergies, current medications, past family history, past medical history, past social history, past surgical history and problem list.    Review of Systems   Constitutional: Negative for fatigue, fever and malaise/fatigue.   HENT: Negative for hoarse voice.    Eyes: Negative for blurred vision.   Respiratory: Negative for cough, hemoptysis, sputum production, shortness of breath and wheezing.    Cardiovascular: Negative for chest pain and palpitations.   Gastrointestinal: Negative for abdominal pain, anorexia,  "hematemesis, hematochezia and melena.   Endocrine: Negative for polydipsia, polyphagia and polyuria.   Neurological: Negative for light-headedness and paresthesias.   Hematological: Does not bruise/bleed easily.   Psychiatric/Behavioral: Negative for confusion.       Objective   Physical Exam  Vitals reviewed.   Constitutional:       Appearance: Normal appearance.   HENT:      Head: Normocephalic and atraumatic.      Right Ear: Tympanic membrane, ear canal and external ear normal.      Left Ear: Tympanic membrane, ear canal and external ear normal.      Nose: Nose normal.      Mouth/Throat:      Mouth: Mucous membranes are moist.      Pharynx: Oropharynx is clear.   Cardiovascular:      Rate and Rhythm: Normal rate and regular rhythm.      Heart sounds: Normal heart sounds. No murmur heard.   No friction rub. No gallop.    Pulmonary:      Effort: Pulmonary effort is normal. No respiratory distress.      Breath sounds: Normal breath sounds. No stridor. No wheezing, rhonchi or rales.   Chest:      Chest wall: No tenderness.   Abdominal:      General: Abdomen is flat. Bowel sounds are normal. There is no distension.      Palpations: Abdomen is soft.      Tenderness: There is no abdominal tenderness. There is no guarding.   Musculoskeletal:      Cervical back: Normal range of motion and neck supple.   Skin:     General: Skin is warm and dry.   Neurological:      Mental Status: He is alert.           Visit Vitals  /58   Pulse 76   Temp 97.4 °F (36.3 °C)   Ht 167.6 cm (66\")   Wt 71.7 kg (158 lb)   SpO2 96%   BMI 25.50 kg/m²     Body mass index is 25.5 kg/m².      Assessment/Plan   Diagnoses and all orders for this visit:    1. Hospital discharge follow-up (Primary)    2. Chronic deep vein thrombosis (DVT) of femoral vein of both lower extremities (CMS/[facility])    3. Type 2 diabetes mellitus with diabetic neuropathy, without long-term current use of insulin (CMS/[facility])  -     glucose blood (True Metrix Blood Glucose Test) " test strip; Check glucose Once daily  Dispense: 100 each; Refill: 6  -     TRUEplus Lancets 30G misc; Use to test Blood Sugars Once Daily  Dispense: 100 each; Refill: 6  -     Comprehensive metabolic panel; Future  -     Lipid panel; Future  -     Hemoglobin A1c; Future    4. Iron deficiency  -     ferrous sulfate (FeroSul) 325 (65 FE) MG tablet; Take 1 tablet by mouth Daily With Breakfast.  Dispense: 30 tablet; Refill: 5  -     CBC w AUTO Differential; Future    5. History of pneumonia  -     XR Chest 2 View; Future    Return to the clinic in 3 month/s.  Will contact with results as needed.  Will check blood and xray on 06/20/21.Orders placed.  He has been having some sweating at night when he doesn't have a night and he will try to take his evening snack.

## 2021-05-20 NOTE — TELEPHONE ENCOUNTER
Pt forgot to ask if he should still have the upcoming CAT scan since he recently had one.    Please contact pt to let him know.

## 2021-05-25 ENCOUNTER — TELEPHONE (OUTPATIENT)
Dept: GENERAL RADIOLOGY | Facility: HOSPITAL | Age: 86
End: 2021-05-25

## 2021-05-25 ENCOUNTER — READMISSION MANAGEMENT (OUTPATIENT)
Dept: CALL CENTER | Facility: HOSPITAL | Age: 86
End: 2021-05-25

## 2021-05-25 NOTE — OUTREACH NOTE
COPD/PN Week 2 Survey      Responses   [facility] [place] patient discharged from?  [place]   Does the patient have one of the following disease processes/diagnoses(primary or secondary)?  COPD/Pneumonia   Was the primary reason for admission:  Pneumonia   Week 2 attempt successful?  Yes   Call start time  1803   Call end time  1805   Discharge diagnosis  acute hypoxic resp failure d/t PNA   Meds reviewed with patient/caregiver?  Yes   Is the patient taking all medications as directed (includes completed medication regime)?  Yes   Has the patient kept scheduled appointments due by today?  Yes   Psychosocial issues?  No   What is the patient's perception of their health status since discharge?  Improving   Week 2 call completed?  Yes   Wrap up additional comments  Doing well, has had a follow up appt.  No problems or questions at this time.          Sharon Baron, RN

## 2021-05-31 LAB
QT INTERVAL: 344 MS
QTC INTERVAL: 425 MS

## 2021-06-03 ENCOUNTER — READMISSION MANAGEMENT (OUTPATIENT)
Dept: CALL CENTER | Facility: HOSPITAL | Age: 86
End: 2021-06-03

## 2021-06-03 NOTE — OUTREACH NOTE
COPD/PN Week 3 Survey      Responses   [facility] patient discharged from?  [place]   Does the patient have one of the following disease processes/diagnoses(primary or secondary)?  COPD/Pneumonia   Was the primary reason for admission:  Pneumonia   Week 3 attempt successful?  No   Unsuccessful attempts  Attempt 1          Phylicia Jones RN

## 2021-06-07 ENCOUNTER — OFFICE VISIT (OUTPATIENT)
Dept: CARDIOLOGY | Facility: CLINIC | Age: 86
End: 2021-06-07

## 2021-06-07 VITALS
WEIGHT: 151.2 LBS | HEART RATE: 75 BPM | OXYGEN SATURATION: 98 % | HEIGHT: 65 IN | SYSTOLIC BLOOD PRESSURE: 152 MMHG | DIASTOLIC BLOOD PRESSURE: 74 MMHG | BODY MASS INDEX: 25.19 KG/M2

## 2021-06-07 DIAGNOSIS — E78.00 PURE HYPERCHOLESTEROLEMIA: ICD-10-CM

## 2021-06-07 DIAGNOSIS — I50.32 CHRONIC DIASTOLIC CONGESTIVE HEART FAILURE (HCC): Primary | Chronic | ICD-10-CM

## 2021-06-07 DIAGNOSIS — I82.513 CHRONIC DEEP VEIN THROMBOSIS (DVT) OF FEMORAL VEIN OF BOTH LOWER EXTREMITIES (HCC): ICD-10-CM

## 2021-06-07 DIAGNOSIS — I10 ESSENTIAL HYPERTENSION: ICD-10-CM

## 2021-06-07 PROCEDURE — 99214 OFFICE O/P EST MOD 30 MIN: CPT | Performed by: NURSE PRACTITIONER

## 2021-06-07 NOTE — PROGRESS NOTES
[facility] CHF CLINIC OFFICE VISIT    Subjective:     Congestive Heart Failure (chief complaint)      History of Present Illness  Congestive Heart Failure   Presents for follow-up visit. Pertinent negatives include no chest pressure, claudication, edema (bilateral ankle at the end of the day; compliant with compression stockings), near-syncope, orthopnea, palpitations, paroxysmal nocturnal dyspnea, shortness of breath or unexpected weight change. The symptoms have been improving. Compliance with total regimen is %. Compliance problems include adherence to exercise.  Compliance with diet is %. Compliance with exercise is 26-50%. Compliance with medications is %.      1. CAD, non-obstructive 2005  2. HFpEF - diastolic dysfunction  3. Hypertension  4. CKD with H/O KIRK follows with Dr Reza  5. H/O Atrial Flutter S/P ablation per Dr Graves 2006  6. Prostate Cancer Brawley Score 6/Stage 2 S/P RT   7. DM, Type II  8. Anterior right total hip arthroplasty 04/25/2016  9. Chronic DVT of bilateral femoral vein: on preventative dosing - Xarelto  10. Carotid disease followed annually by Dr Hamman     12/7/20:  TELEPHONE VISIT  Time Spent: 7 minutes  General attempted the Lore video visit, however connection with video was unsuccessful. Patient then called and visit was performed via telephone service in lieu of a follow-up appointment for results and advice.  This patient is an established patient.  There is no face-to-face service planned within the next 24 hours.  There also has been no E/M in the past 7 days  You have chosen to receive care through a telephone visit. Do you consent to use a telephone visit for your medical care today? Yes  This visit has been rescheduled as a phone visit to comply with patient safety concerns in accordance with CDC recommendations. Total time of discussion was 7 minutes.     Been doing well. No leg swelling. Has not had to have additional lasix. Using exercise bike. No falls.  Xarelto 10mg for DVT prevention. No hospitalizations or ER visits.   Does not need any home medical equipment. Using cane and walker. No shortness of breath. No chest pain. Last echo 2017 without structural changes or valve disease.       6/7/21:  6 month follow up. Hospitalization 2 weeks ago for pneumonia. He was taken off Xarelto for some mild hemoptysis without anemia. He was on 10mg Xarelto for DVT prevention as he had an unprovoked DVT with ongoing decreased mobility. Last US (2018) was all chronic, there has not been a resolution study.   No leg swelling. Lasix is as needed.           Diet: Lives with Daughter and grandson. Cereal, teresa and eggs for breakfast. [place] for lunch. Eats dinner as family. Adds some salt to his plate.   Fluids: Juice, water (3-4 a day), coffee in the morning.   Activity: Gets out of the house often and visits friends. He still drives. He does not get SOA doing his normal activities.     PCP: Dr. Larkin  Cardiologist: REGINA, pawan Aguilarl patient  Nephrologist: Dr. Reza  Pulmonologist: n/a    Hospitalizations:  5/12/21-5/14/21: pneumonia    Past Medical History:   Diagnosis Date   • Acquired hallux rigidus    • Anemia    • Arthritis    • Arthropathy of lumbar facet joint    • Carpal tunnel syndrome    • CHF (congestive heart failure) (CMS/HCC)    • Chronic diastolic congestive heart failure (CMS/HCC)    • Chronic kidney disease    • Chronic kidney disease, stage II (mild)     Chronic kidney disease stage 2   • Chronic obstructive lung disease (CMS/HCC)    • Chronic renal failure    • Coronary artery disease    • Cortical senile cataract    • Diabetes mellitus (CMS/HCC)    • Diabetes mellitus type 2, noninsulin dependent (CMS/HCC)     diabetes mellitus type 2, non-insulin treated, Medication treatment plan: oral diabetic medication   • Diabetes mellitus without complication (CMS/HCC)    • Diastolic dysfunction    • Diastolic heart failure (CMS/HCC)    • Essential hypertension      difficult to control      • Hip pain    • History of echocardiogram 08/19/2015    Echocardiogram W/ color flow 51650 (1) - Mild to moderate LVH with mild left atrial enlargement and normal aortic root.CLVH,preserved LV systolic function with Ef of 55%.Diastolic dysfunction.MV intact.AV thickened.Aortic sclerosis.   • Hyperlipidemia    • Influenza vaccine administered 10/02/2015    INFLUENZA IMMUN ADMIN OR PREV RECV'D  (1) - Ordered By: DIONNE MANDUJANO ([facility])    • Insomnia    • Insomnia     Other insomnia   • Joint pain    • Low back pain    • Muscle strain    • Nuclear cataract    • Onychomycosis    • Pneumococcal vaccination given 07/22/2016    PNEUMOC VAC/ADMIN/RCVD 4040F (3) - Ordered By: DIONNE MANDUJANO ([facility])   • Prostate cancer (CMS/HCC)    • Pure hypercholesterolemia    • Sedentary lifestyle      Past Surgical History:   Procedure Laterality Date   • BACK SURGERY     • CARDIAC ABLATION     • CARDIAC CATHETERIZATION  02/04/1986    Cardiac cath 61961 (1) - normal left heart catherization,non cardiac chest pain   • CARPAL TUNNEL RELEASE  10/19/2015    Carpal tunnel surgery (1) - Release of left carpal tunnel   • CATARACT EXTRACTION W/ INTRAOCULAR LENS IMPLANT Left 3/31/2017    Procedure: REMOVE CATARACT AND IMPLANT INRAOCULAR LENS LEFT EYE;  Surgeon: Hector Navarrete MD;  Location: [facility] OR;  Service:    • CATARACT EXTRACTION W/ INTRAOCULAR LENS IMPLANT Right 4/14/2017    Procedure: REMOVE CATARACT AND IMPLANT INTRAOCULAR LENS RIGHT EYE;  Surgeon: Hector Navarrete MD;  Location: [facility] OR;  Service:    • COLONOSCOPY N/A 10/31/2018    Procedure: COLONOSCOPY;  Surgeon: Herbie Christine MD;  Location: [facility] ENDOSCOPY;  Service: Gastroenterology   • ENDOSCOPY  08/05/2013    Colon endoscopy 79084 (2) - Hemorrhoids found.   • FRACTURE SURGERY     • INGUINAL HERNIA REPAIR Right 06/07/1968    Inguinal hernia, repair (2)   • INJECTION OF MEDICATION  09/06/2012    Albuterol (2u) (1) - Ordered  By: LIDYA STEINER ([facility])    • INJECTION OF MEDICATION  2012    Atrovent (1) - Ordered By: LIDYA STEINER ([facility])    • INJECTION OF MEDICATION  02/10/2012    Depo Medrol (Methylprednisone) (3) - Ordered By: HEATHER PAK ([facility])    • INJECTION OF MEDICATION  2014    Kenalog (4) - Ordered By: DIMAS ANDRADE ([facility])    • JOINT REPLACEMENT     • LUMBAR LAMINECTOMY      Lumbar laminectomy (1)   • MANDIBLE FRACTURE SURGERY  10/11/1981    Treat nose/jaw fracture (1) - bilateral open reduction of fracture of mandible   • OTHER SURGICAL HISTORY  2011    Drain/Inject Major Joint  (1) - Ordered By: KARLOS HERNANDEZ ([facility])    • OTHER SURGICAL HISTORY      Insert IVC filter 09814 (1)   • OTHER SURGICAL HISTORY  10/11/2011    SPIROMETRY 99749 (1) - Ordered By: HEATHER PAK ([facility])   • TOTAL HIP ARTHROPLASTY Right      Social History     Socioeconomic History   • Marital status:      Spouse name: Not on file   • Number of children: Not on file   • Years of education: Not on file   • Highest education level: Not on file   Tobacco Use   • Smoking status: Former Smoker     Quit date:      Years since quittin.4   • Smokeless tobacco: Never Used   Vaping Use   • Vaping Use: Never used   Substance and Sexual Activity   • Alcohol use: No   • Drug use: No   • Sexual activity: Defer     Comment: Marital status:      Family History   Problem Relation Age of Onset   • Diabetes Other    • Cancer Sister    • Heart disease Mother    • Hypertension Mother    • Heart disease Father    • Hypertension Father    • Coronary artery disease Neg Hx        Allergies:  Allergies   Allergen Reactions   • Aldactone [Spironolactone] Other (See Comments)     Hyponatremia and hyperkalemia       Review of Systems   Constitutional: Negative for malaise/fatigue.   Cardiovascular: Negative for chest pain, dyspnea on exertion, orthopnea and palpitations.   Respiratory: Negative for cough and  shortness of breath.    Neurological: Negative for dizziness and weakness.       Current Outpatient Medications   Medication Sig Dispense Refill   • acetaminophen-codeine (TYLENOL #3) 300-30 MG per tablet Take 1 tablet by mouth 4 (Four) Times a Day As Needed for Moderate Pain  (pain). 56 tablet 0   • albuterol sulfate  (90 Base) MCG/ACT inhaler TWO PUFFS EVERY FOUR HOURS AS NEEDED FOR WHEEZING 18 g 6   • aspirin 81 MG EC tablet Take 81 mg by mouth daily.     • carbidopa-levodopa (SINEMET)  MG per tablet Take 1 tablet by mouth 3 (Three) Times a Day. 90 tablet 2   • ferrous sulfate (FeroSul) 325 (65 FE) MG tablet Take 1 tablet by mouth Daily With Breakfast. 30 tablet 5   • gabapentin (NEURONTIN) 300 MG capsule TAKE ONE CAPSULE BY MOUTH THREE TIMES A  capsule 2   • glucose blood (True Metrix Blood Glucose Test) test strip Check glucose Once daily 100 each 6   • hydrALAZINE (APRESOLINE) 25 MG tablet TAKE ONE TABLET BY MOUTH THREE TIMES A DAY 90 tablet 4   • Lidocaine Pain Relief 4 % patch APPLY PATCH TO SKIN EVERY TWELVE HOURS AS DIRECTED. REMOVE USED ONE AFTER 12 HOURS     • lisinopril-hydrochlorothiazide (PRINZIDE,ZESTORETIC) 20-12.5 MG per tablet Take 1 tablet by mouth Daily. 30 tablet 3   • metoprolol succinate XL (TOPROL-XL) 25 MG 24 hr tablet Take 1 tablet by mouth every night at bedtime. 30 tablet 5   • pravastatin (PRAVACHOL) 40 MG tablet Take 1 tablet by mouth Daily. 90 tablet 2   • rivaroxaban (Xarelto) 10 MG tablet Take 1 tablet by mouth Daily. 30 tablet 1   • SITagliptin (Januvia) 50 MG tablet Take 1 tablet by mouth Daily. 30 tablet 3   • tiotropium bromide-olodaterol (Stiolto Respimat) 2.5-2.5 MCG/ACT aerosol solution inhaler Inhale 2 puffs Daily. 4 g 0   • TRUEplus Lancets 30G misc Use to test Blood Sugars Once Daily 100 each 6     No current facility-administered medications for this visit.        Objective:     Vitals:    06/07/21 0859   BP: 152/74   BP Location: Left arm   Patient  "Position: Sitting   Cuff Size: Adult   Pulse: 75   SpO2: 98%   Weight: 68.6 kg (151 lb 3.2 oz)   Height: 165.1 cm (65\")       Wt Readings from Last 3 Encounters:   06/07/21 68.6 kg (151 lb 3.2 oz)   05/20/21 71.7 kg (158 lb)   05/14/21 72.6 kg (160 lb)            Physical Exam  Constitutional:       General: He is not in acute distress.     Appearance: He is well-developed.   Neck:      Vascular: No JVD.   Cardiovascular:      Rate and Rhythm: Normal rate and regular rhythm.      Pulses: Intact distal pulses.      Heart sounds: Normal heart sounds.   Pulmonary:      Effort: Pulmonary effort is normal. No respiratory distress.      Breath sounds: Normal breath sounds.   Abdominal:      General: There is no distension.      Palpations: Abdomen is soft.   Musculoskeletal:         General: Normal range of motion.      Cervical back: Normal range of motion.   Neurological:      Mental Status: He is alert and oriented to person, place, and time.         Cardiographics  Results for orders placed during the hospital encounter of 11/08/17    Adult Transthoracic Echo Complete W/ Cont if Necessary Per Protocol    Interpretation Summary  · Left ventricular wall thickness is consistent with mild concentric hypertrophy.  · Left ventricular systolic function is normal. Estimated EF = 55%.  · Left ventricular diastolic dysfunction (grade I) consistent with impaired relaxation.      CT Chest With Contrast Diagnostic    Result Date: 5/12/2021  1. Coarse, nodular airspace disease on the left is new since 1/19/2021 and appears infectious. 2. Resolved airspace disease on the right since 1/19/2021. 3. This includes resolution of the previously noted right lower lobe pulmonary nodule, which was therefore infectious/inflammatory in etiology. 4. Emphysema. Electronically signed by:  Hermilo Garcia MD  5/12/2021 2:20 PM CDT Workstation: [id number]    XR Chest 1 View    Result Date: 5/12/2021  1.  Left upper lobe perihilar interstitial " groundglass opacity. Left lower lobe infrahilar interstitial groundglass opacity. The differential for these opacities includes pneumonia including atypical viral etiology versus atypical pulmonary edema. Electronically signed by:  Eladio Garces MD  5/12/2021 7:50 AM CDT Workstation: [id number]       Lab Review     Lab Results   Component Value Date    TSH 0.990 08/23/2017     Lab Results   Component Value Date    GLUCOSE 100 (H) 05/13/2021    BUN 22 05/13/2021    CREATININE 1.20 05/13/2021    EGFRIFAFRI 70 05/13/2021    BCR 18.3 05/13/2021    K 4.5 05/13/2021    CO2 28.0 05/13/2021    CALCIUM 8.7 05/13/2021    PROTENTOTREF 6.4 05/14/2015    ALBUMIN 4.30 05/12/2021    LABIL2 1.7 05/14/2015    AST 17 05/12/2021    ALT 9 05/12/2021     Lab Results   Component Value Date    WBC 5.64 05/14/2021    HGB 11.9 (L) 05/14/2021    HCT 36.1 (L) 05/14/2021    MCV 85.7 05/14/2021     05/14/2021       Lab Results   Component Value Date    CKTOTAL 111 05/12/2021    CKMB 1.47 11/08/2017    TROPONINI 0.046 (H) 12/02/2018    TROPONINT <0.010 05/12/2021     Lab Results   Component Value Date    PROBNP 420.6 05/12/2021          The following portions of the patient's history were reviewed and updated as appropriate: allergies, current medications, past family history, past medical history, past social history, past surgical history and problem list.     Old records reviewed and pertinent information is included in the above objective data.     Assessment/Plan:      Diagnosis Plan   1. Chronic diastolic congestive heart failure (CMS/HCC)  EF:65%. NYHA Class II, Stage C. Patient appears euvolemic. and in a well perfused physiologic state. Hemodynamics are acceptable  BETA-BLOCKER:   Metoprolol Succinate  25mg   CORLANOR:  n/a  ACE/ARB: Lisinopril 20mg / HCTZ 12.5mg   DIURETIC: Lasix 20mg PRN. Has not had to have in several months.   ALDOSTERONE ANTAGONIST: n/a, allergy  IMDUR/HYDRALAZINE: hydralazine 25mg TID  DIGOXIN: n/a  Fluid  restriction: 2 L  Sodium restriction:2 grams  Daily Weights  6MWT: n/a    Reiterated all educational points this visit.  Continue daily weight monitoring.  Continue restricted dietary sodium intake.  Discussed patient action plan for heart failure. Contact information for this office verbalized.  Recommended avoiding NSAIDs use.  Discussed warning signs requiring additional medical attention for heart failure.   Education points repeated back by patient.        2. Chronic deep vein thrombosis (DVT) of femoral vein of both lower extremities (CMS/HCC)  Xarelto 10mg daily was stopped in the hospital and recommend he restart on 5/28/21. He was told to stay off it by PCP.     Will restudy his legs because at his 6 month re-check, he still had extensive chronic DVT down both legs.     Александр venous duplex       3. Coronary artery disease involving native coronary artery of native heart without angina pectoris  ASCAD.   Continue current treatment regimen.  Dietary sodium restriction.  Regular aerobic exercise. Using exercise bike at home BID.  Continue current medications.  Antiplatelet therapy: n/a    Beta-blocker:  Toprol XL 25mg    Statin: Pravastatin 40mg    ASA: 81mg     CCS Functional Classification of Angina : 0  Class Activity Evoking Angina Limits to Physical Activity   I Prolonged exertion None   II Walking > 2 blocks or > 1 flight of stairs Slight   III Walking < 2 blocks or < 1 flight of stairs Marked   IV Minimal or at rest Severe     Lab Results   Component Value Date    CHOL 153 03/15/2021    CHLPL 141 08/26/2016    TRIG 49 03/15/2021    HDL 65 (H) 03/15/2021    LDL 77 03/15/2021     LDL controlled on Pravastatin 40mg.       4. Essential HTN Elevated at most office visits, but no issues.   - Hydralazine 25 mg TID  - Lisinopril HCTZ 20-12.5mg   - Toprol XL 25mg           Follow up 6 months.           This document has been electronically signed by REGINA Lopez on June 7, 2021 09:08 CDT

## 2021-06-08 ENCOUNTER — LAB (OUTPATIENT)
Dept: LAB | Facility: HOSPITAL | Age: 86
End: 2021-06-08

## 2021-06-08 DIAGNOSIS — E61.1 IRON DEFICIENCY: ICD-10-CM

## 2021-06-08 DIAGNOSIS — E11.40 TYPE 2 DIABETES MELLITUS WITH DIABETIC NEUROPATHY, WITHOUT LONG-TERM CURRENT USE OF INSULIN (HCC): ICD-10-CM

## 2021-06-08 LAB
ALBUMIN SERPL-MCNC: 4.3 G/DL (ref 3.5–5.2)
ALBUMIN/GLOB SERPL: 1.9 G/DL
ALP SERPL-CCNC: 57 U/L (ref 39–117)
ALT SERPL W P-5'-P-CCNC: 9 U/L (ref 1–41)
ANION GAP SERPL CALCULATED.3IONS-SCNC: 12 MMOL/L (ref 5–15)
AST SERPL-CCNC: 16 U/L (ref 1–40)
BASOPHILS # BLD AUTO: 0.05 10*3/MM3 (ref 0–0.2)
BASOPHILS NFR BLD AUTO: 1.1 % (ref 0–1.5)
BILIRUB SERPL-MCNC: 0.4 MG/DL (ref 0–1.2)
BUN SERPL-MCNC: 19 MG/DL (ref 8–23)
BUN/CREAT SERPL: 14.8 (ref 7–25)
CALCIUM SPEC-SCNC: 9.1 MG/DL (ref 8.6–10.5)
CHLORIDE SERPL-SCNC: 99 MMOL/L (ref 98–107)
CHOLEST SERPL-MCNC: 171 MG/DL (ref 0–200)
CO2 SERPL-SCNC: 26 MMOL/L (ref 22–29)
CREAT SERPL-MCNC: 1.28 MG/DL (ref 0.76–1.27)
DEPRECATED RDW RBC AUTO: 47.1 FL (ref 37–54)
EOSINOPHIL # BLD AUTO: 0.07 10*3/MM3 (ref 0–0.4)
EOSINOPHIL NFR BLD AUTO: 1.6 % (ref 0.3–6.2)
ERYTHROCYTE [DISTWIDTH] IN BLOOD BY AUTOMATED COUNT: 14.5 % (ref 12.3–15.4)
GFR SERPL CREATININE-BSD FRML MDRD: 65 ML/MIN/1.73
GLOBULIN UR ELPH-MCNC: 2.3 GM/DL
GLUCOSE SERPL-MCNC: 87 MG/DL (ref 65–99)
HBA1C MFR BLD: 6.16 % (ref 4.8–5.6)
HCT VFR BLD AUTO: 43.9 % (ref 37.5–51)
HDLC SERPL-MCNC: 71 MG/DL (ref 40–60)
HGB BLD-MCNC: 14.3 G/DL (ref 13–17.7)
IMM GRANULOCYTES # BLD AUTO: 0.01 10*3/MM3 (ref 0–0.05)
IMM GRANULOCYTES NFR BLD AUTO: 0.2 % (ref 0–0.5)
LDLC SERPL CALC-MCNC: 91 MG/DL (ref 0–100)
LDLC/HDLC SERPL: 1.29 {RATIO}
LYMPHOCYTES # BLD AUTO: 1.68 10*3/MM3 (ref 0.7–3.1)
LYMPHOCYTES NFR BLD AUTO: 37.8 % (ref 19.6–45.3)
MCH RBC QN AUTO: 29.2 PG (ref 26.6–33)
MCHC RBC AUTO-ENTMCNC: 32.6 G/DL (ref 31.5–35.7)
MCV RBC AUTO: 89.8 FL (ref 79–97)
MONOCYTES # BLD AUTO: 0.68 10*3/MM3 (ref 0.1–0.9)
MONOCYTES NFR BLD AUTO: 15.3 % (ref 5–12)
NEUTROPHILS NFR BLD AUTO: 1.96 10*3/MM3 (ref 1.7–7)
NEUTROPHILS NFR BLD AUTO: 44 % (ref 42.7–76)
NRBC BLD AUTO-RTO: 0 /100 WBC (ref 0–0.2)
PLATELET # BLD AUTO: 311 10*3/MM3 (ref 140–450)
PMV BLD AUTO: 9 FL (ref 6–12)
POTASSIUM SERPL-SCNC: 4.4 MMOL/L (ref 3.5–5.2)
PROT SERPL-MCNC: 6.6 G/DL (ref 6–8.5)
RBC # BLD AUTO: 4.89 10*6/MM3 (ref 4.14–5.8)
SODIUM SERPL-SCNC: 137 MMOL/L (ref 136–145)
TRIGL SERPL-MCNC: 43 MG/DL (ref 0–150)
VLDLC SERPL-MCNC: 9 MG/DL (ref 5–40)
WBC # BLD AUTO: 4.45 10*3/MM3 (ref 3.4–10.8)

## 2021-06-08 PROCEDURE — 80053 COMPREHEN METABOLIC PANEL: CPT

## 2021-06-08 PROCEDURE — 83036 HEMOGLOBIN GLYCOSYLATED A1C: CPT

## 2021-06-08 PROCEDURE — 85025 COMPLETE CBC W/AUTO DIFF WBC: CPT

## 2021-06-08 PROCEDURE — 36415 COLL VENOUS BLD VENIPUNCTURE: CPT

## 2021-06-08 PROCEDURE — 80061 LIPID PANEL: CPT

## 2021-06-09 ENCOUNTER — READMISSION MANAGEMENT (OUTPATIENT)
Dept: CALL CENTER | Facility: HOSPITAL | Age: 86
End: 2021-06-09

## 2021-06-09 DIAGNOSIS — I50.32 CHRONIC DIASTOLIC CONGESTIVE HEART FAILURE (HCC): Chronic | ICD-10-CM

## 2021-06-09 RX ORDER — GABAPENTIN 300 MG/1
CAPSULE ORAL
Qty: 270 CAPSULE | Refills: 2 | Status: SHIPPED | OUTPATIENT
Start: 2021-06-09 | End: 2021-08-19 | Stop reason: SDUPTHER

## 2021-06-09 NOTE — OUTREACH NOTE
COPD/PN Week 3 Survey      Responses   [facility] patient discharged from?  [place]   Does the patient have one of the following disease processes/diagnoses(primary or secondary)?  COPD/Pneumonia   Was the primary reason for admission:  Pneumonia   Week 3 attempt successful?  Yes   Call start time  1011   Call end time  1013   Meds reviewed with patient/caregiver?  Yes   Is the patient taking all medications as directed (includes completed medication regime)?  Yes   Has the patient kept scheduled appointments due by today?  Yes   Week 3 call completed?  Yes   Wrap up additional comments  Pt continues to feel well. Pt has no concerns at this time.          Diane Pham RN

## 2021-06-17 ENCOUNTER — TELEPHONE (OUTPATIENT)
Dept: FAMILY MEDICINE CLINIC | Facility: CLINIC | Age: 86
End: 2021-06-17

## 2021-06-17 NOTE — TELEPHONE ENCOUNTER
Mr. Ruvalcaba would like to get his chest xray results and his lab results. Please call @ [phone]

## 2021-06-21 ENCOUNTER — READMISSION MANAGEMENT (OUTPATIENT)
Dept: CALL CENTER | Facility: HOSPITAL | Age: 86
End: 2021-06-21

## 2021-06-21 NOTE — OUTREACH NOTE
COPD/PN Week 4 Survey      Responses   [facility] [place] patient discharged from?  [place]   Does the patient have one of the following disease processes/diagnoses(primary or secondary)?  COPD/Pneumonia   Was the primary reason for admission:  Pneumonia   Week 4 attempt successful?  Yes   Call start time  1026   Call end time  1028   Discharge diagnosis  acute hypoxic resp failure d/t PNA   Meds reviewed with patient/caregiver?  Yes   Is the patient taking all medications as directed (includes completed medication regime)?  Yes   Has the patient kept scheduled appointments due by today?  Yes   Is the patient still receiving Home Health Services?  N/A   Pulse Ox monitoring  None   What is the patient's perception of their health status since discharge?  Improving   Week 4 call completed?  Yes   Would the patient like one additional call?  No   Graduated  Yes   Is the patient interested in additional calls from an ambulatory ?  NOTE:  applies to high risk patients requiring additional follow-up.  No          Lakisha Ha RN

## 2021-06-23 ENCOUNTER — OFFICE VISIT (OUTPATIENT)
Dept: PODIATRY | Facility: CLINIC | Age: 86
End: 2021-06-23

## 2021-06-23 VITALS
HEART RATE: 61 BPM | WEIGHT: 151 LBS | HEIGHT: 65 IN | OXYGEN SATURATION: 96 % | BODY MASS INDEX: 25.16 KG/M2 | DIASTOLIC BLOOD PRESSURE: 74 MMHG | SYSTOLIC BLOOD PRESSURE: 156 MMHG

## 2021-06-23 DIAGNOSIS — G89.29 CHRONIC TOE PAIN, BILATERAL: ICD-10-CM

## 2021-06-23 DIAGNOSIS — M79.675 CHRONIC TOE PAIN, BILATERAL: ICD-10-CM

## 2021-06-23 DIAGNOSIS — B35.1 ONYCHOMYCOSIS: ICD-10-CM

## 2021-06-23 DIAGNOSIS — Z79.01 ANTICOAGULANT LONG-TERM USE: ICD-10-CM

## 2021-06-23 DIAGNOSIS — M79.674 CHRONIC TOE PAIN, BILATERAL: ICD-10-CM

## 2021-06-23 DIAGNOSIS — E11.9 TYPE 2 DIABETES MELLITUS WITHOUT COMPLICATION, WITHOUT LONG-TERM CURRENT USE OF INSULIN (HCC): Primary | ICD-10-CM

## 2021-06-23 PROCEDURE — 11721 DEBRIDE NAIL 6 OR MORE: CPT | Performed by: PODIATRIST

## 2021-06-23 NOTE — PROGRESS NOTES
[facility] Ruvalcaba .  1935  85 y.o. male   PCP: MILENA Bethea 5/20/2021  BS- 99 per patient   A1c 6.16 (6/8/2021)    Patient presents today for routine diabetic foot care.    06/23/2021     Chief Complaint   Patient presents with   • Right Foot - diabetic foot care   • Left Foot - diabetic foot care       History of Present Illness    Patient presents to clinic today for routine diabetic foot care.      Past Medical History:   Diagnosis Date   • Acquired hallux rigidus    • Anemia    • Arthritis    • Arthropathy of lumbar facet joint    • Carpal tunnel syndrome    • CHF (congestive heart failure) (CMS/HCC)    • Chronic diastolic congestive heart failure (CMS/HCC)    • Chronic kidney disease    • Chronic kidney disease, stage II (mild)     Chronic kidney disease stage 2   • Chronic obstructive lung disease (CMS/HCC)    • Chronic renal failure    • Coronary artery disease    • Cortical senile cataract    • Diabetes mellitus (CMS/HCC)    • Diabetes mellitus type 2, noninsulin dependent (CMS/HCC)     diabetes mellitus type 2, non-insulin treated, Medication treatment plan: oral diabetic medication   • Diabetes mellitus without complication (CMS/HCC)    • Diastolic dysfunction    • Diastolic heart failure (CMS/HCC)    • Essential hypertension     difficult to control      • Hip pain    • History of echocardiogram 08/19/2015    Echocardiogram W/ color flow 12422 (1) - Mild to moderate LVH with mild left atrial enlargement and normal aortic root.CLVH,preserved LV systolic function with Ef of 55%.Diastolic dysfunction.MV intact.AV thickened.Aortic sclerosis.   • Hyperlipidemia    • Influenza vaccine administered 10/02/2015    INFLUENZA IMMUN ADMIN OR PREV RECV'D  (1) - Ordered By: DIONNE MANDUJANO ([facility])    • Insomnia    • Insomnia     Other insomnia   • Joint pain    • Low back pain    • Muscle strain    • Nuclear cataract    • Onychomycosis    • Pneumococcal vaccination given 07/22/2016    PNEUMOC VAC/ADMIN/RCVD  4040F (3) - Ordered By: DIONNE MANDUJANO ([facility])   • Prostate cancer (CMS/HCC)    • Pure hypercholesterolemia    • Sedentary lifestyle          Past Surgical History:   Procedure Laterality Date   • BACK SURGERY     • CARDIAC ABLATION     • CARDIAC CATHETERIZATION  02/04/1986    Cardiac cath 62013 (1) - normal left heart catherization,non cardiac chest pain   • CARPAL TUNNEL RELEASE  10/19/2015    Carpal tunnel surgery (1) - Release of left carpal tunnel   • CATARACT EXTRACTION W/ INTRAOCULAR LENS IMPLANT Left 3/31/2017    Procedure: REMOVE CATARACT AND IMPLANT INRAOCULAR LENS LEFT EYE;  Surgeon: Hector Navarrete MD;  Location: [facility] OR;  Service:    • CATARACT EXTRACTION W/ INTRAOCULAR LENS IMPLANT Right 4/14/2017    Procedure: REMOVE CATARACT AND IMPLANT INTRAOCULAR LENS RIGHT EYE;  Surgeon: Hector Navarrete MD;  Location: [facility] OR;  Service:    • COLONOSCOPY N/A 10/31/2018    Procedure: COLONOSCOPY;  Surgeon: Herbie Christine MD;  Location: [facility] ENDOSCOPY;  Service: Gastroenterology   • ENDOSCOPY  08/05/2013    Colon endoscopy 79254 (2) - Hemorrhoids found.   • FRACTURE SURGERY     • INGUINAL HERNIA REPAIR Right 06/07/1968    Inguinal hernia, repair (2)   • INJECTION OF MEDICATION  09/06/2012    Albuterol (2u) (1) - Ordered By: LIDYA STEINER ([facility])    • INJECTION OF MEDICATION  09/06/2012    Atrovent (1) - Ordered By: LIDYA STEINER ([facility])    • INJECTION OF MEDICATION  02/10/2012    Depo Medrol (Methylprednisone) (3) - Ordered By: HEATHER PAK ([facility])    • INJECTION OF MEDICATION  02/09/2014    Kenalog (4) - Ordered By: DIMAS ANDRADE ([facility])    • JOINT REPLACEMENT     • LUMBAR LAMINECTOMY  2007    Lumbar laminectomy (1)   • MANDIBLE FRACTURE SURGERY  10/11/1981    Treat nose/jaw fracture (1) - bilateral open reduction of fracture of mandible   • OTHER SURGICAL HISTORY  09/24/2011    Drain/Inject Major Joint 20610 (1) - Ordered By: KARLOS HERNANDEZ ([facility])    • OTHER SURGICAL  HISTORY      Insert IVC filter 52381 (1)   • OTHER SURGICAL HISTORY  10/11/2011    SPIROMETRY 57109 (1) - Ordered By: HEATHER PKA ([facility])   • TOTAL HIP ARTHROPLASTY Right          Family History   Problem Relation Age of Onset   • Diabetes Other    • Cancer Sister    • Heart disease Mother    • Hypertension Mother    • Heart disease Father    • Hypertension Father    • Coronary artery disease Neg Hx        Allergies   Allergen Reactions   • Aldactone [Spironolactone] Other (See Comments)     Hyponatremia and hyperkalemia       Social History     Socioeconomic History   • Marital status:      Spouse name: Not on file   • Number of children: Not on file   • Years of education: Not on file   • Highest education level: Not on file   Tobacco Use   • Smoking status: Former Smoker     Quit date:      Years since quittin.4   • Smokeless tobacco: Never Used   Vaping Use   • Vaping Use: Never used   Substance and Sexual Activity   • Alcohol use: No   • Drug use: No   • Sexual activity: Defer     Comment: Marital status:          Current Outpatient Medications   Medication Sig Dispense Refill   • acetaminophen-codeine (TYLENOL #3) 300-30 MG per tablet Take 1 tablet by mouth 4 (Four) Times a Day As Needed for Moderate Pain  (pain). 56 tablet 0   • albuterol sulfate  (90 Base) MCG/ACT inhaler TWO PUFFS EVERY FOUR HOURS AS NEEDED FOR WHEEZING 18 g 6   • aspirin 81 MG EC tablet Take 81 mg by mouth daily.     • carbidopa-levodopa (SINEMET)  MG per tablet Take 1 tablet by mouth 3 (Three) Times a Day. 90 tablet 2   • ferrous sulfate (FeroSul) 325 (65 FE) MG tablet Take 1 tablet by mouth Daily With Breakfast. 30 tablet 5   • gabapentin (NEURONTIN) 300 MG capsule TAKE ONE CAPSULE BY MOUTH THREE TIMES A  capsule 2   • glucose blood (True Metrix Blood Glucose Test) test strip Check glucose Once daily 100 each 6   • hydrALAZINE (APRESOLINE) 25 MG tablet TAKE ONE TABLET BY MOUTH THREE TIMES  "A DAY 90 tablet 4   • Lidocaine Pain Relief 4 % patch APPLY PATCH TO SKIN EVERY TWELVE HOURS AS DIRECTED. REMOVE USED ONE AFTER 12 HOURS     • lisinopril-hydrochlorothiazide (PRINZIDE,ZESTORETIC) 20-12.5 MG per tablet Take 1 tablet by mouth Daily. 30 tablet 3   • metoprolol succinate XL (TOPROL-XL) 25 MG 24 hr tablet Take 1 tablet by mouth every night at bedtime. 30 tablet 5   • pravastatin (PRAVACHOL) 40 MG tablet Take 1 tablet by mouth Daily. 90 tablet 2   • rivaroxaban (Xarelto) 10 MG tablet Take 1 tablet by mouth Daily. 30 tablet 1   • SITagliptin (Januvia) 50 MG tablet Take 1 tablet by mouth Daily. 30 tablet 3   • tiotropium bromide-olodaterol (Stiolto Respimat) 2.5-2.5 MCG/ACT aerosol solution inhaler Inhale 2 puffs Daily. 4 g 0   • TRUEplus Lancets 30G misc Use to test Blood Sugars Once Daily 100 each 6     No current facility-administered medications for this visit.       Review of Systems   Constitutional: Negative.    HENT: Negative.    Eyes: Negative.    Respiratory: Negative.    Cardiovascular: Negative.    Gastrointestinal: Negative.    Endocrine: Negative.    Genitourinary: Negative.    Musculoskeletal:        Toe pain   Skin: Negative.    Allergic/Immunologic: Negative.    Neurological: Negative.    Hematological: Negative.    Psychiatric/Behavioral: Negative.          OBJECTIVE    /74   Pulse 61   Ht 165.1 cm (65\")   Wt 68.5 kg (151 lb)   SpO2 96%   BMI 25.13 kg/m²       Physical Exam   Constitutional: He is oriented to person, place, and time. He appears well-developed. No distress.   Pulmonary/Chest: Effort normal. No respiratory distress.   Musculoskeletal: Normal range of motion. No signs of injury.   Neurological: He is alert and oriented to person, place, and time.   Skin: Skin is warm and dry. Capillary refill takes less than 2 seconds.   Psychiatric: His behavior is normal. Mood normal.   Vitals reviewed.      Gait: Normal    Assistive Device: Walker    Lower " Extremity    Cardiovascular:    DP/PT pulses palpable bilateral  CFT brisk  to all digits  Skin temp is warm to warm from proximal tibia to distal digits bilateral  Pedal hair growth decreased.   Nonpitting vanesa-malleolar edema bilateral  Musculoskeletal:  Muscle strength is 5/5 for all muscle groups tested   ROM of the 1st MTP is WNL bilateral   ROM of the ankle joint is  WNL bilateral   Dermatological:   Skin is warm, dry and intact bilateral  Webspaces 1-4 bilateral are clean, dry and intact.   No subcutaneous nodules or masses noted    Nails 1-5 bilateral are  discolored, elongated with subungual debris.  Pain on palpation to the nail plates.  Neurological:   Protective sensation intact   Sensation intact to light touch        Procedures        ASSESSMENT AND PLAN    Diagnoses and all orders for this visit:    1. Type 2 diabetes mellitus without complication, without long-term current use of insulin (CMS/[facility]) (Primary)    2. Onychomycosis    3. Chronic toe pain, bilateral    4. Anticoagulant long-term use        - Nails 1-5 bilateral were debrided in length and thickness with nail nipper and electric  to decrease fungal load and risk of infection.   - All the patients questions were answered.  - RTC 3 months or sooner if needed.               This document has been electronically signed by Partha Campos DPM on June 23, 2021 08:24 CDT     6/23/2021  08:24 CDT

## 2021-07-02 RX ORDER — SITAGLIPTIN 50 MG/1
TABLET, FILM COATED ORAL
Qty: 30 TABLET | Refills: 0 | Status: SHIPPED | OUTPATIENT
Start: 2021-07-02 | End: 2021-07-07 | Stop reason: SDUPTHER

## 2021-07-02 RX ORDER — LISINOPRIL AND HYDROCHLOROTHIAZIDE 20; 12.5 MG/1; MG/1
TABLET ORAL
Qty: 30 TABLET | Refills: 0 | Status: SHIPPED | OUTPATIENT
Start: 2021-07-02 | End: 2021-07-07 | Stop reason: SDUPTHER

## 2021-07-07 ENCOUNTER — OFFICE VISIT (OUTPATIENT)
Dept: FAMILY MEDICINE CLINIC | Facility: CLINIC | Age: 86
End: 2021-07-07

## 2021-07-07 VITALS
WEIGHT: 161.06 LBS | HEIGHT: 65 IN | DIASTOLIC BLOOD PRESSURE: 64 MMHG | BODY MASS INDEX: 26.84 KG/M2 | OXYGEN SATURATION: 98 % | SYSTOLIC BLOOD PRESSURE: 140 MMHG | HEART RATE: 57 BPM

## 2021-07-07 DIAGNOSIS — R52 PAIN: ICD-10-CM

## 2021-07-07 DIAGNOSIS — E11.9 TYPE 2 DIABETES MELLITUS WITHOUT COMPLICATION, WITHOUT LONG-TERM CURRENT USE OF INSULIN (HCC): ICD-10-CM

## 2021-07-07 DIAGNOSIS — E78.00 PURE HYPERCHOLESTEROLEMIA: ICD-10-CM

## 2021-07-07 DIAGNOSIS — I10 ESSENTIAL HYPERTENSION: Primary | ICD-10-CM

## 2021-07-07 PROCEDURE — 99214 OFFICE O/P EST MOD 30 MIN: CPT | Performed by: FAMILY MEDICINE

## 2021-07-07 RX ORDER — HYDRALAZINE HYDROCHLORIDE 25 MG/1
25 TABLET, FILM COATED ORAL 3 TIMES DAILY
Qty: 90 TABLET | Refills: 4 | Status: SHIPPED | OUTPATIENT
Start: 2021-07-07 | End: 2021-12-23

## 2021-07-07 RX ORDER — ACETAMINOPHEN AND CODEINE PHOSPHATE 300; 30 MG/1; MG/1
1 TABLET ORAL 4 TIMES DAILY PRN
Qty: 56 TABLET | Refills: 0 | Status: SHIPPED | OUTPATIENT
Start: 2021-07-07 | End: 2021-11-08 | Stop reason: SDUPTHER

## 2021-07-07 RX ORDER — LISINOPRIL AND HYDROCHLOROTHIAZIDE 20; 12.5 MG/1; MG/1
1 TABLET ORAL DAILY
Qty: 90 TABLET | Refills: 2 | Status: SHIPPED | OUTPATIENT
Start: 2021-07-07 | End: 2022-05-02

## 2021-07-07 RX ORDER — PRAVASTATIN SODIUM 40 MG
40 TABLET ORAL DAILY
Qty: 90 TABLET | Refills: 2 | Status: SHIPPED | OUTPATIENT
Start: 2021-07-07

## 2021-07-07 NOTE — PROGRESS NOTES
Subjective   General Keith Ruvalcaba Jr. is a 85 y.o. male.   .Cc: follow up of chronic medical issues    Hypertension  This is a chronic problem. The current episode started more than 1 year ago. The problem has been gradually improving since onset. Pertinent negatives include no anxiety, blurred vision, chest pain, malaise/fatigue, orthopnea, palpitations, peripheral edema, PND, shortness of breath or sweats. Current antihypertension treatment includes alpha 1 blockers, beta blockers, ACE inhibitors and diuretics.   Hyperlipidemia  This is a chronic problem. The current episode started more than 1 year ago. The problem is resistant. Pertinent negatives include no chest pain, myalgias or shortness of breath. Current antihyperlipidemic treatment includes statins.   Diabetes  He presents for his follow-up diabetic visit. He has type 2 diabetes mellitus. Pertinent negatives for hypoglycemia include no sweats. Pertinent negatives for diabetes include no blurred vision, no chest pain, no fatigue, no foot paresthesias, no foot ulcerations, no polydipsia, no polyphagia and no polyuria.   Pain  This is a chronic problem. The current episode started more than 1 year ago. Pertinent negatives include no abdominal pain, arthralgias, change in bowel habit, chest pain, congestion, coughing, fatigue, joint swelling, myalgias, rash, sore throat or swollen glands.   He is having pain in the low back in the left para spinal area . It happens every other night.It lasts a few minutes.    The following portions of the patient's history were reviewed and updated as appropriate: allergies, current medications, past family history, past medical history, past social history, past surgical history and problem list.    Review of Systems   Constitutional: Negative for fatigue and malaise/fatigue.   HENT: Negative for congestion and sore throat.    Eyes: Negative for blurred vision.   Respiratory: Negative for cough and shortness of breath.   "  Cardiovascular: Negative for chest pain, palpitations, orthopnea and PND.   Gastrointestinal: Negative for abdominal pain and change in bowel habit.   Endocrine: Negative for polydipsia, polyphagia and polyuria.   Musculoskeletal: Negative for arthralgias, joint swelling and myalgias.   Skin: Negative for rash.       Objective   Physical Exam  Vitals reviewed.   Constitutional:       Appearance: Normal appearance.   HENT:      Head: Normocephalic and atraumatic.      Right Ear: Tympanic membrane, ear canal and external ear normal.      Left Ear: Tympanic membrane, ear canal and external ear normal.      Nose: Nose normal.      Mouth/Throat:      Mouth: Mucous membranes are moist.      Pharynx: Oropharynx is clear.   Cardiovascular:      Rate and Rhythm: Normal rate and regular rhythm.      Heart sounds: Normal heart sounds. No murmur heard.   No friction rub. No gallop.    Pulmonary:      Effort: Pulmonary effort is normal. No respiratory distress.      Breath sounds: Normal breath sounds. No stridor. No wheezing, rhonchi or rales.   Chest:      Chest wall: No tenderness.   Abdominal:      General: Abdomen is flat. Bowel sounds are normal. There is no distension.      Palpations: Abdomen is soft. There is no mass.      Tenderness: There is no abdominal tenderness. There is no guarding or rebound.      Hernia: No hernia is present.   Musculoskeletal:      Cervical back: Normal range of motion.      Comments: Non tender on palpation of the lower back currently.   Neurological:      Mental Status: He is alert.           Visit Vitals  /64   Pulse 57   Ht 165.1 cm (65\")   Wt 73.1 kg (161 lb 1 oz)   SpO2 98%   BMI 26.80 kg/m²     Body mass index is 26.8 kg/m².      Assessment/Plan   Diagnoses and all orders for this visit:    1. Essential hypertension (Primary)  -     Comprehensive metabolic panel; Future  -     CBC w AUTO Differential; Future    2. Pure hypercholesterolemia  -     Lipid panel; Future    3. Type 2 " diabetes mellitus without complication, without long-term current use of insulin (CMS/[facility])  -     Hemoglobin A1c; Future    4. Pain  -     acetaminophen-codeine (TYLENOL #3) 300-30 MG per tablet; Take 1 tablet by mouth 4 (Four) Times a Day As Needed for Moderate Pain  (pain).  Dispense: 56 tablet; Refill: 0    Other orders  -     hydrALAZINE (APRESOLINE) 25 MG tablet; Take 1 tablet by mouth 3 (Three) Times a Day.  Dispense: 90 tablet; Refill: 4  -     SITagliptin (Januvia) 50 MG tablet; Take 1 tablet by mouth Daily.  Dispense: 90 tablet; Refill: 2  -     lisinopril-hydrochlorothiazide (PRINZIDE,ZESTORETIC) 20-12.5 MG per tablet; Take 1 tablet by mouth Daily.  Dispense: 90 tablet; Refill: 2  -     pravastatin (PRAVACHOL) 40 MG tablet; Take 1 tablet by mouth Daily.  Dispense: 90 tablet; Refill: 2    PAULA is appropriate.  Return to the clinic in 3 month/s.  Will contact with results as needed.

## 2021-07-09 ENCOUNTER — TELEPHONE (OUTPATIENT)
Dept: FAMILY MEDICINE CLINIC | Facility: CLINIC | Age: 86
End: 2021-07-09

## 2021-07-09 RX ORDER — PRIMIDONE 50 MG/1
TABLET ORAL
Qty: 15 TABLET | Refills: 2 | Status: SHIPPED | OUTPATIENT
Start: 2021-07-09 | End: 2021-08-19 | Stop reason: DRUGHIGH

## 2021-07-09 NOTE — TELEPHONE ENCOUNTER
Pt saw Dr. Larkin for a tremor in the right hand and says now both hands have tremors. He is requesting a referral to a doctor in [place] or [place] with Neurology (he says who ever Dr. Larkin thinks can help him). Thanks!

## 2021-07-09 NOTE — TELEPHONE ENCOUNTER
I contacted the patient and addressed the issue. Have called in [place] and he will get an appt. In 2-3 weeks and we will address the issue.

## 2021-07-09 NOTE — TELEPHONE ENCOUNTER
Mr. Ruvalcaba called back wanting to speak with Dr Larkin about the trembling in his hand getting worse and wanting to referral to Neurology in [place] or [place]. He would like a call back this afternoon. Call back @ [phone]

## 2021-07-19 ENCOUNTER — TELEPHONE (OUTPATIENT)
Dept: CARDIOLOGY | Facility: CLINIC | Age: 86
End: 2021-07-19

## 2021-08-16 RX ORDER — METOPROLOL SUCCINATE 25 MG/1
TABLET, EXTENDED RELEASE ORAL
Qty: 30 TABLET | Refills: 5 | Status: SHIPPED | OUTPATIENT
Start: 2021-08-16 | End: 2022-01-25

## 2021-08-19 ENCOUNTER — OFFICE VISIT (OUTPATIENT)
Dept: FAMILY MEDICINE CLINIC | Facility: CLINIC | Age: 86
End: 2021-08-19

## 2021-08-19 VITALS
WEIGHT: 160 LBS | DIASTOLIC BLOOD PRESSURE: 60 MMHG | OXYGEN SATURATION: 99 % | HEART RATE: 73 BPM | SYSTOLIC BLOOD PRESSURE: 124 MMHG | HEIGHT: 65 IN | BODY MASS INDEX: 26.66 KG/M2

## 2021-08-19 DIAGNOSIS — E61.1 IRON DEFICIENCY: ICD-10-CM

## 2021-08-19 DIAGNOSIS — I50.32 CHRONIC DIASTOLIC CONGESTIVE HEART FAILURE (HCC): Chronic | ICD-10-CM

## 2021-08-19 DIAGNOSIS — R52 PAIN: ICD-10-CM

## 2021-08-19 PROCEDURE — 99214 OFFICE O/P EST MOD 30 MIN: CPT | Performed by: FAMILY MEDICINE

## 2021-08-19 RX ORDER — ALBUTEROL SULFATE 90 UG/1
2 AEROSOL, METERED RESPIRATORY (INHALATION) EVERY 4 HOURS PRN
Qty: 18 G | Refills: 11 | Status: SHIPPED | OUTPATIENT
Start: 2021-08-19

## 2021-08-19 RX ORDER — ACETAMINOPHEN AND CODEINE PHOSPHATE 300; 30 MG/1; MG/1
1 TABLET ORAL 4 TIMES DAILY PRN
Qty: 60 TABLET | Refills: 0 | Status: CANCELLED | OUTPATIENT
Start: 2021-08-19

## 2021-08-19 RX ORDER — PRIMIDONE 50 MG/1
50 TABLET ORAL NIGHTLY
Qty: 30 TABLET | Refills: 2 | Status: SHIPPED | OUTPATIENT
Start: 2021-08-19 | End: 2021-09-28 | Stop reason: SDUPTHER

## 2021-08-19 RX ORDER — GABAPENTIN 300 MG/1
300 CAPSULE ORAL 3 TIMES DAILY
Qty: 270 CAPSULE | Refills: 2 | Status: SHIPPED | OUTPATIENT
Start: 2021-08-19

## 2021-08-19 RX ORDER — FERROUS SULFATE 325(65) MG
1 TABLET ORAL
Qty: 30 TABLET | Refills: 5 | Status: SHIPPED | OUTPATIENT
Start: 2021-08-19 | End: 2022-01-25

## 2021-08-19 NOTE — PROGRESS NOTES
Subjective   General Keith Ruvalcaba Jr. is a 85 y.o. male.   Cc: follow up of chronic medical issues    Hip Pain   There was no injury mechanism. The pain is present in the left hip. The pain is at a severity of 7/10. The pain is moderate. Pertinent negatives include no inability to bear weight, loss of motion, loss of sensation, muscle weakness, numbness or tingling.   Congestive Heart Failure  Presents for follow-up visit. Pertinent negatives include no abdominal pain, chest pain, chest pressure, edema, fatigue, muscle weakness, near-syncope, nocturia, orthopnea, palpitations, paroxysmal nocturnal dyspnea, shortness of breath or unexpected weight change.   He has a history of iron deficiency anemia.It has been stavle. His tremor has continued.     The following portions of the patient's history were reviewed and updated as appropriate: allergies, current medications, past family history, past medical history, past social history, past surgical history and problem list.    Review of Systems   Constitutional: Negative for fatigue and unexpected weight change.   Respiratory: Negative for shortness of breath.    Cardiovascular: Negative for chest pain, palpitations and near-syncope.   Gastrointestinal: Negative for abdominal pain.   Genitourinary: Negative for nocturia.   Musculoskeletal: Negative for muscle weakness.   Neurological: Negative for tingling and numbness.       Objective   Physical Exam  Vitals reviewed.   Constitutional:       Appearance: Normal appearance.   HENT:      Head: Normocephalic and atraumatic.      Right Ear: Tympanic membrane, ear canal and external ear normal.      Left Ear: Tympanic membrane, ear canal and external ear normal.      Nose: Nose normal.      Mouth/Throat:      Mouth: Mucous membranes are moist.      Pharynx: Oropharynx is clear.   Cardiovascular:      Rate and Rhythm: Normal rate and regular rhythm.      Heart sounds: Normal heart sounds. No murmur heard.   No friction rub. No  "gallop.    Pulmonary:      Effort: Pulmonary effort is normal. No respiratory distress.      Breath sounds: Normal breath sounds. No stridor. No wheezing, rhonchi or rales.   Chest:      Chest wall: No tenderness.   Abdominal:      General: Abdomen is flat. Bowel sounds are normal. There is no distension.      Palpations: Abdomen is soft. There is no mass.      Tenderness: There is no abdominal tenderness. There is no guarding or rebound.      Hernia: No hernia is present.   Skin:     General: Skin is warm and dry.   Neurological:      Mental Status: He is alert.      Comments: The patient has a tremor.           Visit Vitals  /60   Pulse 73   Ht 165.1 cm (65\")   Wt 72.6 kg (160 lb)   SpO2 99%   BMI 26.63 kg/m²     Body mass index is 26.63 kg/m².      Assessment/Plan   Diagnoses and all orders for this visit:    1. Pain    2. Iron deficiency  -     ferrous sulfate (FeroSul) 325 (65 FE) MG tablet; Take 1 tablet by mouth Daily With Breakfast.  Dispense: 30 tablet; Refill: 5    3. Chronic diastolic congestive heart failure (CMS/HCC)  -     gabapentin (NEURONTIN) 300 MG capsule; Take 1 capsule by mouth 3 (Three) Times a Day.  Dispense: 270 capsule; Refill: 2  -     Comprehensive metabolic panel; Future  -     CBC w AUTO Differential; Future    Other orders  -     Cancel: acetaminophen-codeine (TYLENOL #3) 300-30 MG per tablet; Take 1 tablet by mouth 4 (Four) Times a Day As Needed for Moderate Pain  (pain).  Dispense: 60 tablet; Refill: 0  -     albuterol sulfate  (90 Base) MCG/ACT inhaler; Inhale 2 puffs Every 4 (Four) Hours As Needed for Wheezing.  Dispense: 18 g; Refill: 11  -     primidone (Mysoline) 50 MG tablet; Take 1 tablet by mouth Every Night.  Dispense: 30 tablet; Refill: 2    Return to the clinic in 3 month/s.  Will contact with results as needed.  PAULA is appropriate.  "

## 2021-09-21 ENCOUNTER — APPOINTMENT (OUTPATIENT)
Dept: GENERAL RADIOLOGY | Facility: HOSPITAL | Age: 86
End: 2021-09-21

## 2021-09-21 ENCOUNTER — HOSPITAL ENCOUNTER (EMERGENCY)
Facility: HOSPITAL | Age: 86
Discharge: HOME | End: 2021-09-21
Attending: EMERGENCY MEDICINE | Admitting: EMERGENCY MEDICINE

## 2021-09-21 VITALS
HEART RATE: 68 BPM | HEIGHT: 65 IN | OXYGEN SATURATION: 97 % | DIASTOLIC BLOOD PRESSURE: 83 MMHG | SYSTOLIC BLOOD PRESSURE: 126 MMHG | RESPIRATION RATE: 18 BRPM | WEIGHT: 162 LBS | BODY MASS INDEX: 26.99 KG/M2 | TEMPERATURE: 97.7 F

## 2021-09-21 DIAGNOSIS — J44.1 COPD EXACERBATION (HCC): Primary | ICD-10-CM

## 2021-09-21 DIAGNOSIS — E87.1 HYPONATREMIA: ICD-10-CM

## 2021-09-21 LAB
ALBUMIN SERPL-MCNC: 4 G/DL (ref 3.5–5.2)
ALBUMIN/GLOB SERPL: 1.9 G/DL
ALP SERPL-CCNC: 67 U/L (ref 39–117)
ALT SERPL W P-5'-P-CCNC: 8 U/L (ref 1–41)
ANION GAP SERPL CALCULATED.3IONS-SCNC: 10 MMOL/L (ref 5–15)
APTT PPP: 31.4 SECONDS (ref 20–40.3)
AST SERPL-CCNC: 17 U/L (ref 1–40)
BASOPHILS # BLD AUTO: 0.04 10*3/MM3 (ref 0–0.2)
BASOPHILS NFR BLD AUTO: 0.8 % (ref 0–1.5)
BILIRUB SERPL-MCNC: 0.2 MG/DL (ref 0–1.2)
BILIRUB UR QL STRIP: NEGATIVE
BUN SERPL-MCNC: 16 MG/DL (ref 8–23)
BUN/CREAT SERPL: 13 (ref 7–25)
CALCIUM SPEC-SCNC: 8.9 MG/DL (ref 8.6–10.5)
CHLORIDE SERPL-SCNC: 94 MMOL/L (ref 98–107)
CLARITY UR: ABNORMAL
CO2 SERPL-SCNC: 26 MMOL/L (ref 22–29)
COLOR UR: YELLOW
CREAT SERPL-MCNC: 1.23 MG/DL (ref 0.76–1.27)
DEPRECATED RDW RBC AUTO: 45.5 FL (ref 37–54)
EOSINOPHIL # BLD AUTO: 0.04 10*3/MM3 (ref 0–0.4)
EOSINOPHIL NFR BLD AUTO: 0.8 % (ref 0.3–6.2)
ERYTHROCYTE [DISTWIDTH] IN BLOOD BY AUTOMATED COUNT: 14.1 % (ref 12.3–15.4)
FLUAV RNA RESP QL NAA+PROBE: NOT DETECTED
FLUBV RNA RESP QL NAA+PROBE: NOT DETECTED
GFR SERPL CREATININE-BSD FRML MDRD: 68 ML/MIN/1.73
GLOBULIN UR ELPH-MCNC: 2.1 GM/DL
GLUCOSE SERPL-MCNC: 100 MG/DL (ref 65–99)
GLUCOSE UR STRIP-MCNC: NEGATIVE MG/DL
HCT VFR BLD AUTO: 36.6 % (ref 37.5–51)
HGB BLD-MCNC: 12.5 G/DL (ref 13–17.7)
HGB UR QL STRIP.AUTO: NEGATIVE
HOLD SPECIMEN: NORMAL
HOLD SPECIMEN: NORMAL
IMM GRANULOCYTES # BLD AUTO: 0.04 10*3/MM3 (ref 0–0.05)
IMM GRANULOCYTES NFR BLD AUTO: 0.8 % (ref 0–0.5)
INR PPP: 1.07 (ref 0.8–1.2)
KETONES UR QL STRIP: NEGATIVE
LEUKOCYTE ESTERASE UR QL STRIP.AUTO: NEGATIVE
LYMPHOCYTES # BLD AUTO: 1.09 10*3/MM3 (ref 0.7–3.1)
LYMPHOCYTES NFR BLD AUTO: 20.8 % (ref 19.6–45.3)
MCH RBC QN AUTO: 30.2 PG (ref 26.6–33)
MCHC RBC AUTO-ENTMCNC: 34.2 G/DL (ref 31.5–35.7)
MCV RBC AUTO: 88.4 FL (ref 79–97)
MONOCYTES # BLD AUTO: 0.67 10*3/MM3 (ref 0.1–0.9)
MONOCYTES NFR BLD AUTO: 12.8 % (ref 5–12)
NEUTROPHILS NFR BLD AUTO: 3.35 10*3/MM3 (ref 1.7–7)
NEUTROPHILS NFR BLD AUTO: 64 % (ref 42.7–76)
NITRITE UR QL STRIP: NEGATIVE
NRBC BLD AUTO-RTO: 0 /100 WBC (ref 0–0.2)
NT-PROBNP SERPL-MCNC: 381.9 PG/ML (ref 0–1800)
PH UR STRIP.AUTO: 6 [PH] (ref 5–9)
PLATELET # BLD AUTO: 370 10*3/MM3 (ref 140–450)
PMV BLD AUTO: 8.3 FL (ref 6–12)
POTASSIUM SERPL-SCNC: 4.4 MMOL/L (ref 3.5–5.2)
PROT SERPL-MCNC: 6.1 G/DL (ref 6–8.5)
PROT UR QL STRIP: NEGATIVE
PROTHROMBIN TIME: 13.8 SECONDS (ref 11.1–15.3)
RBC # BLD AUTO: 4.14 10*6/MM3 (ref 4.14–5.8)
SARS-COV-2 RNA RESP QL NAA+PROBE: NOT DETECTED
SODIUM SERPL-SCNC: 130 MMOL/L (ref 136–145)
SP GR UR STRIP: 1.01 (ref 1–1.03)
TROPONIN T SERPL-MCNC: <0.01 NG/ML (ref 0–0.03)
TROPONIN T SERPL-MCNC: <0.01 NG/ML (ref 0–0.03)
UROBILINOGEN UR QL STRIP: ABNORMAL
WBC # BLD AUTO: 5.23 10*3/MM3 (ref 3.4–10.8)
WHOLE BLOOD HOLD SPECIMEN: NORMAL

## 2021-09-21 PROCEDURE — 71045 X-RAY EXAM CHEST 1 VIEW: CPT

## 2021-09-21 PROCEDURE — 99283 EMERGENCY DEPT VISIT LOW MDM: CPT

## 2021-09-21 PROCEDURE — 81003 URINALYSIS AUTO W/O SCOPE: CPT | Performed by: EMERGENCY MEDICINE

## 2021-09-21 PROCEDURE — 83880 ASSAY OF NATRIURETIC PEPTIDE: CPT | Performed by: EMERGENCY MEDICINE

## 2021-09-21 PROCEDURE — 85610 PROTHROMBIN TIME: CPT | Performed by: EMERGENCY MEDICINE

## 2021-09-21 PROCEDURE — 96360 HYDRATION IV INFUSION INIT: CPT

## 2021-09-21 PROCEDURE — 85730 THROMBOPLASTIN TIME PARTIAL: CPT | Performed by: EMERGENCY MEDICINE

## 2021-09-21 PROCEDURE — 85025 COMPLETE CBC W/AUTO DIFF WBC: CPT

## 2021-09-21 PROCEDURE — 80053 COMPREHEN METABOLIC PANEL: CPT

## 2021-09-21 PROCEDURE — 84484 ASSAY OF TROPONIN QUANT: CPT | Performed by: EMERGENCY MEDICINE

## 2021-09-21 PROCEDURE — 93005 ELECTROCARDIOGRAM TRACING: CPT | Performed by: EMERGENCY MEDICINE

## 2021-09-21 PROCEDURE — 87636 SARSCOV2 & INF A&B AMP PRB: CPT | Performed by: EMERGENCY MEDICINE

## 2021-09-21 PROCEDURE — 93010 ELECTROCARDIOGRAM REPORT: CPT | Performed by: INTERNAL MEDICINE

## 2021-09-21 RX ORDER — DOXYCYCLINE 100 MG/1
100 CAPSULE ORAL 2 TIMES DAILY
Qty: 20 CAPSULE | Refills: 0 | Status: SHIPPED | OUTPATIENT
Start: 2021-09-21 | End: 2021-10-01

## 2021-09-21 RX ORDER — SODIUM CHLORIDE 0.9 % (FLUSH) 0.9 %
10 SYRINGE (ML) INJECTION AS NEEDED
Status: DISCONTINUED | OUTPATIENT
Start: 2021-09-21 | End: 2021-09-21 | Stop reason: HOSPADM

## 2021-09-21 RX ORDER — DEXTROMETHORPHAN HYDROBROMIDE AND PROMETHAZINE HYDROCHLORIDE 15; 6.25 MG/5ML; MG/5ML
5 SYRUP ORAL 4 TIMES DAILY PRN
Qty: 118 ML | Refills: 0 | Status: SHIPPED | OUTPATIENT
Start: 2021-09-21 | End: 2021-11-08

## 2021-09-21 RX ORDER — SODIUM CHLORIDE 9 MG/ML
75 INJECTION, SOLUTION INTRAVENOUS CONTINUOUS
Status: DISCONTINUED | OUTPATIENT
Start: 2021-09-21 | End: 2021-09-21 | Stop reason: HOSPADM

## 2021-09-21 RX ORDER — PREDNISONE 20 MG/1
20 TABLET ORAL DAILY
Qty: 5 TABLET | Refills: 0 | Status: SHIPPED | OUTPATIENT
Start: 2021-09-21 | End: 2021-09-26

## 2021-09-21 RX ORDER — ALBUTEROL SULFATE 90 UG/1
2 AEROSOL, METERED RESPIRATORY (INHALATION) EVERY 4 HOURS PRN
Qty: 6.7 G | Refills: 0 | Status: SHIPPED | OUTPATIENT
Start: 2021-09-21 | End: 2021-09-28 | Stop reason: SDUPTHER

## 2021-09-21 RX ADMIN — SODIUM CHLORIDE 75 ML/HR: 9 INJECTION, SOLUTION INTRAVENOUS at 14:12

## 2021-09-21 NOTE — ED PROVIDER NOTES
"Subjective   85yo male pmh significant htn/dm2/cad/HFpEF/copd/ckd/dvt/prostate ca/parkinsons disease presents ED c/o 2d hx generalized fatigue.  ROS (+) productive cough \"clear\" sputum.  Denies fever/chills/sore throat/chest pain/soa/abd pain/n/v/d/dysuria.      History provided by:  Patient  Illness  Severity:  Mild  Onset quality:  Gradual  Duration:  2 days  Associated symptoms: cough and fatigue    Associated symptoms: no fever and no shortness of breath        Review of Systems   Constitutional: Positive for fatigue. Negative for fever.   HENT: Negative.    Eyes: Negative for redness.   Respiratory: Positive for cough. Negative for shortness of breath.    Cardiovascular: Negative.    Gastrointestinal: Negative.    Genitourinary: Negative.    Musculoskeletal: Negative.    Allergic/Immunologic: Negative for immunocompromised state.   All other systems reviewed and are negative.      Past Medical History:   Diagnosis Date   • Acquired hallux rigidus    • Anemia    • Arthritis    • Arthropathy of lumbar facet joint    • Carpal tunnel syndrome    • CHF (congestive heart failure) (CMS/HCC)    • Chronic diastolic congestive heart failure (CMS/HCC)    • Chronic kidney disease    • Chronic kidney disease, stage II (mild)     Chronic kidney disease stage 2   • Chronic obstructive lung disease (CMS/HCC)    • Chronic renal failure    • Coronary artery disease    • Cortical senile cataract    • Diabetes mellitus (CMS/HCC)    • Diabetes mellitus type 2, noninsulin dependent (CMS/HCC)     diabetes mellitus type 2, non-insulin treated, Medication treatment plan: oral diabetic medication   • Diabetes mellitus without complication (CMS/HCC)    • Diastolic dysfunction    • Diastolic heart failure (CMS/HCC)    • Essential hypertension     difficult to control      • Hip pain    • History of echocardiogram 08/19/2015    Echocardiogram W/ color flow 90044 (1) - Mild to moderate LVH with mild left atrial enlargement and normal aortic " root.CLVH,preserved LV systolic function with Ef of 55%.Diastolic dysfunction.MV intact.AV thickened.Aortic sclerosis.   • Hyperlipidemia    • Influenza vaccine administered 10/02/2015    INFLUENZA IMMUN ADMIN OR PREV RECV'D  (1) - Ordered By: DIONNE MANDUJANO ([facility])    • Insomnia    • Insomnia     Other insomnia   • Joint pain    • Low back pain    • Muscle strain    • Nuclear cataract    • Onychomycosis    • Pneumococcal vaccination given 07/22/2016    PNEUMOC VAC/ADMIN/RCVD 4040F (3) - Ordered By: DIONNE MANDUJANO ([facility])   • Prostate cancer (CMS/HCC)    • Pure hypercholesterolemia    • Sedentary lifestyle        Allergies   Allergen Reactions   • Aldactone [Spironolactone] Other (See Comments)     Hyponatremia and hyperkalemia       Past Surgical History:   Procedure Laterality Date   • BACK SURGERY     • CARDIAC ABLATION     • CARDIAC CATHETERIZATION  02/04/1986    Cardiac cath 12476 (1) - normal left heart catherization,non cardiac chest pain   • CARPAL TUNNEL RELEASE  10/19/2015    Carpal tunnel surgery (1) - Release of left carpal tunnel   • CATARACT EXTRACTION W/ INTRAOCULAR LENS IMPLANT Left 3/31/2017    Procedure: REMOVE CATARACT AND IMPLANT INRAOCULAR LENS LEFT EYE;  Surgeon: Hector Navarrete MD;  Location: [facility] OR;  Service:    • CATARACT EXTRACTION W/ INTRAOCULAR LENS IMPLANT Right 4/14/2017    Procedure: REMOVE CATARACT AND IMPLANT INTRAOCULAR LENS RIGHT EYE;  Surgeon: Hector Navarrete MD;  Location: [facility] OR;  Service:    • COLONOSCOPY N/A 10/31/2018    Procedure: COLONOSCOPY;  Surgeon: Herbie Christine MD;  Location: [facility] ENDOSCOPY;  Service: Gastroenterology   • ENDOSCOPY  08/05/2013    Colon endoscopy 65845 (2) - Hemorrhoids found.   • FRACTURE SURGERY     • INGUINAL HERNIA REPAIR Right 06/07/1968    Inguinal hernia, repair (2)   • INJECTION OF MEDICATION  09/06/2012    Albuterol (2u) (1) - Ordered By: LIDYA STEINER ([facility])    • INJECTION OF MEDICATION  09/06/2012     Atrovent (1) - Ordered By: LIDYA STEINER ([facility])    • INJECTION OF MEDICATION  02/10/2012    Depo Medrol (Methylprednisone) (3) - Ordered By: HEATHER PAK ([facility])    • INJECTION OF MEDICATION  2014    Kenalog (4) - Ordered By: DIMAS ANDRADE ([facility])    • JOINT REPLACEMENT     • LUMBAR LAMINECTOMY      Lumbar laminectomy (1)   • MANDIBLE FRACTURE SURGERY  10/11/1981    Treat nose/jaw fracture (1) - bilateral open reduction of fracture of mandible   • OTHER SURGICAL HISTORY  2011    Drain/Inject Major Joint  (1) - Ordered By: KARLOS HERNANDEZ ([facility])    • OTHER SURGICAL HISTORY      Insert IVC filter 95628 (1)   • OTHER SURGICAL HISTORY  10/11/2011    SPIROMETRY 50436 (1) - Ordered By: HEATHER PAK ([facility])   • TOTAL HIP ARTHROPLASTY Right        Family History   Problem Relation Age of Onset   • Diabetes Other    • Cancer Sister    • Heart disease Mother    • Hypertension Mother    • Heart disease Father    • Hypertension Father    • Coronary artery disease Neg Hx        Social History     Socioeconomic History   • Marital status:      Spouse name: Not on file   • Number of children: Not on file   • Years of education: Not on file   • Highest education level: Not on file   Tobacco Use   • Smoking status: Former Smoker     Quit date:      Years since quittin.7   • Smokeless tobacco: Never Used   Vaping Use   • Vaping Use: Never used   Substance and Sexual Activity   • Alcohol use: No   • Drug use: No   • Sexual activity: Defer     Comment: Marital status:            Objective   Physical Exam  Vitals and nursing note reviewed.   Constitutional:       Appearance: Normal appearance.   HENT:      Head: Normocephalic and atraumatic.      Nose: Nose normal.      Mouth/Throat:      Mouth: Mucous membranes are moist.   Eyes:      Pupils: Pupils are equal, round, and reactive to light.   Cardiovascular:      Rate and Rhythm: Rhythm irregular.      Pulses: Normal  pulses.      Heart sounds: Normal heart sounds, S1 normal and S2 normal. No murmur heard.   No friction rub. No gallop.    Pulmonary:      Effort: Pulmonary effort is normal. No respiratory distress.      Breath sounds: Normal breath sounds. No wheezing, rhonchi or rales.   Abdominal:      General: Abdomen is flat. Bowel sounds are normal. There is no distension.      Palpations: Abdomen is soft.      Tenderness: There is no abdominal tenderness. There is no guarding or rebound.   Musculoskeletal:         General: No swelling or deformity.      Cervical back: Normal range of motion and neck supple. No rigidity.   Lymphadenopathy:      Cervical: No cervical adenopathy.   Skin:     General: Skin is warm and dry.   Neurological:      General: No focal deficit present.      Mental Status: He is alert and oriented to person, place, and time.      GCS: GCS eye subscore is 4. GCS verbal subscore is 5. GCS motor subscore is 6.         ECG 12 Lead      Date/Time: 9/21/2021 1:54 PM  Performed by: Demarco Acevedo MD  Authorized by: Demarco Acevedo MD   Interpreted by physician  Rhythm: sinus rhythm  Ectopy: atrial premature contractions  Rate: normal  BPM: 71  QRS axis: normal  Conduction: conduction normal  ST Segments: ST segments normal  T depression: III and aVF  Other findings: PRWP  Clinical impression: abnormal ECG                 ED Course      Labs Reviewed   COMPREHENSIVE METABOLIC PANEL - Abnormal; Notable for the following components:       Result Value    Glucose 100 (*)     Sodium 130 (*)     Chloride 94 (*)     All other components within normal limits    Narrative:     GFR Normal >60  Chronic Kidney Disease <60  Kidney Failure <15     CBC WITH AUTO DIFFERENTIAL - Abnormal; Notable for the following components:    Hemoglobin 12.5 (*)     Hematocrit 36.6 (*)     Monocyte % 12.8 (*)     Immature Grans % 0.8 (*)     All other components within normal limits   URINALYSIS W/ MICROSCOPIC IF INDICATED (NO CULTURE) -  Abnormal; Notable for the following components:    Appearance, UA Cloudy (*)     All other components within normal limits    Narrative:     Urine microscopic not indicated.   COVID-19 AND FLU A/B, NP SWAB IN TRANSPORT MEDIA 8-12 HR TAT - Normal    Narrative:     Fact sheet for providers: https://www.fda.gov/media/494791/download    Fact sheet for patients: https://www.fda.gov/media/910825/download    Test performed by PCR.   PROTIME-INR - Normal    Narrative:     Therapeutic range for most indications is 2.0-3.0 INR,  or 2.5-3.5 for mechanical heart valves.   APTT - Normal    Narrative:     The recommended Heparin therapeutic range is 68-97 seconds.   TROPONIN (IN-HOUSE) - Normal    Narrative:     Troponin T Reference Range:  <= 0.03 ng/mL-   Negative for AMI  >0.03 ng/mL-     Abnormal for myocardial necrosis.  Clinicians would have to utilize clinical acumen, EKG, Troponin and serial changes to determine if it is an Acute Myocardial Infarction or myocardial injury due to an underlying chronic condition.       Results may be falsely decreased if patient taking Biotin.     TROPONIN (IN-HOUSE) - Normal    Narrative:     Troponin T Reference Range:  <= 0.03 ng/mL-   Negative for AMI  >0.03 ng/mL-     Abnormal for myocardial necrosis.  Clinicians would have to utilize clinical acumen, EKG, Troponin and serial changes to determine if it is an Acute Myocardial Infarction or myocardial injury due to an underlying chronic condition.       Results may be falsely decreased if patient taking Biotin.     BNP (IN-HOUSE) - Normal    Narrative:     Among patients with dyspnea, NT-proBNP is highly sensitive for the detection of acute congestive heart failure. In addition NT-proBNP of <300 pg/ml effectively rules out acute congestive heart failure with 99% negative predictive value.    Results may be falsely decreased if patient taking Biotin.     RAINBOW DRAW    Narrative:     The following orders were created for panel order  [place] Draw.  Procedure                               Abnormality         Status                     ---------                               -----------         ------                     Green Top (Gel)[[id number]]                                  Final result               Lavender Top[[id number]]                                     Final result               Gold Top - SST[[id number]]                                   Final result               Light Blue Top[[id number]]                                                                Please view results for these tests on the individual orders.   CBC AND DIFFERENTIAL    Narrative:     The following orders were created for panel order CBC & Differential.  Procedure                               Abnormality         Status                     ---------                               -----------         ------                     CBC Auto Differential[[id number]]        Abnormal            Final result                 Please view results for these tests on the individual orders.   GREEN TOP   LAVENDER TOP   GOLD TOP - SST     XR Chest 1 View    Result Date: 9/21/2021  Narrative: EXAM: XR CHEST 1 VIEW COMPARISONS: Radiograph 6/8/2021 INDICATION: cough FINDINGS: Frontal view of the chest. Patient is rotated. Exaggeration of the right perihilar region is likely secondary to patient patient. Otherwise, Lungs are symmetric and adequately expanded. No focal consolidation, effusion, or pneumothorax. Heart size is normal. Unchanged atherosclerotic aorta. No acute osseous abnormalities.     Impression: No acute cardiopulmonary process. Electronically signed by:  Ed Vicente MD  9/21/2021 1:31 PM CDT Workstation: [id number]                                         MDM  Number of Diagnoses or Management Options  COPD exacerbation (CMS/[facility])  Hyponatremia  Diagnosis management comments: Labs/radiographic studies reviewed. CXR no active disease. cmp significant for mild  hyponatremia, Na 130meq/L.  Normal physical exam. Stable discharge with outpatient management/pmd followup.  Plan doxycycline 100mg po bid x10d/phenergan dm prn/albuterol hfa/prednisone 20mg daily x5d.  DC precautions provided.       Amount and/or Complexity of Data Reviewed  Clinical lab tests: reviewed  Tests in the radiology section of CPT®: reviewed  Tests in the medicine section of CPT®: reviewed  Decide to obtain previous medical records or to obtain history from someone other than the patient: yes        Final diagnoses:   COPD exacerbation (CMS/HCC)   Hyponatremia       ED Disposition  ED Disposition     ED Disposition Condition Comment    Discharge Mata Grant MD  [address] [facility] [id number] [phone]    In 1 day           Medication List      New Prescriptions    doxycycline 100 MG capsule  Commonly known as: MONODOX  Take 1 capsule by mouth 2 (Two) Times a Day for 10 days.     predniSONE 20 MG tablet  Commonly known as: DELTASONE  Take 1 tablet by mouth Daily for 5 days.     promethazine-dextromethorphan 6.25-15 MG/5ML syrup  Commonly known as: PROMETHAZINE-DM  Take 5 mL by mouth 4 (Four) Times a Day As Needed for Cough.        Changed    * albuterol sulfate  (90 Base) MCG/ACT inhaler  Commonly known as: PROVENTIL HFA;VENTOLIN HFA;PROAIR HFA  Inhale 2 puffs Every 4 (Four) Hours As Needed for Wheezing.  What changed: Another medication with the same name was added. Make sure you understand how and when to take each.     * albuterol sulfate  (90 Base) MCG/ACT inhaler  Commonly known as: PROVENTIL HFA;VENTOLIN HFA;PROAIR HFA  Inhale 2 puffs Every 4 (Four) Hours As Needed for Wheezing or Shortness of Air.  What changed: You were already taking a medication with the same name, and this prescription was added. Make sure you understand how and when to take each.         * This list has 2 medication(s) that are the same as other medications prescribed  for you. Read the directions carefully, and ask your doctor or other care provider to review them with you.               Where to Get Your Medications      These medications were sent to [facility] - [place], [place] - [address] - [phone]  - [phone]   [address], [facility] [address]    Phone: [phone]   · albuterol sulfate  (90 Base) MCG/ACT inhaler  · doxycycline 100 MG capsule  · predniSONE 20 MG tablet  · promethazine-dextromethorphan 6.25-15 MG/5ML syrup          Demarco Acevedo MD  09/21/21 [id number]

## 2021-09-22 ENCOUNTER — PATIENT OUTREACH (OUTPATIENT)
Dept: CASE MANAGEMENT | Facility: OTHER | Age: 86
End: 2021-09-22

## 2021-09-22 NOTE — OUTREACH NOTE
"Ambulatory Case Management Note    General & Health Literacy Assessment    Questions/Answers      Most Recent Value   Assessment Completed With  Patient   Living Arrangement  Children [lives with dght and grandson]   Type of Residence  Private Residence   Equiptment Used at Home  Walker, Cane [glucometer, scale]   Bed or Wheelchair Confined  No   Difficulty Keeping Appointments  No   Health Literacy  Good        Care Evaluation    Questions/Answers      Most Recent Value   Annual Wellness Visit:   -- [reminder put in 9/28 pcp appt notes]   Care Gaps Addressed  Colon Cancer Screening, Diabetic A1C, Diabetic Eye Exam, Flu Shot, Pneumonia Vaccine   Colon Cancer Screening Type  Colonoscopy   Colonoscopy Status  Up to Date (< 10 yrs)   HbA1c Status  Up to Date-outside defined limits   Diabetic Eye Exam Status  Patient will Complete [discussed with pt and he states he will schedule]   Flu Shot Status  Up to Date   Pneumonia Vaccine Status  Up to Date   Other Patient Education/Resources   24/7 [facility] Nurse Call Line   24/7 Nurse Call Line Education Method  Verbal   Advanced Directives:  Not Interested At This Time   Medication Adherence  Medications understood        SDOH updated and reviewed with the patient during this program:     Food Insecurity: No Food Insecurity   • Worried About Running Out of Food in the Last Year: Never true   • Ran Out of Food in the Last Year: Never true       Transportation Needs: No Transportation Needs   • Lack of Transportation (Medical): No   • Lack of Transportation (Non-Medical): No     Patient Outreach    Pt seen at [facility] ED 9/21/21 for weakness, cough. He tested negative for covid and was treated for COPD exacerbation. He states \"I feel fine today\". He reports his dght filled his rx's and he is taking the abx and steroid. Discussed importance of completing both rx's. He has pcp f/u appt 9/28/21. He monitors his BG and weight daily and denies any needs or concerns at this " time.     Bria Galvan RN  Ambulatory Case Management    9/22/2021, 14:36 EDT

## 2021-09-27 ENCOUNTER — OFFICE VISIT (OUTPATIENT)
Dept: PODIATRY | Facility: CLINIC | Age: 86
End: 2021-09-27

## 2021-09-27 VITALS
OXYGEN SATURATION: 99 % | HEIGHT: 65 IN | DIASTOLIC BLOOD PRESSURE: 74 MMHG | HEART RATE: 65 BPM | SYSTOLIC BLOOD PRESSURE: 148 MMHG | WEIGHT: 162 LBS | BODY MASS INDEX: 26.99 KG/M2

## 2021-09-27 DIAGNOSIS — M79.675 CHRONIC TOE PAIN, BILATERAL: ICD-10-CM

## 2021-09-27 DIAGNOSIS — E11.9 TYPE 2 DIABETES MELLITUS WITHOUT COMPLICATION, WITHOUT LONG-TERM CURRENT USE OF INSULIN (HCC): ICD-10-CM

## 2021-09-27 DIAGNOSIS — M79.674 CHRONIC TOE PAIN, BILATERAL: ICD-10-CM

## 2021-09-27 DIAGNOSIS — G89.29 CHRONIC TOE PAIN, BILATERAL: ICD-10-CM

## 2021-09-27 DIAGNOSIS — B35.1 ONYCHOMYCOSIS: ICD-10-CM

## 2021-09-27 DIAGNOSIS — E11.9 ENCOUNTER FOR DIABETIC FOOT EXAM (HCC): Primary | ICD-10-CM

## 2021-09-27 DIAGNOSIS — Z79.01 ANTICOAGULANT LONG-TERM USE: ICD-10-CM

## 2021-09-27 PROCEDURE — 99213 OFFICE O/P EST LOW 20 MIN: CPT | Performed by: PODIATRIST

## 2021-09-27 PROCEDURE — 11721 DEBRIDE NAIL 6 OR MORE: CPT | Performed by: PODIATRIST

## 2021-09-27 NOTE — PROGRESS NOTES
[facility] Keith Ruvalcaba Jr.  1935  86 y.o. male   PCP: MILENA BETHEA 8/19/2021  BS- 99 per patient   A1c 6.16 (6/8/2021)    Patient presents today for routine diabetic foot care.    06/23/2021     Chief Complaint   Patient presents with   • Left Foot - diabetic foot care   • Right Foot - diabetic foot care       History of Present Illness    Patient presents to clinic today for routine diabetic foot care.      Past Medical History:   Diagnosis Date   • Acquired hallux rigidus    • Anemia    • Arthritis    • Arthropathy of lumbar facet joint    • Carpal tunnel syndrome    • CHF (congestive heart failure) (CMS/HCC)    • Chronic diastolic congestive heart failure (CMS/HCC)    • Chronic kidney disease    • Chronic kidney disease, stage II (mild)     Chronic kidney disease stage 2   • Chronic obstructive lung disease (CMS/HCC)    • Chronic renal failure    • Coronary artery disease    • Cortical senile cataract    • Diabetes mellitus (CMS/HCC)    • Diabetes mellitus type 2, noninsulin dependent (CMS/HCC)     diabetes mellitus type 2, non-insulin treated, Medication treatment plan: oral diabetic medication   • Diabetes mellitus without complication (CMS/HCC)    • Diastolic dysfunction    • Diastolic heart failure (CMS/HCC)    • Essential hypertension     difficult to control      • Hip pain    • History of echocardiogram 08/19/2015    Echocardiogram W/ color flow 84086 (1) - Mild to moderate LVH with mild left atrial enlargement and normal aortic root.CLVH,preserved LV systolic function with Ef of 55%.Diastolic dysfunction.MV intact.AV thickened.Aortic sclerosis.   • Hyperlipidemia    • Influenza vaccine administered 10/02/2015    INFLUENZA IMMUN ADMIN OR PREV RECV'D  (1) - Ordered By: DIONNE MANDUJANO ([facility])    • Insomnia    • Insomnia     Other insomnia   • Joint pain    • Low back pain    • Muscle strain    • Nuclear cataract    • Onychomycosis    • Pneumococcal vaccination given 07/22/2016    PNEUMOC VAC/ADMIN/RCVD  4040F (3) - Ordered By: DIONNE MANDUJANO ([facility])   • Prostate cancer (CMS/HCC)    • Pure hypercholesterolemia    • Sedentary lifestyle          Past Surgical History:   Procedure Laterality Date   • BACK SURGERY     • CARDIAC ABLATION     • CARDIAC CATHETERIZATION  02/04/1986    Cardiac cath 52730 (1) - normal left heart catherization,non cardiac chest pain   • CARPAL TUNNEL RELEASE  10/19/2015    Carpal tunnel surgery (1) - Release of left carpal tunnel   • CATARACT EXTRACTION W/ INTRAOCULAR LENS IMPLANT Left 3/31/2017    Procedure: REMOVE CATARACT AND IMPLANT INRAOCULAR LENS LEFT EYE;  Surgeon: Hector Navarrete MD;  Location: [facility] OR;  Service:    • CATARACT EXTRACTION W/ INTRAOCULAR LENS IMPLANT Right 4/14/2017    Procedure: REMOVE CATARACT AND IMPLANT INTRAOCULAR LENS RIGHT EYE;  Surgeon: Hector Navarrete MD;  Location: [facility] OR;  Service:    • COLONOSCOPY N/A 10/31/2018    Procedure: COLONOSCOPY;  Surgeon: Herbie Christine MD;  Location: [facility] ENDOSCOPY;  Service: Gastroenterology   • ENDOSCOPY  08/05/2013    Colon endoscopy 73710 (2) - Hemorrhoids found.   • FRACTURE SURGERY     • INGUINAL HERNIA REPAIR Right 06/07/1968    Inguinal hernia, repair (2)   • INJECTION OF MEDICATION  09/06/2012    Albuterol (2u) (1) - Ordered By: LIDYA STEINER ([facility])    • INJECTION OF MEDICATION  09/06/2012    Atrovent (1) - Ordered By: LIDYA STEINER ([facility])    • INJECTION OF MEDICATION  02/10/2012    Depo Medrol (Methylprednisone) (3) - Ordered By: HEATHER PAK ([facility])    • INJECTION OF MEDICATION  02/09/2014    Kenalog (4) - Ordered By: DIMAS ANDRADE ([facility])    • JOINT REPLACEMENT     • LUMBAR LAMINECTOMY  2007    Lumbar laminectomy (1)   • MANDIBLE FRACTURE SURGERY  10/11/1981    Treat nose/jaw fracture (1) - bilateral open reduction of fracture of mandible   • OTHER SURGICAL HISTORY  09/24/2011    Drain/Inject Major Joint 20610 (1) - Ordered By: KARLOS HERNANDEZ ([facility])    • OTHER SURGICAL  HISTORY      Insert IVC filter 53677 (1)   • OTHER SURGICAL HISTORY  10/11/2011    SPIROMETRY 97179 (1) - Ordered By: HEATHER PAK ([facility])   • TOTAL HIP ARTHROPLASTY Right          Family History   Problem Relation Age of Onset   • Diabetes Other    • Cancer Sister    • Heart disease Mother    • Hypertension Mother    • Heart disease Father    • Hypertension Father    • Coronary artery disease Neg Hx        Allergies   Allergen Reactions   • Aldactone [Spironolactone] Other (See Comments)     Hyponatremia and hyperkalemia       Social History     Socioeconomic History   • Marital status:      Spouse name: Not on file   • Number of children: Not on file   • Years of education: Not on file   • Highest education level: Not on file   Tobacco Use   • Smoking status: Former Smoker     Quit date:      Years since quittin.7   • Smokeless tobacco: Never Used   Vaping Use   • Vaping Use: Never used   Substance and Sexual Activity   • Alcohol use: No   • Drug use: No   • Sexual activity: Defer     Comment: Marital status:          Current Outpatient Medications   Medication Sig Dispense Refill   • acetaminophen-codeine (TYLENOL #3) 300-30 MG per tablet Take 1 tablet by mouth 4 (Four) Times a Day As Needed for Moderate Pain  (pain). 56 tablet 0   • albuterol sulfate  (90 Base) MCG/ACT inhaler Inhale 2 puffs Every 4 (Four) Hours As Needed for Wheezing. 18 g 11   • albuterol sulfate  (90 Base) MCG/ACT inhaler Inhale 2 puffs Every 4 (Four) Hours As Needed for Wheezing or Shortness of Air. 6.7 g 0   • aspirin 81 MG EC tablet Take 81 mg by mouth daily.     • carbidopa-levodopa (SINEMET)  MG per tablet Take 1 tablet by mouth 3 (Three) Times a Day. 90 tablet 2   • doxycycline (MONODOX) 100 MG capsule Take 1 capsule by mouth 2 (Two) Times a Day for 10 days. 20 capsule 0   • ferrous sulfate (FeroSul) 325 (65 FE) MG tablet Take 1 tablet by mouth Daily With Breakfast. 30 tablet 5   •  "gabapentin (NEURONTIN) 300 MG capsule Take 1 capsule by mouth 3 (Three) Times a Day. 270 capsule 2   • glucose blood (True Metrix Blood Glucose Test) test strip Check glucose Once daily 100 each 6   • hydrALAZINE (APRESOLINE) 25 MG tablet Take 1 tablet by mouth 3 (Three) Times a Day. 90 tablet 4   • Lidocaine Pain Relief 4 % patch APPLY PATCH TO SKIN EVERY TWELVE HOURS AS DIRECTED. REMOVE USED ONE AFTER 12 HOURS     • lisinopril-hydrochlorothiazide (PRINZIDE,ZESTORETIC) 20-12.5 MG per tablet Take 1 tablet by mouth Daily. 90 tablet 2   • metoprolol succinate XL (TOPROL-XL) 25 MG 24 hr tablet TAKE ONE TABLET BY MOUTH EVERY NIGHT AT BEDTIME 30 tablet 5   • pravastatin (PRAVACHOL) 40 MG tablet Take 1 tablet by mouth Daily. 90 tablet 2   • primidone (Mysoline) 50 MG tablet Take 1 tablet by mouth Every Night. 30 tablet 2   • promethazine-dextromethorphan (PROMETHAZINE-DM) 6.25-15 MG/5ML syrup Take 5 mL by mouth 4 (Four) Times a Day As Needed for Cough. 118 mL 0   • rivaroxaban (Xarelto) 10 MG tablet Take 1 tablet by mouth Daily. 30 tablet 11   • SITagliptin (Januvia) 50 MG tablet Take 1 tablet by mouth Daily. 90 tablet 2   • tiotropium bromide-olodaterol (Stiolto Respimat) 2.5-2.5 MCG/ACT aerosol solution inhaler Inhale 2 puffs Daily. 4 g 0   • TRUEplus Lancets 30G misc Use to test Blood Sugars Once Daily 100 each 6     No current facility-administered medications for this visit.       Review of Systems   Constitutional: Negative.    HENT: Negative.    Eyes: Negative.    Respiratory: Negative.    Cardiovascular: Negative.    Gastrointestinal: Negative.    Endocrine: Negative.    Genitourinary: Negative.    Musculoskeletal:        Toe pain   Skin: Negative.    Allergic/Immunologic: Negative.    Neurological: Negative.    Hematological: Negative.    Psychiatric/Behavioral: Negative.          OBJECTIVE    /74   Pulse 65   Ht 165.1 cm (65\")   Wt 73.5 kg (162 lb)   SpO2 99%   BMI 26.96 kg/m²       Physical Exam "   Constitutional: He is oriented to person, place, and time. He appears well-developed. No distress.   Pulmonary/Chest: Effort normal. No respiratory distress.   Musculoskeletal: Normal range of motion. No signs of injury.   Neurological: He is alert and oriented to person, place, and time.   Skin: Skin is warm and dry. Capillary refill takes less than 2 seconds.   Psychiatric: His behavior is normal. Mood normal.   Vitals reviewed.      Gait: Normal    Assistive Device: Walker    Lower Extremity    Cardiovascular:    DP/PT pulses palpable bilateral  CFT brisk  to all digits  Skin temp is warm to warm from proximal tibia to distal digits bilateral  Pedal hair growth decreased.   Nonpitting vanesa-malleolar edema bilateral  Musculoskeletal:  Muscle strength is 5/5 for all muscle groups tested   ROM of the 1st MTP is WNL bilateral   ROM of the ankle joint is  WNL bilateral   Dermatological:   Skin is warm, dry and intact bilateral  Webspaces 1-4 bilateral are clean, dry and intact.   No subcutaneous nodules or masses noted    Nails 1-5 bilateral are  discolored, elongated with subungual debris.  Pain on palpation to the nail plates.  Neurological:   Protective sensation intact   Sensation intact to light touch        Procedures        ASSESSMENT AND PLAN    There are no diagnoses linked to this encounter.    - Nails 1-5 bilateral were debrided in length and thickness with nail nipper and electric  to decrease fungal load and risk of infection.   - All the patients questions were answered.  - RTC 3 months or sooner if needed.               This document has been electronically signed by Pattie Ndiaye MA on September 27, 2021 07:58 CDT     9/27/2021  07:58 CDT

## 2021-09-28 ENCOUNTER — OFFICE VISIT (OUTPATIENT)
Dept: FAMILY MEDICINE CLINIC | Facility: CLINIC | Age: 86
End: 2021-09-28

## 2021-09-28 VITALS
HEIGHT: 65 IN | BODY MASS INDEX: 26.25 KG/M2 | SYSTOLIC BLOOD PRESSURE: 128 MMHG | WEIGHT: 157.56 LBS | DIASTOLIC BLOOD PRESSURE: 62 MMHG | OXYGEN SATURATION: 98 % | HEART RATE: 62 BPM

## 2021-09-28 DIAGNOSIS — I25.10 CORONARY ARTERY DISEASE INVOLVING NATIVE CORONARY ARTERY OF NATIVE HEART WITHOUT ANGINA PECTORIS: ICD-10-CM

## 2021-09-28 DIAGNOSIS — R25.1 TREMOR: Primary | ICD-10-CM

## 2021-09-28 PROCEDURE — 99214 OFFICE O/P EST MOD 30 MIN: CPT | Performed by: FAMILY MEDICINE

## 2021-09-28 RX ORDER — GABAPENTIN 300 MG/1
300 CAPSULE ORAL 3 TIMES DAILY
Qty: 270 CAPSULE | Refills: 2 | Status: CANCELLED | OUTPATIENT
Start: 2021-09-28

## 2021-09-28 RX ORDER — TIOTROPIUM BROMIDE AND OLODATEROL 3.124; 2.736 UG/1; UG/1
2 SPRAY, METERED RESPIRATORY (INHALATION)
Qty: 4 G | Refills: 11 | Status: CANCELLED | OUTPATIENT
Start: 2021-09-28

## 2021-09-28 RX ORDER — ACETAMINOPHEN AND CODEINE PHOSPHATE 300; 30 MG/1; MG/1
1 TABLET ORAL 4 TIMES DAILY PRN
Qty: 56 TABLET | Refills: 0 | Status: CANCELLED | OUTPATIENT
Start: 2021-09-28

## 2021-09-28 RX ORDER — PRIMIDONE 50 MG/1
50 TABLET ORAL 2 TIMES DAILY
Qty: 60 TABLET | Refills: 2 | Status: SHIPPED | OUTPATIENT
Start: 2021-09-28 | End: 2021-10-08

## 2021-09-28 NOTE — PROGRESS NOTES
Subjective   General Keith Ruvalcaba Jr. 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   Cc: follow up of chronic medical issues    Coronary Artery Disease  Presents for follow-up visit. Pertinent negatives include no chest pain, chest pressure, chest tightness, dizziness, leg swelling, muscle weakness, palpitations, shortness of breath or weight gain.   His tremor is still bothering him .It continues to be present.    The following portions of the patient's history were reviewed and updated as appropriate: allergies, current medications, past family history, past medical history, past social history, past surgical history and problem list.    Review of Systems   Constitutional: Negative for fatigue, fever and weight gain.   Respiratory: Negative for chest tightness and shortness of breath.    Cardiovascular: Negative for chest pain, palpitations and leg swelling.   Musculoskeletal: Negative for muscle weakness.   Neurological: Negative for dizziness.       Objective   Physical Exam  Vitals reviewed.   Constitutional:       Appearance: Normal appearance.   HENT:      Head: Normocephalic and atraumatic.      Right Ear: Tympanic membrane, ear canal and external ear normal.      Left Ear: Tympanic membrane, ear canal and external ear normal.      Nose: Nose normal.      Mouth/Throat:      Mouth: Mucous membranes are moist.      Pharynx: Oropharynx is clear.   Cardiovascular:      Rate and Rhythm: Normal rate and regular rhythm.      Heart sounds: Normal heart sounds. No murmur heard.   No friction rub. No gallop.    Pulmonary:      Effort: Pulmonary effort is normal. No respiratory distress.      Breath sounds: Normal breath sounds. No stridor. No wheezing, rhonchi or rales.   Chest:      Chest wall: No tenderness.   Abdominal:      General: Abdomen is flat. Bowel sounds are normal. There is no distension.      Palpations: There is no mass.      Tenderness: There is no abdominal tenderness. There is no guarding.      Hernia: No hernia is present.  "  Musculoskeletal:      Cervical back: Normal range of motion.   Skin:     General: Skin is warm.   Neurological:      Mental Status: He is alert.      Comments: Tremor is still present in both hands.           Visit Vitals  /62   Pulse 62   Ht 165.1 cm (65\")   Wt 71.5 kg (157 lb 9 oz)   SpO2 98%   BMI 26.22 kg/m²     Body mass index is 26.22 kg/m².      Assessment/Plan   Diagnoses and all orders for this visit:    1. Tremor (Primary)    2. Coronary artery disease involving native coronary artery of native heart without angina pectoris    Other orders  -     Cancel: acetaminophen-codeine (TYLENOL #3) 300-30 MG per tablet; Take 1 tablet by mouth 4 (Four) Times a Day As Needed for Moderate Pain  (pain).  Dispense: 56 tablet; Refill: 0  -     Cancel: carbidopa-levodopa (Sinemet)  MG per tablet; Take 1 tablet by mouth 3 (Three) Times a Day.  Dispense: 90 tablet; Refill: 2  -     Cancel: gabapentin (NEURONTIN) 300 MG capsule; Take 1 capsule by mouth 3 (Three) Times a Day.  Dispense: 270 capsule; Refill: 2  -     primidone (Mysoline) 50 MG tablet; Take 1 tablet by mouth Every Night.  Dispense: 30 tablet; Refill: 2  -     Cancel: tiotropium bromide-olodaterol (Stiolto Respimat) 2.5-2.5 MCG/ACT aerosol solution inhaler; Inhale 2 puffs Daily.  Dispense: 4 g; Refill: 11    Keep next appointment.  "

## 2021-10-04 ENCOUNTER — LAB (OUTPATIENT)
Dept: LAB | Facility: HOSPITAL | Age: 86
End: 2021-10-04

## 2021-10-04 ENCOUNTER — TRANSCRIBE ORDERS (OUTPATIENT)
Dept: LAB | Facility: HOSPITAL | Age: 86
End: 2021-10-04

## 2021-10-04 DIAGNOSIS — E55.9 VITAMIN D DEFICIENCY: ICD-10-CM

## 2021-10-04 DIAGNOSIS — I10 HYPERTENSION, ESSENTIAL: ICD-10-CM

## 2021-10-04 DIAGNOSIS — N18.2 CHRONIC RENAL DISEASE, STAGE II: ICD-10-CM

## 2021-10-04 DIAGNOSIS — N18.2 CHRONIC RENAL DISEASE, STAGE II: Primary | ICD-10-CM

## 2021-10-04 DIAGNOSIS — E78.5 DYSLIPIDEMIA: ICD-10-CM

## 2021-10-04 LAB
25(OH)D3 SERPL-MCNC: 52.5 NG/ML (ref 30–100)
ALBUMIN SERPL-MCNC: 4.3 G/DL (ref 3.5–5.2)
ALBUMIN/GLOB SERPL: 2 G/DL
ALP SERPL-CCNC: 65 U/L (ref 39–117)
ALT SERPL W P-5'-P-CCNC: 8 U/L (ref 1–41)
ANION GAP SERPL CALCULATED.3IONS-SCNC: 9 MMOL/L (ref 5–15)
ANISOCYTOSIS BLD QL: ABNORMAL
AST SERPL-CCNC: 17 U/L (ref 1–40)
BASOPHILS # BLD MANUAL: 0.1 10*3/MM3 (ref 0–0.2)
BASOPHILS NFR BLD AUTO: 2 % (ref 0–1.5)
BILIRUB SERPL-MCNC: 0.4 MG/DL (ref 0–1.2)
BUN SERPL-MCNC: 28 MG/DL (ref 8–23)
BUN/CREAT SERPL: 19 (ref 7–25)
CALCIUM SPEC-SCNC: 9.2 MG/DL (ref 8.6–10.5)
CHLORIDE SERPL-SCNC: 96 MMOL/L (ref 98–107)
CO2 SERPL-SCNC: 26 MMOL/L (ref 22–29)
CREAT SERPL-MCNC: 1.47 MG/DL (ref 0.76–1.27)
DEPRECATED RDW RBC AUTO: 45.2 FL (ref 37–54)
ERYTHROCYTE [DISTWIDTH] IN BLOOD BY AUTOMATED COUNT: 14 % (ref 12.3–15.4)
GFR SERPL CREATININE-BSD FRML MDRD: 55 ML/MIN/1.73
GLOBULIN UR ELPH-MCNC: 2.1 GM/DL
GLUCOSE SERPL-MCNC: 92 MG/DL (ref 65–99)
HCT VFR BLD AUTO: 39.4 % (ref 37.5–51)
HGB BLD-MCNC: 13.3 G/DL (ref 13–17.7)
LYMPHOCYTES # BLD MANUAL: 2.77 10*3/MM3 (ref 0.7–3.1)
LYMPHOCYTES NFR BLD MANUAL: 13.1 % (ref 5–12)
LYMPHOCYTES NFR BLD MANUAL: 54.5 % (ref 19.6–45.3)
MCH RBC QN AUTO: 30.2 PG (ref 26.6–33)
MCHC RBC AUTO-ENTMCNC: 33.8 G/DL (ref 31.5–35.7)
MCV RBC AUTO: 89.5 FL (ref 79–97)
MONOCYTES # BLD AUTO: 0.67 10*3/MM3 (ref 0.1–0.9)
NEUTROPHILS # BLD AUTO: 1.54 10*3/MM3 (ref 1.7–7)
NEUTROPHILS NFR BLD MANUAL: 30.3 % (ref 42.7–76)
PLAT MORPH BLD: NORMAL
PLATELET # BLD AUTO: 384 10*3/MM3 (ref 140–450)
PMV BLD AUTO: 9 FL (ref 6–12)
POIKILOCYTOSIS BLD QL SMEAR: ABNORMAL
POTASSIUM SERPL-SCNC: 4.4 MMOL/L (ref 3.5–5.2)
PROT SERPL-MCNC: 6.4 G/DL (ref 6–8.5)
PTH-INTACT SERPL-MCNC: 60.8 PG/ML (ref 15–65)
RBC # BLD AUTO: 4.4 10*6/MM3 (ref 4.14–5.8)
SMUDGE CELLS BLD QL SMEAR: ABNORMAL
SODIUM SERPL-SCNC: 131 MMOL/L (ref 136–145)
WBC # BLD AUTO: 5.09 10*3/MM3 (ref 3.4–10.8)

## 2021-10-04 PROCEDURE — 80053 COMPREHEN METABOLIC PANEL: CPT

## 2021-10-04 PROCEDURE — 85007 BL SMEAR W/DIFF WBC COUNT: CPT

## 2021-10-04 PROCEDURE — 36415 COLL VENOUS BLD VENIPUNCTURE: CPT

## 2021-10-04 PROCEDURE — 84156 ASSAY OF PROTEIN URINE: CPT

## 2021-10-04 PROCEDURE — 83970 ASSAY OF PARATHORMONE: CPT

## 2021-10-04 PROCEDURE — 82570 ASSAY OF URINE CREATININE: CPT

## 2021-10-04 PROCEDURE — 82306 VITAMIN D 25 HYDROXY: CPT

## 2021-10-04 PROCEDURE — 85027 COMPLETE CBC AUTOMATED: CPT

## 2021-10-05 LAB
CREAT UR-MCNC: 56.4 MG/DL
PROT UR-MCNC: 7 MG/DL
PROT/CREAT UR: 124.1 MG/G CREA (ref 0–200)

## 2021-10-08 ENCOUNTER — OFFICE VISIT (OUTPATIENT)
Dept: FAMILY MEDICINE CLINIC | Facility: CLINIC | Age: 86
End: 2021-10-08

## 2021-10-08 VITALS
HEIGHT: 65 IN | BODY MASS INDEX: 26.82 KG/M2 | OXYGEN SATURATION: 100 % | WEIGHT: 161 LBS | SYSTOLIC BLOOD PRESSURE: 128 MMHG | HEART RATE: 72 BPM | DIASTOLIC BLOOD PRESSURE: 76 MMHG

## 2021-10-08 DIAGNOSIS — R25.1 TREMOR: ICD-10-CM

## 2021-10-08 DIAGNOSIS — Z23 INFLUENZA VACCINE NEEDED: ICD-10-CM

## 2021-10-08 DIAGNOSIS — I25.10 CORONARY ARTERY DISEASE INVOLVING NATIVE CORONARY ARTERY OF NATIVE HEART WITHOUT ANGINA PECTORIS: Primary | ICD-10-CM

## 2021-10-08 PROCEDURE — 99214 OFFICE O/P EST MOD 30 MIN: CPT | Performed by: FAMILY MEDICINE

## 2021-10-08 PROCEDURE — 90662 IIV NO PRSV INCREASED AG IM: CPT | Performed by: FAMILY MEDICINE

## 2021-10-08 PROCEDURE — G0008 ADMIN INFLUENZA VIRUS VAC: HCPCS | Performed by: FAMILY MEDICINE

## 2021-10-08 RX ORDER — PRIMIDONE 50 MG/1
50 TABLET ORAL 3 TIMES DAILY
Qty: 90 TABLET | Refills: 2 | Status: SHIPPED | OUTPATIENT
Start: 2021-10-08 | End: 2021-11-08

## 2021-10-08 NOTE — PROGRESS NOTES
Subjective   General Keith Ruvalcaba Jr. 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   Cc: follow up of chronic medical issues    Coronary Artery Disease  Presents for follow-up visit. Pertinent negatives include no chest pain, chest pressure, chest tightness, dizziness, leg swelling, muscle weakness, palpitations, shortness of breath or weight gain.   Tremor- He is still having the tremor. His hands and arms are still involved.    The following portions of the patient's history were reviewed and updated as appropriate: allergies, current medications, past family history, past medical history, past social history, past surgical history and problem list.    Review of Systems   Constitutional: Negative for weight gain.   Respiratory: Negative for chest tightness and shortness of breath.    Cardiovascular: Negative for chest pain, palpitations and leg swelling.   Musculoskeletal: Negative for muscle weakness.   Neurological: Negative for dizziness.       Objective   Physical Exam  Vitals reviewed.   Constitutional:       Appearance: Normal appearance.   HENT:      Head: Normocephalic and atraumatic.      Right Ear: Tympanic membrane, ear canal and external ear normal.      Left Ear: Tympanic membrane, ear canal and external ear normal.      Nose: Nose normal.      Mouth/Throat:      Mouth: Mucous membranes are moist.      Pharynx: Oropharynx is clear.   Eyes:      Pupils: Pupils are equal, round, and reactive to light.   Cardiovascular:      Rate and Rhythm: Normal rate and regular rhythm.      Heart sounds: Normal heart sounds. No murmur heard.   No friction rub. No gallop.    Pulmonary:      Effort: Pulmonary effort is normal. No respiratory distress.      Breath sounds: Normal breath sounds. No stridor. No wheezing, rhonchi or rales.   Chest:      Chest wall: No tenderness.   Abdominal:      General: Abdomen is flat. Bowel sounds are normal. There is no distension.      Palpations: Abdomen is soft. There is no mass.      Tenderness: There is no  "abdominal tenderness. There is no guarding or rebound.      Hernia: No hernia is present.   Musculoskeletal:      Cervical back: Normal range of motion and neck supple.   Skin:     General: Skin is warm and dry.   Neurological:      Mental Status: He is alert.      Comments: He still has a Pill rolling type Tremor.           Visit Vitals  /76 (BP Location: Left arm, Patient Position: Sitting)   Pulse 72   Ht 165.1 cm (65\")   Wt 73 kg (161 lb)   SpO2 100%   BMI 26.79 kg/m²     Body mass index is 26.79 kg/m².      Assessment/Plan   Diagnoses and all orders for this visit:    1. Coronary artery disease involving native coronary artery of native heart without angina pectoris (Primary)    2. Tremor    3. Influenza vaccine needed    Other orders  -     Fluzone High-Dose 65+yrs (1711-0136)  -     primidone (Mysoline) 50 MG tablet; Take 1 tablet by mouth 3 (Three) Times a Day.  Dispense: 90 tablet; Refill: 2    I reviewed the CBC,CMP,and PTH with the patient..  I increased the Mysoline to 50 mg three times daily.  Patient was given an influenza vaccine.  Return to the clinic in 1 month/s.  Will contact with results as needed.    "

## 2021-10-22 DIAGNOSIS — M25.552 BILATERAL HIP PAIN: Primary | ICD-10-CM

## 2021-10-22 DIAGNOSIS — M25.551 BILATERAL HIP PAIN: Primary | ICD-10-CM

## 2021-10-25 ENCOUNTER — OFFICE VISIT (OUTPATIENT)
Dept: ORTHOPEDIC SURGERY | Facility: CLINIC | Age: 86
End: 2021-10-25

## 2021-10-25 VITALS — BODY MASS INDEX: 26.99 KG/M2 | WEIGHT: 162 LBS | HEIGHT: 65 IN

## 2021-10-25 DIAGNOSIS — M25.552 BILATERAL HIP PAIN: Primary | ICD-10-CM

## 2021-10-25 DIAGNOSIS — M51.36 DDD (DEGENERATIVE DISC DISEASE), LUMBAR: ICD-10-CM

## 2021-10-25 DIAGNOSIS — Z96.641 PRESENCE OF ARTIFICIAL HIP JOINT, RIGHT: ICD-10-CM

## 2021-10-25 DIAGNOSIS — G89.29 CHRONIC BILATERAL LOW BACK PAIN WITHOUT SCIATICA: ICD-10-CM

## 2021-10-25 DIAGNOSIS — M54.50 CHRONIC BILATERAL LOW BACK PAIN WITHOUT SCIATICA: ICD-10-CM

## 2021-10-25 DIAGNOSIS — M47.816 FACET ARTHROPATHY, LUMBAR: ICD-10-CM

## 2021-10-25 DIAGNOSIS — M48.061 SPINAL STENOSIS OF LUMBAR REGION WITHOUT NEUROGENIC CLAUDICATION: ICD-10-CM

## 2021-10-25 DIAGNOSIS — M25.551 BILATERAL HIP PAIN: Primary | ICD-10-CM

## 2021-10-25 PROCEDURE — 99214 OFFICE O/P EST MOD 30 MIN: CPT | Performed by: NURSE PRACTITIONER

## 2021-10-25 PROCEDURE — 96372 THER/PROPH/DIAG INJ SC/IM: CPT | Performed by: NURSE PRACTITIONER

## 2021-10-25 RX ORDER — HYDROCODONE BITARTRATE AND ACETAMINOPHEN 5; 325 MG/1; MG/1
1 TABLET ORAL EVERY 8 HOURS PRN
Qty: 30 TABLET | Refills: 0 | Status: SHIPPED | OUTPATIENT
Start: 2021-10-25 | End: 2021-11-04

## 2021-10-25 RX ORDER — TRIAMCINOLONE ACETONIDE 40 MG/ML
80 INJECTION, SUSPENSION INTRA-ARTICULAR; INTRAMUSCULAR ONCE
Status: COMPLETED | OUTPATIENT
Start: 2021-10-25 | End: 2021-10-25

## 2021-10-25 RX ADMIN — TRIAMCINOLONE ACETONIDE 80 MG: 40 INJECTION, SUSPENSION INTRA-ARTICULAR; INTRAMUSCULAR at 15:39

## 2021-10-25 NOTE — PROGRESS NOTES
"   Keith Ruvalcaba Jr. is a 86 y.o. male   Primary provider:  Mata Larkin MD       Chief Complaint   Patient presents with   • Left Hip - Follow-up   • Right Hip - Pain       HISTORY OF PRESENT ILLNESS:    86-year-old male patient presents to office for evaluation of acute bilateral hip pain.  Onset of pain occurred approximately 2 weeks ago with no known injury or incident.  Patient states his hips \"just started hurting\".  Patient has a history of prior right hip arthroplasty several years ago and has done well postoperatively with no known complications.  Patient also has known multilevel degenerative changes in the lumbar spine with a prior lumbar laminectomy performed in 2007.  Patient has known degenerative disc disease in the lumbar spine and evidence on previous MRI imaging of severe spinal stenosis at L3-L4.  Patient complains of bilateral hip pain with pain that radiates into his thighs.  No numbness or tingling reported.  Pain is described as constant and mild to moderate in severity.  Pain is described as aching in nature.  Pain is worse with standing, walking, bending and activity.  Pain improves mildly with rest.  Patient takes Neurontin 300 mg 3 times daily as a regular medication as prescribed by his primary care physician.  Patient is ambulatory in office today with use of his Rollator walker, which is his baseline.  X-rays are performed in office today.  Pain scale today is 8/10.    Pain  This is a new problem. The current episode started 1 to 4 weeks ago (about 2 weeks ago). The problem occurs constantly. The problem has been unchanged. Associated symptoms include arthralgias. Associated symptoms comments: Aching pain. The symptoms are aggravated by standing, bending and exertion. He has tried rest (Neurontin, use of a walker) for the symptoms. The treatment provided mild relief.     CONCURRENT MEDICAL HISTORY:    Past Medical History:   Diagnosis Date   • Acquired hallux rigidus    • " Anemia    • Arthritis    • Arthropathy of lumbar facet joint    • Carpal tunnel syndrome    • CHF (congestive heart failure) (HCC)    • Chronic diastolic congestive heart failure (HCC)    • Chronic kidney disease    • Chronic kidney disease, stage II (mild)     Chronic kidney disease stage 2   • Chronic obstructive lung disease (HCC)    • Chronic renal failure    • Coronary artery disease    • Cortical senile cataract    • Diabetes mellitus ([facility])    • Diabetes mellitus type 2, noninsulin dependent (HCC)     diabetes mellitus type 2, non-insulin treated, Medication treatment plan: oral diabetic medication   • Diabetes mellitus without complication ([facility])    • Diastolic dysfunction    • Diastolic heart failure (HCC)    • Essential hypertension     difficult to control      • Hip pain    • History of echocardiogram 08/19/2015    Echocardiogram W/ color flow 64914 (1) - Mild to moderate LVH with mild left atrial enlargement and normal aortic root.CLVH,preserved LV systolic function with Ef of 55%.Diastolic dysfunction.MV intact.AV thickened.Aortic sclerosis.   • Hyperlipidemia    • Influenza vaccine administered 10/02/2015    INFLUENZA IMMUN ADMIN OR PREV RECV'D  (1) - Ordered By: DIONNE MANDUJANO ([facility])    • Insomnia    • Insomnia     Other insomnia   • Joint pain    • Low back pain    • Muscle strain    • Nuclear cataract    • Onychomycosis    • Pneumococcal vaccination given 07/22/2016    PNEUMOC VAC/ADMIN/RCVD 4040F (3) - Ordered By: DIONNE MANDUJANO ([facility])   • Prostate cancer ([facility])    • Pure hypercholesterolemia    • Sedentary lifestyle        Allergies   Allergen Reactions   • Aldactone [Spironolactone] Other (See Comments)     Hyponatremia and hyperkalemia         Current Outpatient Medications:   •  acetaminophen-codeine (TYLENOL #3) 300-30 MG per tablet, Take 1 tablet by mouth 4 (Four) Times a Day As Needed for Moderate Pain  (pain)., Disp: 56 tablet, Rfl: 0  •  albuterol sulfate  (90 Base)  MCG/ACT inhaler, Inhale 2 puffs Every 4 (Four) Hours As Needed for Wheezing., Disp: 18 g, Rfl: 11  •  aspirin 81 MG EC tablet, Take 81 mg by mouth daily., Disp: , Rfl:   •  carbidopa-levodopa (SINEMET)  MG per tablet, Take 1 tablet by mouth 3 (Three) Times a Day., Disp: 90 tablet, Rfl: 2  •  ferrous sulfate (FeroSul) 325 (65 FE) MG tablet, Take 1 tablet by mouth Daily With Breakfast., Disp: 30 tablet, Rfl: 5  •  gabapentin (NEURONTIN) 300 MG capsule, Take 1 capsule by mouth 3 (Three) Times a Day., Disp: 270 capsule, Rfl: 2  •  glucose blood (True Metrix Blood Glucose Test) test strip, Check glucose Once daily, Disp: 100 each, Rfl: 6  •  hydrALAZINE (APRESOLINE) 25 MG tablet, Take 1 tablet by mouth 3 (Three) Times a Day., Disp: 90 tablet, Rfl: 4  •  Lidocaine Pain Relief 4 % patch, APPLY PATCH TO SKIN EVERY TWELVE HOURS AS DIRECTED. REMOVE USED ONE AFTER 12 HOURS, Disp: , Rfl:   •  lisinopril-hydrochlorothiazide (PRINZIDE,ZESTORETIC) 20-12.5 MG per tablet, Take 1 tablet by mouth Daily., Disp: 90 tablet, Rfl: 2  •  metoprolol succinate XL (TOPROL-XL) 25 MG 24 hr tablet, TAKE ONE TABLET BY MOUTH EVERY NIGHT AT BEDTIME, Disp: 30 tablet, Rfl: 5  •  pravastatin (PRAVACHOL) 40 MG tablet, Take 1 tablet by mouth Daily., Disp: 90 tablet, Rfl: 2  •  primidone (Mysoline) 50 MG tablet, Take 1 tablet by mouth 3 (Three) Times a Day., Disp: 90 tablet, Rfl: 2  •  promethazine-dextromethorphan (PROMETHAZINE-DM) 6.25-15 MG/5ML syrup, Take 5 mL by mouth 4 (Four) Times a Day As Needed for Cough., Disp: 118 mL, Rfl: 0  •  rivaroxaban (Xarelto) 10 MG tablet, Take 1 tablet by mouth Daily., Disp: 30 tablet, Rfl: 11  •  SITagliptin (Januvia) 50 MG tablet, Take 1 tablet by mouth Daily., Disp: 90 tablet, Rfl: 2  •  tiotropium bromide-olodaterol (Stiolto Respimat) 2.5-2.5 MCG/ACT aerosol solution inhaler, Inhale 2 puffs Daily., Disp: 4 g, Rfl: 0  •  TRUEplus Lancets 30G misc, Use to test Blood Sugars Once Daily, Disp: 100 each, Rfl: 6  •   HYDROcodone-acetaminophen (NORCO) 5-325 MG per tablet, Take 1 tablet by mouth Every 8 (Eight) Hours As Needed for Moderate Pain  or Severe Pain  for up to 10 days. Please deliver, Disp: 30 tablet, Rfl: 0    Past Surgical History:   Procedure Laterality Date   • BACK SURGERY     • CARDIAC ABLATION     • CARDIAC CATHETERIZATION  02/04/1986    Cardiac cath 51719 (1) - normal left heart catherization,non cardiac chest pain   • CARPAL TUNNEL RELEASE  10/19/2015    Carpal tunnel surgery (1) - Release of left carpal tunnel   • CATARACT EXTRACTION W/ INTRAOCULAR LENS IMPLANT Left 3/31/2017    Procedure: REMOVE CATARACT AND IMPLANT INRAOCULAR LENS LEFT EYE;  Surgeon: Hector Navarrete MD;  Location: [facility] OR;  Service:    • CATARACT EXTRACTION W/ INTRAOCULAR LENS IMPLANT Right 4/14/2017    Procedure: REMOVE CATARACT AND IMPLANT INTRAOCULAR LENS RIGHT EYE;  Surgeon: Hector Navarrete MD;  Location: [facility] OR;  Service:    • COLONOSCOPY N/A 10/31/2018    Procedure: COLONOSCOPY;  Surgeon: Herbie Christine MD;  Location: [facility] ENDOSCOPY;  Service: Gastroenterology   • ENDOSCOPY  08/05/2013    Colon endoscopy 40872 (2) - Hemorrhoids found.   • FRACTURE SURGERY     • INGUINAL HERNIA REPAIR Right 06/07/1968    Inguinal hernia, repair (2)   • INJECTION OF MEDICATION  09/06/2012    Albuterol (2u) (1) - Ordered By: LIDYA STEINER ([facility])    • INJECTION OF MEDICATION  09/06/2012    Atrovent (1) - Ordered By: LIDYA STEINER ([facility])    • INJECTION OF MEDICATION  02/10/2012    Depo Medrol (Methylprednisone) (3) - Ordered By: HEATHER PAK ([facility])    • INJECTION OF MEDICATION  02/09/2014    Kenalog (4) - Ordered By: DIMAS ANDRADE ([facility])    • JOINT REPLACEMENT     • LUMBAR LAMINECTOMY  2007    Lumbar laminectomy (1)   • MANDIBLE FRACTURE SURGERY  10/11/1981    Treat nose/jaw fracture (1) - bilateral open reduction of fracture of mandible   • OTHER SURGICAL HISTORY  09/24/2011    Drain/Inject Major Joint 20610  "(1) - Ordered By: KARLOS HERNANDEZ ([facility])    • OTHER SURGICAL HISTORY      Insert IVC filter 07376 (1)   • OTHER SURGICAL HISTORY  10/11/2011    SPIROMETRY 52533 (1) - Ordered By: HEATHER PAK ([facility])   • TOTAL HIP ARTHROPLASTY Right        Family History   Problem Relation Age of Onset   • Diabetes Other    • Cancer Sister    • Heart disease Mother    • Hypertension Mother    • Heart disease Father    • Hypertension Father    • Coronary artery disease Neg Hx        Social History     Socioeconomic History   • Marital status:    Tobacco Use   • Smoking status: Former Smoker     Quit date:      Years since quittin.8   • Smokeless tobacco: Never Used   Vaping Use   • Vaping Use: Never used   Substance and Sexual Activity   • Alcohol use: No   • Drug use: No   • Sexual activity: Defer     Comment: Marital status:         Review of Systems   Constitutional: Negative.    HENT: Negative.    Eyes: Negative.    Respiratory: Negative.    Cardiovascular: Negative.    Gastrointestinal: Negative.    Endocrine: Negative.    Genitourinary: Negative.    Musculoskeletal: Positive for arthralgias, back pain and gait problem.        Bilateral hip pain.   Skin: Negative.    Allergic/Immunologic: Negative.    Hematological: Negative.    Psychiatric/Behavioral: Negative.        PHYSICAL EXAMINATION:       Ht 165.1 cm (65\")   Wt 73.5 kg (162 lb)   BMI 26.96 kg/m²     Physical Exam  Vitals reviewed.   Constitutional:       General: He is not in acute distress.     Appearance: He is well-developed. He is not ill-appearing.   HENT:      Head: Normocephalic.   Pulmonary:      Effort: Pulmonary effort is normal. No respiratory distress.   Abdominal:      General: There is no distension.      Palpations: Abdomen is soft.   Musculoskeletal:         General: Tenderness (Lumbar spine) present. No swelling, deformity or signs of injury.      Lumbar back: Negative right straight leg raise test and negative left " straight leg raise test.   Skin:     General: Skin is warm and dry.      Capillary Refill: Capillary refill takes less than 2 seconds.      Findings: No erythema.   Neurological:      Mental Status: He is alert and oriented to person, place, and time.      GCS: GCS eye subscore is 4. GCS verbal subscore is 5. GCS motor subscore is 6.   Psychiatric:         Speech: Speech normal.         Behavior: Behavior normal.         Thought Content: Thought content normal.         Judgment: Judgment normal.         GAIT:     []  Normal  [x]  Antalgic    Assistive device: []  None  [x]  Walker     []  Crutches  []  Cane     []  Wheelchair  []  Stretcher    Right Hip Exam     Tenderness   The patient is experiencing no tenderness.     Range of Motion   Abduction: 35   Flexion: 110     Muscle Strength   Abduction: 4/5   Flexion: 4/5     Tests   ALEISHA: negative  Fadir:  Negative FADIR test    Other   Erythema: absent  Sensation: normal  Pulse: present    Comments:  Overall good motion of the hip joint.  No pain elicited with range of motion of the hip.      Left Hip Exam     Tenderness   The patient is experiencing no tenderness.     Range of Motion   Abduction: 35   Flexion: 110     Muscle Strength   Abduction: 4/5   Flexion: 4/5     Tests   ALEISHA: negative  Fadir:  Negative FADIR test    Other   Erythema: absent  Sensation: normal  Pulse: present    Comments:  Overall good motion of the hip joint.  No pain elicited with range of motion of the hip.      Back Exam     Tenderness   The patient is experiencing tenderness in the lumbar.    Range of Motion   Extension: abnormal   Flexion: abnormal   Rotation right: abnormal   Rotation left: abnormal     Muscle Strength   Right Quadriceps:  4/5   Left Quadriceps:  4/5     Tests   Straight leg raise right: negative  Straight leg raise left: negative    Other   Sensation: normal  Gait: antalgic   Erythema: no back redness  Scars: present            XR Hips Bilateral With or Without Pelvis  3-4 View    Result Date: 10/26/2021  Narrative: Two view bilateral hips with single view pelvis HISTORY: Pain. Bilateral hip pain. AP and frog-leg lateral views of each hip and AP film of the pelvis obtained. COMPARISON: October 16, 2018 FINDINGS: Stable right total hip prosthesis. Some increase in heterotopic ossifications lateral to the prosthetic acetabulum and femoral head which may cause some impingement on range of motion. Minimal degenerative changes left hip without joint space narrowing. Degenerative disc disease L4-5. Postoperative posterior bony fusion lower lumbar spine. Minimal degenerative changes iliac crests and inferior pubic rami. No fracture or dislocation. No other osseous or articular abnormality. Atherosclerotic calcification iliac and femoral arteries.     Impression: CONCLUSION: Stable right total hip prosthesis. Some increase in heterotopic ossifications lateral to the prosthetic acetabulum and femoral head which may cause some impingement on range of motion. Minimal degenerative changes left hip without joint space narrowing. Degenerative disc disease L4-5. Postoperative posterior bony fusion lower lumbar spine. Minimal degenerative changes iliac crests and inferior pubic rami. [id number] Electronically signed by:  Abram Driscoll MD  10/26/2021 7:11 PM CDT Workstation: [id number]        ASSESSMENT:    Diagnoses and all orders for this visit:    Bilateral hip pain  -     triamcinolone acetonide (KENALOG-40) injection 80 mg  -     HYDROcodone-acetaminophen (NORCO) 5-325 MG per tablet; Take 1 tablet by mouth Every 8 (Eight) Hours As Needed for Moderate Pain  or Severe Pain  for up to 10 days. Please deliver    Chronic bilateral low back pain without sciatica    DDD (degenerative disc disease), lumbar    Facet arthropathy, lumbar    Spinal stenosis of lumbar region without neurogenic claudication    Presence of artificial hip joint, right    PLAN    X-rays of the bilateral hips performed in office today  and reviewed with no acute findings noted.  The right hip implant appears stable and unchanged.  The patient has some mild degenerative changes noted in the left hip but nothing significant.  Patient complains of vague pain in his bilateral hips that radiates into his bilateral thighs.  Patient has known, advanced degenerative changes in the lumbar spine.  Patient has had a prior lumbar spine laminectomy at L5-S1 in 2007.  Patient's previous MRI from 2018 showed evidence of multilevel degenerative changes, degenerative disc disease and severe spinal stenosis at the L3-L4 level.  We discussed that it is more likely that his pain in his bilateral hips and thighs is coming from his lumbar spine. Recommend proceeding today with an IM injection of Kenalog for management of pain/inflammation.     Recommend the following:    -Rest and activity modification for now with avoidance of straining, lifting, pulling, tugging, etc. patient can gradually progress his activity as pain allows.  -Use of ice therapy and/or heat therapy to the low back to minimize pain/inflammation/muscle tension.  -Use of a cane or walker for modified weightbearing from the lumbar spine.  Patient is using his Rollator walker in office today, which is his baseline.    Norco is prescribed to take as needed for moderate to severe pain.  Patient has tolerated this well in the past with no known adverse effects. Patient is instructed to take the pain medication sparingly and to take the least amount of pain medication needed to control the pain.  Patient is cautioned that opioids can be addictive.  We discussed other potential adverse side effects of opioids including constipation, dizziness, drowsiness, increased risk for falls and/or respiratory depression.  Patient verbalized understanding of these risks.  I have encouraged the patient to focus on nonopioid pain management methods as much as possible.  Recommend Tylenol as needed for milder pain.   Patient cannot take oral NSAIDs due to his history of chronic kidney disease and his current use of Xarelto.  PAULA is reviewed internally via Epic and is noted to be appropriate.    Follow-up in 2 to 4 weeks for recheck as needed for any new, worsening or persistent symptoms.  Consider MRI of the lumbar spine if pain/symptoms persist.  Consider lumbar epidural injection of steroid as a possible treatment option.    Time spent of a minimum of 30 minutes including the face to face evaluation, reviewing of medical history and prior medial records, reviewing of diagnostic studies, prescription management, documentation, patient education and coordination of care.     Return in about 4 weeks (around 11/22/2021), or if symptoms worsen or fail to improve, for Recheck.        This document has been electronically signed by REGINA Muñoz on October 29, 2021 10:40 CDT      REGINA Muñoz

## 2021-10-29 PROBLEM — M51.36 DDD (DEGENERATIVE DISC DISEASE), LUMBAR: Status: ACTIVE | Noted: 2021-10-29

## 2021-10-29 PROBLEM — G89.29 CHRONIC BILATERAL LOW BACK PAIN WITHOUT SCIATICA: Status: ACTIVE | Noted: 2021-10-29

## 2021-10-29 PROBLEM — M25.552 BILATERAL HIP PAIN: Status: ACTIVE | Noted: 2021-10-29

## 2021-10-29 PROBLEM — M54.50 CHRONIC BILATERAL LOW BACK PAIN WITHOUT SCIATICA: Status: ACTIVE | Noted: 2021-10-29

## 2021-10-29 PROBLEM — M48.061 SPINAL STENOSIS OF LUMBAR REGION WITHOUT NEUROGENIC CLAUDICATION: Status: ACTIVE | Noted: 2021-10-29

## 2021-10-29 PROBLEM — M47.816 FACET ARTHROPATHY, LUMBAR: Status: ACTIVE | Noted: 2021-10-29

## 2021-10-29 PROBLEM — M25.551 BILATERAL HIP PAIN: Status: ACTIVE | Noted: 2021-10-29

## 2021-10-29 PROBLEM — Z96.641 PRESENCE OF ARTIFICIAL HIP JOINT, RIGHT: Status: ACTIVE | Noted: 2021-10-29

## 2021-11-05 LAB
QT INTERVAL: 372 MS
QTC INTERVAL: 404 MS

## 2021-11-08 ENCOUNTER — OFFICE VISIT (OUTPATIENT)
Dept: FAMILY MEDICINE CLINIC | Facility: CLINIC | Age: 86
End: 2021-11-08

## 2021-11-08 VITALS
BODY MASS INDEX: 25.37 KG/M2 | OXYGEN SATURATION: 98 % | SYSTOLIC BLOOD PRESSURE: 144 MMHG | HEART RATE: 68 BPM | DIASTOLIC BLOOD PRESSURE: 80 MMHG | HEIGHT: 65 IN | WEIGHT: 152.25 LBS

## 2021-11-08 DIAGNOSIS — R52 PAIN: Primary | ICD-10-CM

## 2021-11-08 DIAGNOSIS — R25.1 TREMOR: ICD-10-CM

## 2021-11-08 PROBLEM — R60.0 EDEMA OF LOWER EXTREMITY: Status: ACTIVE | Noted: 2017-11-16

## 2021-11-08 PROBLEM — C61 MALIGNANT NEOPLASM OF PROSTATE (HCC): Status: ACTIVE | Noted: 2021-10-11

## 2021-11-08 PROBLEM — E55.9 VITAMIN D DEFICIENCY: Status: ACTIVE | Noted: 2021-10-11

## 2021-11-08 PROBLEM — E78.5 DYSLIPIDEMIA: Status: ACTIVE | Noted: 2021-10-11

## 2021-11-08 PROBLEM — J44.9 CHRONIC OBSTRUCTIVE PULMONARY DISEASE: Status: ACTIVE | Noted: 2021-10-11

## 2021-11-08 PROCEDURE — 99214 OFFICE O/P EST MOD 30 MIN: CPT | Performed by: FAMILY MEDICINE

## 2021-11-08 RX ORDER — PRIMIDONE 50 MG/1
TABLET ORAL
Qty: 189 TABLET | Refills: 1 | Status: SHIPPED | OUTPATIENT
Start: 2021-11-08 | End: 2021-12-08 | Stop reason: SDUPTHER

## 2021-11-08 RX ORDER — PRAVASTATIN SODIUM 40 MG
40 TABLET ORAL DAILY
Qty: 90 TABLET | Refills: 2 | Status: CANCELLED | OUTPATIENT
Start: 2021-11-08

## 2021-11-08 RX ORDER — METOPROLOL SUCCINATE 25 MG/1
25 TABLET, EXTENDED RELEASE ORAL
Qty: 30 TABLET | Refills: 5 | Status: CANCELLED | OUTPATIENT
Start: 2021-11-08

## 2021-11-08 RX ORDER — GABAPENTIN 300 MG/1
300 CAPSULE ORAL 3 TIMES DAILY
Qty: 270 CAPSULE | Refills: 2 | Status: CANCELLED | OUTPATIENT
Start: 2021-11-08

## 2021-11-08 RX ORDER — LISINOPRIL AND HYDROCHLOROTHIAZIDE 20; 12.5 MG/1; MG/1
1 TABLET ORAL DAILY
Qty: 90 TABLET | Refills: 2 | Status: CANCELLED | OUTPATIENT
Start: 2021-11-08

## 2021-11-08 RX ORDER — ACETAMINOPHEN AND CODEINE PHOSPHATE 300; 30 MG/1; MG/1
1 TABLET ORAL 4 TIMES DAILY PRN
Qty: 60 TABLET | Refills: 0 | Status: SHIPPED | OUTPATIENT
Start: 2021-11-08

## 2021-11-08 RX ORDER — HYDRALAZINE HYDROCHLORIDE 25 MG/1
25 TABLET, FILM COATED ORAL 3 TIMES DAILY
Qty: 90 TABLET | Refills: 4 | Status: CANCELLED | OUTPATIENT
Start: 2021-11-08

## 2021-11-08 RX ORDER — TIOTROPIUM BROMIDE AND OLODATEROL 3.124; 2.736 UG/1; UG/1
2 SPRAY, METERED RESPIRATORY (INHALATION)
Qty: 4 G | Refills: 11 | Status: CANCELLED | OUTPATIENT
Start: 2021-11-08

## 2021-11-08 RX ORDER — FERROUS SULFATE 325(65) MG
1 TABLET ORAL
Qty: 30 TABLET | Refills: 5 | Status: CANCELLED | OUTPATIENT
Start: 2021-11-08

## 2021-11-08 NOTE — PROGRESS NOTES
Subjective   General Keith Ruvalcaba Jr. 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   .Cc: follow up of chronic medical issues    Pain  This is a chronic problem. The current episode started more than 1 year ago. The problem occurs daily. Pertinent negatives include no abdominal pain, anorexia, arthralgias, change in bowel habit, chest pain, chills, congestion, coughing, diaphoresis, fatigue, fever, headaches, joint swelling, myalgias, nausea, neck pain, numbness, rash, sore throat, swollen glands, urinary symptoms, vertigo, visual change, vomiting or weakness.   His tremor continues . The increase in Mysoline helped some.    The following portions of the patient's history were reviewed and updated as appropriate: allergies, current medications, past family history, past medical history, past social history, past surgical history and problem list.    Review of Systems   Constitutional: Negative for chills, diaphoresis, fatigue and fever.   HENT: Negative for congestion and sore throat.    Respiratory: Negative for cough, shortness of breath and wheezing.    Cardiovascular: Negative for chest pain, palpitations and leg swelling.   Gastrointestinal: Negative for abdominal pain, anorexia, change in bowel habit, nausea and vomiting.   Musculoskeletal: Negative for arthralgias, joint swelling, myalgias and neck pain.   Skin: Negative for rash.   Neurological: Negative for vertigo, weakness, numbness and headaches.       Objective   Physical Exam  Vitals reviewed.   Constitutional:       Appearance: Normal appearance.   HENT:      Head: Normocephalic and atraumatic.      Right Ear: Tympanic membrane, ear canal and external ear normal.      Left Ear: Tympanic membrane, ear canal and external ear normal.      Nose: Nose normal.      Mouth/Throat:      Mouth: Mucous membranes are moist.      Pharynx: Oropharynx is clear.   Eyes:      Pupils: Pupils are equal, round, and reactive to light.   Cardiovascular:      Rate and Rhythm: Normal rate and regular  "rhythm.      Heart sounds: Normal heart sounds. No murmur heard.  No friction rub. No gallop.    Pulmonary:      Effort: Pulmonary effort is normal. No respiratory distress.      Breath sounds: Normal breath sounds. No stridor. No wheezing, rhonchi or rales.   Chest:      Chest wall: No tenderness.   Abdominal:      General: Abdomen is flat. Bowel sounds are normal. There is no distension.      Palpations: Abdomen is soft. There is no mass.      Tenderness: There is no abdominal tenderness. There is no guarding or rebound.      Hernia: No hernia is present.   Musculoskeletal:      Cervical back: Normal range of motion.   Skin:     General: Skin is warm and dry.   Neurological:      Mental Status: He is alert and oriented to person, place, and time.      Cranial Nerves: No cranial nerve deficit.      Comments: He has a tremor .           Visit Vitals  /80   Pulse 68   Ht 165.1 cm (65\")   Wt 69.1 kg (152 lb 4 oz)   SpO2 98%   BMI 25.34 kg/m²     Body mass index is 25.34 kg/m².      Assessment/Plan   Diagnoses and all orders for this visit:    1. Pain (Primary)  -     acetaminophen-codeine (TYLENOL #3) 300-30 MG per tablet; Take 1 tablet by mouth 4 (Four) Times a Day As Needed for Moderate Pain  (pain).  Dispense: 60 tablet; Refill: 0    2. Tremor    Other orders  -     primidone (Mysoline) 50 MG tablet; One tab q AM and mid day and two at bedtime times one week,then  One tab q AM and two at mid day and q hs times one week.,then  Two tabs tid  Dispense: 189 tablet; Refill: 1    I reviewed the CBC,CMP,and TSH and Free T-4.w8ith the patient.  Return to the clinic in 1 month/s.  Will contact with results as needed.  Will get an MRI of the head if this continues.  "

## 2021-12-08 ENCOUNTER — TELEPHONE (OUTPATIENT)
Dept: FAMILY MEDICINE CLINIC | Facility: CLINIC | Age: 86
End: 2021-12-08

## 2021-12-08 ENCOUNTER — OFFICE VISIT (OUTPATIENT)
Dept: FAMILY MEDICINE CLINIC | Facility: CLINIC | Age: 86
End: 2021-12-08

## 2021-12-08 VITALS
BODY MASS INDEX: 25.99 KG/M2 | WEIGHT: 156 LBS | HEIGHT: 65 IN | DIASTOLIC BLOOD PRESSURE: 72 MMHG | SYSTOLIC BLOOD PRESSURE: 134 MMHG | HEART RATE: 87 BPM | OXYGEN SATURATION: 99 %

## 2021-12-08 DIAGNOSIS — M25.559 HIP PAIN: ICD-10-CM

## 2021-12-08 DIAGNOSIS — I50.32 CHRONIC DIASTOLIC CONGESTIVE HEART FAILURE (HCC): Primary | Chronic | ICD-10-CM

## 2021-12-08 DIAGNOSIS — R52 PAIN: ICD-10-CM

## 2021-12-08 PROCEDURE — 99214 OFFICE O/P EST MOD 30 MIN: CPT | Performed by: FAMILY MEDICINE

## 2021-12-08 PROCEDURE — 96372 THER/PROPH/DIAG INJ SC/IM: CPT | Performed by: FAMILY MEDICINE

## 2021-12-08 RX ORDER — PRAVASTATIN SODIUM 40 MG
40 TABLET ORAL DAILY
Qty: 90 TABLET | Refills: 2 | Status: CANCELLED | OUTPATIENT
Start: 2021-12-08

## 2021-12-08 RX ORDER — PRIMIDONE 50 MG/1
100 TABLET ORAL 3 TIMES DAILY
Qty: 270 TABLET | Refills: 1 | Status: CANCELLED | OUTPATIENT
Start: 2021-12-08

## 2021-12-08 RX ORDER — PRIMIDONE 50 MG/1
50 TABLET ORAL 2 TIMES DAILY
Qty: 180 TABLET | Refills: 1 | Status: SHIPPED | OUTPATIENT
Start: 2021-12-08

## 2021-12-08 RX ORDER — FERROUS SULFATE 325(65) MG
1 TABLET ORAL
Qty: 30 TABLET | Refills: 5 | Status: CANCELLED | OUTPATIENT
Start: 2021-12-08

## 2021-12-08 RX ORDER — GABAPENTIN 300 MG/1
300 CAPSULE ORAL 3 TIMES DAILY
Qty: 270 CAPSULE | Refills: 2 | Status: CANCELLED | OUTPATIENT
Start: 2021-12-08

## 2021-12-08 RX ORDER — TRIAMCINOLONE ACETONIDE 40 MG/ML
80 INJECTION, SUSPENSION INTRA-ARTICULAR; INTRAMUSCULAR ONCE
Status: COMPLETED | OUTPATIENT
Start: 2021-12-08 | End: 2021-12-08

## 2021-12-08 RX ORDER — ACETAMINOPHEN AND CODEINE PHOSPHATE 300; 30 MG/1; MG/1
1 TABLET ORAL 4 TIMES DAILY PRN
Qty: 60 TABLET | Refills: 0 | Status: CANCELLED | OUTPATIENT
Start: 2021-12-08

## 2021-12-08 RX ORDER — HYDRALAZINE HYDROCHLORIDE 25 MG/1
25 TABLET, FILM COATED ORAL 3 TIMES DAILY
Qty: 90 TABLET | Refills: 4 | Status: CANCELLED | OUTPATIENT
Start: 2021-12-08

## 2021-12-08 RX ORDER — LISINOPRIL AND HYDROCHLOROTHIAZIDE 20; 12.5 MG/1; MG/1
1 TABLET ORAL DAILY
Qty: 90 TABLET | Refills: 2 | Status: CANCELLED | OUTPATIENT
Start: 2021-12-08

## 2021-12-08 RX ADMIN — TRIAMCINOLONE ACETONIDE 80 MG: 40 INJECTION, SUSPENSION INTRA-ARTICULAR; INTRAMUSCULAR at 11:12

## 2021-12-08 NOTE — PROGRESS NOTES
Subjective   General Keith Ruvalcaba Jr. 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   Cc: follow up of chronic medical issues    Back Pain  This is a chronic problem. The current episode started more than 1 year ago. The problem occurs daily. The pain is present in the lumbar spine. The pain is at a severity of 8/10. The pain is moderate. Pertinent negatives include no abdominal pain, bladder incontinence, bowel incontinence, chest pain, dysuria, headaches, leg pain, numbness, perianal numbness, tingling or weakness.   Congestive Heart Failure  Presents for follow-up visit. Pertinent negatives include no abdominal pain, chest pain, chest pressure, claudication, edema, fatigue, muscle weakness, near-syncope, nocturia, orthopnea, palpitations, paroxysmal nocturnal dyspnea, shortness of breath or unexpected weight change.   His left hip is hurting as well. It is worse at night.    The following portions of the patient's history were reviewed and updated as appropriate: allergies, current medications, past family history, past medical history, past social history, past surgical history and problem list.    Review of Systems   Constitutional: Negative for fatigue and unexpected weight change.   Respiratory: Negative for shortness of breath.    Cardiovascular: Negative for chest pain, palpitations, claudication and near-syncope.   Gastrointestinal: Negative for abdominal pain and bowel incontinence.   Genitourinary: Negative for bladder incontinence, dysuria and nocturia.   Musculoskeletal: Positive for back pain. Negative for muscle weakness.   Neurological: Negative for tingling, weakness, numbness and headaches.       Objective   Physical Exam  Vitals reviewed.   Constitutional:       Appearance: Normal appearance.   HENT:      Head: Normocephalic and atraumatic.      Right Ear: Tympanic membrane, ear canal and external ear normal.      Left Ear: Tympanic membrane, ear canal and external ear normal.      Nose: Nose normal.      Mouth/Throat:       "Mouth: Mucous membranes are moist.      Pharynx: Oropharynx is clear.   Cardiovascular:      Rate and Rhythm: Normal rate and regular rhythm.      Heart sounds: Normal heart sounds. No murmur heard.  No friction rub. No gallop.    Pulmonary:      Effort: Pulmonary effort is normal. No respiratory distress.      Breath sounds: Normal breath sounds. No stridor. No wheezing, rhonchi or rales.   Chest:      Chest wall: No tenderness.   Abdominal:      General: Abdomen is flat. Bowel sounds are normal. There is no distension.      Palpations: Abdomen is soft. There is no mass.      Tenderness: There is no abdominal tenderness. There is no guarding or rebound.      Hernia: No hernia is present.   Musculoskeletal:      Cervical back: Normal range of motion.      Comments: His low back is non tender The left hip has mild tendernesi   Skin:     General: Skin is warm and dry.   Neurological:      Mental Status: He is alert.           Visit Vitals  /72   Pulse 87   Ht 165.1 cm (65\")   Wt 70.8 kg (156 lb)   SpO2 99%   BMI 25.96 kg/m²     Body mass index is 25.96 kg/m².      Assessment/Plan   Diagnoses and all orders for this visit:    1. Chronic diastolic congestive heart failure (HCC) (Primary)  -     Comprehensive metabolic panel; Future  -     CBC w AUTO Differential; Future    2. Pain    3. Hip pain    I reviewed the CBC and CMP with the patient..  Return to the clinic in 3 month/s.  Will contact with results as needed.    "

## 2021-12-10 ENCOUNTER — OFFICE VISIT (OUTPATIENT)
Dept: CARDIOLOGY | Facility: CLINIC | Age: 86
End: 2021-12-10

## 2021-12-10 VITALS
OXYGEN SATURATION: 94 % | BODY MASS INDEX: 26.33 KG/M2 | HEIGHT: 65 IN | HEART RATE: 73 BPM | WEIGHT: 158 LBS | SYSTOLIC BLOOD PRESSURE: 118 MMHG | DIASTOLIC BLOOD PRESSURE: 70 MMHG

## 2021-12-10 DIAGNOSIS — I50.32 CHRONIC DIASTOLIC CONGESTIVE HEART FAILURE (HCC): Primary | Chronic | ICD-10-CM

## 2021-12-10 DIAGNOSIS — I25.10 CORONARY ARTERY DISEASE INVOLVING NATIVE CORONARY ARTERY OF NATIVE HEART WITHOUT ANGINA PECTORIS: ICD-10-CM

## 2021-12-10 DIAGNOSIS — I82.513 CHRONIC DEEP VEIN THROMBOSIS (DVT) OF FEMORAL VEIN OF BOTH LOWER EXTREMITIES (HCC): ICD-10-CM

## 2021-12-10 DIAGNOSIS — I65.23 ASYMPTOMATIC BILATERAL CAROTID ARTERY STENOSIS: ICD-10-CM

## 2021-12-10 PROCEDURE — 99214 OFFICE O/P EST MOD 30 MIN: CPT | Performed by: NURSE PRACTITIONER

## 2021-12-10 NOTE — PROGRESS NOTES
[facility] CHF CLINIC OFFICE VISIT    Subjective:     Congestive Heart Failure (Chief Complaint )      History of Present Illness  Congestive Heart Failure   Presents for follow-up visit. Pertinent negatives include no chest pressure, claudication, edema (bilateral ankle at the end of the day; compliant with compression stockings), near-syncope, orthopnea, palpitations, paroxysmal nocturnal dyspnea, shortness of breath or unexpected weight change. The symptoms have been improving. Compliance with total regimen is %. Compliance problems include adherence to exercise.  Compliance with diet is %. Compliance with exercise is 26-50%. Compliance with medications is %.      1. CAD, non-obstructive 2005  2. HFpEF - diastolic dysfunction  3. Hypertension  4. CKD with H/O KIRK follows with Dr Reza  5. H/O Atrial Flutter S/P ablation per Dr Graves 2006  6. Prostate Cancer Salvador Score 6/Stage 2 S/P RT   7. DM, Type II  8. Anterior right total hip arthroplasty 04/25/2016  9. Chronic DVT of bilateral femoral vein: on preventative dosing - Xarelto  10. Carotid disease followed annually      12/7/20:  TELEPHONE VISIT  Time Spent: 7 minutes  General attempted the SYLLETA video visit, however connection with video was unsuccessful. Patient then called and visit was performed via telephone service in lieu of a follow-up appointment for results and advice.  This patient is an established patient.  There is no face-to-face service planned within the next 24 hours.  There also has been no E/M in the past 7 days  You have chosen to receive care through a telephone visit. Do you consent to use a telephone visit for your medical care today? Yes  This visit has been rescheduled as a phone visit to comply with patient safety concerns in accordance with CDC recommendations. Total time of discussion was 7 minutes.     Been doing well. No leg swelling. Has not had to have additional lasix. Using exercise bike. No falls. Xarelto 10mg  for DVT prevention. No hospitalizations or ER visits.   Does not need any home medical equipment. Using cane and walker. No shortness of breath. No chest pain. Last echo 2017 without structural changes or valve disease.       6/7/21:  6 month follow up. Hospitalization 2 weeks ago for pneumonia. He was taken off Xarelto for some mild hemoptysis without anemia. He was on 10mg Xarelto for DVT prevention as he had an unprovoked DVT with ongoing decreased mobility. Last US (2018) was all chronic, there has not been a resolution study.   No leg swelling. Lasix is as needed.       12/10/21:  3 month follow up. Since last seen, his largest complaint has been a tremor in both hands and hip pain. Has not had to have any Lasix for swelling. He still urinates easily and large amounts. Weight is down. Appetite is good. ER visit in September COPD. Steroids and ABX.   Has not had any falls. NO bruising or bleeding with Xarelto.       Diet: Lives with Daughter and grandson. Cereal, teresa and eggs for breakfast. [place] for lunch. Eats dinner as family. Adds some salt to his plate.   Fluids: Juice, water (3-4 a day), coffee in the morning.   Activity: Gets out of the house often and visits friends. He still drives. He does not get SOA doing his normal activities.     PCP: Dr. Larkin  Cardiologist: pawan TAPIA patient  Nephrologist: Dr. Reza  Pulmonologist: n/a    Hospitalizations:  5/12/21-5/14/21: pneumonia    Past Medical History:   Diagnosis Date   • Acquired hallux rigidus    • Anemia    • Arthritis    • Arthropathy of lumbar facet joint    • Carpal tunnel syndrome    • CHF (congestive heart failure) (HCC)    • Chronic diastolic congestive heart failure (HCC)    • Chronic kidney disease    • Chronic kidney disease, stage II (mild)     Chronic kidney disease stage 2   • Chronic obstructive lung disease (HCC)    • Chronic renal failure    • Coronary artery disease    • Cortical senile cataract    • Diabetes mellitus  (HCC)    • Diabetes mellitus type 2, noninsulin dependent (HCC)     diabetes mellitus type 2, non-insulin treated, Medication treatment plan: oral diabetic medication   • Diabetes mellitus without complication (HCC)    • Diastolic dysfunction    • Diastolic heart failure (HCC)    • Essential hypertension     difficult to control      • Hip pain    • History of echocardiogram 08/19/2015    Echocardiogram W/ color flow 12349 (1) - Mild to moderate LVH with mild left atrial enlargement and normal aortic root.CLVH,preserved LV systolic function with Ef of 55%.Diastolic dysfunction.MV intact.AV thickened.Aortic sclerosis.   • Hyperlipidemia    • Influenza vaccine administered 10/02/2015    INFLUENZA IMMUN ADMIN OR PREV RECV'D  (1) - Ordered By: DIONNE MANDUJANO ([facility])    • Insomnia    • Insomnia     Other insomnia   • Joint pain    • Low back pain    • Muscle strain    • Nuclear cataract    • Onychomycosis    • Pneumococcal vaccination given 07/22/2016    PNEUMOC VAC/ADMIN/RCVD 4040F (3) - Ordered By: DIONNE MANDUJANO ([facility])   • Prostate cancer (HCC)    • Pure hypercholesterolemia    • Sedentary lifestyle      Past Surgical History:   Procedure Laterality Date   • BACK SURGERY     • CARDIAC ABLATION     • CARDIAC CATHETERIZATION  02/04/1986    Cardiac cath 05209 (1) - normal left heart catherization,non cardiac chest pain   • CARPAL TUNNEL RELEASE  10/19/2015    Carpal tunnel surgery (1) - Release of left carpal tunnel   • CATARACT EXTRACTION W/ INTRAOCULAR LENS IMPLANT Left 3/31/2017    Procedure: REMOVE CATARACT AND IMPLANT INRAOCULAR LENS LEFT EYE;  Surgeon: Hector Navarrete MD;  Location: [facility] OR;  Service:    • CATARACT EXTRACTION W/ INTRAOCULAR LENS IMPLANT Right 4/14/2017    Procedure: REMOVE CATARACT AND IMPLANT INTRAOCULAR LENS RIGHT EYE;  Surgeon: Hector Navarrete MD;  Location: [facility] OR;  Service:    • COLONOSCOPY N/A 10/31/2018    Procedure: COLONOSCOPY;  Surgeon: Herbie Christine MD;   Location: [facility] ENDOSCOPY;  Service: Gastroenterology   • ENDOSCOPY  2013    Colon endoscopy 47350 (2) - Hemorrhoids found.   • FRACTURE SURGERY     • INGUINAL HERNIA REPAIR Right 1968    Inguinal hernia, repair (2)   • INJECTION OF MEDICATION  2012    Albuterol (2u) (1) - Ordered By: LIDYA STEINER ([facility])    • INJECTION OF MEDICATION  2012    Atrovent (1) - Ordered By: LIDYA STEINER ([facility])    • INJECTION OF MEDICATION  02/10/2012    Depo Medrol (Methylprednisone) (3) - Ordered By: HEATHER PAK ([facility])    • INJECTION OF MEDICATION  2014    Kenalog (4) - Ordered By: DIMAS ANDRADE ([facility])    • JOINT REPLACEMENT     • LUMBAR LAMINECTOMY      Lumbar laminectomy (1)   • MANDIBLE FRACTURE SURGERY  10/11/1981    Treat nose/jaw fracture (1) - bilateral open reduction of fracture of mandible   • OTHER SURGICAL HISTORY  2011    Drain/Inject Major Joint  (1) - Ordered By: KARLOS HERNANDEZ ([facility])    • OTHER SURGICAL HISTORY      Insert IVC filter 62726 (1)   • OTHER SURGICAL HISTORY  10/11/2011    SPIROMETRY 68352 (1) - Ordered By: HEATHER PAK ([facility])   • TOTAL HIP ARTHROPLASTY Right      Social History     Socioeconomic History   • Marital status:    Tobacco Use   • Smoking status: Former Smoker     Quit date:      Years since quittin.9   • Smokeless tobacco: Never Used   Vaping Use   • Vaping Use: Never used   Substance and Sexual Activity   • Alcohol use: No   • Drug use: No   • Sexual activity: Defer     Comment: Marital status:      Family History   Problem Relation Age of Onset   • Diabetes Other    • Cancer Sister    • Heart disease Mother    • Hypertension Mother    • Heart disease Father    • Hypertension Father    • Coronary artery disease Neg Hx        Allergies:  Allergies   Allergen Reactions   • Aldactone [Spironolactone] Other (See Comments)     Hyponatremia and hyperkalemia       Review of Systems   Constitutional:  Negative for malaise/fatigue.   Cardiovascular: Negative for chest pain, dyspnea on exertion, orthopnea and palpitations.   Respiratory: Negative for cough and shortness of breath.    Neurological: Positive for tremors. Negative for dizziness and weakness.       Current Outpatient Medications   Medication Sig Dispense Refill   • acetaminophen-codeine (TYLENOL #3) 300-30 MG per tablet Take 1 tablet by mouth 4 (Four) Times a Day As Needed for Moderate Pain  (pain). 60 tablet 0   • albuterol sulfate  (90 Base) MCG/ACT inhaler Inhale 2 puffs Every 4 (Four) Hours As Needed for Wheezing. 18 g 11   • aspirin 81 MG EC tablet Take 81 mg by mouth daily.     • carbidopa-levodopa (SINEMET)  MG per tablet Take 1 tablet by mouth 3 (Three) Times a Day. 90 tablet 2   • ferrous sulfate (FeroSul) 325 (65 FE) MG tablet Take 1 tablet by mouth Daily With Breakfast. 30 tablet 5   • gabapentin (NEURONTIN) 300 MG capsule Take 1 capsule by mouth 3 (Three) Times a Day. 270 capsule 2   • glucose blood (True Metrix Blood Glucose Test) test strip Check glucose Once daily 100 each 6   • hydrALAZINE (APRESOLINE) 25 MG tablet Take 1 tablet by mouth 3 (Three) Times a Day. 90 tablet 4   • Lidocaine Pain Relief 4 % patch APPLY PATCH TO SKIN EVERY TWELVE HOURS AS DIRECTED. REMOVE USED ONE AFTER 12 HOURS     • lisinopril-hydrochlorothiazide (PRINZIDE,ZESTORETIC) 20-12.5 MG per tablet Take 1 tablet by mouth Daily. 90 tablet 2   • metoprolol succinate XL (TOPROL-XL) 25 MG 24 hr tablet TAKE ONE TABLET BY MOUTH EVERY NIGHT AT BEDTIME 30 tablet 5   • pravastatin (PRAVACHOL) 40 MG tablet Take 1 tablet by mouth Daily. 90 tablet 2   • primidone (Mysoline) 50 MG tablet Take 1 tablet by mouth 2 (Two) Times a Day. 180 tablet 1   • rivaroxaban (Xarelto) 10 MG tablet Take 1 tablet by mouth Daily. 30 tablet 11   • SITagliptin (Januvia) 50 MG tablet Take 1 tablet by mouth Daily. 90 tablet 2   • TRUEplus Lancets 30G misc Use to test Blood Sugars Once  "Daily 100 each 6     No current facility-administered medications for this visit.        Objective:     Vitals:    12/10/21 1318   BP: 118/70   BP Location: Left arm   Patient Position: Sitting   Cuff Size: Adult   Pulse: 73   SpO2: 94%   Weight: 71.7 kg (158 lb)   Height: 165.1 cm (65\")       Wt Readings from Last 3 Encounters:   12/10/21 71.7 kg (158 lb)   12/08/21 70.8 kg (156 lb)   11/08/21 69.1 kg (152 lb 4 oz)            Physical Exam  Constitutional:       General: He is not in acute distress.     Appearance: He is well-developed.   Neck:      Vascular: No JVD.   Cardiovascular:      Rate and Rhythm: Normal rate and regular rhythm.      Pulses: Intact distal pulses.      Heart sounds: Normal heart sounds.   Pulmonary:      Effort: Pulmonary effort is normal. No respiratory distress.      Breath sounds: Normal breath sounds.   Abdominal:      General: There is no distension.      Palpations: Abdomen is soft.   Musculoskeletal:         General: Normal range of motion.      Cervical back: Normal range of motion.   Neurological:      Mental Status: He is alert and oriented to person, place, and time.   Psychiatric:         Behavior: Behavior is cooperative.         Cardiographics  Results for orders placed during the hospital encounter of 11/08/17    Adult Transthoracic Echo Complete W/ Cont if Necessary Per Protocol    Interpretation Summary  · Left ventricular wall thickness is consistent with mild concentric hypertrophy.  · Left ventricular systolic function is normal. Estimated EF = 55%.  · Left ventricular diastolic dysfunction (grade I) consistent with impaired relaxation.      No radiology results for the last 30 days.     Lab Review     Lab Results   Component Value Date    TSH 0.990 08/23/2017     Lab Results   Component Value Date    GLUCOSE 92 10/04/2021    BUN 28 (H) 10/04/2021    CREATININE 1.47 (H) 10/04/2021    EGFRIFAFRI 55 (L) 10/04/2021    BCR 19.0 10/04/2021    K 4.4 10/04/2021    CO2 26.0 " 10/04/2021    CALCIUM 9.2 10/04/2021    PROTENTOTREF 6.4 05/14/2015    ALBUMIN 4.30 10/04/2021    LABIL2 1.7 05/14/2015    AST 17 10/04/2021    ALT 8 10/04/2021     Lab Results   Component Value Date    WBC 5.09 10/04/2021    HGB 13.3 10/04/2021    HCT 39.4 10/04/2021    MCV 89.5 10/04/2021     10/04/2021       Lab Results   Component Value Date    CKTOTAL 111 05/12/2021    CKMB 1.47 11/08/2017    TROPONINI 0.046 (H) 12/02/2018    TROPONINT <0.010 09/21/2021     Lab Results   Component Value Date    PROBNP 381.9 09/21/2021          The following portions of the patient's history were reviewed and updated as appropriate: allergies, current medications, past family history, past medical history, past social history, past surgical history and problem list.     Old records reviewed and pertinent information is included in the above objective data.     Assessment/Plan:      Diagnosis Plan   1. Chronic diastolic congestive heart failure (CMS/HCC)  EF:65%. NYHA Class II, Stage C. Patient appears euvolemic. and in a well perfused physiologic state. Hemodynamics are acceptable  BETA-BLOCKER:   Metoprolol Succinate  25mg   CORLANOR:  n/a  ACE/ARB: Lisinopril 20mg / HCTZ 12.5mg   DIURETIC: Lasix 20mg PRN. Has not had to have in several months.   ALDOSTERONE ANTAGONIST: n/a, allergy  IMDUR/HYDRALAZINE: hydralazine 25mg TID  DIGOXIN: n/a  Fluid restriction: 2 L  Sodium restriction:2 grams  Daily Weights  6MWT: n/a    Reiterated all educational points this visit.  Continue daily weight monitoring.  Continue restricted dietary sodium intake.  Discussed patient action plan for heart failure. Contact information for this office verbalized.  Recommended avoiding NSAIDs use.  Discussed warning signs requiring additional medical attention for heart failure.   Education points repeated back by patient.        2. Chronic deep vein thrombosis (DVT) of femoral vein of both lower extremities (CMS/HCC)  Xarelto 10mg daily for chronic  DVT    Still has extensive chronic DVT down both legs.        3. Coronary artery disease involving native coronary artery of native heart without angina pectoris  ASCAD.   Continue current treatment regimen.  Dietary sodium restriction.  Regular aerobic exercise. Using exercise bike at home BID.  Continue current medications.  Antiplatelet therapy: n/a    Beta-blocker:  Toprol XL 25mg    Statin: Pravastatin 40mg    ASA: 81mg     CCS Functional Classification of Angina : 0  Class Activity Evoking Angina Limits to Physical Activity   I Prolonged exertion None   II Walking > 2 blocks or > 1 flight of stairs Slight   III Walking < 2 blocks or < 1 flight of stairs Marked   IV Minimal or at rest Severe     Lab Results   Component Value Date    CHOL 171 06/08/2021    CHLPL 141 08/26/2016    TRIG 43 06/08/2021    HDL 71 (H) 06/08/2021    LDL 91 06/08/2021     LDL controlled on Pravastatin 40mg.       4. Carotid disease, mild US every 2 years. Mild plaque present, no stenosis.   Followed with Dr. Hamman.   Due: 2023        5. Essential HTN Elevated at most office visits, but no issues.   - Hydralazine 25 mg TID  - Lisinopril HCTZ 20-12.5mg   - Toprol XL 25mg           Follow up 6 months.           This document has been electronically signed by REGINA Lopez on December 10, 2021 13:35 CST

## 2021-12-15 ENCOUNTER — TELEPHONE (OUTPATIENT)
Dept: FAMILY MEDICINE CLINIC | Facility: CLINIC | Age: 86
End: 2021-12-15

## 2021-12-15 NOTE — TELEPHONE ENCOUNTER
PT CALLED AND SAID THAT DR. BETHEA GAVE HIM A PACKET OF FOODS HE SHOULD BE EATING DUE TO BEING A DIABETIC AT ONE OF HIS PAST VISITS. HE STATES THAT HE HAS MISPLACED IT AND WOULD LIKE ANOTHER ONE. THANKS

## 2021-12-23 RX ORDER — HYDRALAZINE HYDROCHLORIDE 25 MG/1
TABLET, FILM COATED ORAL
Qty: 90 TABLET | Refills: 4 | Status: SHIPPED | OUTPATIENT
Start: 2021-12-23

## 2021-12-27 ENCOUNTER — TELEPHONE (OUTPATIENT)
Dept: GENERAL RADIOLOGY | Facility: CLINIC | Age: 86
End: 2021-12-27

## 2021-12-27 ENCOUNTER — OFFICE VISIT (OUTPATIENT)
Dept: PODIATRY | Facility: CLINIC | Age: 86
End: 2021-12-27

## 2021-12-27 VITALS
DIASTOLIC BLOOD PRESSURE: 88 MMHG | OXYGEN SATURATION: 98 % | BODY MASS INDEX: 26.33 KG/M2 | WEIGHT: 158 LBS | HEIGHT: 65 IN | SYSTOLIC BLOOD PRESSURE: 152 MMHG | HEART RATE: 82 BPM

## 2021-12-27 DIAGNOSIS — E11.9 TYPE 2 DIABETES MELLITUS WITHOUT COMPLICATION, WITHOUT LONG-TERM CURRENT USE OF INSULIN (HCC): Primary | ICD-10-CM

## 2021-12-27 DIAGNOSIS — M79.674 CHRONIC TOE PAIN, BILATERAL: ICD-10-CM

## 2021-12-27 DIAGNOSIS — G89.29 CHRONIC TOE PAIN, BILATERAL: ICD-10-CM

## 2021-12-27 DIAGNOSIS — B35.1 ONYCHOMYCOSIS: ICD-10-CM

## 2021-12-27 DIAGNOSIS — M79.675 CHRONIC TOE PAIN, BILATERAL: ICD-10-CM

## 2021-12-27 DIAGNOSIS — Z79.01 ANTICOAGULANT LONG-TERM USE: ICD-10-CM

## 2021-12-27 PROCEDURE — 11721 DEBRIDE NAIL 6 OR MORE: CPT | Performed by: PODIATRIST

## 2021-12-27 NOTE — PROGRESS NOTES
General Keith Ruvalcaba Jr.  1935  86 y.o. male   PCP: MILENA BETHEA 12/08/2021  BS- 101 per patient     Patient presents today for routine diabetic foot care.    12/27/2021     Chief Complaint   Patient presents with   • Left Foot - Diabetic foot care   • Right Foot - Diabetic foot care       History of Present Illness    Patient presents to clinic today for routine diabetic foot care.      Past Medical History:   Diagnosis Date   • Acquired hallux rigidus    • Anemia    • Arthritis    • Arthropathy of lumbar facet joint    • Carpal tunnel syndrome    • CHF (congestive heart failure) (HCC)    • Chronic diastolic congestive heart failure (HCC)    • Chronic kidney disease    • Chronic kidney disease, stage II (mild)     Chronic kidney disease stage 2   • Chronic obstructive lung disease (HCC)    • Chronic renal failure    • Coronary artery disease    • Cortical senile cataract    • Diabetes mellitus (HCC)    • Diabetes mellitus type 2, noninsulin dependent (HCC)     diabetes mellitus type 2, non-insulin treated, Medication treatment plan: oral diabetic medication   • Diabetes mellitus without complication (HCC)    • Diastolic dysfunction    • Diastolic heart failure (HCC)    • Essential hypertension     difficult to control      • Hip pain    • History of echocardiogram 08/19/2015    Echocardiogram W/ color flow 26733 (1) - Mild to moderate LVH with mild left atrial enlargement and normal aortic root.CLVH,preserved LV systolic function with Ef of 55%.Diastolic dysfunction.MV intact.AV thickened.Aortic sclerosis.   • Hyperlipidemia    • Influenza vaccine administered 10/02/2015    INFLUENZA IMMUN ADMIN OR PREV RECV'D  (1) - Ordered By: DIONNE MANDUJANO ([facility])    • Insomnia    • Insomnia     Other insomnia   • Joint pain    • Low back pain    • Muscle strain    • Nuclear cataract    • Onychomycosis    • Pneumococcal vaccination given 07/22/2016    PNEUMOC VAC/ADMIN/RCVD 4040F (3) - Ordered By: DIONNE MANDUJANO ([facility])    • Prostate cancer (HCC)    • Pure hypercholesterolemia    • Sedentary lifestyle          Past Surgical History:   Procedure Laterality Date   • BACK SURGERY     • CARDIAC ABLATION     • CARDIAC CATHETERIZATION  02/04/1986    Cardiac cath 66421 (1) - normal left heart catherization,non cardiac chest pain   • CARPAL TUNNEL RELEASE  10/19/2015    Carpal tunnel surgery (1) - Release of left carpal tunnel   • CATARACT EXTRACTION W/ INTRAOCULAR LENS IMPLANT Left 3/31/2017    Procedure: REMOVE CATARACT AND IMPLANT INRAOCULAR LENS LEFT EYE;  Surgeon: Hector Navarrete MD;  Location: [facility] OR;  Service:    • CATARACT EXTRACTION W/ INTRAOCULAR LENS IMPLANT Right 4/14/2017    Procedure: REMOVE CATARACT AND IMPLANT INTRAOCULAR LENS RIGHT EYE;  Surgeon: Hector Navarrete MD;  Location: [facility] OR;  Service:    • COLONOSCOPY N/A 10/31/2018    Procedure: COLONOSCOPY;  Surgeon: Herbie Christine MD;  Location: [facility] ENDOSCOPY;  Service: Gastroenterology   • ENDOSCOPY  08/05/2013    Colon endoscopy 80272 (2) - Hemorrhoids found.   • FRACTURE SURGERY     • INGUINAL HERNIA REPAIR Right 06/07/1968    Inguinal hernia, repair (2)   • INJECTION OF MEDICATION  09/06/2012    Albuterol (2u) (1) - Ordered By: LIDYA STEINER ([facility])    • INJECTION OF MEDICATION  09/06/2012    Atrovent (1) - Ordered By: LIDYA STEINER ([facility])    • INJECTION OF MEDICATION  02/10/2012    Depo Medrol (Methylprednisone) (3) - Ordered By: HEATHER PAK ([facility])    • INJECTION OF MEDICATION  02/09/2014    Kenalog (4) - Ordered By: DIMAS ANDRADE ([facility])    • JOINT REPLACEMENT     • LUMBAR LAMINECTOMY  2007    Lumbar laminectomy (1)   • MANDIBLE FRACTURE SURGERY  10/11/1981    Treat nose/jaw fracture (1) - bilateral open reduction of fracture of mandible   • OTHER SURGICAL HISTORY  09/24/2011    Drain/Inject Major Joint 20610 (1) - Ordered By: KARLOS HERNANDEZ ([facility])    • OTHER SURGICAL HISTORY      Insert IVC filter 63260 (1)   • OTHER  SURGICAL HISTORY  10/11/2011    SPIROMETRY 45756 (1) - Ordered By: HEATHER PAK ([facility])   • TOTAL HIP ARTHROPLASTY Right          Family History   Problem Relation Age of Onset   • Diabetes Other    • Cancer Sister    • Heart disease Mother    • Hypertension Mother    • Heart disease Father    • Hypertension Father    • Coronary artery disease Neg Hx        Allergies   Allergen Reactions   • Aldactone [Spironolactone] Other (See Comments)     Hyponatremia and hyperkalemia       Social History     Socioeconomic History   • Marital status:    Tobacco Use   • Smoking status: Former Smoker     Quit date:      Years since quittin.0   • Smokeless tobacco: Never Used   Vaping Use   • Vaping Use: Never used   Substance and Sexual Activity   • Alcohol use: No   • Drug use: No   • Sexual activity: Defer     Comment: Marital status:          Current Outpatient Medications   Medication Sig Dispense Refill   • acetaminophen-codeine (TYLENOL #3) 300-30 MG per tablet Take 1 tablet by mouth 4 (Four) Times a Day As Needed for Moderate Pain  (pain). 60 tablet 0   • albuterol sulfate  (90 Base) MCG/ACT inhaler Inhale 2 puffs Every 4 (Four) Hours As Needed for Wheezing. 18 g 11   • aspirin 81 MG EC tablet Take 81 mg by mouth daily.     • carbidopa-levodopa (SINEMET)  MG per tablet Take 1 tablet by mouth 3 (Three) Times a Day. 90 tablet 2   • ferrous sulfate (FeroSul) 325 (65 FE) MG tablet Take 1 tablet by mouth Daily With Breakfast. 30 tablet 5   • gabapentin (NEURONTIN) 300 MG capsule Take 1 capsule by mouth 3 (Three) Times a Day. 270 capsule 2   • glucose blood (True Metrix Blood Glucose Test) test strip Check glucose Once daily 100 each 6   • hydrALAZINE (APRESOLINE) 25 MG tablet TAKE ONE TABLET BY MOUTH THREE TIMES A DAY 90 tablet 4   • Lidocaine Pain Relief 4 % patch APPLY PATCH TO SKIN EVERY TWELVE HOURS AS DIRECTED. REMOVE USED ONE AFTER 12 HOURS     • lisinopril-hydrochlorothiazide  "(PRINZIDE,ZESTORETIC) 20-12.5 MG per tablet Take 1 tablet by mouth Daily. 90 tablet 2   • metoprolol succinate XL (TOPROL-XL) 25 MG 24 hr tablet TAKE ONE TABLET BY MOUTH EVERY NIGHT AT BEDTIME 30 tablet 5   • pravastatin (PRAVACHOL) 40 MG tablet Take 1 tablet by mouth Daily. 90 tablet 2   • primidone (Mysoline) 50 MG tablet Take 1 tablet by mouth 2 (Two) Times a Day. 180 tablet 1   • rivaroxaban (Xarelto) 10 MG tablet Take 1 tablet by mouth Daily. 30 tablet 11   • SITagliptin (Januvia) 50 MG tablet Take 1 tablet by mouth Daily. 90 tablet 2   • TRUEplus Lancets 30G misc Use to test Blood Sugars Once Daily 100 each 6     No current facility-administered medications for this visit.       Review of Systems   HENT: Negative.    Eyes: Negative.    Respiratory: Negative.    Cardiovascular: Negative.    Gastrointestinal: Negative.    Endocrine: Negative.    Genitourinary: Negative.    Musculoskeletal: Positive for gait problem.        Toe pain   Skin: Negative.    Allergic/Immunologic: Negative.    Neurological: Positive for tremors and weakness.   Hematological: Negative.    Psychiatric/Behavioral: Negative.          OBJECTIVE    /88   Pulse 82   Ht 165.1 cm (65\")   Wt 71.7 kg (158 lb)   SpO2 98%   BMI 26.29 kg/m²       Physical Exam   Constitutional: He is oriented to person, place, and time. He appears well-developed. No distress.   Pulmonary/Chest: Effort normal. No respiratory distress.   Musculoskeletal: Normal range of motion. No signs of injury.   Neurological: He is alert and oriented to person, place, and time.   Skin: Skin is warm and dry. Capillary refill takes less than 2 seconds.   Psychiatric: His behavior is normal. Mood normal.   Vitals reviewed.      Gait: Normal    Assistive Device: Walker    Lower Extremity    Cardiovascular:    DP/PT pulses palpable bilateral  CFT brisk  to all digits  Skin temp is warm to warm from proximal tibia to distal digits bilateral  Pedal hair growth decreased. "   Nonpitting vanesa-malleolar edema bilateral  Musculoskeletal:  Muscle strength is 5/5 for all muscle groups tested   ROM of the 1st MTP is WNL bilateral   ROM of the ankle joint is  WNL bilateral   Dermatological:   Skin is warm, dry and intact bilateral  Webspaces 1-4 bilateral are clean, dry and intact.   No subcutaneous nodules or masses noted    Nails 1-5 bilateral are  discolored, elongated with subungual debris.  Pain on palpation to the nail plates.  Neurological:   Protective sensation intact 10 out of 10 sites bilateral  Sensation intact to light touch bilateral      Procedures        ASSESSMENT AND PLAN    Diagnoses and all orders for this visit:    1. Type 2 diabetes mellitus without complication, without long-term current use of insulin (HCC) (Primary)    2. Onychomycosis    3. Chronic toe pain, bilateral    4. Anticoagulant long-term use        - Nails 1-5 bilateral were debrided in length and thickness with nail nipper and electric  to decrease fungal load and risk of infection.   - All the patients questions were answered.  - RTC 3 months or sooner if needed.               This document has been electronically signed by Partha Campos DPM on December 27, 2021 08:35 CST     12/27/2021  08:35 CST

## 2022-01-25 DIAGNOSIS — E61.1 IRON DEFICIENCY: ICD-10-CM

## 2022-01-25 RX ORDER — METOPROLOL SUCCINATE 25 MG/1
25 TABLET, EXTENDED RELEASE ORAL
Qty: 30 TABLET | Refills: 5 | Status: SHIPPED | OUTPATIENT
Start: 2022-01-25

## 2022-01-25 RX ORDER — FERROUS SULFATE 325(65) MG
1 TABLET ORAL
Qty: 30 TABLET | Refills: 5 | Status: SHIPPED | OUTPATIENT
Start: 2022-01-25

## 2022-01-25 NOTE — TELEPHONE ENCOUNTER
Incoming Refill Request      Medication requested (name and dose): metoprolol succinate XL (TOPROL-XL) 25 MG 24 hr tablet   ferrous sulfate (FeroSul) 325 (65 FE) MG tablet         Pharmacy where request should be sent: [facility]   [id number]    [place], [place]     Additional details provided by patient:     Best call back number: [phone] grandsons phone is visiting in [place]     Does the patient have less than a 3 day supply:  [x] Yes  [] No    Sherine Palacios Rep  01/25/22, 13:14 CST

## 2022-04-14 NOTE — TELEPHONE ENCOUNTER
Called pharmacy they say they have not received RX I have re-faxed 3 times this AM with a success on our end   
This RX has been faxed to that number several times and have gotten a success on our end   
[facility] HAS CALLED WITH THE CORRECT FX NUMBER =   THEY ARE WAITING ON PRESP FOR A WALKER FOR GENERAL LOMBARDO.he HAS BEEN TRYING TO GET ONE FOR OVER 2WKS NOW  
Continue Regimen: Triamcinolone 0.1% cream apply daily PRN
Detail Level: Zone
Render In Strict Bullet Format?: No

## 2022-05-02 RX ORDER — LISINOPRIL AND HYDROCHLOROTHIAZIDE 20; 12.5 MG/1; MG/1
TABLET ORAL
Qty: 90 TABLET | Refills: 0 | Status: SHIPPED | OUTPATIENT
Start: 2022-05-02 | End: 2022-07-29

## 2022-05-05 RX ORDER — SITAGLIPTIN 50 MG/1
TABLET, FILM COATED ORAL
Qty: 90 TABLET | Refills: 0 | Status: SHIPPED | OUTPATIENT
Start: 2022-05-05 | End: 2022-08-01

## 2022-05-12 ENCOUNTER — TELEPHONE (OUTPATIENT)
Dept: PULMONOLOGY | Facility: CLINIC | Age: 87
End: 2022-05-12

## 2022-05-12 NOTE — TELEPHONE ENCOUNTER
Called to move patients appt on 6/9/ at 1000 to a PM appt on that same day or a different day due to schedule change. No answer.

## 2022-07-29 RX ORDER — LISINOPRIL AND HYDROCHLOROTHIAZIDE 20; 12.5 MG/1; MG/1
TABLET ORAL
Qty: 90 TABLET | Refills: 0 | Status: SHIPPED | OUTPATIENT
Start: 2022-07-29

## 2022-08-01 RX ORDER — SITAGLIPTIN 50 MG/1
TABLET, FILM COATED ORAL
Qty: 90 TABLET | Refills: 0 | Status: SHIPPED | OUTPATIENT
Start: 2022-08-01

## 2022-12-07 NOTE — TELEPHONE ENCOUNTER
Mr Ruvalcaba called wanting to know why Dr Larkin has not called in him pain medication for his L hip pain to [facility]. Please call back @ [phone]  
Tried to call patient pharmacy does have RX's now they did not receive them this am   
no

## 2023-08-09 ENCOUNTER — TELEPHONE (OUTPATIENT)
Dept: FAMILY MEDICINE CLINIC | Facility: CLINIC | Age: 88
End: 2023-08-09
Payer: MEDICARE

## 2023-08-09 DIAGNOSIS — E11.59 TYPE 2 DIABETES MELLITUS WITH OTHER CIRCULATORY COMPLICATION, WITHOUT LONG-TERM CURRENT USE OF INSULIN: Primary | ICD-10-CM

## 2023-08-09 DIAGNOSIS — I65.23 ASYMPTOMATIC BILATERAL CAROTID ARTERY STENOSIS: ICD-10-CM

## 2023-08-09 DIAGNOSIS — I25.10 CORONARY ARTERY DISEASE INVOLVING NATIVE CORONARY ARTERY OF NATIVE HEART WITHOUT ANGINA PECTORIS: ICD-10-CM

## 2023-08-09 DIAGNOSIS — N18.2 CHRONIC KIDNEY DISEASE, STAGE II (MILD): ICD-10-CM

## 2023-08-09 RX ORDER — AMLODIPINE BESYLATE 5 MG/1
5 TABLET ORAL DAILY
COMMUNITY
Start: 2023-07-14 | End: 2023-08-10 | Stop reason: SDUPTHER

## 2023-08-09 RX ORDER — METOPROLOL SUCCINATE 50 MG/1
50 TABLET, EXTENDED RELEASE ORAL DAILY
COMMUNITY
Start: 2023-07-17

## 2023-08-09 RX ORDER — APIXABAN 5 MG/1
5 TABLET, FILM COATED ORAL 2 TIMES DAILY
COMMUNITY
End: 2023-08-09 | Stop reason: SDUPTHER

## 2023-08-09 RX ORDER — UMECLIDINIUM BROMIDE AND VILANTEROL TRIFENATATE 62.5; 25 UG/1; UG/1
1 POWDER RESPIRATORY (INHALATION) DAILY
COMMUNITY
Start: 2023-07-07

## 2023-08-09 RX ORDER — ATORVASTATIN CALCIUM 20 MG/1
20 TABLET, FILM COATED ORAL
COMMUNITY

## 2023-08-09 NOTE — TELEPHONE ENCOUNTER
Mr. Ruvalcaba called and stated that he has an appt for August 17, he has recently moved back from [place].  He is running out of his medication Eliquis and Januvia 50MG.  He is supposed to be getting his medical records sent to Dr. Larkin but thinks that is something that he is supposed to sign on the 17th.  He does not know what to do about his medication.    Pt uses [facility]    Pt call back [phone]

## 2023-08-10 RX ORDER — AMLODIPINE BESYLATE 5 MG/1
5 TABLET ORAL DAILY
Qty: 30 TABLET | Refills: 1 | Status: SHIPPED | OUTPATIENT
Start: 2023-08-10

## 2023-08-10 NOTE — TELEPHONE ENCOUNTER
Incoming Refill Request      Medication requested (name and dose): amLODIPine (NORVASC) 5 MG tablet     Pharmacy where request should be sent: [facility]    Additional details provided by patient: he has recently moved back from [place]. He has also discover that he is needing his Amlodipine. He is supposed to be getting his medical records sent to Dr. Larkin but thinks that is something that he is supposed to sign on the 17th. He does not know what to do about his medication.     Best call back number: [phone]    Does the patient have less than a 3 day supply:  [x] Yes  [] No    Sherine Palma Rep  08/10/23, 08:29 CDT

## 2023-08-16 PROCEDURE — 99284 EMERGENCY DEPT VISIT MOD MDM: CPT

## 2023-08-17 ENCOUNTER — APPOINTMENT (OUTPATIENT)
Dept: GENERAL RADIOLOGY | Facility: HOSPITAL | Age: 88
End: 2023-08-17
Payer: MEDICARE

## 2023-08-17 ENCOUNTER — HOSPITAL ENCOUNTER (EMERGENCY)
Facility: HOSPITAL | Age: 88
Discharge: HOME | End: 2023-08-17
Attending: EMERGENCY MEDICINE
Payer: MEDICARE

## 2023-08-17 ENCOUNTER — OFFICE VISIT (OUTPATIENT)
Dept: FAMILY MEDICINE CLINIC | Facility: CLINIC | Age: 88
End: 2023-08-17
Payer: MEDICARE

## 2023-08-17 VITALS
OXYGEN SATURATION: 95 % | SYSTOLIC BLOOD PRESSURE: 126 MMHG | HEART RATE: 64 BPM | HEIGHT: 67 IN | BODY MASS INDEX: 25.9 KG/M2 | WEIGHT: 165 LBS | RESPIRATION RATE: 20 BRPM | TEMPERATURE: 97.4 F | DIASTOLIC BLOOD PRESSURE: 63 MMHG

## 2023-08-17 VITALS
HEIGHT: 67 IN | BODY MASS INDEX: 24.57 KG/M2 | HEART RATE: 68 BPM | WEIGHT: 156.56 LBS | OXYGEN SATURATION: 98 % | SYSTOLIC BLOOD PRESSURE: 138 MMHG | DIASTOLIC BLOOD PRESSURE: 70 MMHG

## 2023-08-17 DIAGNOSIS — E11.9 DIABETES MELLITUS TYPE 2, NONINSULIN DEPENDENT: ICD-10-CM

## 2023-08-17 DIAGNOSIS — E78.5 HYPERLIPIDEMIA, UNSPECIFIED HYPERLIPIDEMIA TYPE: ICD-10-CM

## 2023-08-17 DIAGNOSIS — J40 BRONCHITIS: Primary | ICD-10-CM

## 2023-08-17 DIAGNOSIS — I10 ESSENTIAL HYPERTENSION: ICD-10-CM

## 2023-08-17 DIAGNOSIS — R04.2 HEMOPTYSIS: ICD-10-CM

## 2023-08-17 DIAGNOSIS — I25.10 CORONARY ARTERY DISEASE INVOLVING NATIVE CORONARY ARTERY OF NATIVE HEART WITHOUT ANGINA PECTORIS: Primary | ICD-10-CM

## 2023-08-17 PROBLEM — G20 PARKINSONISM: Status: ACTIVE | Noted: 2023-07-17

## 2023-08-17 PROBLEM — R29.898 MUSCULAR DECONDITIONING: Status: ACTIVE | Noted: 2023-03-14

## 2023-08-17 PROBLEM — R49.0 HOARSENESS OF VOICE: Status: ACTIVE | Noted: 2022-11-04

## 2023-08-17 PROBLEM — K21.9 LARYNGOPHARYNGEAL REFLUX DISEASE: Status: ACTIVE | Noted: 2023-03-14

## 2023-08-17 PROBLEM — M54.12 CERVICAL RADICULOPATHY: Status: ACTIVE | Noted: 2022-05-24

## 2023-08-17 PROBLEM — E11.40 DIABETIC NEUROPATHY: Status: ACTIVE | Noted: 2022-02-01

## 2023-08-17 PROBLEM — Z86.711 HISTORY OF PULMONARY EMBOLISM: Status: ACTIVE | Noted: 2023-03-14

## 2023-08-17 PROBLEM — J41.1 MUCOPURULENT CHRONIC BRONCHITIS: Status: ACTIVE | Noted: 2023-04-12

## 2023-08-17 PROBLEM — R27.0 ATAXIA: Status: ACTIVE | Noted: 2022-02-01

## 2023-08-17 PROBLEM — I48.0 PAROXYSMAL ATRIAL FIBRILLATION: Status: ACTIVE | Noted: 2022-03-01

## 2023-08-17 PROBLEM — Z87.438 HISTORY OF ERECTILE DYSFUNCTION: Status: ACTIVE | Noted: 2023-03-14

## 2023-08-17 PROBLEM — R13.10 DYSPHAGIA: Status: ACTIVE | Noted: 2022-09-05

## 2023-08-17 PROBLEM — Z85.46 HISTORY OF MALIGNANT NEOPLASM OF PROSTATE: Status: ACTIVE | Noted: 2022-02-01

## 2023-08-17 PROBLEM — Z86.69 HISTORY OF CATARACT: Status: ACTIVE | Noted: 2022-09-05

## 2023-08-17 PROBLEM — R53.81 PHYSICAL DECONDITIONING: Status: ACTIVE | Noted: 2023-04-10

## 2023-08-17 PROBLEM — M48.04 THORACIC SPINAL STENOSIS: Status: ACTIVE | Noted: 2017-10-26

## 2023-08-17 PROBLEM — I11.0 HYPERTENSIVE HEART DISEASE WITH HEART FAILURE: Status: ACTIVE | Noted: 2022-09-05

## 2023-08-17 PROBLEM — D63.8 ANEMIA, CHRONIC DISEASE: Status: ACTIVE | Noted: 2022-09-05

## 2023-08-17 PROBLEM — J44.1 COPD EXACERBATION: Status: ACTIVE | Noted: 2023-02-17

## 2023-08-17 PROBLEM — F09 MILD COGNITIVE DISORDER: Status: ACTIVE | Noted: 2023-07-17

## 2023-08-17 PROBLEM — D68.69 OTHER THROMBOPHILIA: Status: ACTIVE | Noted: 2023-07-17

## 2023-08-17 PROBLEM — G25.0 TREMOR, ESSENTIAL: Status: ACTIVE | Noted: 2022-02-01

## 2023-08-17 PROBLEM — I48.11 LONGSTANDING PERSISTENT ATRIAL FIBRILLATION: Status: ACTIVE | Noted: 2023-03-14

## 2023-08-17 PROBLEM — E04.1 THYROID NODULE: Status: ACTIVE | Noted: 2022-03-01

## 2023-08-17 LAB
ALBUMIN SERPL-MCNC: 4.4 G/DL (ref 3.5–5.2)
ALBUMIN/GLOB SERPL: 1.5 G/DL
ALP SERPL-CCNC: 87 U/L (ref 39–117)
ALT SERPL W P-5'-P-CCNC: 9 U/L (ref 1–41)
ANION GAP SERPL CALCULATED.3IONS-SCNC: 14 MMOL/L (ref 5–15)
AST SERPL-CCNC: 17 U/L (ref 1–40)
BASOPHILS # BLD AUTO: 0.05 10*3/MM3 (ref 0–0.2)
BASOPHILS NFR BLD AUTO: 0.6 % (ref 0–1.5)
BILIRUB SERPL-MCNC: 0.4 MG/DL (ref 0–1.2)
BUN SERPL-MCNC: 22 MG/DL (ref 8–23)
BUN/CREAT SERPL: 17.7 (ref 7–25)
CALCIUM SPEC-SCNC: 9.1 MG/DL (ref 8.6–10.5)
CHLORIDE SERPL-SCNC: 106 MMOL/L (ref 98–107)
CO2 SERPL-SCNC: 23 MMOL/L (ref 22–29)
CREAT SERPL-MCNC: 1.24 MG/DL (ref 0.76–1.27)
DEPRECATED RDW RBC AUTO: 48.8 FL (ref 37–54)
EGFRCR SERPLBLD CKD-EPI 2021: 56.6 ML/MIN/1.73
EOSINOPHIL # BLD AUTO: 0.08 10*3/MM3 (ref 0–0.4)
EOSINOPHIL NFR BLD AUTO: 0.9 % (ref 0.3–6.2)
ERYTHROCYTE [DISTWIDTH] IN BLOOD BY AUTOMATED COUNT: 14.6 % (ref 12.3–15.4)
GLOBULIN UR ELPH-MCNC: 3 GM/DL
GLUCOSE SERPL-MCNC: 129 MG/DL (ref 65–99)
HCT VFR BLD AUTO: 45.9 % (ref 37.5–51)
HGB BLD-MCNC: 15 G/DL (ref 13–17.7)
HOLD SPECIMEN: NORMAL
HOLD SPECIMEN: NORMAL
IMM GRANULOCYTES # BLD AUTO: 0.02 10*3/MM3 (ref 0–0.05)
IMM GRANULOCYTES NFR BLD AUTO: 0.2 % (ref 0–0.5)
INR PPP: 1.18 (ref 0.8–1.2)
LYMPHOCYTES # BLD AUTO: 0.95 10*3/MM3 (ref 0.7–3.1)
LYMPHOCYTES NFR BLD AUTO: 10.8 % (ref 19.6–45.3)
MCH RBC QN AUTO: 29.4 PG (ref 26.6–33)
MCHC RBC AUTO-ENTMCNC: 32.7 G/DL (ref 31.5–35.7)
MCV RBC AUTO: 90 FL (ref 79–97)
MONOCYTES # BLD AUTO: 0.72 10*3/MM3 (ref 0.1–0.9)
MONOCYTES NFR BLD AUTO: 8.2 % (ref 5–12)
NEUTROPHILS NFR BLD AUTO: 6.97 10*3/MM3 (ref 1.7–7)
NEUTROPHILS NFR BLD AUTO: 79.3 % (ref 42.7–76)
NRBC BLD AUTO-RTO: 0 /100 WBC (ref 0–0.2)
NT-PROBNP SERPL-MCNC: 1121 PG/ML (ref 0–1800)
PLATELET # BLD AUTO: 323 10*3/MM3 (ref 140–450)
PMV BLD AUTO: 8.6 FL (ref 6–12)
POTASSIUM SERPL-SCNC: 3.5 MMOL/L (ref 3.5–5.2)
PROT SERPL-MCNC: 7.4 G/DL (ref 6–8.5)
PROTHROMBIN TIME: 15 SECONDS (ref 11.1–15.3)
QT INTERVAL: 410 MS
QTC INTERVAL: 451 MS
RBC # BLD AUTO: 5.1 10*6/MM3 (ref 4.14–5.8)
SODIUM SERPL-SCNC: 143 MMOL/L (ref 136–145)
TROPONIN T SERPL HS-MCNC: 34 NG/L
TROPONIN T SERPL HS-MCNC: 35 NG/L
WBC NRBC COR # BLD: 8.79 10*3/MM3 (ref 3.4–10.8)
WHOLE BLOOD HOLD COAG: NORMAL
WHOLE BLOOD HOLD SPECIMEN: NORMAL

## 2023-08-17 PROCEDURE — 80053 COMPREHEN METABOLIC PANEL: CPT | Performed by: EMERGENCY MEDICINE

## 2023-08-17 PROCEDURE — 93005 ELECTROCARDIOGRAM TRACING: CPT | Performed by: STUDENT IN AN ORGANIZED HEALTH CARE EDUCATION/TRAINING PROGRAM

## 2023-08-17 PROCEDURE — 84484 ASSAY OF TROPONIN QUANT: CPT | Performed by: EMERGENCY MEDICINE

## 2023-08-17 PROCEDURE — 85025 COMPLETE CBC W/AUTO DIFF WBC: CPT | Performed by: EMERGENCY MEDICINE

## 2023-08-17 PROCEDURE — 63710000001 PREDNISONE PER 5 MG: Performed by: EMERGENCY MEDICINE

## 2023-08-17 PROCEDURE — 93005 ELECTROCARDIOGRAM TRACING: CPT | Performed by: EMERGENCY MEDICINE

## 2023-08-17 PROCEDURE — 36415 COLL VENOUS BLD VENIPUNCTURE: CPT

## 2023-08-17 PROCEDURE — 83880 ASSAY OF NATRIURETIC PEPTIDE: CPT | Performed by: EMERGENCY MEDICINE

## 2023-08-17 PROCEDURE — 71045 X-RAY EXAM CHEST 1 VIEW: CPT

## 2023-08-17 PROCEDURE — 85610 PROTHROMBIN TIME: CPT | Performed by: EMERGENCY MEDICINE

## 2023-08-17 RX ORDER — SODIUM CHLORIDE 0.9 % (FLUSH) 0.9 %
10 SYRINGE (ML) INJECTION AS NEEDED
Status: DISCONTINUED | OUTPATIENT
Start: 2023-08-17 | End: 2023-08-17 | Stop reason: HOSPADM

## 2023-08-17 RX ORDER — PRIMIDONE 50 MG/1
50 TABLET ORAL 3 TIMES DAILY
COMMUNITY

## 2023-08-17 RX ORDER — AZITHROMYCIN 250 MG/1
500 TABLET, FILM COATED ORAL ONCE
Status: COMPLETED | OUTPATIENT
Start: 2023-08-17 | End: 2023-08-17

## 2023-08-17 RX ORDER — AZITHROMYCIN 500 MG/1
500 TABLET, FILM COATED ORAL DAILY
Qty: 5 TABLET | Refills: 0 | Status: SHIPPED | OUTPATIENT
Start: 2023-08-17 | End: 2023-08-17

## 2023-08-17 RX ORDER — PREDNISONE 10 MG/1
10 TABLET ORAL ONCE
Status: COMPLETED | OUTPATIENT
Start: 2023-08-17 | End: 2023-08-17

## 2023-08-17 RX ORDER — HYDRALAZINE HYDROCHLORIDE 25 MG/1
25 TABLET, FILM COATED ORAL 3 TIMES DAILY
COMMUNITY

## 2023-08-17 RX ORDER — PREDNISONE 10 MG/1
10 TABLET ORAL DAILY
Qty: 5 TABLET | Refills: 0 | Status: SHIPPED | OUTPATIENT
Start: 2023-08-17 | End: 2023-08-22

## 2023-08-17 RX ADMIN — AZITHROMYCIN DIHYDRATE 500 MG: 250 TABLET, FILM COATED ORAL at 03:39

## 2023-08-17 RX ADMIN — PREDNISONE 10 MG: 10 TABLET ORAL at 03:40

## 2023-08-17 NOTE — PROGRESS NOTES
Subjective   General CAROL Ruvalcaba is a 87 y.o. male.   Cc: follow up of chronic medical issues    Coronary Artery Disease  Presents for follow-up visit. Symptoms include shortness of breath. Pertinent negatives include no chest pain, chest pressure, chest tightness, dizziness, leg swelling, muscle weakness, palpitations or weight gain. Risk factors include hyperlipidemia.   Hypertension  This is a chronic problem. The current episode started more than 1 year ago. The problem has been gradually improving since onset. Associated symptoms include shortness of breath. Pertinent negatives include no anxiety, blurred vision, chest pain, headaches, malaise/fatigue, neck pain, orthopnea, palpitations, peripheral edema, PND or sweats. Current antihypertension treatment includes calcium channel blockers and alpha 1 blockers.   Hyperlipidemia  This is a chronic problem. The current episode started more than 1 year ago. The problem is resistant. Associated symptoms include shortness of breath. Pertinent negatives include no chest pain. Current antihyperlipidemic treatment includes statins.   Diabetes  He presents for his follow-up diabetic visit. He has type 2 diabetes mellitus. The initial diagnosis of diabetes was made 6 years ago. Pertinent negatives for hypoglycemia include no dizziness, headaches or sweats. Pertinent negatives for diabetes include no blurred vision, no chest pain, no fatigue, no polydipsia, no polyphagia and no polyuria.   The patient needs a new Rollator walker.He has Osteoarthritis of  multiple joints,Arthropathy of his lumbar spine and history of Cervical Radiculopathy.  The following portions of the patient's history were reviewed and updated as appropriate: allergies, current medications, past family history, past medical history, past social history, past surgical history, and problem list.    Review of Systems   Constitutional:  Negative for fatigue, malaise/fatigue and weight gain.   Eyes:  Negative for  "blurred vision.   Respiratory:  Positive for shortness of breath. Negative for chest tightness.    Cardiovascular:  Negative for chest pain, palpitations, orthopnea, leg swelling and PND.   Endocrine: Negative for polydipsia, polyphagia and polyuria.   Musculoskeletal:  Negative for muscle weakness and neck pain.   Neurological:  Negative for dizziness and headaches.     Objective   Physical Exam  Vitals reviewed.   Constitutional:       Appearance: Normal appearance.   HENT:      Head: Normocephalic and atraumatic.      Right Ear: Tympanic membrane, ear canal and external ear normal.      Left Ear: Tympanic membrane, ear canal and external ear normal.      Nose: Nose normal.      Mouth/Throat:      Mouth: Mucous membranes are moist.      Pharynx: Oropharynx is clear.   Cardiovascular:      Rate and Rhythm: Normal rate and regular rhythm.      Heart sounds: Normal heart sounds. No murmur heard.    No friction rub. No gallop.   Pulmonary:      Effort: Pulmonary effort is normal. No respiratory distress.      Breath sounds: Normal breath sounds. No stridor. No wheezing, rhonchi or rales.   Chest:      Chest wall: No tenderness.   Abdominal:      General: Bowel sounds are normal. There is no distension.      Palpations: Abdomen is soft. There is no mass.      Tenderness: There is no abdominal tenderness. There is no guarding or rebound.      Hernia: No hernia is present.   Skin:     General: Skin is warm and dry.   Neurological:      Mental Status: He is alert.      Comments: He  has a pill rolling tremor present. (This is a chronic finding.)         Visit Vitals  /70   Pulse 68   Ht 170.2 cm (67\")   Wt 71 kg (156 lb 9 oz)   SpO2 98%   BMI 24.52 kg/mý     Body mass index is 24.52 kg/mý.      Assessment/Plan   Diagnoses and all orders for this visit:    1. Coronary artery disease involving native coronary artery of native heart without angina pectoris (Primary)    2. Essential hypertension    3. Hyperlipidemia, " unspecified hyperlipidemia type    4. Diabetes mellitus type 2, noninsulin dependent    I reviewed the CBC,CMP,and Iron from today with the patient.  Return to the clinic in 3 month/s.  Will contact with results as needed.  The above medical issues are stable. Continue  on current medications.  He has been in  the E.D. this AM and was treated for Bronchitis.He is to take Prednisone and Azithromycin.   He is to hold the Prednisone and just take the Azithromycin.  It is my medical opinion that Mr. Parker needs a new Rollator walker aa his old one is broken and he could fall and injure himself.        This document has been electronically signed by Mata Larkin MD on August 17, 2023 11:24 CDT

## 2023-08-17 NOTE — DISCHARGE INSTRUCTIONS
Please return with new or worsening symptoms.  Continue daily inhaler as prescribed.  Use albuterol/rescue inhaler every 4 hours for the next 48 hours and then as needed.  Medications have sent to the pharmacy to take for the next 5 days.  Follow closely with primary care.

## 2023-08-17 NOTE — ED PROVIDER NOTES
Subjective   History of Present Illness  87-year-old male presents the emergency department with complaint of cough and generalized weakness.  He reports that he coughed once and it had some blood streaked in it.  It is not happened again but he presented for evaluation.  History of COPD, not oxygen dependent.  He is also on anticoagulation.  History of diabetes and chronic kidney disease.  He denies any chest pain or shortness of breath.    Family history, surgical history, social history, current medications and allergies are reviewed with the patient and triage documentation and vitals are reviewed.      History provided by:  Patient and medical records   used: No      Review of Systems   Constitutional:  Negative for chills and fever.   HENT:  Negative for congestion and sore throat.    Eyes:  Negative for photophobia and visual disturbance.   Respiratory:  Positive for cough. Negative for chest tightness, shortness of breath and wheezing.    Cardiovascular:  Negative for chest pain, palpitations and leg swelling.   Gastrointestinal:  Negative for abdominal pain, diarrhea, nausea and vomiting.   Endocrine: Negative for polydipsia, polyphagia and polyuria.   Genitourinary:  Negative for dysuria, frequency and urgency.   Musculoskeletal:  Negative for arthralgias and myalgias.   Skin:  Negative for rash and wound.   Allergic/Immunologic: Negative.    Neurological: Negative.    Hematological: Negative.    Psychiatric/Behavioral: Negative.       Past Medical History:   Diagnosis Date    Acquired hallux rigidus     Anemia     Arthritis     Arthropathy of lumbar facet joint     Carpal tunnel syndrome     CHF (congestive heart failure)     Chronic diastolic congestive heart failure     Chronic kidney disease     Chronic kidney disease, stage II (mild)     Chronic kidney disease stage 2    Chronic obstructive lung disease     Chronic renal failure     Coronary artery disease     Cortical senile  cataract     Diabetes mellitus     Diabetes mellitus type 2, noninsulin dependent     diabetes mellitus type 2, non-insulin treated, Medication treatment plan: oral diabetic medication    Diabetes mellitus without complication     Diastolic dysfunction     Diastolic heart failure     Essential hypertension     difficult to control       Hip pain     History of echocardiogram 08/19/2015    Echocardiogram W/ color flow 68837 (1) - Mild to moderate LVH with mild left atrial enlargement and normal aortic root.CLVH,preserved LV systolic function with Ef of 55%.Diastolic dysfunction.MV intact.AV thickened.Aortic sclerosis.    Hyperlipidemia     Influenza vaccine administered 10/02/2015    INFLUENZA IMMUN ADMIN OR PREV RECV'D  (1) - Ordered By: DIONNE MANDUJANO ([facility])     Insomnia     Insomnia     Other insomnia    Joint pain     Low back pain     Muscle strain     Nuclear cataract     Onychomycosis     Pneumococcal vaccination given 07/22/2016    PNEUMOC VAC/ADMIN/RCVD 4040F (3) - Ordered By: DIONNE MANDUJANO ([facility])    Prostate cancer     Pure hypercholesterolemia     Sedentary lifestyle     Thyroid nodule 3/1/2022       Allergies   Allergen Reactions    Aldactone [Spironolactone] Other (See Comments)     Hyponatremia and hyperkalemia       Past Surgical History:   Procedure Laterality Date    BACK SURGERY      CARDIAC ABLATION      CARDIAC CATHETERIZATION  02/04/1986    Cardiac cath 89486 (1) - normal left heart catherization,non cardiac chest pain    CARPAL TUNNEL RELEASE  10/19/2015    Carpal tunnel surgery (1) - Release of left carpal tunnel    CATARACT EXTRACTION W/ INTRAOCULAR LENS IMPLANT Left 3/31/2017    Procedure: REMOVE CATARACT AND IMPLANT INRAOCULAR LENS LEFT EYE;  Surgeon: Hector Navarrete MD;  Location: [facility];  Service:     CATARACT EXTRACTION W/ INTRAOCULAR LENS IMPLANT Right 4/14/2017    Procedure: REMOVE CATARACT AND IMPLANT INTRAOCULAR LENS RIGHT EYE;  Surgeon: Hector Navarrete,  MD;  Location: [facility] OR;  Service:     COLONOSCOPY N/A 10/31/2018    Procedure: COLONOSCOPY;  Surgeon: Herbie Christine MD;  Location: [facility] ENDOSCOPY;  Service: Gastroenterology    ENDOSCOPY  2013    Colon endoscopy 26166 (2) - Hemorrhoids found.    FRACTURE SURGERY      INGUINAL HERNIA REPAIR Right 1968    Inguinal hernia, repair (2)    INJECTION OF MEDICATION  2012    Albuterol (2u) (1) - Ordered By: LIDYA STEINER ([facility])     INJECTION OF MEDICATION  2012    Atrovent (1) - Ordered By: LIDYA STEINER ([facility])     INJECTION OF MEDICATION  02/10/2012    Depo Medrol (Methylprednisone) (3) - Ordered By: HEATHER PAK ([facility])     INJECTION OF MEDICATION  2014    Kenalog (4) - Ordered By: DIMAS ANDRADE ([facility])     JOINT REPLACEMENT      LUMBAR LAMINECTOMY      Lumbar laminectomy (1)    MANDIBLE FRACTURE SURGERY  10/11/1981    Treat nose/jaw fracture (1) - bilateral open reduction of fracture of mandible    OTHER SURGICAL HISTORY  2011    Drain/Inject Major Joint  (1) - Ordered By: KARLOS HERNANDEZ ([facility])     OTHER SURGICAL HISTORY      Insert IVC filter 36269 (1)    OTHER SURGICAL HISTORY  10/11/2011    SPIROMETRY 32821 (1) - Ordered By: HEATHER PAK ([facility])    TOTAL HIP ARTHROPLASTY Right        Family History   Problem Relation Age of Onset    Diabetes Other     Cancer Sister     Heart disease Mother     Hypertension Mother     Heart disease Father     Hypertension Father     Coronary artery disease Neg Hx        Social History     Socioeconomic History    Marital status:    Tobacco Use    Smoking status: Former     Types: Cigarettes     Quit date:      Years since quittin.6     Passive exposure: Past    Smokeless tobacco: Never   Vaping Use    Vaping Use: Never used   Substance and Sexual Activity    Alcohol use: No    Drug use: No    Sexual activity: Not Currently     Partners: Female     Comment: Marital status:             Objective   Physical Exam  Vitals and nursing note reviewed.   Constitutional:       General: He is not in acute distress.     Appearance: Normal appearance. He is normal weight. He is not ill-appearing, toxic-appearing or diaphoretic.   HENT:      Head: Normocephalic.      Mouth/Throat:      Mouth: Mucous membranes are moist.   Eyes:      Conjunctiva/sclera: Conjunctivae normal.      Pupils: Pupils are equal, round, and reactive to light.   Cardiovascular:      Rate and Rhythm: Normal rate and regular rhythm.      Pulses: Normal pulses.      Heart sounds: No murmur heard.  Pulmonary:      Effort: Pulmonary effort is normal. No respiratory distress.      Breath sounds: Wheezing present. No rhonchi.   Chest:      Chest wall: No tenderness.   Abdominal:      General: Bowel sounds are normal.      Palpations: Abdomen is soft.   Musculoskeletal:         General: Normal range of motion.      Cervical back: Normal range of motion and neck supple.      Right lower leg: No edema.      Left lower leg: No edema.   Skin:     General: Skin is warm and dry.      Capillary Refill: Capillary refill takes less than 2 seconds.   Neurological:      General: No focal deficit present.      Mental Status: He is alert.   Psychiatric:         Mood and Affect: Mood normal.         Behavior: Behavior normal.       ECG 12 Lead      Date/Time: 8/17/2023 12:10 AM  Performed by: Odell Norwood DO  Authorized by: Odell Norwood DO   Interpreted by physician  Comments: EKG August 17, 2023 at 0010 reveals sinus rhythm with first-degree AV block and baseline artifact at a rate of 73 bpm.  Left ventricular hypertrophy without obvious ST elevation or depression.  Diffuse inferior T wave flattening.  No T wave inversion.  No evidence of acute ischemia.  QTc 451.             ED Course      Labs Reviewed   COMPREHENSIVE METABOLIC PANEL - Abnormal; Notable for the following components:       Result Value    Glucose 129 (*)     eGFR  56.6 (*)     All other components within normal limits    Narrative:     GFR Normal >60  Chronic Kidney Disease <60  Kidney Failure <15    The GFR formula is only valid for adults with stable renal function between ages 18 and 70.   SINGLE HSTROPONIN T - Abnormal; Notable for the following components:    HS Troponin T 35 (*)     All other components within normal limits    Narrative:     High Sensitive Troponin T Reference Range:  <10.0 ng/L- Negative Female for AMI  <15.0 ng/L- Negative Male for AMI  >=10 - Abnormal Female indicating possible myocardial injury.  >=15 - Abnormal Male indicating possible myocardial injury.   Clinicians would have to utilize clinical acumen, EKG, Troponin, and serial changes to determine if it is an Acute Myocardial Infarction or myocardial injury due to an underlying chronic condition.        CBC WITH AUTO DIFFERENTIAL - Abnormal; Notable for the following components:    Neutrophil % 79.3 (*)     Lymphocyte % 10.8 (*)     All other components within normal limits   SINGLE HSTROPONIN T - Abnormal; Notable for the following components:    HS Troponin T 34 (*)     All other components within normal limits    Narrative:     High Sensitive Troponin T Reference Range:  <10.0 ng/L- Negative Female for AMI  <15.0 ng/L- Negative Male for AMI  >=10 - Abnormal Female indicating possible myocardial injury.  >=15 - Abnormal Male indicating possible myocardial injury.   Clinicians would have to utilize clinical acumen, EKG, Troponin, and serial changes to determine if it is an Acute Myocardial Infarction or myocardial injury due to an underlying chronic condition.        BNP (IN-HOUSE) - Normal    Narrative:     Among patients with dyspnea, NT-proBNP is highly sensitive for the detection of acute congestive heart failure. In addition NT-proBNP of <300 pg/ml effectively rules out acute congestive heart failure with 99% negative predictive value.     PROTIME-INR - Normal    Narrative:      Therapeutic range for most indications is 2.0-3.0 INR,  or 2.5-3.5 for mechanical heart valves.   RAINBOW DRAW    Narrative:     The following orders were created for panel order [place] Draw.  Procedure                               Abnormality         Status                     ---------                               -----------         ------                     Green Top (Gel)[[id number]]                                  Final result               Lavender Top[[id number]]                                     Final result               Gold Top - SST[[id number]]                                   Final result               Light Blue Top[[id number]]                                   Final result                 Please view results for these tests on the individual orders.   CBC AND DIFFERENTIAL    Narrative:     The following orders were created for panel order CBC & Differential.  Procedure                               Abnormality         Status                     ---------                               -----------         ------                     CBC Auto Differential[[id number]]        Abnormal            Final result                 Please view results for these tests on the individual orders.   GREEN TOP   LAVENDER TOP   GOLD TOP - SST   LIGHT BLUE TOP     XR Chest 1 View    Result Date: 8/17/2023  Narrative: XR CHEST 1 VIEW HISTORY: SOA Triage Protocol COMPARISON: None. FINDINGS: The lungs are clear. Cardiac silhouette is within normal limits. The pulmonary vasculature is within normal limits. The osseous structures and surrounding soft tissues demonstrate no acute abnormality.     Impression: 1. No radiographic evidence of acute cardiopulmonary process.           Medical Decision Making  Problems Addressed:  Bronchitis: complicated acute illness or injury  Hemoptysis: complicated acute illness or injury    Amount and/or Complexity of Data Reviewed  Labs: ordered.  Radiology: ordered.  ECG/medicine tests:  ordered and independent interpretation performed.    Risk  Prescription drug management.    X-ray imaging with no acute cardiopulmonary process.  Laboratory studies overall unremarkable with no signs of anemia or excessive bleeding.  Troponin mildly elevated in the setting of no chest pain and chronic kidney disease. Patient had only one other small episode of cough with some blood-tinged sputum.  He will be treated for COPD exacerbation.  Started on antibiotic and steroid.  He states he feels fine and is agreeable to this plan and outpatient follow-up.  Discharged home with family.    Final diagnoses:   Bronchitis   Hemoptysis        ED Disposition  ED Disposition       ED Disposition   Discharge    Condition   Stable    Comment   --               Mata Larkin MD  [address] [place] [id number] [phone]    Schedule an appointment as soon as possible for a visit            Medication List        New Prescriptions      predniSONE 10 MG tablet  Commonly known as: DELTASONE  Take 1 tablet by mouth Daily for 5 days.            Stop      gabapentin 300 MG capsule  Commonly known as: NEURONTIN     hydrALAZINE 25 MG tablet  Commonly known as: APRESOLINE     Lidocaine Pain Relief 4 %  Generic drug: Lidocaine     lisinopril-hydrochlorothiazide 20-12.5 MG per tablet  Commonly known as: PRINZIDE,ZESTORETIC     primidone 50 MG tablet  Commonly known as: Mysoline     rivaroxaban 10 MG tablet  Commonly known as: Xarelto               Where to Get Your Medications        These medications were sent to [facility] - [place], [place] - [address] - [phone]  - [phone] FX  [address], [facility] [address]      Phone: [phone]   predniSONE 10 MG tablet            Odell Norwood,   08/18/23 9859

## 2023-08-21 ENCOUNTER — TELEPHONE (OUTPATIENT)
Dept: FAMILY MEDICINE CLINIC | Facility: CLINIC | Age: 88
End: 2023-08-21
Payer: MEDICARE

## 2023-08-21 DIAGNOSIS — M47.816 ARTHROPATHY OF LUMBAR FACET JOINT: Primary | ICD-10-CM

## 2023-08-21 DIAGNOSIS — M15.9 PRIMARY OSTEOARTHRITIS INVOLVING MULTIPLE JOINTS: ICD-10-CM

## 2023-08-21 DIAGNOSIS — M54.12 CERVICAL RADICULOPATHY: ICD-10-CM

## 2023-08-21 DIAGNOSIS — M48.04 THORACIC SPINAL STENOSIS: ICD-10-CM

## 2023-08-21 DIAGNOSIS — E11.40 TYPE 2 DIABETES MELLITUS WITH DIABETIC NEUROPATHY, WITHOUT LONG-TERM CURRENT USE OF INSULIN: ICD-10-CM

## 2023-08-21 DIAGNOSIS — M48.061 SPINAL STENOSIS OF LUMBAR REGION WITHOUT NEUROGENIC CLAUDICATION: ICD-10-CM

## 2023-08-21 RX ORDER — CALCIUM CITRATE/VITAMIN D3 200MG-6.25
TABLET ORAL
Qty: 100 EACH | Refills: 6 | Status: SHIPPED | OUTPATIENT
Start: 2023-08-21

## 2023-08-21 NOTE — TELEPHONE ENCOUNTER
Pt is asking for rolling walker script to be sent [facility]. The one he has is too heavy and the front wheels are messed up. Thanks!

## 2023-08-23 ENCOUNTER — TELEPHONE (OUTPATIENT)
Dept: FAMILY MEDICINE CLINIC | Facility: CLINIC | Age: 88
End: 2023-08-23
Payer: MEDICARE

## 2023-08-23 NOTE — TELEPHONE ENCOUNTER
[facility] - [place], [place] - [address] [place] [address]. - [phone]  - [phone] FX       Order was received for the walker but they need notes that states the patient current mobility and why needs the walker   [facility] called     [phone] fax   [phone] phone

## 2023-08-23 NOTE — TELEPHONE ENCOUNTER
General called wanting Dr Larkin to send in an order for a new rolling walker to [facility]. He states that his old one is worn out. He states this is the second request for this.     Pt call back # [phone]

## 2023-08-24 ENCOUNTER — TELEPHONE (OUTPATIENT)
Dept: FAMILY MEDICINE CLINIC | Facility: CLINIC | Age: 88
End: 2023-08-24
Payer: MEDICARE

## 2023-08-24 LAB
QT INTERVAL: 410 MS
QTC INTERVAL: 451 MS

## 2023-08-24 NOTE — TELEPHONE ENCOUNTER
General called and left a voicemail @ 11:33 am wanting to know if Dr Larkin has sent in the order for the walker that he requested earlier in the week.     Pt call back # [phone]

## 2023-08-24 NOTE — TELEPHONE ENCOUNTER
[facility] has asked for the following    Office visit from 8/17/23 needs to be updated stating that patient needs a new Rolator walker and why patient has Arthropathy of lumbar, Cervical radiculopathy, Primary Osteoarthritis with multiple joints, Spinal Stenosis, Thoracic spinal stenosis these are all the ICD 10 codes that patient has and medicare will pay for the new walker with.

## 2023-08-25 ENCOUNTER — TELEPHONE (OUTPATIENT)
Dept: FAMILY MEDICINE CLINIC | Facility: CLINIC | Age: 88
End: 2023-08-25
Payer: MEDICARE

## 2023-08-25 NOTE — TELEPHONE ENCOUNTER
Please call [facility], they are telling pt that we have not contacted them with the additional information that was requested. General wasn't happy that he has had to call back multiple times and has still not been able to get his walker. Thanks!

## 2023-08-31 RX ORDER — METHOCARBAMOL 500 MG/1
TABLET, FILM COATED ORAL
Qty: 30 TABLET | Refills: 1 | Status: SHIPPED | OUTPATIENT
Start: 2023-08-31

## 2023-09-21 DIAGNOSIS — E78.2 MIXED HYPERLIPIDEMIA: Primary | ICD-10-CM

## 2023-09-21 RX ORDER — ATORVASTATIN CALCIUM 20 MG/1
20 TABLET, FILM COATED ORAL
Qty: 90 TABLET | Refills: 1 | Status: SHIPPED | OUTPATIENT
Start: 2023-09-21

## 2023-10-19 NOTE — TELEPHONE ENCOUNTER
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 Recommend he continue his Xarelto 10mg. His hemoptysis resolved and there is no indication to stop NOAC.       ----- Message from Loree Hernández sent at 2021  3:55 PM CDT -----  Regarding: results  General Jordon camargo 37 wanted his results from his vascular tests, he wanted a call back @ [phone] to talk about this. Thxs        Is This A New Presentation, Or A Follow-Up?: Skin Lesion Has Your Skin Lesion Been Treated?: not been treated

## 2024-01-13 NOTE — TELEPHONE ENCOUNTER
RX has been re faxed to [facility]    
Wants you to refax the walker request to [phone]. Says they have not got it yet. It is for the home medical department. Fara lopez  
97

## 2025-03-14 NOTE — TELEPHONE ENCOUNTER
Patient requesting refill: albuterol Inhaler  Last OV: 1/17/24  Next OV 4/22/25  Last refill: 10/15/24    Can be refilled per protocol. Patient has called and made an appointment to come in.    Requested Refills Sent  ICD-10   Code included, sent to Dr. Larkin to [employer] for insurance purpose

## (undated) DEVICE — GLV SURG TRIUMPH LT PF LTX 8 STRL

## (undated) DEVICE — Device

## (undated) DEVICE — MICROSURGICAL INSTRUMENT PROVISC CANNULA 27GA THREADED HUB: Brand: ALCON

## (undated) DEVICE — GLV SURG SENSICARE GREEN W/ALOE PF LF 6.5 STRL

## (undated) DEVICE — CLEARCUT® SLIT KNIFE INTREPID MICRO-COAXIAL SYSTEM 2.4 SB: Brand: CLEARCUT®; INTREPID

## (undated) DEVICE — CANN SMPL SOFTECH BIFLO ETCO2 A/M 7FT

## (undated) DEVICE — SOL IRR H2O BTL 1000ML STRL

## (undated) DEVICE — SYR LL 3CC

## (undated) DEVICE — INST FRCP BIOPSY RADIALJAW4 W NDL 2.8MM 240CM JUMBO BX40 ORN

## (undated) DEVICE — ALCON INTREPID CURVED 0.3MM POLYMER I/A TIP: Brand: ALCON, INTREPID

## (undated) DEVICE — 30° KELMAN®, 0.9 MM TURBOSONICS® ABS® MINI TIP: Brand: ALCON, KELMAN, TURBOSONICS, ABS

## (undated) DEVICE — GLV SURG SENSICARE GREEN W/ALOE PF LF 7 STRL

## (undated) DEVICE — GLV SURG TRIUMPH LT PF LTX 7.5 STRL

## (undated) DEVICE — THE MONARCH® "D" CARTRIDGE IS A SINGLE-USE POLYPROPYLENE CARTRIDGE FOR POSTERIOR CHAMBER IOL DELIVERY: Brand: MONARCH® III

## (undated) DEVICE — OPHTHALMIC KNIFE 15°: Brand: ALCON

## (undated) DEVICE — GOWN,SIRUS,NONREINF,RAGLAN,XL,STERILE: Brand: MEDLINE

## (undated) DEVICE — GLV SURG TRIUMPH PF LTX 6.5 STRL

## (undated) DEVICE — GLV SURG TRIUMPH NATURAL W/ALOE PF LTX 6.5 STRL

## (undated) DEVICE — GOWN,AURORA,NOREINF,RAGLAN,XL,STERILE: Brand: MEDLINE